# Patient Record
Sex: FEMALE | Race: WHITE | NOT HISPANIC OR LATINO | Employment: OTHER | URBAN - METROPOLITAN AREA
[De-identification: names, ages, dates, MRNs, and addresses within clinical notes are randomized per-mention and may not be internally consistent; named-entity substitution may affect disease eponyms.]

---

## 2017-01-04 DIAGNOSIS — I10 ESSENTIAL HYPERTENSION, BENIGN: Primary | ICD-10-CM

## 2017-01-04 RX ORDER — LISINOPRIL 20 MG/1
20 TABLET ORAL DAILY
Qty: 90 TABLET | Refills: 3 | Status: SHIPPED | OUTPATIENT
Start: 2017-01-04 | End: 2017-08-14

## 2017-01-16 ENCOUNTER — ANTICOAGULATION THERAPY VISIT (OUTPATIENT)
Dept: NURSING | Facility: CLINIC | Age: 77
End: 2017-01-16
Payer: COMMERCIAL

## 2017-01-16 DIAGNOSIS — Z79.01 LONG-TERM (CURRENT) USE OF ANTICOAGULANTS: Primary | ICD-10-CM

## 2017-01-16 LAB — INR POINT OF CARE: 3.1 (ref 0.86–1.14)

## 2017-01-16 PROCEDURE — 99207 ZZC NO CHARGE NURSE ONLY: CPT

## 2017-01-16 PROCEDURE — 85610 PROTHROMBIN TIME: CPT | Mod: QW

## 2017-01-16 PROCEDURE — 36416 COLLJ CAPILLARY BLOOD SPEC: CPT

## 2017-01-16 NOTE — MR AVS SNAPSHOT
Salome SPRAGUE Darlin   1/16/2017 3:00 PM   Anticoagulation Therapy Visit    Description:  76 year old female   Provider:   ANTICOAGULATION CLINIC   Department:   Nurse           INR as of 1/16/2017     Selected INR 3.1! (1/16/2017)      Anticoagulation Summary as of 1/16/2017     INR goal 2.0-3.0   Selected INR 3.1! (1/16/2017)   Full instructions 2.5 mg on Mon, Wed, Fri; 5 mg all other days   Next INR check 2/13/2017    Indications   Long-term (current) use of anticoagulants [Z79.01] [Z79.01]  CVA (cerebral vascular accident) (Resolved) [I63.9]         Your next Anticoagulation Clinic appointment(s)     Feb 13, 2017  3:00 PM   Anticoagulation Visit with  ANTICOAGULATION CLINIC   [facility] [place] ([facility])    [address] [address] [phone]              Contact Numbers     [place] [facility]  Please call [phone] with any problems or questions regarding your therapy, or to cancel or reschedule your appointment        January 2017 Details    Sun Mon Tue Wed Thu Fri Sat     1               2               3               4               5               6               7                 8               9               10               11               12               13               14                 15               16      2.5 mg   See details      17      5 mg         18      2.5 mg         19      5 mg         20      2.5 mg         21      5 mg           22      5 mg         23      2.5 mg         24      5 mg         25      2.5 mg         26      5 mg         27      2.5 mg         28      5 mg           29      5 mg         30      2.5 mg         31      5 mg              Date Details   01/16 This INR check   Increase greens               How to take your warfarin dose     To take:  2.5 mg Take 0.5 of a 5 mg tablet.    To take:  5 mg Take 1 of the 5 mg tablets.           February 2017 Details    Sun Mon Tue Wed Thu Fri Sat        1      2.5 mg         2       5 mg         3      2.5 mg         4      5 mg           5      5 mg         6      2.5 mg         7      5 mg         8      2.5 mg         9      5 mg         10      2.5 mg         11      5 mg           12      5 mg         13            14               15               16               17               18                 19               20               21               22               23               24               25                 26               27               28                    Date Details   No additional details    Date of next INR:  2/13/2017         How to take your warfarin dose     To take:  2.5 mg Take 0.5 of a 5 mg tablet.    To take:  5 mg Take 1 of the 5 mg tablets.

## 2017-01-16 NOTE — PROGRESS NOTES
ANTICOAGULATION FOLLOW-UP CLINIC VISIT    Patient Name:  Salome Saeed  Date:  1/16/2017  Contact Type:  Face to Face    SUBJECTIVE:     Patient Findings     Positives No Problem Findings           OBJECTIVE    INR PROTIME   Date Value Ref Range Status   01/16/2017 3.1* 0.86 - 1.14 Final       ASSESSMENT / PLAN  INR assessment THER    Recheck INR In: 4 WEEKS    INR Location Clinic      Anticoagulation Summary as of 1/16/2017     INR goal 2.0-3.0   Selected INR 3.1! (1/16/2017)   Maintenance plan 2.5 mg (5 mg x 0.5) on Mon, Wed, Fri; 5 mg (5 mg x 1) all other days   Full instructions 2.5 mg on Mon, Wed, Fri; 5 mg all other days   Weekly total 27.5 mg   Plan last modified Leyla Lopez RN (1/11/2016)   Next INR check 2/13/2017   Target end date     Indications   Long-term (current) use of anticoagulants [Z79.01] [Z79.01]  CVA (cerebral vascular accident) (Resolved) [I63.9]         Anticoagulation Episode Summary     INR check location     Preferred lab     Send INR reminders to [facility]    Comments             See the Encounter Report to view Anticoagulation Flowsheet and Dosing Calendar (Go to Encounters tab in chart review, and find the Anticoagulation Therapy Visit)    Rosanne Rico RN

## 2017-01-31 ENCOUNTER — TRANSFERRED RECORDS (OUTPATIENT)
Dept: HEALTH INFORMATION MANAGEMENT | Facility: CLINIC | Age: 77
End: 2017-01-31

## 2017-02-02 ENCOUNTER — TELEPHONE (OUTPATIENT)
Dept: FAMILY MEDICINE | Facility: CLINIC | Age: 77
End: 2017-02-02

## 2017-02-02 NOTE — TELEPHONE ENCOUNTER
Spoke To patient. She went and talked to Dr. Harris.  He is recommending surgery. She feels very comfortable with  Dr. Harris. Her sons are encouraging her to potentially  Get a second opinion.She is asking for my advice. I told her if she is comfortable with Dr. Harris, She should stick with him.  She feels most comfortable doing this.  She will follow-up to address her  Coumadin prior to her surgery.    Karen Weiler, MD

## 2017-02-02 NOTE — TELEPHONE ENCOUNTER
Name of caller: Salome  Relationship to Patient: Self    Reason for Call: Patient would like to talk to Dr. Weiler about personal issues.  She will no be home between 12:30 to 3:30 today, but she would like to speak with her today.    Best phone number to reach patient at: [phone]  OK to leave message with medical info.?: Yes    Nereyda Harkins  Patient Representative - [facility]

## 2017-02-14 ENCOUNTER — ANTICOAGULATION THERAPY VISIT (OUTPATIENT)
Dept: NURSING | Facility: CLINIC | Age: 77
End: 2017-02-14
Payer: COMMERCIAL

## 2017-02-14 VITALS — SYSTOLIC BLOOD PRESSURE: 118 MMHG | DIASTOLIC BLOOD PRESSURE: 60 MMHG

## 2017-02-14 DIAGNOSIS — Z79.01 LONG-TERM (CURRENT) USE OF ANTICOAGULANTS: ICD-10-CM

## 2017-02-14 LAB — INR POINT OF CARE: 2.8 (ref 0.86–1.14)

## 2017-02-14 PROCEDURE — 36416 COLLJ CAPILLARY BLOOD SPEC: CPT

## 2017-02-14 PROCEDURE — 85610 PROTHROMBIN TIME: CPT | Mod: QW

## 2017-02-14 PROCEDURE — 99207 ZZC NO CHARGE NURSE ONLY: CPT

## 2017-02-14 NOTE — MR AVS SNAPSHOT
Salomeduane Saeed   2/14/2017 2:00 PM   Anticoagulation Therapy Visit    Description:  76 year old female   Provider:  [facility]   Department:  [facility]           INR as of 2/14/2017     Today's INR 2.8      Anticoagulation Summary as of 2/14/2017     INR goal 2.0-3.0   Today's INR 2.8   Full instructions 2.5 mg on Mon, Wed, Fri; 5 mg all other days   Next INR check 3/14/2017    Indications   Long-term (current) use of anticoagulants [Z79.01] [Z79.01]  CVA (cerebral vascular accident) (Resolved) [I63.9]         Contact Numbers     [facility]  Please call [phone] with any problems or questions regarding your therapy, or to cancel or reschedule your appointment        February 2017 Details    Sun Mon Tue Wed Thu Fri Sat        1               2               3               4                 5               6               7               8               9               10               11                 12               13               14      5 mg   See details      15      2.5 mg         16      5 mg         17      2.5 mg         18      5 mg           19      5 mg         20      2.5 mg         21      5 mg         22      2.5 mg         23      5 mg         24      2.5 mg         25      5 mg           26      5 mg         27      2.5 mg         28      5 mg              Date Details   02/14 This INR check               How to take your warfarin dose     To take:  2.5 mg Take 0.5 of a 5 mg tablet.    To take:  5 mg Take 1 of the 5 mg tablets.           March 2017 Details    Sun Mon Tue Wed Thu Fri Sat        1      2.5 mg         2      5 mg         3      2.5 mg         4      5 mg           5      5 mg         6      2.5 mg         7      5 mg         8      2.5 mg         9      5 mg         10      2.5 mg         11      5 mg           12      5 mg         13      2.5 mg         14            15               16               17               18                 19                20               21               22               23               24               25                 26               27               28               29               30               31                 Date Details   No additional details    Date of next INR:  3/14/2017         How to take your warfarin dose     To take:  2.5 mg Take 0.5 of a 5 mg tablet.    To take:  5 mg Take 1 of the 5 mg tablets.

## 2017-02-14 NOTE — PROGRESS NOTES
ANTICOAGULATION FOLLOW-UP CLINIC VISIT    Patient Name:  Salome Saeed  Date:  2/14/2017  Contact Type:  Face to Face    SUBJECTIVE:     Patient Findings     Positives No Problem Findings           OBJECTIVE    INR Protime   Date Value Ref Range Status   02/14/2017 2.8 (A) 0.86 - 1.14 Final       ASSESSMENT / PLAN  No question data found.  Anticoagulation Summary as of 2/14/2017     INR goal 2.0-3.0   Today's INR 2.8   Maintenance plan 2.5 mg (5 mg x 0.5) on Mon, Wed, Fri; 5 mg (5 mg x 1) all other days   Full instructions 2.5 mg on Mon, Wed, Fri; 5 mg all other days   Weekly total 27.5 mg   No change documented Day, NUBIA Lay   Plan last modified Leyla Lopez RN (1/11/2016)   Next INR check 3/14/2017   Target end date     Indications   Long-term (current) use of anticoagulants [Z79.01] [Z79.01]  CVA (cerebral vascular accident) (Resolved) [I63.9]         Anticoagulation Episode Summary     INR check location     Preferred lab     Send INR reminders to  [facility]    Comments             See the Encounter Report to view Anticoagulation Flowsheet and Dosing Calendar (Go to Encounters tab in chart review, and find the Anticoagulation Therapy Visit)      Rosanne Rico RN

## 2017-03-14 ENCOUNTER — ANTICOAGULATION THERAPY VISIT (OUTPATIENT)
Dept: NURSING | Facility: CLINIC | Age: 77
End: 2017-03-14
Payer: COMMERCIAL

## 2017-03-14 DIAGNOSIS — Z79.01 LONG-TERM (CURRENT) USE OF ANTICOAGULANTS: ICD-10-CM

## 2017-03-14 LAB — INR POINT OF CARE: 3.1 (ref 0.86–1.14)

## 2017-03-14 PROCEDURE — 36416 COLLJ CAPILLARY BLOOD SPEC: CPT

## 2017-03-14 PROCEDURE — 99207 ZZC NO CHARGE NURSE ONLY: CPT

## 2017-03-14 PROCEDURE — 85610 PROTHROMBIN TIME: CPT | Mod: QW

## 2017-03-14 NOTE — PROGRESS NOTES
ANTICOAGULATION FOLLOW-UP CLINIC VISIT    Patient Name:  Salome Saeed  Date:  3/14/2017  Contact Type:  Face to Face    SUBJECTIVE:     Patient Findings     Positives No Problem Findings           OBJECTIVE    INR Protime   Date Value Ref Range Status   03/14/2017 3.1 (A) 0.86 - 1.14 Final       ASSESSMENT / PLAN  INR assessment THER    Recheck INR In: 4 WEEKS    INR Location Clinic      Anticoagulation Summary as of 3/14/2017     INR goal 2.0-3.0   Today's INR 3.1!   Maintenance plan 2.5 mg (5 mg x 0.5) on Mon, Wed, Fri; 5 mg (5 mg x 1) all other days   Full instructions 2.5 mg on Mon, Wed, Fri; 5 mg all other days   Weekly total 27.5 mg   Plan last modified Leyla Lopez RN (1/11/2016)   Next INR check 4/10/2017   Target end date     Indications   Long-term (current) use of anticoagulants [Z79.01] [Z79.01]  CVA (cerebral vascular accident) (Resolved) [I63.9]         Anticoagulation Episode Summary     INR check location     Preferred lab     Send INR reminders to [facility]    Comments             See the Encounter Report to view Anticoagulation Flowsheet and Dosing Calendar (Go to Encounters tab in chart review, and find the Anticoagulation Therapy Visit)      Rosanne Rico RN

## 2017-03-14 NOTE — MR AVS SNAPSHOT
Salome SPRAGUE Darlin   3/14/2017 2:00 PM   Anticoagulation Therapy Visit    Description:  76 year old female   Provider:  [facility]   Department:  [facility]           INR as of 3/14/2017     Today's INR 3.1!      Anticoagulation Summary as of 3/14/2017     INR goal 2.0-3.0   Today's INR 3.1!   Full instructions 2.5 mg on Mon, Wed, Fri; 5 mg all other days   Next INR check 4/10/2017    Indications   Long-term (current) use of anticoagulants [Z79.01] [Z79.01]  CVA (cerebral vascular accident) (Resolved) [I63.9]         Contact Numbers     [facility]  Please call [phone] with any problems or questions regarding your therapy, or to cancel or reschedule your appointment        March 2017 Details    Sun Mon Tue Wed Thu Fri Sat        1               2               3               4                 5               6               7               8               9               10               11                 12               13               14      5 mg   See details      15      2.5 mg         16      5 mg         17      2.5 mg         18      5 mg           19      5 mg         20      2.5 mg         21      5 mg         22      2.5 mg         23      5 mg         24      2.5 mg         25      5 mg           26      5 mg         27      2.5 mg         28      5 mg         29      2.5 mg         30      5 mg         31      2.5 mg           Date Details   03/14 This INR check   Increase greens               How to take your warfarin dose     To take:  2.5 mg Take 0.5 of a 5 mg tablet.    To take:  5 mg Take 1 of the 5 mg tablets.           April 2017 Details    Sun Mon Tue Wed Thu Fri Sat           1      5 mg           2      5 mg         3      2.5 mg         4      5 mg         5      2.5 mg         6      5 mg         7      2.5 mg         8      5 mg           9      5 mg         10            11               12               13               14               15                 16                17               18               19               20               21               22                 23               24               25               26               27               28               29                 30                      Date Details   No additional details    Date of next INR:  4/10/2017         How to take your warfarin dose     To take:  2.5 mg Take 0.5 of a 5 mg tablet.    To take:  5 mg Take 1 of the 5 mg tablets.

## 2017-03-17 DIAGNOSIS — E03.9 HYPOTHYROIDISM, UNSPECIFIED TYPE: ICD-10-CM

## 2017-03-17 NOTE — TELEPHONE ENCOUNTER
levothyroxine (SYNTHROID,LEVOTHROID) 100 MCG tablet     Last Written Prescription Date: 7/14/2016  Last Quantity: 90 tablet, # refills: 3  Last Office Visit with G, P or [facility] prescribing provider: 10/18/2016   Next 5 appointments (look out 90 days)     Apr 10, 2017 10:00 AM CDT   Pre-Op physical with Karen Weiler, MD   [facility]    [address] [facility] [id number] [phone]                   TSH   Date Value Ref Range Status   05/04/2016 0.16 (L) 0.40 - 4.00 mU/L Final

## 2017-03-20 RX ORDER — LEVOTHYROXINE SODIUM 100 UG/1
100 TABLET ORAL DAILY
Qty: 90 TABLET | Refills: 0 | Status: SHIPPED | OUTPATIENT
Start: 2017-03-20 | End: 2017-05-24

## 2017-03-20 NOTE — TELEPHONE ENCOUNTER
Previous rx was sent to [facility]. Patient now requesting through mail order. Rx updated and sent to mail order.  Rosanne Rico RN, BSN  [facility]

## 2017-04-10 ENCOUNTER — OFFICE VISIT (OUTPATIENT)
Dept: FAMILY MEDICINE | Facility: CLINIC | Age: 77
End: 2017-04-10
Payer: COMMERCIAL

## 2017-04-10 ENCOUNTER — ANTICOAGULATION THERAPY VISIT (OUTPATIENT)
Dept: FAMILY MEDICINE | Facility: CLINIC | Age: 77
End: 2017-04-10
Payer: COMMERCIAL

## 2017-04-10 VITALS
HEIGHT: 64 IN | OXYGEN SATURATION: 98 % | HEART RATE: 66 BPM | TEMPERATURE: 97.9 F | SYSTOLIC BLOOD PRESSURE: 119 MMHG | WEIGHT: 153.8 LBS | BODY MASS INDEX: 26.26 KG/M2 | DIASTOLIC BLOOD PRESSURE: 66 MMHG

## 2017-04-10 DIAGNOSIS — Z79.01 LONG-TERM (CURRENT) USE OF ANTICOAGULANTS: ICD-10-CM

## 2017-04-10 DIAGNOSIS — Z01.818 PREOP GENERAL PHYSICAL EXAM: Primary | ICD-10-CM

## 2017-04-10 LAB
ERYTHROCYTE [DISTWIDTH] IN BLOOD BY AUTOMATED COUNT: 14.5 % (ref 10–15)
HCT VFR BLD AUTO: 40.8 % (ref 35–47)
HGB BLD-MCNC: 13.7 G/DL (ref 11.7–15.7)
INR POINT OF CARE: 2.7 (ref 0.86–1.14)
MCH RBC QN AUTO: 29.3 PG (ref 26.5–33)
MCHC RBC AUTO-ENTMCNC: 33.6 G/DL (ref 31.5–36.5)
MCV RBC AUTO: 87 FL (ref 78–100)
PLATELET # BLD AUTO: 220 10E9/L (ref 150–450)
RBC # BLD AUTO: 4.68 10E12/L (ref 3.8–5.2)
WBC # BLD AUTO: 6.6 10E9/L (ref 4–11)

## 2017-04-10 PROCEDURE — 36416 COLLJ CAPILLARY BLOOD SPEC: CPT | Performed by: FAMILY MEDICINE

## 2017-04-10 PROCEDURE — 93000 ELECTROCARDIOGRAM COMPLETE: CPT | Performed by: FAMILY MEDICINE

## 2017-04-10 PROCEDURE — 80048 BASIC METABOLIC PNL TOTAL CA: CPT | Performed by: FAMILY MEDICINE

## 2017-04-10 PROCEDURE — 85027 COMPLETE CBC AUTOMATED: CPT | Performed by: FAMILY MEDICINE

## 2017-04-10 PROCEDURE — 99214 OFFICE O/P EST MOD 30 MIN: CPT | Performed by: FAMILY MEDICINE

## 2017-04-10 PROCEDURE — 85610 PROTHROMBIN TIME: CPT | Mod: QW | Performed by: FAMILY MEDICINE

## 2017-04-10 NOTE — NURSING NOTE
"Chief Complaint   Patient presents with     Pre-Op Exam       Initial /66  Pulse 66  Temp 97.9  F (36.6  C) (Oral)  Ht 5' 4\" (1.626 m)  Wt 153 lb 12.8 oz (69.8 kg)  SpO2 98%  BMI 26.4 kg/m2 Estimated body mass index is 26.4 kg/(m^2) as calculated from the following:    Height as of this encounter: 5' 4\" (1.626 m).    Weight as of this encounter: 153 lb 12.8 oz (69.8 kg).  Medication Reconciliation: complete   Petra Whiting Medical Assistant      "

## 2017-04-10 NOTE — MR AVS SNAPSHOT
After Visit Summary   4/10/2017    Salome Saeed    MRN: [id number]           Patient Information     Date Of Birth          1940        Visit Information        Provider Department      4/10/2017 10:00 AM Weiler, Karen, MD [facility] [place]        Today's Diagnoses     Preop general physical exam    -  1      Care Instructions      Before Your Surgery      Call your surgeon if there is any change in your health. This includes signs of a cold or flu (such as a sore throat, runny nose, cough, rash or fever).    Do not smoke, drink alcohol or take over the counter medicine (unless your surgeon or primary care doctor tells you to) for the 24 hours before and after surgery.    If you take prescribed drugs: Follow your doctor s orders about which medicines to take and which to stop until after surgery.    Eating and drinking prior to surgery: follow the instructions from your surgeon    Take a shower or bath the night before surgery. Use the soap your surgeon gave you to gently clean your skin. If you do not have soap from your surgeon, use your regular soap. Do not shave or scrub the surgery site.  Wear clean pajamas and have clean sheets on your bed.         Follow-ups after your visit        Who to contact     If you have questions or need follow up information about today's clinic visit or your schedule please [facility] at [phone].  Normal or non-critical lab and imaging results will be communicated to you by MyChart, letter or phone within 4 business days after the clinic has received the results. If you do not hear from us within 7 days, please contact the clinic through MyChart or phone. If you have a critical or abnormal lab result, we will notify you by phone as soon as possible.  Submit refill requests through "[employer] or call your pharmacy and they will forward the refill request to us. Please allow 3 business days for your refill to be completed.  "         Additional Information About Your Visit        MyChart Information     [employer] lets you send messages to your doctor, view your test results, renew your prescriptions, schedule appointments and more. To sign up, go to [email or web address] . Click on \"Log in\" on the left side of the screen, which will take you to the Welcome page. Then click on \"Sign up Now\" on the right side of the page.     You will be asked to enter the access code listed below, as well as some personal information. Please follow the directions to create your username and password.     Your access code is: [email or web address]  Expires: 2017  7:41 PM     Your access code will  in 90 days. If you need help or a new code, please call your [facility] clinic or [phone].        Care EveryWhere ID     This is your Care EveryWhere ID. This could be used by other organizations to access your [facility] medical records  [id number]        Your Vitals Were     Pulse Temperature Height Pulse Oximetry BMI (Body Mass Index)       66 97.9  F (36.6  C) (Oral) 5' 4\" (1.626 m) 98% 26.4 kg/m2        Blood Pressure from Last 3 Encounters:   04/10/17 119/66   17 118/60   10/18/16 132/70    Weight from Last 3 Encounters:   04/10/17 153 lb 12.8 oz (69.8 kg)   10/18/16 150 lb 6.4 oz (68.2 kg)   16 150 lb 3.2 oz (68.1 kg)              We Performed the Following     Basic metabolic panel     CBC with platelets     EKG 12-lead complete w/read - Clinics          Today's Medication Changes          These changes are accurate as of: 4/10/17 11:59 PM.  If you have any questions, ask your nurse or doctor.               Start taking these medicines.        Dose/Directions    enoxaparin 60 MG/0.6ML injection   Commonly known as:  LOVENOX   Used for:  Long-term (current) use of anticoagulants   Started by:  Weiler, Karen, MD        Dose:  60 mg   Inject 0.6 mLs (60 mg) Subcutaneous every 12 hours   Quantity:  6 mL   Refills:  0            Where to get " your medicines      These medications were sent to [id number] - [place], [place] - [address]  [address] [address] [place] [address]     Phone:  [phone]     enoxaparin 60 MG/0.6ML injection                Primary Care Provider Office Phone # Fax #    Karen Weiler, [phone]       [facility] [address]  [place] [address]        Thank you!     Thank you for choosing [facility]  for your care. Our goal is always to provide you with excellent care. Hearing back from our patients is one way we can continue to improve our services. Please take a few minutes to complete the written survey that you may receive in the mail after your visit with us. Thank you!             Your Updated Medication List - Protect others around you: Learn how to safely use, store and throw away your medicines at www.disposemymeds.org.          This list is accurate as of: 4/10/17 11:59 PM.  Always use your most recent med list.                   Brand Name Dispense Instructions for use    acetaminophen 325 MG tablet    TYLENOL     Take 2 tablets by mouth every 6 hours as needed for pain.       amLODIPine 5 MG tablet    NORVASC    90 tablet    Take 1 tablet (5 mg) by mouth daily       ascorbic acid 500 MG tablet    VITAMIN C     Take 1,000 mg by mouth daily       aspirin EC 81 MG EC tablet      Take 1 tablet (81 mg) by mouth daily       atenolol 25 MG tablet    TENORMIN    180 tablet    Take 1 tablet (25 mg) by mouth 2 times daily       CALCIUM 600 + D PO      Take 1,200 mg by mouth daily       CoQ-10 200 MG Caps      Take 400 mg by mouth daily       DAILY MULTIVITAMIN PO      Take by mouth daily       enoxaparin 60 MG/0.6ML injection    LOVENOX    6 mL    Inject 0.6 mLs (60 mg) Subcutaneous every 12 hours       escitalopram 5 MG tablet    LEXAPRO    90 tablet    Take 1 tablet (5 mg) by mouth daily       levothyroxine 100 MCG tablet    SYNTHROID/LEVOTHROID    90 tablet    Take 1  tablet (100 mcg) by mouth daily       lisinopril 20 MG tablet    PRINIVIL/ZESTRIL    90 tablet    Take 1 tablet (20 mg) by mouth daily       nitroglycerin 0.4 MG sublingual tablet    NITROSTAT    25 tablet    Place 1 tablet (0.4 mg) under the tongue every 5 minutes as needed for chest pain       ranitidine 150 MG tablet    ZANTAC    180 tablet    Take 1 tablet (150 mg) by mouth 2 times daily       rosuvastatin 10 MG tablet    CRESTOR    90 tablet    Take 1 tablet (10 mg) by mouth daily       vitamin D 2000 UNITS tablet      Take 2,000 Units by mouth daily       warfarin 5 MG tablet    COUMADIN    90 tablet    Take 0.5-1 tab daily as directed by INR nurse based on INR reading.

## 2017-04-10 NOTE — LETTER
[facility]     [address] [place], [place] [address] [phone]   April 12, 2017    Salome Saeed  [address]  [place] [address]      Dear Salome,    Here is a summary of your recent test results:    All of your labs are normal    Your test results are enclosed.      Thank you for choosing [facility].  We appreciate the opportunity to serve you and look forward to supporting your healthcare needs in the future.    If you have any questions or concerns, please call me or my staff at [phone].    Best regards,  Karen Weiler, MD      Results for orders placed or performed in visit on 04/10/17   CBC with platelets   Result Value Ref Range    WBC 6.6 4.0 - 11.0 10e9/L    RBC Count 4.68 3.8 - 5.2 10e12/L    Hemoglobin 13.7 11.7 - 15.7 g/dL    Hematocrit 40.8 35.0 - 47.0 %    MCV 87 78 - 100 fl    MCH 29.3 26.5 - 33.0 pg    MCHC 33.6 31.5 - 36.5 g/dL    RDW 14.5 10.0 - 15.0 %    Platelet Count 220 150 - 450 10e9/L   Basic metabolic panel   Result Value Ref Range    Sodium 134 133 - 144 mmol/L    Potassium 5.0 3.4 - 5.3 mmol/L    Chloride 98 94 - 109 mmol/L    Carbon Dioxide 28 20 - 32 mmol/L    Anion Gap 8 3 - 14 mmol/L    Glucose 103 (H) 70 - 99 mg/dL    Urea Nitrogen 13 7 - 30 mg/dL    Creatinine 0.68 0.52 - 1.04 mg/dL    GFR Estimate 85 >60 mL/min/1.7m2    GFR Estimate If Black >90   GFR Calc   >60 mL/min/1.7m2    Calcium 9.2 8.5 - 10.1 mg/dL

## 2017-04-10 NOTE — PROGRESS NOTES
[facility]  [address] [address] [phone]  Dept: [phone]    PRE-OP EVALUATION:  Today's date: 4/10/2017    Salome Saeed (: 1940) presents for pre-operative evaluation assessment as requested by Dr. Harris.  She requires evaluation and anesthesia risk assessment prior to undergoing surgery/procedure for treatment of Back .  Proposed procedure: Decompression Levels: L3 to L4 Posterior spine fusion : L2 to L4    Date of Surgery/ Procedure: 17  Time of Surgery/ Procedure: 12.30 PM   Hospital/Surgical Facility: [facility]   Fax number for surgical facility: [phone]  Primary Physician: Weiler, Karen  Type of Anesthesia Anticipated: General    Patient has a Health Care Directive or Living Will:  NO    1. YES - DO YOU HAVE A HISTORY OF HEART ATTACK, STROKE, STENT, BYPASS OR SURGERY ON AN ARTERY IN THE HEAD, NECK, HEART OR LEG? Stroke , stents   2. NO - Do you ever have any pain or discomfort in your chest?  3. NO - Do you have a history of  Heart Failure?  4. NO - Are you troubled by shortness of breath when: walking on the level, up a slight hill or at night?  5. NO - Do you currently have a cold, bronchitis or other respiratory infection?  6. NO - Do you have a cough, shortness of breath or wheezing?  7. NO - DO YOU SOMETIMES GET PAINS IN THE CALVES OF YOUR LEGS WHEN YOU WALK?   8. NO - Do you or anyone in your family have previous history of blood clots?  9. NO - Do you or does anyone in your family have a serious bleeding problem such as prolonged bleeding following surgeries or cuts?  10. NO - Have you ever had problems with anemia or been told to take iron pills?  11. NO - Have you had any abnormal blood loss such as black, tarry or bloody stools, or abnormal vaginal bleeding?  12. NO - Have you ever had a blood transfusion?  13. NO - Have you or any of your relatives ever had problems with anesthesia?  14. NO - Do you have sleep apnea,  excessive snoring or daytime drowsiness?  15. NO - Do you have any prosthetic heart valves?  16. NO - Do you have prosthetic joints?  17. NO - Is there any chance that you may be pregnant?      HPI:                                                      Brief HPI related to upcoming procedure:     Patient reports she will have a posterior spine fusion L2-L4 at [facility], performed by Dr. Harris. Patient reports she is asymptomatic when she is not active. When she stands or walks she experiences pain in her legs. She also has difficulty sleeping due to the pain.       See problem list for active medical problems.  Problems all longstanding and stable, except as noted/documented.  See ROS for pertinent symptoms related to these conditions.                                                                                                  .    MEDICAL HISTORY:                                                      Patient Active Problem List    Diagnosis Date Noted     Hyperlipidemia LDL goal <70 04/01/2010     Priority: High     Coronary artery disease involving native coronary artery of native heart without angina pectoris 04/09/2009     Priority: High     5/28/2015 - PTCA and 2.25x8mm Promus PREMIER NAILA to ostium of small second OM  12/2012 cath - 40-50% stenosis in mid PDA, 80% on D1- medically treat  4/8/2009: PTCA and two 2.5x15mm Xience stents to mid LAD lesion        Benign essential hypertension      Priority: High     Cerebrovascular accident involving cerebellum (H) 10/26/2016     Priority: Medium     Vertebral artery stenosis, right      Priority: Medium     Hypothyroidism, unspecified type 05/18/2016     Priority: Medium     Long-term (current) use of anticoagulants [Z79.01] 01/11/2016     Priority: Medium     GERD (gastroesophageal reflux disease)      Priority: Medium     Status post coronary angiogram 05/28/2015     Mitral regurgitation      1+ per 7/2013 Echo       Anxiety 08/08/2013     Health Care Home  07/23/2013     EMERGENCY CARE PLAN  Presenting Problem Signs and Symptoms Treatment Plan    Questions or conerns during clinic hours    I will call the clinic directly     Questions or conerns outside clinic hours    I will call the 24 hour nurse line at [phone]    Patient needs to schedule an appointment    I will call the 24 hour scheduling team at [phone] or clinic directly    Same day treatment     I will call the clinic first, nurse line if after hours, urgent care and express care if needed                          Clinic Care Coordinator:Apolinar Green, [phone]  **Pt not in active care coordination at this time.       Spinal stenosis, lumbar region, with neurogenic claudication 05/08/2013     Lumbago 09/07/2012     DDD (degenerative disc disease), lumbar 07/09/2012     Lumbar spinal stenosis 07/09/2012     Advanced directives, counseling/discussion 06/18/2012     Discussed advance care planning with patient; however, patient declined at this time. 6/18/2012           Past Medical History:   Diagnosis Date     CAD (coronary artery disease) 4/9/2009 4/8/2009: PTCA and two 2.5x15mm Xience stents to mid LAD lesion; Cath 12/2012- 40-50% stenosis in mid PDA, 80% on D1- medically treat , 5/28/2015 - PTCA and 2.25x8mm Promus PREMIIER NAILA to ostium of 2nd OM     CVA (cerebral infarction)      DDD (degenerative disc disease)      Essential hypertension, benign      GERD (gastroesophageal reflux disease)      Headache      Hyperlipidemia      Hypothyroidism      Lumbago      Lumbar spinal stenosis      Mitral regurgitation     1+ per 7/2013 Echo     Vertebral artery stenosis, right      Past Surgical History:   Procedure Laterality Date     CORONARY ANGIOGRAPHY ADULT ORDER  12/6/2012    40-50% stenosis to mid PDA, 80% in D1- medically treat     CORONARY ANGIOGRAPHY ADULT ORDER  5/28/2015    PTCA and 2.25x8mm Promus PREMIER NAILA to ostium of 2nd OM     DECOMPRESSION, FUSION LUMBAR POSTERIOR THREE +  LEVELS, COMBINED  5/8/2013    Procedure: COMBINED DECOMPRESSION, FUSION LUMBAR POSTERIOR THREE + LEVELS;  Posterior Lumbar Decompression L3-S1, Fusion L4-S1;  Surgeon: Daniel Harris MD;  Location: [facility] OR     ENDOSCOPIC STRIPPING VEIN(S)       HEART CATH, ANGIOPLASTY  4/8/2009    PTCA and two 2.5x15mm Xience stents to mid LAD lesion     HYSTERECTOMY, RICKIE      Fibroids, and Menorrhagia.. Bilateral Oophrectomy     ORTHOPEDIC SURGERY       Stenting of LAD, Angioplasty  4/8/09     TONSILLECTOMY      as a child     Current Outpatient Prescriptions   Medication Sig Dispense Refill     levothyroxine (SYNTHROID/LEVOTHROID) 100 MCG tablet Take 1 tablet (100 mcg) by mouth daily 90 tablet 0     lisinopril (PRINIVIL/ZESTRIL) 20 MG tablet Take 1 tablet (20 mg) by mouth daily 90 tablet 3     warfarin (COUMADIN) 5 MG tablet Take 0.5-1 tab daily as directed by INR nurse based on INR reading. 90 tablet 1     aspirin EC 81 MG tablet Take 1 tablet (81 mg) by mouth daily       atenolol (TENORMIN) 25 MG tablet Take 1 tablet (25 mg) by mouth 2 times daily 180 tablet 3     rosuvastatin (CRESTOR) 10 MG tablet Take 1 tablet (10 mg) by mouth daily 90 tablet 3     amLODIPine (NORVASC) 5 MG tablet Take 1 tablet (5 mg) by mouth daily 90 tablet 3     escitalopram (LEXAPRO) 5 MG tablet Take 1 tablet (5 mg) by mouth daily 90 tablet 3     Multiple Vitamin (DAILY MULTIVITAMIN PO) Take by mouth daily       Calcium Carb-Cholecalciferol (CALCIUM 600 + D PO) Take 1,200 mg by mouth daily        Cholecalciferol (VITAMIN D) 2000 UNITS tablet Take 2,000 Units by mouth daily       Coenzyme Q10 (COQ-10) 200 MG CAPS Take 400 mg by mouth daily        ascorbic acid (VITAMIN C) 500 MG tablet Take 1,000 mg by mouth daily        ranitidine (ZANTAC) 150 MG tablet Take 1 tablet (150 mg) by mouth 2 times daily 180 tablet 1     nitroglycerin (NITROSTAT) 0.4 MG SL tablet Place 1 tablet (0.4 mg) under the tongue every 5 minutes as needed for chest pain 25 tablet 2  "    acetaminophen (TYLENOL) 325 MG tablet Take 2 tablets by mouth every 6 hours as needed for pain.  0     OTC products: Tylenol and vitamins     Allergies   Allergen Reactions     Atorvastatin      Leg cramps     Gluten      Sinuses affected by gluten     Magnesium Salicylate      Oat      Shellfish Allergy      hives     Wheat       Latex Allergy: NO    Social History   Substance Use Topics     Smoking status: Former Smoker     Quit date: 1/1/1965     Smokeless tobacco: Never Used     Alcohol use 0.0 oz/week     0 Standard drinks or equivalent per week      Comment: 2 glasses wine per month     History   Drug Use No       REVIEW OF SYSTEMS:                                                    C: NEGATIVE for fever, chills, change in weight  I: NEGATIVE for worrisome rashes, moles or lesions  E: NEGATIVE for vision changes or irritation  E/M: NEGATIVE for ear, mouth and throat problems  R: NEGATIVE for significant cough or SOB  CV: NEGATIVE for chest pain, palpitations or peripheral edema  GI: NEGATIVE for nausea, abdominal pain, heartburn, or change in bowel habits  : NEGATIVE for frequency, dysuria, or hematuria  M: NEGATIVE for significant arthralgias or myalgia  N: NEGATIVE for weakness, dizziness or paresthesias  E: NEGATIVE for temperature intolerance, skin/hair changes  H: NEGATIVE for bleeding problems  P: NEGATIVE for changes in mood or affect    This document serves as a record of the services and decisions personally performed and made by Karen Weiler, MD. It was created on her behalf by Maria Antonia Milton, a trained medical scribe. The creation of this document is based the provider's statements to the medical scribe.  Maria Antonia Milton April 10, 2017 11:05 AM    EXAM:                                                    /66  Pulse 66  Temp 97.9  F (36.6  C) (Oral)  Ht 1.626 m (5' 4\")  Wt 69.8 kg (153 lb 12.8 oz)  SpO2 98%  BMI 26.4 kg/m2    GENERAL APPEARANCE: healthy, alert and no distress     EYES: EOMI, " PERRL     HENT: ear canals and TM's normal and nose and mouth without ulcers or lesions     NECK: no adenopathy, no asymmetry, masses, or scars and thyroid normal to palpation     RESP: lungs clear to auscultation - no rales, rhonchi or wheezes     CV: regular rates and rhythm, normal S1 S2, no S3 or S4 and no murmur, click or rub     ABDOMEN:  soft, nontender, no HSM or masses and bowel sounds normal     MS: extremities normal- no gross deformities noted, no evidence of inflammation in joints, FROM in all extremities.     SKIN: no suspicious lesions or rashes     NEURO: Normal strength and tone, sensory exam grossly normal, mentation intact and speech normal     PSYCH: mentation appears normal. and affect normal/bright     LYMPHATICS: No axillary, cervical, or supraclavicular nodes    DIAGNOSTICS:                                                      EKG: appears normal, NSR, normal axis, normal intervals, no acute ST/T changes c/w ischemia, no LVH by voltage criteria, unchanged from previous tracings  Labs Resulted Today:   Results for orders placed or performed in visit on 04/10/17   CBC with platelets   Result Value Ref Range    WBC 6.6 4.0 - 11.0 10e9/L    RBC Count 4.68 3.8 - 5.2 10e12/L    Hemoglobin 13.7 11.7 - 15.7 g/dL    Hematocrit 40.8 35.0 - 47.0 %    MCV 87 78 - 100 fl    MCH 29.3 26.5 - 33.0 pg    MCHC 33.6 31.5 - 36.5 g/dL    RDW 14.5 10.0 - 15.0 %    Platelet Count 220 150 - 450 10e9/L   Basic metabolic panel   Result Value Ref Range    Sodium 134 133 - 144 mmol/L    Potassium 5.0 3.4 - 5.3 mmol/L    Chloride 98 94 - 109 mmol/L    Carbon Dioxide 28 20 - 32 mmol/L    Anion Gap 8 3 - 14 mmol/L    Glucose 103 (H) 70 - 99 mg/dL    Urea Nitrogen 13 7 - 30 mg/dL    Creatinine 0.68 0.52 - 1.04 mg/dL    GFR Estimate 85 >60 mL/min/1.7m2    GFR Estimate If Black >90   GFR Calc   >60 mL/min/1.7m2    Calcium 9.2 8.5 - 10.1 mg/dL       Recent Labs   Lab Test 03/14/17 02/14/17 08/24/16   0827    05/04/16   1207   07/08/15   1215   HGB   --    --    --    --    --   13.4   --   13.6   PLT   --    --    --    --    --   213   --   225   INR  3.1*  2.8*   < >   --    < >   --    < >   --    NA   --    --    --   137   --   134   < >  129*   POTASSIUM   --    --    --   4.1   --   4.4   < >  4.6   CR   --    --    --   0.59   --   0.65   < >  0.65    < > = values in this interval not displayed.        IMPRESSION:                                                    Reason for surgery/procedure: Posterior spine fusion : L2 to L4  Diagnosis/reason for consult: clear for anesthesia    The proposed surgical procedure is considered INTERMEDIATE risk.    REVISED CARDIAC RISK INDEX  The patient has the following serious cardiovascular risks for perioperative complications such as (MI, PE, VFib and 3  AV Block):  No serious cardiac risks  INTERPRETATION: 1 risks: Class II (low risk - 0.9% complication rate)    The patient has the following additional risks for perioperative complications:  No identified additional risks      ICD-10-CM    1. Preop general physical exam Z01.818 EKG 12-lead complete w/read - Clinics     CBC with platelets     Basic metabolic panel       RECOMMENDATIONS:                                                      Patient is currently on Coumadin for history of CVA.  We'll bridge with Lovenox.  Instructions given to patient.  --Patient is to take all scheduled medications on the day of surgery EXCEPT for modifications listed below.    APPROVAL GIVEN to proceed with proposed procedure, without further diagnostic evaluation     The information in this document, created by the medical scribe for me, accurately reflects the services I personally performed and the decisions made by me. I have reviewed and approved this document for accuracy prior to leaving the patient care area.  4/10/2017 11:04 AM         Signed Electronically by: Karen Weiler, MD    Copy of this evaluation report is provided to  requesting physician.    [facility] Preop Guidelines

## 2017-04-10 NOTE — PROGRESS NOTES
ANTICOAGULATION FOLLOW-UP CLINIC VISIT    Patient Name:  Salome Saeed  Date:  4/10/2017  Contact Type:  Face to Face    SUBJECTIVE:     Patient Findings     Positives No Problem Findings           OBJECTIVE    INR Protime   Date Value Ref Range Status   04/10/2017 2.7 (A) 0.86 - 1.14 Final       ASSESSMENT / PLAN  INR assessment THER    Recheck INR In: 4 WEEKS    INR Location Clinic      Anticoagulation Summary as of 4/10/2017     INR goal 2.0-3.0   Today's INR 2.7   Maintenance plan 2.5 mg (5 mg x 0.5) on Mon, Wed, Fri; 5 mg (5 mg x 1) all other days   Full instructions 4/21: Hold; 4/22: Hold; 4/23: Hold; 4/24: Hold; 4/25: Hold; Otherwise 2.5 mg on Mon, Wed, Fri; 5 mg all other days   Weekly total 27.5 mg   Plan last modified Leyla Lopez (1/11/2016)   Next INR check    Target end date     Indications   Long-term (current) use of anticoagulants [Z79.01] [Z79.01]  CVA (cerebral vascular accident) (Resolved) [I63.9]         Anticoagulation Episode Summary     INR check location     Preferred lab     Send INR reminders to [facility] TRI[facility]    Comments             See the Encounter Report to view Anticoagulation Flowsheet and Dosing Calendar (Go to Encounters tab in chart review, and find the Anticoagulation Therapy Visit)    Patient having spine surgery on 4/26/17. Hold and Lovenox instructions given today. Will plan on following up with patient 5/1/17 via phone - patient may have home care checking INR's post-operatively.    Rosanne Rico RN

## 2017-04-11 LAB
ANION GAP SERPL CALCULATED.3IONS-SCNC: 8 MMOL/L (ref 3–14)
BUN SERPL-MCNC: 13 MG/DL (ref 7–30)
CALCIUM SERPL-MCNC: 9.2 MG/DL (ref 8.5–10.1)
CHLORIDE SERPL-SCNC: 98 MMOL/L (ref 94–109)
CO2 SERPL-SCNC: 28 MMOL/L (ref 20–32)
CREAT SERPL-MCNC: 0.68 MG/DL (ref 0.52–1.04)
GFR SERPL CREATININE-BSD FRML MDRD: 85 ML/MIN/1.7M2
GLUCOSE SERPL-MCNC: 103 MG/DL (ref 70–99)
POTASSIUM SERPL-SCNC: 5 MMOL/L (ref 3.4–5.3)
SODIUM SERPL-SCNC: 134 MMOL/L (ref 133–144)

## 2017-04-17 ENCOUNTER — TRANSFERRED RECORDS (OUTPATIENT)
Dept: HEALTH INFORMATION MANAGEMENT | Facility: CLINIC | Age: 77
End: 2017-04-17

## 2017-04-26 ENCOUNTER — TRANSFERRED RECORDS (OUTPATIENT)
Dept: HEALTH INFORMATION MANAGEMENT | Facility: CLINIC | Age: 77
End: 2017-04-26

## 2017-05-01 ENCOUNTER — ANTICOAGULATION THERAPY VISIT (OUTPATIENT)
Dept: NURSING | Facility: CLINIC | Age: 77
End: 2017-05-01
Payer: COMMERCIAL

## 2017-05-01 DIAGNOSIS — Z79.01 LONG-TERM (CURRENT) USE OF ANTICOAGULANTS: ICD-10-CM

## 2017-05-01 LAB — INR POINT OF CARE: 1 (ref 0.86–1.14)

## 2017-05-01 PROCEDURE — 85610 PROTHROMBIN TIME: CPT | Mod: QW

## 2017-05-01 PROCEDURE — 36416 COLLJ CAPILLARY BLOOD SPEC: CPT

## 2017-05-01 PROCEDURE — 99207 ZZC NO CHARGE NURSE ONLY: CPT

## 2017-05-01 NOTE — MR AVS SNAPSHOT
Salomeduane Saeed   5/1/2017 2:00 PM   Anticoagulation Therapy Visit    Description:  77 year old female   Provider:   ANTICOAGULATION CLINIC   Department:   Nurse           INR as of 5/1/2017     Today's INR 1.0!      Anticoagulation Summary as of 5/1/2017     INR goal 2.0-3.0   Today's INR 1.0!   Full instructions 5/1: 5 mg; 5/3: 5 mg; Otherwise 2.5 mg on Mon, Wed, Fri; 5 mg all other days   Next INR check 5/4/2017    Indications   Long-term (current) use of anticoagulants [Z79.01] [Z79.01]  CVA (cerebral vascular accident) (Resolved) [I63.9]         Your next Anticoagulation Clinic appointment(s)     May 04, 2017  2:00 PM CDT   Anticoagulation Visit with  ANTICOAGULATION CLINIC   [facility]    [address]  [place] [address] [phone]              Contact Numbers     [facility]  Please call [phone] with any problems or questions regarding your therapy, or to cancel or reschedule your appointment        May 2017 Details    Sun Mon Tue Wed Thu Fri Sat      1      5 mg   See details      2      5 mg         3      5 mg         4            5               6                 7               8               9               10               11               12               13                 14               15               16               17               18               19               20                 21               22               23               24               25               26               27                 28               29               30               31                   Date Details   05/01 This INR check   Take 5 mg (5 mg tablets x 1)   Start one dose of Lovenox daily starting today until next appointment on Thursday       Date of next INR:  5/4/2017         How to take your warfarin dose     To take:  5 mg Take 1 of the 5 mg tablets.

## 2017-05-01 NOTE — PROGRESS NOTES
ANTICOAGULATION FOLLOW-UP CLINIC VISIT    Patient Name:  Salome Saeed  Date:  5/1/2017  Contact Type:  Face to Face    SUBJECTIVE:     Patient Findings     Positives Hospital admission (recent back surgery), Intentional hold of therapy           OBJECTIVE    INR Protime   Date Value Ref Range Status   05/01/2017 1.0 0.86 - 1.14 Final       ASSESSMENT / PLAN  INR assessment SUB    Recheck INR In: 3 DAYS    INR Location Clinic      Anticoagulation Summary as of 5/1/2017     INR goal 2.0-3.0   Today's INR 1.0!   Maintenance plan 2.5 mg (5 mg x 0.5) on Mon, Wed, Fri; 5 mg (5 mg x 1) all other days   Full instructions 5/1: 5 mg; 5/3: 5 mg; Otherwise 2.5 mg on Mon, Wed, Fri; 5 mg all other days   Weekly total 27.5 mg   Plan last modified Leyla Lopez (1/11/2016)   Next INR check 5/4/2017   Target end date     Indications   Long-term (current) use of anticoagulants [Z79.01] [Z79.01]  CVA (cerebral vascular accident) (Resolved) [I63.9]         Anticoagulation Episode Summary     INR check location     Preferred lab     Send INR reminders to [facility]    Comments             See the Encounter Report to view Anticoagulation Flowsheet and Dosing Calendar (Go to Encounters tab in chart review, and find the Anticoagulation Therapy Visit)    Dosage adjustment made based on physician directed care plan.    Rosanne Rico RN

## 2017-05-04 ENCOUNTER — ANTICOAGULATION THERAPY VISIT (OUTPATIENT)
Dept: NURSING | Facility: CLINIC | Age: 77
End: 2017-05-04
Payer: COMMERCIAL

## 2017-05-04 DIAGNOSIS — I10 HTN (HYPERTENSION): ICD-10-CM

## 2017-05-04 DIAGNOSIS — Z79.01 LONG-TERM (CURRENT) USE OF ANTICOAGULANTS: ICD-10-CM

## 2017-05-04 LAB — INR POINT OF CARE: 1.6 (ref 0.86–1.14)

## 2017-05-04 PROCEDURE — 36416 COLLJ CAPILLARY BLOOD SPEC: CPT

## 2017-05-04 PROCEDURE — 99207 ZZC NO CHARGE NURSE ONLY: CPT

## 2017-05-04 PROCEDURE — 85610 PROTHROMBIN TIME: CPT | Mod: QW

## 2017-05-04 RX ORDER — AMLODIPINE BESYLATE 5 MG/1
5 TABLET ORAL DAILY
Qty: 90 TABLET | Refills: 1 | Status: SHIPPED | OUTPATIENT
Start: 2017-05-04 | End: 2017-09-25

## 2017-05-04 NOTE — PROGRESS NOTES
ANTICOAGULATION FOLLOW-UP CLINIC VISIT    Patient Name:  Salome aSeed  Date:  5/4/2017  Contact Type:  Face to Face    SUBJECTIVE:     Patient Findings     Positives Hospital admission, Intentional hold of therapy           OBJECTIVE    INR Protime   Date Value Ref Range Status   05/04/2017 1.6 (A) 0.86 - 1.14 Final       ASSESSMENT / PLAN  INR assessment SUB    Recheck INR In: 1 WEEK    INR Location Clinic      Anticoagulation Summary as of 5/4/2017     INR goal 2.0-3.0   Today's INR 1.6!   Maintenance plan 2.5 mg (5 mg x 0.5) on Mon, Wed, Fri; 5 mg (5 mg x 1) all other days   Full instructions 2.5 mg on Mon, Wed, Fri; 5 mg all other days   Weekly total 27.5 mg   No change documented Day, NUBIA Lay   Plan last modified Leyla Lopez (1/11/2016)   Next INR check 5/11/2017   Target end date     Indications   Long-term (current) use of anticoagulants [Z79.01] [Z79.01]  CVA (cerebral vascular accident) (Resolved) [I63.9]         Anticoagulation Episode Summary     INR check location     Preferred lab     Send INR reminders to [facility] TRI[facility]    Comments             See the Encounter Report to view Anticoagulation Flowsheet and Dosing Calendar (Go to Encounters tab in chart review, and find the Anticoagulation Therapy Visit)    Dosage adjustment made based on physician directed care plan.    Rosanne Rico RN

## 2017-05-04 NOTE — MR AVS SNAPSHOT
Salome CELESTINE Darlin   5/4/2017 2:00 PM   Anticoagulation Therapy Visit    Description:  77 year old female   Provider:   ANTICOAGULATION CLINIC   Department:   Nurse           INR as of 5/4/2017     Today's INR 1.6!      Anticoagulation Summary as of 5/4/2017     INR goal 2.0-3.0   Today's INR 1.6!   Full instructions 2.5 mg on Mon, Wed, Fri; 5 mg all other days   Next INR check 5/11/2017    Indications   Long-term (current) use of anticoagulants [Z79.01] [Z79.01]  CVA (cerebral vascular accident) (Resolved) [I63.9]         Your next Anticoagulation Clinic appointment(s)     May 11, 2017  2:30 PM CDT   Anticoagulation Visit with  ANTICOAGULATION CLINIC   [facility]    [address] [place] [place] [id number] [phone]              Contact Numbers     [facility]  Please call [phone] with any problems or questions regarding your therapy, or to cancel or reschedule your appointment        May 2017 Details    Sun Mon Tue Wed Thu Fri Sat      1               2               3               4      5 mg   See details      5      2.5 mg         6      5 mg           7      5 mg         8      2.5 mg         9      5 mg         10      2.5 mg         11            12               13                 14               15               16               17               18               19               20                 21               22               23               24               25               26               27                 28               29               30               31                   Date Details   05/04 This INR check       Date of next INR:  5/11/2017         How to take your warfarin dose     To take:  2.5 mg Take 0.5 of a 5 mg tablet.    To take:  5 mg Take 1 of the 5 mg tablets.

## 2017-05-11 ENCOUNTER — ANTICOAGULATION THERAPY VISIT (OUTPATIENT)
Dept: NURSING | Facility: CLINIC | Age: 77
End: 2017-05-11
Payer: COMMERCIAL

## 2017-05-11 DIAGNOSIS — Z79.01 LONG-TERM (CURRENT) USE OF ANTICOAGULANTS: ICD-10-CM

## 2017-05-11 LAB — INR POINT OF CARE: 2.7 (ref 0.86–1.14)

## 2017-05-11 PROCEDURE — 36416 COLLJ CAPILLARY BLOOD SPEC: CPT

## 2017-05-11 PROCEDURE — 99207 ZZC NO CHARGE NURSE ONLY: CPT

## 2017-05-11 PROCEDURE — 85610 PROTHROMBIN TIME: CPT | Mod: QW

## 2017-05-11 NOTE — PROGRESS NOTES
ANTICOAGULATION FOLLOW-UP CLINIC VISIT    Patient Name:  Salome Saeed  Date:  5/11/2017  Contact Type:  Face to Face    SUBJECTIVE:     Patient Findings     Positives No Problem Findings           OBJECTIVE    INR Protime   Date Value Ref Range Status   05/11/2017 2.7 (A) 0.86 - 1.14 Final       ASSESSMENT / PLAN  INR assessment THER    Recheck INR In: 2 WEEKS    INR Location Clinic      Anticoagulation Summary as of 5/11/2017     INR goal 2.0-3.0   Today's INR 2.7   Maintenance plan 2.5 mg (5 mg x 0.5) on Mon, Wed, Fri; 5 mg (5 mg x 1) all other days   Full instructions 2.5 mg on Mon, Wed, Fri; 5 mg all other days   Weekly total 27.5 mg   No change documented Rosanne Rico RN   Plan last modified Leyla Lopez (1/11/2016)   Next INR check 5/25/2017   Target end date     Indications   Long-term (current) use of anticoagulants [Z79.01] [Z79.01]  CVA (cerebral vascular accident) (Resolved) [I63.9]         Anticoagulation Episode Summary     INR check location     Preferred lab     Send INR reminders to [facility]    Comments             See the Encounter Report to view Anticoagulation Flowsheet and Dosing Calendar (Go to Encounters tab in chart review, and find the Anticoagulation Therapy Visit)    Rosanne Rico, RN

## 2017-05-11 NOTE — MR AVS SNAPSHOT
Salome Eppersonde   5/11/2017 2:30 PM   Anticoagulation Therapy Visit    Description:  77 year old female   Provider:   ANTICOAGULATION CLINIC   Department:   Nurse           INR as of 5/11/2017     Today's INR 2.7      Anticoagulation Summary as of 5/11/2017     INR goal 2.0-3.0   Today's INR 2.7   Full instructions 2.5 mg on Mon, Wed, Fri; 5 mg all other days   Next INR check 5/25/2017    Indications   Long-term (current) use of anticoagulants [Z79.01] [Z79.01]  CVA (cerebral vascular accident) (Resolved) [I63.9]         Your next Anticoagulation Clinic appointment(s)     May 25, 2017  3:00 PM CDT   Anticoagulation Visit with  ANTICOAGULATION CLINIC   [facility] [place] ([facility])    [address]  [facility] [id number] [phone]              Contact Numbers     [place] [facility]  Please call [phone] with any problems or questions regarding your therapy, or to cancel or reschedule your appointment        May 2017 Details    Sun Mon Tue Wed Thu Fri Sat      1               2               3               4               5               6                 7               8               9               10               11      5 mg   See details      12      2.5 mg         13      5 mg           14      5 mg         15      2.5 mg         16      5 mg         17      2.5 mg         18      5 mg         19      2.5 mg         20      5 mg           21      5 mg         22      2.5 mg         23      5 mg         24      2.5 mg         25            26               27                 28               29               30               31                   Date Details   05/11 This INR check       Date of next INR:  5/25/2017         How to take your warfarin dose     To take:  2.5 mg Take 0.5 of a 5 mg tablet.    To take:  5 mg Take 1 of the 5 mg tablets.

## 2017-05-18 DIAGNOSIS — I25.10 CAD (CORONARY ARTERY DISEASE): ICD-10-CM

## 2017-05-18 RX ORDER — ATENOLOL 25 MG/1
25 TABLET ORAL 2 TIMES DAILY
Qty: 180 TABLET | Refills: 1 | Status: SHIPPED | OUTPATIENT
Start: 2017-05-18 | End: 2017-08-14

## 2017-05-24 ENCOUNTER — TELEPHONE (OUTPATIENT)
Dept: FAMILY MEDICINE | Facility: CLINIC | Age: 77
End: 2017-05-24

## 2017-05-24 ENCOUNTER — OFFICE VISIT (OUTPATIENT)
Dept: FAMILY MEDICINE | Facility: CLINIC | Age: 77
End: 2017-05-24
Payer: COMMERCIAL

## 2017-05-24 VITALS
TEMPERATURE: 97.7 F | HEIGHT: 64 IN | BODY MASS INDEX: 25.78 KG/M2 | OXYGEN SATURATION: 98 % | SYSTOLIC BLOOD PRESSURE: 118 MMHG | HEART RATE: 72 BPM | WEIGHT: 151 LBS | DIASTOLIC BLOOD PRESSURE: 60 MMHG

## 2017-05-24 DIAGNOSIS — R53.83 FATIGUE, UNSPECIFIED TYPE: Primary | ICD-10-CM

## 2017-05-24 DIAGNOSIS — M51.369 DDD (DEGENERATIVE DISC DISEASE), LUMBAR: ICD-10-CM

## 2017-05-24 DIAGNOSIS — R11.0 NAUSEA: ICD-10-CM

## 2017-05-24 DIAGNOSIS — E03.9 HYPOTHYROIDISM, UNSPECIFIED TYPE: ICD-10-CM

## 2017-05-24 DIAGNOSIS — F41.9 ANXIETY: ICD-10-CM

## 2017-05-24 LAB
ALBUMIN UR-MCNC: NEGATIVE MG/DL
APPEARANCE UR: CLEAR
BASOPHILS # BLD AUTO: 0.1 10E9/L (ref 0–0.2)
BASOPHILS NFR BLD AUTO: 1.2 %
BILIRUB UR QL STRIP: NEGATIVE
COLOR UR AUTO: YELLOW
CRP SERPL-MCNC: 5.2 MG/L (ref 0–8)
DIFFERENTIAL METHOD BLD: NORMAL
EOSINOPHIL # BLD AUTO: 0.2 10E9/L (ref 0–0.7)
EOSINOPHIL NFR BLD AUTO: 4.8 %
ERYTHROCYTE [DISTWIDTH] IN BLOOD BY AUTOMATED COUNT: 14.7 % (ref 10–15)
ERYTHROCYTE [SEDIMENTATION RATE] IN BLOOD BY WESTERGREN METHOD: 38 MM/H (ref 0–30)
GLUCOSE UR STRIP-MCNC: NEGATIVE MG/DL
HCT VFR BLD AUTO: 37.2 % (ref 35–47)
HGB BLD-MCNC: 12.2 G/DL (ref 11.7–15.7)
HGB UR QL STRIP: ABNORMAL
KETONES UR STRIP-MCNC: NEGATIVE MG/DL
LEUKOCYTE ESTERASE UR QL STRIP: NEGATIVE
LIPASE SERPL-CCNC: 176 U/L (ref 73–393)
LYMPHOCYTES # BLD AUTO: 1.1 10E9/L (ref 0.8–5.3)
LYMPHOCYTES NFR BLD AUTO: 22.1 %
MCH RBC QN AUTO: 29.3 PG (ref 26.5–33)
MCHC RBC AUTO-ENTMCNC: 32.8 G/DL (ref 31.5–36.5)
MCV RBC AUTO: 89 FL (ref 78–100)
MONOCYTES # BLD AUTO: 0.4 10E9/L (ref 0–1.3)
MONOCYTES NFR BLD AUTO: 8.3 %
NEUTROPHILS # BLD AUTO: 3.1 10E9/L (ref 1.6–8.3)
NEUTROPHILS NFR BLD AUTO: 63.6 %
NITRATE UR QL: NEGATIVE
NON-SQ EPI CELLS #/AREA URNS LPF: ABNORMAL /LPF
PH UR STRIP: 7 PH (ref 5–7)
PLATELET # BLD AUTO: 287 10E9/L (ref 150–450)
RBC # BLD AUTO: 4.16 10E12/L (ref 3.8–5.2)
RBC #/AREA URNS AUTO: ABNORMAL /HPF (ref 0–2)
SP GR UR STRIP: 1.01 (ref 1–1.03)
URN SPEC COLLECT METH UR: ABNORMAL
UROBILINOGEN UR STRIP-ACNC: 0.2 EU/DL (ref 0.2–1)
WBC # BLD AUTO: 4.8 10E9/L (ref 4–11)
WBC #/AREA URNS AUTO: ABNORMAL /HPF (ref 0–2)

## 2017-05-24 PROCEDURE — 86140 C-REACTIVE PROTEIN: CPT | Performed by: FAMILY MEDICINE

## 2017-05-24 PROCEDURE — 83690 ASSAY OF LIPASE: CPT | Performed by: FAMILY MEDICINE

## 2017-05-24 PROCEDURE — 36415 COLL VENOUS BLD VENIPUNCTURE: CPT | Performed by: FAMILY MEDICINE

## 2017-05-24 PROCEDURE — 87086 URINE CULTURE/COLONY COUNT: CPT | Performed by: FAMILY MEDICINE

## 2017-05-24 PROCEDURE — 85652 RBC SED RATE AUTOMATED: CPT | Performed by: FAMILY MEDICINE

## 2017-05-24 PROCEDURE — 85025 COMPLETE CBC W/AUTO DIFF WBC: CPT | Performed by: FAMILY MEDICINE

## 2017-05-24 PROCEDURE — 84443 ASSAY THYROID STIM HORMONE: CPT | Performed by: FAMILY MEDICINE

## 2017-05-24 PROCEDURE — 80053 COMPREHEN METABOLIC PANEL: CPT | Performed by: FAMILY MEDICINE

## 2017-05-24 PROCEDURE — 81001 URINALYSIS AUTO W/SCOPE: CPT | Performed by: FAMILY MEDICINE

## 2017-05-24 PROCEDURE — 99214 OFFICE O/P EST MOD 30 MIN: CPT | Performed by: FAMILY MEDICINE

## 2017-05-24 RX ORDER — OXYCODONE HYDROCHLORIDE 5 MG/1
TABLET ORAL
Refills: 0 | COMMUNITY
Start: 2017-05-13 | End: 2017-09-01

## 2017-05-24 NOTE — PROGRESS NOTES
SUBJECTIVE:                                                    Salome Saeed is a 77 year old female who presents to clinic today for the following health issues:      Concern - Nausea / Fatigue      Onset: X3 days     Description:   I felt nausea and diarrhea on Monday   Tired and lower back pain ( right side)   Tingling feeling - lasts for a few minutes   Pelvic discomfort on Monday and today     Intensity: moderate    Progression of Symptoms:  same    Accompanying Signs & Symptoms:  Decreased appetite        Previous history of similar problem:   None     Precipitating factors:   Worsened by: daily activities     Alleviating factors:  Improved by: oxycodone        Therapies Tried and outcome: oxycodone - feels better     Back surgery was 4/26/2017. Felt okay, but over the past few days, has not felt good.  Surgery   Has had some loose stools started 2 days ago.  No blood in her stool.  Not dark in color.   Has been feeling very nauseated.  No vomiting.   Has an area in her back that has been very sore.  No numbness or tingling in her legs.  Feels weak.  Appetite is decreased.  Has been taking Oxycodone 1 X a day at bedtime.   Had some abdominal pain when she had the diarrhea.  No dysuria.  No CP, SOB.  No cough. No ill contacts.     Problem list and histories reviewed & adjusted, as indicated.  Additional history: as documented    Patient Active Problem List   Diagnosis     Benign essential hypertension     Coronary artery disease involving native coronary artery of native heart without angina pectoris     Hyperlipidemia LDL goal <70     Advanced directives, counseling/discussion     DDD (degenerative disc disease), lumbar     Lumbar spinal stenosis     Lumbago     Spinal stenosis, lumbar region, with neurogenic claudication     Health Care Home     Anxiety     Mitral regurgitation     Status post coronary angiogram     GERD (gastroesophageal reflux disease)     Long-term (current) use of  "anticoagulants [Z79.01]     Hypothyroidism, unspecified type     Vertebral artery stenosis, right     Cerebrovascular accident involving cerebellum (H)     Past Surgical History:   Procedure Laterality Date     CORONARY ANGIOGRAPHY ADULT ORDER  2012    40-50% stenosis to mid PDA, 80% in D1- medically treat     CORONARY ANGIOGRAPHY ADULT ORDER  2015    PTCA and 2.25x8mm Promus PREMIER NAILA to ostium of 2nd OM     DECOMPRESSION, FUSION LUMBAR POSTERIOR THREE + LEVELS, COMBINED  2013    Procedure: COMBINED DECOMPRESSION, FUSION LUMBAR POSTERIOR THREE + LEVELS;  Posterior Lumbar Decompression L3-S1, Fusion L4-S1;  Surgeon: Daniel Harris MD;  Location: [facility] OR     ENDOSCOPIC STRIPPING VEIN(S)       HEART CATH, ANGIOPLASTY  2009    PTCA and two 2.5x15mm Xience stents to mid LAD lesion     HYSTERECTOMY, RICKIE      Fibroids, and Menorrhagia.. Bilateral Oophrectomy     ORTHOPEDIC SURGERY       Stenting of LAD, Angioplasty  09     TONSILLECTOMY      as a child       Social History   Substance Use Topics     Smoking status: Former Smoker     Quit date: 1965     Smokeless tobacco: Never Used     Alcohol use 0.0 oz/week     0 Standard drinks or equivalent per week      Comment: 2 glasses wine per month     Family History   Problem Relation Age of Onset     Neurologic Disorder Mother      Dementia, Still living     Ovarian Cancer Mother      Thyroid Disease Mother      C.A.D. Father           DIABETES Father      Coronary Artery Disease Father      Alcohol/Drug Brother      Thyroid Disease Son      DIABETES Son            Reviewed and updated as needed this visit by clinical staff       Reviewed and updated as needed this visit by Provider         ROS:  Constitutional, HEENT, cardiovascular, pulmonary, gi and gu systems are negative, except as otherwise noted.    OBJECTIVE:                                                    /60  Pulse 72  Temp 97.7  F (36.5  C) (Oral)  Ht 5' 4\" (1.626 " m)  Wt 151 lb (68.5 kg)  SpO2 98%  BMI 25.92 kg/m2  Body mass index is 25.92 kg/(m^2).  GENERAL: Awake, Alert.  Looks tired.   NECK: no adenopathy, no asymmetry, masses, or scars and thyroid normal to palpation  RESP: lungs clear to auscultation - no rales, rhonchi or wheezes  CV: regular rate and rhythm, normal S1 S2, no S3 or S4, no murmur, click or rub, no peripheral edema and peripheral pulses strong  ABDOMEN: soft, nontender, no hepatosplenomegaly, no masses and bowel sounds normal  MS: no gross musculoskeletal defects noted, no edema  Back:  Incision healing well. No redness or warmth. No tenderness.  Diagnostic Test Results:  UA RESULTS:  Recent Labs   Lab Test  05/24/17   1203   COLOR  Yellow   APPEARANCE  Clear   URINEGLC  Negative   URINEBILI  Negative   URINEKETONE  Negative   SG  1.015   UBLD  Small*   URINEPH  7.0   PROTEIN  Negative   UROBILINOGEN  0.2   NITRITE  Negative   LEUKEST  Negative   RBCU  2-5*   WBCU  O - 2     .   CBC RESULTS:   Recent Labs   Lab Test  05/24/17   1229   WBC  4.8   RBC  4.16   HGB  12.2   HCT  37.2   MCV  89   MCH  29.3   MCHC  32.8   RDW  14.7   PLT  287       ASSESSMENT/PLAN:                                                      (R53.83) Fatigue, unspecified type  (primary encounter diagnosis)  Comment: ?etiology.  Unsure if related to recent back surgery versus other cause. We'll check labs.  Plan: *UA reflex to Microscopic and Culture ([place]         and [facility] (except [place] and         [place]), CBC with platelets and differential,         ESR: Erythrocyte sedimentation rate, CRP,         inflammation, Comprehensive metabolic panel         (BMP + Alb, Alk Phos, ALT, AST, Total. Bili,         TP), Lipase, Urine Microscopic, TSH with free         T4 reflex          (R11.0) Nausea  Comment: ?etiology.  Not associated with much abdominal pain.  Plan: Urine Culture Aerobic Bacterial          (M51.36) DDD (degenerative disc disease), lumbar  Comment: S/P  Lumbar fusion L2-L5.  Pain is under good control. Feel like her postoperative course has been good. She is ambulating well. Has follow-up scheduled with Dr. Harris.  Incision is healing well. No signs of infection.      Follow-up if not improving.    Karen Weiler, MD  [facility]

## 2017-05-24 NOTE — MR AVS SNAPSHOT
After Visit Summary   5/24/2017    aSlome Saeed    MRN: [id number]           Patient Information     Date Of Birth          1940        Visit Information        Provider Department      5/24/2017 11:40 AM Weiler, Azucena, MD [facility] [place]        Today's Diagnoses     Fatigue, unspecified type    -  1    Nausea        DDD (degenerative disc disease), lumbar           Follow-ups after your visit        Your next 10 appointments already scheduled     Jun 06, 2017  9:00 AM CDT   US ABDOMEN LIMITED with [id number]   [facility] [place] ([facility])    [address]  [facility] [phone]           Please bring a list of your medicines (including vitamins, minerals and over-the-counter drugs). Also, tell your doctor about any allergies you may have. Wear comfortable clothes and leave your valuables at home.  Adults: No eating or drinking for 8 hours before the exam. You may take medicine with a small sip of water.  Children: - Children 6+ years: No food or drink for 6 hours before exam. - Children 1-5 years: No food or drink for 4 hours before exam. - Infants, breast-fed: may have breast milk up to 2 hours before exam. - Infants, formula: may have bottle until 4 hours before exam.  Please call the Imaging Department at your exam site with any questions.            Jun 08, 2017 11:15 AM CDT   Anticoagulation Visit with [facility]TICO[facility]ULATION CLINIC   [facility] ([facility] [place])    [address]  [place] [phone]              Who to contact     If you have questions or need follow up information about today's clinic visit or your schedule please contact [facility] [place] directly at [phone].  Normal or non-critical lab and imaging results will be communicated to you by MyChart, letter or phone within 4 business days after the clinic has received the results. If you do not  "hear from us within 7 days, please contact the clinic through [employer] or phone. If you have a critical or abnormal lab result, we will notify you by phone as soon as possible.  Submit refill requests through [employer] or call your pharmacy and they will forward the refill request to us. Please allow 3 business days for your refill to be completed.          Additional Information About Your Visit        [email or web address] Information     [employer] lets you send messages to your doctor, view your test results, renew your prescriptions, schedule appointments and more. To sign up, go to [email or web address] . Click on \"Log in\" on the left side of the screen, which will take you to the Welcome page. Then click on \"Sign up Now\" on the right side of the page.     You will be asked to enter the access code listed below, as well as some personal information. Please follow the directions to create your username and password.     Your access code is: [email or web address]  Expires: 2017  7:41 PM     Your access code will  in 90 days. If you need help or a new code, please call your [place] clinic or [phone].        Care EveryWhere ID     This is your Care EveryWhere ID. This could be used by other organizations to access your [place] medical records  [id number]        Your Vitals Were     Pulse Temperature Height Pulse Oximetry BMI (Body Mass Index)       72 97.7  F (36.5  C) (Oral) 5' 4\" (1.626 m) 98% 25.92 kg/m2        Blood Pressure from Last 3 Encounters:   17 118/60   04/10/17 119/66   17 118/60    Weight from Last 3 Encounters:   17 151 lb (68.5 kg)   04/10/17 153 lb 12.8 oz (69.8 kg)   10/18/16 150 lb 6.4 oz (68.2 kg)              We Performed the Following     *UA reflex to Microscopic and Culture ([place] and [facility] [facility] (except [place] and [place])     CBC with platelets and differential     Comprehensive metabolic panel (BMP + Alb, Alk Phos, ALT, AST, Total. Bili, TP)     CRP, inflammation  "    ESR: Erythrocyte sedimentation rate     Lipase     TSH with free T4 reflex     Urine Culture Aerobic Bacterial     Urine Microscopic          Today's Medication Changes          These changes are accurate as of: 5/24/17 11:59 PM.  If you have any questions, ask your nurse or doctor.               These medicines have changed or have updated prescriptions.        Dose/Directions    levothyroxine 100 MCG tablet   Commonly known as:  SYNTHROID/LEVOTHROID   This may have changed:  See the new instructions.   Used for:  Hypothyroidism, unspecified type   Changed by:  Weiler, Karen, MD        TAKE 1 TABLET EVERY DAY   Quantity:  30 tablet   Refills:  0            Where to get your medicines      These medications were sent to [facility] - [place], [place] - [address]  [address], [facility] [address]     Phone:  [phone]     escitalopram 5 MG tablet    levothyroxine 100 MCG tablet                Primary Care Provider Office Phone # Fax #    Karen Weiler, [phone]       [facility] [address] [address]        Thank you!     Thank you for choosing [facility]  for your care. Our goal is always to provide you with excellent care. Hearing back from our patients is one way we can continue to improve our services. Please take a few minutes to complete the written survey that you may receive in the mail after your visit with us. Thank you!             Your Updated Medication List - Protect others around you: Learn how to safely use, store and throw away your medicines at www.disposemymeds.org.          This list is accurate as of: 5/24/17 11:59 PM.  Always use your most recent med list.                   Brand Name Dispense Instructions for use    acetaminophen 325 MG tablet    TYLENOL     Take 2 tablets by mouth every 6 hours as needed for pain.       amLODIPine 5 MG tablet    NORVASC    90 tablet    Take 1 tablet (5 mg) by mouth  daily       ascorbic acid 500 MG tablet    VITAMIN C     Take 1,000 mg by mouth daily       aspirin EC 81 MG EC tablet      Take 1 tablet (81 mg) by mouth daily       atenolol 25 MG tablet    TENORMIN    180 tablet    Take 1 tablet (25 mg) by mouth 2 times daily       CALCIUM 600 + D PO      Take 1,200 mg by mouth daily       CoQ-10 200 MG Caps      Take 400 mg by mouth daily       DAILY MULTIVITAMIN PO      Take by mouth daily       enoxaparin 60 MG/0.6ML injection    LOVENOX    1.2 mL    Inject 0.6 mLs (60 mg) Subcutaneous daily       escitalopram 5 MG tablet    LEXAPRO    90 tablet    Take 1 tablet (5 mg) by mouth daily       levothyroxine 100 MCG tablet    SYNTHROID/LEVOTHROID    30 tablet    TAKE 1 TABLET EVERY DAY       lisinopril 20 MG tablet    PRINIVIL/ZESTRIL    90 tablet    Take 1 tablet (20 mg) by mouth daily       nitroglycerin 0.4 MG sublingual tablet    NITROSTAT    25 tablet    Place 1 tablet (0.4 mg) under the tongue every 5 minutes as needed for chest pain       oxyCODONE 5 MG IR tablet    ROXICODONE         ranitidine 150 MG tablet    ZANTAC    180 tablet    Take 1 tablet (150 mg) by mouth 2 times daily       rosuvastatin 10 MG tablet    CRESTOR    90 tablet    Take 1 tablet (10 mg) by mouth daily       vitamin D 2000 UNITS tablet      Take 2,000 Units by mouth daily       warfarin 5 MG tablet    COUMADIN    90 tablet    Take 0.5-1 tab daily as directed by INR nurse based on INR reading.

## 2017-05-24 NOTE — TELEPHONE ENCOUNTER
levothyroxine     Last Written Prescription Date: 3/20/2017  Last Quantity: 90, # refills: 0  Last Office Visit with G, P or [facility] prescribing provider: 4/10/2017        TSH   Date Value Ref Range Status   05/04/2016 0.16 (L) 0.40 - 4.00 mU/L Final

## 2017-05-24 NOTE — NURSING NOTE
"Chief Complaint   Patient presents with     Surgical Followup       Initial /60  Pulse 72  Temp 97.7  F (36.5  C) (Oral)  Ht 5' 4\" (1.626 m)  Wt 151 lb (68.5 kg)  SpO2 98%  BMI 25.92 kg/m2 Estimated body mass index is 25.92 kg/(m^2) as calculated from the following:    Height as of this encounter: 5' 4\" (1.626 m).    Weight as of this encounter: 151 lb (68.5 kg).  Medication Reconciliation: complete   Petra Whiting Certified Medical Assistant      "

## 2017-05-24 NOTE — TELEPHONE ENCOUNTER
escitalopram (LEXAPRO) 5 MG tablet     Last Written Prescription Date: 5/18/2016  Last Fill Quantity: 90 tablet, # refills: 3  Last Office Visit with G primary care provider:  4/10/2017        Last PHQ-9 score on record=   PHQ-9 SCORE 1/30/2014   Total Score 4

## 2017-05-24 NOTE — TELEPHONE ENCOUNTER
Loreta calling today is not feeling well. Nauseated, diarrhea not loose but having frequent bowel movements. Spine surgery in April 26 th, fusion and repair of screw per Loreta. She is saying the surgical area is bothering her--it doesn't feel right,.. No swelling, no fever, legs feel normal no numbness or tingling. General feeling of generalized well being. Discussed with Dr. Weiler and placed on today's schedule. Renee Hoang R.N.

## 2017-05-25 LAB
ALBUMIN SERPL-MCNC: 3.6 G/DL (ref 3.4–5)
ALP SERPL-CCNC: 145 U/L (ref 40–150)
ALT SERPL W P-5'-P-CCNC: 26 U/L (ref 0–50)
ANION GAP SERPL CALCULATED.3IONS-SCNC: 10 MMOL/L (ref 3–14)
AST SERPL W P-5'-P-CCNC: 21 U/L (ref 0–45)
BILIRUB SERPL-MCNC: 0.4 MG/DL (ref 0.2–1.3)
BUN SERPL-MCNC: 8 MG/DL (ref 7–30)
CALCIUM SERPL-MCNC: 9.1 MG/DL (ref 8.5–10.1)
CHLORIDE SERPL-SCNC: 102 MMOL/L (ref 94–109)
CO2 SERPL-SCNC: 25 MMOL/L (ref 20–32)
CREAT SERPL-MCNC: 0.6 MG/DL (ref 0.52–1.04)
GFR SERPL CREATININE-BSD FRML MDRD: ABNORMAL ML/MIN/1.7M2
GLUCOSE SERPL-MCNC: 128 MG/DL (ref 70–99)
POTASSIUM SERPL-SCNC: 4.5 MMOL/L (ref 3.4–5.3)
PROT SERPL-MCNC: 7.4 G/DL (ref 6.8–8.8)
SODIUM SERPL-SCNC: 137 MMOL/L (ref 133–144)
TSH SERPL DL<=0.005 MIU/L-ACNC: 0.55 MU/L (ref 0.4–4)

## 2017-05-25 RX ORDER — ESCITALOPRAM OXALATE 5 MG/1
5 TABLET ORAL DAILY
Qty: 90 TABLET | Refills: 3 | Status: SHIPPED | OUTPATIENT
Start: 2017-05-25 | End: 2017-06-22

## 2017-05-25 RX ORDER — LEVOTHYROXINE SODIUM 100 UG/1
TABLET ORAL
Qty: 30 TABLET | Refills: 0 | Status: SHIPPED | OUTPATIENT
Start: 2017-05-25 | End: 2017-06-21

## 2017-05-25 NOTE — TELEPHONE ENCOUNTER
Due for an Office visit for further refills, only fill for 30 days     Rossy Herring RN, BSN  [facility]

## 2017-05-26 LAB
BACTERIA SPEC CULT: NORMAL
MICRO REPORT STATUS: NORMAL
SPECIMEN SOURCE: NORMAL

## 2017-05-30 ENCOUNTER — ANTICOAGULATION THERAPY VISIT (OUTPATIENT)
Dept: NURSING | Facility: CLINIC | Age: 77
End: 2017-05-30
Payer: COMMERCIAL

## 2017-05-30 DIAGNOSIS — Z79.01 LONG-TERM (CURRENT) USE OF ANTICOAGULANTS: ICD-10-CM

## 2017-05-30 LAB — INR POINT OF CARE: 5.2 (ref 0.86–1.14)

## 2017-05-30 PROCEDURE — 36416 COLLJ CAPILLARY BLOOD SPEC: CPT

## 2017-05-30 PROCEDURE — 85610 PROTHROMBIN TIME: CPT | Mod: QW

## 2017-05-30 NOTE — PROGRESS NOTES
ANTICOAGULATION FOLLOW-UP CLINIC VISIT    Patient Name:  Salome Saeed  Date:  5/30/2017  Contact Type:  Face to Face    SUBJECTIVE:     Patient Findings     Positives No Problem Findings           OBJECTIVE    INR Protime   Date Value Ref Range Status   05/30/2017 5.2 (A) 0.86 - 1.14 Final       ASSESSMENT / PLAN  No question data found.  Anticoagulation Summary as of 5/30/2017     INR goal 2.0-3.0   Today's INR 5.2!   Maintenance plan 2.5 mg (5 mg x 0.5) on Mon, Wed, Fri; 5 mg (5 mg x 1) all other days   Full instructions 5/30: Hold; 5/31: Hold; Otherwise 2.5 mg on Mon, Wed, Fri; 5 mg all other days   Weekly total 27.5 mg   Plan last modified Leyla Lopez (1/11/2016)   Next INR check 6/1/2017   Target end date     Indications   Long-term (current) use of anticoagulants [Z79.01] [Z79.01]  CVA (cerebral vascular accident) (Resolved) [I63.9]         Anticoagulation Episode Summary     INR check location     Preferred lab     Send INR reminders to  [facility]    Comments             See the Encounter Report to view Anticoagulation Flowsheet and Dosing Calendar (Go to Encounters tab in chart review, and find the Anticoagulation Therapy Visit)    Dosage adjustment made based on physician directed care plan.    Rossy Herring RN

## 2017-05-30 NOTE — MR AVS SNAPSHOT
Salome SPRAGUE Darlin   5/30/2017 3:15 PM   Anticoagulation Therapy Visit    Description:  77 year old female   Provider:   ANTICOAGULATION CLINIC   Department:   Nurse           INR as of 5/30/2017     Today's INR 5.2!      Anticoagulation Summary as of 5/30/2017     INR goal 2.0-3.0   Today's INR 5.2!   Full instructions 5/30: Hold; 5/31: Hold; Otherwise 2.5 mg on Mon, Wed, Fri; 5 mg all other days   Next INR check 6/1/2017    Indications   Long-term (current) use of anticoagulants [Z79.01] [Z79.01]  CVA (cerebral vascular accident) (Resolved) [I63.9]         Your next Anticoagulation Clinic appointment(s)     Jun 01, 2017 10:30 AM CDT   Anticoagulation Visit with  ANTICOAGULATION CLINIC   [facility] [place] ([facility])    [address] [phone]              Contact Numbers     [facility]  Please call [phone] with any problems or questions regarding your therapy, or to cancel or reschedule your appointment        May 2017 Details    Sun Mon Tue Wed Thu Fri Sat      1               2               3               4               5               6                 7               8               9               10               11               12               13                 14               15               16               17               18               19               20                 21               22               23               24               25               26               27                 28               29               30      Hold   See details      31      Hold             Date Details   05/30 This INR check               How to take your warfarin dose     Hold Do not take your warfarin dose. See the Details table to the right for additional instructions.                June 2017 Details    Sun Mon Tue Wed Thu Fri Sat         1            2               3                 4               5               6                7               8               9               10                 11               12               13               14               15               16               17                 18               19               20               21               22               23               24                 25               26               27               28               29               30                 Date Details   No additional details    Date of next INR:  6/1/2017         How to take your warfarin dose     To take:  5 mg Take 1 of the 5 mg tablets.

## 2017-05-31 ENCOUNTER — TELEPHONE (OUTPATIENT)
Dept: FAMILY MEDICINE | Facility: CLINIC | Age: 77
End: 2017-05-31

## 2017-05-31 DIAGNOSIS — R11.0 NAUSEA: ICD-10-CM

## 2017-05-31 DIAGNOSIS — R10.11 ABDOMINAL PAIN, RIGHT UPPER QUADRANT: Primary | ICD-10-CM

## 2017-05-31 NOTE — TELEPHONE ENCOUNTER
Dr. Weiler, please see message below and advise. I can call patient back for you.  Thank you,  Renee Hoang R.N.

## 2017-05-31 NOTE — TELEPHONE ENCOUNTER
Name of caller: Salome  Relationship of Patient: Self    Reason for Call: Patient called and wanted to speak with Dr. Weiler about her test results and what she thought about them and that her INR is also very high as well.     Best phone number to reach pt at is: [phone]  Ok to leave a message with medical info? Yes    Pharmacy preferred (if calling for a refill): [facility]

## 2017-06-01 ENCOUNTER — TRANSFERRED RECORDS (OUTPATIENT)
Dept: HEALTH INFORMATION MANAGEMENT | Facility: CLINIC | Age: 77
End: 2017-06-01

## 2017-06-01 ENCOUNTER — ANTICOAGULATION THERAPY VISIT (OUTPATIENT)
Dept: NURSING | Facility: CLINIC | Age: 77
End: 2017-06-01
Payer: COMMERCIAL

## 2017-06-01 DIAGNOSIS — Z79.01 LONG-TERM (CURRENT) USE OF ANTICOAGULANTS: ICD-10-CM

## 2017-06-01 LAB — INR POINT OF CARE: 2 (ref 0.86–1.14)

## 2017-06-01 PROCEDURE — 85610 PROTHROMBIN TIME: CPT | Mod: QW

## 2017-06-01 PROCEDURE — 36416 COLLJ CAPILLARY BLOOD SPEC: CPT

## 2017-06-01 NOTE — PROGRESS NOTES
ANTICOAGULATION FOLLOW-UP CLINIC VISIT    Patient Name:  Salome Saeed  Date:  6/1/2017  Contact Type:  Face to Face    SUBJECTIVE:     Patient Findings     Positives Intentional hold of therapy           OBJECTIVE    INR Protime   Date Value Ref Range Status   06/01/2017 2.0 (A) 0.86 - 1.14 Final       ASSESSMENT / PLAN  No question data found.  Anticoagulation Summary as of 6/1/2017     INR goal 2.0-3.0   Today's INR 2.0   Maintenance plan 2.5 mg (5 mg x 0.5) on Mon, Wed, Fri; 5 mg (5 mg x 1) all other days   Full instructions 2.5 mg on Mon, Wed, Fri; 5 mg all other days   Weekly total 27.5 mg   No change documented Rossy Herring RN   Plan last modified Leyla Lopez (1/11/2016)   Next INR check 6/8/2017   Target end date     Indications   Long-term (current) use of anticoagulants [Z79.01] [Z79.01]  CVA (cerebral vascular accident) (Resolved) [I63.9]         Anticoagulation Episode Summary     INR check location     Preferred lab     Send INR reminders to  [facility]    Comments             See the Encounter Report to view Anticoagulation Flowsheet and Dosing Calendar (Go to Encounters tab in chart review, and find the Anticoagulation Therapy Visit)    Dosage adjustment made based on physician directed care plan.    Rossy Herring RN

## 2017-06-01 NOTE — MR AVS SNAPSHOT
Salome CELESTINE Darlin   6/1/2017 10:30 AM   Anticoagulation Therapy Visit    Description:  77 year old female   Provider:   ANTICOAGULATION CLINIC   Department:   Nurse           INR as of 6/1/2017     Today's INR 2.0      Anticoagulation Summary as of 6/1/2017     INR goal 2.0-3.0   Today's INR 2.0   Full instructions 2.5 mg on Mon, Wed, Fri; 5 mg all other days   Next INR check 6/8/2017    Indications   Long-term (current) use of anticoagulants [Z79.01] [Z79.01]  CVA (cerebral vascular accident) (Resolved) [I63.9]         Your next Anticoagulation Clinic appointment(s)     Jun 08, 2017 11:15 AM CDT   Anticoagulation Visit with  ANTICOAGULATION CLINIC   [facility] [place] ([facility])    [address]  [facility] [id number] [phone]              Contact Numbers     [place] [facility]  Please call [phone] with any problems or questions regarding your therapy, or to cancel or reschedule your appointment        June 2017 Details    Sun Mon Tue Wed Thu Fri Sat         1      5 mg   See details      2      2.5 mg         3      5 mg           4      5 mg         5      2.5 mg         6      5 mg         7      2.5 mg         8            9               10                 11               12               13               14               15               16               17                 18               19               20               21               22               23               24                 25               26               27               28               29               30                 Date Details   06/01 This INR check       Date of next INR:  6/8/2017         How to take your warfarin dose     To take:  2.5 mg Take 0.5 of a 5 mg tablet.    To take:  5 mg Take 1 of the 5 mg tablets.

## 2017-06-02 NOTE — TELEPHONE ENCOUNTER
Spoke to patient. Discussed test results. Patient is still feeling very nauseated. Symptoms worse after she eats. Has had gallbladder issues in the past.  Will repeat US. May consider surgery consult depending on the results. Patient to follow up if symptoms worsening.    Karen Weiler, MD

## 2017-06-06 ENCOUNTER — HOSPITAL ENCOUNTER (OUTPATIENT)
Dept: ULTRASOUND IMAGING | Facility: CLINIC | Age: 77
Discharge: HOME | End: 2017-06-06
Attending: FAMILY MEDICINE | Admitting: FAMILY MEDICINE
Payer: COMMERCIAL

## 2017-06-06 DIAGNOSIS — R10.11 ABDOMINAL PAIN, RIGHT UPPER QUADRANT: ICD-10-CM

## 2017-06-06 DIAGNOSIS — R11.0 NAUSEA: ICD-10-CM

## 2017-06-06 PROCEDURE — 76705 ECHO EXAM OF ABDOMEN: CPT

## 2017-06-08 ENCOUNTER — ANTICOAGULATION THERAPY VISIT (OUTPATIENT)
Dept: NURSING | Facility: CLINIC | Age: 77
End: 2017-06-08
Payer: COMMERCIAL

## 2017-06-08 DIAGNOSIS — Z79.01 LONG-TERM (CURRENT) USE OF ANTICOAGULANTS: ICD-10-CM

## 2017-06-08 LAB — INR POINT OF CARE: 3 (ref 0.86–1.14)

## 2017-06-08 PROCEDURE — 36416 COLLJ CAPILLARY BLOOD SPEC: CPT

## 2017-06-08 PROCEDURE — 99207 ZZC NO CHARGE NURSE ONLY: CPT

## 2017-06-08 PROCEDURE — 85610 PROTHROMBIN TIME: CPT | Mod: QW

## 2017-06-08 NOTE — MR AVS SNAPSHOT
Salome SPRAGUE Darlin   6/8/2017 11:15 AM   Anticoagulation Therapy Visit    Description:  77 year old female   Provider:   ANTICOAGULATION CLINIC   Department:   Nurse           INR as of 6/8/2017     Today's INR 3.0      Anticoagulation Summary as of 6/8/2017     INR goal 2.0-3.0   Today's INR 3.0   Full instructions 6/8: 2.5 mg; Otherwise 2.5 mg on Mon, Wed, Fri; 5 mg all other days   Next INR check 6/15/2017    Indications   Long-term (current) use of anticoagulants [Z79.01] [Z79.01]  CVA (cerebral vascular accident) (Resolved) [I63.9]         Your next Anticoagulation Clinic appointment(s)     Jerry 15, 2017  2:30 PM CDT   Anticoagulation Visit with  ANTICOAGULATION CLINIC   [facility] [place] ([facility])    [address] [facility] [address] [phone]              Contact Numbers     [place] [facility]  Please call [phone] with any problems or questions regarding your therapy, or to cancel or reschedule your appointment        June 2017 Details    Sun Mon Tue Wed Thu Fri Sat         1               2               3                 4               5               6               7               8      2.5 mg   See details      9      2.5 mg         10      5 mg           11      5 mg         12      2.5 mg         13      5 mg         14      2.5 mg         15            16               17                 18               19               20               21               22               23               24                 25               26               27               28               29               30                 Date Details   06/08 This INR check       Date of next INR:  6/15/2017         How to take your warfarin dose     To take:  2.5 mg Take 0.5 of a 5 mg tablet.    To take:  5 mg Take 1 of the 5 mg tablets.

## 2017-06-08 NOTE — PROGRESS NOTES
ANTICOAGULATION FOLLOW-UP CLINIC VISIT    Patient Name:  Salome Saeed  Date:  6/8/2017  Contact Type:  Face to Face    SUBJECTIVE:     Patient Findings     Positives Missed doses           OBJECTIVE    INR Protime   Date Value Ref Range Status   06/08/2017 3.0 (A) 0.86 - 1.14 Final       ASSESSMENT / PLAN  No question data found.  Anticoagulation Summary as of 6/8/2017     INR goal 2.0-3.0   Today's INR 3.0   Maintenance plan 2.5 mg (5 mg x 0.5) on Mon, Wed, Fri; 5 mg (5 mg x 1) all other days   Full instructions 6/8: 2.5 mg; Otherwise 2.5 mg on Mon, Wed, Fri; 5 mg all other days   Weekly total 27.5 mg   Plan last modified Leyla Lopez (1/11/2016)   Next INR check 6/15/2017   Target end date     Indications   Long-term (current) use of anticoagulants [Z79.01] [Z79.01]  CVA (cerebral vascular accident) (Resolved) [I63.9]         Anticoagulation Episode Summary     INR check location     Preferred lab     Send INR reminders to  [facility]    Comments             See the Encounter Report to view Anticoagulation Flowsheet and Dosing Calendar (Go to Encounters tab in chart review, and find the Anticoagulation Therapy Visit)    Dosage adjustment made based on physician directed care plan.    Rossy Herring RN

## 2017-06-12 ENCOUNTER — OFFICE VISIT (OUTPATIENT)
Dept: FAMILY MEDICINE | Facility: CLINIC | Age: 77
End: 2017-06-12
Payer: COMMERCIAL

## 2017-06-12 DIAGNOSIS — R10.13 ABDOMINAL PAIN, EPIGASTRIC: Primary | ICD-10-CM

## 2017-06-12 DIAGNOSIS — F41.9 ANXIETY: ICD-10-CM

## 2017-06-12 PROCEDURE — 99213 OFFICE O/P EST LOW 20 MIN: CPT | Performed by: FAMILY MEDICINE

## 2017-06-14 VITALS
TEMPERATURE: 97.8 F | HEIGHT: 64 IN | WEIGHT: 148.6 LBS | HEART RATE: 62 BPM | SYSTOLIC BLOOD PRESSURE: 130 MMHG | DIASTOLIC BLOOD PRESSURE: 62 MMHG | BODY MASS INDEX: 25.37 KG/M2 | OXYGEN SATURATION: 97 %

## 2017-06-14 NOTE — NURSING NOTE
"Chief Complaint   Patient presents with     Recheck Medication       Initial /62  Pulse 62  Temp 97.8  F (36.6  C) (Oral)  Ht 5' 4\" (1.626 m)  Wt 148 lb 9.6 oz (67.4 kg)  SpO2 97%  BMI 25.51 kg/m2 Estimated body mass index is 25.51 kg/(m^2) as calculated from the following:    Height as of this encounter: 5' 4\" (1.626 m).    Weight as of this encounter: 148 lb 9.6 oz (67.4 kg).  Medication Reconciliation: complete   Petra Whiting Certified Medical Assistant      "

## 2017-06-15 ENCOUNTER — ANTICOAGULATION THERAPY VISIT (OUTPATIENT)
Dept: NURSING | Facility: CLINIC | Age: 77
End: 2017-06-15
Payer: COMMERCIAL

## 2017-06-15 DIAGNOSIS — Z79.01 LONG-TERM (CURRENT) USE OF ANTICOAGULANTS: ICD-10-CM

## 2017-06-15 LAB — INR POINT OF CARE: 3.1 (ref 0.86–1.14)

## 2017-06-15 PROCEDURE — 85610 PROTHROMBIN TIME: CPT | Mod: QW

## 2017-06-15 PROCEDURE — 99207 ZZC NO CHARGE NURSE ONLY: CPT

## 2017-06-15 PROCEDURE — 36416 COLLJ CAPILLARY BLOOD SPEC: CPT

## 2017-06-15 NOTE — MR AVS SNAPSHOT
Salome SPRAGUE Darlin   6/15/2017 2:30 PM   Anticoagulation Therapy Visit    Description:  77 year old female   Provider:   ANTICOAGULATION CLINIC   Department:   Nurse           INR as of 6/15/2017     Today's INR 3.1!      Anticoagulation Summary as of 6/15/2017     INR goal 2.0-3.0   Today's INR 3.1!   Full instructions 2.5 mg on Mon, Wed, Fri; 5 mg all other days   Next INR check 6/29/2017    Indications   Long-term (current) use of anticoagulants [Z79.01] [Z79.01]  CVA (cerebral vascular accident) (Resolved) [I63.9]         Your next Anticoagulation Clinic appointment(s)     Jun 29, 2017  2:30 PM CDT   Anticoagulation Visit with  ANTICOAGULATION CLINIC   [facility] [place] ([facility])    [address] [place] [address] [phone]              Contact Numbers     [place] [facility]  Please call [phone] with any problems or questions regarding your therapy, or to cancel or reschedule your appointment        June 2017 Details    Sun Mon Tue Wed Thu Fri Sat         1               2               3                 4               5               6               7               8               9               10                 11               12               13               14               15      5 mg   See details      16      2.5 mg         17      5 mg           18      5 mg         19      2.5 mg         20      5 mg         21      2.5 mg         22      5 mg         23      2.5 mg         24      5 mg           25      5 mg         26      2.5 mg         27      5 mg         28      2.5 mg         29            30                 Date Details   06/15 This INR check       Date of next INR:  6/29/2017         How to take your warfarin dose     To take:  2.5 mg Take 0.5 of a 5 mg tablet.    To take:  5 mg Take 1 of the 5 mg tablets.

## 2017-06-15 NOTE — PROGRESS NOTES
ANTICOAGULATION FOLLOW-UP CLINIC VISIT    Patient Name:  Salome Saeed  Date:  6/15/2017  Contact Type:  Face to Face    SUBJECTIVE:     Patient Findings     Positives No Problem Findings           OBJECTIVE    INR Protime   Date Value Ref Range Status   06/15/2017 3.1 (A) 0.86 - 1.14 Final       ASSESSMENT / PLAN  No question data found.  Anticoagulation Summary as of 6/15/2017     INR goal 2.0-3.0   Today's INR 3.1!   Maintenance plan 2.5 mg (5 mg x 0.5) on Mon, Wed, Fri; 5 mg (5 mg x 1) all other days   Full instructions 2.5 mg on Mon, Wed, Fri; 5 mg all other days   Weekly total 27.5 mg   No change documented Rossy Herring RN   Plan last modified Leyla Lopez (1/11/2016)   Next INR check 6/29/2017   Target end date     Indications   Long-term (current) use of anticoagulants [Z79.01] [Z79.01]  CVA (cerebral vascular accident) (Resolved) [I63.9]         Anticoagulation Episode Summary     INR check location     Preferred lab     Send INR reminders to  [facility]    Comments             See the Encounter Report to view Anticoagulation Flowsheet and Dosing Calendar (Go to Encounters tab in chart review, and find the Anticoagulation Therapy Visit)    Dosage adjustment made based on physician directed care plan.    Rossy Herring RN

## 2017-06-21 ENCOUNTER — THERAPY VISIT (OUTPATIENT)
Dept: PHYSICAL THERAPY | Facility: CLINIC | Age: 77
End: 2017-06-21
Payer: COMMERCIAL

## 2017-06-21 DIAGNOSIS — Z98.1 STATUS POST ARTHRODESIS: ICD-10-CM

## 2017-06-21 DIAGNOSIS — M54.50 CHRONIC BILATERAL LOW BACK PAIN WITHOUT SCIATICA: Primary | ICD-10-CM

## 2017-06-21 DIAGNOSIS — E03.9 HYPOTHYROIDISM, UNSPECIFIED TYPE: ICD-10-CM

## 2017-06-21 DIAGNOSIS — G89.29 CHRONIC BILATERAL LOW BACK PAIN WITHOUT SCIATICA: Primary | ICD-10-CM

## 2017-06-21 PROCEDURE — 97161 PT EVAL LOW COMPLEX 20 MIN: CPT | Mod: GP | Performed by: PHYSICAL THERAPIST

## 2017-06-21 PROCEDURE — G8978 MOBILITY CURRENT STATUS: HCPCS | Mod: GP | Performed by: PHYSICAL THERAPIST

## 2017-06-21 PROCEDURE — 97110 THERAPEUTIC EXERCISES: CPT | Mod: GP | Performed by: PHYSICAL THERAPIST

## 2017-06-21 PROCEDURE — G8979 MOBILITY GOAL STATUS: HCPCS | Mod: GP | Performed by: PHYSICAL THERAPIST

## 2017-06-21 NOTE — MR AVS SNAPSHOT
After Visit Summary   6/21/2017    Salome Saeed    MRN: [id number]           Patient Information     Date Of Birth          1940        Visit Information        Provider Department      6/21/2017 2:40 PM Elijah Mcdaniels PT [facility] [place]        Today's Diagnoses     Chronic bilateral low back pain without sciatica    -  1    Status post arthrodesis           Follow-ups after your visit        Your next 10 appointments already scheduled     Jun 28, 2017  1:20 PM CDT   KELSEY Extremity with Elijah cMdaniels PT   [facility] [place] ([facility] [place])    [address] [place] [address]   [phone]            Jun 29, 2017  2:30 PM CDT   Anticoagulation Visit with  ANTICOAGULATION CLINIC   [facility] [place] ([facility] [place])    [address]  [place] [address]   [phone]            Sep 01, 2017  9:15 AM CDT   LAB with RU LAB   [facility] ([facility])    [address]  [place] [address] [phone]           Patient must bring picture ID.  Patient should be prepared to give a urine specimen  Please do not eat 10-12 hours before your appointment if you are coming in fasting for labs on lipids, cholesterol, or glucose (sugar).  Pregnant women should follow their Care Team instructions. Water with medications is okay. Do not drink coffee or other fluids.   If you have concerns about taking  your medications, please ask at office or if scheduling via Enevatehart, send a message by clicking on Secure Messaging, Message Your Care Team.            Sep 01, 2017 10:10 AM CDT   Return Visit with KIANA Carreno CNP   [facility] [place] ([facility])    [address]  [place] [address] [phone]              Who to contact     If you have questions or need follow up information about  "today's clinic visit or your schedule please contact [facility] [place] directly at [phone].  Normal or non-critical lab and imaging results will be communicated to you by Zentilahart, letter or phone within 4 business days after the clinic has received the results. If you do not hear from us within 7 days, please contact the clinic through Zentilahart or phone. If you have a critical or abnormal lab result, we will notify you by phone as soon as possible.  Submit refill requests through Lima or call your pharmacy and they will forward the refill request to us. Please allow 3 business days for your refill to be completed.          Additional Information About Your Visit        Zentilahart Information     Lima lets you send messages to your doctor, view your test results, renew your prescriptions, schedule appointments and more. To sign up, go to [email or web address] . Click on \"Log in\" on the left side of the screen, which will take you to the Welcome page. Then click on \"Sign up Now\" on the right side of the page.     You will be asked to enter the access code listed below, as well as some personal information. Please follow the directions to create your username and password.     Your access code is: [email or web address]  Expires: 2017  7:41 PM     Your access code will  in 90 days. If you need help or a new code, please call your [facility] clinic or [phone].        Care EveryWhere ID     This is your Care EveryWhere ID. This could be used by other organizations to access your [facility] medical records  [id number]         Blood Pressure from Last 3 Encounters:   17 130/62   17 118/60   04/10/17 119/66    Weight from Last 3 Encounters:   17 67.4 kg (148 lb 9.6 oz)   17 68.5 kg (151 lb)   04/10/17 69.8 kg (153 lb 12.8 oz)              We Performed the Following     HC PT EVAL, LOW COMPLEXITY     KELSEY CERT REPORT     THERAPEUTIC EXERCISES        Primary Care " Provider Office Phone # Fax #    Karen Weiler, [phone]       [facility] [address] [address]        Equal Access to Services     JAZIELSYLVIE ELISE : Hadii aad ku hadpatricko Soomaali, waaxda luqadaha, qaybta kaalmada adeegyada, sonia negronn andrew brown laAlysiasylvie freya. So [facility] [phone].    ATENCIÓN: Si habla español, tiene a smith disposición servicios gratuitos de asistencia lingüística. Llame al [phone].    We comply with applicable federal civil rights laws and [place] laws. We do not discriminate on the basis of race, color, national origin, age, disability sex, sexual orientation or gender identity.            Thank you!     Thank you for choosing [facility] [place]  for your care. Our goal is always to provide you with excellent care. Hearing back from our patients is one way we can continue to improve our services. Please take a few minutes to complete the written survey that you may receive in the mail after your visit with us. Thank you!             Your Updated Medication List - Protect others around you: Learn how to safely use, store and throw away your medicines at www.disposemymeds.org.          This list is accurate as of: 6/21/17  5:05 PM.  Always use your most recent med list.                   Brand Name Dispense Instructions for use Diagnosis    acetaminophen 325 MG tablet    TYLENOL     Take 2 tablets by mouth every 6 hours as needed for pain.        amLODIPine 5 MG tablet    NORVASC    90 tablet    Take 1 tablet (5 mg) by mouth daily    HTN (hypertension)       ascorbic acid 500 MG tablet    VITAMIN C     Take 1,000 mg by mouth daily        aspirin EC 81 MG EC tablet      Take 1 tablet (81 mg) by mouth daily    Coronary artery disease involving native coronary artery of native heart without angina pectoris       atenolol 25 MG tablet    TENORMIN    180 tablet    Take 1 tablet (25 mg) by mouth 2 times daily    CAD (coronary artery  disease)       CALCIUM 600 + D PO      Take 1,200 mg by mouth daily        CoQ-10 200 MG Caps      Take 400 mg by mouth daily        DAILY MULTIVITAMIN PO      Take by mouth daily        enoxaparin 60 MG/0.6ML injection    LOVENOX    1.2 mL    Inject 0.6 mLs (60 mg) Subcutaneous daily    Long-term (current) use of anticoagulants       escitalopram 5 MG tablet    LEXAPRO    90 tablet    Take 1 tablet (5 mg) by mouth daily    Anxiety       levothyroxine 100 MCG tablet    SYNTHROID/LEVOTHROID    30 tablet    TAKE 1 TABLET EVERY DAY    Hypothyroidism, unspecified type       lisinopril 20 MG tablet    PRINIVIL/ZESTRIL    90 tablet    Take 1 tablet (20 mg) by mouth daily    Essential hypertension, benign       nitroglycerin 0.4 MG sublingual tablet    NITROSTAT    25 tablet    Place 1 tablet (0.4 mg) under the tongue every 5 minutes as needed for chest pain    CAD (coronary artery disease)       oxyCODONE 5 MG IR tablet    ROXICODONE          ranitidine 150 MG tablet    ZANTAC    180 tablet    Take 1 tablet (150 mg) by mouth 2 times daily    Gastroesophageal reflux disease with esophagitis       rosuvastatin 10 MG tablet    CRESTOR    90 tablet    Take 1 tablet (10 mg) by mouth daily    Hyperlipidemia LDL goal <70       vitamin D 2000 UNITS tablet      Take 2,000 Units by mouth daily        warfarin 5 MG tablet    COUMADIN    90 tablet    Take 0.5-1 tab daily as directed by INR nurse based on INR reading.    Cerebrovascular accident involving cerebellum (H)

## 2017-06-21 NOTE — TELEPHONE ENCOUNTER
levothyroxine (SYNTHROID/LEVOTHROID) 100 MCG tablet     Last Written Prescription Date: 5/25/2017  Last Quantity: 30 tablet, # refills: 0  Last Office Visit with [facility], [facility] or [facility] prescribing provider: 6/12/2017   Next 5 appointments (look out 90 days)     Sep 01, 2017 10:10 AM CDT   Return Visit with KIANA Carreno CNP   [facility] [place] ([facility])    [address] [address] [address] [phone]                   TSH   Date Value Ref Range Status   05/24/2017 0.55 0.40 - 4.00 mU/L Final

## 2017-06-21 NOTE — LETTER
[facility]  [address]  [facility] [address]  [phone]    2017    Re: Salome Saeed   :   1940  MRN:  [id number]   REFERRING PHYSICIAN:   Loy Coffey  [facility]  Date of Initial Evaluation: 2017  Visits:  Rxs Used: 1  Reason for Referral:   Chronic bilateral low back pain without sciatica  Status post arthrodesis    EVALUATION SUMMARY    Subjective:  Patient is a 77 year old female presenting with rehab back hpi. The history is provided by the patient. No  was used.   Salome Saeed is a 77 year old female with a lumbar condition.  Condition occurred with:  Degenerative joint disease (LBP progressed last year and elected to have further lumbar L1-3 fusion, repair of few screws from prior L3-S1 fusion (May 2013)).  Condition occurred: other.  This is a new condition  2017.    Patient reports pain:  Lower lumbar spine, mid lumbar spine, upper lumbar spine, central lumbar spine, lumbar spine left and lumbar spine right.    Pain is described as aching and is intermittent and reported as 5/10.  Associated symptoms:  Loss of strength, fatigue and loss of motion/stiffness. Pain is worse during the day.  Symptoms are exacerbated by lifting, standing and walking   Since onset symptoms are unchanged.    Previous treatment includes surgery.  There was moderate improvement following previous treatment.  General health as reported by patient is good.  Past medical history: see epic.  Medical allergies: yes.  Other surgeries include:  Orthopedic surgery.  Medication history: see epic.  Current occupation is Retired  Barriers include:  None as reported by the patient.  Red flags:  None as reported by the patient.    Objective:    Gait:    Gait Type:  Antalgic   Weight Bearing Status:  WBAT   Assistive Devices:  Brace  Lumbar/SI Evaluation  ROM:  AROM Lumbar: not assessed  Strength: abdominal  strength difficult with poor isometric isolation, gluts fair isolation  Neural Tension/Mobility:  Lumbar:  Normal    Hip Evaluation  Hip PROM:    Flexion: Left: 110   Right: 125  Internal Rotation: Left: 30    Right: 35  External Rotation: Left: 50    Right: 55    Assessment/Plan:    Patient is a 77 year old female with lumbar complaints.    Patient has the following significant findings with corresponding treatment plan.                Diagnosis 1:  L1-3 new fusion, L3-S1 prior fusion  Pain -  hot/cold therapy, splint/taping/bracing/orthotics, self management, education and home program  Re: Salome Saeed   :   1940    Decreased ROM/flexibility - manual therapy and therapeutic exercise  Decreased strength - therapeutic exercise and therapeutic activities  Impaired balance - neuro re-education, gait training and therapeutic activities  Decreased proprioception - neuro re-education and therapeutic activities  Impaired gait - gait training  Decreased function - therapeutic activities  Impaired posture - neuro re-education  Therapy Evaluation Codes:   1) History comprised of:   Personal factors that impact the plan of care:      None.    Comorbidity factors that impact the plan of care are:      None.     Medications impacting care: None.  2) Examination of Body Systems comprised of:   Body structures and functions that impact the plan of care:      Hip and Lumbar spine.   Activity limitations that impact the plan of care are:      Bathing, Bending, Cooking, Driving, Dressing, Lifting, Squatting/kneeling, Stairs, Standing and Walking.  3) Clinical presentation characteristics are:   Stable/Uncomplicated.  4) Decision-Making    Low complexity using standardized patient assessment instrument and/or measureable assessment of functional outcome.  Cumulative Therapy Evaluation is: Low complexity.  Previous and current functional limitations:  (See Goal Flow Sheet for this information)    Short term and Long term  goals: (See Goal Flow Sheet for this information)   Communication ability:  Patient appears to be able to clearly communicate and understand verbal and written communication and follow directions correctly.  Treatment Explanation - The following has been discussed with the patient:   RX ordered/plan of care  Anticipated outcomes  Possible risks and side effects  This patient would benefit from PT intervention to resume normal activities.   Rehab potential is good.  Frequency:  1 X week, once daily  Duration:  for 12 weeks  Discharge Plan:  Achieve all LTG.  Independent in home treatment program.  Reach maximal therapeutic benefit.      Thank you for your referral.    INQUIRIES  Therapist: Aung Mcdaniels [facility] [place]  [address] [place] [address]  Phone: [phone]  Fax: [phone]

## 2017-06-21 NOTE — PROGRESS NOTES
Subjective:    Patient is a 77 year old female presenting with rehab back hpi. The history is provided by the patient. No  was used.   Salome Saeed is a 77 year old female with a lumbar condition.  Condition occurred with:  Degenerative joint disease (LBP progressed last year and elected to have further lumbar L1-3 fusion, repair of few screws from prior L3-S1 fusion (May 2013)).  Condition occurred: other.  This is a new condition  April 26th 2017.    Patient reports pain:  Lower lumbar spine, mid lumbar spine, upper lumbar spine, central lumbar spine, lumbar spine left and lumbar spine right.    Pain is described as aching and is intermittent and reported as 5/10.  Associated symptoms:  Loss of strength, fatigue and loss of motion/stiffness. Pain is worse during the day.  Symptoms are exacerbated by lifting, standing and walking   Since onset symptoms are unchanged.    Previous treatment includes surgery.  There was moderate improvement following previous treatment.  General health as reported by patient is good.  Past medical history: see epic.  Medical allergies: yes.  Other surgeries include:  Orthopedic surgery.  Medication history: see epic.  Current occupation is Retired  .        Barriers include:  None as reported by the patient.    Red flags:  None as reported by the patient.                        Objective:      Gait:    Gait Type:  Antalgic   Weight Bearing Status:  WBAT   Assistive Devices:  Brace                 Lumbar/SI Evaluation  ROM:  AROM Lumbar: not assessed    Strength: abdominal strength difficult with poor isometric isolation, gluts fair isolation          Neural Tension/Mobility:  Lumbar:  Normal                                                  Hip Evaluation  Hip PROM:    Flexion: Left: 110   Right: 125        Internal Rotation: Left: 30    Right: 35  External Rotation: Left: 50    Right: 55                               General     ROS    Assessment/Plan:       Patient is a 77 year old female with lumbar complaints.    Patient has the following significant findings with corresponding treatment plan.                Diagnosis 1:  L1-3 new fusion, L3-S1 prior fusion  Pain -  hot/cold therapy, splint/taping/bracing/orthotics, self management, education and home program  Decreased ROM/flexibility - manual therapy and therapeutic exercise  Decreased strength - therapeutic exercise and therapeutic activities  Impaired balance - neuro re-education, gait training and therapeutic activities  Decreased proprioception - neuro re-education and therapeutic activities  Impaired gait - gait training  Decreased function - therapeutic activities  Impaired posture - neuro re-education    Therapy Evaluation Codes:   1) History comprised of:   Personal factors that impact the plan of care:      None.    Comorbidity factors that impact the plan of care are:      None.     Medications impacting care: None.  2) Examination of Body Systems comprised of:   Body structures and functions that impact the plan of care:      Hip and Lumbar spine.   Activity limitations that impact the plan of care are:      Bathing, Bending, Cooking, Driving, Dressing, Lifting, Squatting/kneeling, Stairs, Standing and Walking.  3) Clinical presentation characteristics are:   Stable/Uncomplicated.  4) Decision-Making    Low complexity using standardized patient assessment instrument and/or measureable assessment of functional outcome.  Cumulative Therapy Evaluation is: Low complexity.    Previous and current functional limitations:  (See Goal Flow Sheet for this information)    Short term and Long term goals: (See Goal Flow Sheet for this information)     Communication ability:  Patient appears to be able to clearly communicate and understand verbal and written communication and follow directions correctly.  Treatment Explanation - The following has been discussed with the patient:   RX ordered/plan of  care  Anticipated outcomes  Possible risks and side effects  This patient would benefit from PT intervention to resume normal activities.   Rehab potential is good.    Frequency:  1 X week, once daily  Duration:  for 12 weeks  Discharge Plan:  Achieve all LTG.  Independent in home treatment program.  Reach maximal therapeutic benefit.    Please refer to the daily flowsheet for treatment today, total treatment time and time spent performing 1:1 timed codes.

## 2017-06-22 RX ORDER — ESCITALOPRAM OXALATE 5 MG/1
7.5 TABLET ORAL DAILY
Qty: 90 TABLET | Refills: 3
Start: 2017-06-22 | End: 2017-09-01 | Stop reason: DRUGHIGH

## 2017-06-22 RX ORDER — LEVOTHYROXINE SODIUM 100 UG/1
100 TABLET ORAL DAILY
Qty: 90 TABLET | Refills: 3 | Status: SHIPPED | OUTPATIENT
Start: 2017-06-22 | End: 2018-04-19

## 2017-06-22 NOTE — TELEPHONE ENCOUNTER
TSH   Date Value Ref Range Status   05/24/2017 0.55 0.40 - 4.00 mU/L Final       Refill per RN protocol     Rossy Herring RN, BSN  [facility]

## 2017-06-28 ENCOUNTER — THERAPY VISIT (OUTPATIENT)
Dept: PHYSICAL THERAPY | Facility: CLINIC | Age: 77
End: 2017-06-28
Payer: COMMERCIAL

## 2017-06-28 DIAGNOSIS — M54.50 CHRONIC BILATERAL LOW BACK PAIN WITHOUT SCIATICA: ICD-10-CM

## 2017-06-28 DIAGNOSIS — Z98.1 STATUS POST ARTHRODESIS: ICD-10-CM

## 2017-06-28 DIAGNOSIS — G89.29 CHRONIC BILATERAL LOW BACK PAIN WITHOUT SCIATICA: ICD-10-CM

## 2017-06-28 PROCEDURE — 97530 THERAPEUTIC ACTIVITIES: CPT | Mod: GP | Performed by: PHYSICAL THERAPIST

## 2017-06-28 PROCEDURE — 97110 THERAPEUTIC EXERCISES: CPT | Mod: GP | Performed by: PHYSICAL THERAPIST

## 2017-06-29 ENCOUNTER — ANTICOAGULATION THERAPY VISIT (OUTPATIENT)
Dept: NURSING | Facility: CLINIC | Age: 77
End: 2017-06-29
Payer: COMMERCIAL

## 2017-06-29 DIAGNOSIS — Z79.01 LONG-TERM (CURRENT) USE OF ANTICOAGULANTS: ICD-10-CM

## 2017-06-29 LAB — INR POINT OF CARE: 3.1 (ref 0.86–1.14)

## 2017-06-29 PROCEDURE — 85610 PROTHROMBIN TIME: CPT | Mod: QW

## 2017-06-29 PROCEDURE — 99207 ZZC NO CHARGE NURSE ONLY: CPT

## 2017-06-29 PROCEDURE — 36416 COLLJ CAPILLARY BLOOD SPEC: CPT

## 2017-06-29 NOTE — PROGRESS NOTES
ANTICOAGULATION FOLLOW-UP CLINIC VISIT    Patient Name:  Salome Saeed  Date:  6/29/2017  Contact Type:  Face to Face    SUBJECTIVE:     Patient Findings     Positives No Problem Findings           OBJECTIVE    INR Protime   Date Value Ref Range Status   06/29/2017 3.1 (A) 0.86 - 1.14 Final       ASSESSMENT / PLAN  No question data found.  Anticoagulation Summary as of 6/29/2017     INR goal 2.0-3.0   Today's INR 3.1!   Maintenance plan 2.5 mg (5 mg x 0.5) on Mon, Wed, Fri; 5 mg (5 mg x 1) all other days   Full instructions 2.5 mg on Mon, Wed, Fri; 5 mg all other days   Weekly total 27.5 mg   No change documented Rossy Herring RN   Plan last modified Leyla Lopez (1/11/2016)   Next INR check 7/27/2017   Target end date     Indications   Long-term (current) use of anticoagulants [Z79.01] [Z79.01]  CVA (cerebral vascular accident) (Resolved) [I63.9]         Anticoagulation Episode Summary     INR check location     Preferred lab     Send INR reminders to  [facility]    Comments             See the Encounter Report to view Anticoagulation Flowsheet and Dosing Calendar (Go to Encounters tab in chart review, and find the Anticoagulation Therapy Visit)    Dosage adjustment made based on physician directed care plan.    Rossy Herring RN

## 2017-06-29 NOTE — MR AVS SNAPSHOT
Salome SPRAGUE Darlin   6/29/2017 2:30 PM   Anticoagulation Therapy Visit    Description:  77 year old female   Provider:   ANTICOAGULATION CLINIC   Department:   Nurse           INR as of 6/29/2017     Today's INR 3.1!      Anticoagulation Summary as of 6/29/2017     INR goal 2.0-3.0   Today's INR 3.1!   Full instructions 2.5 mg on Mon, Wed, Fri; 5 mg all other days   Next INR check 7/27/2017    Indications   Long-term (current) use of anticoagulants [Z79.01] [Z79.01]  CVA (cerebral vascular accident) (Resolved) [I63.9]         Your next Anticoagulation Clinic appointment(s)     Jul 26, 2017  2:00 PM CDT   Anticoagulation Visit with  ANTICOAGULATION CLINIC   [facility] [place] ([facility]    [address] [place] [address] [phone]              Contact Numbers     [place] [facility]  Please call [phone] with any problems or questions regarding your therapy, or to cancel or reschedule your appointment        June 2017 Details    Sun Mon Tue Wed Thu Fri Sat         1               2               3                 4               5               6               7               8               9               10                 11               12               13               14               15               16               17                 18               19               20               21               22               23               24                 25               26               27               28               29      5 mg   See details      30      2.5 mg           Date Details   06/29 This INR check               How to take your warfarin dose     To take:  2.5 mg Take 0.5 of a 5 mg tablet.    To take:  5 mg Take 1 of the 5 mg tablets.           July 2017 Details    Sun Mon Tue Wed Thu Fri Sat           1      5 mg           2      5 mg         3      2.5 mg         4      5 mg         5      2.5 mg         6      5 mg         7      2.5 mg         8       5 mg           9      5 mg         10      2.5 mg         11      5 mg         12      2.5 mg         13      5 mg         14      2.5 mg         15      5 mg           16      5 mg         17      2.5 mg         18      5 mg         19      2.5 mg         20      5 mg         21      2.5 mg         22      5 mg           23      5 mg         24      2.5 mg         25      5 mg         26      2.5 mg         27            28               29                 30               31                     Date Details   No additional details    Date of next INR:  7/27/2017         How to take your warfarin dose     To take:  2.5 mg Take 0.5 of a 5 mg tablet.    To take:  5 mg Take 1 of the 5 mg tablets.

## 2017-07-11 ENCOUNTER — THERAPY VISIT (OUTPATIENT)
Dept: PHYSICAL THERAPY | Facility: CLINIC | Age: 77
End: 2017-07-11
Payer: COMMERCIAL

## 2017-07-11 DIAGNOSIS — Z98.1 STATUS POST ARTHRODESIS: ICD-10-CM

## 2017-07-11 DIAGNOSIS — M54.50 CHRONIC BILATERAL LOW BACK PAIN WITHOUT SCIATICA: ICD-10-CM

## 2017-07-11 DIAGNOSIS — G89.29 CHRONIC BILATERAL LOW BACK PAIN WITHOUT SCIATICA: ICD-10-CM

## 2017-07-11 PROCEDURE — 97112 NEUROMUSCULAR REEDUCATION: CPT | Mod: GP | Performed by: PHYSICAL THERAPIST

## 2017-07-11 PROCEDURE — 97530 THERAPEUTIC ACTIVITIES: CPT | Mod: GP | Performed by: PHYSICAL THERAPIST

## 2017-07-11 PROCEDURE — 97110 THERAPEUTIC EXERCISES: CPT | Mod: GP | Performed by: PHYSICAL THERAPIST

## 2017-07-13 ENCOUNTER — TELEPHONE (OUTPATIENT)
Dept: FAMILY MEDICINE | Facility: CLINIC | Age: 77
End: 2017-07-13

## 2017-07-13 NOTE — TELEPHONE ENCOUNTER
PA phone number: [phone]  Patient ID: [id number]  Medication Requested: Escitalopram 5mg--need override as plan only allows one per day  Dx:Anxiety F41.9  Requested by: [facility] [place]  T/f: nothing on file.  Per chart review, pt was taking 10mg from 8/28/2013 through 3/13/2015 when she stopped taking the med.  We restarted her on a 5mg dose on 12/31/2015.  Dose was increased to 7.5mg on 6/12/2017 based on visit that day.  Per note from that visit pt was to increase to 7.5mg to treat for possible stomach ulcer.  Do you still want pt on 7.5mg?  If so, I will need to do a PA or we can go back to 5mg or a 10mg as plan only allows one tablet per day.  MD, please advise.  Janie Mccall

## 2017-07-18 NOTE — TELEPHONE ENCOUNTER
"Patient is doing well on the 5mg.  \"Everything\" seems better.  I am happy with the way things are right now.   Routing to  as JAKE Saenz RN- Triage [employer]    "

## 2017-07-18 NOTE — TELEPHONE ENCOUNTER
Can we please call patient and see how she is doing.  Since the insurance is not covering 7.5, see if the patient feels like she wants to go up to 10mg or we can drop to 5mg.    Karen Weiler, MD

## 2017-07-26 ENCOUNTER — ANTICOAGULATION THERAPY VISIT (OUTPATIENT)
Dept: NURSING | Facility: CLINIC | Age: 77
End: 2017-07-26
Payer: COMMERCIAL

## 2017-07-26 DIAGNOSIS — Z79.01 LONG-TERM (CURRENT) USE OF ANTICOAGULANTS: ICD-10-CM

## 2017-07-26 LAB — INR POINT OF CARE: 2.1 (ref 0.86–1.14)

## 2017-07-26 PROCEDURE — 85610 PROTHROMBIN TIME: CPT | Mod: QW

## 2017-07-26 PROCEDURE — 36416 COLLJ CAPILLARY BLOOD SPEC: CPT

## 2017-07-26 NOTE — MR AVS SNAPSHOT
Salomeduane Saeed   7/26/2017 2:00 PM   Anticoagulation Therapy Visit    Description:  77 year old female   Provider:   ANTICOAGULATION CLINIC   Department:   Nurse           INR as of 7/26/2017     Today's INR 2.1      Anticoagulation Summary as of 7/26/2017     INR goal 2.0-3.0   Today's INR 2.1   Full instructions 2.5 mg on Mon, Wed, Fri; 5 mg all other days   Next INR check 8/22/2017    Indications   Long-term (current) use of anticoagulants [Z79.01] [Z79.01]  CVA (cerebral vascular accident) (Resolved) [I63.9]         Your next Anticoagulation Clinic appointment(s)     Aug 22, 2017  2:15 PM CDT   Anticoagulation Visit with  ANTICOAGULATION CLINIC   [facility] [place] ([facility])    [address]  [facility] [id number] [phone]              Contact Numbers     [place] [facility]  Please call [phone] with any problems or questions regarding your therapy, or to cancel or reschedule your appointment        July 2017 Details    Sun Mon Tue Wed Thu Fri Sat           1                 2               3               4               5               6               7               8                 9               10               11               12               13               14               15                 16               17               18               19               20               21               22                 23               24               25               26      2.5 mg   See details      27      5 mg         28      2.5 mg         29      5 mg           30      5 mg         31      2.5 mg               Date Details   07/26 This INR check               How to take your warfarin dose     To take:  2.5 mg Take 0.5 of a 5 mg tablet.    To take:  5 mg Take 1 of the 5 mg tablets.           August 2017 Details    Sun Mon Tue Wed Thu Fri Sat       1      5 mg         2      2.5 mg         3      5 mg         4      2.5 mg         5      5 mg           6       5 mg         7      2.5 mg         8      5 mg         9      2.5 mg         10      5 mg         11      2.5 mg         12      5 mg           13      5 mg         14      2.5 mg         15      5 mg         16      2.5 mg         17      5 mg         18      2.5 mg         19      5 mg           20      5 mg         21      2.5 mg         22            23               24               25               26                 27               28               29               30               31                  Date Details   No additional details    Date of next INR:  8/22/2017         How to take your warfarin dose     To take:  2.5 mg Take 0.5 of a 5 mg tablet.    To take:  5 mg Take 1 of the 5 mg tablets.

## 2017-07-26 NOTE — PROGRESS NOTES
ANTICOAGULATION FOLLOW-UP CLINIC VISIT    Patient Name:  Salome Saeed  Date:  7/26/2017  Contact Type:  Face to Face    SUBJECTIVE:     Patient Findings     Positives No Problem Findings           OBJECTIVE    INR Protime   Date Value Ref Range Status   07/26/2017 2.1 (A) 0.86 - 1.14 Final       ASSESSMENT / PLAN  INR assessment THER    Recheck INR In: 4 WEEKS    INR Location Clinic      Anticoagulation Summary as of 7/26/2017     INR goal 2.0-3.0   Today's INR 2.1   Maintenance plan 2.5 mg (5 mg x 0.5) on Mon, Wed, Fri; 5 mg (5 mg x 1) all other days   Full instructions 2.5 mg on Mon, Wed, Fri; 5 mg all other days   Weekly total 27.5 mg   No change documented Renee Hoang, NUBIA   Plan last modified Leyla Lopez (1/11/2016)   Next INR check 8/22/2017   Target end date     Indications   Long-term (current) use of anticoagulants [Z79.01] [Z79.01]  CVA (cerebral vascular accident) (Resolved) [I63.9]         Anticoagulation Episode Summary     INR check location     Preferred lab     Send INR reminders to [facility]    Comments             See the Encounter Report to view Anticoagulation Flowsheet and Dosing Calendar (Go to Encounters tab in chart review, and find the Anticoagulation Therapy Visit)    Dosage adjustment made based on physician directed care plan.    Renee Hoagn RN

## 2017-08-02 ENCOUNTER — THERAPY VISIT (OUTPATIENT)
Dept: PHYSICAL THERAPY | Facility: CLINIC | Age: 77
End: 2017-08-02
Payer: COMMERCIAL

## 2017-08-02 DIAGNOSIS — M54.50 CHRONIC BILATERAL LOW BACK PAIN WITHOUT SCIATICA: ICD-10-CM

## 2017-08-02 DIAGNOSIS — Z98.1 STATUS POST ARTHRODESIS: ICD-10-CM

## 2017-08-02 DIAGNOSIS — G89.29 CHRONIC BILATERAL LOW BACK PAIN WITHOUT SCIATICA: ICD-10-CM

## 2017-08-02 PROCEDURE — 97110 THERAPEUTIC EXERCISES: CPT | Mod: GP | Performed by: PHYSICAL THERAPIST

## 2017-08-07 NOTE — TELEPHONE ENCOUNTER
Pantoprazole (Protonix) 20 mg EC tablet  This medication has never been prescribed by a [facility] provider.

## 2017-08-07 NOTE — TELEPHONE ENCOUNTER
This medication was never prescribed to pt by any [facility] provider   Why does pt need this? How much and how often does pt take this?     Called #   Telephone Information:   Mobile [phone]     Unable to LM     Will attempt later     Rossy Herring RN, BSN  [facility]

## 2017-08-08 ENCOUNTER — TRANSFERRED RECORDS (OUTPATIENT)
Dept: HEALTH INFORMATION MANAGEMENT | Facility: CLINIC | Age: 77
End: 2017-08-08

## 2017-08-11 NOTE — TELEPHONE ENCOUNTER
Called pharmacy and verbally gave them new dispensing directions.  They will run rx with new dosing instruction of 5mg taken 1 time daily.  Janie Mccall

## 2017-08-14 DIAGNOSIS — I10 ESSENTIAL HYPERTENSION, BENIGN: ICD-10-CM

## 2017-08-14 DIAGNOSIS — I25.10 CAD (CORONARY ARTERY DISEASE): ICD-10-CM

## 2017-08-14 RX ORDER — ATENOLOL 25 MG/1
25 TABLET ORAL 2 TIMES DAILY
Qty: 60 TABLET | Refills: 0 | Status: SHIPPED | OUTPATIENT
Start: 2017-08-14 | End: 2017-11-01

## 2017-08-14 RX ORDER — LISINOPRIL 20 MG/1
20 TABLET ORAL DAILY
Qty: 30 TABLET | Refills: 0 | Status: SHIPPED | OUTPATIENT
Start: 2017-08-14 | End: 2017-10-25

## 2017-08-15 NOTE — TELEPHONE ENCOUNTER
Patient Contact    Attempt # 2    Was call answered?  No.  Unable to leave message. voicmail not set up  Brenda Saenz RN- Triage [employer]

## 2017-08-21 RX ORDER — PANTOPRAZOLE SODIUM 20 MG/1
TABLET, DELAYED RELEASE ORAL
Qty: 30 TABLET | Refills: 0 | OUTPATIENT
Start: 2017-08-21

## 2017-08-22 ENCOUNTER — ANTICOAGULATION THERAPY VISIT (OUTPATIENT)
Dept: NURSING | Facility: CLINIC | Age: 77
End: 2017-08-22
Payer: COMMERCIAL

## 2017-08-22 ENCOUNTER — THERAPY VISIT (OUTPATIENT)
Dept: PHYSICAL THERAPY | Facility: CLINIC | Age: 77
End: 2017-08-22
Payer: COMMERCIAL

## 2017-08-22 DIAGNOSIS — Z98.1 STATUS POST ARTHRODESIS: ICD-10-CM

## 2017-08-22 DIAGNOSIS — Z79.01 LONG-TERM (CURRENT) USE OF ANTICOAGULANTS: ICD-10-CM

## 2017-08-22 DIAGNOSIS — M54.50 CHRONIC BILATERAL LOW BACK PAIN WITHOUT SCIATICA: ICD-10-CM

## 2017-08-22 DIAGNOSIS — G89.29 CHRONIC BILATERAL LOW BACK PAIN WITHOUT SCIATICA: ICD-10-CM

## 2017-08-22 DIAGNOSIS — K21.9 GASTROESOPHAGEAL REFLUX DISEASE WITHOUT ESOPHAGITIS: Primary | ICD-10-CM

## 2017-08-22 LAB — INR POINT OF CARE: 2.7 (ref 0.86–1.14)

## 2017-08-22 PROCEDURE — G8979 MOBILITY GOAL STATUS: HCPCS | Mod: GP | Performed by: PHYSICAL THERAPIST

## 2017-08-22 PROCEDURE — G8980 MOBILITY D/C STATUS: HCPCS | Mod: GP | Performed by: PHYSICAL THERAPIST

## 2017-08-22 PROCEDURE — 85610 PROTHROMBIN TIME: CPT | Mod: QW

## 2017-08-22 PROCEDURE — 97110 THERAPEUTIC EXERCISES: CPT | Mod: GP | Performed by: PHYSICAL THERAPIST

## 2017-08-22 PROCEDURE — 97530 THERAPEUTIC ACTIVITIES: CPT | Mod: GP | Performed by: PHYSICAL THERAPIST

## 2017-08-22 PROCEDURE — 36416 COLLJ CAPILLARY BLOOD SPEC: CPT

## 2017-08-22 NOTE — MR AVS SNAPSHOT
Salome SPRAGUE Darlin   8/22/2017 2:15 PM   Anticoagulation Therapy Visit    Description:  77 year old female   Provider:   ANTICOAGULATION CLINIC   Department:   Nurse           INR as of 8/22/2017     Today's INR 2.7      Anticoagulation Summary as of 8/22/2017     INR goal 2.0-3.0   Today's INR 2.7   Full instructions 2.5 mg on Mon, Wed, Fri; 5 mg all other days   Next INR check 9/19/2017    Indications   Long-term (current) use of anticoagulants [Z79.01] [Z79.01]  CVA (cerebral vascular accident) (Resolved) [I63.9]         Your next Anticoagulation Clinic appointment(s)     Sep 19, 2017  2:30 PM CDT   Anticoagulation Visit with  ANTICOAGULATION CLINIC   [facility] [place] ([facility])    [address]  [place] [id number] [phone]              Contact Numbers     [place] [facility]  Please call [phone] with any problems or questions regarding your therapy, or to cancel or reschedule your appointment        August 2017 Details    Sun Mon Tue Wed Thu Fri Sat       1               2               3               4               5                 6               7               8               9               10               11               12                 13               14               15               16               17               18               19                 20               21               22      5 mg   See details      23      2.5 mg         24      5 mg         25      2.5 mg         26      5 mg           27      5 mg         28      2.5 mg         29      5 mg         30      2.5 mg         31      5 mg            Date Details   08/22 This INR check               How to take your warfarin dose     To take:  2.5 mg Take 0.5 of a 5 mg tablet.    To take:  5 mg Take 1 of the 5 mg tablets.           September 2017 Details    Sun Mon Tue Wed Thu Fri Sat          1      2.5 mg         2      5 mg           3      5 mg         4      2.5 mg         5      5  mg         6      2.5 mg         7      5 mg         8      2.5 mg         9      5 mg           10      5 mg         11      2.5 mg         12      5 mg         13      2.5 mg         14      5 mg         15      2.5 mg         16      5 mg           17      5 mg         18      2.5 mg         19            20               21               22               23                 24               25               26               27               28               29               30                Date Details   No additional details    Date of next INR:  9/19/2017         How to take your warfarin dose     To take:  2.5 mg Take 0.5 of a 5 mg tablet.    To take:  5 mg Take 1 of the 5 mg tablets.

## 2017-08-22 NOTE — MR AVS SNAPSHOT
After Visit Summary   8/22/2017    Salome Saeed    MRN: [id number]           Patient Information     Date Of Birth          1940        Visit Information        Provider Department      8/22/2017 3:00 PM Elijah Mcdaniels PT [facility] [place]        Today's Diagnoses     Chronic bilateral low back pain without sciatica        Status post arthrodesis           Follow-ups after your visit        Your next 10 appointments already scheduled     Sep 01, 2017  9:15 AM CDT   LAB with RU LAB   [facility] ([facility])    [address] [place] [id number] [phone]           Patient must bring picture ID. Patient should be prepared to give a urine specimen  Please do not eat 10-12 hours before your appointment if you are coming in fasting for labs on lipids, cholesterol, or glucose (sugar). Pregnant women should follow their Care Team instructions. Water with medications is okay. Do not drink coffee or other fluids. If you have concerns about taking  your medications, please ask at office or if scheduling via Users[employer], send a message by clicking on Secure Messaging, Message Your Care Team.            Sep 01, 2017 10:10 AM CDT   Return Visit with KIANA Carreno CNP   [facility] ([facility])    [address] [place] [address] [address]  [facility] [id number] [phone]            Sep 19, 2017  2:30 PM CDT   Anticoagulation Visit with [facility]   [facility] [place] ([facility])    [address] [place] [id number] [phone]              Who to contact     If you have questions or need follow up information about today's clinic visit or your schedule please contact [facility] directly at [phone].  Normal or non-critical lab and imaging results will be communicated to you by  "MyChart, letter or phone within 4 business days after the clinic has received the results. If you do not hear from us within 7 days, please contact the clinic through [employer] or phone. If you have a critical or abnormal lab result, we will notify you by phone as soon as possible.  Submit refill requests through [employer] or call your pharmacy and they will forward the refill request to us. Please allow 3 business days for your refill to be completed.          Additional Information About Your Visit        Displair[employer]l Information     [employer] lets you send messages to your doctor, view your test results, renew your prescriptions, schedule appointments and more. To sign up, go to [email or web address] . Click on \"Log in\" on the left side of the screen, which will take you to the Welcome page. Then click on \"Sign up Now\" on the right side of the page.     You will be asked to enter the access code listed below, as well as some personal information. Please follow the directions to create your username and password.     Your access code is: [email or web address]  Expires: 10/31/2017  4:30 PM     Your access code will  in 90 days. If you need help or a new code, please call your [place] clinic or [phone].        Care EveryWhere ID     This is your Care EveryWhere ID. This could be used by other organizations to access your [place] medical records  [id number]         Blood Pressure from Last 3 Encounters:   17 130/62   17 118/60   04/10/17 119/66    Weight from Last 3 Encounters:   17 67.4 kg (148 lb 9.6 oz)   17 68.5 kg (151 lb)   04/10/17 69.8 kg (153 lb 12.8 oz)              We Performed the Following     KELSEY PROGRESS NOTES REPORT     THERAPEUTIC ACTIVITIES     THERAPEUTIC EXERCISES        Primary Care Provider Office Phone # Fax #    Karen Weiler, [phone] [address]  [place] [address]        Equal Access to Services     BRICE OLIVARES AH: Randell booth " Gilberto, reneda lutreadaha, qaybta kakim chambers, sonia morales aliyahjesus alberto guevaranancy freya. So [facility] [phone].    ATENCIÓN: Si pam campa, tiene a smith disposición servicios gratuitos de asistencia lingüística. Leonidas al [phone].    We comply with applicable federal civil rights laws and [place] laws. We do not discriminate on the basis of race, color, national origin, age, disability sex, sexual orientation or gender identity.            Thank you!     Thank you for choosing [facility]  for your care. Our goal is always to provide you with excellent care. Hearing back from our patients is one way we can continue to improve our services. Please take a few minutes to complete the written survey that you may receive in the mail after your visit with us. Thank you!             Your Updated Medication List - Protect others around you: Learn how to safely use, store and throw away your medicines at www.disposemymeds.org.          This list is accurate as of: 8/22/17  3:49 PM.  Always use your most recent med list.                   Brand Name Dispense Instructions for use Diagnosis    acetaminophen 325 MG tablet    TYLENOL     Take 2 tablets by mouth every 6 hours as needed for pain.        amLODIPine 5 MG tablet    NORVASC    90 tablet    Take 1 tablet (5 mg) by mouth daily    HTN (hypertension)       ascorbic acid 500 MG tablet    VITAMIN C     Take 1,000 mg by mouth daily        aspirin EC 81 MG EC tablet      Take 1 tablet (81 mg) by mouth daily    Coronary artery disease involving native coronary artery of native heart without angina pectoris       atenolol 25 MG tablet    TENORMIN    60 tablet    Take 1 tablet (25 mg) by mouth 2 times daily    CAD (coronary artery disease)       CALCIUM 600 + D PO      Take 1,200 mg by mouth daily        CoQ-10 200 MG Caps      Take 400 mg by mouth daily        DAILY MULTIVITAMIN PO      Take by mouth daily        enoxaparin 60 MG/0.6ML  injection    LOVENOX    1.2 mL    Inject 0.6 mLs (60 mg) Subcutaneous daily    Long-term (current) use of anticoagulants       escitalopram 5 MG tablet    LEXAPRO    90 tablet    Take 1.5 tablets (7.5 mg) by mouth daily    Anxiety       levothyroxine 100 MCG tablet    SYNTHROID/LEVOTHROID    90 tablet    Take 1 tablet (100 mcg) by mouth daily    Hypothyroidism, unspecified type       lisinopril 20 MG tablet    PRINIVIL/ZESTRIL    30 tablet    Take 1 tablet (20 mg) by mouth daily    Essential hypertension, benign       nitroGLYcerin 0.4 MG sublingual tablet    NITROSTAT    25 tablet    Place 1 tablet (0.4 mg) under the tongue every 5 minutes as needed for chest pain    CAD (coronary artery disease)       oxyCODONE 5 MG IR tablet    ROXICODONE          ranitidine 150 MG tablet    ZANTAC    180 tablet    Take 1 tablet (150 mg) by mouth 2 times daily    Gastroesophageal reflux disease with esophagitis       rosuvastatin 10 MG tablet    CRESTOR    90 tablet    Take 1 tablet (10 mg) by mouth daily    Hyperlipidemia LDL goal <70       vitamin D 2000 UNITS tablet      Take 2,000 Units by mouth daily        warfarin 5 MG tablet    COUMADIN    90 tablet    Take 0.5-1 tab daily as directed by INR nurse based on INR reading.    Cerebrovascular accident involving cerebellum (H)

## 2017-08-22 NOTE — PROGRESS NOTES
ANTICOAGULATION FOLLOW-UP CLINIC VISIT    Patient Name:  Salome Saeed  Date:  8/22/2017  Contact Type:  Face to Face    SUBJECTIVE:     Patient Findings     Positives No Problem Findings           OBJECTIVE    INR Protime   Date Value Ref Range Status   08/22/2017 2.7 (A) 0.86 - 1.14 Final       ASSESSMENT / PLAN  INR assessment THER    Recheck INR In: 4 WEEKS    INR Location Clinic      Anticoagulation Summary as of 8/22/2017     INR goal 2.0-3.0   Today's INR 2.7   Maintenance plan 2.5 mg (5 mg x 0.5) on Mon, Wed, Fri; 5 mg (5 mg x 1) all other days   Full instructions 2.5 mg on Mon, Wed, Fri; 5 mg all other days   Weekly total 27.5 mg   No change documented Rosanne Rico RN   Plan last modified Leyla Lopez (1/11/2016)   Next INR check 9/19/2017   Target end date     Indications   Long-term (current) use of anticoagulants [Z79.01] [Z79.01]  CVA (cerebral vascular accident) (Resolved) [I63.9]         Anticoagulation Episode Summary     INR check location     Preferred lab     Send INR reminders to SV TRIAGE    Comments             See the Encounter Report to view Anticoagulation Flowsheet and Dosing Calendar (Go to Encounters tab in chart review, and find the Anticoagulation Therapy Visit)    Rosanne Rico, RN

## 2017-08-22 NOTE — LETTER
[facility] [place]  [address]  [facility] [address]  [phone]    2017    Re: Salome Saeed   :   1940  MRN:  [id number]   REFERRING PHYSICIAN:   Daniel Harris    [facility]  Date of Initial Evaluation:  2017  Visits:  Rxs Used: 5  Reason for Referral:     Chronic bilateral low back pain without sciatica  Status post arthrodesis    DISCHARGE REPORT    Progress reporting period is from initial to .       SUBJECTIVE    Subjective changes noted by patient:  Loreta returns to PT feeling overall good improvement.  She is not using her back brace and being more active. Pt walked lot yesterday walking with  at [facility] and she could feel some back soreness.  Pt reports one of her main reasons to have this new surgery was her leg pain, and she reports she does not have leg pain anymore.  Current pain level is 4/10  .     Previous pain level was  7/10  .   Changes in function:  Yes (See Goal flowsheet attached for changes in current functional level)  Adverse reaction to treatment or activity: None    OBJECTIVE    Changes noted in objective findings:  Yes, Loreta shows good progress of LE, core strength with her ability to move more and she is able to tolerate a progression of exercise volume.  Objective: pain at rest 0/10, pain with standing/walking 6-7/10     ASSESSMENT/PLAN    Updated problem list and treatment plan: Diagnosis 1:  S/P Lumbar Fusion L1-S1  Pain -  hot/cold therapy  Decreased ROM/flexibility - manual therapy and therapeutic exercise  Decreased strength - therapeutic exercise and therapeutic activities  Impaired gait - gait training  Decreased function - therapeutic activities  Impaired posture - neuro re-education  STG/LTGs have been met or progress has been made towards goals:  Yes (See Goal flow sheet completed today.)  Re: Salome Saeed   :   1940      Assessment of Progress: The  patient's condition is improving.  Self Management Plans:  Patient has been instructed in a home treatment program.  Patient is independent in a home treatment program.  Patient  has been instructed in self management of symptoms.  Patient is independent in self management of symptoms.  I have re-evaluated this patient and find that the nature, scope, duration and intensity of the therapy is appropriate for the medical condition of the patient.  Salome continues to require the following intervention to meet STG and LTG's:  PT intervention is no longer required to meet STG/LTG.    Recommendations:    This patient is ready to be discharged from therapy and continue their home treatment program.              Thank you for your referral.    INQUIRIES  Therapist: Aung Mcdaniels PT  [facility] [place]  [address] [facility] [address] [facility]  [place] [address]  Phone: [phone]  Fax: [phone]

## 2017-08-22 NOTE — PROGRESS NOTES
Subjective:    HPI  Oswestry Score: 17.78 %                 Objective:    System    Physical Exam    General     ROS    Assessment/Plan:      DISCHARGE REPORT    Progress reporting period is from initial to 8/22.       SUBJECTIVE  Subjective changes noted by patient:  Loreta returns to PT feeling overall good improvement.  She is not using her back brace and being more active. Pt walked lot yesterday walking with  at [facility] and she could feel some back soreness.  Pt reports one of her main reasons to have this new surgery was her leg pain, and she reports she does not have leg pain anymore.  Current pain level is 4/10  .     Previous pain level was  7/10  .   Changes in function:  Yes (See Goal flowsheet attached for changes in current functional level)  Adverse reaction to treatment or activity: None    OBJECTIVE  Changes noted in objective findings:  Yes, Loreta shows good progress of LE, core strength with her ability to move more and she is able to tolerate a progression of exercise volume.  Objective: pain at rest 0/10, pain with standing/walking 6-7/10     ASSESSMENT/PLAN  Updated problem list and treatment plan: Diagnosis 1:  S/P Lumbar Fusion L1-S1  Pain -  hot/cold therapy  Decreased ROM/flexibility - manual therapy and therapeutic exercise  Decreased strength - therapeutic exercise and therapeutic activities  Impaired gait - gait training  Decreased function - therapeutic activities  Impaired posture - neuro re-education  STG/LTGs have been met or progress has been made towards goals:  Yes (See Goal flow sheet completed today.)  Assessment of Progress: The patient's condition is improving.  Self Management Plans:  Patient has been instructed in a home treatment program.  Patient is independent in a home treatment program.  Patient  has been instructed in self management of symptoms.  Patient is independent in self management of symptoms.  I have re-evaluated this patient and find that the nature,  scope, duration and intensity of the therapy is appropriate for the medical condition of the patient.  Salome continues to require the following intervention to meet STG and LTG's:  PT intervention is no longer required to meet STG/LTG.    Recommendations:  This patient is ready to be discharged from therapy and continue their home treatment program.    Please refer to the daily flowsheet for treatment today, total treatment time and time spent performing 1:1 timed codes.

## 2017-08-23 RX ORDER — PANTOPRAZOLE SODIUM 20 MG/1
TABLET, DELAYED RELEASE ORAL
Qty: 30 TABLET | Refills: 1 | Status: SHIPPED | OUTPATIENT
Start: 2017-08-23 | End: 2017-09-01

## 2017-08-23 NOTE — TELEPHONE ENCOUNTER
Pantoprazole(PROTONIX) 20 MG EC tablet    See request on 8/7/2017.    Last Written Prescription Date: NA  Last Fill Quantity: NA,  # refills: NA   Last Office Visit with [facility], [facility] or [facility] prescribing provider: 6/12/2017                                         Next 5 appointments (look out 90 days)     Sep 01, 2017 10:10 AM CDT   Return Visit with KIANA Carreno CNP   [facility] ([facility]    [address] [address] [address] [phone]                 Routing refill request to provider for review/approval because:  Medication has never been prescribed by a [place] physician.

## 2017-09-01 ENCOUNTER — OFFICE VISIT (OUTPATIENT)
Dept: CARDIOLOGY | Facility: CLINIC | Age: 77
End: 2017-09-01
Attending: INTERNAL MEDICINE
Payer: COMMERCIAL

## 2017-09-01 VITALS
BODY MASS INDEX: 25.95 KG/M2 | DIASTOLIC BLOOD PRESSURE: 72 MMHG | SYSTOLIC BLOOD PRESSURE: 118 MMHG | HEIGHT: 64 IN | HEART RATE: 56 BPM | OXYGEN SATURATION: 99 % | WEIGHT: 152 LBS

## 2017-09-01 DIAGNOSIS — E78.5 HYPERLIPIDEMIA LDL GOAL <70: ICD-10-CM

## 2017-09-01 DIAGNOSIS — I25.10 CORONARY ARTERY DISEASE INVOLVING NATIVE CORONARY ARTERY OF NATIVE HEART WITHOUT ANGINA PECTORIS: ICD-10-CM

## 2017-09-01 DIAGNOSIS — I25.759 CORONARY ARTERY DISEASE INVOLVING NATIVE ARTERY OF TRANSPLANTED HEART WITH ANGINA PECTORIS (H): Primary | ICD-10-CM

## 2017-09-01 DIAGNOSIS — I25.10 CORONARY ARTERY DISEASE INVOLVING NATIVE CORONARY ARTERY, ANGINA PRESENCE UNSPECIFIED, UNSPECIFIED WHETHER NATIVE OR TRANSPLANTED HEART: ICD-10-CM

## 2017-09-01 LAB
ANION GAP SERPL CALCULATED.3IONS-SCNC: 5 MMOL/L (ref 3–14)
BUN SERPL-MCNC: 10 MG/DL (ref 7–30)
CALCIUM SERPL-MCNC: 9.1 MG/DL (ref 8.5–10.1)
CHLORIDE SERPL-SCNC: 100 MMOL/L (ref 94–109)
CHOLEST SERPL-MCNC: 172 MG/DL
CO2 SERPL-SCNC: 29 MMOL/L (ref 20–32)
CREAT SERPL-MCNC: 0.63 MG/DL (ref 0.52–1.04)
GFR SERPL CREATININE-BSD FRML MDRD: >90 ML/MIN/1.7M2
GLUCOSE SERPL-MCNC: 108 MG/DL (ref 70–99)
HDLC SERPL-MCNC: 84 MG/DL
LDLC SERPL CALC-MCNC: 75 MG/DL
NONHDLC SERPL-MCNC: 88 MG/DL
POTASSIUM SERPL-SCNC: 4.2 MMOL/L (ref 3.4–5.3)
SODIUM SERPL-SCNC: 134 MMOL/L (ref 133–144)
TRIGL SERPL-MCNC: 64 MG/DL

## 2017-09-01 PROCEDURE — 80061 LIPID PANEL: CPT | Performed by: INTERNAL MEDICINE

## 2017-09-01 PROCEDURE — 80048 BASIC METABOLIC PNL TOTAL CA: CPT | Performed by: INTERNAL MEDICINE

## 2017-09-01 PROCEDURE — 99214 OFFICE O/P EST MOD 30 MIN: CPT | Performed by: NURSE PRACTITIONER

## 2017-09-01 PROCEDURE — 36415 COLL VENOUS BLD VENIPUNCTURE: CPT | Performed by: INTERNAL MEDICINE

## 2017-09-01 RX ORDER — ESCITALOPRAM OXALATE 5 MG/1
5 TABLET ORAL DAILY
COMMUNITY
End: 2017-11-01

## 2017-09-01 RX ORDER — NITROGLYCERIN 0.4 MG/1
0.4 TABLET SUBLINGUAL EVERY 5 MIN PRN
Qty: 25 TABLET | Refills: 2 | Status: SHIPPED | OUTPATIENT
Start: 2017-09-01 | End: 2017-09-01

## 2017-09-01 RX ORDER — ROSUVASTATIN CALCIUM 20 MG/1
20 TABLET, COATED ORAL DAILY
Qty: 90 TABLET | Refills: 3 | Status: SHIPPED | OUTPATIENT
Start: 2017-09-01 | End: 2018-05-30

## 2017-09-01 RX ORDER — NITROGLYCERIN 0.4 MG/1
0.4 TABLET SUBLINGUAL EVERY 5 MIN PRN
Qty: 25 TABLET | Refills: 2 | Status: SHIPPED | OUTPATIENT
Start: 2017-09-01 | End: 2019-10-04

## 2017-09-01 NOTE — MR AVS SNAPSHOT
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 For a TWO day exam: Allow 2 hours PER day for test.  You may need to stop some medicines before the test. Follow your doctor s orders. - If you take a beta blocker: Follow your doctor s specific instructions on taking it prior to and on the day of your exam. - If you take Aggrenox or dipyridamole (Persantine, Permole), stop taking it 48 hours before your test. - If you take Viagra, Cialis or Levitra, stop taking it 48 hours before your test. - If you take theophylline or aminophylline, stop taking it 12 hours before your test.  For patients with diabetes: - If you take insulin, call your diabetes care team. Ask if you should take a 1/2 dose the morning of your test. - If you take diabetes medicine by mouth, don t take it on the morning of your test. Bring it with you to take after the test. (If you have questions, call your diabetes care team.)  Do not take nitrates on the day of your test. Do not wear your  Nitro-Patch.  Stop all caffeine 12 hours before the test. This includes coffee, tea, soda pop, chocolate and certain medicines (such as Anacin, Excedrin and NoDoz). Also avoid decaf coffee and tea, as these contain small amounts of caffeine.  No alcohol, smoking or other tobacco for 12 hours before the test.  Stop eating 3 hours before the test. You may drink water.  Please wear a loose two-piece outfit. If you will have an exercise test, bring rubber-soled walking shoes.  When you arrive, please tell us if you: - Have diabetes - Are breastfeeding - May be pregnant - Have a pacemaker of ICD (implantable defibrillator).  Please call your Imaging Department at your exam site with any questions.            Sep 06, 2017 12:30 PM CDT   Ekg Stress Nm Lexiscan with RESRM3   [facility] ([facility])    [address] [facility] [id number] [phone]            Sep 13, 2017  8:30 AM CDT   Return Visit with Nikolai Amaya MD   [facility] [place] ([facility])    [address]  [facility] [id number] [phone]            Sep 19, 2017  2:30 PM CDT   Anticoagulation Visit with [facility]IC   [facility] [place] ([facility])    [address] [facility] [id number] [phone]              Future tests that were ordered for you today     Open Future Orders        Priority Expected Expires Ordered    Lipid Profile Routine 12/30/2017 9/1/2018 9/1/2017    ALT Routine 12/30/2017 9/1/2018 9/1/2017    NM Lexiscan stress test (nuc card) Routine 9/6/2017 9/1/2018 9/1/2017    Follow-Up with Cardiologist Routine 9/13/2017 9/1/2018 9/1/2017            Who to contact     If you have questions or need follow up information about today's clinic visit or your schedule please contact [facility] [place] directly at [phone].  Normal or non-critical lab and  "imaging results will be communicated to you by MyChart, letter or phone within 4 business days after the clinic has received the results. If you do not hear from us within 7 days, please contact the clinic through scanRt or phone. If you have a critical or abnormal lab result, we will notify you by phone as soon as possible.  Submit refill requests through [employer] or call your pharmacy and they will forward the refill request to us. Please allow 3 business days for your refill to be completed.          Additional Information About Your Visit        [employer] Information     [employer] lets you send messages to your doctor, view your test results, renew your prescriptions, schedule appointments and more. To sign up, go to [email or web address] . Click on \"Log in\" on the left side of the screen, which will take you to the Welcome page. Then click on \"Sign up Now\" on the right side of the page.     You will be asked to enter the access code listed below, as well as some personal information. Please follow the directions to create your username and password.     Your access code is: [email or web address]  Expires: 10/31/2017  4:30 PM     Your access code will  in 90 days. If you need help or a new code, please call your [place] clinic or [phone].        Care EveryWhere ID     This is your Care EveryWhere ID. This could be used by other organizations to access your [place] medical records  [id number]        Your Vitals Were     Pulse Height Pulse Oximetry BMI (Body Mass Index)          56 1.626 m (5' 4\") 99% 26.09 kg/m2         Blood Pressure from Last 3 Encounters:   17 118/72   17 130/62   17 118/60    Weight from Last 3 Encounters:   17 68.9 kg (152 lb)   17 67.4 kg (148 lb 9.6 oz)   17 68.5 kg (151 lb)              We Performed the Following     Follow-Up with Cardiac Advanced Practice Provider          Today's Medication Changes          These changes are accurate as of: " 9/1/17 10:53 AM.  If you have any questions, ask your nurse or doctor.               Start taking these medicines.        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        Dose/Directions    escitalopram 5 MG tablet   Commonly known as:  LEXAPRO   This may have changed:  Another medication with the same name was removed. Continue taking this medication, and follow the directions you see here.   Changed by:  Emmett Araujo APRN CNP        Dose:  5 mg   Take 5 mg by mouth daily   Refills:  0       rosuvastatin 20 MG tablet   Commonly known as:  CRESTOR   This may have changed:    - medication strength  - how much to take   Used for:  Hyperlipidemia LDL goal <70   Changed by:  Emmett Araujo APRN CNP        Dose:  20 mg   Take 1 tablet (20 mg) by mouth daily   Quantity:  90 tablet   Refills:  3            Where to get your medicines      These medications were sent to [facility] - [facility] [address]  [address] [facility] [address]     Phone:  [phone]     nitroGLYcerin 0.4 MG sublingual tablet    rosuvastatin 20 MG tablet                Primary Care Provider Office Phone # Fax #    Karen Weiler, [phone] [address]  [place] [address]        Equal Access to Services     [facility]: Hadii solange booth Sojoel, waaxda luqadaha, qaybta kaalmada trish, sonia pillai. [facility] [phone].    ATENCIÓN: Si habla español, tiene a smith disposición servicios gratuitos de asistencia  lingüística. Leonidas al [phone].    We comply with applicable federal civil rights laws and [place] laws. We do not discriminate on the basis of race, color, national origin, age, disability sex, sexual orientation or gender identity.            Thank you!     Thank you for choosing [facility] [place]  for your care. Our goal is always to provide you with excellent care. Hearing back from our patients is one way we can continue to improve our services. Please take a few minutes to complete the written survey that you may receive in the mail after your visit with us. Thank you!             Your Updated Medication List - Protect others around you: Learn how to safely use, store and throw away your medicines at www.disposemymeds.org.          This list is accurate as of: 9/1/17 10:53 AM.  Always use your most recent med list.                   Brand Name Dispense Instructions for use Diagnosis    acetaminophen 325 MG tablet    TYLENOL     Take 2 tablets by mouth every 6 hours as needed for pain.        amLODIPine 5 MG tablet    NORVASC    90 tablet    Take 1 tablet (5 mg) by mouth daily    HTN (hypertension)       ascorbic acid 500 MG tablet    VITAMIN C     Take 1,000 mg by mouth daily        aspirin EC 81 MG EC tablet      Take 1 tablet (81 mg) by mouth daily    Coronary artery disease involving native coronary artery of native heart without angina pectoris       atenolol 25 MG tablet    TENORMIN    60 tablet    Take 1 tablet (25 mg) by mouth 2 times daily    CAD (coronary artery disease)       CALCIUM 600 + D PO      Take 1,200 mg by mouth daily        CoQ-10 200 MG Caps      Take 400 mg by mouth daily        DAILY MULTIVITAMIN PO      Take by mouth daily        escitalopram 5 MG tablet    LEXAPRO     Take 5 mg by mouth daily        levothyroxine 100 MCG tablet    SYNTHROID/LEVOTHROID    90 tablet    Take 1 tablet (100 mcg) by mouth daily    Hypothyroidism, unspecified type        lisinopril 20 MG tablet    PRINIVIL/ZESTRIL    30 tablet    Take 1 tablet (20 mg) by mouth daily    Essential hypertension, benign       nitroGLYcerin 0.4 MG sublingual tablet    NITROSTAT    25 tablet    Place 1 tablet (0.4 mg) under the tongue every 5 minutes as needed for chest pain    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    Cerebrovascular accident involving cerebellum (H)

## 2017-09-01 NOTE — LETTER
9/1/2017    Karen Weiler, MD  [address] [place] [address]    RE: Salome Saeed       Dear Colleague,    I had the pleasure of seeing Salome Saeed in the [facility].    History of Present Illness:    Salome Saeed is a 77 year old female followed here by Dr. Amaya. She returns today for annual follow-up. She has a history of unstable angina leading to 2 drug-coated stents into her LAD in April 2009. She had recurrent chest discomfort in 2012 with angiography showing no change in her anatomy. Medical management was undertaken.     In the spring of 2015 she had classic substernal chest pressure with exertion and angiography again showed no change in her anatomy. We attempted medical therapy however she went to the emergency room and went back to the Cath Lab where she was found to have a small obtuse marginal branch with stenosis treated with a Promus Premier drug-eluting stent in May 2015. The LAD had 25% in-stent restenosis. The first diagonal had a narrowing of 80% but this was a small caliber vessel.    Salome had her second back surgery by Dr. Harris in April of this year. She was suffering from sciatica and radicular symptoms down her right lower extremity. This has profoundly limited her activity over the past year. She continues to have back discomfort but the radicular symptoms and sciatica have resolved. She now gets pain intrascapularly when she walks which the surgeons are telling her is likely muscle spasms. Prior to her first stenting in 2009 she had intrascapular discomfort however-more commonly however her typical angina with substernal chest discomfort with exertion.    Yesterday she went to the [facility] with her  and did a lot of walking. She has not been able to walk very far lately. She noted an onset of substernal chest pressure with activity that was relieved at rest. She did a brief episode of this this morning which lasted  only a few minutes and dissipated. Of course this is concerning for the possibility of angina pectoris. She has not used  nitroglycerin. She remains on amlodipine and aspirin and atenolol. She has nitroglycerin available but it is old-- I will renew the prescription.    Her lipid panel has deteriorated further with LDL up to 75. I will increase rosuvastatin to 20 mg per day and have ordered a lipid panel in 4 months.    In July 2013 she had a CVA which manifested as balance problems. He was thought to be an embolic stroke from a lesion in the vertebral artery. She carries been on warfarin since that time.    Overall her weight is up a few pounds but relatively stable. She again is not exercising regularly due to postop surgical limitations. She notes some breathlessness which likely is deconditioning. She has no edema        Impression/Plan:     1. Coronary artery disease dating back nearly a decade. She is now having substernal chest pressure with exertion that started yesterday for the first time. It is difficult to know if this is angina or not, but seems fairly classic for her. It's the first episode she's had. I suspect the intrascapular pain is surgically related. I suspect the shortness of breath is deconditioning. I will start with a Lexiscan nuclear stress test to quantify ischemia. She will see Dr. Amaya back as I am out of the office. This will be scheduled in 2 weeks time. I renewed her nitroglycerin and told her if she has sustained chest pressure unrelieved with nitroglycerin she should call 911. If his symptoms progressed to rest pain she should also seek medical attention. We talked about using nitroglycerin and likely is safest in the lying position. Continue aspirin and beta blockers amlodipine and ACE inhibitor.  2. Last echocardiogram was done at the time of her stroke in 2013 showing preserved left ventricular function there was no shunt through the atrial septum and no significant valvular  disease, 1+ mitral regurgitation was noted  3. Dyslipidemia with regression of her LDL to 75. Her diet has not been optimal as her  then doing the cooking and has been bringing in a lot of takeout food. This is occurred since her back surgery. Increase rosuvastatin 20 mg per day check lipids in 4 months time  4. 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 Continue warfarin  5. Degenerative spinal disease just completed her second surgery by Dr. Harris 4 months ago. She continues to recover but is having some chronic pain muscle spasms and activity limitation. This is likely contributory to her shortness of breath.  6. Hypertension controlled continue the same medications.    Greater than 50% of this 30 minute visit today was spent in counseling    It has been a pleasure seeing Salome Saeed   today. We will see her back in follow-up within 2 weeks after her nuclear stress test is performed.     Emmett Araujo, MSN, APRN-BC, CNP  Cardiology    Orders Placed This Encounter   Procedures     NM Lexiscan stress test (nuc card)     Lipid Profile     ALT     Follow-Up with Cardiologist     Orders Placed This Encounter   Medications     escitalopram (LEXAPRO) 5 MG tablet     Sig: Take 5 mg by mouth daily     vitamin B complex with vitamin C (VITAMIN  B COMPLEX) TABS tablet     Sig: Take 1 tablet by mouth daily     rosuvastatin (CRESTOR) 20 MG tablet     Sig: Take 1 tablet (20 mg) by mouth daily     Dispense:  90 tablet     Refill:  3     nitroGLYcerin (NITROSTAT) 0.4 MG sublingual tablet     Sig: Place 1 tablet (0.4 mg) under the tongue every 5 minutes as needed for chest pain     Dispense:  25 tablet     Refill:  2     Medications Discontinued During This Encounter   Medication Reason     escitalopram (LEXAPRO) 5 MG tablet Dose adjustment     enoxaparin (LOVENOX) 60 MG/0.6ML injection Therapy completed     pantoprazole (PROTONIX) 20 MG EC tablet Therapy completed     oxyCODONE  (ROXICODONE) 5 MG IR tablet Therapy completed     rosuvastatin (CRESTOR) 10 MG tablet Reorder     nitroglycerin (NITROSTAT) 0.4 MG SL tablet Reorder         Encounter Diagnoses   Name Primary?     Hyperlipidemia LDL goal <70      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) Yes       CURRENT MEDICATIONS:  Current Outpatient Prescriptions   Medication Sig Dispense Refill     escitalopram (LEXAPRO) 5 MG tablet Take 5 mg by mouth daily       vitamin B complex with vitamin C (VITAMIN  B COMPLEX) TABS tablet Take 1 tablet by mouth daily       rosuvastatin (CRESTOR) 20 MG tablet Take 1 tablet (20 mg) by mouth daily 90 tablet 3     nitroGLYcerin (NITROSTAT) 0.4 MG sublingual tablet Place 1 tablet (0.4 mg) under the tongue every 5 minutes as needed for chest pain 25 tablet 2     atenolol (TENORMIN) 25 MG tablet Take 1 tablet (25 mg) by mouth 2 times daily 60 tablet 0     lisinopril (PRINIVIL/ZESTRIL) 20 MG tablet Take 1 tablet (20 mg) by mouth daily 30 tablet 0     levothyroxine (SYNTHROID/LEVOTHROID) 100 MCG tablet Take 1 tablet (100 mcg) by mouth daily 90 tablet 3     amLODIPine (NORVASC) 5 MG tablet Take 1 tablet (5 mg) by mouth daily 90 tablet 1     warfarin (COUMADIN) 5 MG tablet Take 0.5-1 tab daily as directed by INR nurse based on INR reading. 90 tablet 1     aspirin EC 81 MG tablet Take 1 tablet (81 mg) by mouth daily       Multiple Vitamin (DAILY MULTIVITAMIN PO) Take by mouth daily       Calcium Carb-Cholecalciferol (CALCIUM 600 + D PO) Take 1,200 mg by mouth daily        Cholecalciferol (VITAMIN D) 2000 UNITS tablet Take 2,000 Units by mouth daily       Coenzyme Q10 (COQ-10) 200 MG CAPS Take 400 mg by mouth daily        ascorbic acid (VITAMIN C) 500 MG tablet Take 1,000 mg by mouth daily        ranitidine (ZANTAC) 150 MG tablet Take 1 tablet (150 mg) by mouth 2 times daily 180 tablet 1     acetaminophen (TYLENOL) 325 MG tablet Take 2 tablets by mouth every 6  hours as needed for pain.  0     [DISCONTINUED] escitalopram (LEXAPRO) 5 MG tablet Take 1.5 tablets (7.5 mg) by mouth daily 90 tablet 3     [DISCONTINUED] rosuvastatin (CRESTOR) 10 MG tablet Take 1 tablet (10 mg) by mouth daily 90 tablet 3     [DISCONTINUED] nitroglycerin (NITROSTAT) 0.4 MG SL tablet Place 1 tablet (0.4 mg) under the tongue every 5 minutes as needed for chest pain 25 tablet 2       ALLERGIES     Allergies   Allergen Reactions     Atorvastatin      Leg cramps     Gluten      Sinuses affected by gluten     Magnesium Salicylate      Oat      Shellfish Allergy      hives     Wheat        PAST MEDICAL HISTORY:  Past Medical History:   Diagnosis Date     CAD (coronary artery disease) 4/9/2009 4/8/2009: PTCA and two 2.5x15mm Xience stents to mid LAD lesion; Cath 12/2012- 40-50% stenosis in mid PDA, 80% on D1- medically treat , 5/28/2015 - PTCA and 2.25x8mm Promus PREMIIER NAILA to ostium of 2nd OM     CVA (cerebral infarction)      DDD (degenerative disc disease)      Essential hypertension, benign      GERD (gastroesophageal reflux disease)      Headache      Hyperlipidemia      Hypothyroidism      Lumbago      Lumbar spinal stenosis      Mitral regurgitation     1+ per 7/2013 Echo     Vertebral artery stenosis, right        PAST SURGICAL HISTORY:  Past Surgical History:   Procedure Laterality Date     CORONARY ANGIOGRAPHY ADULT ORDER  12/6/2012    40-50% stenosis to mid PDA, 80% in D1- medically treat     CORONARY ANGIOGRAPHY ADULT ORDER  5/28/2015    PTCA and 2.25x8mm Promus PREMIER NAILA to ostium of 2nd OM     DECOMPRESSION, FUSION LUMBAR POSTERIOR THREE + LEVELS, COMBINED  5/8/2013    Procedure: COMBINED DECOMPRESSION, FUSION LUMBAR POSTERIOR THREE + LEVELS;  Posterior Lumbar Decompression L3-S1, Fusion L4-S1;  Surgeon: Daniel Harris MD;  Location: [facility] OR     ENDOSCOPIC STRIPPING VEIN(S)       HEART CATH, ANGIOPLASTY  4/8/2009    PTCA and two 2.5x15mm Xience stents to mid LAD lesion      HYSTERECTOMY, RICKIE      Fibroids, and Menorrhagia.. Bilateral Oophrectomy     ORTHOPEDIC SURGERY       Stenting of LAD, Angioplasty  09     TONSILLECTOMY      as a child       FAMILY HISTORY:  Family History   Problem Relation Age of Onset     Neurologic Disorder Mother      Dementia, Still living     Ovarian Cancer Mother      Thyroid Disease Mother      C.A.D. Father           DIABETES Father      Coronary Artery Disease Father      Alcohol/Drug Brother      Thyroid Disease Son      DIABETES Son        SOCIAL HISTORY:  Social History     Social History     Marital status:      Spouse name: N/A     Number of children: N/A     Years of education: N/A     Social History Main Topics     Smoking status: Former Smoker     Quit date: 1965     Smokeless tobacco: Never Used     Alcohol use 0.0 oz/week     0 Standard drinks or equivalent per week      Comment: 2 glasses wine per month     Drug use: No     Sexual activity: No     Other Topics Concern     Blood Transfusions No     Caffeine Concern Yes     5 cups caffeine per day     Sleep Concern No     Stress Concern No     Weight Concern No     weight stable     Special Diet No     trying to eat healthier     Exercise Yes     stretching     Seat Belt Yes     Self-Exams Yes     Parent/Sibling W/ Cabg, Mi Or Angioplasty Before 65f 55m? Yes     Social History Narrative    Works in an office       Review of Systems:  Skin:  Positive for bruising   Eyes:  Positive for glasses  ENT:       Respiratory:  Positive for dyspnea on exertion  Cardiovascular:    chest pain;Positive for;heaviness  Gastroenterology: Positive for heartburn  Genitourinary:  Negative    Musculoskeletal:  Positive for back pain  Neurologic:  Negative    Psychiatric:  Negative    Heme/Lymph/Imm:  Positive for easy bruising  Endocrine:  Positive for thyroid disorder    Physical Exam:  Vitals: /72 (BP Location: Right arm, Patient Position: Sitting, Cuff Size: Adult Regular)  Pulse 56  " Ht 1.626 m (5' 4\")  Wt 68.9 kg (152 lb)  SpO2 99%  BMI 26.09 kg/m2    Constitutional:  cooperative, alert and oriented, well developed, well nourished, in no acute distress        Skin:  warm and dry to the touch, no apparent skin lesions or masses noted        Head:  normocephalic, no masses or lesions        Eyes:  pupils equal and round, conjunctivae and lids unremarkable, sclera white, no xanthalasma, EOMS intact, no nystagmus        ENT:  no pallor or cyanosis, dentition good        Neck:  carotid pulses are full and equal bilaterally;no carotid bruit        Chest:  normal breath sounds, clear to auscultation, normal A-P diameter, normal symmetry, normal respiratory excursion, no use of accessory muscles        Cardiac: regular rhythm;normal S1 and S2   S4              Abdomen:  abdomen soft, non-tender, BS normoactive, no mass, no HSM, no bruits        Vascular: pulses full and equal                                 left wrist    Extremities and Back:  no edema;no spinal abnormalities noted;normal muscle strength and tone   left wrist and forearm ecchymotic and mildy swollen without hum or bruit    Neurological:  affect appropriate, oriented to time, person and place;no gross motor deficits          Recent Lab Results:  LIPID RESULTS:  Lab Results   Component Value Date    CHOL 172 09/01/2017    HDL 84 09/01/2017    LDL 75 09/01/2017    TRIG 64 09/01/2017    CHOLHDLRATIO 2.2 08/17/2015       LIVER ENZYME RESULTS:  Lab Results   Component Value Date    AST 21 05/24/2017    ALT 26 05/24/2017       CBC RESULTS:  Lab Results   Component Value Date    WBC 4.8 05/24/2017    RBC 4.16 05/24/2017    HGB 12.2 05/24/2017    HCT 37.2 05/24/2017    MCV 89 05/24/2017    MCH 29.3 05/24/2017    MCHC 32.8 05/24/2017    RDW 14.7 05/24/2017     05/24/2017       BMP RESULTS:  Lab Results   Component Value Date     09/01/2017    POTASSIUM 4.2 09/01/2017    CHLORIDE 100 09/01/2017    CO2 29 09/01/2017    ANIONGAP 5 " 09/01/2017     (H) 09/01/2017    BUN 10 09/01/2017    CR 0.63 09/01/2017    GFRESTIMATED >90 09/01/2017    GFRESTBLACK >90 09/01/2017    ERIC 9.1 09/01/2017        A1C RESULTS:  No results found for: A1C    INR RESULTS:  Lab Results   Component Value Date    INR 2.7 (A) 08/22/2017    INR 2.1 (A) 07/26/2017    INR 3.26 (H) 05/28/2015    INR 2.60 (H) 05/17/2015     Thank you for allowing me to participate in the care of your patient.    Sincerely,     KIANA Carreno [facility]

## 2017-09-01 NOTE — PATIENT INSTRUCTIONS
Schedule a stress test and see Dr Amaya back     Increase Crestor to 20mg daily    Check cholesterol in 4 months

## 2017-09-01 NOTE — PROGRESS NOTES
History of Present Illness:    Salome Saeed is a 77 year old female followed here by Dr. Amaya. She returns today for annual follow-up. She has a history of unstable angina leading to 2 drug-coated stents into her LAD in April 2009. She had recurrent chest discomfort in 2012 with angiography showing no change in her anatomy. Medical management was undertaken.     In the spring of 2015 she had classic substernal chest pressure with exertion and angiography again showed no change in her anatomy. We attempted medical therapy however she went to the emergency room and went back to the Cath Lab where she was found to have a small obtuse marginal branch with stenosis treated with a Promus Premier drug-eluting stent in May 2015. The LAD had 25% in-stent restenosis. The first diagonal had a narrowing of 80% but this was a small caliber vessel.    Salome had her second back surgery by Dr. Harris in April of this year. She was suffering from sciatica and radicular symptoms down her right lower extremity. This has profoundly limited her activity over the past year. She continues to have back discomfort but the radicular symptoms and sciatica have resolved. She now gets pain intrascapularly when she walks which the surgeons are telling her is likely muscle spasms. Prior to her first stenting in 2009 she had intrascapular discomfort however-more commonly however her typical angina with substernal chest discomfort with exertion.    Yesterday she went to the [facility] with her  and did a lot of walking. She has not been able to walk very far lately. She noted an onset of substernal chest pressure with activity that was relieved at rest. She did a brief episode of this this morning which lasted only a few minutes and dissipated. Of course this is concerning for the possibility of angina pectoris. She has not used  nitroglycerin. She remains on amlodipine and aspirin and atenolol. She has nitroglycerin  available but it is old-- I will renew the prescription.    Her lipid panel has deteriorated further with LDL up to 75. I will increase rosuvastatin to 20 mg per day and have ordered a lipid panel in 4 months.    In July 2013 she had a CVA which manifested as balance problems. He was thought to be an embolic stroke from a lesion in the vertebral artery. She carries been on warfarin since that time.    Overall her weight is up a few pounds but relatively stable. She again is not exercising regularly due to postop surgical limitations. She notes some breathlessness which likely is deconditioning. She has no edema        Impression/Plan:     1. Coronary artery disease dating back nearly a decade. She is now having substernal chest pressure with exertion that started yesterday for the first time. It is difficult to know if this is angina or not, but seems fairly classic for her. It's the first episode she's had. I suspect the intrascapular pain is surgically related. I suspect the shortness of breath is deconditioning. I will start with a Lexiscan nuclear stress test to quantify ischemia. She will see Dr. Amaya back as I am out of the office. This will be scheduled in 2 weeks time. I renewed her nitroglycerin and told her if she has sustained chest pressure unrelieved with nitroglycerin she should call 911. If his symptoms progressed to rest pain she should also seek medical attention. We talked about using nitroglycerin and likely is safest in the lying position. Continue aspirin and beta blockers amlodipine and ACE inhibitor.  2. Last echocardiogram was done at the time of her stroke in 2013 showing preserved left ventricular function there was no shunt through the atrial septum and no significant valvular disease, 1+ mitral regurgitation was noted  3. Dyslipidemia with regression of her LDL to 75. Her diet has not been optimal as her  then doing the cooking and has been bringing in a lot of takeout food.  This is occurred since her back surgery. Increase rosuvastatin 20 mg per day check lipids in 4 months time  4. 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 Continue warfarin  5. Degenerative spinal disease just completed her second surgery by Dr. Harris 4 months ago. She continues to recover but is having some chronic pain muscle spasms and activity limitation. This is likely contributory to her shortness of breath.  6. Hypertension controlled continue the same medications.    Greater than 50% of this 30 minute visit today was spent in counseling    It has been a pleasure seeing Salome Saeed   today. We will see her back in follow-up within 2 weeks after her nuclear stress test is performed.     Emmett Araujo, MSN, APRN-BC, CNP  Cardiology    Orders Placed This Encounter   Procedures     NM Lexiscan stress test (nuc card)     Lipid Profile     ALT     Follow-Up with Cardiologist     Orders Placed This Encounter   Medications     escitalopram (LEXAPRO) 5 MG tablet     Sig: Take 5 mg by mouth daily     vitamin B complex with vitamin C (VITAMIN  B COMPLEX) TABS tablet     Sig: Take 1 tablet by mouth daily     rosuvastatin (CRESTOR) 20 MG tablet     Sig: Take 1 tablet (20 mg) by mouth daily     Dispense:  90 tablet     Refill:  3     nitroGLYcerin (NITROSTAT) 0.4 MG sublingual tablet     Sig: Place 1 tablet (0.4 mg) under the tongue every 5 minutes as needed for chest pain     Dispense:  25 tablet     Refill:  2     Medications Discontinued During This Encounter   Medication Reason     escitalopram (LEXAPRO) 5 MG tablet Dose adjustment     enoxaparin (LOVENOX) 60 MG/0.6ML injection Therapy completed     pantoprazole (PROTONIX) 20 MG EC tablet Therapy completed     oxyCODONE (ROXICODONE) 5 MG IR tablet Therapy completed     rosuvastatin (CRESTOR) 10 MG tablet Reorder     nitroglycerin (NITROSTAT) 0.4 MG SL tablet Reorder         Encounter Diagnoses   Name Primary?     Hyperlipidemia  LDL goal <70      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) Yes       CURRENT MEDICATIONS:  Current Outpatient Prescriptions   Medication Sig Dispense Refill     escitalopram (LEXAPRO) 5 MG tablet Take 5 mg by mouth daily       vitamin B complex with vitamin C (VITAMIN  B COMPLEX) TABS tablet Take 1 tablet by mouth daily       rosuvastatin (CRESTOR) 20 MG tablet Take 1 tablet (20 mg) by mouth daily 90 tablet 3     nitroGLYcerin (NITROSTAT) 0.4 MG sublingual tablet Place 1 tablet (0.4 mg) under the tongue every 5 minutes as needed for chest pain 25 tablet 2     atenolol (TENORMIN) 25 MG tablet Take 1 tablet (25 mg) by mouth 2 times daily 60 tablet 0     lisinopril (PRINIVIL/ZESTRIL) 20 MG tablet Take 1 tablet (20 mg) by mouth daily 30 tablet 0     levothyroxine (SYNTHROID/LEVOTHROID) 100 MCG tablet Take 1 tablet (100 mcg) by mouth daily 90 tablet 3     amLODIPine (NORVASC) 5 MG tablet Take 1 tablet (5 mg) by mouth daily 90 tablet 1     warfarin (COUMADIN) 5 MG tablet Take 0.5-1 tab daily as directed by INR nurse based on INR reading. 90 tablet 1     aspirin EC 81 MG tablet Take 1 tablet (81 mg) by mouth daily       Multiple Vitamin (DAILY MULTIVITAMIN PO) Take by mouth daily       Calcium Carb-Cholecalciferol (CALCIUM 600 + D PO) Take 1,200 mg by mouth daily        Cholecalciferol (VITAMIN D) 2000 UNITS tablet Take 2,000 Units by mouth daily       Coenzyme Q10 (COQ-10) 200 MG CAPS Take 400 mg by mouth daily        ascorbic acid (VITAMIN C) 500 MG tablet Take 1,000 mg by mouth daily        ranitidine (ZANTAC) 150 MG tablet Take 1 tablet (150 mg) by mouth 2 times daily 180 tablet 1     acetaminophen (TYLENOL) 325 MG tablet Take 2 tablets by mouth every 6 hours as needed for pain.  0     [DISCONTINUED] escitalopram (LEXAPRO) 5 MG tablet Take 1.5 tablets (7.5 mg) by mouth daily 90 tablet 3     [DISCONTINUED] rosuvastatin (CRESTOR) 10 MG tablet Take 1 tablet (10 mg) by  mouth daily 90 tablet 3     [DISCONTINUED] nitroglycerin (NITROSTAT) 0.4 MG SL tablet Place 1 tablet (0.4 mg) under the tongue every 5 minutes as needed for chest pain 25 tablet 2       ALLERGIES     Allergies   Allergen Reactions     Atorvastatin      Leg cramps     Gluten      Sinuses affected by gluten     Magnesium Salicylate      Oat      Shellfish Allergy      hives     Wheat        PAST MEDICAL HISTORY:  Past Medical History:   Diagnosis Date     CAD (coronary artery disease) 4/9/2009 4/8/2009: PTCA and two 2.5x15mm Xience stents to mid LAD lesion; Cath 12/2012- 40-50% stenosis in mid PDA, 80% on D1- medically treat , 5/28/2015 - PTCA and 2.25x8mm Promus PREMIIER NAILA to ostium of 2nd OM     CVA (cerebral infarction)      DDD (degenerative disc disease)      Essential hypertension, benign      GERD (gastroesophageal reflux disease)      Headache      Hyperlipidemia      Hypothyroidism      Lumbago      Lumbar spinal stenosis      Mitral regurgitation     1+ per 7/2013 Echo     Vertebral artery stenosis, right        PAST SURGICAL HISTORY:  Past Surgical History:   Procedure Laterality Date     CORONARY ANGIOGRAPHY ADULT ORDER  12/6/2012    40-50% stenosis to mid PDA, 80% in D1- medically treat     CORONARY ANGIOGRAPHY ADULT ORDER  5/28/2015    PTCA and 2.25x8mm Promus PREMIER NAILA to ostium of 2nd OM     DECOMPRESSION, FUSION LUMBAR POSTERIOR THREE + LEVELS, COMBINED  5/8/2013    Procedure: COMBINED DECOMPRESSION, FUSION LUMBAR POSTERIOR THREE + LEVELS;  Posterior Lumbar Decompression L3-S1, Fusion L4-S1;  Surgeon: Daniel Harris MD;  Location: [facility] OR     ENDOSCOPIC STRIPPING VEIN(S)       HEART CATH, ANGIOPLASTY  4/8/2009    PTCA and two 2.5x15mm Xience stents to mid LAD lesion     HYSTERECTOMY, RICKIE      Fibroids, and Menorrhagia.. Bilateral Oophrectomy     ORTHOPEDIC SURGERY       Stenting of LAD, Angioplasty  4/8/09     TONSILLECTOMY      as a child       FAMILY HISTORY:  Family History   Problem  "Relation Age of Onset     Neurologic Disorder Mother      Dementia, Still living     Ovarian Cancer Mother      Thyroid Disease Mother      C.A.D. Father           DIABETES Father      Coronary Artery Disease Father      Alcohol/Drug Brother      Thyroid Disease Son      DIABETES Son        SOCIAL HISTORY:  Social History     Social History     Marital status:      Spouse name: N/A     Number of children: N/A     Years of education: N/A     Social History Main Topics     Smoking status: Former Smoker     Quit date: 1965     Smokeless tobacco: Never Used     Alcohol use 0.0 oz/week     0 Standard drinks or equivalent per week      Comment: 2 glasses wine per month     Drug use: No     Sexual activity: No     Other Topics Concern     Blood Transfusions No     Caffeine Concern Yes     5 cups caffeine per day     Sleep Concern No     Stress Concern No     Weight Concern No     weight stable     Special Diet No     trying to eat healthier     Exercise Yes     stretching     Seat Belt Yes     Self-Exams Yes     Parent/Sibling W/ Cabg, Mi Or Angioplasty Before 65f 55m? Yes     Social History Narrative    Works in an office       Review of Systems:  Skin:  Positive for bruising   Eyes:  Positive for glasses  ENT:       Respiratory:  Positive for dyspnea on exertion  Cardiovascular:    chest pain;Positive for;heaviness  Gastroenterology: Positive for heartburn  Genitourinary:  Negative    Musculoskeletal:  Positive for back pain  Neurologic:  Negative    Psychiatric:  Negative    Heme/Lymph/Imm:  Positive for easy bruising  Endocrine:  Positive for thyroid disorder    Physical Exam:  Vitals: /72 (BP Location: Right arm, Patient Position: Sitting, Cuff Size: Adult Regular)  Pulse 56  Ht 1.626 m (5' 4\")  Wt 68.9 kg (152 lb)  SpO2 99%  BMI 26.09 kg/m2    Constitutional:  cooperative, alert and oriented, well developed, well nourished, in no acute distress        Skin:  warm and dry to the touch, no " apparent skin lesions or masses noted        Head:  normocephalic, no masses or lesions        Eyes:  pupils equal and round, conjunctivae and lids unremarkable, sclera white, no xanthalasma, EOMS intact, no nystagmus        ENT:  no pallor or cyanosis, dentition good        Neck:  carotid pulses are full and equal bilaterally;no carotid bruit        Chest:  normal breath sounds, clear to auscultation, normal A-P diameter, normal symmetry, normal respiratory excursion, no use of accessory muscles        Cardiac: regular rhythm;normal S1 and S2   S4              Abdomen:  abdomen soft, non-tender, BS normoactive, no mass, no HSM, no bruits        Vascular: pulses full and equal                                 left wrist    Extremities and Back:  no edema;no spinal abnormalities noted;normal muscle strength and tone   left wrist and forearm ecchymotic and mildy swollen without hum or bruit    Neurological:  affect appropriate, oriented to time, person and place;no gross motor deficits          Recent Lab Results:  LIPID RESULTS:  Lab Results   Component Value Date    CHOL 172 09/01/2017    HDL 84 09/01/2017    LDL 75 09/01/2017    TRIG 64 09/01/2017    CHOLHDLRATIO 2.2 08/17/2015       LIVER ENZYME RESULTS:  Lab Results   Component Value Date    AST 21 05/24/2017    ALT 26 05/24/2017       CBC RESULTS:  Lab Results   Component Value Date    WBC 4.8 05/24/2017    RBC 4.16 05/24/2017    HGB 12.2 05/24/2017    HCT 37.2 05/24/2017    MCV 89 05/24/2017    MCH 29.3 05/24/2017    MCHC 32.8 05/24/2017    RDW 14.7 05/24/2017     05/24/2017       BMP RESULTS:  Lab Results   Component Value Date     09/01/2017    POTASSIUM 4.2 09/01/2017    CHLORIDE 100 09/01/2017    CO2 29 09/01/2017    ANIONGAP 5 09/01/2017     (H) 09/01/2017    BUN 10 09/01/2017    CR 0.63 09/01/2017    GFRESTIMATED >90 09/01/2017    GFRESTBLACK >90 09/01/2017    ERIC 9.1 09/01/2017        A1C RESULTS:  No results found for: A1C    INR  RESULTS:  Lab Results   Component Value Date    INR 2.7 (A) 08/22/2017    INR 2.1 (A) 07/26/2017    INR 3.26 (H) 05/28/2015    INR 2.60 (H) 05/17/2015           CC  Nikolai Amaya MD  [address]  [place] [id number]

## 2017-09-06 ENCOUNTER — HOSPITAL ENCOUNTER (OUTPATIENT)
Dept: CARDIOLOGY | Facility: CLINIC | Age: 77
Discharge: HOME | End: 2017-09-06
Attending: NURSE PRACTITIONER | Admitting: NURSE PRACTITIONER
Payer: COMMERCIAL

## 2017-09-06 ENCOUNTER — HOSPITAL ENCOUNTER (OUTPATIENT)
Dept: NUCLEAR MEDICINE | Facility: CLINIC | Age: 77
Setting detail: NUCLEAR MEDICINE
End: 2017-09-06
Attending: NURSE PRACTITIONER
Payer: COMMERCIAL

## 2017-09-06 PROCEDURE — 25000128 H RX IP 250 OP 636

## 2017-09-06 PROCEDURE — 78452 HT MUSCLE IMAGE SPECT MULT: CPT | Mod: 26 | Performed by: INTERNAL MEDICINE

## 2017-09-06 PROCEDURE — 93016 CV STRESS TEST SUPVJ ONLY: CPT | Performed by: INTERNAL MEDICINE

## 2017-09-06 PROCEDURE — 34300033 ZZH RX 343: Performed by: NURSE PRACTITIONER

## 2017-09-06 PROCEDURE — 93018 CV STRESS TEST I&R ONLY: CPT | Performed by: INTERNAL MEDICINE

## 2017-09-06 PROCEDURE — 78452 HT MUSCLE IMAGE SPECT MULT: CPT

## 2017-09-06 PROCEDURE — A9502 TC99M TETROFOSMIN: HCPCS | Performed by: NURSE PRACTITIONER

## 2017-09-06 RX ORDER — REGADENOSON 0.08 MG/ML
INJECTION, SOLUTION INTRAVENOUS
Status: COMPLETED
Start: 2017-09-06 | End: 2017-09-06

## 2017-09-06 RX ADMIN — TETROFOSMIN 11 MCI.: 1.38 INJECTION, POWDER, LYOPHILIZED, FOR SOLUTION INTRAVENOUS at 10:48

## 2017-09-06 RX ADMIN — TETROFOSMIN 33 MCI.: 1.38 INJECTION, POWDER, LYOPHILIZED, FOR SOLUTION INTRAVENOUS at 12:30

## 2017-09-06 RX ADMIN — REGADENOSON 0.4 MG: 0.08 INJECTION, SOLUTION INTRAVENOUS at 12:56

## 2017-09-06 NOTE — PROGRESS NOTES
Pre-procedure:    Initial vital signs: /70, HR 62, RR 16  Allergies reviewed: See Epic    Rhythm: Sinus  Medications taken within 48 hours of procedure: See Epic   Last Caffeine: none  Lung sounds: CTA, no wheezing, crackles or rtx  Health History (COPD, Asthma, etc): none    Procedure: Lexiscan  Reaction/symptoms after receiving Jessica injection: Shortness of breath, chest and back discomfort  Intensity of Pain: 6 out of 10  1. Vital Signs:/60, HR 84, RR 16      Reversal agent: N/A    Post:   Resolution of symptoms?: YES  Vital signs: /62, HR 70, RR 16  Walk: YES  Comment: slight chest discomfort post test.   Return to Radiology

## 2017-09-12 ENCOUNTER — PRE VISIT (OUTPATIENT)
Dept: CARDIOLOGY | Facility: CLINIC | Age: 77
End: 2017-09-12

## 2017-09-13 ENCOUNTER — OFFICE VISIT (OUTPATIENT)
Dept: CARDIOLOGY | Facility: CLINIC | Age: 77
End: 2017-09-13
Payer: COMMERCIAL

## 2017-09-13 VITALS
OXYGEN SATURATION: 97 % | HEART RATE: 57 BPM | DIASTOLIC BLOOD PRESSURE: 62 MMHG | SYSTOLIC BLOOD PRESSURE: 126 MMHG | HEIGHT: 64 IN | WEIGHT: 141 LBS | BODY MASS INDEX: 24.07 KG/M2

## 2017-09-13 DIAGNOSIS — E78.5 HYPERLIPIDEMIA LDL GOAL <70: ICD-10-CM

## 2017-09-13 DIAGNOSIS — R07.89 ATYPICAL CHEST PAIN: ICD-10-CM

## 2017-09-13 DIAGNOSIS — I25.10 CORONARY ARTERY DISEASE INVOLVING NATIVE CORONARY ARTERY OF NATIVE HEART WITHOUT ANGINA PECTORIS: Chronic | ICD-10-CM

## 2017-09-13 DIAGNOSIS — I10 BENIGN ESSENTIAL HYPERTENSION: Primary | Chronic | ICD-10-CM

## 2017-09-13 DIAGNOSIS — I63.9 CEREBROVASCULAR ACCIDENT INVOLVING CEREBELLUM (H): ICD-10-CM

## 2017-09-13 PROCEDURE — 99214 OFFICE O/P EST MOD 30 MIN: CPT | Performed by: INTERNAL MEDICINE

## 2017-09-13 NOTE — PROGRESS NOTES
HPI and Plan:   See dictation    No orders of the defined types were placed in this encounter.      No orders of the defined types were placed in this encounter.      There are no discontinued medications.      Encounter Diagnoses   Name Primary?     Benign essential hypertension Yes     Coronary artery disease involving native coronary artery of native heart without angina pectoris      Hyperlipidemia LDL goal <70      Cerebrovascular accident involving cerebellum (H)      Atypical chest pain        CURRENT MEDICATIONS:  Current Outpatient Prescriptions   Medication Sig Dispense Refill     escitalopram (LEXAPRO) 5 MG tablet Take 5 mg by mouth daily       vitamin B complex with vitamin C (VITAMIN  B COMPLEX) TABS tablet Take 1 tablet by mouth daily       rosuvastatin (CRESTOR) 20 MG tablet Take 1 tablet (20 mg) by mouth daily 90 tablet 3     nitroGLYcerin (NITROSTAT) 0.4 MG sublingual tablet Place 1 tablet (0.4 mg) under the tongue every 5 minutes as needed for chest pain 25 tablet 2     atenolol (TENORMIN) 25 MG tablet Take 1 tablet (25 mg) by mouth 2 times daily 60 tablet 0     lisinopril (PRINIVIL/ZESTRIL) 20 MG tablet Take 1 tablet (20 mg) by mouth daily 30 tablet 0     levothyroxine (SYNTHROID/LEVOTHROID) 100 MCG tablet Take 1 tablet (100 mcg) by mouth daily 90 tablet 3     amLODIPine (NORVASC) 5 MG tablet Take 1 tablet (5 mg) by mouth daily 90 tablet 1     warfarin (COUMADIN) 5 MG tablet Take 0.5-1 tab daily as directed by INR nurse based on INR reading. 90 tablet 1     aspirin EC 81 MG tablet Take 1 tablet (81 mg) by mouth daily       Multiple Vitamin (DAILY MULTIVITAMIN PO) Take by mouth daily       Calcium Carb-Cholecalciferol (CALCIUM 600 + D PO) Take 1,200 mg by mouth daily        Cholecalciferol (VITAMIN D) 2000 UNITS tablet Take 2,000 Units by mouth daily       Coenzyme Q10 (COQ-10) 200 MG CAPS Take 400 mg by mouth daily        ascorbic acid (VITAMIN C) 500 MG tablet Take 1,000 mg by mouth daily         ranitidine (ZANTAC) 150 MG tablet Take 1 tablet (150 mg) by mouth 2 times daily 180 tablet 1     acetaminophen (TYLENOL) 325 MG tablet Take 2 tablets by mouth every 6 hours as needed for pain.  0       ALLERGIES     Allergies   Allergen Reactions     Atorvastatin      Leg cramps     Gluten      Sinuses affected by gluten     Magnesium Salicylate      Oat      Shellfish Allergy      hives     Wheat        PAST MEDICAL HISTORY:  Past Medical History:   Diagnosis Date     CAD (coronary artery disease) 4/9/2009 4/8/2009: PTCA and two 2.5x15mm Xience stents to mid LAD lesion; Cath 12/2012- 40-50% stenosis in mid PDA, 80% on D1- medically treat , 5/28/2015 - PTCA and 2.25x8mm Promus PREMIIER NAILA to ostium of 2nd OM     CVA (cerebral infarction)      DDD (degenerative disc disease)      Essential hypertension, benign      GERD (gastroesophageal reflux disease)      Headache      Hyperlipidemia      Hypothyroidism      Lumbago      Lumbar spinal stenosis      Mitral regurgitation     1+ per 7/2013 Echo     Vertebral artery stenosis, right        PAST SURGICAL HISTORY:  Past Surgical History:   Procedure Laterality Date     CORONARY ANGIOGRAPHY ADULT ORDER  12/6/2012    40-50% stenosis to mid PDA, 80% in D1- medically treat     CORONARY ANGIOGRAPHY ADULT ORDER  5/28/2015    PTCA and 2.25x8mm Promus PREMIER NAILA to ostium of 2nd OM     DECOMPRESSION, FUSION LUMBAR POSTERIOR THREE + LEVELS, COMBINED  5/8/2013    Procedure: COMBINED DECOMPRESSION, FUSION LUMBAR POSTERIOR THREE + LEVELS;  Posterior Lumbar Decompression L3-S1, Fusion L4-S1;  Surgeon: Daniel Harris MD;  Location: [facility] OR     ENDOSCOPIC STRIPPING VEIN(S)       HEART CATH, ANGIOPLASTY  4/8/2009    PTCA and two 2.5x15mm Xience stents to mid LAD lesion     HYSTERECTOMY, RICKIE      Fibroids, and Menorrhagia.. Bilateral Oophrectomy     ORTHOPEDIC SURGERY       Stenting of LAD, Angioplasty  4/8/09     TONSILLECTOMY      as a child       FAMILY HISTORY:  Family  "History   Problem Relation Age of Onset     Neurologic Disorder Mother      Dementia, Still living     Ovarian Cancer Mother      Thyroid Disease Mother      C.A.D. Father           DIABETES Father      Coronary Artery Disease Father      Alcohol/Drug Brother      Thyroid Disease Son      DIABETES Son        SOCIAL HISTORY:  Social History     Social History     Marital status:      Spouse name: N/A     Number of children: N/A     Years of education: N/A     Social History Main Topics     Smoking status: Former Smoker     Quit date: 1965     Smokeless tobacco: Never Used     Alcohol use 0.0 oz/week     0 Standard drinks or equivalent per week      Comment: 2 glasses wine per month     Drug use: No     Sexual activity: No     Other Topics Concern     Blood Transfusions No     Caffeine Concern Yes     5 cups caffeine per day     Sleep Concern No     Stress Concern No     Weight Concern No     weight stable     Special Diet No     trying to eat healthier     Exercise Yes     stretching     Seat Belt Yes     Self-Exams Yes     Parent/Sibling W/ Cabg, Mi Or Angioplasty Before 65f 55m? Yes     Social History Narrative    Works in an office       Review of Systems:  Skin:  Negative bruising     Eyes:  Negative      ENT:  Negative      Respiratory:  Positive for dyspnea on exertion     Cardiovascular:    chest pain;Positive for;heaviness chest heaviness off and on  Gastroenterology: Positive for heartburn controlled with meds   Genitourinary:  Negative      Musculoskeletal:  Positive for back pain Hx of back surgery   Neurologic:  Positive for stroke balance issue  Psychiatric:  Negative      Heme/Lymph/Imm:  Positive for easy bruising    Endocrine:  Positive for thyroid disorder      Physical Exam:  Vitals: /62  Pulse 57  Ht 1.626 m (5' 4\")  Wt 64 kg (141 lb)  SpO2 97%  BMI 24.2 kg/m2    Constitutional:  cooperative, alert and oriented, well developed, well nourished, in no acute distress    "     Skin:  warm and dry to the touch, no apparent skin lesions or masses noted        Head:  normocephalic, no masses or lesions        Eyes:  pupils equal and round, conjunctivae and lids unremarkable, sclera white, no xanthalasma, EOMS intact, no nystagmus        ENT:  no pallor or cyanosis, dentition good        Neck:  carotid pulses are full and equal bilaterally;no carotid bruit        Chest:  normal breath sounds, clear to auscultation, normal A-P diameter, normal symmetry, normal respiratory excursion, no use of accessory muscles          Cardiac: regular rhythm;normal S1 and S2;no murmurs, gallops or rubs detected   S4              Abdomen:           Vascular: pulses full and equal                                        Extremities and Back:  no edema;no spinal abnormalities noted;normal muscle strength and tone              Neurological:  affect appropriate, oriented to time, person and place;no gross motor deficits              RICKEY Araujo, APRN CNP  [address]  [place] [address]

## 2017-09-13 NOTE — PROGRESS NOTES
HISTORY OF PRESENT ILLNESS:  Ms. Saeed is a delightful 77-year-old woman with past medical history significant for unstable angina leading to 2 Xience drug-eluting stents placed in her left anterior descending artery in 04/2009.  She has hypercholesterolemia, hypertension, hypothyroidism and a strong family history of coronary artery disease.  Chest discomfort in 12/2012, lead to repeat angiography demonstrating no change in her anatomy.  She had moderate disease in the posterior descending artery, an ostial pinch in her first diagonal and an 80% stenosis in a branch of an obtuse marginal and I recommended medical management.      She underwent spinal surgery by Dr. Daniel Harris with good results for her spinal stenosis.  In the spring of 2015, she again had symptoms that were consistent with fairly classic exertional angina.  Angiography demonstrated no change in her anatomy and we again tried to continue with medical management.  One month later she again came to the emergency room, we brought her back to the Cath Lab where we stented a small obtuse marginal using a 2.25 x 8 mm Promus Premier drug-eluting stent.  She initially had some problems with the wrist after the radial approach, but it did result in resolution of her anginal symptoms.      In July 2013, she had a cerebrovascular accident which was manifested as balance problems.  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  She had been on warfarin therapy.      More recently, Salome had another back surgery in 04/2017, and in August had an episode of exertional chest discomfort relieved by rest and then a recurrent episode.  Because of this, we performed a stress nuclear scan.  We also checked her fasting lipid profile with a backslide of her LDL to 75 and we increased her Crestor from 10 mg to 20 mg.      Salome returns to clinic and thinks she is doing fairly well.  She still has some occasional chest pain that  occurs in no particular pattern.  She states that it usually passes quite quickly.  She never takes any nitroglycerin for it.      She states she is more troubled by back pain.  Her sciatica was cured with the back surgery but still she has lower back discomfort for which she continues to follow Daniel Harris and is treating this conservatively at this time.      She notes no problems or side effects with her current medical regimen.  She unfortunately is not exercising at all because of her back but is planning on getting a bicycle.      ASSESSMENT AND PLAN:  Jesus appears to be doing well from a cardiac standpoint.  She continues to have chest discomfort which appears to be more atypical.  The stress nuclear scan is quite reassuring as it demonstrates no evidence of ischemia and I have tried to reassure her.      I have encouraged her to try to exercise regularly.  I did encourage her to get a bicycle, extolling the virtues of a regular exercise regimen.      Fasting lipid profile as stated was checked on 09/01.  Total cholesterol is 172, HDL is 84, LDL is 75, triglycerides are 64.  We have bumped her Crestor from 10 mg to 20 and will recheck a fasting lipid profile in 2-3 months.  The goal was to try to drive her LDL at least below 70.      Blood pressure was initially high when she came in today, but I did recheck it and on repeat it was 126/62 with a pulse of 57.      Her weight is 141, which is down and I have congratulated on this and encouraged her to continue to try to maintain this lower weight.      We did review her medications; will continue the remainder as is.  She does not appear to be having any bleeding problems with her warfarin and aspirin therapy.      Thank you for allowing me to participate in her care.         OMAR CASTRO MD, [facility]             D: 09/13/2017 09:15   T: 09/13/2017 11:14   MT: [id number]      Name:     JESUS AVELAR   MRN:      [id number]        Account:       [id number]   :      1940           Service Date: 2017      Document: [id number]

## 2017-09-13 NOTE — LETTER
9/13/2017    Karen Weiler, MD  [address] [place] [address]    RE: Salome Saeed       Dear Colleague,    I had the pleasure of seeing Salome Saeed in the [facility].    Ms. Saeed is a delightful 77-year-old woman with past medical history significant for unstable angina leading to 2 Xience drug-eluting stents placed in her left anterior descending artery in 04/2009.  She has hypercholesterolemia, hypertension, hypothyroidism and a strong family history of coronary artery disease.  Chest discomfort in 12/2012, lead to repeat angiography demonstrating no change in her anatomy.  She had moderate disease in the posterior descending artery, an ostial pinch in her first diagonal and an 80% stenosis in a branch of an obtuse marginal and I recommended medical management.      She underwent spinal surgery by Dr. Daniel Harris with good results for her spinal stenosis.  In the spring of 2015, she again had symptoms that were consistent with fairly classic exertional angina.  Angiography demonstrated no change in her anatomy and we again tried to continue with medical management.  One month later she again came to the emergency room, we brought her back to the Cath Lab where we stented a small obtuse marginal using a 2.25 x 8 mm Promus Premier drug-eluting stent.  She initially had some problems with the wrist after the radial approach, but it did result in resolution of her anginal symptoms.      In July 2013, she had a cerebrovascular accident which was manifested as balance problems.  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  She had been on warfarin therapy.      More recently, Salome had another back surgery in 04/2017, and in August had an episode of exertional chest discomfort relieved by rest and then a recurrent episode.  Because of this, we performed a stress nuclear scan.  We also checked her fasting lipid profile with a backslide  of her LDL to 75 and we increased her Crestor from 10 mg to 20 mg.      Salome returns to clinic and thinks she is doing fairly well.  She still has some occasional chest pain that occurs in no particular pattern.  She states that it usually passes quite quickly.  She never takes any nitroglycerin for it.      She states she is more troubled by back pain.  Her sciatica was cured with the back surgery but still she has lower back discomfort for which she continues to follow Daniel Harris and is treating this conservatively at this time.      She notes no problems or side effects with her current medical regimen.  She unfortunately is not exercising at all because of her back but is planning on getting a bicycle.     Outpatient Encounter Prescriptions as of 9/13/2017   Medication Sig Dispense Refill     escitalopram (LEXAPRO) 5 MG tablet Take 5 mg by mouth daily       vitamin B complex with vitamin C (VITAMIN  B COMPLEX) TABS tablet Take 1 tablet by mouth daily       rosuvastatin (CRESTOR) 20 MG tablet Take 1 tablet (20 mg) by mouth daily 90 tablet 3     nitroGLYcerin (NITROSTAT) 0.4 MG sublingual tablet Place 1 tablet (0.4 mg) under the tongue every 5 minutes as needed for chest pain 25 tablet 2     atenolol (TENORMIN) 25 MG tablet Take 1 tablet (25 mg) by mouth 2 times daily 60 tablet 0     lisinopril (PRINIVIL/ZESTRIL) 20 MG tablet Take 1 tablet (20 mg) by mouth daily 30 tablet 0     levothyroxine (SYNTHROID/LEVOTHROID) 100 MCG tablet Take 1 tablet (100 mcg) by mouth daily 90 tablet 3     amLODIPine (NORVASC) 5 MG tablet Take 1 tablet (5 mg) by mouth daily 90 tablet 1     warfarin (COUMADIN) 5 MG tablet Take 0.5-1 tab daily as directed by INR nurse based on INR reading. 90 tablet 1     aspirin EC 81 MG tablet Take 1 tablet (81 mg) by mouth daily       Multiple Vitamin (DAILY MULTIVITAMIN PO) Take by mouth daily       Calcium Carb-Cholecalciferol (CALCIUM 600 + D PO) Take 1,200 mg by mouth daily         Cholecalciferol (VITAMIN D) 2000 UNITS tablet Take 2,000 Units by mouth daily       Coenzyme Q10 (COQ-10) 200 MG CAPS Take 400 mg by mouth daily        ascorbic acid (VITAMIN C) 500 MG tablet Take 1,000 mg by mouth daily        ranitidine (ZANTAC) 150 MG tablet Take 1 tablet (150 mg) by mouth 2 times daily 180 tablet 1     acetaminophen (TYLENOL) 325 MG tablet Take 2 tablets by mouth every 6 hours as needed for pain.  0     No facility-administered encounter medications on file as of 9/13/2017.       ASSESSMENT AND PLAN:  Salome appears to be doing well from a cardiac standpoint.  She continues to have chest discomfort which appears to be more atypical.  The stress nuclear scan is quite reassuring as it demonstrates no evidence of ischemia and I have tried to reassure her.      I have encouraged her to try to exercise regularly.  I did encourage her to get a bicycle, extolling the virtues of a regular exercise regimen.      Fasting lipid profile as stated was checked on 09/01.  Total cholesterol is 172, HDL is 84, LDL is 75, triglycerides are 64.  We have bumped her Crestor from 10 mg to 20 and will recheck a fasting lipid profile in 2-3 months.  The goal was to try to drive her LDL at least below 70.      Blood pressure was initially high when she came in today, but I did recheck it and on repeat it was 126/62 with a pulse of 57.      Her weight is 141, which is down and I have congratulated on this and encouraged her to continue to try to maintain this lower weight.      We did review her medications; will continue the remainder as is.  She does not appear to be having any bleeding problems with her warfarin and aspirin therapy.      Thank you for allowing me to participate in her care.     Sincerely,    Nikolai Amaya MD     [facility]

## 2017-09-13 NOTE — MR AVS SNAPSHOT
"              After Visit Summary   9/13/2017    Salome Saeed    MRN: [id number]           Patient Information     Date Of Birth          1940        Visit Information        Provider Department      9/13/2017 8:30 AM Nikolai Amaya MD [facility] [place]        Today's Diagnoses     Benign essential hypertension    -  1    Coronary artery disease involving native coronary artery of native heart without angina pectoris        Hyperlipidemia LDL goal <70        Cerebrovascular accident involving cerebellum (H)        Atypical chest pain           Follow-ups after your visit        Your next 10 appointments already scheduled     Sep 19, 2017  2:30 PM CDT   Anticoagulation Visit with  ANTICOAGULATION CLINIC   [facility]    [address]  [facility] [address] [phone]              Who to contact     If you have questions or need follow up information about today's clinic visit or your schedule please contact [facility] [place] directly at [phone].  Normal or non-critical lab and imaging results will be communicated to you by RealityMinehart, letter or phone within 4 business days after the clinic has received the results. If you do not hear from us within 7 days, please contact the clinic through RealityMinehart or phone. If you have a critical or abnormal lab result, we will notify you by phone as soon as possible.  Submit refill requests through SoundTag or call your pharmacy and they will forward the refill request to us. Please allow 3 business days for your refill to be completed.          Additional Information About Your Visit        MyChart Information     SoundTag lets you send messages to your doctor, view your test results, renew your prescriptions, schedule appointments and more. To sign up, go to [email or web address] . Click on \"Log in\" on the left side of the screen, which will take " "you to the Welcome page. Then click on \"Sign up Now\" on the right side of the page.     You will be asked to enter the access code listed below, as well as some personal information. Please follow the directions to create your username and password.     Your access code is: [email or web address]  Expires: 10/31/2017  4:30 PM     Your access code will  in 90 days. If you need help or a new code, please call your [place] clinic or [phone].        Care EveryWhere ID     This is your Care EveryWhere ID. This could be used by other organizations to access your [place] medical records  [id number]        Your Vitals Were     Pulse Height Pulse Oximetry BMI (Body Mass Index)          57 1.626 m (5' 4\") 97% 24.2 kg/m2         Blood Pressure from Last 3 Encounters:   17 126/62   17 118/72   17 130/62    Weight from Last 3 Encounters:   17 64 kg (141 lb)   17 68.9 kg (152 lb)   17 67.4 kg (148 lb 9.6 oz)              We Performed the Following     Follow-Up with Cardiologist        Primary Care Provider Office Phone # Fax #    Karen Weiler, [phone] [address] [address]        Equal Access to Services     [facility] AH: Hadii aad ku hadasho Sojungali, waaxda luqadaha, qaybta kaalmada trish, sonia pan . So [facility] [phone].    ATENCIÓN: Si habla español, tiene a smith disposición servicios gratuitos de asistencia lingüística. Leonidas al [phone].    We comply with applicable federal civil rights laws and [place] laws. We do not discriminate on the basis of race, color, national origin, age, disability sex, sexual orientation or gender identity.            Thank you!     Thank you for choosing [facility] [place]  for your care. Our goal is always to provide you with excellent care. Hearing back from our patients is one way we can continue to improve our services. Please take a few " minutes to complete the written survey that you may receive in the mail after your visit with us. Thank you!             Your Updated Medication List - Protect others around you: Learn how to safely use, store and throw away your medicines at www.disposemymeds.org.          This list is accurate as of: 9/13/17  9:16 AM.  Always use your most recent med list.                   Brand Name Dispense Instructions for use Diagnosis    acetaminophen 325 MG tablet    TYLENOL     Take 2 tablets by mouth every 6 hours as needed for pain.        amLODIPine 5 MG tablet    NORVASC    90 tablet    Take 1 tablet (5 mg) by mouth daily    HTN (hypertension)       ascorbic acid 500 MG tablet    VITAMIN C     Take 1,000 mg by mouth daily        aspirin EC 81 MG EC tablet      Take 1 tablet (81 mg) by mouth daily    Coronary artery disease involving native coronary artery of native heart without angina pectoris       atenolol 25 MG tablet    TENORMIN    60 tablet    Take 1 tablet (25 mg) by mouth 2 times daily    CAD (coronary artery disease)       CALCIUM 600 + D PO      Take 1,200 mg by mouth daily        CoQ-10 200 MG Caps      Take 400 mg by mouth daily        DAILY MULTIVITAMIN PO      Take by mouth daily        escitalopram 5 MG tablet    LEXAPRO     Take 5 mg by mouth daily        levothyroxine 100 MCG tablet    SYNTHROID/LEVOTHROID    90 tablet    Take 1 tablet (100 mcg) by mouth daily    Hypothyroidism, unspecified type       lisinopril 20 MG tablet    PRINIVIL/ZESTRIL    30 tablet    Take 1 tablet (20 mg) by mouth daily    Essential hypertension, benign       nitroGLYcerin 0.4 MG sublingual tablet    NITROSTAT    25 tablet    Place 1 tablet (0.4 mg) under the tongue every 5 minutes as needed for chest pain    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    Cerebrovascular accident involving cerebellum (H)

## 2017-09-19 ENCOUNTER — ANTICOAGULATION THERAPY VISIT (OUTPATIENT)
Dept: NURSING | Facility: CLINIC | Age: 77
End: 2017-09-19
Payer: COMMERCIAL

## 2017-09-19 DIAGNOSIS — Z79.01 LONG-TERM (CURRENT) USE OF ANTICOAGULANTS: ICD-10-CM

## 2017-09-19 DIAGNOSIS — I63.9 CEREBROVASCULAR ACCIDENT INVOLVING CEREBELLUM (H): ICD-10-CM

## 2017-09-19 LAB — INR POINT OF CARE: 2.5 (ref 0.86–1.14)

## 2017-09-19 PROCEDURE — 85610 PROTHROMBIN TIME: CPT | Mod: QW

## 2017-09-19 PROCEDURE — 36416 COLLJ CAPILLARY BLOOD SPEC: CPT

## 2017-09-19 RX ORDER — WARFARIN SODIUM 5 MG/1
TABLET ORAL
Qty: 90 TABLET | Refills: 1 | Status: SHIPPED | OUTPATIENT
Start: 2017-09-19 | End: 2018-01-22

## 2017-09-19 NOTE — MR AVS SNAPSHOT
Salome SPRAGUE Darlin   9/19/2017 2:30 PM   Anticoagulation Therapy Visit    Description:  77 year old female   Provider:   ANTICOAGULATION CLINIC   Department:   Nurse           INR as of 9/19/2017     Today's INR 2.5      Anticoagulation Summary as of 9/19/2017     INR goal 2.0-3.0   Today's INR 2.5   Full instructions 2.5 mg on Mon, Wed, Fri; 5 mg all other days   Next INR check 10/17/2017    Indications   Long-term (current) use of anticoagulants [Z79.01] [Z79.01]  CVA (cerebral vascular accident) (Resolved) [I63.9]         Your next Anticoagulation Clinic appointment(s)     Oct 17, 2017  2:30 PM CDT   Anticoagulation Visit with  ANTICOAGULATION CLINIC   [facility] [place] ([facility])    [address]  [place] [address] [phone]              Contact Numbers     [place] [facility]  Please call [phone] with any problems or questions regarding your therapy, or to cancel or reschedule your appointment        September 2017 Details    Sun Mon Tue Wed Thu Fri Sat          1               2                 3               4               5               6               7               8               9                 10               11               12               13               14               15               16                 17               18               19      5 mg   See details      20      2.5 mg         21      5 mg         22      2.5 mg         23      5 mg           24      5 mg         25      2.5 mg         26      5 mg         27      2.5 mg         28      5 mg         29      2.5 mg         30      5 mg          Date Details   09/19 This INR check               How to take your warfarin dose     To take:  2.5 mg Take 0.5 of a 5 mg tablet.    To take:  5 mg Take 1 of the 5 mg tablets.           October 2017 Details    Sun Mon Tue Wed Thu Fri Sat     1      5 mg         2      2.5 mg         3      5 mg         4      2.5 mg         5      5 mg         6       2.5 mg         7      5 mg           8      5 mg         9      2.5 mg         10      5 mg         11      2.5 mg         12      5 mg         13      2.5 mg         14      5 mg           15      5 mg         16      2.5 mg         17            18               19               20               21                 22               23               24               25               26               27               28                 29               30               31                    Date Details   No additional details    Date of next INR:  10/17/2017         How to take your warfarin dose     To take:  2.5 mg Take 0.5 of a 5 mg tablet.    To take:  5 mg Take 1 of the 5 mg tablets.

## 2017-09-19 NOTE — PROGRESS NOTES
ANTICOAGULATION FOLLOW-UP CLINIC VISIT    Patient Name:  Salome Saeed  Date:  9/19/2017  Contact Type:  Face to Face    SUBJECTIVE:     Patient Findings     Positives No Problem Findings           OBJECTIVE    INR Protime   Date Value Ref Range Status   09/19/2017 2.5 (A) 0.86 - 1.14 Final       ASSESSMENT / PLAN  INR assessment THER    Recheck INR In: 4 WEEKS    INR Location Clinic      Anticoagulation Summary as of 9/19/2017     INR goal 2.0-3.0   Today's INR 2.5   Maintenance plan 2.5 mg (5 mg x 0.5) on Mon, Wed, Fri; 5 mg (5 mg x 1) all other days   Full instructions 2.5 mg on Mon, Wed, Fri; 5 mg all other days   Weekly total 27.5 mg   No change documented Rosanne Rico RN   Plan last modified Leyla Lopez (1/11/2016)   Next INR check 10/17/2017   Target end date     Indications   Long-term (current) use of anticoagulants [Z79.01] [Z79.01]  CVA (cerebral vascular accident) (Resolved) [I63.9]         Anticoagulation Episode Summary     INR check location     Preferred lab     Send INR reminders to [facility] TRIAGE    Comments             See the Encounter Report to view Anticoagulation Flowsheet and Dosing Calendar (Go to Encounters tab in chart review, and find the Anticoagulation Therapy Visit)    Rosanne Rico, RN

## 2017-09-25 DIAGNOSIS — I10 HTN (HYPERTENSION): ICD-10-CM

## 2017-09-25 RX ORDER — AMLODIPINE BESYLATE 5 MG/1
5 TABLET ORAL DAILY
Qty: 90 TABLET | Refills: 3 | Status: SHIPPED | OUTPATIENT
Start: 2017-09-25 | End: 2018-07-05

## 2017-09-28 ENCOUNTER — OFFICE VISIT (OUTPATIENT)
Dept: FAMILY MEDICINE | Facility: CLINIC | Age: 77
End: 2017-09-28
Payer: COMMERCIAL

## 2017-09-28 VITALS
WEIGHT: 152 LBS | SYSTOLIC BLOOD PRESSURE: 112 MMHG | HEIGHT: 64 IN | TEMPERATURE: 98.1 F | HEART RATE: 65 BPM | OXYGEN SATURATION: 98 % | DIASTOLIC BLOOD PRESSURE: 66 MMHG | BODY MASS INDEX: 25.95 KG/M2

## 2017-09-28 DIAGNOSIS — Z23 NEED FOR PROPHYLACTIC VACCINATION AND INOCULATION AGAINST INFLUENZA: ICD-10-CM

## 2017-09-28 DIAGNOSIS — Z71.89 ADVANCED DIRECTIVES, COUNSELING/DISCUSSION: ICD-10-CM

## 2017-09-28 DIAGNOSIS — Z01.818 PREOP GENERAL PHYSICAL EXAM: Primary | ICD-10-CM

## 2017-09-28 PROCEDURE — G0008 ADMIN INFLUENZA VIRUS VAC: HCPCS | Performed by: FAMILY MEDICINE

## 2017-09-28 PROCEDURE — 90662 IIV NO PRSV INCREASED AG IM: CPT | Performed by: FAMILY MEDICINE

## 2017-09-28 PROCEDURE — 99214 OFFICE O/P EST MOD 30 MIN: CPT | Mod: 25 | Performed by: FAMILY MEDICINE

## 2017-09-28 ASSESSMENT — ANXIETY QUESTIONNAIRES
3. WORRYING TOO MUCH ABOUT DIFFERENT THINGS: NOT AT ALL
5. BEING SO RESTLESS THAT IT IS HARD TO SIT STILL: NOT AT ALL
GAD7 TOTAL SCORE: 0
1. FEELING NERVOUS, ANXIOUS, OR ON EDGE: NOT AT ALL
IF YOU CHECKED OFF ANY PROBLEMS ON THIS QUESTIONNAIRE, HOW DIFFICULT HAVE THESE PROBLEMS MADE IT FOR YOU TO DO YOUR WORK, TAKE CARE OF THINGS AT HOME, OR GET ALONG WITH OTHER PEOPLE: NOT DIFFICULT AT ALL
2. NOT BEING ABLE TO STOP OR CONTROL WORRYING: NOT AT ALL
7. FEELING AFRAID AS IF SOMETHING AWFUL MIGHT HAPPEN: NOT AT ALL
6. BECOMING EASILY ANNOYED OR IRRITABLE: NOT AT ALL

## 2017-09-28 ASSESSMENT — PATIENT HEALTH QUESTIONNAIRE - PHQ9
5. POOR APPETITE OR OVEREATING: NOT AT ALL
SUM OF ALL RESPONSES TO PHQ QUESTIONS 1-9: 0

## 2017-09-28 NOTE — NURSING NOTE
"Chief Complaint   Patient presents with     Pre-Op Exam       Initial /66  Pulse 65  Temp 98.1  F (36.7  C) (Oral)  Ht 5' 4\" (1.626 m)  Wt 152 lb (68.9 kg)  SpO2 98%  BMI 26.09 kg/m2 Estimated body mass index is 26.09 kg/(m^2) as calculated from the following:    Height as of this encounter: 5' 4\" (1.626 m).    Weight as of this encounter: 152 lb (68.9 kg).  Medication Reconciliation: complete   Petra Whiting Certified Medical Assistant    "

## 2017-09-28 NOTE — ASSESSMENT & PLAN NOTE
Advance Care Planning 9/28/2017: ACP Review of Chart / Resources Provided:  Reviewed chart for advance care plan.  Salome Saeed has no plan or code status on file. Discussed available resources and provided with information.   Added by Petra Whiting

## 2017-09-28 NOTE — PROGRESS NOTES
[facility], [place]  [address] [address] [phone]  Dept: [phone]    PRE-OP EVALUATION:  Today's date: 2017    Salome Saeed (: 1940) presents for pre-operative evaluation assessment as requested by Dr. Jovani Quiroz.  She requires evaluation and anesthesia risk assessment prior to undergoing surgery/procedure for treatment of Eye bilateral .  Proposed procedure: Cataract     Date of Surgery/ Procedure: 10/04/17 and 10/11/17   Time of Surgery/ Procedure: To be decided   Hospital/Surgical Facility: [facility]   [phone]  Primary Physician: Weiler, Karen  Type of Anesthesia Anticipated: Local with MAC    Patient has a Health Care Directive or Living Will:  NO    1. YES - DO YOU HAVE A HISTORY OF HEART ATTACK, STROKE, STENT, BYPASS OR SURGERY ON AN ARTERY IN THE HEAD, NECK, HEART OR LEG? Stroke and 3 stents   2. NO - Do you ever have any pain or discomfort in your chest?  3. NO - Do you have a history of  Heart Failure?  4. NO - Are you troubled by shortness of breath when: walking on the level, up a slight hill or at night?  5. NO - Do you currently have a cold, bronchitis or other respiratory infection?  6. NO - Do you have a cough, shortness of breath or wheezing?  7. NO - Do you sometimes get pains in the calves of your legs when you walk?  8. NO - Do you or anyone in your family have previous history of blood clots?  9. NO - Do you or does anyone in your family have a serious bleeding problem such as prolonged bleeding following surgeries or cuts?  10. NO - Have you ever had problems with anemia or been told to take iron pills?  11. NO - Have you had any abnormal blood loss such as black, tarry or bloody stools, or abnormal vaginal bleeding?  12. NO - Have you ever had a blood transfusion?  13. NO - Have you or any of your relatives ever had problems with anesthesia?  14. NO - Do you have sleep apnea, excessive snoring or daytime drowsiness?  15. NO - Do you have  any prosthetic heart valves?  16. NO - Do you have prosthetic joints?  17. NO - Is there any chance that you may be pregnant?    HPI:                                                      Brief HPI related to upcoming procedure:     Pt was been having back pain when she went to cardiology, attributes it to her recent back surgery. Her systolic pressure was 77% from a recent stress test, no changes from stress test she had 2 years ago. No problems with anesthesia. Pt is having 2 separate surgeries for cataracts. Last blood work at beginning of September showed ranges within normal ranges. She is nonsmoker. Her BP looked good today. No palpitations, chest pain, SOB, swelling in her feet.      See problem list for active medical problems.  Problems all longstanding and stable, except as noted/documented.  See ROS for pertinent symptoms related to these conditions.                                                                                                  .    MEDICAL HISTORY:                                                    Patient Active Problem List    Diagnosis Date Noted     Hyperlipidemia LDL goal <70 04/01/2010     Priority: High     Coronary artery disease involving native coronary artery of native heart without angina pectoris 04/09/2009     Priority: High     5/28/2015 - PTCA and 2.25x8mm Promus PREMIER NAILA to ostium of small second OM  12/2012 cath - 40-50% stenosis in mid PDA, 80% on D1- medically treat  4/8/2009: PTCA and two 2.5x15mm Xience stents to mid LAD lesion        Benign essential hypertension      Priority: High     Chronic bilateral low back pain without sciatica 06/21/2017     Priority: Medium     Status post arthrodesis 06/21/2017     Priority: Medium     Cerebrovascular accident involving cerebellum (H) 10/26/2016     Priority: Medium     Vertebral artery stenosis, right      Priority: Medium     Hypothyroidism, unspecified type 05/18/2016     Priority: Medium     Long-term (current) use  of anticoagulants [Z79.01] 01/11/2016     Priority: Medium     GERD (gastroesophageal reflux disease)      Priority: Medium     Status post coronary angiogram 05/28/2015     Priority: Medium     Mitral regurgitation      Priority: Medium     1+ per 7/2013 Echo       Anxiety 08/08/2013     Priority: Medium     Health Care Home 07/23/2013     Priority: Medium     EMERGENCY CARE PLAN  Presenting Problem Signs and Symptoms Treatment Plan    Questions or conerns during clinic hours    I will call the clinic directly     Questions or conerns outside clinic hours    I will call the 24 hour nurse line at [phone]    Patient needs to schedule an appointment    I will call the 24 hour scheduling team at [phone] or clinic directly    Same day treatment     I will call the clinic first, nurse line if after hours, urgent care and express care if needed                          Clinic Care Coordinator:Apolinar Green [phone]  **Pt not in active care coordination at this time.       Atypical chest pain 07/19/2013     Priority: Medium     Imo Update utility       Spinal stenosis, lumbar region, with neurogenic claudication 05/08/2013     Priority: Medium     Lumbago 09/07/2012     Priority: Medium     DDD (degenerative disc disease), lumbar 07/09/2012     Priority: Medium     Lumbar spinal stenosis 07/09/2012     Priority: Medium     Advanced directives, counseling/discussion 06/18/2012     Priority: Medium     Discussed advance care planning with patient; however, patient declined at this time. 6/18/2012           Past Medical History:   Diagnosis Date     CAD (coronary artery disease) 4/9/2009 4/8/2009: PTCA and two 2.5x15mm Xience stents to mid LAD lesion; Cath 12/2012- 40-50% stenosis in mid PDA, 80% on D1- medically treat , 5/28/2015 - PTCA and 2.25x8mm Promus PREMIIER NAILA to ostium of 2nd OM     CVA (cerebral infarction)      DDD (degenerative disc disease)      Essential hypertension, benign      GERD  (gastroesophageal reflux disease)      Headache      Hyperlipidemia      Hypothyroidism      Lumbago      Lumbar spinal stenosis      Mitral regurgitation     1+ per 7/2013 Echo     Vertebral artery stenosis, right      Past Surgical History:   Procedure Laterality Date     CORONARY ANGIOGRAPHY ADULT ORDER  12/6/2012    40-50% stenosis to mid PDA, 80% in D1- medically treat     CORONARY ANGIOGRAPHY ADULT ORDER  5/28/2015    PTCA and 2.25x8mm Promus PREMIER NAILA to ostium of 2nd OM     DECOMPRESSION, FUSION LUMBAR POSTERIOR THREE + LEVELS, COMBINED  5/8/2013    Procedure: COMBINED DECOMPRESSION, FUSION LUMBAR POSTERIOR THREE + LEVELS;  Posterior Lumbar Decompression L3-S1, Fusion L4-S1;  Surgeon: Daniel Harris MD;  Location: [facility] OR     ENDOSCOPIC STRIPPING VEIN(S)       HEART CATH, ANGIOPLASTY  4/8/2009    PTCA and two 2.5x15mm Xience stents to mid LAD lesion     HYSTERECTOMY, RICKIE      Fibroids, and Menorrhagia.. Bilateral Oophrectomy     ORTHOPEDIC SURGERY       Stenting of LAD, Angioplasty  4/8/09     TONSILLECTOMY      as a child     Current Outpatient Prescriptions   Medication Sig Dispense Refill     amLODIPine (NORVASC) 5 MG tablet Take 1 tablet (5 mg) by mouth daily 90 tablet 3     warfarin (COUMADIN) 5 MG tablet Take 0.5-1 tab daily as directed by INR nurse based on INR reading. 90 tablet 1     escitalopram (LEXAPRO) 5 MG tablet Take 5 mg by mouth daily       vitamin B complex with vitamin C (VITAMIN  B COMPLEX) TABS tablet Take 1 tablet by mouth daily       rosuvastatin (CRESTOR) 20 MG tablet Take 1 tablet (20 mg) by mouth daily 90 tablet 3     nitroGLYcerin (NITROSTAT) 0.4 MG sublingual tablet Place 1 tablet (0.4 mg) under the tongue every 5 minutes as needed for chest pain 25 tablet 2     atenolol (TENORMIN) 25 MG tablet Take 1 tablet (25 mg) by mouth 2 times daily 60 tablet 0     lisinopril (PRINIVIL/ZESTRIL) 20 MG tablet Take 1 tablet (20 mg) by mouth daily 30 tablet 0     levothyroxine  "(SYNTHROID/LEVOTHROID) 100 MCG tablet Take 1 tablet (100 mcg) by mouth daily 90 tablet 3     aspirin EC 81 MG tablet Take 1 tablet (81 mg) by mouth daily       Multiple Vitamin (DAILY MULTIVITAMIN PO) Take by mouth daily       Calcium Carb-Cholecalciferol (CALCIUM 600 + D PO) Take 1,200 mg by mouth daily        Cholecalciferol (VITAMIN D) 2000 UNITS tablet Take 2,000 Units by mouth daily       Coenzyme Q10 (COQ-10) 200 MG CAPS Take 400 mg by mouth daily        ascorbic acid (VITAMIN C) 500 MG tablet Take 1,000 mg by mouth daily        ranitidine (ZANTAC) 150 MG tablet Take 1 tablet (150 mg) by mouth 2 times daily 180 tablet 1     acetaminophen (TYLENOL) 325 MG tablet Take 2 tablets by mouth every 6 hours as needed for pain.  0     OTC products: vitamins     Allergies   Allergen Reactions     Atorvastatin      Leg cramps     Gluten      Sinuses affected by gluten     Magnesium Salicylate      Oat      Shellfish Allergy      hives     Wheat       Latex Allergy: NO    Social History   Substance Use Topics     Smoking status: Former Smoker     Quit date: 1/1/1965     Smokeless tobacco: Never Used     Alcohol use 0.0 oz/week     0 Standard drinks or equivalent per week      Comment: 2 glasses wine per month     History   Drug Use No       REVIEW OF SYSTEMS:                                                    Constitutional, HEENT, cardiovascular, pulmonary, gi and gu systems are negative, except as otherwise noted.    This document serves as a record of the services and decisions personally performed and made by Karen Weiler, MD. It was created on her behalf by Asa Srinivasan, a trained medical scribe. The creation of this document is based on the provider's statements to the medical scribe.  Asa Srinivasan 2:58 PM September 28, 2017    EXAM:                                                    /66  Pulse 65  Temp 98.1  F (36.7  C) (Oral)  Ht 1.626 m (5' 4\")  Wt 68.9 kg (152 lb)  SpO2 98%  BMI 26.09 kg/m2    " GENERAL APPEARANCE: healthy, alert and no distress     EYES: EOMI, PERRL     HENT: ear canals and TM's normal and nose and mouth without ulcers or lesions     NECK: no adenopathy, no asymmetry, masses, or scars and thyroid normal to palpation     RESP: lungs clear to auscultation - no rales, rhonchi or wheezes     CV: regular rates and rhythm, normal S1 S2, no S3 or S4 and no murmur, click or rub     ABDOMEN:  soft, nontender, no HSM or masses and bowel sounds normal     MS: extremities normal- no gross deformities noted, no evidence of inflammation in joints, FROM in all extremities.     SKIN: no suspicious lesions or rashes     NEURO: Normal strength and tone, sensory exam grossly normal, mentation intact and speech normal. CN II-XII grossly intact. Strength 5/5 and symmetrical in extremities.   FTN-FTF intact     PSYCH: mentation appears normal. and affect normal/bright     LYMPHATICS: No axillary, cervical, or supraclavicular nodes    DIAGNOSTICS:                                                    No labs or EKG required for low risk surgery (cataract, skin procedure, breast biopsy, etc)    Recent Labs   Lab Test 09/19/17 09/01/17   0914 08/22/17 05/24/17   1229   04/10/17   1143   HGB   --    --    --    --   12.2   --   13.7   PLT   --    --    --    --   287   --   220   INR  2.5*   --   2.7*   < >   --    < >   --    NA   --   134   --    --   137   --   134   POTASSIUM   --   4.2   --    --   4.5   --   5.0   CR   --   0.63   --    --   0.60   --   0.68    < > = values in this interval not displayed.     IMPRESSION:                                                    Reason for surgery/procedure: Removal of bilateral cataracts.  Diagnosis/reason for consult: Evaluation and anesthesia risk assessment.    The proposed surgical procedure is considered LOW risk.    REVISED CARDIAC RISK INDEX  The patient has the following serious cardiovascular risks for perioperative complications such as (MI, PE, VFib and  3  AV Block):  No serious cardiac risks  INTERPRETATION: 0 risks: Class I (very low risk - 0.4% complication rate)    The patient has the following additional risks for perioperative complications:  No identified additional risks      ICD-10-CM    1. Preop general physical exam Z01.818    2. Need for prophylactic vaccination and inoculation against influenza Z23 FLU VACCINE, INCREASED ANTIGEN, PRESV FREE, AGE 65+ [17290]     Vaccine Administration, Initial [79135]   3. Advanced directives, counseling/discussion Z71.89        RECOMMENDATIONS:                                                      (Z01.818) Preop general physical exam  (primary encounter diagnosis)  Comment: Pt had stress test on 9/6/17, was good, EKG not needed today subsequently. Blood work from 9/1/17 showed levels within normal range.  Plan: Follow up if needed.    (Z23) Need for prophylactic vaccination and inoculation against influenza  Comment: Pt requesting flu shot today, will have her receive one.  Plan: FLU VACCINE, INCREASED ANTIGEN, PRESV FREE, AGE        65+ [57999], Vaccine Administration, Initial         [81203]        Follow up if needed.    (Z71.89) Advanced directives, counseling/discussion      --Consult hospital rounder / IM to assist post-op medical management    --Patient is to take all scheduled medications on the day of surgery EXCEPT for modifications listed below.    APPROVAL GIVEN to proceed with proposed procedure, without further diagnostic evaluation       Signed Electronically by: Karen Weiler, MD    Copy of this evaluation report is provided to requesting physician.    [place] Preop Guidelines    The information in this document, created by the medical scribe for me, accurately reflects the services I personally performed and the decisions made by me. I have reviewed and approved this document for accuracy prior to leaving the patient care area.  September 28, 2017 2:58 PM    Injectable Influenza Immunization  Documentation    1.  Is the person to be vaccinated sick today?   No    2. Does the person to be vaccinated have an allergy to a component   of the vaccine?   No    3. Has the person to be vaccinated ever had a serious reaction   to influenza vaccine in the past?   No    4. Has the person to be vaccinated ever had Guillain-Barré syndrome?   No    Form completed by Petra Whiting Certified Medical Assistant.

## 2017-09-28 NOTE — MR AVS SNAPSHOT
After Visit Summary   9/28/2017    Salome Saeed    MRN: [id number]           Patient Information     Date Of Birth          1940        Visit Information        Provider Department      9/28/2017 2:20 PM Weiler, Azucena, MD [facility] [place]        Today's Diagnoses     Preop general physical exam    -  1    Need for prophylactic vaccination and inoculation against influenza        Advanced directives, counseling/discussion          Care Instructions      Before Your Surgery      Call your surgeon if there is any change in your health. This includes signs of a cold or flu (such as a sore throat, runny nose, cough, rash or fever).    Do not smoke, drink alcohol or take over the counter medicine (unless your surgeon or primary care doctor tells you to) for the 24 hours before and after surgery.    If you take prescribed drugs: Follow your doctor s orders about which medicines to take and which to stop until after surgery.    Eating and drinking prior to surgery: follow the instructions from your surgeon    Take a shower or bath the night before surgery. Use the soap your surgeon gave you to gently clean your skin. If you do not have soap from your surgeon, use your regular soap. Do not shave or scrub the surgery site.  Wear clean pajamas and have clean sheets on your bed.           Follow-ups after your visit        Your next 10 appointments already scheduled     Oct 17, 2017  2:30 PM CDT   Anticoagulation Visit with [facility]   Aurelia Ramirez ([facility] [place])    [address] [place] [id number] [phone]              Who to contact     If you have questions or need follow up information about today's clinic visit or your schedule please contact [facility] [place] directly at [phone].  Normal or non-critical lab and imaging results will be communicated to you by MyChart, letter or phone within 4 business days after the clinic has  "received the results. If you do not hear from us within 7 days, please contact the clinic through [facility] or phone. If you have a critical or abnormal lab result, we will notify you by phone as soon as possible.  Submit refill requests through [facility] or call your pharmacy and they will forward the refill request to us. Please allow 3 business days for your refill to be completed.          Additional Information About Your Visit        [facility] Information     [facility] lets you send messages to your doctor, view your test results, renew your prescriptions, schedule appointments and more. To sign up, go to [email or web address] . Click on \"Log in\" on the left side of the screen, which will take you to the Welcome page. Then click on \"Sign up Now\" on the right side of the page.     You will be asked to enter the access code listed below, as well as some personal information. Please follow the directions to create your username and password.     Your access code is: [email or web address]  Expires: 10/31/2017  4:30 PM     Your access code will  in 90 days. If you need help or a new code, please call your [place] clinic or [phone].        Care EveryWhere ID     This is your Care EveryWhere ID. This could be used by other organizations to access your [place] medical records  [id number]        Your Vitals Were     Pulse Temperature Height Pulse Oximetry BMI (Body Mass Index)       65 98.1  F (36.7  C) (Oral) 5' 4\" (1.626 m) 98% 26.09 kg/m2        Blood Pressure from Last 3 Encounters:   17 112/66   17 126/62   17 118/72    Weight from Last 3 Encounters:   17 152 lb (68.9 kg)   17 141 lb (64 kg)   17 152 lb (68.9 kg)              We Performed the Following     FLU VACCINE, INCREASED ANTIGEN, PRESV FREE, AGE 65+ [30644]     Vaccine Administration, Initial [36496]        Primary Care Provider Office Phone # Fax #    Karen Weiler, [phone] [address]  [place] [address]        Equal Access to Services     [facility] : Hadii solange ku hadpatricko Sojungali, waaxda luqadaha, qaybta kaalmasonia pedersenjacobkenroy pillai. So [facility] [phone].    ATENCIÓN: Si habla español, tiene a smith disposición servicios gratuitos de asistencia lingüística. Leonidas al [phone].    We comply with applicable federal civil rights laws and [place] laws. We do not discriminate on the basis of race, color, national origin, age, disability, sex, sexual orientation, or gender identity.            Thank you!     Thank you for choosing [facility]3]  for your care. Our goal is always to provide you with excellent care. Hearing back from our patients is one way we can continue to improve our services. Please take a few minutes to complete the written survey that you may receive in the mail after your visit with us. Thank you!             Your Updated Medication List - Protect others around you: Learn how to safely use, store and throw away your medicines at www.disposemymeds.org.          This list is accurate as of: 9/28/17 11:59 PM.  Always use your most recent med list.                   Brand Name Dispense Instructions for use Diagnosis    acetaminophen 325 MG tablet    TYLENOL     Take 2 tablets by mouth every 6 hours as needed for pain.        amLODIPine 5 MG tablet    NORVASC    90 tablet    Take 1 tablet (5 mg) by mouth daily    HTN (hypertension)       ascorbic acid 500 MG tablet    VITAMIN C     Take 1,000 mg by mouth daily        aspirin EC 81 MG EC tablet      Take 1 tablet (81 mg) by mouth daily    Coronary artery disease involving native coronary artery of native heart without angina pectoris       atenolol 25 MG tablet    TENORMIN    60 tablet    Take 1 tablet (25 mg) by mouth 2 times daily    CAD (coronary artery disease)       CALCIUM 600 + D PO      Take 1,200 mg by mouth daily        CoQ-10 200 MG Caps      Take 400 mg by mouth daily         DAILY MULTIVITAMIN PO      Take by mouth daily        escitalopram 5 MG tablet    LEXAPRO     Take 5 mg by mouth daily        levothyroxine 100 MCG tablet    SYNTHROID/LEVOTHROID    90 tablet    Take 1 tablet (100 mcg) by mouth daily    Hypothyroidism, unspecified type       lisinopril 20 MG tablet    PRINIVIL/ZESTRIL    30 tablet    Take 1 tablet (20 mg) by mouth daily    Essential hypertension, benign       nitroGLYcerin 0.4 MG sublingual tablet    NITROSTAT    25 tablet    Place 1 tablet (0.4 mg) under the tongue every 5 minutes as needed for chest pain    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    Cerebrovascular accident involving cerebellum (H)

## 2017-09-29 ASSESSMENT — ANXIETY QUESTIONNAIRES: GAD7 TOTAL SCORE: 0

## 2017-10-16 ENCOUNTER — ANTICOAGULATION THERAPY VISIT (OUTPATIENT)
Dept: NURSING | Facility: CLINIC | Age: 77
End: 2017-10-16
Payer: COMMERCIAL

## 2017-10-16 DIAGNOSIS — Z79.01 LONG-TERM (CURRENT) USE OF ANTICOAGULANTS: ICD-10-CM

## 2017-10-16 LAB — INR POINT OF CARE: 3.1 (ref 0.86–1.14)

## 2017-10-16 PROCEDURE — 36416 COLLJ CAPILLARY BLOOD SPEC: CPT

## 2017-10-16 PROCEDURE — 85610 PROTHROMBIN TIME: CPT | Mod: QW

## 2017-10-16 NOTE — MR AVS SNAPSHOT
Salome Eppersonde   10/16/2017 11:15 AM   Anticoagulation Therapy Visit    Description:  77 year old female   Provider:   ANTICOAGULATION CLINIC   Department:   Nurse           INR as of 10/16/2017     Today's INR 3.1!      Anticoagulation Summary as of 10/16/2017     INR goal 2.0-3.0   Today's INR 3.1!   Full instructions 2.5 mg on Mon, Wed, Fri; 5 mg all other days   Next INR check 10/31/2017    Indications   Long-term (current) use of anticoagulants [Z79.01] [Z79.01]  CVA (cerebral vascular accident) (Resolved) [I63.9]         Your next Anticoagulation Clinic appointment(s)     Oct 31, 2017 11:00 AM CDT   Anticoagulation Visit with  ANTICOAGULATION CLINIC   [facility] [place] ([facility])    [address]  [place] [id number] [phone]              Contact Numbers     [place] [facility]  Please call [phone] with any problems or questions regarding your therapy, or to cancel or reschedule your appointment        October 2017 Details    Sun Mon Tue Wed Thu Fri Sat     1               2               3               4               5               6               7                 8               9               10               11               12               13               14                 15               16      2.5 mg   See details      17      5 mg         18      2.5 mg         19      5 mg         20      2.5 mg         21      5 mg           22      5 mg         23      2.5 mg         24      5 mg         25      2.5 mg         26      5 mg         27      2.5 mg         28      5 mg           29      5 mg         30      2.5 mg         31                 Date Details   10/16 This INR check       Date of next INR:  10/31/2017         How to take your warfarin dose     To take:  2.5 mg Take 0.5 of a 5 mg tablet.    To take:  5 mg Take 1 of the 5 mg tablets.

## 2017-10-16 NOTE — PROGRESS NOTES
ANTICOAGULATION FOLLOW-UP CLINIC VISIT    Patient Name:  Salome Saeed  Date:  10/16/2017  Contact Type:  Face to Face    SUBJECTIVE:     Patient Findings     Positives No Problem Findings           OBJECTIVE    INR Protime   Date Value Ref Range Status   10/16/2017 3.1 (A) 0.86 - 1.14 Final       ASSESSMENT / PLAN  INR assessment THER    Recheck INR In: 2 WEEKS    INR Location Clinic      Anticoagulation Summary as of 10/16/2017     INR goal 2.0-3.0   Today's INR 3.1!   Maintenance plan 2.5 mg (5 mg x 0.5) on Mon, Wed, Fri; 5 mg (5 mg x 1) all other days   Full instructions 2.5 mg on Mon, Wed, Fri; 5 mg all other days   Weekly total 27.5 mg   No change documented Trisha, NUBIA Lay   Plan last modified Leyla Lopez (1/11/2016)   Next INR check 10/31/2017   Target end date     Indications   Long-term (current) use of anticoagulants [Z79.01] [Z79.01]  CVA (cerebral vascular accident) (Resolved) [I63.9]         Anticoagulation Episode Summary     INR check location     Preferred lab     Send INR reminders to [facility] TRI[facility]    Comments             See the Encounter Report to view Anticoagulation Flowsheet and Dosing Calendar (Go to Encounters tab in chart review, and find the Anticoagulation Therapy Visit)    Rosanne Rico, NUBIA

## 2017-10-25 DIAGNOSIS — I10 ESSENTIAL HYPERTENSION, BENIGN: ICD-10-CM

## 2017-10-25 RX ORDER — LISINOPRIL 20 MG/1
20 TABLET ORAL DAILY
Qty: 90 TABLET | Refills: 3 | Status: SHIPPED | OUTPATIENT
Start: 2017-10-25 | End: 2017-11-14

## 2017-10-26 ENCOUNTER — TRANSFERRED RECORDS (OUTPATIENT)
Dept: HEALTH INFORMATION MANAGEMENT | Facility: CLINIC | Age: 77
End: 2017-10-26

## 2017-10-30 ENCOUNTER — ANTICOAGULATION THERAPY VISIT (OUTPATIENT)
Dept: NURSING | Facility: CLINIC | Age: 77
End: 2017-10-30
Payer: COMMERCIAL

## 2017-10-30 DIAGNOSIS — Z79.01 LONG-TERM (CURRENT) USE OF ANTICOAGULANTS: ICD-10-CM

## 2017-10-30 LAB — INR POINT OF CARE: 2.9 (ref 0.86–1.14)

## 2017-10-30 PROCEDURE — 85610 PROTHROMBIN TIME: CPT | Mod: QW

## 2017-10-30 PROCEDURE — 36416 COLLJ CAPILLARY BLOOD SPEC: CPT

## 2017-10-30 NOTE — MR AVS SNAPSHOT
Salome CELESTINE Darlin   10/30/2017 11:00 AM   Anticoagulation Therapy Visit    Description:  77 year old female   Provider:   ANTICOAGULATION CLINIC   Department:   Nurse           INR as of 10/30/2017     Today's INR 2.9      Anticoagulation Summary as of 10/30/2017     INR goal 2.0-3.0   Today's INR 2.9   Full instructions 2.5 mg on Mon, Wed, Fri; 5 mg all other days   Next INR check 11/27/2017    Indications   Long-term (current) use of anticoagulants [Z79.01] [Z79.01]  CVA (cerebral vascular accident) (Resolved) [I63.9]         Your next Anticoagulation Clinic appointment(s)     Nov 27, 2017  1:30 PM CST   Anticoagulation Visit with  ANTICOAGULATION CLINIC   [facility] [place] ([facility])    [address] [address] [phone]              Contact Numbers     [facility] [facility]  Please call [phone] with any problems or questions regarding your therapy, or to cancel or reschedule your appointment        October 2017 Details    Sun Mon Tue Wed Thu Fri Sat     1               2               3               4               5               6               7                 8               9               10               11               12               13               14                 15               16               17               18               19               20               21                 22               23               24               25               26               27               28                 29               30      2.5 mg   See details      31      5 mg              Date Details   10/30 This INR check               How to take your warfarin dose     To take:  2.5 mg Take 0.5 of a 5 mg tablet.    To take:  5 mg Take 1 of the 5 mg tablets.           November 2017 Details    Sun Mon Tue Wed Thu Fri Sat        1      2.5 mg         2      5 mg         3      2.5 mg         4      5 mg           5      5 mg         6      2.5 mg         7       5 mg         8      2.5 mg         9      5 mg         10      2.5 mg         11      5 mg           12      5 mg         13      2.5 mg         14      5 mg         15      2.5 mg         16      5 mg         17      2.5 mg         18      5 mg           19      5 mg         20      2.5 mg         21      5 mg         22      2.5 mg         23      5 mg         24      2.5 mg         25      5 mg           26      5 mg         27            28               29               30                  Date Details   No additional details    Date of next INR:  11/27/2017         How to take your warfarin dose     To take:  2.5 mg Take 0.5 of a 5 mg tablet.    To take:  5 mg Take 1 of the 5 mg tablets.

## 2017-10-30 NOTE — PROGRESS NOTES
ANTICOAGULATION FOLLOW-UP CLINIC VISIT    Patient Name:  Salome Saeed  Date:  10/30/2017  Contact Type:  Face to Face    SUBJECTIVE:     Patient Findings     Positives No Problem Findings           OBJECTIVE    INR Protime   Date Value Ref Range Status   10/30/2017 2.9 (A) 0.86 - 1.14 Final       ASSESSMENT / PLAN  INR assessment THER    Recheck INR In: 4 WEEKS    INR Location Clinic      Anticoagulation Summary as of 10/30/2017     INR goal 2.0-3.0   Today's INR 2.9   Maintenance plan 2.5 mg (5 mg x 0.5) on Mon, Wed, Fri; 5 mg (5 mg x 1) all other days   Full instructions 2.5 mg on Mon, Wed, Fri; 5 mg all other days   Weekly total 27.5 mg   No change documented Rosanne Rico RN   Plan last modified Leyla Lopez (1/11/2016)   Next INR check 11/27/2017   Target end date     Indications   Long-term (current) use of anticoagulants [Z79.01] [Z79.01]  CVA (cerebral vascular accident) (Resolved) [I63.9]         Anticoagulation Episode Summary     INR check location     Preferred lab     Send INR reminders to [facility] TRI[facility]    Comments             See the Encounter Report to view Anticoagulation Flowsheet and Dosing Calendar (Go to Encounters tab in chart review, and find the Anticoagulation Therapy Visit)    Rosanne Rico, RN

## 2017-11-01 ENCOUNTER — OFFICE VISIT (OUTPATIENT)
Dept: FAMILY MEDICINE | Facility: CLINIC | Age: 77
End: 2017-11-01
Payer: COMMERCIAL

## 2017-11-01 VITALS
HEART RATE: 63 BPM | WEIGHT: 155 LBS | DIASTOLIC BLOOD PRESSURE: 66 MMHG | HEIGHT: 64 IN | BODY MASS INDEX: 26.46 KG/M2 | TEMPERATURE: 97.9 F | SYSTOLIC BLOOD PRESSURE: 119 MMHG | OXYGEN SATURATION: 98 %

## 2017-11-01 DIAGNOSIS — E78.5 HYPERLIPIDEMIA LDL GOAL <70: ICD-10-CM

## 2017-11-01 DIAGNOSIS — M51.369 DDD (DEGENERATIVE DISC DISEASE), LUMBAR: ICD-10-CM

## 2017-11-01 DIAGNOSIS — I25.10 CORONARY ARTERY DISEASE INVOLVING NATIVE CORONARY ARTERY OF NATIVE HEART WITHOUT ANGINA PECTORIS: ICD-10-CM

## 2017-11-01 DIAGNOSIS — F41.9 ANXIETY: ICD-10-CM

## 2017-11-01 DIAGNOSIS — Z00.00 MEDICARE ANNUAL WELLNESS VISIT, SUBSEQUENT: Primary | ICD-10-CM

## 2017-11-01 PROCEDURE — 99397 PER PM REEVAL EST PAT 65+ YR: CPT | Performed by: FAMILY MEDICINE

## 2017-11-01 RX ORDER — ESCITALOPRAM OXALATE 5 MG/1
5 TABLET ORAL DAILY
Qty: 90 TABLET | Refills: 3 | Status: SHIPPED | OUTPATIENT
Start: 2017-11-01 | End: 2018-09-09

## 2017-11-01 RX ORDER — KETOROLAC TROMETHAMINE 5 MG/ML
SOLUTION OPHTHALMIC
Refills: 3 | COMMUNITY
Start: 2017-10-11 | End: 2018-10-09

## 2017-11-01 RX ORDER — PREDNISOLONE ACETATE 10 MG/ML
SUSPENSION/ DROPS OPHTHALMIC
Refills: 3 | COMMUNITY
Start: 2017-10-11 | End: 2018-10-09

## 2017-11-01 RX ORDER — ATENOLOL 25 MG/1
25 TABLET ORAL 2 TIMES DAILY
Qty: 180 TABLET | Refills: 3 | Status: SHIPPED | OUTPATIENT
Start: 2017-11-01 | End: 2018-12-26

## 2017-11-01 RX ORDER — GATIFLOXACIN 5 MG/ML
SOLUTION/ DROPS OPHTHALMIC
Refills: 3 | COMMUNITY
Start: 2017-10-11 | End: 2018-10-09

## 2017-11-01 RX ORDER — MOXIFLOXACIN 5 MG/ML
SOLUTION/ DROPS OPHTHALMIC
Refills: 3 | COMMUNITY
Start: 2017-10-26 | End: 2018-10-09

## 2017-11-01 RX ORDER — CYCLOPENTOLATE HYDROCHLORIDE 10 MG/ML
SOLUTION/ DROPS OPHTHALMIC
Refills: 0 | COMMUNITY
Start: 2017-10-02 | End: 2018-10-09

## 2017-11-01 NOTE — PROGRESS NOTES
SUBJECTIVE:   Salome Saeed is a 77 year old female who presents for Preventive Visit.    Are you in the first 12 months of your Medicare Part B coverage?  No    Healthy Habits:    Do you get at least three servings of calcium containing foods daily (dairy, green leafy vegetables, etc.)? yes    Amount of exercise or daily activities, outside of work: none     Problems taking medications regularly No    Medication side effects: No    Have you had an eye exam in the past two years? yes    Do you see a dentist twice per year? yes    Do you have sleep apnea, excessive snoring or daytime drowsiness?no    COGNITIVE SCREEN  1) Repeat 3 items (Banana, Sunrise, Chair)    2) Clock draw: NORMAL  3) 3 item recall: Recalls 2 objects   Results: NORMAL clock, 1-2 items recalled: COGNITIVE IMPAIRMENT LESS LIKELY    Mini-CogTM Copyright S Anjelica. Licensed by the author for use in [facility]; reprinted with permission ([email or web address]@[email or web address]). All rights reserved.          Pt recently had cataract surgery, went well, was painless, her left cornea is healing slower than her right. She has hx of double vision, still needs glasses. No chest pain or SOB. She saw cardiologist Dr. Amaya recently, everything was normal, she did stress nuclear scan, he increased her Crestor. She is sleeping well, no depression symptoms, she got flu shot. She notes having prominence of arthritis in her back, is waiting at least a year to see if pain resolves on its own. No hx of falls. Last mammogram was 2016, pt does not have family hx of breast cancer, pt wants to wait until next spring to have another done. She is UTD on DEXA. She is due for colon cancer screening, she usually does FIT, no family hx of colon cancer. No palpitations, abdominal pain, or swelling in her feet. She is taking Atenolol 1 tablet 2X a day, she is still on Lexapro.      Reviewed and updated as needed this visit by clinical staff  Tobacco  Allergies  Meds  Med Hx   Surg Hx  Fam Hx  Soc Hx      Reviewed and updated as needed this visit by Provider        Social History   Substance Use Topics     Smoking status: Former Smoker     Quit date: 1/1/1965     Smokeless tobacco: Never Used     Alcohol use 0.0 oz/week     0 Standard drinks or equivalent per week      Comment: 2 glasses wine per month       No alcohol use     Today's PHQ-2 Score:   PHQ-2 ( 1999 Pfizer) 11/1/2017 9/28/2017   Q1: Little interest or pleasure in doing things 0 0   Q2: Feeling down, depressed or hopeless 0 0   PHQ-2 Score 0 0     Do you feel safe in your environment - Yes    Do you have a Health Care Directive?: No: Advance care planning was reviewed with patient; patient declined at this time.    Current providers sharing in care for this patient include: Patient Care Team:  Weiler, Karen, MD as PCP - General      Hearing impairment: No    Ability to successfully perform activities of daily living: Yes, no assistance needed     Fall risk:  Fallen 2 or more times in the past year?: No  Any fall with injury in the past year?: No      Home safety:  none identified      The following health maintenance items are reviewed in Epic and correct as of today:  Health Maintenance   Topic Date Due     URINE DRUG SCREEN Q1 YR  04/02/1957     COLON CANCER SCREEN (SYSTEM ASSIGNED)  02/20/2014     WELLNESS VISIT Q1 YR  05/18/2017     TSH Q1 YEAR  05/24/2018     LIPID MONITORING Q1 YEAR  09/01/2018     FALL RISK ASSESSMENT  09/28/2018     TETANUS Q10 YR  02/20/2019     DEXA Q3 YR  06/27/2019     ADVANCE DIRECTIVE PLANNING Q5 YRS  09/28/2022     INFLUENZA VACCINE (SYSTEM ASSIGNED)  Completed     PNEUMOCOCCAL  Completed       Pneumonia Vaccine: Series completed  Mammogram Screening: Patient over age 75, has elected to continue with mammography screening.  History of abnormal Pap smear: Status post benign hysterectomy. Health Maintenance and Surgical History updated.    ROS:  Constitutional, HEENT, cardiovascular, pulmonary,  "gi and gu systems are negative, except as otherwise noted.    This document serves as a record of the services and decisions personally performed and made by Karen Weiler, MD. It was created on her behalf by Asa Srinivasan, a trained medical scribe. The creation of this document is based on the provider's statements to the medical scribe.  Asa Srinivasan 11:17 AM November 1, 2017    OBJECTIVE:   /66  Pulse 63  Temp 97.9  F (36.6  C) (Oral)  Ht 1.626 m (5' 4\")  Wt 70.3 kg (155 lb)  SpO2 98%  BMI 26.61 kg/m2 Estimated body mass index is 26.61 kg/(m^2) as calculated from the following:    Height as of this encounter: 1.626 m (5' 4\").    Weight as of this encounter: 70.3 kg (155 lb).  EXAM:   GENERAL APPEARANCE: healthy, alert and no distress  EYES: Eyes grossly normal to inspection, PERRL and conjunctivae and sclerae normal  HENT: ear canals and TM's normal, nose and mouth without ulcers or lesions, oropharynx clear and oral mucous membranes moist  NECK: no adenopathy, no asymmetry, masses, or scars and thyroid normal to palpation  RESP: lungs clear to auscultation - no rales, rhonchi or wheezes  CV: regular rate and rhythm, normal S1 S2, no S3 or S4, no murmur, click or rub, no peripheral edema and peripheral pulses strong  ABDOMEN: soft, nontender, no hepatosplenomegaly, no masses and bowel sounds normal  MS: no musculoskeletal defects are noted and gait is age appropriate without ataxia  SKIN: no suspicious lesions or rashes  NEURO: Normal strength and tone, sensory exam grossly normal, mentation intact and speech normal  PSYCH: mentation appears normal and affect normal/bright    ASSESSMENT / PLAN:     (Z00.00) Medicare annual wellness visit, subsequent  (primary encounter diagnosis)  Comment: Pt appears to be doing well.  Plan: Next annual wellness check due 11/1/18.    (I25.10) Coronary artery disease involving native coronary artery of native heart without angina pectoris  Comment: Pt is being followed " "by Dr. Amaya, she recently had labs and stress nuclear test done with him, were all normal, he increased her Crestor, she is still taking Atenolol as prescribed, will refill and have her continue.  Plan: atenolol (TENORMIN) 25 MG tablet        Follow up if needed.    (E78.5) Hyperlipidemia LDL goal <70  Comment: Stable, well controlled with current regimen, Dr. Amaya recently increased her Crestor.  Plan: Follow up if needed.    (M51.36) DDD (degenerative disc disease), lumbar  Comment: Pt has arthritis in her back, is waiting at least a year to see if pain resolves on its own after her surgery before pursuing additional treatment options.  Plan: Follow up if needed.    (F41.9) Anxiety  Comment: Stable, will refill and have pt continue with Lexapro.  Plan: escitalopram (LEXAPRO) 5 MG tablet        Follow up if needed.    End of Life Planning:  Patient currently has an advanced directive: No.  I have verified the patient's ablity to prepare an advanced directive/make health care decisions.  Literature was provided to assist patient in preparing an advanced directive.    COUNSELING:  Reviewed preventive health counseling, as reflected in patient instructions       Regular exercise       Healthy diet/nutrition       Osteoporosis Prevention/Bone Health       Colon cancer screening    Estimated body mass index is 26.61 kg/(m^2) as calculated from the following:    Height as of this encounter: 1.626 m (5' 4\").    Weight as of this encounter: 70.3 kg (155 lb).     reports that she quit smoking about 52 years ago. She has never used smokeless tobacco.    Appropriate preventive services were discussed with this patient, including applicable screening as appropriate for cardiovascular disease, diabetes, osteopenia/osteoporosis, and glaucoma.  As appropriate for age/gender, discussed screening for colorectal cancer, prostate cancer, breast cancer, and cervical cancer. Checklist reviewing preventive services available has " been given to the patient.    Reviewed patients plan of care and provided an AVS. The Basic Care Plan (routine screening as documented in Health Maintenance) for Salome meets the Care Plan requirement. This Care Plan has been established and reviewed with the Patient.    Counseling Resources:  ATP IV Guidelines  Pooled Cohorts Equation Calculator  Breast Cancer Risk Calculator  FRAX Risk Assessment  ICSI Preventive Guidelines  Dietary Guidelines for Americans, 2010  [employer] MyPlate  ASA Prophylaxis  Lung CA Screening    The information in this document, created by the medical scribe for me, accurately reflects the services I personally performed and the decisions made by me. I have reviewed and approved this document for accuracy prior to leaving the patient care area.  November 1, 2017 11:16 AM    Karen Weiler, MD  [facility]

## 2017-11-01 NOTE — PATIENT INSTRUCTIONS

## 2017-11-01 NOTE — MR AVS SNAPSHOT
After Visit Summary   11/1/2017    Salome Saeed    MRN: [id number]           Patient Information     Date Of Birth          1940        Visit Information        Provider Department      11/1/2017 10:40 AM Weiler, Karen, MD [facility] [place]        Today's Diagnoses     Medicare annual wellness visit, subsequent    -  1    Coronary artery disease involving native coronary artery of native heart without angina pectoris        Hyperlipidemia LDL goal <70        DDD (degenerative disc disease), lumbar        Anxiety          Care Instructions      Preventive Health Recommendations    Female Ages 65 +    Yearly exam:     See your health care provider every year in order to  o Review health changes.   o Discuss preventive care.    o Review your medicines if your doctor has prescribed any.      You no longer need a yearly Pap test unless you've had an abnormal Pap test in the past 10 years. If you have vaginal symptoms, such as bleeding or discharge, be sure to talk with your provider about a Pap test.      Every 1 to 2 years, have a mammogram.  If you are over 69, talk with your health care provider about whether or not you want to continue having screening mammograms.      Every 10 years, have a colonoscopy. Or, have a yearly FIT test (stool test). These exams will check for colon cancer.       Have a cholesterol test every 5 years, or more often if your doctor advises it.       Have a diabetes test (fasting glucose) every three years. If you are at risk for diabetes, you should have this test more often.       At age 65, have a bone density scan (DEXA) to check for osteoporosis (brittle bone disease).    Shots:    Get a flu shot each year.    Get a tetanus shot every 10 years.    Talk to your doctor about your pneumonia vaccines. There are now two you should receive - Pneumovax (PPSV 23) and Prevnar (PCV 13).    Talk to your doctor about the shingles vaccine.    Talk to your doctor about  the hepatitis B vaccine.    Nutrition:     Eat at least 5 servings of fruits and vegetables each day.      Eat whole-grain bread, whole-wheat pasta and brown rice instead of white grains and rice.      Talk to your provider about Calcium and Vitamin D.     Lifestyle    Exercise at least 150 minutes a week (30 minutes a day, 5 days a week). This will help you control your weight and prevent disease.      Limit alcohol to one drink per day.      No smoking.       Wear sunscreen to prevent skin cancer.       See your dentist twice a year for an exam and cleaning.      See your eye doctor every 1 to 2 years to screen for conditions such as glaucoma, macular degeneration and cataracts.    Dr. [facility] clinic          Follow-ups after your visit        Your next 10 appointments already scheduled     Nov 27, 2017  1:30 PM CST   Anticoagulation Visit with  ANTICOAGULATION CLINIC   [facility] [place] ([facility]    [address] [address] [phone]              Who to contact     If you have questions or need follow up information about today's clinic visit or your schedule please [facility] at [phone].  Normal or non-critical lab and imaging results will be communicated to you by MyChart, letter or phone within 4 business days after the clinic has received the results. If you do not hear from us within 7 days, please contact the clinic through [employer] or phone. If you have a critical or abnormal lab result, we will notify you by phone as soon as possible.  Submit refill requests through [employer] or call your pharmacy and they will forward the refill request to us. Please allow 3 business days for your refill to be completed.          Additional Information About Your Visit        MyChart Information     FaceRig lets you send messages to your doctor, view your test results, renew your prescriptions, schedule appointments and more. To sign up, go  "to [email or web address] . Click on \"Log in\" on the left side of the screen, which will take you to the Welcome page. Then click on \"Sign up Now\" on the right side of the page.     You will be asked to enter the access code listed below, as well as some personal information. Please follow the directions to create your username and password.     Your access code is: [email or web address]  Expires: 2018  8:05 PM     Your access code will  in 90 days. If you need help or a new code, please call your [facility] clinic or [phone].        Care EveryWhere ID     This is your Care EveryWhere ID. This could be used by other organizations to access your [facility] medical records  [id number]        Your Vitals Were     Pulse Temperature Height Pulse Oximetry BMI (Body Mass Index)       63 97.9  F (36.6  C) (Oral) 5' 4\" (1.626 m) 98% 26.61 kg/m2        Blood Pressure from Last 3 Encounters:   17 119/66   17 112/66   17 126/62    Weight from Last 3 Encounters:   17 155 lb (70.3 kg)   17 152 lb (68.9 kg)   17 141 lb (64 kg)              Today, you had the following     No orders found for display         Where to get your medicines      These medications were sent to [facility] - [facility] [address]  [address] [facility] [address]     Phone:  [phone]     atenolol 25 MG tablet    escitalopram 5 MG tablet          Primary Care Provider Office Phone # Fax #    Karen Weiler, [phone] [address]  [place] [address]        Equal Access to Services     SYLVIE OLIVARES : Randell Macias, wajose antoine, qaybta kaalmasonia pedersen. So [facility] [phone].    ATENCIÓN: Si habla español, tiene a smith disposición servicios gratuitos de asistencia lingüística. Llame al [phone].    We comply with applicable federal civil rights laws and [place] laws. We do not " discriminate on the basis of race, color, national origin, age, disability, sex, sexual orientation, or gender identity.            Thank you!     Thank you for choosing [facility]  for your care. Our goal is always to provide you with excellent care. Hearing back from our patients is one way we can continue to improve our services. Please take a few minutes to complete the written survey that you may receive in the mail after your visit with us. Thank you!             Your Updated Medication List - Protect others around you: Learn how to safely use, store and throw away your medicines at www.disposemymeds.org.          This list is accurate as of: 11/1/17 11:59 PM.  Always use your most recent med list.                   Brand Name Dispense Instructions for use Diagnosis    acetaminophen 325 MG tablet    TYLENOL     Take 2 tablets by mouth every 6 hours as needed for pain.        amLODIPine 5 MG tablet    NORVASC    90 tablet    Take 1 tablet (5 mg) by mouth daily    HTN (hypertension)       ascorbic acid 500 MG tablet    VITAMIN C     Take 1,000 mg by mouth daily        aspirin EC 81 MG EC tablet      Take 1 tablet (81 mg) by mouth daily    Coronary artery disease involving native coronary artery of native heart without angina pectoris       atenolol 25 MG tablet    TENORMIN    180 tablet    Take 1 tablet (25 mg) by mouth 2 times daily    Coronary artery disease involving native coronary artery of native heart without angina pectoris       CALCIUM 600 + D PO      Take 1,200 mg by mouth daily        CoQ-10 200 MG Caps      Take 400 mg by mouth daily        cyclopentolate 1 % ophthalmic solution    CYCLOGYL          DAILY MULTIVITAMIN PO      Take by mouth daily        escitalopram 5 MG tablet    LEXAPRO    90 tablet    Take 1 tablet (5 mg) by mouth daily    Anxiety       gatifloxacin 0.5 % ophthalmic solution    ZYMAXID          ketorolac 0.5 % ophthalmic solution    ACULAR          levothyroxine 100  MCG tablet    SYNTHROID/LEVOTHROID    90 tablet    Take 1 tablet (100 mcg) by mouth daily    Hypothyroidism, unspecified type       lisinopril 20 MG tablet    PRINIVIL/ZESTRIL    90 tablet    Take 1 tablet (20 mg) by mouth daily    Essential hypertension, benign       moxifloxacin 0.5 % ophthalmic solution    VIGAMOX          nitroGLYcerin 0.4 MG sublingual tablet    NITROSTAT    25 tablet    Place 1 tablet (0.4 mg) under the tongue every 5 minutes as needed for chest pain    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)       prednisoLONE acetate 1 % ophthalmic susp    PRED FORTE          ranitidine 150 MG tablet    ZANTAC    180 tablet    Take 1 tablet (150 mg) by mouth 2 times daily    Gastroesophageal reflux disease with esophagitis       rosuvastatin 20 MG tablet    CRESTOR    90 tablet    Take 1 tablet (20 mg) by mouth daily    Hyperlipidemia LDL goal <70       vitamin B complex with vitamin C Tabs tablet      Take 1 tablet by mouth daily        vitamin D 2000 UNITS tablet      Take 2,000 Units by mouth daily        warfarin 5 MG tablet    COUMADIN    90 tablet    Take 0.5-1 tab daily as directed by INR nurse based on INR reading.    Cerebrovascular accident involving cerebellum (H)

## 2017-11-01 NOTE — NURSING NOTE
"Chief Complaint   Patient presents with     Wellness Visit       Initial /66  Pulse 63  Temp 97.9  F (36.6  C) (Oral)  Ht 5' 4\" (1.626 m)  Wt 155 lb (70.3 kg)  SpO2 98%  BMI 26.61 kg/m2 Estimated body mass index is 26.61 kg/(m^2) as calculated from the following:    Height as of this encounter: 5' 4\" (1.626 m).    Weight as of this encounter: 155 lb (70.3 kg).  Medication Reconciliation: complete   Petra Whiting Certified Medical Assistant    "

## 2017-11-14 DIAGNOSIS — I10 ESSENTIAL HYPERTENSION, BENIGN: ICD-10-CM

## 2017-11-14 RX ORDER — LISINOPRIL 20 MG/1
20 TABLET ORAL DAILY
Qty: 90 TABLET | Refills: 3 | Status: SHIPPED | OUTPATIENT
Start: 2017-11-14 | End: 2018-10-11

## 2017-11-28 ENCOUNTER — ANTICOAGULATION THERAPY VISIT (OUTPATIENT)
Dept: NURSING | Facility: CLINIC | Age: 77
End: 2017-11-28
Payer: COMMERCIAL

## 2017-11-28 DIAGNOSIS — Z79.01 LONG-TERM (CURRENT) USE OF ANTICOAGULANTS: ICD-10-CM

## 2017-11-28 LAB — INR POINT OF CARE: 3.2 (ref 0.86–1.14)

## 2017-11-28 PROCEDURE — 85610 PROTHROMBIN TIME: CPT | Mod: QW

## 2017-11-28 PROCEDURE — 36416 COLLJ CAPILLARY BLOOD SPEC: CPT

## 2017-11-28 NOTE — MR AVS SNAPSHOT
Salome SPRAGUE Darlin   11/28/2017 2:00 PM   Anticoagulation Therapy Visit    Description:  77 year old female   Provider:   ANTICOAGULATION CLINIC   Department:   Nurse           INR as of 11/28/2017     Today's INR 3.2!      Anticoagulation Summary as of 11/28/2017     INR goal 2.0-3.0   Today's INR 3.2!   Full instructions 2.5 mg on Mon, Wed, Fri; 5 mg all other days   Next INR check 12/11/2017    Indications   Long-term (current) use of anticoagulants [Z79.01] [Z79.01]  CVA (cerebral vascular accident) (Resolved) [I63.9]         Your next Anticoagulation Clinic appointment(s)     Dec 11, 2017  2:30 PM CST   Anticoagulation Visit with  ANTICOAGULATION CLINIC   [facility] [place] ([facility])    [address] [place] [address] [phone]              Contact Numbers     [facility]  Please call [phone] with any problems or questions regarding your therapy, or to cancel or reschedule your appointment        November 2017 Details    Sun Mon Tue Wed Thu Fri Sat        1               2               3               4                 5               6               7               8               9               10               11                 12               13               14               15               16               17               18                 19               20               21               22               23               24               25                 26               27               28      5 mg   See details      29      2.5 mg         30      5 mg            Date Details   11/28 This INR check   increase greens               How to take your warfarin dose     To take:  2.5 mg Take 0.5 of a 5 mg tablet.    To take:  5 mg Take 1 of the 5 mg tablets.           December 2017 Details    Sun Mon Tue Wed Thu Fri Sat          1      2.5 mg         2      5 mg           3      5 mg         4      2.5 mg         5      5 mg         6      2.5 mg          7      5 mg         8      2.5 mg         9      5 mg           10      5 mg         11            12               13               14               15               16                 17               18               19               20               21               22               23                 24               25               26               27               28               29               30                 31                      Date Details   No additional details    Date of next INR:  12/11/2017         How to take your warfarin dose     To take:  2.5 mg Take 0.5 of a 5 mg tablet.    To take:  5 mg Take 1 of the 5 mg tablets.

## 2017-11-28 NOTE — PROGRESS NOTES
ANTICOAGULATION FOLLOW-UP CLINIC VISIT    Patient Name:  Salome Saeed  Date:  11/28/2017  Contact Type:  Face to Face    SUBJECTIVE:     Patient Findings     Positives Inflammation           OBJECTIVE    INR Protime   Date Value Ref Range Status   11/28/2017 3.2 (A) 0.86 - 1.14 Final       ASSESSMENT / PLAN  INR assessment SUPRA    Recheck INR In: 2 WEEKS    INR Location Clinic      Anticoagulation Summary as of 11/28/2017     INR goal 2.0-3.0   Today's INR 3.2!   Maintenance plan 2.5 mg (5 mg x 0.5) on Mon, Wed, Fri; 5 mg (5 mg x 1) all other days   Full instructions 2.5 mg on Mon, Wed, Fri; 5 mg all other days   Weekly total 27.5 mg   No change documented Day, NUBIA Lay   Plan last modified Leyla Lopez (1/11/2016)   Next INR check 12/11/2017   Target end date     Indications   Long-term (current) use of anticoagulants [Z79.01] [Z79.01]  CVA (cerebral vascular accident) (Resolved) [I63.9]         Anticoagulation Episode Summary     INR check location     Preferred lab     Send INR reminders to [facility]    Comments             See the Encounter Report to view Anticoagulation Flowsheet and Dosing Calendar (Go to Encounters tab in chart review, and find the Anticoagulation Therapy Visit)    Increase greens intake.    Rosanne Rico, RN

## 2017-12-11 ENCOUNTER — ANTICOAGULATION THERAPY VISIT (OUTPATIENT)
Dept: NURSING | Facility: CLINIC | Age: 77
End: 2017-12-11
Payer: COMMERCIAL

## 2017-12-11 DIAGNOSIS — Z79.01 LONG-TERM (CURRENT) USE OF ANTICOAGULANTS: ICD-10-CM

## 2017-12-11 LAB — INR POINT OF CARE: 2.2 (ref 0.86–1.14)

## 2017-12-11 PROCEDURE — 36416 COLLJ CAPILLARY BLOOD SPEC: CPT

## 2017-12-11 PROCEDURE — 85610 PROTHROMBIN TIME: CPT | Mod: QW

## 2017-12-11 NOTE — MR AVS SNAPSHOT
Salome SPRAGUE Darlin   12/11/2017 2:30 PM   Anticoagulation Therapy Visit    Description:  77 year old female   Provider:   ANTICOAGULATION CLINIC   Department:   Nurse           INR as of 12/11/2017     Today's INR 2.2      Anticoagulation Summary as of 12/11/2017     INR goal 2.0-3.0   Today's INR 2.2   Full instructions 2.5 mg on Mon, Wed, Fri; 5 mg all other days   Next INR check 1/15/2018    Indications   Long-term (current) use of anticoagulants [Z79.01] [Z79.01]  CVA (cerebral vascular accident) (Resolved) [I63.9]         Your next Anticoagulation Clinic appointment(s)     Nate 15, 2018  2:00 PM CST   Anticoagulation Visit with  ANTICOAGULATION CLINIC   [facility] [place] ([facility])    [address]  [facility] [place] [address] [phone]              Contact Numbers     [facility]  Please call [phone] with any problems or questions regarding your therapy, or to cancel or reschedule your appointment        December 2017 Details    Sun Mon Tue Wed Thu Fri Sat          1               2                 3               4               5               6               7               8               9                 10               11      2.5 mg   See details      12      5 mg         13      2.5 mg         14      5 mg         15      2.5 mg         16      5 mg           17      5 mg         18      2.5 mg         19      5 mg         20      2.5 mg         21      5 mg         22      2.5 mg         23      5 mg           24      5 mg         25      2.5 mg         26      5 mg         27      2.5 mg         28      5 mg         29      2.5 mg         30      5 mg           31      5 mg                Date Details   12/11 This INR check               How to take your warfarin dose     To take:  2.5 mg Take 0.5 of a 5 mg tablet.    To take:  5 mg Take 1 of the 5 mg tablets.           January 2018 Details    Sun Mon Tue Wed Thu Fri Sat      1      2.5 mg         2      5 mg          3      2.5 mg         4      5 mg         5      2.5 mg         6      5 mg           7      5 mg         8      2.5 mg         9      5 mg         10      2.5 mg         11      5 mg         12      2.5 mg         13      5 mg           14      5 mg         15            16               17               18               19               20                 21               22               23               24               25               26               27                 28               29               30               31                   Date Details   No additional details    Date of next INR:  1/15/2018         How to take your warfarin dose     To take:  2.5 mg Take 0.5 of a 5 mg tablet.    To take:  5 mg Take 1 of the 5 mg tablets.

## 2017-12-11 NOTE — PROGRESS NOTES
ANTICOAGULATION FOLLOW-UP CLINIC VISIT    Patient Name:  Salome Saeed  Date:  12/11/2017  Contact Type:  Face to Face    SUBJECTIVE:     Patient Findings     Positives No Problem Findings           OBJECTIVE    INR Protime   Date Value Ref Range Status   12/11/2017 2.2 (A) 0.86 - 1.14 Final       ASSESSMENT / PLAN  INR assessment THER    Recheck INR In: 4 WEEKS    INR Location Clinic      Anticoagulation Summary as of 12/11/2017     INR goal 2.0-3.0   Today's INR 2.2   Maintenance plan 2.5 mg (5 mg x 0.5) on Mon, Wed, Fri; 5 mg (5 mg x 1) all other days   Full instructions 2.5 mg on Mon, Wed, Fri; 5 mg all other days   Weekly total 27.5 mg   No change documented Renee Hoang, NUBIA   Plan last modified Leyla Lopez (1/11/2016)   Next INR check 1/15/2018   Target end date     Indications   Long-term (current) use of anticoagulants [Z79.01] [Z79.01]  CVA (cerebral vascular accident) (Resolved) [I63.9]         Anticoagulation Episode Summary     INR check location     Preferred lab     Send INR reminders to [facility]    Comments             See the Encounter Report to view Anticoagulation Flowsheet and Dosing Calendar (Go to Encounters tab in chart review, and find the Anticoagulation Therapy Visit)    Dosage adjustment made based on physician directed care plan. No changes, recheck in one month.     Renee Hoang, NUBIA

## 2017-12-27 DIAGNOSIS — E78.5 HYPERLIPIDEMIA LDL GOAL <70: ICD-10-CM

## 2017-12-27 LAB
ALT SERPL W P-5'-P-CCNC: 28 U/L (ref 0–50)
CHOLEST SERPL-MCNC: 163 MG/DL
HDLC SERPL-MCNC: 74 MG/DL
LDLC SERPL CALC-MCNC: 66 MG/DL
NONHDLC SERPL-MCNC: 89 MG/DL
TRIGL SERPL-MCNC: 114 MG/DL

## 2017-12-27 PROCEDURE — 80061 LIPID PANEL: CPT | Performed by: NURSE PRACTITIONER

## 2017-12-27 PROCEDURE — 36415 COLL VENOUS BLD VENIPUNCTURE: CPT | Performed by: NURSE PRACTITIONER

## 2017-12-27 PROCEDURE — 84460 ALANINE AMINO (ALT) (SGPT): CPT | Performed by: NURSE PRACTITIONER

## 2018-01-15 ENCOUNTER — ANTICOAGULATION THERAPY VISIT (OUTPATIENT)
Dept: NURSING | Facility: CLINIC | Age: 78
End: 2018-01-15
Payer: COMMERCIAL

## 2018-01-15 DIAGNOSIS — Z79.01 LONG-TERM (CURRENT) USE OF ANTICOAGULANTS: ICD-10-CM

## 2018-01-15 LAB — INR POINT OF CARE: 2 (ref 0.86–1.14)

## 2018-01-15 PROCEDURE — 36416 COLLJ CAPILLARY BLOOD SPEC: CPT

## 2018-01-15 PROCEDURE — 85610 PROTHROMBIN TIME: CPT | Mod: QW

## 2018-01-15 NOTE — MR AVS SNAPSHOT
Salome SPRAGUE Darlin   1/15/2018 2:00 PM   Anticoagulation Therapy Visit    Description:  77 year old female   Provider:   ANTICOAGULATION CLINIC   Department:   Nurse           INR as of 1/15/2018     Today's INR 2.0      Anticoagulation Summary as of 1/15/2018     INR goal 2.0-3.0   Today's INR 2.0   Full instructions 2.5 mg on Mon, Wed, Fri; 5 mg all other days   Next INR check 2/13/2018    Indications   Long-term (current) use of anticoagulants [Z79.01] [Z79.01]  CVA (cerebral vascular accident) (Resolved) [I63.9]         Your next Anticoagulation Clinic appointment(s)     Feb 13, 2018  1:00 PM CST   Anticoagulation Visit with  ANTICOAGULATION CLINIC   [facility] [place] ([facility])    [address]  [facility] [place] [id number] [phone]              Contact Numbers     [facility]  Please call [phone] with any problems or questions regarding your therapy, or to cancel or reschedule your appointment        January 2018 Details    Sun Mon Tue Wed Thu Fri Sat      1               2               3               4               5               6                 7               8               9               10               11               12               13                 14               15      2.5 mg   See details      16      5 mg         17      2.5 mg         18      5 mg         19      2.5 mg         20      5 mg           21      5 mg         22      2.5 mg         23      5 mg         24      2.5 mg         25      5 mg         26      2.5 mg         27      5 mg           28      5 mg         29      2.5 mg         30      5 mg         31      2.5 mg             Date Details   01/15 This INR check               How to take your warfarin dose     To take:  2.5 mg Take 0.5 of a 5 mg tablet.    To take:  5 mg Take 1 of the 5 mg tablets.           February 2018 Details    Sun Mon Tue Wed Thu Fri Sat         1      5 mg         2      2.5 mg         3      5 mg            4      5 mg         5      2.5 mg         6      5 mg         7      2.5 mg         8      5 mg         9      2.5 mg         10      5 mg           11      5 mg         12      2.5 mg         13            14               15               16               17                 18               19               20               21               22               23               24                 25               26               27               28                   Date Details   No additional details    Date of next INR:  2/13/2018         How to take your warfarin dose     To take:  2.5 mg Take 0.5 of a 5 mg tablet.    To take:  5 mg Take 1 of the 5 mg tablets.

## 2018-01-15 NOTE — PROGRESS NOTES
ANTICOAGULATION FOLLOW-UP CLINIC VISIT    Patient Name:  Salome Saeed  Date:  1/15/2018  Contact Type:  Face to Face    SUBJECTIVE:     Patient Findings     Positives No Problem Findings           OBJECTIVE    INR Protime   Date Value Ref Range Status   01/15/2018 2.0 (A) 0.86 - 1.14 Final       ASSESSMENT / PLAN  INR assessment THER    Recheck INR In: 4 WEEKS    INR Location Clinic      Anticoagulation Summary as of 1/15/2018     INR goal 2.0-3.0   Today's INR 2.0   Maintenance plan 2.5 mg (5 mg x 0.5) on Mon, Wed, Fri; 5 mg (5 mg x 1) all other days   Full instructions 2.5 mg on Mon, Wed, Fri; 5 mg all other days   Weekly total 27.5 mg   No change documented Renee Hoang, NUBIA   Plan last modified Leyla Lopez (1/11/2016)   Next INR check 2/13/2018   Target end date     Indications   Long-term (current) use of anticoagulants [Z79.01] [Z79.01]  CVA (cerebral vascular accident) (Resolved) [I63.9]         Anticoagulation Episode Summary     INR check location     Preferred lab     Send INR reminders to [facility]    Comments             See the Encounter Report to view Anticoagulation Flowsheet and Dosing Calendar (Go to Encounters tab in chart review, and find the Anticoagulation Therapy Visit)    Dosage adjustment made based on physician directed care plan. Continue maintenance plan, recheck in 4 weeks.     Renee Hoang RN

## 2018-01-22 DIAGNOSIS — I63.9 CEREBROVASCULAR ACCIDENT INVOLVING CEREBELLUM (H): ICD-10-CM

## 2018-01-23 NOTE — TELEPHONE ENCOUNTER
"Requested Prescriptions   Pending Prescriptions Disp Refills     warfarin (COUMADIN) 5 MG tablet [Pharmacy Med Name: WARFARIN SODIUM 5 MG Tablet]  Last Written Prescription Date:  9/19/2017  Last Fill Quantity: 90 tablet,  # refills: 1   Last Office Visit with FMNORY, KARLA or [facility] prescribing provider:  11/1/2017   Future Office Visit:      90 tablet 1     Sig: TAKE 1/2 TO 1 TABLET EVERY DAY AS DIRECTED  BY  INR  NURSE  BASED ON INR READING    Vitamin K Antagonists Failed    1/22/2018  7:50 PM       Failed - INR is within goal in the past 6 weeks    Confirm INR is within goal in the past 6 weeks.     Recent Labs   Lab Test 01/15/18   INR  2.0*          Passed - Recent or future visit with authorizing provider's specialty    Patient had office visit in the last year or has a visit in the next 30 days with authorizing provider.  See \"Patient Info\" tab in inbasket, or \"Choose Columns\" in Meds & Orders section of the refill encounter.          Passed - Patient is 18 years of age or older       Passed - Patient is not pregnant       Passed - No positive pregnancy on file in past 12 months          "

## 2018-01-24 RX ORDER — WARFARIN SODIUM 5 MG/1
TABLET ORAL
Qty: 90 TABLET | Refills: 1 | Status: SHIPPED | OUTPATIENT
Start: 2018-01-24 | End: 2018-05-30

## 2018-01-24 NOTE — TELEPHONE ENCOUNTER
Prescription approved per [facility] Refill Protocol. Renee Hoang R.N.

## 2018-02-08 ENCOUNTER — ANTICOAGULATION THERAPY VISIT (OUTPATIENT)
Dept: FAMILY MEDICINE | Facility: CLINIC | Age: 78
End: 2018-02-08

## 2018-02-08 ENCOUNTER — OFFICE VISIT (OUTPATIENT)
Dept: FAMILY MEDICINE | Facility: CLINIC | Age: 78
End: 2018-02-08
Payer: COMMERCIAL

## 2018-02-08 VITALS
TEMPERATURE: 97.4 F | SYSTOLIC BLOOD PRESSURE: 120 MMHG | HEART RATE: 67 BPM | BODY MASS INDEX: 26.63 KG/M2 | DIASTOLIC BLOOD PRESSURE: 64 MMHG | WEIGHT: 156 LBS | HEIGHT: 64 IN | OXYGEN SATURATION: 97 %

## 2018-02-08 DIAGNOSIS — Z01.818 PREOP GENERAL PHYSICAL EXAM: Primary | ICD-10-CM

## 2018-02-08 DIAGNOSIS — Z79.01 LONG-TERM (CURRENT) USE OF ANTICOAGULANTS: ICD-10-CM

## 2018-02-08 LAB
ANION GAP SERPL CALCULATED.3IONS-SCNC: 5 MMOL/L (ref 3–14)
BUN SERPL-MCNC: 11 MG/DL (ref 7–30)
CALCIUM SERPL-MCNC: 9 MG/DL (ref 8.5–10.1)
CHLORIDE SERPL-SCNC: 101 MMOL/L (ref 94–109)
CO2 SERPL-SCNC: 31 MMOL/L (ref 20–32)
CREAT SERPL-MCNC: 0.64 MG/DL (ref 0.52–1.04)
GFR SERPL CREATININE-BSD FRML MDRD: 90 ML/MIN/1.7M2
GLUCOSE SERPL-MCNC: 85 MG/DL (ref 70–99)
HGB BLD-MCNC: 13.1 G/DL (ref 11.7–15.7)
INR POINT OF CARE: 3.8 (ref 0.86–1.14)
POTASSIUM SERPL-SCNC: 4.6 MMOL/L (ref 3.4–5.3)
SODIUM SERPL-SCNC: 137 MMOL/L (ref 133–144)
T4 FREE SERPL-MCNC: 1.27 NG/DL (ref 0.76–1.46)
TSH SERPL DL<=0.005 MIU/L-ACNC: 0.13 MU/L (ref 0.4–4)

## 2018-02-08 PROCEDURE — 93005 ELECTROCARDIOGRAM TRACING: CPT | Performed by: FAMILY MEDICINE

## 2018-02-08 PROCEDURE — 99214 OFFICE O/P EST MOD 30 MIN: CPT | Performed by: FAMILY MEDICINE

## 2018-02-08 PROCEDURE — 36416 COLLJ CAPILLARY BLOOD SPEC: CPT | Performed by: FAMILY MEDICINE

## 2018-02-08 PROCEDURE — 85018 HEMOGLOBIN: CPT | Performed by: FAMILY MEDICINE

## 2018-02-08 PROCEDURE — 85610 PROTHROMBIN TIME: CPT | Mod: QW | Performed by: FAMILY MEDICINE

## 2018-02-08 PROCEDURE — 80048 BASIC METABOLIC PNL TOTAL CA: CPT | Performed by: FAMILY MEDICINE

## 2018-02-08 PROCEDURE — 84443 ASSAY THYROID STIM HORMONE: CPT | Performed by: FAMILY MEDICINE

## 2018-02-08 PROCEDURE — 84439 ASSAY OF FREE THYROXINE: CPT | Performed by: FAMILY MEDICINE

## 2018-02-08 RX ORDER — DUREZOL 0.5 MG/ML
EMULSION OPHTHALMIC
Refills: 2 | COMMUNITY
Start: 2018-01-24 | End: 2018-10-09

## 2018-02-08 NOTE — NURSING NOTE
"Chief Complaint   Patient presents with     Pre-Op Exam       Initial /64  Pulse 67  Temp 97.4  F (36.3  C) (Oral)  Ht 5' 4\" (1.626 m)  Wt 156 lb (70.8 kg)  SpO2 97%  BMI 26.78 kg/m2 Estimated body mass index is 26.78 kg/(m^2) as calculated from the following:    Height as of this encounter: 5' 4\" (1.626 m).    Weight as of this encounter: 156 lb (70.8 kg).  Medication Reconciliation: complete   Petra Whiting Certified Medical Assistant    "

## 2018-02-08 NOTE — MR AVS SNAPSHOT
Salome SPRAGUE Darlin   2/8/2018   Anticoagulation Therapy Visit    Description:  77 year old female   Provider:  Surya Dyer DO   Department:   Family Practice           INR as of 2/8/2018     Today's INR 3.8!      Anticoagulation Summary as of 2/8/2018     INR goal 2.0-3.0   Today's INR 3.8!   Full instructions 2/8: Hold; Otherwise 2.5 mg on Mon, Wed, Fri; 5 mg all other days   Next INR check 2/13/2018    Indications   Long-term (current) use of anticoagulants [Z79.01] [Z79.01]  CVA (cerebral vascular accident) (Resolved) [I63.9]         Your next Anticoagulation Clinic appointment(s)     Feb 13, 2018  1:00 PM CST   Anticoagulation Visit with  ANTICOAGULATION CLINIC   [facility] [place] ([facility]    [address] [address]   [phone]              February 2018 Details    Sun Mon Tue Wed Thu Fri Sat         1               2               3                 4               5               6               7               8      Hold   See details      9      2.5 mg         10      5 mg           11      5 mg         12      2.5 mg         13            14               15               16               17                 18               19               20               21               22               23               24                 25               26               27               28                   Date Details   02/08 This INR check   Hold dose   Increase greens       Date of next INR:  2/13/2018         How to take your warfarin dose     To take:  2.5 mg Take 0.5 of a 5 mg tablet.    To take:  5 mg Take 1 of the 5 mg tablets.    Hold Do not take your warfarin dose. See the Details table to the right for additional instructions.

## 2018-02-08 NOTE — LETTER
[facility] [place]                     ( [phone]   February 12, 2018    Salome Saeed  [address]  [place] [address]      Dear Salome,    Here is a summary of your recent test results:    Kidney function is normal (Cr, GFR), Sodium is normal, Potassium is normal, Calcium is normal, Glucose is normal (diabetes screening test).   -TSH (thyroid stimulating hormone) is low but thyroid hormone level is normal. This is stable from recent tests. I would recommend rechecking your TSH in 2 months. I will place a lab order and you can schedule a lab only appointment   -Hemoglobin is normal.  There is no evidence of anemia.    Your test results are enclosed.      Please contact me if you have any questions.      [facility].     Healthy regards,  Surya Dyer, DO           Results for orders placed or performed in visit on 02/08/18   Basic metabolic panel   Result Value Ref Range    Sodium 137 133 - 144 mmol/L    Potassium 4.6 3.4 - 5.3 mmol/L    Chloride 101 94 - 109 mmol/L    Carbon Dioxide 31 20 - 32 mmol/L    Anion Gap 5 3 - 14 mmol/L    Glucose 85 70 - 99 mg/dL    Urea Nitrogen 11 7 - 30 mg/dL    Creatinine 0.64 0.52 - 1.04 mg/dL    GFR Estimate 90 >60 mL/min/1.7m2    GFR Estimate If Black >90 >60 mL/min/1.7m2    Calcium 9.0 8.5 - 10.1 mg/dL   Hemoglobin   Result Value Ref Range    Hemoglobin 13.1 11.7 - 15.7 g/dL   TSH with free T4 reflex   Result Value Ref Range    TSH 0.13 (L) 0.40 - 4.00 mU/L   T4 free   Result Value Ref Range    T4 Free 1.27 0.76 - 1.46 ng/dL

## 2018-02-08 NOTE — PROGRESS NOTES
[facility]  [address] [facility] [address] [phone]  Dept: [phone]    PRE-OP EVALUATION:  Today's date: 2018    Salome Saeed (: 1940) presents for pre-operative evaluation assessment as requested by Dr. Dr. Quiroz.  She requires evaluation and anesthesia risk assessment prior to undergoing surgery/procedure for treatment of Left Eye .  Proposed procedure: Partial Cornea transplant, Dsek     Date of Surgery/ Procedure: 18 , 18   Time of Surgery/ Procedure: To be determined   Hospital/Surgical Facility: [facility]   Fax number for surgical facility: [phone]  Primary Physician: Weiler, Karen  Type of Anesthesia Anticipated: General or MAC anesthesia    Patient has a Health Care Directive or Living Will:  NO    1. YES - DO YOU HAVE A HISTORY OF HEART ATTACK, STROKE, STENT, BYPASS OR SURGERY ON AN ARTERY IN THE HEAD, NECK, HEART OR LEG? Stroke and stents   2. YES - DO YOU EVER HAVE ANY PAIN OR DISCOMFORT IN YOUR CHEST? Occasionally, recent tests no negative results    3. NO - Do you have a history of  Heart Failure?  4. YES - ARE YOUR TROUBLED BY SHORTNESS OF BREATH WHEN WALKING ON THE LEVEL, UP A SLIGHT HILL OR AT NIGHT? Sometimes, slightly   5. NO - Do you currently have a cold, bronchitis or other respiratory infection?  6. NO - Do you have a cough, shortness of breath or wheezing?  7. NO - Do you sometimes get pains in the calves of your legs when you walk?  8. NO - Do you or anyone in your family have previous history of blood clots?  9. NO - Do you or does anyone in your family have a serious bleeding problem such as prolonged bleeding following surgeries or cuts?  10. NO - Have you ever had problems with anemia or been told to take iron pills?  11. NO - Have you had any abnormal blood loss such as black, tarry or bloody stools, or abnormal vaginal bleeding?  12. NO - Have you ever had a blood transfusion?  13. NO - Have you or any of your  relatives ever had problems with anesthesia?  14. NO - Do you have sleep apnea, excessive snoring or daytime drowsiness?  15. NO - Do you have any prosthetic heart valves?  16. NO - Do you have prosthetic joints?  17. NO - Is there any chance that you may be pregnant?      HPI:                                                      Brief HPI related to upcoming procedure: history of double vision. Had cataract surgery this past fall. Now going for DSEK procedure. --     HYPERTENSION: stable, well controlled.    See problem list for active medical problems.  Problems all longstanding and stable, except as noted/documented.  See ROS for pertinent symptoms related to these conditions.                                                                                                  .  HYPERLIPIDEMIA - Patient has a long history of significant Hyperlipidemia requiring medication for treatment with recent good control. Patient reports no problems or side effects with the medication.                                                                                                                                                       .  HYPOTHYROIDISM - Patient has a longstanding history of chronic Hypothyroidism. Patient has been doing well, noting no tremor, insomnia, hair loss or changes in skin texture. Last TSH value of 0.55 on 5/24/2017. Continues to take medications as directed, without adverse reactions or side effects.                                                                                                                                                                                                                        .  CAD - Patient has a longstanding history of mod-severe CAD. Patient denies recent chest pain or NTG use, denies exercise induced dyspnea or PND. Last Stress test - Lexiscan stress test:    Impression  1.  Myocardial perfusion imaging using single isotope technique  demonstrated normal  perfusion, no ischemia or infarct.   2. Gated images demonstrated normal wall motion.  The left ventricular  systolic function is 77% at stress and at rest.  3. Compared to the prior study from 2011, no changes ..                                                                                                                            .    MEDICAL HISTORY:                                                      Patient Active Problem List    Diagnosis Date Noted     Hyperlipidemia LDL goal <70 04/01/2010     Priority: High     Coronary artery disease involving native coronary artery of native heart without angina pectoris 04/09/2009     Priority: High     5/28/2015 - PTCA and 2.25x8mm Promus PREMIER NAILA to ostium of small second OM  12/2012 cath - 40-50% stenosis in mid PDA, 80% on D1- medically treat  4/8/2009: PTCA and two 2.5x15mm Xience stents to mid LAD lesion        Benign essential hypertension      Priority: High     Chronic bilateral low back pain without sciatica 06/21/2017     Priority: Medium     Status post arthrodesis 06/21/2017     Priority: Medium     Cerebrovascular accident involving cerebellum (H) 10/26/2016     Priority: Medium     Vertebral artery stenosis, right      Priority: Medium     Hypothyroidism, unspecified type 05/18/2016     Priority: Medium     Long-term (current) use of anticoagulants [Z79.01] 01/11/2016     Priority: Medium     GERD (gastroesophageal reflux disease)      Priority: Medium     Status post coronary angiogram 05/28/2015     Priority: Medium     Mitral regurgitation      Priority: Medium     1+ per 7/2013 Echo       Anxiety 08/08/2013     Priority: Medium     Health Care Home 07/23/2013     Priority: Medium     EMERGENCY CARE PLAN  Presenting Problem Signs and Symptoms Treatment Plan    Questions or conerns during clinic hours    I will call the clinic directly     Questions or conerns outside clinic hours    I will call the 24 hour nurse line at [phone]    Patient  needs to schedule an appointment    I will call the 24 hour scheduling team at [phone] or clinic directly    Same day treatment     I will call the clinic first, nurse line if after hours, urgent care and express care if needed                          Clinic Care Coordinator:Apolinar Green, [phone]  **Pt not in active care coordination at this time.       Atypical chest pain 07/19/2013     Priority: Medium     Imo Update utility       Spinal stenosis, lumbar region, with neurogenic claudication 05/08/2013     Priority: Medium     Lumbago 09/07/2012     Priority: Medium     DDD (degenerative disc disease), lumbar 07/09/2012     Priority: Medium     Lumbar spinal stenosis 07/09/2012     Priority: Medium     Advanced directives, counseling/discussion 06/18/2012     Priority: Medium     Discussed advance care planning with patient; however, patient declined at this time. 6/18/2012           Past Medical History:   Diagnosis Date     CAD (coronary artery disease) 4/9/2009 4/8/2009: PTCA and two 2.5x15mm Xience stents to mid LAD lesion; Cath 12/2012- 40-50% stenosis in mid PDA, 80% on D1- medically treat , 5/28/2015 - PTCA and 2.25x8mm Promus PREMIIER NAILA to ostium of 2nd OM     CVA (cerebral infarction)      DDD (degenerative disc disease)      Essential hypertension, benign      GERD (gastroesophageal reflux disease)      Headache      Hyperlipidemia      Hypothyroidism      Lumbago      Lumbar spinal stenosis      Mitral regurgitation     1+ per 7/2013 Echo     Vertebral artery stenosis, right      Past Surgical History:   Procedure Laterality Date     CORONARY ANGIOGRAPHY ADULT ORDER  12/6/2012    40-50% stenosis to mid PDA, 80% in D1- medically treat     CORONARY ANGIOGRAPHY ADULT ORDER  5/28/2015    PTCA and 2.25x8mm Promus PREMIER NAILA to ostium of 2nd OM     DECOMPRESSION, FUSION LUMBAR POSTERIOR THREE + LEVELS, COMBINED  5/8/2013    Procedure: COMBINED DECOMPRESSION, FUSION LUMBAR POSTERIOR THREE +  LEVELS;  Posterior Lumbar Decompression L3-S1, Fusion L4-S1;  Surgeon: Daniel Harris MD;  Location: [facility] OR     ENDOSCOPIC STRIPPING VEIN(S)       HEART CATH, ANGIOPLASTY  4/8/2009    PTCA and two 2.5x15mm Xience stents to mid LAD lesion     HYSTERECTOMY, RICKIE      Fibroids, and Menorrhagia.. Bilateral Oophrectomy     ORTHOPEDIC SURGERY       Stenting of LAD, Angioplasty  4/8/09     TONSILLECTOMY      as a child     Current Outpatient Prescriptions   Medication Sig Dispense Refill     DUREZOL 0.05 % ophthalmic emulsion   2     warfarin (COUMADIN) 5 MG tablet TAKE 1/2 TO 1 TABLET EVERY DAY AS DIRECTED  BY  INR  NURSE  BASED ON INR READING 90 tablet 1     lisinopril (PRINIVIL/ZESTRIL) 20 MG tablet Take 1 tablet (20 mg) by mouth daily 90 tablet 3     cyclopentolate (CYCLOGYL) 1 % ophthalmic solution   0     atenolol (TENORMIN) 25 MG tablet Take 1 tablet (25 mg) by mouth 2 times daily 180 tablet 3     escitalopram (LEXAPRO) 5 MG tablet Take 1 tablet (5 mg) by mouth daily 90 tablet 3     amLODIPine (NORVASC) 5 MG tablet Take 1 tablet (5 mg) by mouth daily 90 tablet 3     vitamin B complex with vitamin C (VITAMIN  B COMPLEX) TABS tablet Take 1 tablet by mouth daily       rosuvastatin (CRESTOR) 20 MG tablet Take 1 tablet (20 mg) by mouth daily 90 tablet 3     nitroGLYcerin (NITROSTAT) 0.4 MG sublingual tablet Place 1 tablet (0.4 mg) under the tongue every 5 minutes as needed for chest pain 25 tablet 2     levothyroxine (SYNTHROID/LEVOTHROID) 100 MCG tablet Take 1 tablet (100 mcg) by mouth daily 90 tablet 3     aspirin EC 81 MG tablet Take 1 tablet (81 mg) by mouth daily       Multiple Vitamin (DAILY MULTIVITAMIN PO) Take by mouth daily       Calcium Carb-Cholecalciferol (CALCIUM 600 + D PO) Take 1,200 mg by mouth daily        Cholecalciferol (VITAMIN D) 2000 UNITS tablet Take 2,000 Units by mouth daily       Coenzyme Q10 (COQ-10) 200 MG CAPS Take 400 mg by mouth daily        ascorbic acid (VITAMIN C) 500 MG tablet  "Take 1,000 mg by mouth daily        ranitidine (ZANTAC) 150 MG tablet Take 1 tablet (150 mg) by mouth 2 times daily 180 tablet 1     acetaminophen (TYLENOL) 325 MG tablet Take 2 tablets by mouth every 6 hours as needed for pain.  0     gatifloxacin (ZYMAXID) 0.5 % ophthalmic solution   3     ketorolac (ACULAR) 0.5 % ophthalmic solution   3     moxifloxacin (VIGAMOX) 0.5 % ophthalmic solution   3     prednisoLONE acetate (PRED FORTE) 1 % ophthalmic susp   3     OTC products: Vitamins and Tylenol and aleve sometimes     Allergies   Allergen Reactions     Atorvastatin      Leg cramps     Gluten      Sinuses affected by gluten     Magnesium Salicylate      Oat      Shellfish Allergy      hives     Wheat       Latex Allergy: NO    Social History   Substance Use Topics     Smoking status: Former Smoker     Quit date: 1/1/1965     Smokeless tobacco: Never Used     Alcohol use 0.0 oz/week     0 Standard drinks or equivalent per week      Comment: 2 glasses wine per month     History   Drug Use No       REVIEW OF SYSTEMS:                                                    C: NEGATIVE for fever, chills, change in weight  I: NEGATIVE for worrisome rashes, moles or lesions  E: NEGATIVE for vision changes or irritation  E/M: NEGATIVE for ear, mouth and throat problems  R: NEGATIVE for significant cough or SOB  B: NEGATIVE for masses, tenderness or discharge  CV: NEGATIVE for chest pain, palpitations or peripheral edema  GI: NEGATIVE for nausea, abdominal pain, heartburn, or change in bowel habits  : NEGATIVE for frequency, dysuria, or hematuria  M: NEGATIVE for significant arthralgias or myalgia  N: NEGATIVE for weakness, dizziness or paresthesias  E: NEGATIVE for temperature intolerance, skin/hair changes  H: NEGATIVE for bleeding problems  P: NEGATIVE for changes in mood or affect    EXAM:                                                    /64  Pulse 67  Temp 97.4  F (36.3  C) (Oral)  Ht 5' 4\" (1.626 m)  Wt 156 lb " (70.8 kg)  SpO2 97%  BMI 26.78 kg/m2    GENERAL APPEARANCE: healthy, alert and no distress     EYES: EOMI, PERRL     HENT: ear canals and TM's normal and nose and mouth without ulcers or lesions     NECK: no adenopathy, no asymmetry, masses, or scars and thyroid normal to palpation     RESP: lungs clear to auscultation - no rales, rhonchi or wheezes     CV: regular rates and rhythm, normal S1 S2, no S3 or S4 and no murmur, click or rub     ABDOMEN:  soft, nontender, no HSM or masses and bowel sounds normal     MS: extremities normal- no gross deformities noted, no evidence of inflammation in joints, FROM in all extremities.     SKIN: no suspicious lesions or rashes     NEURO: Normal strength and tone, sensory exam grossly normal, mentation intact and speech normal     PSYCH: mentation appears normal. and affect normal/bright     LYMPHATICS: No axillary, cervical, or supraclavicular nodes    DIAGNOSTICS:                                                      EKG: sinus bradycardia, normal axis, normal intervals, no acute ST/T changes c/w ischemia, unchanged from previous tracings    Labs Resulted Today:   Results for orders placed or performed in visit on 02/08/18   Basic metabolic panel   Result Value Ref Range    Sodium 137 133 - 144 mmol/L    Potassium 4.6 3.4 - 5.3 mmol/L    Chloride 101 94 - 109 mmol/L    Carbon Dioxide 31 20 - 32 mmol/L    Anion Gap 5 3 - 14 mmol/L    Glucose 85 70 - 99 mg/dL    Urea Nitrogen 11 7 - 30 mg/dL    Creatinine 0.64 0.52 - 1.04 mg/dL    GFR Estimate 90 >60 mL/min/1.7m2    GFR Estimate If Black >90 >60 mL/min/1.7m2    Calcium 9.0 8.5 - 10.1 mg/dL   Hemoglobin   Result Value Ref Range    Hemoglobin 13.1 11.7 - 15.7 g/dL   TSH with free T4 reflex   Result Value Ref Range    TSH 0.13 (L) 0.40 - 4.00 mU/L   T4 free   Result Value Ref Range    T4 Free 1.27 0.76 - 1.46 ng/dL     Labs Drawn and in Process:   Unresulted Labs Ordered in the Past 30 Days of this Admission     No orders found  from 12/10/2017 to 2/9/2018.          Recent Labs   Lab Test 01/15/18 12/11/17   09/01/17   0914   05/24/17   1229   04/10/17   1143   HGB   --    --    --    --    --   12.2   --   13.7   PLT   --    --    --    --    --   287   --   220   INR  2.0*  2.2*   < >   --    < >   --    < >   --    NA   --    --    --   134   --   137   --   134   POTASSIUM   --    --    --   4.2   --   4.5   --   5.0   CR   --    --    --   0.63   --   0.60   --   0.68    < > = values in this interval not displayed.        IMPRESSION:                                                    Reason for surgery/procedure: history oc cataracts, vision changes  Diagnosis/reason for consult: preoperative assessment    The proposed surgical procedure is considered LOW risk.    REVISED CARDIAC RISK INDEX  The patient has the following serious cardiovascular risks for perioperative complications such as (MI, PE, VFib and 3  AV Block):  No serious cardiac risks  INTERPRETATION: 0 risks: Class I (very low risk - 0.4% complication rate)    The patient has the following additional risks for perioperative complications:  No identified additional risks      ICD-10-CM    1. Preop general physical exam Z01.818 EKG 12-LEAD RADIOLOGY     Basic metabolic panel     Hemoglobin     TSH with free T4 reflex     CANCELED: INR       RECOMMENDATIONS:                                                      --Consult hospital rounder / IM to assist post-op medical management    Cardiovascular Risk  Patient is already on a Beta Blocker. Continue Betablocker therapy after surgery, using Beta blocker order set as necessary for NPO status.    --Patient is to take all scheduled medications on the day of surgery EXCEPT for modifications listed below.    APPROVAL GIVEN to proceed with proposed procedure, without further diagnostic evaluation       Signed Electronically by: Surya Dyer DO    Copy of this evaluation report is provided to requesting physician.    [place] Preop  Guidelines

## 2018-02-08 NOTE — MR AVS SNAPSHOT
After Visit Summary   2/8/2018    Salome Saeed    MRN: [id number]           Patient Information     Date Of Birth          1940        Visit Information        Provider Department      2/8/2018 10:40 AM Surya Dyer, DO [facility] [place]        Today's Diagnoses     Preop general physical exam    -  1      Care Instructions      Before Your Surgery      Call your surgeon if there is any change in your health. This includes signs of a cold or flu (such as a sore throat, runny nose, cough, rash or fever).    Do not smoke, drink alcohol or take over the counter medicine (unless your surgeon or primary care doctor tells you to) for the 24 hours before and after surgery.    If you take prescribed drugs: Follow your doctor s orders about which medicines to take and which to stop until after surgery.    Eating and drinking prior to surgery: follow the instructions from your surgeon    Take a shower or bath the night before surgery. Use the soap your surgeon gave you to gently clean your skin. If you do not have soap from your surgeon, use your regular soap. Do not shave or scrub the surgery site.  Wear clean pajamas and have clean sheets on your bed.           Follow-ups after your visit        Your next 10 appointments already scheduled     Feb 13, 2018  1:00 PM CST   Anticoagulation Visit with  ANTICOAGULATION CLINIC   [facility] [place] ([facility] [place])    [address] [facility] [address] [phone]              Who to contact     If you have questions or need follow up information about today's clinic visit or your schedule please contact [facility] [place] directly at [phone].  Normal or non-critical lab and imaging results will be communicated to you by MyChart, letter or phone within 4 business days after the clinic has received the results. If you do not hear from us within 7 days, please contact the clinic through MyChart or phone. If you have a  "critical or abnormal lab result, we will notify you by phone as soon as possible.  Submit refill requests through [employer] or call your pharmacy and they will forward the refill request to us. Please allow 3 business days for your refill to be completed.          Additional Information About Your Visit        MyChart Information     [employer] lets you send messages to your doctor, view your test results, renew your prescriptions, schedule appointments and more. To sign up, go to [email or web address] . Click on \"Log in\" on the left side of the screen, which will take you to the Welcome page. Then click on \"Sign up Now\" on the right side of the page.     You will be asked to enter the access code listed below, as well as some personal information. Please follow the directions to create your username and password.     Your access code is: [email or web address]  Expires: 2018 11:26 AM     Your access code will  in 90 days. If you need help or a new code, please call your [facility] clinic or [phone].        Care EveryWhere ID     This is your Care EveryWhere ID. This could be used by other organizations to access your [facility] medical records  [id number]        Your Vitals Were     Pulse Temperature Height Pulse Oximetry BMI (Body Mass Index)       67 97.4  F (36.3  C) (Oral) 5' 4\" (1.626 m) 97% 26.78 kg/m2        Blood Pressure from Last 3 Encounters:   18 120/64   17 119/66   17 112/66    Weight from Last 3 Encounters:   18 156 lb (70.8 kg)   17 155 lb (70.3 kg)   17 152 lb (68.9 kg)              We Performed the Following     Basic metabolic panel     EKG 12-LEAD RADIOLOGY     Hemoglobin     TSH with free T4 reflex        Primary Care Provider Office Phone # Fax #    Karen Weiler, [phone] [address]  [place] [address]        Equal Access to Services     BRICE OLIVARES AH: Hadii solange Macias, rafael antoine, qaybta asya chambers, " sonia pereabrenda wright'aan ah. So [facility] [phone].    ATENCIÓN: Si habla katheryn, tiene a smith disposición servicios gratuitos de asistencia lingüística. Leonidas bermudez [phone].    We comply with applicable federal civil rights laws and [place] laws. We do not discriminate on the basis of race, color, national origin, age, disability, sex, sexual orientation, or gender identity.            Thank you!     [facility] you [facility]  [facility] your [facility]. Our goal is always to provide you with excellent care. Hearing back from our patients is one way we can continue to improve our services. Please take a few minutes to complete the written survey that you may receive in the mail after your visit with us. Thank you!             Your Updated Medication List - Protect others around you: Learn how to safely use, store and throw away your medicines at www.disposemymeds.org.          This list is accurate as of 2/8/18 11:26 AM.  Always use your most recent med list.                   Brand Name Dispense Instructions for use Diagnosis    acetaminophen 325 MG tablet    TYLENOL     Take 2 tablets by mouth every 6 hours as needed for pain.        amLODIPine 5 MG tablet    NORVASC    90 tablet    Take 1 tablet (5 mg) by mouth daily    HTN (hypertension)       ascorbic acid 500 MG tablet    VITAMIN C     Take 1,000 mg by mouth daily        aspirin EC 81 MG EC tablet      Take 1 tablet (81 mg) by mouth daily    Coronary artery disease involving native coronary artery of native heart without angina pectoris       atenolol 25 MG tablet    TENORMIN    180 tablet    Take 1 tablet (25 mg) by mouth 2 times daily    Coronary artery disease involving native coronary artery of native heart without angina pectoris       CALCIUM 600 + D PO      Take 1,200 mg by mouth daily        CoQ-10 200 MG Caps      Take 400 mg by mouth daily        cyclopentolate 1 % ophthalmic solution    CYCLOGYL          DAILY MULTIVITAMIN  PO      Take by mouth daily        DUREZOL 0.05 % ophthalmic emulsion   Generic drug:  difluprednate           escitalopram 5 MG tablet    LEXAPRO    90 tablet    Take 1 tablet (5 mg) by mouth daily    Anxiety       gatifloxacin 0.5 % ophthalmic solution    ZYMAXID          ketorolac 0.5 % ophthalmic solution    ACULAR          levothyroxine 100 MCG tablet    SYNTHROID/LEVOTHROID    90 tablet    Take 1 tablet (100 mcg) by mouth daily    Hypothyroidism, unspecified type       lisinopril 20 MG tablet    PRINIVIL/ZESTRIL    90 tablet    Take 1 tablet (20 mg) by mouth daily    Essential hypertension, benign       moxifloxacin 0.5 % ophthalmic solution    VIGAMOX          nitroGLYcerin 0.4 MG sublingual tablet    NITROSTAT    25 tablet    Place 1 tablet (0.4 mg) under the tongue every 5 minutes as needed for chest pain    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)       prednisoLONE acetate 1 % ophthalmic susp    PRED FORTE          ranitidine 150 MG tablet    ZANTAC    180 tablet    Take 1 tablet (150 mg) by mouth 2 times daily    Gastroesophageal reflux disease with esophagitis       rosuvastatin 20 MG tablet    CRESTOR    90 tablet    Take 1 tablet (20 mg) by mouth daily    Hyperlipidemia LDL goal <70       vitamin B complex with vitamin C Tabs tablet      Take 1 tablet by mouth daily        vitamin D 2000 UNITS tablet      Take 2,000 Units by mouth daily        warfarin 5 MG tablet    COUMADIN    90 tablet    TAKE 1/2 TO 1 TABLET EVERY DAY AS DIRECTED  BY  INR  NURSE  BASED ON INR READING    Cerebrovascular accident involving cerebellum (H)

## 2018-02-12 DIAGNOSIS — E03.9 HYPOTHYROIDISM, UNSPECIFIED TYPE: Primary | ICD-10-CM

## 2018-02-13 ENCOUNTER — ANTICOAGULATION THERAPY VISIT (OUTPATIENT)
Dept: NURSING | Facility: CLINIC | Age: 78
End: 2018-02-13
Payer: COMMERCIAL

## 2018-02-13 DIAGNOSIS — Z79.01 LONG-TERM (CURRENT) USE OF ANTICOAGULANTS: ICD-10-CM

## 2018-02-13 LAB — INR POINT OF CARE: 2.4 (ref 0.86–1.14)

## 2018-02-13 PROCEDURE — 85610 PROTHROMBIN TIME: CPT | Mod: QW

## 2018-02-13 PROCEDURE — 36416 COLLJ CAPILLARY BLOOD SPEC: CPT

## 2018-02-13 NOTE — PROGRESS NOTES
ANTICOAGULATION FOLLOW-UP CLINIC VISIT    Patient Name:  Salome Saeed  Date:  2/13/2018  Contact Type:  Face to Face    SUBJECTIVE:     Patient Findings     Positives No Problem Findings           OBJECTIVE    INR Protime   Date Value Ref Range Status   02/13/2018 2.4 (A) 0.86 - 1.14 Final       ASSESSMENT / PLAN  INR assessment THER    Recheck INR In: 2 WEEKS    INR Location Clinic      Anticoagulation Summary as of 2/13/2018     INR goal 2.0-3.0   Today's INR 2.4   Maintenance plan 2.5 mg (5 mg x 0.5) on Mon, Wed, Fri; 5 mg (5 mg x 1) all other days   Full instructions 2.5 mg on Mon, Wed, Fri; 5 mg all other days   Weekly total 27.5 mg   Plan last modified Leyla Lopez (1/11/2016)   Next INR check 2/26/2018   Target end date     Indications   Long-term (current) use of anticoagulants [Z79.01] [Z79.01]  CVA (cerebral vascular accident) (Resolved) [I63.9]         Anticoagulation Episode Summary     INR check location     Preferred lab     Send INR reminders to [facility]    Comments             See the Encounter Report to view Anticoagulation Flowsheet and Dosing Calendar (Go to Encounters tab in chart review, and find the Anticoagulation Therapy Visit)    Rosanne Rico RN

## 2018-02-13 NOTE — MR AVS SNAPSHOT
Salome SPRAGUE Darlin   2/13/2018 1:00 PM   Anticoagulation Therapy Visit    Description:  77 year old female   Provider:   ANTICOAGULATION CLINIC   Department:   Nurse           INR as of 2/13/2018     Today's INR 2.4      Anticoagulation Summary as of 2/13/2018     INR goal 2.0-3.0   Today's INR 2.4   Full instructions 2.5 mg on Mon, Wed, Fri; 5 mg all other days   Next INR check 2/26/2018    Indications   Long-term (current) use of anticoagulants [Z79.01] [Z79.01]  CVA (cerebral vascular accident) (Resolved) [I63.9]         Your next Anticoagulation Clinic appointment(s)     Feb 26, 2018  1:30 PM CST   Anticoagulation Visit with  ANTICOAGULATION CLINIC   [facility] [place] ([facility])    [address]  [facility] [id number] [phone]              Contact Numbers     [place] [facility]  Please call [phone] with any problems or questions regarding your therapy, or to cancel or reschedule your appointment        February 2018 Details    Sun Mon Tue Wed Thu Fri Sat         1               2               3                 4               5               6               7               8               9               10                 11               12               13      5 mg   See details      14      2.5 mg         15      5 mg         16      2.5 mg         17      5 mg           18      5 mg         19      2.5 mg         20      5 mg         21      2.5 mg         22      5 mg         23      2.5 mg         24      5 mg           25      5 mg         26            27               28                   Date Details   02/13 This INR check       Date of next INR:  2/26/2018         How to take your warfarin dose     To take:  2.5 mg Take 0.5 of a 5 mg tablet.    To take:  5 mg Take 1 of the 5 mg tablets.

## 2018-02-26 ENCOUNTER — ANTICOAGULATION THERAPY VISIT (OUTPATIENT)
Dept: NURSING | Facility: CLINIC | Age: 78
End: 2018-02-26
Payer: COMMERCIAL

## 2018-02-26 DIAGNOSIS — Z79.01 LONG-TERM (CURRENT) USE OF ANTICOAGULANTS: ICD-10-CM

## 2018-02-26 LAB — INR POINT OF CARE: 5.5 (ref 0.86–1.14)

## 2018-02-26 PROCEDURE — 36416 COLLJ CAPILLARY BLOOD SPEC: CPT

## 2018-02-26 PROCEDURE — 85610 PROTHROMBIN TIME: CPT | Mod: QW

## 2018-02-26 NOTE — MR AVS SNAPSHOT
Salome SPRAGUE Darlin   2/26/2018 1:30 PM   Anticoagulation Therapy Visit    Description:  77 year old female   Provider:   ANTICOAGULATION CLINIC   Department:   Nurse           INR as of 2/26/2018     Today's INR 5.5!      Anticoagulation Summary as of 2/26/2018     INR goal 2.0-3.0   Today's INR 5.5!   Full instructions 2/26: Hold; 2/27: Hold; Otherwise 2.5 mg on Mon, Wed, Fri; 5 mg all other days   Next INR check 3/8/2018    Indications   Long-term (current) use of anticoagulants [Z79.01] [Z79.01]  CVA (cerebral vascular accident) (Resolved) [I63.9]         Your next Anticoagulation Clinic appointment(s)     Mar 08, 2018  1:30 PM CST   Anticoagulation Visit with  ANTICOAGULATION CLINIC   [facility] [place] ([facility])    [address] [facility] [address] [phone]              Contact Numbers     [facility]  Please call [phone] with any problems or questions regarding your therapy, or to cancel or reschedule your appointment        February 2018 Details    Sun Mon Tue Wed Thu Fri Sat         1               2               3                 4               5               6               7               8               9               10                 11               12               13               14               15               16               17                 18               19               20               21               22               23               24                 25               26      Hold   See details      27      Hold         28      2.5 mg             Date Details   02/26 This INR check               How to take your warfarin dose     To take:  2.5 mg Take 0.5 of a 5 mg tablet.    Hold Do not take your warfarin dose. See the Details table to the right for additional instructions.                March 2018 Details    Sun Mon Tue Wed Thu Fri Sat         1      5 mg         2      2.5 mg         3      5 mg           4      5 mg          5      2.5 mg         6      5 mg         7      2.5 mg         8            9               10                 11               12               13               14               15               16               17                 18               19               20               21               22               23               24                 25               26               27               28               29               30               31                Date Details   No additional details    Date of next INR:  3/8/2018         How to take your warfarin dose     To take:  2.5 mg Take 0.5 of a 5 mg tablet.    To take:  5 mg Take 1 of the 5 mg tablets.

## 2018-02-26 NOTE — PROGRESS NOTES
ANTICOAGULATION FOLLOW-UP CLINIC VISIT    Patient Name:  Salome Saeed  Date:  2/26/2018  Contact Type:  Face to Face    SUBJECTIVE:     Patient Findings     Positives Inflammation (eye procedure today and tomorrow and recent back surgery), Antibiotic use or infection (eye issues)           OBJECTIVE    INR Protime   Date Value Ref Range Status   02/26/2018 5.5 (A) 0.86 - 1.14 Final       ASSESSMENT / PLAN  INR assessment SUPRA    Recheck INR In: 1 WEEK    INR Location Clinic      Anticoagulation Summary as of 2/26/2018     INR goal 2.0-3.0   Today's INR 5.5!   Maintenance plan 2.5 mg (5 mg x 0.5) on Mon, Wed, Fri; 5 mg (5 mg x 1) all other days   Full instructions 2/26: Hold; 2/27: Hold; Otherwise 2.5 mg on Mon, Wed, Fri; 5 mg all other days   Weekly total 27.5 mg   Plan last modified Leyla Lopez RN (1/11/2016)   Next INR check 3/8/2018   Target end date     Indications   Long-term (current) use of anticoagulants [Z79.01] [Z79.01]  CVA (cerebral vascular accident) (Resolved) [I63.9]         Anticoagulation Episode Summary     INR check location     Preferred lab     Send INR reminders to [facility]    Comments             See the Encounter Report to view Anticoagulation Flowsheet and Dosing Calendar (Go to Encounters tab in chart review, and find the Anticoagulation Therapy Visit)    Dosage adjustment made based on physician directed care plan.    Rosnane Rico RN

## 2018-03-08 ENCOUNTER — ANTICOAGULATION THERAPY VISIT (OUTPATIENT)
Dept: NURSING | Facility: CLINIC | Age: 78
End: 2018-03-08
Payer: COMMERCIAL

## 2018-03-08 DIAGNOSIS — Z79.01 LONG-TERM (CURRENT) USE OF ANTICOAGULANTS: ICD-10-CM

## 2018-03-08 LAB — INR POINT OF CARE: 1.6 (ref 0.86–1.14)

## 2018-03-08 PROCEDURE — 85610 PROTHROMBIN TIME: CPT | Mod: QW

## 2018-03-08 PROCEDURE — 36416 COLLJ CAPILLARY BLOOD SPEC: CPT

## 2018-03-08 PROCEDURE — 99207 ZZC NO CHARGE NURSE ONLY: CPT

## 2018-03-08 NOTE — MR AVS SNAPSHOT
Salome Eppersonde   3/8/2018 1:30 PM   Anticoagulation Therapy Visit    Description:  77 year old female   Provider:   ANTICOAGULATION CLINIC   Department:   Nurse           INR as of 3/8/2018     Today's INR 1.6!      Anticoagulation Summary as of 3/8/2018     INR goal 2.0-3.0   Today's INR 1.6!   Full instructions 2.5 mg on Mon, Wed, Fri; 5 mg all other days   Next INR check 3/19/2018    Indications   Long-term (current) use of anticoagulants [Z79.01] [Z79.01]  CVA (cerebral vascular accident) (Resolved) [I63.9]         Your next Anticoagulation Clinic appointment(s)     Mar 22, 2018  1:00 PM CDT   Anticoagulation Visit with  ANTICOAGULATION CLINIC   [facility] [place] ([facility])    [address]  [place] [id number] [phone]              Contact Numbers     [place] [facility]  Please call [phone] with any problems or questions regarding your therapy, or to cancel or reschedule your appointment        March 2018 Details    Sun Mon Tue Wed Thu Fri Sat         1               2               3                 4               5               6               7               8      5 mg   See details      9      2.5 mg         10      5 mg           11      5 mg         12      2.5 mg         13      5 mg         14      2.5 mg         15      5 mg         16      2.5 mg         17      5 mg           18      5 mg         19            20               21               22               23               24                 25               26               27               28               29               30               31                Date Details   03/08 This INR check       Date of next INR:  3/19/2018         How to take your warfarin dose     To take:  2.5 mg Take 0.5 of a 5 mg tablet.    To take:  5 mg Take 1 of the 5 mg tablets.

## 2018-03-08 NOTE — PROGRESS NOTES
ANTICOAGULATION FOLLOW-UP CLINIC VISIT    Patient Name:  Salome Saeed  Date:  3/8/2018  Contact Type:  Face to Face    SUBJECTIVE:     Patient Findings     Positives Intentional hold of therapy, Missed doses (additional missed dose)           OBJECTIVE    INR Protime   Date Value Ref Range Status   03/08/2018 1.6 (A) 0.86 - 1.14 Final       ASSESSMENT / PLAN  INR assessment SUB    Recheck INR In: 2 WEEKS    INR Location Clinic      Anticoagulation Summary as of 3/8/2018     INR goal 2.0-3.0   Today's INR 1.6!   Maintenance plan 2.5 mg (5 mg x 0.5) on Mon, Wed, Fri; 5 mg (5 mg x 1) all other days   Full instructions 2.5 mg on Mon, Wed, Fri; 5 mg all other days   Weekly total 27.5 mg   No change documented Day, NUBIA Lay   Plan last modified Leyla Lopez RN (1/11/2016)   Next INR check 3/19/2018   Target end date     Indications   Long-term (current) use of anticoagulants [Z79.01] [Z79.01]  CVA (cerebral vascular accident) (Resolved) [I63.9]         Anticoagulation Episode Summary     INR check location     Preferred lab     Send INR reminders to [facility]    Comments             See the Encounter Report to view Anticoagulation Flowsheet and Dosing Calendar (Go to Encounters tab in chart review, and find the Anticoagulation Therapy Visit)    Dosage adjustment made based on physician directed care plan.    Rosanne Rico RN

## 2018-03-12 DIAGNOSIS — E03.9 HYPOTHYROIDISM, UNSPECIFIED TYPE: ICD-10-CM

## 2018-03-13 RX ORDER — LEVOTHYROXINE SODIUM 100 UG/1
TABLET ORAL
Qty: 90 TABLET | Refills: 3 | OUTPATIENT
Start: 2018-03-13

## 2018-03-13 NOTE — TELEPHONE ENCOUNTER
"Requested Prescriptions   Pending Prescriptions Disp Refills     levothyroxine (SYNTHROID/LEVOTHROID) 100 MCG tablet [Pharmacy Med Name: LEVOTHYROXINE SODIUM 100 MCG Tablet]  Medication may not be due for a refill.  Last Written Prescription Date:  6/22/2017  Last Fill Quantity: 90 tablet,  # refills: 3   Last office visit: 2/8/2018 with prescribing provider:  Manisha   Future Office Visit:       90 tablet 3     Sig: TAKE 1 TABLET EVERY DAY    Thyroid Protocol Failed    3/12/2018  4:59 PM       Failed - Normal TSH on file in past 12 months    Recent Labs   Lab Test  02/08/18   1149   TSH  0.13*             Passed - Patient is 12 years or older       Passed - Recent (12 mo) or future (30 days) visit within the authorizing provider's specialty    Patient had office visit in the last 12 months or has a visit in the next 30 days with authorizing provider or within the authorizing provider's specialty.  See \"Patient Info\" tab in inbasket, or \"Choose Columns\" in Meds & Orders section of the refill encounter.           Passed - No active pregnancy on record    If patient is pregnant or has had a positive pregnancy test, please check TSH.         Passed - No positive pregnancy test in past 12 months    If patient is pregnant or has had a positive pregnancy test, please check TSH.            "

## 2018-03-22 ENCOUNTER — ANTICOAGULATION THERAPY VISIT (OUTPATIENT)
Dept: NURSING | Facility: CLINIC | Age: 78
End: 2018-03-22
Payer: COMMERCIAL

## 2018-03-22 DIAGNOSIS — Z79.01 LONG-TERM (CURRENT) USE OF ANTICOAGULANTS: ICD-10-CM

## 2018-03-22 LAB — INR POINT OF CARE: 3.3 (ref 0.86–1.14)

## 2018-03-22 PROCEDURE — 85610 PROTHROMBIN TIME: CPT | Mod: QW

## 2018-03-22 PROCEDURE — 36416 COLLJ CAPILLARY BLOOD SPEC: CPT

## 2018-03-22 NOTE — PROGRESS NOTES
ANTICOAGULATION FOLLOW-UP CLINIC VISIT    Patient Name:  Salome Saeed  Date:  3/22/2018  Contact Type:  Face to Face    SUBJECTIVE:     Patient Findings     Positives Change in diet/appetite, Inflammation           OBJECTIVE    INR Protime   Date Value Ref Range Status   03/22/2018 3.3 (A) 0.86 - 1.14 Final       ASSESSMENT / PLAN  INR assessment SUPRA    Recheck INR In: 2 WEEKS    INR Location Clinic      Anticoagulation Summary as of 3/22/2018     INR goal 2.0-3.0   Today's INR 3.3!   Maintenance plan 2.5 mg (5 mg x 0.5) on Mon, Wed, Fri; 5 mg (5 mg x 1) all other days   Full instructions 2.5 mg on Mon, Wed, Fri; 5 mg all other days   Weekly total 27.5 mg   Plan last modified Leyla Lopez RN (1/11/2016)   Next INR check 4/5/2018   Target end date     Indications   Long-term (current) use of anticoagulants [Z79.01] [Z79.01]  CVA (cerebral vascular accident) (Resolved) [I63.9]         Anticoagulation Episode Summary     INR check location     Preferred lab     Send INR reminders to [facility]    Comments             See the Encounter Report to view Anticoagulation Flowsheet and Dosing Calendar (Go to Encounters tab in chart review, and find the Anticoagulation Therapy Visit)    INR slightly supratherapeutic today. Advised patient to increase Vitamin K intake and recheck again in 2 weeks.    Rosanne Rico RN

## 2018-03-22 NOTE — MR AVS SNAPSHOT
Salome SPRAGUE Darlin   3/22/2018 1:00 PM   Anticoagulation Therapy Visit    Description:  77 year old female   Provider:   ANTICOAGULATION CLINIC   Department:   Nurse           INR as of 3/22/2018     Today's INR 3.3!      Anticoagulation Summary as of 3/22/2018     INR goal 2.0-3.0   Today's INR 3.3!   Full instructions 2.5 mg on Mon, Wed, Fri; 5 mg all other days   Next INR check 4/5/2018    Indications   Long-term (current) use of anticoagulants [Z79.01] [Z79.01]  CVA (cerebral vascular accident) (Resolved) [I63.9]         Your next Anticoagulation Clinic appointment(s)     Apr 05, 2018  1:30 PM CDT   Anticoagulation Visit with  ANTICOAGULATION CLINIC   [facility] [place] ([facility])    [address] [place] [id number] [phone]              Contact Numbers     [place] [facility]  Please call [phone] with any problems or questions regarding your therapy, or to cancel or reschedule your appointment        March 2018 Details    Sun Mon Tue Wed Thu Fri Sat         1               2               3                 4               5               6               7               8               9               10                 11               12               13               14               15               16               17                 18               19               20               21               22      5 mg   See details      23      2.5 mg         24      5 mg           25      5 mg         26      2.5 mg         27      5 mg         28      2.5 mg         29      5 mg         30      2.5 mg         31      5 mg          Date Details   03/22 This INR check   Increase greens               How to take your warfarin dose     To take:  2.5 mg Take 0.5 of a 5 mg tablet.    To take:  5 mg Take 1 of the 5 mg tablets.           April 2018 Details    Sun Mon Tue Wed Thu Fri Sat     1      5 mg         2      2.5 mg         3      5 mg         4      2.5 mg         5             6               7                 8               9               10               11               12               13               14                 15               16               17               18               19               20               21                 22               23               24               25               26               27               28                 29               30                     Date Details   No additional details    Date of next INR:  4/5/2018         How to take your warfarin dose     To take:  2.5 mg Take 0.5 of a 5 mg tablet.    To take:  5 mg Take 1 of the 5 mg tablets.

## 2018-03-28 DIAGNOSIS — E03.9 HYPOTHYROIDISM, UNSPECIFIED TYPE: ICD-10-CM

## 2018-03-28 NOTE — TELEPHONE ENCOUNTER
"Requested Prescriptions   Pending Prescriptions Disp Refills     levothyroxine (SYNTHROID/LEVOTHROID) 100 MCG tablet [Pharmacy Med Name: LEVOTHYROXINE SODIUM 100 MCG Tablet]  Medication may not be due for a refill.  Last Written Prescription Date:  6/22/2017  Last Fill Quantity: 90 tablet,  # refills: 3   Last office visit: 2/8/2018 with prescribing provider:  Manisha   Future Office Visit:       90 tablet 3     Sig: TAKE 1 TABLET EVERY DAY    Thyroid Protocol Failed    3/28/2018 11:13 AM       Failed - Normal TSH on file in past 12 months    Recent Labs   Lab Test  02/08/18   1149   TSH  0.13*             Passed - Patient is 12 years or older       Passed - Recent (12 mo) or future (30 days) visit within the authorizing provider's specialty    Patient had office visit in the last 12 months or has a visit in the next 30 days with authorizing provider or within the authorizing provider's specialty.  See \"Patient Info\" tab in inbasket, or \"Choose Columns\" in Meds & Orders section of the refill encounter.           Passed - No active pregnancy on record    If patient is pregnant or has had a positive pregnancy test, please check TSH.         Passed - No positive pregnancy test in past 12 months    If patient is pregnant or has had a positive pregnancy test, please check TSH.            "

## 2018-03-29 RX ORDER — LEVOTHYROXINE SODIUM 100 UG/1
TABLET ORAL
Qty: 90 TABLET | Refills: 3 | OUTPATIENT
Start: 2018-03-29

## 2018-04-05 ENCOUNTER — ANTICOAGULATION THERAPY VISIT (OUTPATIENT)
Dept: NURSING | Facility: CLINIC | Age: 78
End: 2018-04-05
Payer: COMMERCIAL

## 2018-04-05 DIAGNOSIS — Z79.01 LONG-TERM (CURRENT) USE OF ANTICOAGULANTS: ICD-10-CM

## 2018-04-05 LAB — INR POINT OF CARE: 3.1 (ref 0.86–1.14)

## 2018-04-05 PROCEDURE — 36416 COLLJ CAPILLARY BLOOD SPEC: CPT

## 2018-04-05 PROCEDURE — 85610 PROTHROMBIN TIME: CPT | Mod: QW

## 2018-04-05 NOTE — PROGRESS NOTES
ANTICOAGULATION FOLLOW-UP CLINIC VISIT    Patient Name:  Salome Saeed  Date:  4/5/2018  Contact Type:  Face to Face    SUBJECTIVE:     Patient Findings     Positives Inflammation (back pain post surgery - has been taking ibuprofen)           OBJECTIVE    INR Protime   Date Value Ref Range Status   04/05/2018 3.1 (A) 0.86 - 1.14 Final       ASSESSMENT / PLAN  INR assessment SUPRA    Recheck INR In: 2 WEEKS    INR Location Clinic      Anticoagulation Summary as of 4/5/2018     INR goal 2.0-3.0   Today's INR 3.1!   Maintenance plan 5 mg (5 mg x 1) on Mon, Wed, Fri; 2.5 mg (5 mg x 0.5) all other days   Full instructions 5 mg on Mon, Wed, Fri; 2.5 mg all other days   Weekly total 25 mg   Plan last modified Day, NUBIA Lay (4/5/2018)   Next INR check 4/19/2018   Target end date     Indications   Long-term (current) use of anticoagulants [Z79.01] [Z79.01]  CVA (cerebral vascular accident) (Resolved) [I63.9]         Anticoagulation Episode Summary     INR check location     Preferred lab     Send INR reminders to  [facility]    Comments             See the Encounter Report to view Anticoagulation Flowsheet and Dosing Calendar (Go to Encounters tab in chart review, and find the Anticoagulation Therapy Visit)    Dosage adjustment made based on physician directed care plan. Maintenance dose decreased from 27.5mg per week to 25mg per week today given recent higher readings. Patient also reported increased back pain recently - patient has significant history of back issues including recent surgery - and has been taking ibuprofen pretty regularly for pain. I advised patient that ibuprofen can increase bleeding risk while on warfarin and that she may want to contact her surgeon to see if there is an alternative medication they would recommend to manage her pain since Tylenol is not effective.    Rosanne Rico RN

## 2018-04-05 NOTE — MR AVS SNAPSHOT
Salome Vierasiomara   4/5/2018 1:30 PM   Anticoagulation Therapy Visit    Description:  78 year old female   Provider:   ANTICOAGULATION CLINIC   Department:   Nurse           INR as of 4/5/2018     Today's INR 3.1!      Anticoagulation Summary as of 4/5/2018     INR goal 2.0-3.0   Today's INR 3.1!   Full instructions 5 mg on Mon, Wed, Fri; 2.5 mg all other days   Next INR check 4/19/2018    Indications   Long-term (current) use of anticoagulants [Z79.01] [Z79.01]  CVA (cerebral vascular accident) (Resolved) [I63.9]         Your next Anticoagulation Clinic appointment(s)     Apr 19, 2018  1:30 PM CDT   Anticoagulation Visit with  ANTICOAGULATION CLINIC   [facility] [place] ([facility])    [address] [place] [id number] [phone]              Contact Numbers     [place] [facility]  Please call [phone] with any problems or questions regarding your therapy, or to cancel or reschedule your appointment        April 2018 Details    Sun Mon Tue Wed Thu Fri Sat     1               2               3               4               5      2.5 mg   See details      6      5 mg         7      2.5 mg           8      2.5 mg         9      5 mg         10      2.5 mg         11      5 mg         12      2.5 mg         13      5 mg         14      2.5 mg           15      2.5 mg         16      5 mg         17      2.5 mg         18      5 mg         19            20               21                 22               23               24               25               26               27               28                 29               30                     Date Details   04/05 This INR check       Date of next INR:  4/19/2018         How to take your warfarin dose     To take:  2.5 mg Take 0.5 of a 5 mg tablet.    To take:  5 mg Take 1 of the 5 mg tablets.

## 2018-04-19 ENCOUNTER — ANTICOAGULATION THERAPY VISIT (OUTPATIENT)
Dept: NURSING | Facility: CLINIC | Age: 78
End: 2018-04-19
Payer: COMMERCIAL

## 2018-04-19 DIAGNOSIS — Z79.01 LONG-TERM (CURRENT) USE OF ANTICOAGULANTS: ICD-10-CM

## 2018-04-19 DIAGNOSIS — E03.9 HYPOTHYROIDISM, UNSPECIFIED TYPE: ICD-10-CM

## 2018-04-19 LAB — INR POINT OF CARE: 2.6 (ref 0.86–1.14)

## 2018-04-19 PROCEDURE — 36416 COLLJ CAPILLARY BLOOD SPEC: CPT

## 2018-04-19 PROCEDURE — 85610 PROTHROMBIN TIME: CPT | Mod: QW

## 2018-04-19 RX ORDER — LEVOTHYROXINE SODIUM 100 UG/1
100 TABLET ORAL DAILY
Qty: 90 TABLET | Refills: 0 | Status: SHIPPED | OUTPATIENT
Start: 2018-04-19 | End: 2018-08-23

## 2018-04-19 NOTE — TELEPHONE ENCOUNTER
Patient presented to clinic for a recheck of her INR. While here she mentioned that she received a letter from her mail order pharmacy ([facility]) regarding her levothyroxine prescription. She brought the letter with her. It reports that her refill was denied and to follow up with her provider regarding further refills.    After review it appears that patient should have enough medication to last until mid-June. Patient reports that she has plenty of medication at home so, was unsure why they were requesting the refill anyway. I advised patient that I will go ahead and send a new prescription for the levothyroxine to the mail order, but did advise that she will be due for follow up thyroid labs for future refills.    Patient verbalized understanding and agrees with plan.    Will call back if further questions or concerns.    Rosanne Rico, RN, BSN

## 2018-04-19 NOTE — PROGRESS NOTES
ANTICOAGULATION FOLLOW-UP CLINIC VISIT    Patient Name:  Salome Saeed  Date:  4/19/2018  Contact Type:  Face to Face    SUBJECTIVE:     Patient Findings     Positives No Problem Findings           OBJECTIVE    INR Protime   Date Value Ref Range Status   04/19/2018 2.6 (A) 0.86 - 1.14 Final       ASSESSMENT / PLAN  INR assessment THER    Recheck INR In: 4 WEEKS    INR Location Clinic      Anticoagulation Summary as of 4/19/2018     INR goal 2.0-3.0   Today's INR 2.6   Maintenance plan 5 mg (5 mg x 1) on Mon, Wed, Fri; 2.5 mg (5 mg x 0.5) all other days   Full instructions 5 mg on Mon, Wed, Fri; 2.5 mg all other days   Weekly total 25 mg   No change documented Day, NUBIA Lay   Plan last modified Day, NUBIA Lay (4/5/2018)   Next INR check 5/17/2018   Target end date     Indications   Long-term (current) use of anticoagulants [Z79.01] [Z79.01]  CVA (cerebral vascular accident) (Resolved) [I63.9]         Anticoagulation Episode Summary     INR check location     Preferred lab     Send INR reminders to [facility]    Comments             See the Encounter Report to view Anticoagulation Flowsheet and Dosing Calendar (Go to Encounters tab in chart review, and find the Anticoagulation Therapy Visit)    Rosanne Rico RN

## 2018-04-19 NOTE — MR AVS SNAPSHOT
Salome SPRAGUE Darlin   4/19/2018 1:30 PM   Anticoagulation Therapy Visit    Description:  78 year old female   Provider:   ANTICOAGULATION CLINIC   Department:   Nurse           INR as of 4/19/2018     Today's INR 2.6      Anticoagulation Summary as of 4/19/2018     INR goal 2.0-3.0   Today's INR 2.6   Full instructions 5 mg on Mon, Wed, Fri; 2.5 mg all other days   Next INR check 5/17/2018    Indications   Long-term (current) use of anticoagulants [Z79.01] [Z79.01]  CVA (cerebral vascular accident) (Resolved) [I63.9]         Your next Anticoagulation Clinic appointment(s)     May 17, 2018  1:30 PM CDT   Anticoagulation Visit with  ANTICOAGULATION CLINIC   [facility] ([facility])    [address]  [facility] [address] [phone]              Contact Numbers     [facility]  Please call [phone] with any problems or questions regarding your therapy, or to cancel or reschedule your appointment        April 2018 Details    Sun Mon Tue Wed Thu Fri Sat     1               2               3               4               5               6               7                 8               9               10               11               12               13               14                 15               16               17               18               19      2.5 mg   See details      20      5 mg         21      2.5 mg           22      2.5 mg         23      5 mg         24      2.5 mg         25      5 mg         26      2.5 mg         27      5 mg         28      2.5 mg           29      2.5 mg         30      5 mg               Date Details   04/19 This INR check               How to take your warfarin dose     To take:  2.5 mg Take 0.5 of a 5 mg tablet.    To take:  5 mg Take 1 of the 5 mg tablets.           May 2018 Details    Sun Mon Tue Wed Thu Fri Sat       1      2.5 mg         2      5 mg         3      2.5 mg         4      5 mg         5      2.5 mg            6      2.5 mg         7      5 mg         8      2.5 mg         9      5 mg         10      2.5 mg         11      5 mg         12      2.5 mg           13      2.5 mg         14      5 mg         15      2.5 mg         16      5 mg         17            18               19                 20               21               22               23               24               25               26                 27               28               29               30               31                  Date Details   No additional details    Date of next INR:  5/17/2018         How to take your warfarin dose     To take:  2.5 mg Take 0.5 of a 5 mg tablet.    To take:  5 mg Take 1 of the 5 mg tablets.

## 2018-05-17 ENCOUNTER — ANTICOAGULATION THERAPY VISIT (OUTPATIENT)
Dept: NURSING | Facility: CLINIC | Age: 78
End: 2018-05-17
Payer: COMMERCIAL

## 2018-05-17 DIAGNOSIS — Z79.01 LONG-TERM (CURRENT) USE OF ANTICOAGULANTS: ICD-10-CM

## 2018-05-17 LAB — INR POINT OF CARE: 2 (ref 0.86–1.14)

## 2018-05-17 PROCEDURE — 85610 PROTHROMBIN TIME: CPT | Mod: QW

## 2018-05-17 PROCEDURE — 36416 COLLJ CAPILLARY BLOOD SPEC: CPT

## 2018-05-17 NOTE — MR AVS SNAPSHOT
Salome Eppersonde   5/17/2018 1:30 PM   Anticoagulation Therapy Visit    Description:  78 year old female   Provider:   ANTICOAGULATION CLINIC   Department:   Nurse           INR as of 5/17/2018     Today's INR 2.0      Anticoagulation Summary as of 5/17/2018     INR goal 2.0-3.0   Today's INR 2.0   Full instructions 2.5 mg on Mon, Wed, Fri; 5 mg all other days   Next INR check 6/7/2018    Indications   Long-term (current) use of anticoagulants [Z79.01] [Z79.01]  CVA (cerebral vascular accident) (Resolved) [I63.9]         Your next Anticoagulation Clinic appointment(s)     Jun 07, 2018  1:30 PM CDT   Anticoagulation Visit with  ANTICOAGULATION CLINIC   [facility] ([facility])    [address]  [facility] [address] [phone]              Contact Numbers     [facility]  Please call [phone] with any problems or questions regarding your therapy, or to cancel or reschedule your appointment        May 2018 Details    Sun Mon Tue Wed Thu Fri Sat       1               2               3               4               5                 6               7               8               9               10               11               12                 13               14               15               16               17      5 mg   See details      18      2.5 mg         19      5 mg           20      5 mg         21      2.5 mg         22      5 mg         23      2.5 mg         24      5 mg         25      2.5 mg         26      5 mg           27      5 mg         28      2.5 mg         29      5 mg         30      2.5 mg         31      5 mg            Date Details   05/17 This INR check               How to take your warfarin dose     To take:  2.5 mg Take 0.5 of a 5 mg tablet.    To take:  5 mg Take 1 of the 5 mg tablets.           June 2018 Details    Sun Mon Tue Wed Thu Fri Sat          1      2.5 mg         2      5 mg           3      5 mg         4      2.5 mg          5      5 mg         6      2.5 mg         7            8               9                 10               11               12               13               14               15               16                 17               18               19               20               21               22               23                 24               25               26               27               28               29               30                Date Details   No additional details    Date of next INR:  6/7/2018         How to take your warfarin dose     To take:  2.5 mg Take 0.5 of a 5 mg tablet.    To take:  5 mg Take 1 of the 5 mg tablets.

## 2018-05-17 NOTE — PROGRESS NOTES
ANTICOAGULATION FOLLOW-UP CLINIC VISIT    Patient Name:  Salome Saeed  Date:  5/17/2018  Contact Type:  Face to Face    SUBJECTIVE:        OBJECTIVE    INR Protime   Date Value Ref Range Status   05/17/2018 2.0 (A) 0.86 - 1.14 Final       ASSESSMENT / PLAN  INR assessment THER    Recheck INR In: 3 WEEKS    INR Location Clinic      Anticoagulation Summary as of 5/17/2018     INR goal 2.0-3.0   Today's INR 2.0   Maintenance plan 2.5 mg (5 mg x 0.5) on Mon, Wed, Fri; 5 mg (5 mg x 1) all other days   Full instructions 2.5 mg on Mon, Wed, Fri; 5 mg all other days   Weekly total 27.5 mg   Plan last modified Day, NUBIA Lay (5/17/2018)   Next INR check 6/7/2018   Target end date     Indications   Long-term (current) use of anticoagulants [Z79.01] [Z79.01]  CVA (cerebral vascular accident) (Resolved) [I63.9]         Anticoagulation Episode Summary     INR check location     Preferred lab     Send INR reminders to [facility]    Comments             See the Encounter Report to view Anticoagulation Flowsheet and Dosing Calendar (Go to Encounters tab in chart review, and find the Anticoagulation Therapy Visit)    Dosage adjustment made based on physician directed care plan.    Rosanne Rico RN

## 2018-05-30 DIAGNOSIS — E78.5 HYPERLIPIDEMIA LDL GOAL <70: ICD-10-CM

## 2018-05-30 DIAGNOSIS — I63.9 CEREBROVASCULAR ACCIDENT INVOLVING CEREBELLUM (H): ICD-10-CM

## 2018-05-30 RX ORDER — ROSUVASTATIN CALCIUM 20 MG/1
20 TABLET, COATED ORAL DAILY
Qty: 90 TABLET | Refills: 0 | Status: SHIPPED | OUTPATIENT
Start: 2018-05-30 | End: 2018-08-07

## 2018-06-04 RX ORDER — WARFARIN SODIUM 5 MG/1
TABLET ORAL
Qty: 90 TABLET | Refills: 1 | Status: SHIPPED | OUTPATIENT
Start: 2018-06-04 | End: 2018-10-11

## 2018-06-04 NOTE — TELEPHONE ENCOUNTER
Patient calling stating that her mail order pharmacy, [facility], keeps calling her stating that we have not responded to a refill request for her warfarin. It appears that a warfarin refill was entered into patient's chart, however, it was not routed. Patient reports that she has plenty of medication so, is not in need of a temporary supply.    Prescription approved per [facility] Refill Protocol.  Rosanne Rico RN, BSN  [facility]

## 2018-06-07 ENCOUNTER — ANTICOAGULATION THERAPY VISIT (OUTPATIENT)
Dept: NURSING | Facility: CLINIC | Age: 78
End: 2018-06-07
Payer: COMMERCIAL

## 2018-06-07 DIAGNOSIS — Z79.01 LONG-TERM (CURRENT) USE OF ANTICOAGULANTS: ICD-10-CM

## 2018-06-07 LAB — INR POINT OF CARE: 2.3 (ref 0.86–1.14)

## 2018-06-07 PROCEDURE — 99207 ZZC NO CHARGE NURSE ONLY: CPT

## 2018-06-07 PROCEDURE — 36416 COLLJ CAPILLARY BLOOD SPEC: CPT

## 2018-06-07 PROCEDURE — 85610 PROTHROMBIN TIME: CPT | Mod: QW

## 2018-06-07 NOTE — MR AVS SNAPSHOT
Salome SPRAGUE Darlin   6/7/2018 2:45 PM   Anticoagulation Therapy Visit    Description:  78 year old female   Provider:   ANTICOAGULATION CLINIC   Department:   Nurse           INR as of 6/7/2018     Today's INR 2.3      Anticoagulation Summary as of 6/7/2018     INR goal 2.0-3.0   Today's INR 2.3   Full warfarin instructions 2.5 mg on Mon, Wed, Fri; 5 mg all other days   Next INR check 7/3/2018    Indications   Long-term (current) use of anticoagulants [Z79.01] [Z79.01]  CVA (cerebral vascular accident) (Resolved) [I63.9]         Your next Anticoagulation Clinic appointment(s)     Jun 07, 2018  2:45 PM CDT   Anticoagulation Visit with  ANTICOAGULATION CLINIC   [facility]    [address]  [facility] [id number] [phone]            Jul 03, 2018  1:30 PM CDT   Anticoagulation Visit with  ANTICOAGULATION CLINIC   [facility]    [address]  [facility] [id number] [phone]              Contact Numbers     [facility]  Please call [phone] with any problems or questions regarding your therapy, or to cancel or reschedule your appointment        June 2018 Details    Sun Mon Tue Wed Thu Fri Sat          1               2                 3               4               5               6               7      5 mg   See details      8      2.5 mg         9      5 mg           10      5 mg         11      2.5 mg         12      5 mg         13      2.5 mg         14      5 mg         15      2.5 mg         16      5 mg           17      5 mg         18      2.5 mg         19      5 mg         20      2.5 mg         21      5 mg         22      2.5 mg         23      5 mg           24      5 mg         25      2.5 mg         26      5 mg         27      2.5 mg         28      5 mg         29      2.5 mg         30      5 mg          Date Details   06/07 This INR check               How to take your warfarin dose     To  take:  2.5 mg Take 0.5 of a 5 mg tablet.    To take:  5 mg Take 1 of the 5 mg tablets.           July 2018 Details    Sun Mon Tue Wed Thu Fri Sat     1      5 mg         2      2.5 mg         3            4               5               6               7                 8               9               10               11               12               13               14                 15               16               17               18               19               20               21                 22               23               24               25               26               27               28                 29               30               31                    Date Details   No additional details    Date of next INR:  7/3/2018         How to take your warfarin dose     To take:  2.5 mg Take 0.5 of a 5 mg tablet.    To take:  5 mg Take 1 of the 5 mg tablets.

## 2018-06-07 NOTE — PROGRESS NOTES
ANTICOAGULATION FOLLOW-UP CLINIC VISIT    Patient Name:  Salome Saeed  Date:  6/7/2018  Contact Type:  Face to Face    SUBJECTIVE:     Patient Findings     Positives No Problem Findings           OBJECTIVE    INR Protime   Date Value Ref Range Status   06/07/2018 2.3 (A) 0.86 - 1.14 Final       ASSESSMENT / PLAN  INR assessment THER    Recheck INR In: 4 WEEKS    INR Location Clinic      Anticoagulation Summary as of 6/7/2018     INR goal 2.0-3.0   Today's INR 2.3   Warfarin maintenance plan 2.5 mg (5 mg x 0.5) on Mon, Wed, Fri; 5 mg (5 mg x 1) all other days   Full warfarin instructions 2.5 mg on Mon, Wed, Fri; 5 mg all other days   Weekly warfarin total 27.5 mg   No change documented Day, NUBIA Lay   Plan last modified Day, NUBIA Lay (5/17/2018)   Next INR check 7/3/2018   Target end date     Indications   Long-term (current) use of anticoagulants [Z79.01] [Z79.01]  CVA (cerebral vascular accident) (Resolved) [I63.9]         Anticoagulation Episode Summary     INR check location     Preferred lab     Send INR reminders to [facility]    Comments             See the Encounter Report to view Anticoagulation Flowsheet and Dosing Calendar (Go to Encounters tab in chart review, and find the Anticoagulation Therapy Visit)    Rosanne Rico, NUBIA

## 2018-06-27 ENCOUNTER — TELEPHONE (OUTPATIENT)
Dept: FAMILY MEDICINE | Facility: CLINIC | Age: 78
End: 2018-06-27

## 2018-06-27 DIAGNOSIS — E03.9 HYPOTHYROIDISM, UNSPECIFIED TYPE: ICD-10-CM

## 2018-06-27 RX ORDER — LEVOTHYROXINE SODIUM 100 UG/1
100 TABLET ORAL DAILY
Qty: 90 TABLET | Refills: 0 | Status: CANCELLED | OUTPATIENT
Start: 2018-06-27

## 2018-06-27 NOTE — TELEPHONE ENCOUNTER
"Requested Prescriptions   Pending Prescriptions Disp Refills     levothyroxine (SYNTHROID/LEVOTHROID) 100 MCG tablet  Last Written Prescription Date:  4/19/2018  Last Fill Quantity: 90 tablet,  # refills: 0   Last office visit: 2/8/2018 with prescribing provider:  Manisha   Future Office Visit:       90 tablet 0     Sig: Take 1 tablet (100 mcg) by mouth daily    Thyroid Protocol Failed    6/27/2018  1:36 PM       Failed - Normal TSH on file in past 12 months    Recent Labs   Lab Test  02/08/18   1149   TSH  0.13*             Passed - Patient is 12 years or older       Passed - Recent (12 mo) or future (30 days) visit within the authorizing provider's specialty    Patient had office visit in the last 12 months or has a visit in the next 30 days with authorizing provider or within the authorizing provider's specialty.  See \"Patient Info\" tab in inbasket, or \"Choose Columns\" in Meds & Orders section of the refill encounter.           Passed - No active pregnancy on record    If patient is pregnant or has had a positive pregnancy test, please check TSH.         Passed - No positive pregnancy test in past 12 months    If patient is pregnant or has had a positive pregnancy test, please check TSH.            "

## 2018-06-28 NOTE — TELEPHONE ENCOUNTER
Domenica for recheck of thyroid labs. Please assist patient with scheduling lab only appointment. Can provide additional refill if she needs more medication prior to lab completion. Thank you.  Rosanne Rico RN, BSN  [facility]

## 2018-06-28 NOTE — TELEPHONE ENCOUNTER
Called pt at [phone] and spoke with pt.  Appt made for lab only on 7/3/2018.  Pt states she has medication to last until then.  Janie Mccall

## 2018-07-03 ENCOUNTER — ANTICOAGULATION THERAPY VISIT (OUTPATIENT)
Dept: NURSING | Facility: CLINIC | Age: 78
End: 2018-07-03
Payer: COMMERCIAL

## 2018-07-03 DIAGNOSIS — E03.9 HYPOTHYROIDISM, UNSPECIFIED TYPE: ICD-10-CM

## 2018-07-03 DIAGNOSIS — Z79.01 LONG-TERM (CURRENT) USE OF ANTICOAGULANTS: ICD-10-CM

## 2018-07-03 LAB
INR POINT OF CARE: 2.4 (ref 0.86–1.14)
T4 FREE SERPL-MCNC: 0.96 NG/DL (ref 0.76–1.46)
TSH SERPL DL<=0.005 MIU/L-ACNC: 0.19 MU/L (ref 0.4–4)

## 2018-07-03 PROCEDURE — 85610 PROTHROMBIN TIME: CPT | Mod: QW

## 2018-07-03 PROCEDURE — 84439 ASSAY OF FREE THYROXINE: CPT | Performed by: FAMILY MEDICINE

## 2018-07-03 PROCEDURE — 99207 ZZC NO CHARGE NURSE ONLY: CPT

## 2018-07-03 PROCEDURE — 84443 ASSAY THYROID STIM HORMONE: CPT | Performed by: FAMILY MEDICINE

## 2018-07-03 PROCEDURE — 36416 COLLJ CAPILLARY BLOOD SPEC: CPT

## 2018-07-03 NOTE — PROGRESS NOTES
ANTICOAGULATION FOLLOW-UP CLINIC VISIT    Patient Name:  Salome Saeed  Date:  7/3/2018  Contact Type:  Face to Face    SUBJECTIVE:     Patient Findings     Positives No Problem Findings           OBJECTIVE    INR Protime   Date Value Ref Range Status   07/03/2018 2.4 (A) 0.86 - 1.14 Final       ASSESSMENT / PLAN  INR assessment THER    Recheck INR In: 5 WEEKS    INR Location Clinic      Anticoagulation Summary as of 7/3/2018     INR goal 2.0-3.0   Today's INR 2.4   Warfarin maintenance plan 2.5 mg (5 mg x 0.5) on Mon, Wed, Fri; 5 mg (5 mg x 1) all other days   Full warfarin instructions 2.5 mg on Mon, Wed, Fri; 5 mg all other days   Weekly warfarin total 27.5 mg   No change documented Day, NUBIA Lay   Plan last modified Day, NUBIA Lay (5/17/2018)   Next INR check 8/6/2018   Target end date     Indications   Long-term (current) use of anticoagulants [Z79.01] [Z79.01]  CVA (cerebral vascular accident) (Resolved) [I63.9]         Anticoagulation Episode Summary     INR check location     Preferred lab     Send INR reminders to [facility] TRI[facility]    Comments             See the Encounter Report to view Anticoagulation Flowsheet and Dosing Calendar (Go to Encounters tab in chart review, and find the Anticoagulation Therapy Visit)    Rosanne Rico, NUBIA

## 2018-07-03 NOTE — MR AVS SNAPSHOT
Salome Eppersonde   7/3/2018 1:30 PM   Anticoagulation Therapy Visit    Description:  78 year old female   Provider:   ANTICOAGULATION CLINIC   Department:   Nurse           INR as of 7/3/2018     Today's INR 2.4      Anticoagulation Summary as of 7/3/2018     INR goal 2.0-3.0   Today's INR 2.4   Full warfarin instructions 2.5 mg on Mon, Wed, Fri; 5 mg all other days   Next INR check 8/6/2018    Indications   Long-term (current) use of anticoagulants [Z79.01] [Z79.01]  CVA (cerebral vascular accident) (Resolved) [I63.9]         Your next Anticoagulation Clinic appointment(s)     Aug 06, 2018  1:30 PM CDT   Anticoagulation Visit with  ANTICOAGULATION CLINIC   [facility]    [address] [facility] [address] [phone]              Contact Numbers     [facility]  Please call [phone] with any problems or questions regarding your therapy, or to cancel or reschedule your appointment        July 2018 Details    Sun Mon Tue Wed Thu Fri Sat     1               2               3      5 mg   See details      4      2.5 mg         5      5 mg         6      2.5 mg         7      5 mg           8      5 mg         9      2.5 mg         10      5 mg         11      2.5 mg         12      5 mg         13      2.5 mg         14      5 mg           15      5 mg         16      2.5 mg         17      5 mg         18      2.5 mg         19      5 mg         20      2.5 mg         21      5 mg           22      5 mg         23      2.5 mg         24      5 mg         25      2.5 mg         26      5 mg         27      2.5 mg         28      5 mg           29      5 mg         30      2.5 mg         31      5 mg              Date Details   07/03 This INR check               How to take your warfarin dose     To take:  2.5 mg Take 0.5 of a 5 mg tablet.    To take:  5 mg Take 1 of the 5 mg tablets.           August 2018 Details    Sun Mon Tue Wed Thu Fri Sat        1       2.5 mg         2      5 mg         3      2.5 mg         4      5 mg           5      5 mg         6            7               8               9               10               11                 12               13               14               15               16               17               18                 19               20               21               22               23               24               25                 26               27               28               29               30               31                 Date Details   No additional details    Date of next INR:  8/6/2018         How to take your warfarin dose     To take:  2.5 mg Take 0.5 of a 5 mg tablet.    To take:  5 mg Take 1 of the 5 mg tablets.

## 2018-07-05 DIAGNOSIS — I10 HTN (HYPERTENSION): ICD-10-CM

## 2018-07-05 RX ORDER — AMLODIPINE BESYLATE 5 MG/1
5 TABLET ORAL DAILY
Qty: 90 TABLET | Refills: 0 | Status: SHIPPED | OUTPATIENT
Start: 2018-07-05 | End: 2018-09-07

## 2018-07-10 DIAGNOSIS — E03.9 HYPOTHYROIDISM, UNSPECIFIED TYPE: Primary | ICD-10-CM

## 2018-07-10 RX ORDER — LEVOTHYROXINE SODIUM 88 UG/1
88 TABLET ORAL DAILY
Qty: 90 TABLET | Refills: 0 | Status: SHIPPED | OUTPATIENT
Start: 2018-07-10 | End: 2018-08-23

## 2018-07-11 ENCOUNTER — TELEPHONE (OUTPATIENT)
Dept: FAMILY MEDICINE | Facility: CLINIC | Age: 78
End: 2018-07-11

## 2018-07-11 NOTE — TELEPHONE ENCOUNTER
Reason for Call:  Other prescription    Detailed comments: Pt would like to talk to someone about her thyroid medication. It was changed yesterday and she would like to know why.    Phone Number Patient can be reached at: Home number on file [phone] (home)    Best Time:     Can we leave a detailed message on this number? YES    Call taken on 7/11/2018 at 10:17 AM by Jaleesa Ureña

## 2018-07-11 NOTE — TELEPHONE ENCOUNTER
07/03/2018 Result Note:   -TSH (thyroid stimulating hormone) is abnormal suggesting you are currently slightly overreplaced.  ADVISE: changing your medication dose to 88 micrograms/day and recheck your TSH with a lab appointment in 6 weeks. (TSH w/reflex T4, DX: hypothyroidism).     A new prescription has been sent to your mail order pharmacy and future lab order placed to be checked.       Called patient @ # below - advised of result note above  Patient stated an understanding and agreed with plan.  Lab Only scheduled for 08/22/2018    Yesi Weir RN  [facility]

## 2018-07-17 ENCOUNTER — TELEPHONE (OUTPATIENT)
Dept: FAMILY MEDICINE | Facility: CLINIC | Age: 78
End: 2018-07-17

## 2018-07-17 NOTE — TELEPHONE ENCOUNTER
Date Forms was received: July 17, 2018    Forms received by: Patient Drop Off    Last office visit: 2-8-18    Purpose of Form:  Handicap Parking Form    When the form is due:  This month July 2018    How the form needs to be returned for patient:  Patient     Form currently placed  SW forms file  Christine Aufmuth MA

## 2018-07-25 NOTE — TELEPHONE ENCOUNTER
Called pt to verify need for permit.  She has had 2 back surgeries and is unsteady on her feet especially in winter.  Form given to DO to complete.  Janie Mccall

## 2018-08-02 ENCOUNTER — TRANSFERRED RECORDS (OUTPATIENT)
Dept: HEALTH INFORMATION MANAGEMENT | Facility: CLINIC | Age: 78
End: 2018-08-02

## 2018-08-06 ENCOUNTER — ANTICOAGULATION THERAPY VISIT (OUTPATIENT)
Dept: NURSING | Facility: CLINIC | Age: 78
End: 2018-08-06
Payer: COMMERCIAL

## 2018-08-06 DIAGNOSIS — Z79.01 LONG-TERM (CURRENT) USE OF ANTICOAGULANTS: ICD-10-CM

## 2018-08-06 LAB — INR POINT OF CARE: 3.1 (ref 0.86–1.14)

## 2018-08-06 PROCEDURE — 85610 PROTHROMBIN TIME: CPT | Mod: QW

## 2018-08-06 PROCEDURE — 36416 COLLJ CAPILLARY BLOOD SPEC: CPT

## 2018-08-06 NOTE — PROGRESS NOTES
ANTICOAGULATION FOLLOW-UP CLINIC VISIT    Patient Name:  Salome Saeed  Date:  8/6/2018  Contact Type:  Face to Face    SUBJECTIVE:     Patient Findings     Positives No Problem Findings           OBJECTIVE    INR Protime   Date Value Ref Range Status   08/06/2018 3.1 (A) 0.86 - 1.14 Final       ASSESSMENT / PLAN  INR assessment THER    Recheck INR In: 4 WEEKS    INR Location Clinic      Anticoagulation Summary as of 8/6/2018     INR goal 2.0-3.0   Today's INR 3.1!   Warfarin maintenance plan 2.5 mg (5 mg x 0.5) on Mon, Wed, Fri; 5 mg (5 mg x 1) all other days   Full warfarin instructions 2.5 mg on Mon, Wed, Fri; 5 mg all other days   Weekly warfarin total 27.5 mg   Plan last modified Day, NUBIA Lay (5/17/2018)   Next INR check 9/4/2018   Target end date     Indications   Long-term (current) use of anticoagulants [Z79.01] [Z79.01]  CVA (cerebral vascular accident) (Resolved) [I63.9]         Anticoagulation Episode Summary     INR check location     Preferred lab     Send INR reminders to [facility]    Comments             See the Encounter Report to view Anticoagulation Flowsheet and Dosing Calendar (Go to Encounters tab in chart review, and find the Anticoagulation Therapy Visit)    Advised to increase vitamin K intake.    Rosanne Rico RN

## 2018-08-06 NOTE — MR AVS SNAPSHOT
Salome Eppersonde   8/6/2018 1:30 PM   Anticoagulation Therapy Visit    Description:  78 year old female   Provider:   ANTICOAGULATION CLINIC   Department:   Nurse           INR as of 8/6/2018     Today's INR 3.1!      Anticoagulation Summary as of 8/6/2018     INR goal 2.0-3.0   Today's INR 3.1!   Full warfarin instructions 2.5 mg on Mon, Wed, Fri; 5 mg all other days   Next INR check 9/4/2018    Indications   Long-term (current) use of anticoagulants [Z79.01] [Z79.01]  CVA (cerebral vascular accident) (Resolved) [I63.9]         Your next Anticoagulation Clinic appointment(s)     Sep 04, 2018  1:30 PM CDT   Anticoagulation Visit with  ANTICOAGULATION CLINIC   [facility]    [address] [place] [id number] [phone]              Contact Numbers     [facility]  Please call [phone] with any problems or questions regarding your therapy, or to cancel or reschedule your appointment        August 2018 Details    Sun Mon Tue Wed Thu Fri Sat        1               2               3               4                 5               6      2.5 mg   See details      7      5 mg         8      2.5 mg         9      5 mg         10      2.5 mg         11      5 mg           12      5 mg         13      2.5 mg         14      5 mg         15      2.5 mg         16      5 mg         17      2.5 mg         18      5 mg           19      5 mg         20      2.5 mg         21      5 mg         22      2.5 mg         23      5 mg         24      2.5 mg         25      5 mg           26      5 mg         27      2.5 mg         28      5 mg         29      2.5 mg         30      5 mg         31      2.5 mg           Date Details   08/06 This INR check   Increase greens               How to take your warfarin dose     To take:  2.5 mg Take 0.5 of a 5 mg tablet.    To take:  5 mg Take 1 of the 5 mg tablets.           September 2018 Details    Sun Mon Tue Wed Thu Fri Sat            1      5 mg           2      5 mg         3      2.5 mg         4            5               6               7               8                 9               10               11               12               13               14               15                 16               17               18               19               20               21               22                 23               24               25               26               27               28               29                 30                      Date Details   No additional details    Date of next INR:  9/4/2018         How to take your warfarin dose     To take:  2.5 mg Take 0.5 of a 5 mg tablet.    To take:  5 mg Take 1 of the 5 mg tablets.

## 2018-08-07 DIAGNOSIS — E78.5 HYPERLIPIDEMIA LDL GOAL <70: ICD-10-CM

## 2018-08-07 RX ORDER — ROSUVASTATIN CALCIUM 20 MG/1
20 TABLET, COATED ORAL DAILY
Qty: 90 TABLET | Refills: 1 | Status: SHIPPED | OUTPATIENT
Start: 2018-08-07 | End: 2018-08-08

## 2018-08-08 DIAGNOSIS — E78.5 HYPERLIPIDEMIA LDL GOAL <70: ICD-10-CM

## 2018-08-08 RX ORDER — ROSUVASTATIN CALCIUM 20 MG/1
20 TABLET, COATED ORAL DAILY
Qty: 90 TABLET | Refills: 0 | Status: SHIPPED | OUTPATIENT
Start: 2018-08-08 | End: 2018-12-27

## 2018-08-22 DIAGNOSIS — E03.9 HYPOTHYROIDISM, UNSPECIFIED TYPE: ICD-10-CM

## 2018-08-22 LAB
T4 FREE SERPL-MCNC: 1.19 NG/DL (ref 0.76–1.46)
TSH SERPL DL<=0.005 MIU/L-ACNC: 0.22 MU/L (ref 0.4–4)

## 2018-08-22 PROCEDURE — 84443 ASSAY THYROID STIM HORMONE: CPT | Performed by: FAMILY MEDICINE

## 2018-08-22 PROCEDURE — 36415 COLL VENOUS BLD VENIPUNCTURE: CPT | Performed by: FAMILY MEDICINE

## 2018-08-22 PROCEDURE — 84439 ASSAY OF FREE THYROXINE: CPT | Performed by: FAMILY MEDICINE

## 2018-08-23 DIAGNOSIS — E03.9 HYPOTHYROIDISM, UNSPECIFIED TYPE: Primary | ICD-10-CM

## 2018-08-23 RX ORDER — LEVOTHYROXINE SODIUM 75 UG/1
75 TABLET ORAL DAILY
Qty: 60 TABLET | Refills: 0 | Status: SHIPPED | OUTPATIENT
Start: 2018-08-23 | End: 2018-08-27

## 2018-08-27 ENCOUNTER — OFFICE VISIT (OUTPATIENT)
Dept: FAMILY MEDICINE | Facility: CLINIC | Age: 78
End: 2018-08-27
Payer: COMMERCIAL

## 2018-08-27 VITALS
TEMPERATURE: 98.6 F | BODY MASS INDEX: 27.64 KG/M2 | OXYGEN SATURATION: 98 % | SYSTOLIC BLOOD PRESSURE: 134 MMHG | HEART RATE: 65 BPM | DIASTOLIC BLOOD PRESSURE: 70 MMHG | WEIGHT: 161 LBS

## 2018-08-27 DIAGNOSIS — M54.50 RIGHT-SIDED LOW BACK PAIN WITHOUT SCIATICA, UNSPECIFIED CHRONICITY: ICD-10-CM

## 2018-08-27 DIAGNOSIS — E03.9 HYPOTHYROIDISM, UNSPECIFIED TYPE: Primary | ICD-10-CM

## 2018-08-27 PROCEDURE — 99214 OFFICE O/P EST MOD 30 MIN: CPT | Performed by: FAMILY MEDICINE

## 2018-08-27 RX ORDER — LEVOTHYROXINE SODIUM 100 UG/1
100 TABLET ORAL DAILY
Qty: 90 TABLET | Refills: 1 | Status: SHIPPED | OUTPATIENT
Start: 2018-08-27 | End: 2019-01-22

## 2018-08-27 NOTE — MR AVS SNAPSHOT
After Visit Summary   8/27/2018    Salome Saeed    MRN: [id number]           Patient Information     Date Of Birth          1940        Visit Information        Provider Department      8/27/2018 10:40 AM Surya Dyer,  [facility]        Today's Diagnoses     Hypothyroidism, unspecified type    -  1    Right-sided low back pain without sciatica, unspecified chronicity           Follow-ups after your visit        Follow-up notes from your care team     Return in about 7 weeks (around 10/17/2018) for lab only visit to recheck thyroid. .      Your next 10 appointments already scheduled     Sep 04, 2018  1:30 PM CDT   Anticoagulation Visit with  ANTICOAGULATION CLINIC   [facility] [place] ([facility])    [address]  [facility] [id number] [phone]            Oct 09, 2018  8:30 AM CDT   LAB with RU LAB   [facility] ([facility])    [address] [place] [address] [address]  [facility] [id number] [phone]           Please do not eat 10-12 hours before your appointment if you are coming in fasting for labs on lipids, cholesterol, or glucose (sugar). This does not apply to pregnant women. Water, hot tea and black coffee (with nothing added) are okay. Do not drink other fluids, diet soda or chew gum.            Oct 09, 2018  9:30 AM CDT   Return Visit with Nikolai Amaya MD   [facility] ([facility])    [address]  [facility] [id number] [phone]              Future tests that were ordered for you today     Open Future Orders        Priority Expected Expires Ordered    T3, total Routine  8/27/2019 8/27/2018    T3, Free Routine  8/27/2019 8/27/2018    Thyroid peroxidase antibody Routine  8/27/2019 8/27/2018            Who to contact     If you have questions or need follow up information about today's clinic visit or your schedule  please contact [facility] [place] directly at [phone].  Normal or non-critical lab and imaging results will be communicated to you by MyChart, letter or phone within 4 business days after the clinic has received the results. If you do not hear from us within 7 days, please contact the clinic through mChronhart or phone. If you have a critical or abnormal lab result, we will notify you by phone as soon as possible.  Submit refill requests through [employer] or call your pharmacy and they will forward the refill request to us. Please allow 3 business days for your refill to be completed.          Additional Information About Your Visit        [employer]ronhar[employer]agnet[employer]s Information     [employer] gives you secure access to your electronic health record. If you see a primary care provider, you can also send messages to your care team and make appointments. If you have questions, please call your primary care clinic.  If you do not have a primary care provider, please call [phone] and they will assist you.        Care EveryWhere ID     This is your Care EveryWhere ID. This could be used by other organizations to access your [facility] medical records  [id number]        Your Vitals Were     Pulse Temperature Pulse Oximetry BMI (Body Mass Index)          65 98.6  F (37  C) (Oral) 98% 27.64 kg/m2         Blood Pressure from Last 3 Encounters:   08/27/18 134/70   02/08/18 120/64   11/01/17 119/66    Weight from Last 3 Encounters:   08/27/18 161 lb (73 kg)   02/08/18 156 lb (70.8 kg)   11/01/17 155 lb (70.3 kg)              We Performed the Following     PAF COMPLETED          Today's Medication Changes          These changes are accurate as of 8/27/18 11:21 AM.  If you have any questions, ask your nurse or doctor.               These medicines have changed or have updated prescriptions.        Dose/Directions    * levothyroxine 75 MCG tablet   Commonly known as:  SYNTHROID/LEVOTHROID   This may have changed:  Another medication with the  same name was added. Make sure you understand how and when to take each.   Used for:  Hypothyroidism, unspecified type   Changed by:  Surya Dyer DO        Dose:  75 mcg   Take 1 tablet (75 mcg) by mouth daily   Quantity:  60 tablet   Refills:  0       * levothyroxine 100 MCG tablet   Commonly known as:  SYNTHROID/LEVOTHROID   This may have changed:  You were already taking a medication with the same name, and this prescription was added. Make sure you understand how and when to take each.   Used for:  Hypothyroidism, unspecified type   Changed by:  Surya Dyer DO        Dose:  100 mcg   Take 1 tablet (100 mcg) by mouth daily   Quantity:  90 tablet   Refills:  1       * Notice:  This list has 2 medication(s) that are the same as other medications prescribed for you. Read the directions carefully, and ask your doctor or other care provider to review them with you.         Where to get your medicines      These medications were sent to [facility] - [place], [place] - [address]  [address], [facility] [address]     Phone:  [phone]     levothyroxine 100 MCG tablet                Primary Care Provider Office Phone # Fax #    Surya Dyer [phone] [address]  [place] [address]        Equal Access to Services     BRICE OLIVARES : Hadii solange ku hadasho Soomaali, waaxda luqadaha, qaybta kaalmada adeegyada, sonia pillai. So [facility] [phone].    ATENCIÓN: Si habla español, tiene a smith disposición servicios gratuitos de asistencia lingüística. Llame al [phone].    We comply with applicable federal civil rights laws and [place] laws. We do not discriminate on the basis of race, color, national origin, age, disability, sex, sexual orientation, or gender identity.            Thank you!     Thank you for choosing [facility]  for your care. Our goal is always to provide you with excellent care. Hearing back  from our patients is one way we can continue to improve our services. Please take a few minutes to complete the written survey that you may receive in the mail after your visit with us. Thank you!             Your Updated Medication List - Protect others around you: Learn how to safely use, store and throw away your medicines at www.disposemymeds.org.          This list is accurate as of 8/27/18 11:21 AM.  Always use your most recent med list.                   Brand Name Dispense Instructions for use Diagnosis    acetaminophen 325 MG tablet    TYLENOL     Take 2 tablets by mouth every 6 hours as needed for pain.        amLODIPine 5 MG tablet    NORVASC    90 tablet    Take 1 tablet (5 mg) by mouth daily    HTN (hypertension)       ascorbic acid 500 MG tablet    VITAMIN C     Take 1,000 mg by mouth daily        aspirin 81 MG EC tablet      Take 1 tablet (81 mg) by mouth daily    Coronary artery disease involving native coronary artery of native heart without angina pectoris       atenolol 25 MG tablet    TENORMIN    180 tablet    Take 1 tablet (25 mg) by mouth 2 times daily    Coronary artery disease involving native coronary artery of native heart without angina pectoris       CALCIUM 600 + D PO      Take 1,200 mg by mouth daily        CoQ-10 200 MG Caps      Take 400 mg by mouth daily        cyclopentolate 1 % ophthalmic solution    CYCLOGYL          DAILY MULTIVITAMIN PO      Take by mouth daily        DUREZOL 0.05 % ophthalmic emulsion   Generic drug:  difluprednate           escitalopram 5 MG tablet    LEXAPRO    90 tablet    Take 1 tablet (5 mg) by mouth daily    Anxiety       gatifloxacin 0.5 % ophthalmic solution    ZYMAXID          ketorolac 0.5 % ophthalmic solution    ACULAR          * levothyroxine 75 MCG tablet    SYNTHROID/LEVOTHROID    60 tablet    Take 1 tablet (75 mcg) by mouth daily    Hypothyroidism, unspecified type       * levothyroxine 100 MCG tablet    SYNTHROID/LEVOTHROID    90 tablet    Take  1 tablet (100 mcg) by mouth daily    Hypothyroidism, unspecified type       lisinopril 20 MG tablet    PRINIVIL/ZESTRIL    90 tablet    Take 1 tablet (20 mg) by mouth daily    Essential hypertension, benign       moxifloxacin 0.5 % ophthalmic solution    VIGAMOX          nitroGLYcerin 0.4 MG sublingual tablet    NITROSTAT    25 tablet    Place 1 tablet (0.4 mg) under the tongue every 5 minutes as needed for chest pain    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)       prednisoLONE acetate 1 % ophthalmic susp    PRED FORTE          ranitidine 150 MG tablet    ZANTAC    180 tablet    Take 1 tablet (150 mg) by mouth 2 times daily    Gastroesophageal reflux disease with esophagitis       rosuvastatin 20 MG tablet    CRESTOR    90 tablet    Take 1 tablet (20 mg) by mouth daily    Hyperlipidemia LDL goal <70       vitamin B complex with vitamin C Tabs tablet      Take 1 tablet by mouth daily        vitamin D 2000 units tablet      Take 2,000 Units by mouth daily        warfarin 5 MG tablet    COUMADIN    90 tablet    TAKE 1/2 TO 1 TABLET EVERY DAY AS DIRECTED  BY  INR  NURSE  BASED ON INR READING    Cerebrovascular accident involving cerebellum (H)       * Notice:  This list has 2 medication(s) that are the same as other medications prescribed for you. Read the directions carefully, and ask your doctor or other care provider to review them with you.

## 2018-08-27 NOTE — PROGRESS NOTES
SUBJECTIVE:   Salome Saeed is a 78 year old female who presents to clinic today for the following health issues:      Hypothyroidism Follow-up      Since last visit, patient describes the following symptoms: weight gain of 5 lbs      Amount of exercise or physical activity: 4-5 days/week for an average of 15-30 minutes    Problems taking medications regularly: No    Medication side effects: none    Diet: regular (no restrictions)    Don't recall any diagnosis of hyperthyroidism. No history of procedures on thyroid. No head trauma. Doesn't remember why she was started on Synthroid.  She thinks she has been taking it for at least 10-12 years.  Since dose has been lowered, she feels more fatigued, has episodes of sweating, has gained weight and overall doesn't feel as well. No skin changes. No diarrhea or constipation. Denies any chest pain, palpitations, or shortness of breath.      Right low back pain.  Has been worse for the past couple months. Intermittently comes and goes.  Will take Tylenol arthritis or Aleve. Ice pack is what really helps her symptoms. No urinary symptoms. Denies any saddle anesthesia or episodes of incontinence. No history of trauma or injury. She did have lumbar fusion in 2013. Has been seeing chiropractor with some relief. No radiation of pain. No numbness or tingling in her lower extremities. No sensory loss.    Problem list and histories reviewed & adjusted, as indicated.  Additional history: as documented    Patient Active Problem List   Diagnosis     Benign essential hypertension     Coronary artery disease involving native coronary artery of native heart without angina pectoris     Hyperlipidemia LDL goal <70     Advanced directives, counseling/discussion     DDD (degenerative disc disease), lumbar     Lumbar spinal stenosis     Lumbago     Spinal stenosis, lumbar region, with neurogenic claudication     Atypical chest pain     Health Care Home     Anxiety     Mitral  regurgitation     Status post coronary angiogram     GERD (gastroesophageal reflux disease)     Long-term (current) use of anticoagulants [Z79.01]     Hypothyroidism, unspecified type     Vertebral artery stenosis, right     Cerebrovascular accident involving cerebellum (H)     Chronic bilateral low back pain without sciatica     Status post arthrodesis     Past Surgical History:   Procedure Laterality Date     CORONARY ANGIOGRAPHY ADULT ORDER  2012    40-50% stenosis to mid PDA, 80% in D1- medically treat     CORONARY ANGIOGRAPHY ADULT ORDER  2015    PTCA and 2.25x8mm Promus PREMIER NAILA to ostium of 2nd OM     DECOMPRESSION, FUSION LUMBAR POSTERIOR THREE + LEVELS, COMBINED  2013    Procedure: COMBINED DECOMPRESSION, FUSION LUMBAR POSTERIOR THREE + LEVELS;  Posterior Lumbar Decompression L3-S1, Fusion L4-S1;  Surgeon: Daniel [facility] MD;  Location: [facility] OR     ENDOSCOPIC STRIPPING VEIN(S)       HEART CATH, ANGIOPLASTY  2009    PTCA and two 2.5x15mm Xience stents to mid LAD lesion     HYSTERECTOMY, RICKIE      Fibroids, and Menorrhagia.. Bilateral Oophrectomy     ORTHOPEDIC SURGERY       Stenting of LAD, Angioplasty  09     TONSILLECTOMY      as a child       Social History   Substance Use Topics     Smoking status: Former Smoker     Quit date: 1965     Smokeless tobacco: Never Used     Alcohol use 0.0 oz/week     0 Standard drinks or equivalent per week      Comment: 2 glasses wine per month     Family History   Problem Relation Age of Onset     Neurologic Disorder Mother      Dementia, Still living     Ovarian Cancer Mother      Thyroid Disease Mother      C.A.D. Father           Diabetes Father      Coronary Artery Disease Father      Alcohol/Drug Brother      Thyroid Disease Son      Diabetes Son            Reviewed and updated as needed this visit by clinical staff       Reviewed and updated as needed this visit by Provider         ROS:  Constitutional, HEENT, cardiovascular,  pulmonary, gi and gu systems are negative, except as otherwise noted.    OBJECTIVE:     /70  Pulse 65  Temp 98.6  F (37  C) (Oral)  Wt 161 lb (73 kg)  SpO2 98%  BMI 27.64 kg/m2  Body mass index is 27.64 kg/(m^2).  GENERAL: healthy, alert and no distress  RESP: lungs clear to auscultation - no rales, rhonchi or wheezes  CV: regular rate and rhythm, normal S1 S2, no S3 or S4, no murmur, click or rub, no peripheral edema and peripheral pulses strong  Comprehensive back pain exam:  mild tenderness of right lumbar paraspinal musculature, Range of motion not limited by pain, Lower extremity strength functional and equal on both sides,Lower extremity sensation normal and equal on both sides and Straight leg raise negative bilaterally    Diagnostic Test Results:  none     ASSESSMENT/PLAN:   1. Hypothyroidism, unspecified type: unclear etiology for hypothyroidism based on chart review. No history of thyroid procedure or ever being hyperthyroid. NO history of radiation. ?subclinical hypothyroidism. For the past several years, TSH has been low with normal T4 and Synthroid dosing remained the same. Will increase Synthroid back to 100 mcg daily. Recheck thyroid labs in a couple months. Could consider endocrinology consult for recommendations in the future.  - PAF COMPLETED  - levothyroxine (SYNTHROID/LEVOTHROID) 100 MCG tablet; Take 1 tablet (100 mcg) by mouth daily  Dispense: 90 tablet; Refill: 1  - T3, total; Future  - T3, Free; Future  - Thyroid peroxidase antibody; Future    2. Right-sided low back pain without sciatica, unspecified chronicity: seems more muscle related on exam. Encouraged ongoing chiropractic care as helpful. Can try ice and/or heat -- whichever is more helpful. Also try acetaminophen as needed. If still not improving, could refer to physical therapy for further evaluation and recommendations.    Surya Dyer,   [facility]

## 2018-09-04 ENCOUNTER — TELEPHONE (OUTPATIENT)
Dept: FAMILY MEDICINE | Facility: CLINIC | Age: 78
End: 2018-09-04

## 2018-09-04 ENCOUNTER — ANTICOAGULATION THERAPY VISIT (OUTPATIENT)
Dept: NURSING | Facility: CLINIC | Age: 78
End: 2018-09-04
Payer: COMMERCIAL

## 2018-09-04 DIAGNOSIS — Z79.01 LONG-TERM (CURRENT) USE OF ANTICOAGULANTS: ICD-10-CM

## 2018-09-04 LAB — INR POINT OF CARE: 3.8 (ref 0.86–1.14)

## 2018-09-04 PROCEDURE — 85610 PROTHROMBIN TIME: CPT | Mod: QW

## 2018-09-04 PROCEDURE — 36416 COLLJ CAPILLARY BLOOD SPEC: CPT

## 2018-09-04 NOTE — MR AVS SNAPSHOT
Salome Eppersonde   9/4/2018 1:30 PM   Anticoagulation Therapy Visit    Description:  78 year old female   Provider:   ANTICOAGULATION CLINIC   Department:   Nurse           INR as of 9/4/2018     Today's INR 3.8!      Anticoagulation Summary as of 9/4/2018     INR goal 2.0-3.0   Today's INR 3.8!   Full warfarin instructions 9/4: Hold; Otherwise 2.5 mg on Mon, Wed, Fri; 5 mg all other days   Next INR check 9/13/2018    Indications   Long-term (current) use of anticoagulants [Z79.01] [Z79.01]  CVA (cerebral vascular accident) (Resolved) [I63.9]         Your next Anticoagulation Clinic appointment(s)     Sep 13, 2018  1:30 PM CDT   Anticoagulation Visit with  ANTICOAGULATION CLINIC   [facility] [place] ([facility])    [address] [id number] [phone]              Contact Numbers     [place] [facility]  Please call [phone] with any problems or questions regarding your therapy, or to cancel or reschedule your appointment        September 2018 Details    Sun Mon Tue Wed Thu Fri Sat           1                 2               3               4      Hold   See details      5      2.5 mg         6      5 mg         7      2.5 mg         8      5 mg           9      5 mg         10      2.5 mg         11      5 mg         12      2.5 mg         13            14               15                 16               17               18               19               20               21               22                 23               24               25               26               27               28               29                 30                      Date Details   09/04 This INR check       Date of next INR:  9/13/2018         How to take your warfarin dose     To take:  2.5 mg Take 0.5 of a 5 mg tablet.    To take:  5 mg Take 1 of the 5 mg tablets.    Hold Do not take your warfarin dose. See the Details table to the right for additional instructions.

## 2018-09-04 NOTE — TELEPHONE ENCOUNTER
Form received via fax.  Completed and signed and faxed back to [facility] [phone].  Copy scanned to chart.  Christine Spence MA

## 2018-09-04 NOTE — PROGRESS NOTES
ANTICOAGULATION FOLLOW-UP CLINIC VISIT    Patient Name:  Salome Saeed  Date:  9/4/2018  Contact Type:  Face to Face    SUBJECTIVE:     Patient Findings     Positives Change in medications (has been taking prednisone for back issue), No Problem Findings           OBJECTIVE    INR Protime   Date Value Ref Range Status   09/04/2018 3.8 (A) 0.86 - 1.14 Final       ASSESSMENT / PLAN  INR assessment SUPRA    Recheck INR In: 10 DAYS    INR Location Clinic      Anticoagulation Summary as of 9/4/2018     INR goal 2.0-3.0   Today's INR 3.8!   Warfarin maintenance plan 2.5 mg (5 mg x 0.5) on Mon, Wed, Fri; 5 mg (5 mg x 1) all other days   Full warfarin instructions 9/4: Hold; Otherwise 2.5 mg on Mon, Wed, Fri; 5 mg all other days   Weekly warfarin total 27.5 mg   Plan last modified Day, NUBIA Lay (5/17/2018)   Next INR check 9/13/2018   Target end date     Indications   Long-term (current) use of anticoagulants [Z79.01] [Z79.01]  CVA (cerebral vascular accident) (Resolved) [I63.9]         Anticoagulation Episode Summary     INR check location     Preferred lab     Send INR reminders to [facility]    Comments             See the Encounter Report to view Anticoagulation Flowsheet and Dosing Calendar (Go to Encounters tab in chart review, and find the Anticoagulation Therapy Visit)    Dosage adjustment made based on physician directed care plan. See full instructions above.    Rosanne Rico RN

## 2018-09-07 DIAGNOSIS — I10 HTN (HYPERTENSION): ICD-10-CM

## 2018-09-07 RX ORDER — AMLODIPINE BESYLATE 5 MG/1
5 TABLET ORAL DAILY
Qty: 30 TABLET | Refills: 0 | Status: SHIPPED | OUTPATIENT
Start: 2018-09-07 | End: 2018-10-02

## 2018-09-09 DIAGNOSIS — F41.9 ANXIETY: ICD-10-CM

## 2018-09-10 NOTE — TELEPHONE ENCOUNTER
"Requested Prescriptions   Pending Prescriptions Disp Refills     escitalopram (LEXAPRO) 5 MG tablet [Pharmacy Med Name: ESCITALOPRAM OXALATE 5 MG Tablet]  Last Written Prescription Date:  11/1/2017  Last Fill Quantity: 90 tablet,  # refills: 3   Last office visit: 8/27/2018 with prescribing provider:  Manisha     Future Office Visit:   Next 5 appointments (look out 90 days)     Oct 09, 2018  9:30 AM CDT   Return Visit with Nikolai Amaya MD   [facility] ([facility])    [address]  [facility] [address] [phone]                    90 tablet 3     Sig: TAKE 1 TABLET EVERY DAY    SSRIs Protocol Passed    9/9/2018  7:46 AM    PHQ-9 SCORE 1/30/2014 9/28/2017   Total Score 4 -   Total Score - 0     VALERIA-7 SCORE 2/3/2014 9/28/2017   Total Score 0 -   Total Score - 0            Passed - Recent (12 mo) or future (30 days) visit within the authorizing provider's specialty    Patient had office visit in the last 12 months or has a visit in the next 30 days with authorizing provider or within the authorizing provider's specialty.  See \"Patient Info\" tab in inbasket, or \"Choose Columns\" in Meds & Orders section of the refill encounter.           Passed - Patient is age 18 or older       Passed - No active pregnancy on record       Passed - No positive pregnancy test in last 12 months          "

## 2018-09-11 RX ORDER — ESCITALOPRAM OXALATE 5 MG/1
TABLET ORAL
Qty: 90 TABLET | Refills: 3 | Status: SHIPPED | OUTPATIENT
Start: 2018-09-11 | End: 2019-07-01

## 2018-09-13 ENCOUNTER — ANTICOAGULATION THERAPY VISIT (OUTPATIENT)
Dept: NURSING | Facility: CLINIC | Age: 78
End: 2018-09-13
Payer: COMMERCIAL

## 2018-09-13 DIAGNOSIS — Z79.01 LONG-TERM (CURRENT) USE OF ANTICOAGULANTS: ICD-10-CM

## 2018-09-13 LAB — INR POINT OF CARE: 5.1 (ref 0.86–1.14)

## 2018-09-13 PROCEDURE — 85610 PROTHROMBIN TIME: CPT | Mod: QW

## 2018-09-13 PROCEDURE — 36416 COLLJ CAPILLARY BLOOD SPEC: CPT

## 2018-09-13 NOTE — MR AVS SNAPSHOT
Salome Eppersonde   9/13/2018 1:30 PM   Anticoagulation Therapy Visit    Description:  78 year old female   Provider:   ANTICOAGULATION CLINIC   Department:   Nurse           INR as of 9/13/2018     Today's INR 5.1!      Anticoagulation Summary as of 9/13/2018     INR goal 2.0-3.0   Today's INR 5.1!   Full warfarin instructions 9/13: Hold; 9/14: Hold; Otherwise 2.5 mg on Mon, Wed, Fri; 5 mg all other days   Next INR check 9/20/2018    Indications   Long-term (current) use of anticoagulants [Z79.01] [Z79.01]  CVA (cerebral vascular accident) (Resolved) [I63.9]         Your next Anticoagulation Clinic appointment(s)     Sep 20, 2018  1:30 PM CDT   Anticoagulation Visit with  ANTICOAGULATION CLINIC   [facility] [place] ([facility])    [address]  [place] [id number] [phone]              Contact Numbers     [place] [facility]  Please call [phone] with any problems or questions regarding your therapy, or to cancel or reschedule your appointment        September 2018 Details    Sun Mon Tue Wed Thu Fri Sat           1                 2               3               4               5               6               7               8                 9               10               11               12               13      Hold   See details      14      Hold         15      5 mg           16      5 mg         17      2.5 mg         18      5 mg         19      2.5 mg         20            21               22                 23               24               25               26               27               28               29                 30                      Date Details   09/13 This INR check       Date of next INR:  9/20/2018         How to take your warfarin dose     To take:  2.5 mg Take 0.5 of a 5 mg tablet.    To take:  5 mg Take 1 of the 5 mg tablets.    Hold Do not take your warfarin dose. See the Details table to the right for additional instructions.

## 2018-09-13 NOTE — PROGRESS NOTES
ANTICOAGULATION FOLLOW-UP CLINIC VISIT    Patient Name:  Salome Saeed  Date:  9/13/2018  Contact Type:  Face to Face    SUBJECTIVE:     Patient Findings     Positives Change in medications (on prednisone taper)           OBJECTIVE    INR Protime   Date Value Ref Range Status   09/13/2018 5.1 (A) 0.86 - 1.14 Final       ASSESSMENT / PLAN  INR assessment SUPRA    Recheck INR In: 1 WEEK    INR Location Clinic      Anticoagulation Summary as of 9/13/2018     INR goal 2.0-3.0   Today's INR 5.1!   Warfarin maintenance plan 2.5 mg (5 mg x 0.5) on Mon, Wed, Fri; 5 mg (5 mg x 1) all other days   Full warfarin instructions 9/13: Hold; 9/14: Hold; Otherwise 2.5 mg on Mon, Wed, Fri; 5 mg all other days   Weekly warfarin total 27.5 mg   Plan last modified Day, NUBIA Lay (5/17/2018)   Next INR check 9/20/2018   Target end date     Indications   Long-term (current) use of anticoagulants [Z79.01] [Z79.01]  CVA (cerebral vascular accident) (Resolved) [I63.9]         Anticoagulation Episode Summary     INR check location     Preferred lab     Send INR reminders to [facility]    Comments             See the Encounter Report to view Anticoagulation Flowsheet and Dosing Calendar (Go to Encounters tab in chart review, and find the Anticoagulation Therapy Visit)    Dosage adjustment made based on physician directed care plan. See full instructions above.    Rosanne Rico RN

## 2018-09-20 ENCOUNTER — ANTICOAGULATION THERAPY VISIT (OUTPATIENT)
Dept: NURSING | Facility: CLINIC | Age: 78
End: 2018-09-20
Payer: COMMERCIAL

## 2018-09-20 DIAGNOSIS — Z79.01 LONG-TERM (CURRENT) USE OF ANTICOAGULANTS: ICD-10-CM

## 2018-09-20 LAB — INR POINT OF CARE: 3.2 (ref 0.86–1.14)

## 2018-09-20 PROCEDURE — 36416 COLLJ CAPILLARY BLOOD SPEC: CPT

## 2018-09-20 PROCEDURE — 85610 PROTHROMBIN TIME: CPT | Mod: QW

## 2018-09-20 NOTE — MR AVS SNAPSHOT
Salome SPRAGUE Darlin   9/20/2018 11:00 AM   Anticoagulation Therapy Visit    Description:  78 year old female   Provider:   ANTICOAGULATION CLINIC   Department:   Nurse           INR as of 9/20/2018     Today's INR 3.2!      Anticoagulation Summary as of 9/20/2018     INR goal 2.0-3.0   Today's INR 3.2!   Full warfarin instructions 5 mg on Mon, Wed, Fri; 2.5 mg all other days   Next INR check 10/4/2018    Indications   Long-term (current) use of anticoagulants [Z79.01] [Z79.01]  CVA (cerebral vascular accident) (Resolved) [I63.9]         Your next Anticoagulation Clinic appointment(s)     Oct 04, 2018  1:30 PM CDT   Anticoagulation Visit with  ANTICOAGULATION CLINIC   [facility] ([facility])    [address]  [place] [id number] [phone]              Contact Numbers     [place] [facility]  Please call [phone] with any problems or questions regarding your therapy, or to cancel or reschedule your appointment        September 2018 Details    Sun Mon Tue Wed Thu Fri Sat           1                 2               3               4               5               6               7               8                 9               10               11               12               13               14               15                 16               17               18               19               20      2.5 mg   See details      21      5 mg         22      2.5 mg           23      2.5 mg         24      5 mg         25      2.5 mg         26      5 mg         27      2.5 mg         28      5 mg         29      2.5 mg           30      2.5 mg                Date Details   09/20 This INR check               How to take your warfarin dose     To take:  2.5 mg Take 0.5 of a 5 mg tablet.    To take:  5 mg Take 1 of the 5 mg tablets.           October 2018 Details    Sun Mon Tue Wed Thu Fri Sat      1      5 mg         2      2.5 mg         3      5 mg         4            5                6                 7               8               9               10               11               12               13                 14               15               16               17               18               19               20                 21               22               23               24               25               26               27                 28               29               30               31                   Date Details   No additional details    Date of next INR:  10/4/2018         How to take your warfarin dose     To take:  2.5 mg Take 0.5 of a 5 mg tablet.    To take:  5 mg Take 1 of the 5 mg tablets.

## 2018-09-20 NOTE — PROGRESS NOTES
ANTICOAGULATION FOLLOW-UP CLINIC VISIT    Patient Name:  Salome Saeed  Date:  9/20/2018  Contact Type:  Face to Face    SUBJECTIVE:     Patient Findings     Positives Change in medications (was on prednisone taper, completed 3-4 days ago)           OBJECTIVE    INR Protime   Date Value Ref Range Status   09/20/2018 3.2 (A) 0.86 - 1.14 Final       ASSESSMENT / PLAN  INR assessment SUPRA    Recheck INR In: 2 WEEKS    INR Location Clinic      Anticoagulation Summary as of 9/20/2018     INR goal 2.0-3.0   Today's INR 3.2!   Warfarin maintenance plan 5 mg (5 mg x 1) on Mon, Wed, Fri; 2.5 mg (5 mg x 0.5) all other days   Full warfarin instructions 5 mg on Mon, Wed, Fri; 2.5 mg all other days   Weekly warfarin total 25 mg   Plan last modified Day, NUBIA Lay (9/20/2018)   Next INR check 10/4/2018   Target end date     Indications   Long-term (current) use of anticoagulants [Z79.01] [Z79.01]  CVA (cerebral vascular accident) (Resolved) [I63.9]         Anticoagulation Episode Summary     INR check location     Preferred lab     Send INR reminders to [facility]    Comments             See the Encounter Report to view Anticoagulation Flowsheet and Dosing Calendar (Go to Encounters tab in chart review, and find the Anticoagulation Therapy Visit)    Dosage adjustment made based on physician directed care plan. Given recent supratherapeutic readings, will decrease maintenance dose today from 27.5mg per week to 25mg per week (9.1% decrease). Recheck in 2 weeks.    Rosanne Rico RN

## 2018-10-02 DIAGNOSIS — I10 ESSENTIAL HYPERTENSION: ICD-10-CM

## 2018-10-02 RX ORDER — AMLODIPINE BESYLATE 5 MG/1
5 TABLET ORAL DAILY
Qty: 30 TABLET | Refills: 0 | Status: SHIPPED | OUTPATIENT
Start: 2018-10-02 | End: 2018-10-29

## 2018-10-02 NOTE — TELEPHONE ENCOUNTER
Refill for 30 day supply of 5mg amlodipine sent. Patient has follow-up appointment with Dr. Amaya on 10/9/18. Will need further refills then.

## 2018-10-04 ENCOUNTER — ANTICOAGULATION THERAPY VISIT (OUTPATIENT)
Dept: NURSING | Facility: CLINIC | Age: 78
End: 2018-10-04
Payer: COMMERCIAL

## 2018-10-04 LAB — INR POINT OF CARE: 2.6 (ref 0.86–1.14)

## 2018-10-04 PROCEDURE — 85610 PROTHROMBIN TIME: CPT | Mod: QW

## 2018-10-04 PROCEDURE — 36416 COLLJ CAPILLARY BLOOD SPEC: CPT

## 2018-10-04 NOTE — MR AVS SNAPSHOT
Salome Eppersonde   10/4/2018 3:15 PM   Anticoagulation Therapy Visit    Description:  78 year old female   Provider:   ANTICOAGULATION CLINIC   Department:   Nurse           INR as of 10/4/2018     Today's INR 2.6      Anticoagulation Summary as of 10/4/2018     INR goal 2.0-3.0   Today's INR 2.6   Full warfarin instructions 5 mg on Mon, Wed, Fri; 2.5 mg all other days   Next INR check 10/25/2018    Indications   Long-term (current) use of anticoagulants [Z79.01] [Z79.01]  CVA (cerebral vascular accident) (Resolved) [I63.9]         Your next Anticoagulation Clinic appointment(s)     Oct 25, 2018  1:30 PM CDT   Anticoagulation Visit with  ANTICOAGULATION CLINIC   [facility] [place] ([facility])    [address]  [facility] [place] [address] [phone]              Contact Numbers     [place] [facility]  Please call [phone] with any problems or questions regarding your therapy, or to cancel or reschedule your appointment        October 2018 Details    Sun Mon Tue Wed Thu Fri Sat      1               2               3               4      2.5 mg   See details      5      5 mg         6      2.5 mg           7      2.5 mg         8      5 mg         9      2.5 mg         10      5 mg         11      2.5 mg         12      5 mg         13      2.5 mg           14      2.5 mg         15      5 mg         16      2.5 mg         17      5 mg         18      2.5 mg         19      5 mg         20      2.5 mg           21      2.5 mg         22      5 mg         23      2.5 mg         24      5 mg         25            26               27                 28               29               30               31                   Date Details   10/04 This INR check       Date of next INR:  10/25/2018         How to take your warfarin dose     To take:  2.5 mg Take 0.5 of a 5 mg tablet.    To take:  5 mg Take 1 of the 5 mg tablets.

## 2018-10-04 NOTE — PROGRESS NOTES
ANTICOAGULATION FOLLOW-UP CLINIC VISIT    Patient Name:  Salome Saeed  Date:  10/4/2018  Contact Type:  Face to Face    SUBJECTIVE:     Patient Findings     Positives Bleeding gums, No Problem Findings           OBJECTIVE    INR Protime   Date Value Ref Range Status   10/04/2018 2.6 (A) 0.86 - 1.14 Final       ASSESSMENT / PLAN  INR assessment THER    Recheck INR In: 3 WEEKS    INR Location Clinic      Anticoagulation Summary as of 10/4/2018     INR goal 2.0-3.0   Today's INR 2.6   Warfarin maintenance plan 5 mg (5 mg x 1) on Mon, Wed, Fri; 2.5 mg (5 mg x 0.5) all other days   Full warfarin instructions 5 mg on Mon, Wed, Fri; 2.5 mg all other days   Weekly warfarin total 25 mg   Plan last modified Day, NUBIA Lay (9/20/2018)   Next INR check 10/25/2018   Target end date     Indications   Long-term (current) use of anticoagulants [Z79.01] [Z79.01]  CVA (cerebral vascular accident) (Resolved) [I63.9]         Anticoagulation Episode Summary     INR check location     Preferred lab     Send INR reminders to [facility]    Comments             See the Encounter Report to view Anticoagulation Flowsheet and Dosing Calendar (Go to Encounters tab in chart review, and find the Anticoagulation Therapy Visit)    Rosanne Rico RN

## 2018-10-05 ENCOUNTER — PRE VISIT (OUTPATIENT)
Dept: CARDIOLOGY | Facility: CLINIC | Age: 78
End: 2018-10-05

## 2018-10-09 ENCOUNTER — OFFICE VISIT (OUTPATIENT)
Dept: CARDIOLOGY | Facility: CLINIC | Age: 78
End: 2018-10-09
Payer: COMMERCIAL

## 2018-10-09 VITALS
HEART RATE: 60 BPM | BODY MASS INDEX: 27.31 KG/M2 | WEIGHT: 160 LBS | HEIGHT: 64 IN | DIASTOLIC BLOOD PRESSURE: 70 MMHG | SYSTOLIC BLOOD PRESSURE: 130 MMHG

## 2018-10-09 DIAGNOSIS — I10 BENIGN ESSENTIAL HYPERTENSION: Primary | Chronic | ICD-10-CM

## 2018-10-09 DIAGNOSIS — Z79.01 LONG TERM CURRENT USE OF ANTICOAGULANT THERAPY: ICD-10-CM

## 2018-10-09 DIAGNOSIS — I25.10 CORONARY ARTERY DISEASE INVOLVING NATIVE CORONARY ARTERY OF NATIVE HEART WITHOUT ANGINA PECTORIS: Chronic | ICD-10-CM

## 2018-10-09 DIAGNOSIS — I25.10 CORONARY ARTERY DISEASE INVOLVING NATIVE CORONARY ARTERY OF NATIVE HEART WITHOUT ANGINA PECTORIS: ICD-10-CM

## 2018-10-09 DIAGNOSIS — R06.09 DOE (DYSPNEA ON EXERTION): ICD-10-CM

## 2018-10-09 DIAGNOSIS — E78.5 HYPERLIPIDEMIA LDL GOAL <70: ICD-10-CM

## 2018-10-09 LAB
ANION GAP SERPL CALCULATED.3IONS-SCNC: 4 MMOL/L (ref 3–14)
BUN SERPL-MCNC: 9 MG/DL (ref 7–30)
CALCIUM SERPL-MCNC: 8.9 MG/DL (ref 8.5–10.1)
CHLORIDE SERPL-SCNC: 103 MMOL/L (ref 94–109)
CHOLEST SERPL-MCNC: 150 MG/DL
CO2 SERPL-SCNC: 29 MMOL/L (ref 20–32)
CREAT SERPL-MCNC: 0.73 MG/DL (ref 0.52–1.04)
GFR SERPL CREATININE-BSD FRML MDRD: 77 ML/MIN/1.7M2
GLUCOSE SERPL-MCNC: 102 MG/DL (ref 70–99)
HDLC SERPL-MCNC: 73 MG/DL
LDLC SERPL CALC-MCNC: 64 MG/DL
NONHDLC SERPL-MCNC: 77 MG/DL
POTASSIUM SERPL-SCNC: 3.8 MMOL/L (ref 3.4–5.3)
SODIUM SERPL-SCNC: 136 MMOL/L (ref 133–144)
TRIGL SERPL-MCNC: 67 MG/DL

## 2018-10-09 PROCEDURE — 80061 LIPID PANEL: CPT | Performed by: INTERNAL MEDICINE

## 2018-10-09 PROCEDURE — 80048 BASIC METABOLIC PNL TOTAL CA: CPT | Performed by: INTERNAL MEDICINE

## 2018-10-09 PROCEDURE — 99213 OFFICE O/P EST LOW 20 MIN: CPT | Performed by: INTERNAL MEDICINE

## 2018-10-09 PROCEDURE — 36415 COLL VENOUS BLD VENIPUNCTURE: CPT | Performed by: INTERNAL MEDICINE

## 2018-10-09 NOTE — LETTER
10/9/2018    Surya Dyer, DO  [address]  [place] [address]    RE: Salome Saeed       Dear Colleague,    I had the pleasure of seeing Salome CELESTINE Saeed in the [facility].    HPI and Plan:   See dictation    Orders Placed This Encounter   Procedures     Basic metabolic panel     Lipid Profile     Follow-Up with Cardiac Advanced Practice Provider     Follow-Up with Cardiologist       No orders of the defined types were placed in this encounter.      Medications Discontinued During This Encounter   Medication Reason     cyclopentolate (CYCLOGYL) 1 % ophthalmic solution      DUREZOL 0.05 % ophthalmic emulsion      moxifloxacin (VIGAMOX) 0.5 % ophthalmic solution      prednisoLONE acetate (PRED FORTE) 1 % ophthalmic susp      ketorolac (ACULAR) 0.5 % ophthalmic solution      gatifloxacin (ZYMAXID) 0.5 % ophthalmic solution          Encounter Diagnoses   Name Primary?     Benign essential hypertension Yes     Coronary artery disease involving native coronary artery of native heart without angina pectoris      Hyperlipidemia LDL goal <70      WILSON (dyspnea on exertion)      Long term current use of anticoagulant therapy        CURRENT MEDICATIONS:  Current Outpatient Prescriptions   Medication Sig Dispense Refill     acetaminophen (TYLENOL) 325 MG tablet Take 2 tablets by mouth every 6 hours as needed for pain.  0     amLODIPine (NORVASC) 5 MG tablet Take 1 tablet (5 mg) by mouth daily 30 tablet 0     ascorbic acid (VITAMIN C) 500 MG tablet Take 1,000 mg by mouth daily        aspirin EC 81 MG tablet Take 1 tablet (81 mg) by mouth daily       atenolol (TENORMIN) 25 MG tablet Take 1 tablet (25 mg) by mouth 2 times daily 180 tablet 3     Calcium Carb-Cholecalciferol (CALCIUM 600 + D PO) Take 1,200 mg by mouth daily        Cholecalciferol (VITAMIN D) 2000 UNITS tablet Take 2,000 Units by mouth daily       Coenzyme Q10 (COQ-10) 200 MG CAPS Take 400 mg by mouth daily         escitalopram (LEXAPRO) 5 MG tablet TAKE 1 TABLET EVERY DAY 90 tablet 3     levothyroxine (SYNTHROID/LEVOTHROID) 100 MCG tablet Take 1 tablet (100 mcg) by mouth daily 90 tablet 1     lisinopril (PRINIVIL/ZESTRIL) 20 MG tablet Take 1 tablet (20 mg) by mouth daily 90 tablet 3     Multiple Vitamin (DAILY MULTIVITAMIN PO) Take by mouth daily       nitroGLYcerin (NITROSTAT) 0.4 MG sublingual tablet Place 1 tablet (0.4 mg) under the tongue every 5 minutes as needed for chest pain 25 tablet 2     ranitidine (ZANTAC) 150 MG tablet Take 1 tablet (150 mg) by mouth 2 times daily 180 tablet 1     rosuvastatin (CRESTOR) 20 MG tablet Take 1 tablet (20 mg) by mouth daily 90 tablet 0     vitamin B complex with vitamin C (VITAMIN  B COMPLEX) TABS tablet Take 1 tablet by mouth daily       warfarin (COUMADIN) 5 MG tablet TAKE 1/2 TO 1 TABLET EVERY DAY AS DIRECTED  BY  INR  NURSE  BASED ON INR READING 90 tablet 1       ALLERGIES     Allergies   Allergen Reactions     Atorvastatin      Leg cramps     Gluten      Sinuses affected by gluten     Magnesium Salicylate      Oat      Shellfish Allergy      hives     Wheat        PAST MEDICAL HISTORY:  Past Medical History:   Diagnosis Date     CAD (coronary artery disease) 4/9/2009 4/8/2009: PTCA and two 2.5x15mm Xience stents to mid LAD lesion; Cath 12/2012- 40-50% stenosis in mid PDA, 80% on D1- medically treat , 5/28/2015 - PTCA and 2.25x8mm Promus PREMIIER NAILA to ostium of 2nd OM     CVA (cerebral infarction)      DDD (degenerative disc disease)      Essential hypertension, benign      GERD (gastroesophageal reflux disease)      Headache      Hyperlipidemia      Hypothyroidism      Lumbago      Lumbar spinal stenosis      Mitral regurgitation     1+ per 7/2013 Echo     Vertebral artery stenosis, right        PAST SURGICAL HISTORY:  Past Surgical History:   Procedure Laterality Date     CORONARY ANGIOGRAPHY ADULT ORDER  12/6/2012    40-50% stenosis to mid PDA, 80% in D1- medically treat      CORONARY ANGIOGRAPHY ADULT ORDER  2015    PTCA and 2.25x8mm Promus PREMIER NAILA to ostium of 2nd OM     DECOMPRESSION, FUSION LUMBAR POSTERIOR THREE + LEVELS, COMBINED  2013    Procedure: COMBINED DECOMPRESSION, FUSION LUMBAR POSTERIOR THREE + LEVELS;  Posterior Lumbar Decompression L3-S1, Fusion L4-S1;  Surgeon: Daniel Harris MD;  Location: [facility] OR     ENDOSCOPIC STRIPPING VEIN(S)       HEART CATH, ANGIOPLASTY  2009    PTCA and two 2.5x15mm Xience stents to mid LAD lesion     HYSTERECTOMY, RICKIE      Fibroids, and Menorrhagia.. Bilateral Oophrectomy     ORTHOPEDIC SURGERY       Stenting of LAD, Angioplasty  09     TONSILLECTOMY      as a child       FAMILY HISTORY:  Family History   Problem Relation Age of Onset     Neurologic Disorder Mother      Dementia, Still living     Ovarian Cancer Mother      Thyroid Disease Mother      C.A.D. Father           Diabetes Father      Coronary Artery Disease Father      Alcohol/Drug Brother      Thyroid Disease Son      Diabetes Son        SOCIAL HISTORY:  Social History     Social History     Marital status:      Spouse name: N/A     Number of children: N/A     Years of education: N/A     Social History Main Topics     Smoking status: Former Smoker     Quit date: 1965     Smokeless tobacco: Never Used     Alcohol use 0.0 oz/week     0 Standard drinks or equivalent per week      Comment: 2 glasses wine per month     Drug use: No     Sexual activity: No     Other Topics Concern     Blood Transfusions No     Caffeine Concern Yes     5 cups caffeine per day     Sleep Concern No     Stress Concern No     Weight Concern No     weight stable     Special Diet No     trying to eat healthier     Exercise Yes     stretching     Seat Belt Yes     Self-Exams Yes     Parent/Sibling W/ Cabg, Mi Or Angioplasty Before 65f 55m? Yes     Social History Narrative    Works in an office       Review of Systems:  Skin:  Negative for       Eyes:  Positive for  "glasses;visual blurring    ENT:  Negative for      Respiratory:  Positive for dyspnea on exertion     Cardiovascular:  Negative for      Gastroenterology: Positive for heartburn;constipation;diarrhea    Genitourinary:  Positive for urinary frequency    Musculoskeletal:    back pain    Neurologic:  Positive for stroke    Psychiatric:  Positive for anxiety    Heme/Lymph/Imm:  Positive for hay fever    Endocrine:  Positive for thyroid disorder      Physical Exam:  Vitals: /70  Pulse 60  Ht 1.626 m (5' 4\")  Wt 72.6 kg (160 lb)  BMI 27.46 kg/m2    Constitutional:  cooperative, alert and oriented, well developed, well nourished, in no acute distress overweight      Skin:  warm and dry to the touch, no apparent skin lesions or masses noted          Head:  normocephalic, no masses or lesions        Eyes:  pupils equal and round, conjunctivae and lids unremarkable, sclera white, no xanthalasma, EOMS intact, no nystagmus        Lymph:      ENT:  no pallor or cyanosis, dentition good        Neck:  carotid pulses are full and equal bilaterally;no carotid bruit        Respiratory:  normal breath sounds, clear to auscultation, normal A-P diameter, normal symmetry, normal respiratory excursion, no use of accessory muscles         Cardiac: regular rhythm;normal S1 and S2   S4   grade 1;LUSB;systolic murmur        pulses full and equal                                        GI:           Extremities and Muscular Skeletal:  no edema;no spinal abnormalities noted;normal muscle strength and tone              Neurological:  no gross motor deficits        Psych:  affect appropriate, oriented to time, person and place        CC  Surya Dyer DO  [address] [place] [address]                Thank you for allowing me to participate in the care of your patient.      Sincerely,     Nikolai Amaya MD     [facility]    cc:   Surya Dyer DO  [address] [place] [address]        "

## 2018-10-09 NOTE — PROGRESS NOTES
Service Date: 10/09/2018      CLINIC VISIT      HISTORY OF PRESENT ILLNESS:  Ms. Saeed is a delightful 78-year-old woman with past medical history significant for unstable angina leading to 2 Xience drug-eluting stents being placed in her left anterior descending artery in 04/2009.  She has hypercholesterolemia, hypertension, hypothyroidism, chronic back problems, strong family history of coronary artery disease.  Chest discomfort in 2012 led to angiography demonstrating no change in her anatomy.  She had moderate disease in the posterior descending artery, an ostial pinch in her first diagonal and 80% stenosis in a branch of an obtuse marginal and I recommended medical management.      She had spinal surgery by Dr. Daniel Harris with good results for her spinal stenosis.  In spring of 2015, she again had symptoms consistent with fairly classic angina.  Angiography demonstrated no change in her anatomy and we again tried to continued medical management.  One month later, she again came to the emergency room.  We brought her back to the Cath Lab where we stented a small obtuse marginal, placing a 2.25 x 8 mm Promus Premier drug-eluting stent.  She initially had some problems with the wrist after the radial approach but did result in resolution of her anginal symptoms.      In 04/2013, she had a cerebrovascular accident which was manifested as balance problems.  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  She is on warfarin therapy.      Salome had another back surgery in 04/2017.  In August, had an episode of exertional chest discomfort relieved by rest.  A recurrent episode led to a stress nuclear scan which appeared to be normal.      Salome returns to clinic and thinks she is doing well from a cardiac standpoint.  She has no chest, arm, neck, jaw or shoulder discomfort.  She does note that she does have some shortness of breath when she climbs the stairs but no chest  discomfort associated with this.  Her main issue remains her lower back.  She recently had a steroid injection which she blames is the reason she has gained about 5 pounds of weight.  She states her back is still bothering her.  She is not doing any sort of regular exercise but did buy an exercise bicycle which she is planning on using.      She states she has no bleeding problems with her warfarin therapy.  No symptoms to suggest a TIA or CVA.      ASSESSMENT AND PLAN:  Salome appears to be doing well from a cardiac standpoint without clinical evidence of ischemia, heart failure or significant arrhythmia.  I suspect her dyspnea on exertion is mostly just deconditioning and her recent weight gain.  In the past, she has had fairly classical anginal symptoms leading to her stenting procedures.  As stated, she did have a recent stress nuclear scan that appeared to be normal.      Blood pressure is well controlled at 130/70 with a pulse of 60.      Weight is unfortunately at 160, on the higher end of her range.  Body mass index is 27.5.  I have asked her to lose 5 pounds of weight and get back on the lower end of her range.      Fasting lipid profile is quite good.  Total cholesterol is 150, HDL 73, LDL 64, triglycerides are 67.  She is only on 20 mg of rosuvastatin.  We talked about whether we should consider bumping rosuvastatin up to 40 to drive her LDL lower, given the results of the IMPROVE-IT trial, and extrapolating LDL data from ODYSSEY and FOURIER.  At this time, she is going to think about it.  She would rather try to increase her exercise and lose the 5 pounds of weight that she has gained.  This will obviously improve her cholesterol profile as well.      She will continue on her warfarin lifelong.      Thank you for allowing me to participate in her care.      Nikolai Castro MD, FACC         NIKOLAI CASTRO MD, FACC             D: 10/09/2018   T: 10/09/2018   MT: LUBNA      Name:     ROSIO  JESUS   MRN:      [id number]        Account:      [id number]   :      1940           Service Date: 10/09/2018      Document: [id number]

## 2018-10-09 NOTE — LETTER
10/9/2018      Surya Dyer,   [address]  [facility] [place] [address]      RE: Salome Saeed       Dear Colleague,    I had the pleasure of seeing Salome Saeed in the [facility].    Service Date: 10/09/2018      CLINIC VISIT      HISTORY OF PRESENT ILLNESS:  Ms. Saeed is a delightful 78-year-old woman with past medical history significant for unstable angina leading to 2 Xience drug-eluting stents being placed in her left anterior descending artery in 04/2009.  She has hypercholesterolemia, hypertension, hypothyroidism, chronic back problems, strong family history of coronary artery disease.  Chest discomfort in 2012 led to angiography demonstrating no change in her anatomy.  She had moderate disease in the posterior descending artery, an ostial pinch in her first diagonal and 80% stenosis in a branch of an obtuse marginal and I recommended medical management.      She had spinal surgery by Dr. Daniel Harris with good results for her spinal stenosis.  In spring of 2015, she again had symptoms consistent with fairly classic angina.  Angiography demonstrated no change in her anatomy and we again tried to continued medical management.  One month later, she again came to the emergency room.  We brought her back to the Cath Lab where we stented a small obtuse marginal, placing a 2.25 x 8 mm Promus Premier drug-eluting stent.  She initially had some problems with the wrist after the radial approach but did result in resolution of her anginal symptoms.      In 04/2013, she had a cerebrovascular accident which was manifested as balance problems.  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  She is on warfarin therapy.      Salome had another back surgery in 04/2017.  In August, had an episode of exertional chest discomfort relieved by rest.  A recurrent episode led to a stress nuclear scan which appeared to be normal.      Salome returns to  clinic and thinks she is doing well from a cardiac standpoint.  She has no chest, arm, neck, jaw or shoulder discomfort.  She does note that she does have some shortness of breath when she climbs the stairs but no chest discomfort associated with this.  Her main issue remains her lower back.  She recently had a steroid injection which she blames is the reason she has gained about 5 pounds of weight.  She states her back is still bothering her.  She is not doing any sort of regular exercise but did buy an exercise bicycle which she is planning on using.      She states she has no bleeding problems with her warfarin therapy.  No symptoms to suggest a TIA or CVA.      ASSESSMENT AND PLAN:  Salome appears to be doing well from a cardiac standpoint without clinical evidence of ischemia, heart failure or significant arrhythmia.  I suspect her dyspnea on exertion is mostly just deconditioning and her recent weight gain.  In the past, she has had fairly classical anginal symptoms leading to her stenting procedures.  As stated, she did have a recent stress nuclear scan that appeared to be normal.      Blood pressure is well controlled at 130/70 with a pulse of 60.      Weight is unfortunately at 160, on the higher end of her range.  Body mass index is 27.5.  I have asked her to lose 5 pounds of weight and get back on the lower end of her range.      Fasting lipid profile is quite good.  Total cholesterol is 150, HDL 73, LDL 64, triglycerides are 67.  She is only on 20 mg of rosuvastatin.  We talked about whether we should consider bumping rosuvastatin up to 40 to drive her LDL lower, given the results of the IMPROVE-IT trial, and extrapolating LDL data from ODYSSEY and FOURIER.  At this time, she is going to think about it.  She would rather try to increase her exercise and lose the 5 pounds of weight that she has gained.  This will obviously improve her cholesterol profile as well.      She will continue on her warfarin  lifelong.      Thank you for allowing me to participate in her care.      Nikolai Castro MD, [facility]         NIKOLAI CASTRO MD, [facility]             D: 10/09/2018   T: 10/09/2018   MT: LUBNA      Name:     JESUS AVELAR   MRN:      [id number]        Account:      [id number]   :      1940           Service Date: 10/09/2018      Document: [id number]         Outpatient Encounter Prescriptions as of 10/9/2018   Medication Sig Dispense Refill     acetaminophen (TYLENOL) 325 MG tablet Take 2 tablets by mouth every 6 hours as needed for pain.  0     amLODIPine (NORVASC) 5 MG tablet Take 1 tablet (5 mg) by mouth daily 30 tablet 0     ascorbic acid (VITAMIN C) 500 MG tablet Take 1,000 mg by mouth daily        aspirin EC 81 MG tablet Take 1 tablet (81 mg) by mouth daily       atenolol (TENORMIN) 25 MG tablet Take 1 tablet (25 mg) by mouth 2 times daily 180 tablet 3     Calcium Carb-Cholecalciferol (CALCIUM 600 + D PO) Take 1,200 mg by mouth daily        Cholecalciferol (VITAMIN D) 2000 UNITS tablet Take 2,000 Units by mouth daily       Coenzyme Q10 (COQ-10) 200 MG CAPS Take 400 mg by mouth daily        escitalopram (LEXAPRO) 5 MG tablet TAKE 1 TABLET EVERY DAY 90 tablet 3     levothyroxine (SYNTHROID/LEVOTHROID) 100 MCG tablet Take 1 tablet (100 mcg) by mouth daily 90 tablet 1     Multiple Vitamin (DAILY MULTIVITAMIN PO) Take by mouth daily       nitroGLYcerin (NITROSTAT) 0.4 MG sublingual tablet Place 1 tablet (0.4 mg) under the tongue every 5 minutes as needed for chest pain 25 tablet 2     ranitidine (ZANTAC) 150 MG tablet Take 1 tablet (150 mg) by mouth 2 times daily 180 tablet 1     rosuvastatin (CRESTOR) 20 MG tablet Take 1 tablet (20 mg) by mouth daily 90 tablet 0     vitamin B complex with vitamin C (VITAMIN  B COMPLEX) TABS tablet Take 1 tablet by mouth daily       [DISCONTINUED] lisinopril (PRINIVIL/ZESTRIL) 20 MG tablet Take 1 tablet (20 mg) by mouth daily 90 tablet 3     [DISCONTINUED] warfarin  (COUMADIN) 5 MG tablet TAKE 1/2 TO 1 TABLET EVERY DAY AS DIRECTED  BY  INR  NURSE  BASED ON INR READING 90 tablet 1     [DISCONTINUED] amLODIPine (NORVASC) 5 MG tablet Take 1 tablet (5 mg) by mouth daily 30 tablet 0     [DISCONTINUED] cyclopentolate (CYCLOGYL) 1 % ophthalmic solution   0     [DISCONTINUED] DUREZOL 0.05 % ophthalmic emulsion   2     [DISCONTINUED] gatifloxacin (ZYMAXID) 0.5 % ophthalmic solution   3     [DISCONTINUED] ketorolac (ACULAR) 0.5 % ophthalmic solution   3     [DISCONTINUED] moxifloxacin (VIGAMOX) 0.5 % ophthalmic solution   3     [DISCONTINUED] prednisoLONE acetate (PRED FORTE) 1 % ophthalmic susp   3     No facility-administered encounter medications on file as of 10/9/2018.        Again, thank you for allowing me to participate in the care of your patient.      Sincerely,    Nikolai Amaya MD     [facility]

## 2018-10-09 NOTE — PROGRESS NOTES
HPI and Plan:   See dictation    Orders Placed This Encounter   Procedures     Basic metabolic panel     Lipid Profile     Follow-Up with Cardiac Advanced Practice Provider     Follow-Up with Cardiologist       No orders of the defined types were placed in this encounter.      Medications Discontinued During This Encounter   Medication Reason     cyclopentolate (CYCLOGYL) 1 % ophthalmic solution      DUREZOL 0.05 % ophthalmic emulsion      moxifloxacin (VIGAMOX) 0.5 % ophthalmic solution      prednisoLONE acetate (PRED FORTE) 1 % ophthalmic susp      ketorolac (ACULAR) 0.5 % ophthalmic solution      gatifloxacin (ZYMAXID) 0.5 % ophthalmic solution          Encounter Diagnoses   Name Primary?     Benign essential hypertension Yes     Coronary artery disease involving native coronary artery of native heart without angina pectoris      Hyperlipidemia LDL goal <70      WILSON (dyspnea on exertion)      Long term current use of anticoagulant therapy        CURRENT MEDICATIONS:  Current Outpatient Prescriptions   Medication Sig Dispense Refill     acetaminophen (TYLENOL) 325 MG tablet Take 2 tablets by mouth every 6 hours as needed for pain.  0     amLODIPine (NORVASC) 5 MG tablet Take 1 tablet (5 mg) by mouth daily 30 tablet 0     ascorbic acid (VITAMIN C) 500 MG tablet Take 1,000 mg by mouth daily        aspirin EC 81 MG tablet Take 1 tablet (81 mg) by mouth daily       atenolol (TENORMIN) 25 MG tablet Take 1 tablet (25 mg) by mouth 2 times daily 180 tablet 3     Calcium Carb-Cholecalciferol (CALCIUM 600 + D PO) Take 1,200 mg by mouth daily        Cholecalciferol (VITAMIN D) 2000 UNITS tablet Take 2,000 Units by mouth daily       Coenzyme Q10 (COQ-10) 200 MG CAPS Take 400 mg by mouth daily        escitalopram (LEXAPRO) 5 MG tablet TAKE 1 TABLET EVERY DAY 90 tablet 3     levothyroxine (SYNTHROID/LEVOTHROID) 100 MCG tablet Take 1 tablet (100 mcg) by mouth daily 90 tablet 1     lisinopril (PRINIVIL/ZESTRIL) 20 MG tablet Take  1 tablet (20 mg) by mouth daily 90 tablet 3     Multiple Vitamin (DAILY MULTIVITAMIN PO) Take by mouth daily       nitroGLYcerin (NITROSTAT) 0.4 MG sublingual tablet Place 1 tablet (0.4 mg) under the tongue every 5 minutes as needed for chest pain 25 tablet 2     ranitidine (ZANTAC) 150 MG tablet Take 1 tablet (150 mg) by mouth 2 times daily 180 tablet 1     rosuvastatin (CRESTOR) 20 MG tablet Take 1 tablet (20 mg) by mouth daily 90 tablet 0     vitamin B complex with vitamin C (VITAMIN  B COMPLEX) TABS tablet Take 1 tablet by mouth daily       warfarin (COUMADIN) 5 MG tablet TAKE 1/2 TO 1 TABLET EVERY DAY AS DIRECTED  BY  INR  NURSE  BASED ON INR READING 90 tablet 1       ALLERGIES     Allergies   Allergen Reactions     Atorvastatin      Leg cramps     Gluten      Sinuses affected by gluten     Magnesium Salicylate      Oat      Shellfish Allergy      hives     Wheat        PAST MEDICAL HISTORY:  Past Medical History:   Diagnosis Date     CAD (coronary artery disease) 4/9/2009 4/8/2009: PTCA and two 2.5x15mm Xience stents to mid LAD lesion; Cath 12/2012- 40-50% stenosis in mid PDA, 80% on D1- medically treat , 5/28/2015 - PTCA and 2.25x8mm Promus PREMIIER NAILA to ostium of 2nd OM     CVA (cerebral infarction)      DDD (degenerative disc disease)      Essential hypertension, benign      GERD (gastroesophageal reflux disease)      Headache      Hyperlipidemia      Hypothyroidism      Lumbago      Lumbar spinal stenosis      Mitral regurgitation     1+ per 7/2013 Echo     Vertebral artery stenosis, right        PAST SURGICAL HISTORY:  Past Surgical History:   Procedure Laterality Date     CORONARY ANGIOGRAPHY ADULT ORDER  12/6/2012    40-50% stenosis to mid PDA, 80% in D1- medically treat     CORONARY ANGIOGRAPHY ADULT ORDER  5/28/2015    PTCA and 2.25x8mm Promus PREMIER NAILA to ostium of 2nd OM     DECOMPRESSION, FUSION LUMBAR POSTERIOR THREE + LEVELS, COMBINED  5/8/2013    Procedure: COMBINED DECOMPRESSION, FUSION  LUMBAR POSTERIOR THREE + LEVELS;  Posterior Lumbar Decompression L3-S1, Fusion L4-S1;  Surgeon: Daniel Harris MD;  Location: [facility] OR     ENDOSCOPIC STRIPPING VEIN(S)       HEART CATH, ANGIOPLASTY  2009    PTCA and two 2.5x15mm Xience stents to mid LAD lesion     HYSTERECTOMY, RICKIE      Fibroids, and Menorrhagia.. Bilateral Oophrectomy     ORTHOPEDIC SURGERY       Stenting of LAD, Angioplasty  09     TONSILLECTOMY      as a child       FAMILY HISTORY:  Family History   Problem Relation Age of Onset     Neurologic Disorder Mother      Dementia, Still living     Ovarian Cancer Mother      Thyroid Disease Mother      C.A.D. Father           Diabetes Father      Coronary Artery Disease Father      Alcohol/Drug Brother      Thyroid Disease Son      Diabetes Son        SOCIAL HISTORY:  Social History     Social History     Marital status:      Spouse name: N/A     Number of children: N/A     Years of education: N/A     Social History Main Topics     Smoking status: Former Smoker     Quit date: 1965     Smokeless tobacco: Never Used     Alcohol use 0.0 oz/week     0 Standard drinks or equivalent per week      Comment: 2 glasses wine per month     Drug use: No     Sexual activity: No     Other Topics Concern     Blood Transfusions No     Caffeine Concern Yes     5 cups caffeine per day     Sleep Concern No     Stress Concern No     Weight Concern No     weight stable     Special Diet No     trying to eat healthier     Exercise Yes     stretching     Seat Belt Yes     Self-Exams Yes     Parent/Sibling W/ Cabg, Mi Or Angioplasty Before 65f 55m? Yes     Social History Narrative    Works in an office       Review of Systems:  Skin:  Negative for       Eyes:  Positive for glasses;visual blurring    ENT:  Negative for      Respiratory:  Positive for dyspnea on exertion     Cardiovascular:  Negative for      Gastroenterology: Positive for heartburn;constipation;diarrhea    Genitourinary:  Positive  "for urinary frequency    Musculoskeletal:    back pain    Neurologic:  Positive for stroke    Psychiatric:  Positive for anxiety    Heme/Lymph/Imm:  Positive for hay fever    Endocrine:  Positive for thyroid disorder      Physical Exam:  Vitals: /70  Pulse 60  Ht 1.626 m (5' 4\")  Wt 72.6 kg (160 lb)  BMI 27.46 kg/m2    Constitutional:  cooperative, alert and oriented, well developed, well nourished, in no acute distress overweight      Skin:  warm and dry to the touch, no apparent skin lesions or masses noted          Head:  normocephalic, no masses or lesions        Eyes:  pupils equal and round, conjunctivae and lids unremarkable, sclera white, no xanthalasma, EOMS intact, no nystagmus        Lymph:      ENT:  no pallor or cyanosis, dentition good        Neck:  carotid pulses are full and equal bilaterally;no carotid bruit        Respiratory:  normal breath sounds, clear to auscultation, normal A-P diameter, normal symmetry, normal respiratory excursion, no use of accessory muscles         Cardiac: regular rhythm;normal S1 and S2   S4   grade 1;LUSB;systolic murmur        pulses full and equal                                        GI:           Extremities and Muscular Skeletal:  no edema;no spinal abnormalities noted;normal muscle strength and tone              Neurological:  no gross motor deficits        Psych:  affect appropriate, oriented to time, person and place        CC  Surya Dyer,   [address] [place] [address]              "

## 2018-10-09 NOTE — MR AVS SNAPSHOT
After Visit Summary   10/9/2018    Salome Saeed    MRN: [id number]           Patient Information     Date Of Birth          1940        Visit Information        Provider Department      10/9/2018 9:30 AM Nikolai Amaya MD [facility]        Today's Diagnoses     Benign essential hypertension    -  1    Coronary artery disease involving native coronary artery of native heart without angina pectoris        Hyperlipidemia LDL goal <70        WILSON (dyspnea on exertion)        Long term current use of anticoagulant therapy           Follow-ups after your visit        Additional Services     Follow-Up with Cardiac Advanced Practice Provider           Follow-Up with Cardiologist       BMP/FLP/ALT                  Your next 10 appointments already scheduled     Oct 25, 2018  1:30 PM CDT   Anticoagulation Visit with  ANTICOAGULATION CLINIC   [facility]    [address] [address] [phone]              Future tests that were ordered for you today     Open Future Orders        Priority Expected Expires Ordered    Basic metabolic panel Routine 10/9/2019 10/10/2019 10/9/2018    Lipid Profile Routine 10/9/2019 10/10/2019 10/9/2018    Follow-Up with Cardiac Advanced Practice Provider Routine 10/9/2019 10/10/2019 10/9/2018    Follow-Up with Cardiologist Routine 10/8/2020 10/28/2020 10/9/2018            Who to contact     If you have questions or need follow up information about today's clinic visit or your schedule please contact [facility] directly at [phone].  Normal or non-critical lab and imaging results will be communicated to you by MyChart, letter or phone within 4 business days after the clinic has received the results. If you do not hear from us within 7 days, please contact the clinic through MyChart or phone. If you have a critical or  "abnormal lab result, we will notify you by phone as soon as possible.  Submit refill requests through [employer] or call your pharmacy and they will forward the refill request to us. Please allow 3 business days for your refill to be completed.          Additional Information About Your Visit        Care EveryWhere ID     This is your Care EveryWhere ID. This could be used by other organizations to access your [place] medical records  [id number]        Your Vitals Were     Pulse Height BMI (Body Mass Index)             60 1.626 m (5' 4\") 27.46 kg/m2          Blood Pressure from Last 3 Encounters:   10/09/18 130/70   08/27/18 134/70   02/08/18 120/64    Weight from Last 3 Encounters:   10/09/18 72.6 kg (160 lb)   08/27/18 73 kg (161 lb)   02/08/18 70.8 kg (156 lb)                 Today's Medication Changes          These changes are accurate as of 10/9/18  9:56 AM.  If you have any questions, ask your nurse or doctor.               Stop taking these medicines if you haven't already. Please contact your care team if you have questions.     cyclopentolate 1 % ophthalmic solution   Commonly known as:  CYCLOGYL   Stopped by:  Nikolai Amaya MD           DUREZOL 0.05 % ophthalmic emulsion   Generic drug:  difluprednate   Stopped by:  Nikolai Amaya MD           gatifloxacin 0.5 % ophthalmic solution   Commonly known as:  ZYMAXID   Stopped by:  Nikolai Amaya MD           ketorolac 0.5 % ophthalmic solution   Commonly known as:  ACULAR   Stopped by:  Nikolai Amaya MD           moxifloxacin 0.5 % ophthalmic solution   Commonly known as:  VIGAMOX   Stopped by:  Nikolai Amaya MD           prednisoLONE acetate 1 % ophthalmic susp   Commonly known as:  PRED FORTE   Stopped by:  Nikolai Amaya MD                    Primary Care Provider Office Phone # Fax #    Surya Dyer [phone] [address]  [place] [address]        Equal Access to Services     BRICE OLIVARES " AH: Hadii solange lemuspatrickjd Gilberto, waaxda luqadaha, qaybta kaalmarianela chambers, sonia mike migdalianancy lyjacobkenroy pillai. So [facility] [phone].    ATENCIÓN: Si habla español, tiene a smith disposición servicios gratuitos de asistencia lingüística. Llame al [phone].    We comply with applicable federal civil rights laws and [place] laws. We do not discriminate on the basis of race, color, national origin, age, disability, sex, sexual orientation, or gender identity.            Thank you!     Thank you for choosing [facility]  for your care. Our goal is always to provide you with excellent care. Hearing back from our patients is one way we can continue to improve our services. Please take a few minutes to complete the written survey that you may receive in the mail after your visit with us. Thank you!             Your Updated Medication List - Protect others around you: Learn how to safely use, store and throw away your medicines at www.disposemymeds.org.          This list is accurate as of 10/9/18  9:56 AM.  Always use your most recent med list.                   Brand Name Dispense Instructions for use Diagnosis    acetaminophen 325 MG tablet    TYLENOL     Take 2 tablets by mouth every 6 hours as needed for pain.        amLODIPine 5 MG tablet    NORVASC    30 tablet    Take 1 tablet (5 mg) by mouth daily    Essential hypertension       ascorbic acid 500 MG tablet    VITAMIN C     Take 1,000 mg by mouth daily        aspirin 81 MG EC tablet      Take 1 tablet (81 mg) by mouth daily    Coronary artery disease involving native coronary artery of native heart without angina pectoris       atenolol 25 MG tablet    TENORMIN    180 tablet    Take 1 tablet (25 mg) by mouth 2 times daily    Coronary artery disease involving native coronary artery of native heart without angina pectoris       CALCIUM 600 + D PO      Take 1,200 mg by mouth daily        CoQ-10 200 MG Caps       Take 400 mg by mouth daily        DAILY MULTIVITAMIN PO      Take by mouth daily        escitalopram 5 MG tablet    LEXAPRO    90 tablet    TAKE 1 TABLET EVERY DAY    Anxiety       levothyroxine 100 MCG tablet    SYNTHROID/LEVOTHROID    90 tablet    Take 1 tablet (100 mcg) by mouth daily    Hypothyroidism, unspecified type       lisinopril 20 MG tablet    PRINIVIL/ZESTRIL    90 tablet    Take 1 tablet (20 mg) by mouth daily    Essential hypertension, benign       nitroGLYcerin 0.4 MG sublingual tablet    NITROSTAT    25 tablet    Place 1 tablet (0.4 mg) under the tongue every 5 minutes as needed for chest pain    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)       ranitidine 150 MG tablet    ZANTAC    180 tablet    Take 1 tablet (150 mg) by mouth 2 times daily    Gastroesophageal reflux disease with esophagitis       rosuvastatin 20 MG tablet    CRESTOR    90 tablet    Take 1 tablet (20 mg) by mouth daily    Hyperlipidemia LDL goal <70       vitamin B complex with vitamin C Tabs tablet      Take 1 tablet by mouth daily        vitamin D 2000 units tablet      Take 2,000 Units by mouth daily        warfarin 5 MG tablet    COUMADIN    90 tablet    TAKE 1/2 TO 1 TABLET EVERY DAY AS DIRECTED  BY  INR  NURSE  BASED ON INR READING    Cerebrovascular accident involving cerebellum (H)

## 2018-10-11 DIAGNOSIS — I63.9 CEREBROVASCULAR ACCIDENT INVOLVING CEREBELLUM (H): ICD-10-CM

## 2018-10-11 DIAGNOSIS — I10 ESSENTIAL HYPERTENSION, BENIGN: ICD-10-CM

## 2018-10-11 RX ORDER — LISINOPRIL 20 MG/1
20 TABLET ORAL DAILY
Qty: 90 TABLET | Refills: 3 | Status: SHIPPED | OUTPATIENT
Start: 2018-10-11 | End: 2019-07-29

## 2018-10-11 RX ORDER — WARFARIN SODIUM 5 MG/1
TABLET ORAL
Qty: 90 TABLET | Refills: 1 | Status: SHIPPED | OUTPATIENT
Start: 2018-10-11 | End: 2020-08-10

## 2018-10-11 NOTE — TELEPHONE ENCOUNTER
"Requested Prescriptions   Pending Prescriptions Disp Refills     warfarin (COUMADIN) 5 MG tablet  Last Written Prescription Date:  06/04/2018  Last Fill Quantity: 90 tablet,  # refills: 1   Last office visit: 8/27/2018 with prescribing provider:  Surya Dyer DO    Future Office Visit:     90 tablet 1     Sig: TAKE 1/2 TO 1 TABLET EVERY DAY AS DIRECTED  BY  INR  NURSE  BASED ON INR READING    Vitamin K Antagonists Failed    10/11/2018  3:47 PM       Failed - INR is within goal in the past 6 weeks    Confirm INR is within goal in the past 6 weeks.     Recent Labs   Lab Test 10/04/18   INR  2.6*                      Passed - Recent (12 mo) or future (30 days) visit within the authorizing provider's specialty    Patient had office visit in the last 12 months or has a visit in the next 30 days with authorizing provider or within the authorizing provider's specialty.  See \"Patient Info\" tab in inbasket, or \"Choose Columns\" in Meds & Orders section of the refill encounter.           Passed - Patient is 18 years of age or older       Passed - Patient is not pregnant       Passed - No positive pregnancy on file in past 12 months          "

## 2018-10-11 NOTE — TELEPHONE ENCOUNTER
Prescription approved per [facility] Refill Protocol.  Rosanne Rico, RN, BSN  [facility]

## 2018-10-25 ENCOUNTER — ANTICOAGULATION THERAPY VISIT (OUTPATIENT)
Dept: NURSING | Facility: CLINIC | Age: 78
End: 2018-10-25
Payer: COMMERCIAL

## 2018-10-25 DIAGNOSIS — Z79.01 LONG TERM CURRENT USE OF ANTICOAGULANT THERAPY: ICD-10-CM

## 2018-10-25 LAB — INR POINT OF CARE: 2.9 (ref 0.86–1.14)

## 2018-10-25 PROCEDURE — 36416 COLLJ CAPILLARY BLOOD SPEC: CPT

## 2018-10-25 PROCEDURE — 85610 PROTHROMBIN TIME: CPT | Mod: QW

## 2018-10-25 NOTE — PROGRESS NOTES
ANTICOAGULATION FOLLOW-UP CLINIC VISIT    Patient Name:  Salome Saeed  Date:  10/25/2018  Contact Type:  Face to Face    SUBJECTIVE:     Patient Findings     Positives No Problem Findings           OBJECTIVE    INR Protime   Date Value Ref Range Status   10/25/2018 2.9 (A) 0.86 - 1.14 Final       ASSESSMENT / PLAN  INR assessment THER    Recheck INR In: 4 WEEKS    INR Location Clinic      Anticoagulation Summary as of 10/25/2018     INR goal 2.0-3.0   Today's INR 2.9   Warfarin maintenance plan 5 mg (5 mg x 1) on Mon, Wed, Fri; 2.5 mg (5 mg x 0.5) all other days   Full warfarin instructions 5 mg on Mon, Wed, Fri; 2.5 mg all other days   Weekly warfarin total 25 mg   No change documented Day, NUBIA Lay   Plan last modified Day, NUBIA Lay (9/20/2018)   Next INR check 11/20/2018   Target end date     Indications   Long term current use of anticoagulant therapy [Z79.01]  CVA (cerebral vascular accident) (Resolved) [I63.9]         Anticoagulation Episode Summary     INR check location     Preferred lab     Send INR reminders to [facility] TRIAGE    Comments             See the Encounter Report to view Anticoagulation Flowsheet and Dosing Calendar (Go to Encounters tab in chart review, and find the Anticoagulation Therapy Visit)    Rosanne Rico RN

## 2018-10-25 NOTE — MR AVS SNAPSHOT
Salome SPRAGUE Darlin   10/25/2018 1:30 PM   Anticoagulation Therapy Visit    Description:  78 year old female   Provider:   ANTICOAGULATION CLINIC   Department:   Nurse           INR as of 10/25/2018     Today's INR 2.9      Anticoagulation Summary as of 10/25/2018     INR goal 2.0-3.0   Today's INR 2.9   Full warfarin instructions 5 mg on Mon, Wed, Fri; 2.5 mg all other days   Next INR check 11/20/2018    Indications   Long term current use of anticoagulant therapy [Z79.01]  CVA (cerebral vascular accident) (Resolved) [I63.9]         Your next Anticoagulation Clinic appointment(s)     Nov 20, 2018  1:30 PM CST   Anticoagulation Visit with  ANTICOAGULATION CLINIC   [facility] [place] ([facility])    [address] [facility] [address] [phone]              Contact Numbers     [facility]  Please call [phone] with any problems or questions regarding your therapy, or to cancel or reschedule your appointment        October 2018 Details    Sun Mon Tue Wed Thu Fri Sat      1               2               3               4               5               6                 7               8               9               10               11               12               13                 14               15               16               17               18               19               20                 21               22               23               24               25      2.5 mg   See details      26      5 mg         27      2.5 mg           28      2.5 mg         29      5 mg         30      2.5 mg         31      5 mg             Date Details   10/25 This INR check               How to take your warfarin dose     To take:  2.5 mg Take 0.5 of a 5 mg tablet.    To take:  5 mg Take 1 of the 5 mg tablets.           November 2018 Details    Sun Mon Tue Wed Thu Fri Sat         1      2.5 mg         2      5 mg         3      2.5 mg           4      2.5 mg         5      5 mg          6      2.5 mg         7      5 mg         8      2.5 mg         9      5 mg         10      2.5 mg           11      2.5 mg         12      5 mg         13      2.5 mg         14      5 mg         15      2.5 mg         16      5 mg         17      2.5 mg           18      2.5 mg         19      5 mg         20            21               22               23               24                 25               26               27               28               29               30                 Date Details   No additional details    Date of next INR:  11/20/2018         How to take your warfarin dose     To take:  2.5 mg Take 0.5 of a 5 mg tablet.    To take:  5 mg Take 1 of the 5 mg tablets.

## 2018-10-29 DIAGNOSIS — I10 ESSENTIAL HYPERTENSION: ICD-10-CM

## 2018-10-29 RX ORDER — AMLODIPINE BESYLATE 5 MG/1
5 TABLET ORAL DAILY
Qty: 90 TABLET | Refills: 3 | Status: SHIPPED | OUTPATIENT
Start: 2018-10-29 | End: 2019-08-06

## 2018-11-20 ENCOUNTER — ANTICOAGULATION THERAPY VISIT (OUTPATIENT)
Dept: NURSING | Facility: CLINIC | Age: 78
End: 2018-11-20
Payer: COMMERCIAL

## 2018-11-20 DIAGNOSIS — Z79.01 LONG TERM CURRENT USE OF ANTICOAGULANT THERAPY: ICD-10-CM

## 2018-11-20 LAB — INR POINT OF CARE: 3.6 (ref 0.86–1.14)

## 2018-11-20 PROCEDURE — 36416 COLLJ CAPILLARY BLOOD SPEC: CPT

## 2018-11-20 PROCEDURE — 85610 PROTHROMBIN TIME: CPT | Mod: QW

## 2018-11-20 NOTE — MR AVS SNAPSHOT
Salome Eppersonde   11/20/2018 1:30 PM   Anticoagulation Therapy Visit    Description:  78 year old female   Provider:   ANTICOAGULATION CLINIC   Department:   Nurse           INR as of 11/20/2018     Today's INR 3.6!      Anticoagulation Summary as of 11/20/2018     INR goal 2.0-3.0   Today's INR 3.6!   Full warfarin instructions 11/20: Hold; Otherwise 5 mg on Mon, Fri; 2.5 mg all other days   Next INR check 12/4/2018    Indications   Long term current use of anticoagulant therapy [Z79.01]  CVA (cerebral vascular accident) (Resolved) [I63.9]         Your next Anticoagulation Clinic appointment(s)     Dec 04, 2018  2:30 PM CST   Anticoagulation Visit with  ANTICOAGULATION CLINIC   [facility]    [address]  [facility] [address] [phone]              Contact Numbers     [facility]  Please call [phone] with any problems or questions regarding your therapy, or to cancel or reschedule your appointment        November 2018 Details    Sun Mon Tue Wed Thu Fri Sat         1               2               3                 4               5               6               7               8               9               10                 11               12               13               14               15               16               17                 18               19               20      Hold   See details      21      2.5 mg         22      2.5 mg         23      5 mg         24      2.5 mg           25      2.5 mg         26      5 mg         27      2.5 mg         28      2.5 mg         29      2.5 mg         30      5 mg           Date Details   11/20 This INR check               How to take your warfarin dose     To take:  2.5 mg Take 0.5 of a 5 mg tablet.    To take:  5 mg Take 1 of the 5 mg tablets.    Hold Do not take your warfarin dose. See the Details table to the right for additional instructions.                December 2018 Details     Sun Mon Tue Wed Thu Fri Sat           1      2.5 mg           2      2.5 mg         3      5 mg         4            5               6               7               8                 9               10               11               12               13               14               15                 16               17               18               19               20               21               22                 23               24               25               26               27               28               29                 30               31                     Date Details   No additional details    Date of next INR:  12/4/2018         How to take your warfarin dose     To take:  2.5 mg Take 0.5 of a 5 mg tablet.    To take:  5 mg Take 1 of the 5 mg tablets.

## 2018-12-06 ENCOUNTER — ANTICOAGULATION THERAPY VISIT (OUTPATIENT)
Dept: NURSING | Facility: CLINIC | Age: 78
End: 2018-12-06
Payer: COMMERCIAL

## 2018-12-06 DIAGNOSIS — Z79.01 LONG TERM CURRENT USE OF ANTICOAGULANT THERAPY: ICD-10-CM

## 2018-12-06 LAB — INR POINT OF CARE: 2.6 (ref 0.86–1.14)

## 2018-12-06 PROCEDURE — 36416 COLLJ CAPILLARY BLOOD SPEC: CPT

## 2018-12-06 PROCEDURE — 85610 PROTHROMBIN TIME: CPT | Mod: QW

## 2018-12-06 NOTE — PROGRESS NOTES
ANTICOAGULATION FOLLOW-UP CLINIC VISIT    Patient Name:  Salome Saeed  Date:  12/6/2018  Contact Type:  Face to Face    SUBJECTIVE:     Patient Findings     Positives No Problem Findings           OBJECTIVE    INR Protime   Date Value Ref Range Status   12/06/2018 2.6 (A) 0.86 - 1.14 Final       ASSESSMENT / PLAN  INR assessment THER    Recheck INR In: 4 WEEKS    INR Location Clinic      Anticoagulation Summary as of 12/6/2018     INR goal 2.0-3.0   Today's INR 2.6   Warfarin maintenance plan 5 mg (5 mg x 1) on Mon, Fri; 2.5 mg (5 mg x 0.5) all other days   Full warfarin instructions 5 mg on Mon, Fri; 2.5 mg all other days   Weekly warfarin total 22.5 mg   No change documented Day, NUBIA Lay   Plan last modified Day, NUBIA Lay (11/20/2018)   Next INR check 1/3/2019   Target end date     Indications   Long term current use of anticoagulant therapy [Z79.01]  CVA (cerebral vascular accident) (Resolved) [I63.9]         Anticoagulation Episode Summary     INR check location     Preferred lab     Send INR reminders to  [facility]    Comments             See the Encounter Report to view Anticoagulation Flowsheet and Dosing Calendar (Go to Encounters tab in chart review, and find the Anticoagulation Therapy Visit)    Dosage adjustment made based on physician directed care plan.    Rosanne Rico RN

## 2018-12-06 NOTE — MR AVS SNAPSHOT
Salome SPRAGUE Darlin   12/6/2018 3:00 PM   Anticoagulation Therapy Visit    Description:  78 year old female   Provider:   ANTICOAGULATION CLINIC   Department:   Nurse           INR as of 12/6/2018     Today's INR 2.6      Anticoagulation Summary as of 12/6/2018     INR goal 2.0-3.0   Today's INR 2.6   Full warfarin instructions 5 mg on Mon, Fri; 2.5 mg all other days   Next INR check 1/3/2019    Indications   Long term current use of anticoagulant therapy [Z79.01]  CVA (cerebral vascular accident) (Resolved) [I63.9]         Your next Anticoagulation Clinic appointment(s)     Jan 03, 2019  2:30 PM CST   Anticoagulation Visit with  ANTICOAGULATION CLINIC   [facility] [place] ([facility])    [address] [facility] [address] [phone]              Contact Numbers     [facility]  Please call [phone] with any problems or questions regarding your therapy, or to cancel or reschedule your appointment        December 2018 Details    Sun Mon Tue Wed Thu Fri Sat           1                 2               3               4               5               6      2.5 mg   See details      7      5 mg         8      2.5 mg           9      2.5 mg         10      5 mg         11      2.5 mg         12      2.5 mg         13      2.5 mg         14      5 mg         15      2.5 mg           16      2.5 mg         17      5 mg         18      2.5 mg         19      2.5 mg         20      2.5 mg         21      5 mg         22      2.5 mg           23      2.5 mg         24      5 mg         25      2.5 mg         26      2.5 mg         27      2.5 mg         28      5 mg         29      2.5 mg           30      2.5 mg         31      5 mg               Date Details   12/06 This INR check               How to take your warfarin dose     To take:  2.5 mg Take 0.5 of a 5 mg tablet.    To take:  5 mg Take 1 of the 5 mg tablets.           January 2019 Details    Sun Mon Tue Wed Thu Fri Sat        1      2.5 mg         2      2.5 mg         3            4               5                 6               7               8               9               10               11               12                 13               14               15               16               17               18               19                 20               21               22               23               24               25               26                 27               28               29               30               31                  Date Details   No additional details    Date of next INR:  1/3/2019         How to take your warfarin dose     To take:  2.5 mg Take 0.5 of a 5 mg tablet.

## 2018-12-26 DIAGNOSIS — I25.10 CORONARY ARTERY DISEASE INVOLVING NATIVE CORONARY ARTERY OF NATIVE HEART WITHOUT ANGINA PECTORIS: ICD-10-CM

## 2018-12-27 DIAGNOSIS — E78.5 HYPERLIPIDEMIA LDL GOAL <70: ICD-10-CM

## 2018-12-27 RX ORDER — ATENOLOL 25 MG/1
TABLET ORAL
Qty: 90 TABLET | Refills: 2 | Status: SHIPPED | OUTPATIENT
Start: 2018-12-27 | End: 2019-10-04

## 2018-12-27 RX ORDER — ROSUVASTATIN CALCIUM 20 MG/1
20 TABLET, COATED ORAL DAILY
Qty: 90 TABLET | Refills: 3 | Status: SHIPPED | OUTPATIENT
Start: 2018-12-27 | End: 2019-10-04

## 2018-12-27 NOTE — TELEPHONE ENCOUNTER
"Requested Prescriptions   Pending Prescriptions Disp Refills     atenolol (TENORMIN) 25 MG tablet [Pharmacy Med Name: ATENOLOL 25 MG Tablet]  Last Written Prescription Date:  11/1/2017  Last Fill Quantity: 180 tablet,  # refills: 3   Last office visit: 8/27/2018 with prescribing provider:  Manisha     Future Office Visit:       180 tablet 3     Sig: TAKE 1 TABLET TWICE DAILY    Beta-Blockers Protocol Passed - 12/26/2018  5:19 PM       Passed - Blood pressure under 140/90 in past 12 months    BP Readings from Last 3 Encounters:   10/09/18 130/70   08/27/18 134/70   02/08/18 120/64                Passed - Patient is age 6 or older       Passed - Recent (12 mo) or future (30 days) visit within the authorizing provider's specialty    Patient had office visit in the last 12 months or has a visit in the next 30 days with authorizing provider or within the authorizing provider's specialty.  See \"Patient Info\" tab in inbasket, or \"Choose Columns\" in Meds & Orders section of the refill encounter.                "

## 2018-12-27 NOTE — TELEPHONE ENCOUNTER
Prescription approved per [facility] Refill Protocol.    Verna SAUNDERSN, RN   [facility]

## 2019-01-07 ENCOUNTER — ANTICOAGULATION THERAPY VISIT (OUTPATIENT)
Dept: NURSING | Facility: CLINIC | Age: 79
End: 2019-01-07
Payer: COMMERCIAL

## 2019-01-07 DIAGNOSIS — Z79.01 LONG TERM CURRENT USE OF ANTICOAGULANT THERAPY: ICD-10-CM

## 2019-01-07 LAB — INR POINT OF CARE: 1.9 (ref 0.86–1.14)

## 2019-01-07 PROCEDURE — 99207 ZZC NO CHARGE NURSE ONLY: CPT

## 2019-01-07 PROCEDURE — 85610 PROTHROMBIN TIME: CPT | Mod: QW

## 2019-01-07 PROCEDURE — 36416 COLLJ CAPILLARY BLOOD SPEC: CPT

## 2019-01-07 NOTE — ADDENDUM NOTE
Addended by: BRANDON THOMPSON on: 1/7/2019 01:46 PM     Modules accepted: Level of Service, SmartSet

## 2019-01-07 NOTE — PROGRESS NOTES
ANTICOAGULATION FOLLOW-UP CLINIC VISIT    Patient Name:  Salome Saeed  Date:  2019  Contact Type:  Face to Face    SUBJECTIVE:        OBJECTIVE    INR Protime   Date Value Ref Range Status   2019 1.9 (A) 0.86 - 1.14 Final       ASSESSMENT / PLAN  INR assessment THER    Recheck INR In: 2 WEEKS    INR Location Clinic      Anticoagulation Summary  As of 2019    INR goal:   2.0-3.0   TTR:   69.2 % (3 y)   INR used for dosin.9! (2019)   Warfarin maintenance plan:   5 mg (5 mg x 1) every Mon, Wed, Fri; 2.5 mg (5 mg x 0.5) all other days   Full warfarin instructions:   5 mg every Mon, Wed, Fri; 2.5 mg all other days   Weekly warfarin total:   25 mg   Plan last modified:   Renee Hoang, RN (2019)   Next INR check:   2019   Target end date:       Indications    Long term current use of anticoagulant therapy [Z79.01]  CVA (cerebral vascular accident) (Resolved) [I63.9]             Anticoagulation Episode Summary     INR check location:       Preferred lab:       Send INR reminders to:    [facility]    Comments:               See the Encounter Report to view Anticoagulation Flowsheet and Dosing Calendar (Go to Encounters tab in chart review, and find the Anticoagulation Therapy Visit)    Dosage adjustment made based on physician directed care plan.    INR 1.9 today. Discussed with patient and have increased dosage back to maintenance that was previously on of 25 mg per week. Patient is wanting to eat increased greens. Unsure why recently elevated she reports she had back pain during that time. Recently tried cannabis but did not feel this helped enough to continue.     Will have Loreta recheck in 2 weeks to see if new dosage with 11% increase is keep in range or will need further adjustment.     Renee Hoang, RN

## 2019-01-21 ENCOUNTER — ANTICOAGULATION THERAPY VISIT (OUTPATIENT)
Dept: NURSING | Facility: CLINIC | Age: 79
End: 2019-01-21
Payer: COMMERCIAL

## 2019-01-21 DIAGNOSIS — Z79.01 LONG TERM CURRENT USE OF ANTICOAGULANT THERAPY: ICD-10-CM

## 2019-01-21 LAB — INR POINT OF CARE: 2.6 (ref 0.86–1.14)

## 2019-01-21 PROCEDURE — 36416 COLLJ CAPILLARY BLOOD SPEC: CPT

## 2019-01-21 PROCEDURE — 85610 PROTHROMBIN TIME: CPT | Mod: QW

## 2019-01-21 NOTE — PROGRESS NOTES
ANTICOAGULATION FOLLOW-UP CLINIC VISIT    Patient Name:  Salome Saeed  Date:  2019  Contact Type:  Face to Face    SUBJECTIVE:     Patient Findings     Positives:   No Problem Findings           OBJECTIVE    INR Protime   Date Value Ref Range Status   2019 2.6 (A) 0.86 - 1.14 Final       ASSESSMENT / PLAN  INR assessment THER    Recheck INR In: 4 WEEKS    INR Location Clinic      Anticoagulation Summary  As of 2019    INR goal:   2.0-3.0   TTR:   69.4 % (3 y)   INR used for dosin.6 (2019)   Warfarin maintenance plan:   5 mg (5 mg x 1) every Mon, Wed, Fri; 2.5 mg (5 mg x 0.5) all other days   Full warfarin instructions:   5 mg every Mon, Wed, Fri; 2.5 mg all other days   Weekly warfarin total:   25 mg   No change documented:   Rosanne Rico RN   Plan last modified:   Renee Hoang RN (2019)   Next INR check:   2019   Target end date:       Indications    Long term current use of anticoagulant therapy [Z79.01]  CVA (cerebral vascular accident) (Resolved) [I63.9]             Anticoagulation Episode Summary     INR check location:       Preferred lab:       Send INR reminders to:    [facility]    Comments:               See the Encounter Report to view Anticoagulation Flowsheet and Dosing Calendar (Go to Encounters tab in chart review, and find the Anticoagulation Therapy Visit)      Rosanne Rico RN

## 2019-01-22 DIAGNOSIS — E03.9 HYPOTHYROIDISM, UNSPECIFIED TYPE: ICD-10-CM

## 2019-01-22 NOTE — TELEPHONE ENCOUNTER
Routing refill request to provider for review/approval because:  Labs out of range:  TSH  NICKY JavierN, RN  [facility]

## 2019-01-22 NOTE — TELEPHONE ENCOUNTER
"Requested Prescriptions   Pending Prescriptions Disp Refills     levothyroxine (SYNTHROID/LEVOTHROID) 100 MCG tablet [Pharmacy Med Name: LEVOTHYROXINE SODIUM 100 MCG Tablet]  Last Written Prescription Date:  8/27/18  Last Fill Quantity: 90,  # refills: 1   Last office visit: 8/27/2018 with prescribing provider:  Manisha   Future Office Visit:     90 tablet 1     Sig: TAKE 1 TABLET EVERY DAY    Thyroid Protocol Failed - 1/22/2019  1:27 PM       Failed - Normal TSH on file in past 12 months    Recent Labs   Lab Test 08/22/18  1327   TSH 0.22*             Passed - Patient is 12 years or older       Passed - Recent (12 mo) or future (30 days) visit within the authorizing provider's specialty    Patient had office visit in the last 12 months or has a visit in the next 30 days with authorizing provider or within the authorizing provider's specialty.  See \"Patient Info\" tab in inbasket, or \"Choose Columns\" in Meds & Orders section of the refill encounter.             Passed - Medication is active on med list       Passed - No active pregnancy on record    If patient is pregnant or has had a positive pregnancy test, please check TSH.         Passed - No positive pregnancy test in past 12 months    If patient is pregnant or has had a positive pregnancy test, please check TSH.            "

## 2019-01-24 RX ORDER — LEVOTHYROXINE SODIUM 100 UG/1
TABLET ORAL
Qty: 30 TABLET | Refills: 0 | Status: SHIPPED | OUTPATIENT
Start: 2019-01-24 | End: 2019-02-15

## 2019-01-24 NOTE — TELEPHONE ENCOUNTER
Called pt and made lab only for 2/19/2019 as she is already scheduled for INR that day.  Janie Mccall

## 2019-01-24 NOTE — TELEPHONE ENCOUNTER
Short term (30 day) refill signed and she is due for recheck of thyroid labs. Future lab orders are already placed. Please assist her in scheduling lab only appointment.    Surya Dyer,   1/24/2019 12:24 AM

## 2019-02-15 DIAGNOSIS — E03.9 HYPOTHYROIDISM, UNSPECIFIED TYPE: ICD-10-CM

## 2019-02-18 RX ORDER — LEVOTHYROXINE SODIUM 100 UG/1
TABLET ORAL
Qty: 30 TABLET | Refills: 0 | Status: SHIPPED | OUTPATIENT
Start: 2019-02-18 | End: 2019-03-05

## 2019-02-18 NOTE — TELEPHONE ENCOUNTER
30 day refill signed. Patient due for labs and future order has already been placed. She already has lab appointment scheduled for tomorrow.  She is also due for multiple health maintenance items - would advise follow up in clinic sometime in the next month.    Surya Dyer,   2/18/2019 12:55 PM

## 2019-02-18 NOTE — TELEPHONE ENCOUNTER
"Requested Prescriptions   Pending Prescriptions Disp Refills     levothyroxine (SYNTHROID/LEVOTHROID) 100 MCG tablet [Pharmacy Med Name: LEVOTHYROXINE SODIUM 100 MCG Tablet]  Last Written Prescription Date:  1/24/2019  Last Fill Quantity: 30 tablet,  # refills: 0   Last office visit: 8/27/2018 with prescribing provider:  Manisha     Future Office Visit:       30 tablet 0     Sig: TAKE 1 TABLET EVERY DAY    Thyroid Protocol Failed - 2/15/2019  3:21 PM       Failed - Normal TSH on file in past 12 months    Recent Labs   Lab Test 08/22/18  1327   TSH 0.22*             Passed - Patient is 12 years or older       Passed - Recent (12 mo) or future (30 days) visit within the authorizing provider's specialty    Patient had office visit in the last 12 months or has a visit in the next 30 days with authorizing provider or within the authorizing provider's specialty.  See \"Patient Info\" tab in inbasket, or \"Choose Columns\" in Meds & Orders section of the refill encounter.             Passed - Medication is active on med list       Passed - No active pregnancy on record    If patient is pregnant or has had a positive pregnancy test, please check TSH.         Passed - No positive pregnancy test in past 12 months    If patient is pregnant or has had a positive pregnancy test, please check TSH.          "

## 2019-02-18 NOTE — TELEPHONE ENCOUNTER
Routing refill request to provider for review/approval because:  Labs out of range:  TSH  Patient advised to have follow up labs and has not followed up.    NICKY SanchezN, RN  [facility]

## 2019-02-19 ENCOUNTER — ANTICOAGULATION THERAPY VISIT (OUTPATIENT)
Dept: NURSING | Facility: CLINIC | Age: 79
End: 2019-02-19
Payer: COMMERCIAL

## 2019-02-19 DIAGNOSIS — E03.9 HYPOTHYROIDISM, UNSPECIFIED TYPE: ICD-10-CM

## 2019-02-19 DIAGNOSIS — Z79.01 LONG TERM CURRENT USE OF ANTICOAGULANT THERAPY: ICD-10-CM

## 2019-02-19 LAB
INR POINT OF CARE: 2.5 (ref 0.86–1.14)
T3 SERPL-MCNC: 84 NG/DL (ref 60–181)
T3FREE SERPL-MCNC: 2.2 PG/ML (ref 2.3–4.2)

## 2019-02-19 PROCEDURE — 84443 ASSAY THYROID STIM HORMONE: CPT | Performed by: FAMILY MEDICINE

## 2019-02-19 PROCEDURE — 84480 ASSAY TRIIODOTHYRONINE (T3): CPT | Performed by: FAMILY MEDICINE

## 2019-02-19 PROCEDURE — 84481 FREE ASSAY (FT-3): CPT | Performed by: FAMILY MEDICINE

## 2019-02-19 PROCEDURE — 86376 MICROSOMAL ANTIBODY EACH: CPT | Performed by: FAMILY MEDICINE

## 2019-02-19 PROCEDURE — 36416 COLLJ CAPILLARY BLOOD SPEC: CPT

## 2019-02-19 PROCEDURE — 85610 PROTHROMBIN TIME: CPT | Mod: QW

## 2019-02-19 NOTE — PROGRESS NOTES
ANTICOAGULATION FOLLOW-UP CLINIC VISIT    Patient Name:  Salome Saeed  Date:  2019  Contact Type:  Face to Face    SUBJECTIVE:     Patient Findings     Positives:   No Problem Findings           OBJECTIVE    INR Protime   Date Value Ref Range Status   2019 2.5 (A) 0.86 - 1.14 Final       ASSESSMENT / PLAN  INR assessment THER    Recheck INR In: 5 WEEKS    INR Location Clinic      Anticoagulation Summary  As of 2019    INR goal:   2.0-3.0   TTR:   70.2 % (3.1 y)   INR used for dosin.5 (2019)   Warfarin maintenance plan:   5 mg (5 mg x 1) every Mon, Wed, Fri; 2.5 mg (5 mg x 0.5) all other days   Full warfarin instructions:   5 mg every Mon, Wed, Fri; 2.5 mg all other days   Weekly warfarin total:   25 mg   No change documented:   Rosanne Rico RN   Plan last modified:   Renee Hoang, RN (2019)   Next INR check:   3/26/2019   Target end date:       Indications    Long term current use of anticoagulant therapy [Z79.01]  CVA (cerebral vascular accident) (Resolved) [I63.9]             Anticoagulation Episode Summary     INR check location:       Preferred lab:       Send INR reminders to:    [facility]    Comments:               See the Encounter Report to view Anticoagulation Flowsheet and Dosing Calendar (Go to Encounters tab in chart review, and find the Anticoagulation Therapy Visit)    MURTAZA Javier, RN

## 2019-02-20 LAB
THYROPEROXIDASE AB SERPL-ACNC: 29 IU/ML
TSH SERPL DL<=0.005 MIU/L-ACNC: 0.41 MU/L (ref 0.4–4)

## 2019-02-27 DIAGNOSIS — I25.10 CORONARY ARTERY DISEASE INVOLVING NATIVE CORONARY ARTERY OF NATIVE HEART WITHOUT ANGINA PECTORIS: ICD-10-CM

## 2019-02-27 DIAGNOSIS — I63.9 CEREBROVASCULAR ACCIDENT INVOLVING CEREBELLUM (H): ICD-10-CM

## 2019-02-27 NOTE — TELEPHONE ENCOUNTER
"Requested Prescriptions   Pending Prescriptions Disp Refills     atenolol (TENORMIN) 25 MG tablet [Pharmacy Med Name: ATENOLOL 25 MG Tablet]  Medication may not be due for a refill.  Not due until April  Last Written Prescription Date:  12/27/2018  Last Fill Quantity: 90 tablet,  # refills: 2   Last office visit: 8/27/2018 with prescribing provider:  [place]     Future Office Visit:   Next 5 appointments (look out 90 days)    Mar 05, 2019 10:20 AM CST  [facility] KIANA Manning CNP  [facility] [address] [facility] [id number] [phone]            180 tablet 2     Sig: TAKE 1 TABLET TWICE DAILY    Beta-Blockers Protocol Passed - 2/27/2019  2:13 PM       Passed - Blood pressure under 140/90 in past 12 months    BP Readings from Last 3 Encounters:   10/09/18 130/70   08/27/18 134/70   02/08/18 120/64            Passed - Patient is age 6 or older       Passed - Recent (12 mo) or future (30 days) visit within the authorizing provider's specialty    Patient had office visit in the last 12 months or has a visit in the next 30 days with authorizing provider or within the authorizing provider's specialty.  See \"Patient Info\" tab in inbasket, or \"Choose Columns\" in Meds & Orders section of the refill encounter.             Passed - Medication is active on med list                  warfarin (COUMADIN) 5 MG tablet [Pharmacy Med Name: WARFARIN SODIUM 5 MG Tablet]  Last Written Prescription Date:  10/11/2018  Last Fill Quantity: 90 tablet,  # refills: 1   Last office visit: 8/27/2018 with prescribing provider:  Manisha     Future Office Visit:   Next 5 appointments (look out 90 days)    Mar 05, 2019 10:20 AM CST  Office Visit with KIANA Manning CNP  [facility] [address] [facility] [id number] [phone]            90 tablet 1     Sig: TAKE 1/2 TO 1 TABLET EVERY DAY AS DIRECTED  BY  INR  NURSE,  BASED ON INR " "READING    Vitamin K Antagonists Failed - 2/27/2019  2:13 PM       Failed - INR is within goal in the past 6 weeks    Confirm INR is within goal in the past 6 weeks.     Recent Labs   Lab Test 02/19/19   INR 2.5*            Passed - Recent (12 mo) or future (30 days) visit within the authorizing provider's specialty    Patient had office visit in the last 12 months or has a visit in the next 30 days with authorizing provider or within the authorizing provider's specialty.  See \"Patient Info\" tab in inbasket, or \"Choose Columns\" in Meds & Orders section of the refill encounter.             Passed - Medication is active on med list       Passed - Patient is 18 years of age or older       Passed - Patient is not pregnant       Passed - No positive pregnancy on file in past 12 months        "

## 2019-02-28 RX ORDER — ATENOLOL 25 MG/1
TABLET ORAL
Qty: 180 TABLET | Refills: 2 | OUTPATIENT
Start: 2019-02-28

## 2019-02-28 RX ORDER — WARFARIN SODIUM 5 MG/1
TABLET ORAL
Qty: 90 TABLET | Refills: 1 | OUTPATIENT
Start: 2019-02-28

## 2019-02-28 NOTE — TELEPHONE ENCOUNTER
Patient has refills on file, request denied.   Claudine Winn RN   [facility] - Triage

## 2019-03-05 ENCOUNTER — OFFICE VISIT (OUTPATIENT)
Dept: FAMILY MEDICINE | Facility: CLINIC | Age: 79
End: 2019-03-05
Payer: COMMERCIAL

## 2019-03-05 VITALS
BODY MASS INDEX: 28 KG/M2 | HEART RATE: 66 BPM | SYSTOLIC BLOOD PRESSURE: 118 MMHG | WEIGHT: 164 LBS | HEIGHT: 64 IN | TEMPERATURE: 97.4 F | OXYGEN SATURATION: 98 % | DIASTOLIC BLOOD PRESSURE: 62 MMHG

## 2019-03-05 DIAGNOSIS — I25.759 CORONARY ARTERY DISEASE INVOLVING NATIVE ARTERY OF TRANSPLANTED HEART WITH ANGINA PECTORIS (H): ICD-10-CM

## 2019-03-05 DIAGNOSIS — Z78.0 ASYMPTOMATIC MENOPAUSAL STATE: ICD-10-CM

## 2019-03-05 DIAGNOSIS — M85.80 OSTEOPENIA, UNSPECIFIED LOCATION: Primary | ICD-10-CM

## 2019-03-05 DIAGNOSIS — E03.9 HYPOTHYROIDISM, UNSPECIFIED TYPE: ICD-10-CM

## 2019-03-05 DIAGNOSIS — Z12.39 SCREENING FOR BREAST CANCER: ICD-10-CM

## 2019-03-05 PROCEDURE — 99214 OFFICE O/P EST MOD 30 MIN: CPT | Performed by: NURSE PRACTITIONER

## 2019-03-05 RX ORDER — LEVOTHYROXINE SODIUM 100 UG/1
100 TABLET ORAL DAILY
Qty: 90 TABLET | Refills: 3 | Status: SHIPPED | OUTPATIENT
Start: 2019-03-05 | End: 2020-11-30

## 2019-03-05 ASSESSMENT — ANXIETY QUESTIONNAIRES
1. FEELING NERVOUS, ANXIOUS, OR ON EDGE: NOT AT ALL
6. BECOMING EASILY ANNOYED OR IRRITABLE: NOT AT ALL
3. WORRYING TOO MUCH ABOUT DIFFERENT THINGS: NOT AT ALL
5. BEING SO RESTLESS THAT IT IS HARD TO SIT STILL: NOT AT ALL
GAD7 TOTAL SCORE: 0
2. NOT BEING ABLE TO STOP OR CONTROL WORRYING: NOT AT ALL
IF YOU CHECKED OFF ANY PROBLEMS ON THIS QUESTIONNAIRE, HOW DIFFICULT HAVE THESE PROBLEMS MADE IT FOR YOU TO DO YOUR WORK, TAKE CARE OF THINGS AT HOME, OR GET ALONG WITH OTHER PEOPLE: NOT DIFFICULT AT ALL
7. FEELING AFRAID AS IF SOMETHING AWFUL MIGHT HAPPEN: NOT AT ALL

## 2019-03-05 ASSESSMENT — PATIENT HEALTH QUESTIONNAIRE - PHQ9
5. POOR APPETITE OR OVEREATING: NOT AT ALL
SUM OF ALL RESPONSES TO PHQ QUESTIONS 1-9: 0

## 2019-03-05 ASSESSMENT — MIFFLIN-ST. JEOR: SCORE: 1218.9

## 2019-03-05 NOTE — PROGRESS NOTES
SUBJECTIVE:   Salome Saeed is a 78 year old female who presents to clinic today for the following health issues:      Hyperlipidemia Follow-Up      Rate your low fat/cholesterol diet?: not monitoring fat    Taking statin?  Yes, possible muscle aches from statin- leg cramps and ache every night for the past month, but takes magnesium which has helps with the cramps and readjustment in bed helps with leg aches which wake her     Other lipid medications/supplements?:  none    Hypertension Follow-up      Outpatient blood pressures are being checked at home.  Results are 120/75 is the highest but usually 110/65 is her normal.    Low Salt Diet: no added salt  No light headed or dizziness when getting up    Anxiety Follow-Up    Status since last visit: doing good, tried weaning of Lexapro thought she didn't need to take it but noticed she was a little weepy without it so decided to continue taking it.  Trying to take fewer medications    Other associated symptoms:None    Complicating factors:   Significant life event: Yes-  Lost a very dear friend to cancer recently   Current substance abuse: None  Depression symptoms: No  VALERIA-7 SCORE 2/3/2014 9/28/2017 3/5/2019   Total Score 0 - -   Total Score - 0 0       VALERIA-7  Hypothyroidism Follow-up      Since last visit, patient describes the following symptoms: weight gain of 10 lbs and dry skin, but patient has always struggled with constipation    Amount of exercise or physical activity: None but tries to do some stretching     Problems taking medications regularly: No    Medication side effects: none    Diet: regular (no restrictions)      Problem list and histories reviewed & adjusted, as indicated.    Patient Active Problem List   Diagnosis     Benign essential hypertension     Coronary artery disease involving native coronary artery of native heart without angina pectoris     Hyperlipidemia LDL goal <70     Advanced directives, counseling/discussion     DDD  (degenerative disc disease), lumbar     Lumbar spinal stenosis     Lumbago     Spinal stenosis, lumbar region, with neurogenic claudication     Atypical chest pain     Health Care Home     Anxiety     Mitral regurgitation     GERD (gastroesophageal reflux disease)     Long term current use of anticoagulant therapy     Hypothyroidism, unspecified type     Vertebral artery stenosis, right     Cerebrovascular accident involving cerebellum (H)     Chronic bilateral low back pain without sciatica     Status post arthrodesis     WILSON (dyspnea on exertion)     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)     Past Surgical History:   Procedure Laterality Date     CORONARY ANGIOGRAPHY ADULT ORDER  2012    40-50% stenosis to mid PDA, 80% in D1- medically treat     CORONARY ANGIOGRAPHY ADULT ORDER  2015    PTCA and 2.25x8mm Promus PREMIER NAILA to ostium of 2nd OM     DECOMPRESSION, FUSION LUMBAR POSTERIOR THREE + LEVELS, COMBINED  2013    Procedure: COMBINED DECOMPRESSION, FUSION LUMBAR POSTERIOR THREE + LEVELS;  Posterior Lumbar Decompression L3-S1, Fusion L4-S1;  Surgeon: Daniel Harris MD;  Location: [facility] OR     ENDOSCOPIC STRIPPING VEIN(S)       HEART CATH, ANGIOPLASTY  2009    PTCA and two 2.5x15mm Xience stents to mid LAD lesion     HYSTERECTOMY, RICKIE      Fibroids, and Menorrhagia.. Bilateral Oophrectomy     ORTHOPEDIC SURGERY       Stenting of LAD, Angioplasty  09     TONSILLECTOMY      as a child       Social History     Tobacco Use     Smoking status: Former Smoker     Last attempt to quit: 1965     Years since quittin.2     Smokeless tobacco: Never Used   Substance Use Topics     Alcohol use: Yes     Alcohol/week: 0.0 oz     Comment: 2 glasses wine per month     Family History   Problem Relation Age of Onset     Neurologic Disorder Mother         Dementia, Still living     Ovarian Cancer Mother      Thyroid Disease Mother      C.A.D.  Father              Diabetes Father      Coronary Artery Disease Father      Alcohol/Drug Brother      Thyroid Disease Son      Diabetes Son          Current Outpatient Medications   Medication Sig Dispense Refill     acetaminophen (TYLENOL) 325 MG tablet Take 2 tablets by mouth every 6 hours as needed for pain.  0     amLODIPine (NORVASC) 5 MG tablet Take 1 tablet (5 mg) by mouth daily 90 tablet 3     aspirin EC 81 MG tablet Take 1 tablet (81 mg) by mouth daily       atenolol (TENORMIN) 25 MG tablet TAKE 1 TABLET TWICE DAILY 90 tablet 2     escitalopram (LEXAPRO) 5 MG tablet TAKE 1 TABLET EVERY DAY 90 tablet 3     levothyroxine (SYNTHROID/LEVOTHROID) 100 MCG tablet Take 1 tablet (100 mcg) by mouth daily 90 tablet 3     lisinopril (PRINIVIL/ZESTRIL) 20 MG tablet Take 1 tablet (20 mg) by mouth daily 90 tablet 3     Multiple Vitamin (DAILY MULTIVITAMIN PO) Take by mouth daily       ranitidine (ZANTAC) 150 MG tablet Take 1 tablet (150 mg) by mouth 2 times daily 180 tablet 1     rosuvastatin (CRESTOR) 20 MG tablet Take 1 tablet (20 mg) by mouth daily 90 tablet 3     vitamin B complex with vitamin C (VITAMIN  B COMPLEX) TABS tablet Take 1 tablet by mouth daily       warfarin (COUMADIN) 5 MG tablet TAKE 1/2 TO 1 TABLET EVERY DAY AS DIRECTED  BY  INR  NURSE  BASED ON INR READING 90 tablet 1     ascorbic acid (VITAMIN C) 500 MG tablet Take 1,000 mg by mouth daily        Calcium Carb-Cholecalciferol (CALCIUM 600 + D PO) Take 1,200 mg by mouth daily        Cholecalciferol (VITAMIN D) 2000 UNITS tablet Take 2,000 Units by mouth daily       Coenzyme Q10 (COQ-10) 200 MG CAPS Take 400 mg by mouth daily        nitroGLYcerin (NITROSTAT) 0.4 MG sublingual tablet Place 1 tablet (0.4 mg) under the tongue every 5 minutes as needed for chest pain (Patient not taking: Reported on 3/5/2019) 25 tablet 2     Allergies   Allergen Reactions     Atorvastatin      Leg cramps     Gluten      Sinuses affected by gluten     Magnesium  "Salicylate      Oat      Shellfish Allergy      hives     Wheat        Reviewed and updated as needed this visit by clinical staff  Tobacco  Allergies  Meds       Reviewed and updated as needed this visit by Provider         ROS:  CONSTITUTIONAL: NEGATIVE for fever, or chills, POSITIVE: Increased weight of 10 lbs recently  EYES: NEGATIVE for vision changes or irritation, pt had cataract sx this past year  ENT/MOUTH: NEGATIVE for ear, mouth and throat problems  RESP: NEGATIVE for significant cough; POSITIVE: SOB on exertion  CV: NEGATIVE for chest pain, palpitations or peripheral edema  GI: NEGATIVE for nausea, abdominal pain, or heartburn; POSITIVE: Chronic constipation  : NEGATIVE for frequency, dysuria, or hematuria  MUSCULOSKELETAL: POSITIVE: back pain with walking, leg aches and cramping at night  NEURO: NEGATIVE for weakness, dizziness or paresthesias  HEME: NEGATIVE for bleeding problems without recent bruising or bloody noses  PSYCHIATRIC: POSITIVE: Increased weepiness with loss of good friend to cancer in the past month    OBJECTIVE:     /62 (BP Location: Right arm, Patient Position: Sitting, Cuff Size: Adult Regular)   Pulse 66   Temp 97.4  F (36.3  C) (Oral)   Ht 1.626 m (5' 4\")   Wt 74.4 kg (164 lb)   SpO2 98%   BMI 28.15 kg/m    Body mass index is 28.15 kg/m .  GENERAL: healthy, alert and no acute distress  EYES: Eyes grossly normal to inspection, PERRL and conjunctivae and sclerae normal  HENT: ear canals free of erythema or cerumen and TM's intact without erythema, LR present, nose and mouth without ulcers or lesions  RESP: lungs clear to auscultation - no rales, rhonchi or wheezes  CV: regular rate and rhythm, distant heart sounds with  S1 S2 present, no peripheral edema  ABDOMEN: soft, nontender, no hepatosplenomegaly, no masses and bowel sounds present in all four quadrants  PSYCH: mentation appears normal, affect normal/bright    Diagnostic Test Results:  none     ASSESSMENT/PLAN: "     Salome was seen today for hypertension, lipids, anxiety and thyroid problem.    Diagnoses and all orders for this visit:    Hypertension:  Patient has hypertension which is well controlled on amlodipine, atenolol and lisinopril without side effects, no refills needed. Recent CMP WNL. Continue on current regimen and home blood pressure monitoring.     Hyperlipidemia:  Patient has hyperlipidemia which is well controlled on crestor. Recent lipid levels WNL. No refills needed at this time, continuing to encourage weight loss and dietary modification.      Hypothyroidism, unspecified type  Hypothyroidism is controlled currently with levothyroxine. TSH WNL, mildly decreased T3, will continue to monitor, refilled prescription  -     levothyroxine (SYNTHROID/LEVOTHROID) 100 MCG tablet; Take 1 tablet (100 mcg) by mouth daily    Anxiety:  Patients anxiety is well controlled with a VALERIA-7 score of 0. Patient reports some anxiety and feeling down recently with the loss of her friend to cancer and trying to wean down lexapro at the same time. She is currently back to the original dosage of lexapro and feeling better. Lexapro was refilled and will reassess mood and need for lexapro at a future visit.    Screening for breast cancer  Discussion of discontinuing mammograms due to age and risk factors. Patient desires to have one more mammogram this year and then discontinue mammograms after that.  -     *MA Screening Digital Bilateral; Future    Osteopenia, unspecified location/ Asymptomatic menopausal state  Last dexascan 6/2016 which revealed osteopenia with the recommendation to recheck in 2 years. Order placed.  -     DX Hip/Pelvis/Spine; Future    Follow up in 1 month for medicare wellness exam and go over mammogram and dexascan results.    Jonelle Beltran RN, Student FNP, [employer]    History and physical examination done with student nurse practitioner.  Student acted as scribe for this encounter.  I agree with assessment  and plan for this patient.     KIANA Causey [facility]

## 2019-03-06 ASSESSMENT — ANXIETY QUESTIONNAIRES: GAD7 TOTAL SCORE: 0

## 2019-03-18 ENCOUNTER — ANCILLARY PROCEDURE (OUTPATIENT)
Dept: BONE DENSITY | Facility: CLINIC | Age: 79
End: 2019-03-18
Payer: COMMERCIAL

## 2019-03-18 ENCOUNTER — HOSPITAL ENCOUNTER (OUTPATIENT)
Dept: MAMMOGRAPHY | Facility: CLINIC | Age: 79
Discharge: HOME | End: 2019-03-18
Attending: NURSE PRACTITIONER | Admitting: NURSE PRACTITIONER
Payer: COMMERCIAL

## 2019-03-18 ENCOUNTER — ANCILLARY PROCEDURE (OUTPATIENT)
Dept: BONE DENSITY | Facility: CLINIC | Age: 79
End: 2019-03-18
Attending: NURSE PRACTITIONER
Payer: COMMERCIAL

## 2019-03-18 DIAGNOSIS — Z78.0 ASYMPTOMATIC MENOPAUSAL STATE: ICD-10-CM

## 2019-03-18 DIAGNOSIS — Z12.39 SCREENING FOR BREAST CANCER: ICD-10-CM

## 2019-03-18 PROCEDURE — 77081 DXA BONE DENSITY APPENDICULR: CPT | Performed by: FAMILY MEDICINE

## 2019-03-18 PROCEDURE — 77063 BREAST TOMOSYNTHESIS BI: CPT

## 2019-03-18 NOTE — LETTER
2019      Salome Saeed  [address]  [place] [address]        Dear ,    We are writing to inform you of your test results.    Dexa shows osteoporosis. Make sure taking calcium 1200mg daily and vitamin D 800 IU daily. I'd recommend follow-up with Alida to discuss other treatment options which may improve your bone density and reduce your risk for an osteoporotic bone fracture.     Resulted Orders   DX Wrist Heel Radius    Narrative    BONE DENSITOMETRY  [facility] [address] [place] [address]  3/18/2019      PATIENT: Salome Saeed  CHART: [id number]   :  1940  AGE:  78 year old  SEX:  female   REFERRING PROVIDER:  Alida Ugarte APRN CNP     PROCEDURE:  Bone density scanning was performed using DXA technology of   the lumbar spine and hip.  Scanning was performed on a Lunar Prodigy   scanner.  Reporting is completed in the form of a T-score.  The T-score   represents the standard deviation from peak bone mass based on a young   healthy adult.     REFERENCE T-SCORES:       Normal                -1.0 and greater                                 Osteopenia         Between -1.0 and -2.5                                             Osteoporosis     -2.5 and less                                       RISK FACTORS:  Post-menopausal    CURRENT TREATMENT:  none listed     FINDINGS:                              Left Femoral Neck            T-score:  -1.0               Right Femoral Neck          T-score:  -1.5               Forearm (radius 33%)      T-score:  -2.7                                 Total Hip Mean BMD: 0.921  Previous: 0.941     Comparison is made to a couple DXA scans performed on the same Lunar   Prodigy  machine since .      IMPRESSION  Osteoporosis  Degenerative changes of the spine  Recommendations include ensuring adequate daily Calcium and Vitamin D   intake  Follow up scan can be considered in three  years.    Comparisons are not necessarily valid when precision within the machine   has not been determined. Such a comparison has been performed here; one   should interpret with caution.   Compared to previous bone densitometry performed on this patient, there is   the suggestion of a possible trend towards worsening of the total hip.    Current NOF guidelines recommend treatment for patients with the   following:  - Prior hip or vertebral fracture  - T-score -2.5 or below  - A 10 year risk of any major osteoporotic fracture >20% or 10 year risk   of hip fracture >3%, as calculated using the FRAX calculator   (www.shef.ac.uk/FRAX).      Based on these guidelines, treatment (in addition to calcium and vitamin   D) is recommended for this patient, after ruling out other causes of   osteoporosis/low bone density.  While this is meant as an aid to clinical   decision-making, clinical judgment must still be used.       MATTY JIMENEZ M.D.               If you have any questions or concerns, please call the clinic at the number listed above.       Sincerely,      Nikolai Villalba MD

## 2019-03-25 NOTE — RESULT ENCOUNTER NOTE
Please send the following letter:    Ms. Saeed,    Dexa shows osteoporosis. Make sure taking calcium 1200mg daily and vitamin D 800 IU daily. I'd recommend follow-up with Alida to discuss other treatment options which may improve your bone density and reduce your risk for an osteoporotic bone fracture.      If you have further questions about the interpretation of your labs, labtestsonline.org is a good website to check out for further information.      Please contact the clinic if you have additional questions.  Thank you.    Sincerely,    Nikolai Villalba MD

## 2019-03-26 ENCOUNTER — ANTICOAGULATION THERAPY VISIT (OUTPATIENT)
Dept: NURSING | Facility: CLINIC | Age: 79
End: 2019-03-26
Payer: COMMERCIAL

## 2019-03-26 DIAGNOSIS — Z79.01 LONG TERM CURRENT USE OF ANTICOAGULANT THERAPY: ICD-10-CM

## 2019-03-26 LAB — INR POINT OF CARE: 2.5 (ref 0.86–1.14)

## 2019-03-26 PROCEDURE — 85610 PROTHROMBIN TIME: CPT | Mod: QW

## 2019-03-26 PROCEDURE — 36416 COLLJ CAPILLARY BLOOD SPEC: CPT

## 2019-03-26 NOTE — PROGRESS NOTES
ANTICOAGULATION FOLLOW-UP CLINIC VISIT    Patient Name:  Salome Saeed  Date:  3/26/2019  Contact Type:  Face to Face    SUBJECTIVE:     Patient Findings       The patient was assessed for diet, medication, and activity level changes, missed or extra doses, bruising or bleeding, with no problem findings.       OBJECTIVE    INR Protime   Date Value Ref Range Status   2019 2.5 (A) 0.86 - 1.14 Final       ASSESSMENT / PLAN  INR assessment THER    Recheck INR In: 6 WEEKS    INR Location Clinic      Anticoagulation Summary  As of 3/26/2019    INR goal:   2.0-3.0   TTR:   71.1 % (3.2 y)   INR used for dosin.5 (3/26/2019)   Warfarin maintenance plan:   5 mg (5 mg x 1) every Mon, Wed, Fri; 2.5 mg (5 mg x 0.5) all other days   Full warfarin instructions:   5 mg every Mon, Wed, Fri; 2.5 mg all other days   Weekly warfarin total:   25 mg   No change documented:   Rosanne Rico RN   Plan last modified:   Renee Honag RN (2019)   Next INR check:   2019   Target end date:       Indications    Long term current use of anticoagulant therapy [Z79.01]  CVA (cerebral vascular accident) (Resolved) [I63.9]             Anticoagulation Episode Summary     INR check location:       Preferred lab:       Send INR reminders to:    [facility]    Comments:               See the Encounter Report to view Anticoagulation Flowsheet and Dosing Calendar (Go to Encounters tab in chart review, and find the Anticoagulation Therapy Visit)    MURTAZA Javier, RN

## 2019-04-30 ENCOUNTER — ANCILLARY PROCEDURE (OUTPATIENT)
Dept: GENERAL RADIOLOGY | Facility: CLINIC | Age: 79
End: 2019-04-30
Attending: NURSE PRACTITIONER
Payer: COMMERCIAL

## 2019-04-30 ENCOUNTER — OFFICE VISIT (OUTPATIENT)
Dept: FAMILY MEDICINE | Facility: CLINIC | Age: 79
End: 2019-04-30
Payer: COMMERCIAL

## 2019-04-30 VITALS
HEART RATE: 62 BPM | BODY MASS INDEX: 27.83 KG/M2 | OXYGEN SATURATION: 98 % | SYSTOLIC BLOOD PRESSURE: 134 MMHG | TEMPERATURE: 98.1 F | WEIGHT: 163 LBS | DIASTOLIC BLOOD PRESSURE: 60 MMHG | HEIGHT: 64 IN

## 2019-04-30 DIAGNOSIS — Z23 NEED FOR TDAP VACCINATION: ICD-10-CM

## 2019-04-30 DIAGNOSIS — G89.29 CHRONIC PAIN OF LEFT KNEE: ICD-10-CM

## 2019-04-30 DIAGNOSIS — Z00.00 MEDICARE ANNUAL WELLNESS VISIT, SUBSEQUENT: Primary | ICD-10-CM

## 2019-04-30 DIAGNOSIS — M25.562 CHRONIC PAIN OF LEFT KNEE: ICD-10-CM

## 2019-04-30 PROCEDURE — 90471 IMMUNIZATION ADMIN: CPT | Performed by: NURSE PRACTITIONER

## 2019-04-30 PROCEDURE — 73562 X-RAY EXAM OF KNEE 3: CPT | Mod: LT

## 2019-04-30 PROCEDURE — 90715 TDAP VACCINE 7 YRS/> IM: CPT | Performed by: NURSE PRACTITIONER

## 2019-04-30 PROCEDURE — G0439 PPPS, SUBSEQ VISIT: HCPCS | Performed by: NURSE PRACTITIONER

## 2019-04-30 PROCEDURE — 99213 OFFICE O/P EST LOW 20 MIN: CPT | Mod: 25 | Performed by: NURSE PRACTITIONER

## 2019-04-30 ASSESSMENT — MIFFLIN-ST. JEOR: SCORE: 1201.42

## 2019-04-30 NOTE — PATIENT INSTRUCTIONS
Patient Education   Personalized Prevention Plan  You are due for the preventive services outlined below.  Your care team is available to assist you in scheduling these services.  If you have already completed any of these items, please share that information with your care team to update in your medical record.  Health Maintenance Due   Topic Date Due     URINE DRUG SCREEN Q1 YR  04/02/1955     Zoster (Shingles) Vaccine (2 of 3) 09/23/2009     Flu Vaccine (1) 09/01/2018     Annual Wellness Visit  11/01/2018     Diptheria Tetanus Pertussis (DTAP/TDAP/TD) Vaccine (3 - Td) 02/20/2019

## 2019-04-30 NOTE — NURSING NOTE
Screening Questionnaire for Adult Immunization    Are you sick today?   No   Do you have allergies to medications, food, a vaccine component or latex?   No   Have you ever had a serious reaction after receiving a vaccination?   No   Do you have a long-term health problem with heart disease, lung disease, asthma, kidney disease, metabolic disease (e.g. diabetes), anemia, or other blood disorder?   Yes   Do you have cancer, leukemia, HIV/AIDS, or any other immune system problem?   No   In the past 3 months, have you taken medications that affect  your immune system, such as prednisone, other steroids, or anticancer drugs; drugs for the treatment of rheumatoid arthritis, Crohn s disease, or psoriasis; or have you had radiation treatments?   No   Have you had a seizure, or a brain or other nervous system problem?   No   During the past year, have you received a transfusion of blood or blood     products, or been given immune (gamma) globulin or antiviral drug?   No   For women: Are you pregnant or is there a chance you could become        pregnant during the next month?   No   Have you received any vaccinations in the past 4 weeks?   No     Immunization questionnaire was positive for at least one answer.  Notified Alida Ugarte NP.        Per orders of Alida Ugarte NP injection of TDAP given by Alecia Nj. Patient instructed to remain in clinic for 15 minutes afterwards, and to report any adverse reaction to me immediately.       Screening performed by Alecia Nj on 4/30/2019 at 11:04 AM.

## 2019-04-30 NOTE — PROGRESS NOTES
SUBJECTIVE:   Salome Saeed is a 79 year old female who presents for Preventive Visit.    Are you in the first 12 months of your Medicare coverage?  No    HPI  Do you feel safe in your environment? Yes    Do you have a Health Care Directive? No: Advance care planning reviewed with patient; information given to patient to review.      Fall risk  Fallen 2 or more times in the past year?: No  Any fall with injury in the past year?: No    Cognitive Screening   1) Repeat 3 items (Leader, Season, Table)    2) Clock draw: NORMAL  3) 3 item recall: Recalls 2 objects   Results: NORMAL clock, 1-2 items recalled: COGNITIVE IMPAIRMENT LESS LIKELY    Mini-CogTM Copyright S Anjelica. Licensed by the author for use in [facility]; reprinted with permission (sokatia@[facility]). All rights reserved.      Do you have sleep apnea, excessive snoring or daytime drowsiness?: no    Reviewed and updated as needed this visit by clinical staff  Tobacco  Allergies  Meds         Reviewed and updated as needed this visit by Provider        Social History     Tobacco Use     Smoking status: Former Smoker     Last attempt to quit: 1965     Years since quittin.3     Smokeless tobacco: Never Used   Substance Use Topics     Alcohol use: Yes     Alcohol/week: 0.0 oz     Comment: 2 glasses wine per month         Alcohol Use 2017   Prescreen: >3 drinks/day or >7 drinks/week? No alcohol use      Current providers sharing in care for this patient include:   Patient Care Team:  Surya Dyer DO as PCP - General (Family Practice)  Surya Dyer DO as Assigned PCP    The following health maintenance items are reviewed in Epic and correct as of today:  Health Maintenance   Topic Date Due     URINE DRUG SCREEN Q1 YR  1955     ZOSTER IMMUNIZATION (2 of 3) 2009     INFLUENZA VACCINE (1) 2018     MEDICARE ANNUAL WELLNESS VISIT  2018     DTAP/TDAP/TD IMMUNIZATION (3 - Td) 2019     LIPID MONITORING  Q1 YEAR  10/09/2019     TSH Q1 YEAR  02/19/2020     FALL RISK ASSESSMENT  03/05/2020     VALERIA QUESTIONNAIRE 1 YEAR  03/05/2020     PHQ-9 Q1YR  03/05/2020     MAMMO Q2 YR  03/18/2021     DEXA Q3 YR  03/18/2022     ADVANCE DIRECTIVE PLANNING Q5 YRS  09/28/2022     PNEUMOCOCCAL IMMUNIZATION 65+ LOW/MEDIUM RISK  Completed     IPV IMMUNIZATION  Aged Out     MENINGITIS IMMUNIZATION  Aged Out     BP Readings from Last 3 Encounters:   04/30/19 134/60   03/05/19 118/62   10/09/18 130/70    Wt Readings from Last 3 Encounters:   04/30/19 73.9 kg (163 lb)   03/05/19 74.4 kg (164 lb)   10/09/18 72.6 kg (160 lb)                  Patient Active Problem List   Diagnosis     Benign essential hypertension     Coronary artery disease involving native coronary artery of native heart without angina pectoris     Hyperlipidemia LDL goal <70     Advanced directives, counseling/discussion     DDD (degenerative disc disease), lumbar     Lumbar spinal stenosis     Lumbago     Spinal stenosis, lumbar region, with neurogenic claudication     Atypical chest pain     Health Care Home     Anxiety     Mitral regurgitation     GERD (gastroesophageal reflux disease)     Long term current use of anticoagulant therapy     Hypothyroidism, unspecified type     Vertebral artery stenosis, right     Cerebrovascular accident involving cerebellum (H)     Chronic bilateral low back pain without sciatica     Status post arthrodesis     WILSON (dyspnea on exertion)     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)     Past Surgical History:   Procedure Laterality Date     CORONARY ANGIOGRAPHY ADULT ORDER  12/6/2012    40-50% stenosis to mid PDA, 80% in D1- medically treat     CORONARY ANGIOGRAPHY ADULT ORDER  5/28/2015    PTCA and 2.25x8mm Promus PREMIER NAILA to ostium of 2nd OM     DECOMPRESSION, FUSION LUMBAR POSTERIOR THREE + LEVELS, COMBINED  5/8/2013    Procedure: COMBINED DECOMPRESSION, FUSION LUMBAR POSTERIOR  THREE + LEVELS;  Posterior Lumbar Decompression L3-S1, Fusion L4-S1;  Surgeon: Daniel Harris MD;  Location: [facility] OR     ENDOSCOPIC STRIPPING VEIN(S)       HEART CATH, ANGIOPLASTY  2009    PTCA and two 2.5x15mm Xience stents to mid LAD lesion     HYSTERECTOMY, RICKIE      Fibroids, and Menorrhagia.. Bilateral Oophrectomy     ORTHOPEDIC SURGERY       Stenting of LAD, Angioplasty  09     TONSILLECTOMY      as a child       Social History     Tobacco Use     Smoking status: Former Smoker     Last attempt to quit: 1965     Years since quittin.3     Smokeless tobacco: Never Used   Substance Use Topics     Alcohol use: Yes     Alcohol/week: 0.0 oz     Comment: 2 glasses wine per month     Family History   Problem Relation Age of Onset     Neurologic Disorder Mother         Dementia, Still living     Ovarian Cancer Mother      Thyroid Disease Mother      C.A.D. Father              Diabetes Father      Coronary Artery Disease Father      Alcohol/Drug Brother      Thyroid Disease Son      Diabetes Son          Current Outpatient Medications   Medication Sig Dispense Refill     acetaminophen (TYLENOL) 325 MG tablet Take 2 tablets by mouth every 6 hours as needed for pain.  0     amLODIPine (NORVASC) 5 MG tablet Take 1 tablet (5 mg) by mouth daily 90 tablet 3     ascorbic acid (VITAMIN C) 500 MG tablet Take 1,000 mg by mouth daily        aspirin EC 81 MG tablet Take 1 tablet (81 mg) by mouth daily       atenolol (TENORMIN) 25 MG tablet TAKE 1 TABLET TWICE DAILY 90 tablet 2     Calcium Carb-Cholecalciferol (CALCIUM 600 + D PO) Take 1,200 mg by mouth daily        Cholecalciferol (VITAMIN D) 2000 UNITS tablet Take 2,000 Units by mouth daily       Coenzyme Q10 (COQ-10) 200 MG CAPS Take 400 mg by mouth daily        escitalopram (LEXAPRO) 5 MG tablet TAKE 1 TABLET EVERY DAY 90 tablet 3     levothyroxine (SYNTHROID/LEVOTHROID) 100 MCG tablet Take 1 tablet (100 mcg) by mouth daily 90 tablet 3     lisinopril  "(PRINIVIL/ZESTRIL) 20 MG tablet Take 1 tablet (20 mg) by mouth daily 90 tablet 3     Multiple Vitamin (DAILY MULTIVITAMIN PO) Take by mouth daily       nitroGLYcerin (NITROSTAT) 0.4 MG sublingual tablet Place 1 tablet (0.4 mg) under the tongue every 5 minutes as needed for chest pain (Patient not taking: Reported on 3/5/2019) 25 tablet 2     ranitidine (ZANTAC) 150 MG tablet Take 1 tablet (150 mg) by mouth 2 times daily 180 tablet 1     rosuvastatin (CRESTOR) 20 MG tablet Take 1 tablet (20 mg) by mouth daily 90 tablet 3     vitamin B complex with vitamin C (VITAMIN  B COMPLEX) TABS tablet Take 1 tablet by mouth daily       warfarin (COUMADIN) 5 MG tablet TAKE 1/2 TO 1 TABLET EVERY DAY AS DIRECTED  BY  INR  NURSE  BASED ON INR READING 90 tablet 1     Pneumonia Vaccine:Adults age 65+ who received Pneumovax (PPSV23) at 65 years or older: Should be given PCV13 > 1 year after their most recent PPSV23  Mammogram Screening: Patient over age 75, has elected to continue with mammography screening.    Review of Systems  Constitutional, HEENT, cardiovascular, pulmonary, gi and gu systems are negative, except as otherwise noted.  MS:  Chronic back pain - has an appointment with neurosurgeon at [facility] - Dr. Harris  Left knee pain - chronic pain, worsening of knee pain over the past few months.  Aggravated with going up and down stairs and prolonged standing.  Has been seeing chiropractor.    Anderson has been helping with pain.      OBJECTIVE:   /60 (BP Location: Right arm, Patient Position: Sitting, Cuff Size: Adult Regular)   Pulse 62   Temp 98.1  F (36.7  C) (Oral)   Ht 1.613 m (5' 3.5\")   Wt 73.9 kg (163 lb)   SpO2 98%   BMI 28.42 kg/m   Estimated body mass index is 28.42 kg/m  as calculated from the following:    Height as of this encounter: 1.613 m (5' 3.5\").    Weight as of this encounter: 73.9 kg (163 lb).  Physical Exam  GENERAL APPEARANCE: healthy, alert and no distress  EYES: Eyes " grossly normal to inspection, PERRL and conjunctivae and sclerae normal  HENT: ear canals and TM's normal, nose and mouth without ulcers or lesions, oropharynx clear and oral mucous membranes moist  NECK: no adenopathy, no asymmetry, masses, or scars and thyroid normal to palpation  RESP: lungs clear to auscultation - no rales, rhonchi or wheezes  CV: regular rate and rhythm, normal S1 S2, no S3 or S4, no murmur, click or rub, no peripheral edema and peripheral pulses strong  ABDOMEN: soft, nontender, no hepatosplenomegaly, no masses and bowel sounds normal  MS: no musculoskeletal defects are noted and gait is age appropriate without ataxia  Left knee with full ROM, tenderness to palpation over right lateral anterior joint line, no swelling, erythema or warmth, no joint instability  SKIN: no suspicious lesions or rashes  NEURO: Normal strength and tone, sensory exam grossly normal, mentation intact and speech normal  PSYCH: mentation appears normal and affect normal/bright    ASSESSMENT / PLAN:     Salome was seen today for physical.    Diagnoses and all orders for this visit:    Medicare annual wellness visit, subsequent    Chronic pain of left knee  -     XR Knee Left 3 Views; Future - medial compartment degenerative changes, narrowing visible  -     Acetaminophen, 1,000 mg scheduled 2 times daily.   -     Consider physical therapy.   -     Follow-up with PCP for steroid injection.      End of Life Planning:  Patient currently has an advanced directive: No.  I have verified the patient's ablity to prepare an advanced directive/make health care decisions.  Literature was provided to assist patient in preparing an advanced directive.    COUNSELING:  Reviewed preventive health counseling, as reflected in patient instructions       Regular exercise       Vision screening       Dental care    BP Readings from Last 1 Encounters:   04/30/19 134/60     Estimated body mass index is 28.42 kg/m  as calculated from the  "following:    Height as of this encounter: 1.613 m (5' 3.5\").    Weight as of this encounter: 73.9 kg (163 lb).      Weight management plan: Discussed healthy diet and exercise guidelines     reports that she quit smoking about 54 years ago. She has never used smokeless tobacco.      Appropriate preventive services were discussed with this patient, including applicable screening as appropriate for cardiovascular disease, diabetes, osteopenia/osteoporosis, and glaucoma.  As appropriate for age/gender, discussed screening for colorectal cancer, prostate cancer, breast cancer, and cervical cancer. Checklist reviewing preventive services available has been given to the patient.    Reviewed patients plan of care and provided an AVS. The Basic Care Plan (routine screening as documented in Health Maintenance) for Salome meets the Care Plan requirement. This Care Plan has been established and reviewed with the Patient.    Counseling Resources:  ATP IV Guidelines  Pooled Cohorts Equation Calculator  Breast Cancer Risk Calculator  FRAX Risk Assessment  ICSI Preventive Guidelines  Dietary Guidelines for Americans, 2010  USDA's MyPlate  ASA Prophylaxis  Lung CA Screening    KIANA Causey [facility]    Identified Health Risks:  "

## 2019-05-02 ENCOUNTER — OFFICE VISIT (OUTPATIENT)
Dept: FAMILY MEDICINE | Facility: CLINIC | Age: 79
End: 2019-05-02
Payer: COMMERCIAL

## 2019-05-02 VITALS
TEMPERATURE: 97.9 F | HEART RATE: 60 BPM | WEIGHT: 163 LBS | SYSTOLIC BLOOD PRESSURE: 134 MMHG | BODY MASS INDEX: 28.42 KG/M2 | DIASTOLIC BLOOD PRESSURE: 72 MMHG | OXYGEN SATURATION: 97 %

## 2019-05-02 DIAGNOSIS — M17.12 OSTEOARTHRITIS OF LEFT KNEE, UNSPECIFIED OSTEOARTHRITIS TYPE: Primary | ICD-10-CM

## 2019-05-02 PROCEDURE — 20610 DRAIN/INJ JOINT/BURSA W/O US: CPT | Mod: LT | Performed by: FAMILY MEDICINE

## 2019-05-02 NOTE — PROGRESS NOTES
SUBJECTIVE:   Salome Saeed is a 79 year old female who presents to clinic today for the following   health issues:      She has been dealing with chronic left knee pain, worsening over the past few months. Pain is worse with stairs and standing. No inciting injury or trauma. She has been using Aspercreme and Aleve with partial relief. X-ray completed on 4/30/19 that demonstrated:    XR KNEE LT 3 VW 4/30/2019 3:56 PM     HISTORY: Chronic pain of left knee; Chronic pain of left knee                                                                      IMPRESSION: Mild degenerative narrowing medial compartment. Tiny knee  effusion. No other findings.      Additional history: as documented    Reviewed  and updated as needed this visit by clinical staff  Tobacco  Allergies  Meds  Problems  Med Hx  Surg Hx  Fam Hx         Reviewed and updated as needed this visit by Provider  Tobacco  Allergies  Meds  Problems  Med Hx  Surg Hx  Fam Hx         Patient Active Problem List   Diagnosis     Benign essential hypertension     Coronary artery disease involving native coronary artery of native heart without angina pectoris     Hyperlipidemia LDL goal <70     Advanced directives, counseling/discussion     DDD (degenerative disc disease), lumbar     Lumbar spinal stenosis     Lumbago     Spinal stenosis, lumbar region, with neurogenic claudication     Atypical chest pain     Health Care Home     Anxiety     Mitral regurgitation     GERD (gastroesophageal reflux disease)     Long term current use of anticoagulant therapy     Hypothyroidism, unspecified type     Vertebral artery stenosis, right     Cerebrovascular accident involving cerebellum (H)     Chronic bilateral low back pain without sciatica     Status post arthrodesis     WILSON (dyspnea on exertion)     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)     Past Surgical History:   Procedure Laterality Date      CORONARY ANGIOGRAPHY ADULT ORDER  2012    40-50% stenosis to mid PDA, 80% in D1- medically treat     CORONARY ANGIOGRAPHY ADULT ORDER  2015    PTCA and 2.25x8mm Promus PREMIER NAILA to ostium of 2nd OM     DECOMPRESSION, FUSION LUMBAR POSTERIOR THREE + LEVELS, COMBINED  2013    Procedure: COMBINED DECOMPRESSION, FUSION LUMBAR POSTERIOR THREE + LEVELS;  Posterior Lumbar Decompression L3-S1, Fusion L4-S1;  Surgeon: Daniel Harris MD;  Location: [facility] OR     ENDOSCOPIC STRIPPING VEIN(S)       HEART CATH, ANGIOPLASTY  2009    PTCA and two 2.5x15mm Xience stents to mid LAD lesion     HYSTERECTOMY, RICKIE      Fibroids, and Menorrhagia.. Bilateral Oophrectomy     ORTHOPEDIC SURGERY       Stenting of LAD, Angioplasty  09     TONSILLECTOMY      as a child       Social History     Tobacco Use     Smoking status: Former Smoker     Last attempt to quit: 1965     Years since quittin.3     Smokeless tobacco: Never Used   Substance Use Topics     Alcohol use: Yes     Alcohol/week: 0.0 oz     Comment: 2 glasses wine per month     Family History   Problem Relation Age of Onset     Neurologic Disorder Mother         Dementia, Still living     Ovarian Cancer Mother      Thyroid Disease Mother      C.A.D. Father              Diabetes Father      Coronary Artery Disease Father      Alcohol/Drug Brother      Thyroid Disease Son      Diabetes Son            ROS:  Constitutional, HEENT, cardiovascular, pulmonary, gi and gu systems are negative, except as otherwise noted.    OBJECTIVE:     /72   Pulse 60   Temp 97.9  F (36.6  C) (Oral)   Wt 73.9 kg (163 lb)   SpO2 97%   BMI 28.42 kg/m    Body mass index is 28.42 kg/m .  GENERAL: healthy, alert and no distress  MS: extremities normal- no gross deformities noted. Left knee with full range of motion. Negative anterior/posterior drawer. Negative qamar. She does have some tenderness over both medial and lateral joint lines. No erythema,  warmth.    Diagnostic Test Results:  none     ASSESSMENT/PLAN:   1. Osteoarthritis of left knee, unspecified osteoarthritis type: The risks and benefits of the procedure were and explained and verbal consent was obtained.  Using sterile technique, the left knee was prepped and liodocaine 1% was used to anesthetize the skin.  From the lateral infrapatellar approach, using a 22 G 1.5 inch needle, the knee joint was entered aspirated without blood and then 1 ml of Kenalog 40mg/ml and 4ml plain 1% Lidocaine was injected and the needle withdrawn.  The procedure was well tolerated.  The patient is asked to continue to rest the knee for a few more days before resuming regular activities.  It may be more painful for the first 1-2 days.  Watch for fever, or increased swelling or persistent pain in knee. Call or return to clinic prn if such symptoms occur or the knee fails to improve as anticipated.  - Drain/Inject Large Joint/Bursa  - methylPREDNISolone (DEPO-MEDROL) 40 MG/ML injection; Inject 1 mL (40 mg) into the muscle once for 1 dose  Dispense: 1 mL; Refill: 0    Surya Dyer DO  [facility]

## 2019-05-03 RX ORDER — METHYLPREDNISOLONE ACETATE 40 MG/ML
40 INJECTION, SUSPENSION INTRA-ARTICULAR; INTRALESIONAL; INTRAMUSCULAR; SOFT TISSUE ONCE
Qty: 1 ML | Refills: 0 | Status: SHIPPED | OUTPATIENT
Start: 2019-05-03 | End: 2019-05-03

## 2019-05-06 ENCOUNTER — TELEPHONE (OUTPATIENT)
Dept: FAMILY MEDICINE | Facility: CLINIC | Age: 79
End: 2019-05-06

## 2019-05-06 ENCOUNTER — VIRTUAL VISIT (OUTPATIENT)
Dept: FAMILY MEDICINE | Facility: CLINIC | Age: 79
End: 2019-05-06
Payer: COMMERCIAL

## 2019-05-06 DIAGNOSIS — M17.12 OSTEOARTHRITIS OF LEFT KNEE, UNSPECIFIED OSTEOARTHRITIS TYPE: Primary | ICD-10-CM

## 2019-05-06 PROCEDURE — 99207 ZZC NO CHARGE LOS: CPT | Performed by: FAMILY MEDICINE

## 2019-05-06 NOTE — PROGRESS NOTES
"Salome Saeed is a 79 year old female who is being evaluated via a billable telephone visit.      The patient has been notified of following:     \"This telephone visit will be conducted via a call between you and your physician/provider. We have found that certain health care needs can be provided without the need for a physical exam.  This service lets us provide the care you need with a short phone conversation.  If a prescription is necessary we can send it directly to your pharmacy.  If lab work is needed we can place an order for that and you can then stop by our lab to have the test done at a later time.    If during the course of the call the physician/provider feels a telephone visit is not appropriate, you will not be charged for this service.\"     Consent has been obtained for this service by 1 care team member: yes. See the scanned image in the medical record.    Salome Saeed complains of  No chief complaint on file.      I have reviewed and updated the patient's Past Medical History, Social History, Family History and Medication List.    ALLERGIES  Atorvastatin; Gluten; Magnesium salicylate; Oat; Shellfish allergy; and Wheat    Margo Shabazz MA   (MA signature)    Knee feels better after injection but is still having pain, wondering what the next step would be. No symptoms besides pain. Maybe 4-5/10 pain, worse with walking and laying on her side. Pain is not unbearable - she could live with it. Denies any fevers or chills. No redness, warmth, or swelling of knee.      Assessment/Plan:  (M17.12) Osteoarthritis of left knee, unspecified osteoarthritis type  (primary encounter diagnosis)  Comment: advise monitoring for another few days to week to see if she starts to notice any further improvement. If no further benefit, could refer to orthopedics for further recommendations.    I have reviewed the note as documented above.  This accurately captures the substance of my conversation with the " patient.      Total time of call between patient and provider was 2 minutes     Surya Dyer DO

## 2019-05-07 ENCOUNTER — ANTICOAGULATION THERAPY VISIT (OUTPATIENT)
Dept: NURSING | Facility: CLINIC | Age: 79
End: 2019-05-07
Payer: COMMERCIAL

## 2019-05-07 DIAGNOSIS — Z79.01 LONG TERM CURRENT USE OF ANTICOAGULANT THERAPY: ICD-10-CM

## 2019-05-07 LAB — INR POINT OF CARE: 2.6 (ref 0.86–1.14)

## 2019-05-07 PROCEDURE — 85610 PROTHROMBIN TIME: CPT | Mod: QW

## 2019-05-07 PROCEDURE — 36416 COLLJ CAPILLARY BLOOD SPEC: CPT

## 2019-05-07 NOTE — PROGRESS NOTES
ANTICOAGULATION FOLLOW-UP CLINIC VISIT    Patient Name:  Slaome Saeed  Date:  2019  Contact Type:  Face to Face    SUBJECTIVE:     Patient Findings     Comments:   The patient was assessed for diet, medication, and activity level changes, missed or extra doses, bruising or bleeding, with no problem findings.             OBJECTIVE    INR Protime   Date Value Ref Range Status   2019 2.6 (A) 0.86 - 1.14 Final       ASSESSMENT / PLAN  INR assessment THER    Recheck INR In: 6 WEEKS    INR Location Clinic      Anticoagulation Summary  As of 2019    INR goal:   2.0-3.0   TTR:   72.1 % (3.3 y)   INR used for dosin.6 (2019)   Warfarin maintenance plan:   5 mg (5 mg x 1) every Mon, Wed, Fri; 2.5 mg (5 mg x 0.5) all other days   Full warfarin instructions:   5 mg every Mon, Wed, Fri; 2.5 mg all other days   Weekly warfarin total:   25 mg   No change documented:   Rosanne Rico RN   Plan last modified:   Renee Hoang RN (2019)   Next INR check:   2019   Target end date:       Indications    Long term current use of anticoagulant therapy [Z79.01]  CVA (cerebral vascular accident) (Resolved) [I63.9]             Anticoagulation Episode Summary     INR check location:       Preferred lab:       Send INR reminders to:    [facility]    Comments:               See the Encounter Report to view Anticoagulation Flowsheet and Dosing Calendar (Go to Encounters tab in chart review, and find the Anticoagulation Therapy Visit)    MURTAZA Javier, RN

## 2019-05-09 ENCOUNTER — TRANSFERRED RECORDS (OUTPATIENT)
Dept: HEALTH INFORMATION MANAGEMENT | Facility: CLINIC | Age: 79
End: 2019-05-09

## 2019-05-15 ENCOUNTER — TRANSFERRED RECORDS (OUTPATIENT)
Dept: HEALTH INFORMATION MANAGEMENT | Facility: CLINIC | Age: 79
End: 2019-05-15

## 2019-06-07 ENCOUNTER — TRANSFERRED RECORDS (OUTPATIENT)
Dept: HEALTH INFORMATION MANAGEMENT | Facility: CLINIC | Age: 79
End: 2019-06-07

## 2019-06-25 ENCOUNTER — TRANSFERRED RECORDS (OUTPATIENT)
Dept: HEALTH INFORMATION MANAGEMENT | Facility: CLINIC | Age: 79
End: 2019-06-25

## 2019-06-26 ENCOUNTER — ANTICOAGULATION THERAPY VISIT (OUTPATIENT)
Dept: NURSING | Facility: CLINIC | Age: 79
End: 2019-06-26
Payer: COMMERCIAL

## 2019-06-26 DIAGNOSIS — Z79.01 LONG TERM CURRENT USE OF ANTICOAGULANT THERAPY: ICD-10-CM

## 2019-06-26 LAB — INR POINT OF CARE: 1.4 (ref 0.86–1.14)

## 2019-06-26 PROCEDURE — 85610 PROTHROMBIN TIME: CPT | Mod: QW

## 2019-06-26 PROCEDURE — 36416 COLLJ CAPILLARY BLOOD SPEC: CPT

## 2019-06-26 NOTE — PROGRESS NOTES
ANTICOAGULATION FOLLOW-UP CLINIC VISIT    Patient Name:  Salome Saeed  Date:  2019  Contact Type:  Face to Face    SUBJECTIVE:  Patient Findings         Clinical Outcomes     Negatives:   Major bleeding event, Thromboembolic event, Anticoagulation-related hospital admission, Anticoagulation-related ED visit, Anticoagulation-related fatality           OBJECTIVE    INR Protime   Date Value Ref Range Status   2019 1.4 (A) 0.86 - 1.14 Final       ASSESSMENT / PLAN  INR assessment SUB    Recheck INR In: 2 WEEKS    INR Location Clinic      Anticoagulation Summary  As of 2019    INR goal:   2.0-3.0   TTR:   71.2 % (3.4 y)   INR used for dosin.4! (2019)   Warfarin maintenance plan:   5 mg (5 mg x 1) every Mon, Wed, Fri; 2.5 mg (5 mg x 0.5) all other days   Full warfarin instructions:   : 7.5 mg; : 5 mg; Otherwise 5 mg every Mon, Wed, Fri; 2.5 mg all other days   Weekly warfarin total:   25 mg   Plan last modified:   Renee Hoang RN (2019)   Next INR check:   2019   Target end date:       Indications    Long term current use of anticoagulant therapy [Z79.01]  CVA (cerebral vascular accident) (Resolved) [I63.9]             Anticoagulation Episode Summary     INR check location:       Preferred lab:       Send INR reminders to:    [facility]    Comments:               See the Encounter Report to view Anticoagulation Flowsheet and Dosing Calendar (Go to Encounters tab in chart review, and find the Anticoagulation Therapy Visit)    Dosage adjustment made based on physician directed care plan.    Patient denies any identifiable changes that caused the 1.4 non-therapeutic INR. The patient was assessed for diet, medication, and activity level changes, missed or extra doses, bruising or bleeding, with no problem findings. Only thing that has changed as she said she has gained some weight. But per Epic review weight has been pretty stable over past year, did not review past that time  period.      Will increase dose this week by 5 mg (20%). Unable to recheck until 7/8/19 due to holiday schedule. Reviewed signs and symptoms of clotting and gave handout of this for her to monitor since she is at a higher risk with sub-therapeutic INR.          Renee Hoang RN

## 2019-07-01 DIAGNOSIS — F41.9 ANXIETY: ICD-10-CM

## 2019-07-01 RX ORDER — ESCITALOPRAM OXALATE 5 MG/1
TABLET ORAL
Qty: 90 TABLET | Refills: 3 | Status: SHIPPED | OUTPATIENT
Start: 2019-07-01 | End: 2020-05-14

## 2019-07-01 NOTE — TELEPHONE ENCOUNTER
"Requested Prescriptions   Pending Prescriptions Disp Refills     escitalopram (LEXAPRO) 5 MG tablet [Pharmacy Med Name: ESCITALOPRAM OXALATE 5 MG Tablet] 90 tablet 3     Sig: TAKE 1 TABLET EVERY DAY  Last Written Prescription Date:  9/11/2018  Last Fill Quantity: 90tablet,  # refills: 3   Last Office Visit: 5/6/2019 Ree Love  Future Office Visit:        PHQ-9 SCORE 1/30/2014 9/28/2017 3/5/2019   PHQ-9 Total Score 4 - -   PHQ-9 Total Score - 0 0     VALERIA-7 SCORE 2/3/2014 9/28/2017 3/5/2019   Total Score 0 - -   Total Score - 0 0       SSRIs Protocol Passed - 7/1/2019  1:36 PM        Passed - Recent (12 mo) or future (30 days) visit within the authorizing provider's specialty     Patient had office visit in the last 12 months or has a visit in the next 30 days with authorizing provider or within the authorizing provider's specialty.  See \"Patient Info\" tab in inbasket, or \"Choose Columns\" in Meds & Orders section of the refill encounter.              Passed - Medication is active on med list        Passed - Patient is age 18 or older        Passed - No active pregnancy on record        Passed - No positive pregnancy test in last 12 months          "

## 2019-07-01 NOTE — TELEPHONE ENCOUNTER
Prescription approved per [facility] Refill Protocol.    Lima Arevalo RN, BSN

## 2019-07-08 ENCOUNTER — ANTICOAGULATION THERAPY VISIT (OUTPATIENT)
Dept: NURSING | Facility: CLINIC | Age: 79
End: 2019-07-08
Payer: COMMERCIAL

## 2019-07-08 DIAGNOSIS — Z79.01 LONG TERM CURRENT USE OF ANTICOAGULANT THERAPY: ICD-10-CM

## 2019-07-08 LAB — INR POINT OF CARE: 3.3 (ref 0.86–1.14)

## 2019-07-08 PROCEDURE — 85610 PROTHROMBIN TIME: CPT | Mod: QW

## 2019-07-08 PROCEDURE — 36416 COLLJ CAPILLARY BLOOD SPEC: CPT

## 2019-07-08 NOTE — PROGRESS NOTES
ANTICOAGULATION FOLLOW-UP CLINIC VISIT    Patient Name:  Salome Saeed  Date:  7/8/2019  Contact Type:  Face to Face    SUBJECTIVE:  Patient Findings         Clinical Outcomes     Negatives:   Major bleeding event, Thromboembolic event, Anticoagulation-related hospital admission, Anticoagulation-related ED visit, Anticoagulation-related fatality           OBJECTIVE    INR Protime   Date Value Ref Range Status   07/08/2019 3.3 (A) 0.86 - 1.14 Final       ASSESSMENT / PLAN  INR assessment SUPRA    Recheck INR In: 2 WEEKS    INR Location Clinic      Anticoagulation Summary  As of 7/8/2019    INR goal:   2.0-3.0   TTR:   71.0 % (3.5 y)   INR used for dosing:   3.3! (7/8/2019)   Warfarin maintenance plan:   5 mg (5 mg x 1) every Mon, Wed, Fri; 2.5 mg (5 mg x 0.5) all other days   Full warfarin instructions:   7/22: 2.5 mg; Otherwise 5 mg every Mon, Wed, Fri; 2.5 mg all other days   Weekly warfarin total:   25 mg   Plan last modified:   Rosanne Rico RN (7/8/2019)   Next INR check:   7/23/2019   Target end date:       Indications    Long term current use of anticoagulant therapy [Z79.01]  CVA (cerebral vascular accident) (Resolved) [I63.9]             Anticoagulation Episode Summary     INR check location:       Preferred lab:       Send INR reminders to:    TRIAGE    Comments:               See the Encounter Report to view Anticoagulation Flowsheet and Dosing Calendar (Go to Encounters tab in chart review, and find the Anticoagulation Therapy Visit)    Dosage adjustment made based on physician directed care plan - slightly supratherapeutic today. No dose change recommended - did advise that she may increase vitamin K intake.    Of note, patient mentions that she will be having an injection in her neck on 6/24/19. They were not very specific on where they wanted her INR to be for this procedure. She thought around 2.5 or under. Recommended we hold half a dose on 6/22/19 and check INR on 6/23/19.    Rosanne Rico  RN

## 2019-07-23 ENCOUNTER — ANTICOAGULATION THERAPY VISIT (OUTPATIENT)
Dept: NURSING | Facility: CLINIC | Age: 79
End: 2019-07-23
Payer: COMMERCIAL

## 2019-07-23 DIAGNOSIS — Z79.01 LONG TERM CURRENT USE OF ANTICOAGULANT THERAPY: ICD-10-CM

## 2019-07-23 LAB — INR POINT OF CARE: 2 (ref 0.86–1.14)

## 2019-07-23 PROCEDURE — 99207 ZZC NO CHARGE NURSE ONLY: CPT

## 2019-07-23 PROCEDURE — 85610 PROTHROMBIN TIME: CPT | Mod: QW

## 2019-07-23 PROCEDURE — 36416 COLLJ CAPILLARY BLOOD SPEC: CPT

## 2019-07-23 NOTE — PROGRESS NOTES
ANTICOAGULATION FOLLOW-UP CLINIC VISIT    Patient Name:  Salome Saeed  Date:  2019  Contact Type:  Face to Face    SUBJECTIVE:  Patient Findings         Clinical Outcomes     Negatives:   Major bleeding event, Thromboembolic event, Anticoagulation-related hospital admission, Anticoagulation-related ED visit, Anticoagulation-related fatality           OBJECTIVE    INR Protime   Date Value Ref Range Status   2019 2.0 (A) 0.86 - 1.14 Final       ASSESSMENT / PLAN  INR assessment THER    Recheck INR In: 2 WEEKS    INR Location Clinic      Anticoagulation Summary  As of 2019    INR goal:   2.0-3.0   TTR:   71.1 % (3.5 y)   INR used for dosin.0 (2019)   Warfarin maintenance plan:   5 mg (5 mg x 1) every Mon, Wed, Fri; 2.5 mg (5 mg x 0.5) all other days   Full warfarin instructions:   5 mg every Mon, Wed, Fri; 2.5 mg all other days   Weekly warfarin total:   25 mg   Plan last modified:   oRsanne Rico RN (2019)   Next INR check:   2019   Target end date:       Indications    Long term current use of anticoagulant therapy [Z79.01]  CVA (cerebral vascular accident) (Resolved) [I63.9]             Anticoagulation Episode Summary     INR check location:       Preferred lab:       Send INR reminders to:    TRIAGE    Comments:               See the Encounter Report to view Anticoagulation Flowsheet and Dosing Calendar (Go to Encounters tab in chart review, and find the Anticoagulation Therapy Visit)      MURATZA Javier, RN

## 2019-07-29 DIAGNOSIS — I10 ESSENTIAL HYPERTENSION, BENIGN: ICD-10-CM

## 2019-07-29 RX ORDER — LISINOPRIL 20 MG/1
20 TABLET ORAL DAILY
Qty: 90 TABLET | Refills: 0 | Status: SHIPPED | OUTPATIENT
Start: 2019-07-29 | End: 2019-10-03

## 2019-08-05 NOTE — PROGRESS NOTES
Subjective     Salome Saeed is a 79 year old female who presents to clinic today for the following health issues:    HPI   Left knee injection - Last injection helped for 3 months. Would like to try a second injection.      Patient Active Problem List   Diagnosis     Benign essential hypertension     Coronary artery disease involving native coronary artery of native heart without angina pectoris     Hyperlipidemia LDL goal <70     Advanced directives, counseling/discussion     DDD (degenerative disc disease), lumbar     Lumbar spinal stenosis     Lumbago     Spinal stenosis, lumbar region, with neurogenic claudication     Atypical chest pain     Health Care Home     Anxiety     Mitral regurgitation     GERD (gastroesophageal reflux disease)     Long term current use of anticoagulant therapy     Hypothyroidism, unspecified type     Vertebral artery stenosis, right     Cerebrovascular accident involving cerebellum (H)     Chronic bilateral low back pain without sciatica     Status post arthrodesis     WILSON (dyspnea on exertion)     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)     Past Surgical History:   Procedure Laterality Date     CORONARY ANGIOGRAPHY ADULT ORDER  12/6/2012    40-50% stenosis to mid PDA, 80% in D1- medically treat     CORONARY ANGIOGRAPHY ADULT ORDER  5/28/2015    PTCA and 2.25x8mm Promus PREMIER NAILA to ostium of 2nd OM     DECOMPRESSION, FUSION LUMBAR POSTERIOR THREE + LEVELS, COMBINED  5/8/2013    Procedure: COMBINED DECOMPRESSION, FUSION LUMBAR POSTERIOR THREE + LEVELS;  Posterior Lumbar Decompression L3-S1, Fusion L4-S1;  Surgeon: Daniel Harirs MD;  Location: [facility] OR     ENDOSCOPIC STRIPPING VEIN(S)       HEART CATH, ANGIOPLASTY  4/8/2009    PTCA and two 2.5x15mm Xience stents to mid LAD lesion     HYSTERECTOMY, RICKIE      Fibroids, and Menorrhagia.. Bilateral Oophrectomy     ORTHOPEDIC SURGERY       Stenting of LAD, Angioplasty  4/8/09      TONSILLECTOMY      as a child       Social History     Tobacco Use     Smoking status: Former Smoker     Last attempt to quit: 1965     Years since quittin.6     Smokeless tobacco: Never Used   Substance Use Topics     Alcohol use: Yes     Alcohol/week: 0.0 oz     Comment: 2 glasses wine per month     Family History   Problem Relation Age of Onset     Neurologic Disorder Mother         Dementia, Still living     Ovarian Cancer Mother      Thyroid Disease Mother      C.A.D. Father              Diabetes Father      Coronary Artery Disease Father      Alcohol/Drug Brother      Thyroid Disease Son      Diabetes Son          Reviewed and updated as needed this visit by Provider         Review of Systems   ROS COMP: Constitutional, HEENT, cardiovascular, pulmonary, gi and gu systems are negative, except as otherwise noted.      Objective    /64   Pulse 66   Temp 98  F (36.7  C) (Oral)   Wt 74.4 kg (164 lb)   SpO2 97%   BMI 28.60 kg/m    Body mass index is 28.6 kg/m .  Physical Exam   GENERAL: healthy, alert and no distress  RESP: lungs clear to auscultation - no rales, rhonchi or wheezes  CV: regular rate and rhythm, normal S1 S2, no S3 or S4, no murmur, click or rub, no peripheral edema and peripheral pulses strong  MS: no gross musculoskeletal defects noted, no edema    Diagnostic Test Results:  none         Assessment & Plan     1. Osteoarthritis of left knee, unspecified osteoarthritis type: The risks and benefits of the procedure were and explained and verbal consent was obtained.  Using sterile technique, the left knee was prepped and liodocaine 1% was used to anesthetize the skin.  From the lateral infrapatellar approach, using a 22 G 1.5 inch needle, the knee joint was entered aspirated without blood and then 1 ml of Kenalog 40mg/ml and 4ml plain 1% Lidocaine was injected and the needle withdrawn.  The procedure was well tolerated.  The patient is asked to continue to rest the knee  for a few more days before resuming regular activities.  It may be more painful for the first 1-2 days.  Watch for fever, or increased swelling or persistent pain in knee. Call or return to clinic prn if such symptoms occur or the knee fails to improve as anticipated. If steroid injection not helping as much or as long, consider referral to ortho for consideration of Synvisc or other recommendation.       Return in about 3 months (around 11/6/2019) for follow up if symptoms not improving.    Surya Dyer,   [facility]

## 2019-08-06 ENCOUNTER — OFFICE VISIT (OUTPATIENT)
Dept: FAMILY MEDICINE | Facility: CLINIC | Age: 79
End: 2019-08-06
Payer: COMMERCIAL

## 2019-08-06 ENCOUNTER — ANTICOAGULATION THERAPY VISIT (OUTPATIENT)
Dept: NURSING | Facility: CLINIC | Age: 79
End: 2019-08-06
Payer: COMMERCIAL

## 2019-08-06 VITALS
TEMPERATURE: 98 F | BODY MASS INDEX: 28.6 KG/M2 | WEIGHT: 164 LBS | DIASTOLIC BLOOD PRESSURE: 64 MMHG | SYSTOLIC BLOOD PRESSURE: 118 MMHG | HEART RATE: 66 BPM | OXYGEN SATURATION: 97 %

## 2019-08-06 DIAGNOSIS — I10 ESSENTIAL HYPERTENSION: ICD-10-CM

## 2019-08-06 DIAGNOSIS — Z79.01 LONG TERM CURRENT USE OF ANTICOAGULANT THERAPY: ICD-10-CM

## 2019-08-06 DIAGNOSIS — M17.12 OSTEOARTHRITIS OF LEFT KNEE, UNSPECIFIED OSTEOARTHRITIS TYPE: Primary | ICD-10-CM

## 2019-08-06 LAB — INR POINT OF CARE: 1.9 (ref 0.86–1.14)

## 2019-08-06 PROCEDURE — 85610 PROTHROMBIN TIME: CPT | Mod: QW

## 2019-08-06 PROCEDURE — 36416 COLLJ CAPILLARY BLOOD SPEC: CPT

## 2019-08-06 PROCEDURE — 20610 DRAIN/INJ JOINT/BURSA W/O US: CPT | Mod: LT | Performed by: FAMILY MEDICINE

## 2019-08-06 RX ORDER — AMLODIPINE BESYLATE 5 MG/1
5 TABLET ORAL DAILY
Qty: 90 TABLET | Refills: 3 | Status: SHIPPED | OUTPATIENT
Start: 2019-08-06 | End: 2020-05-11

## 2019-08-06 RX ORDER — METHYLPREDNISOLONE ACETATE 40 MG/ML
40 INJECTION, SUSPENSION INTRA-ARTICULAR; INTRALESIONAL; INTRAMUSCULAR; SOFT TISSUE ONCE
Status: DISCONTINUED | OUTPATIENT
Start: 2019-08-06 | End: 2019-08-07

## 2019-08-06 NOTE — PROGRESS NOTES
"ANTICOAGULATION FOLLOW-UP CLINIC VISIT    Patient Name:  Salome Saeed  Date:  2019  Contact Type:  Face to Face    SUBJECTIVE:  Patient Findings     Positives:   Missed doses (Patient has a \"back injection\" scheduled for tomorrow and took 2.5mg on Friday and Monday instead of her usual 5mg)        Clinical Outcomes     Negatives:   Major bleeding event, Thromboembolic event, Anticoagulation-related hospital admission, Anticoagulation-related ED visit, Anticoagulation-related fatality           OBJECTIVE    INR Protime   Date Value Ref Range Status   2019 1.9 (A) 0.86 - 1.14 Final       ASSESSMENT / PLAN  INR assessment SUB    Recheck INR In: 2 WEEKS    INR Location Clinic      Anticoagulation Summary  As of 2019    INR goal:   2.0-3.0   TTR:   70.3 % (3.5 y)   INR used for dosin.9! (2019)   Warfarin maintenance plan:   5 mg (5 mg x 1) every Mon, Wed, Fri; 2.5 mg (5 mg x 0.5) all other days   Full warfarin instructions:   5 mg every Mon, Wed, Fri; 2.5 mg all other days   Weekly warfarin total:   25 mg   No change documented:   Rosanne Rico, RN   Plan last modified:   Rosanne Rico, RN (2019)   Next INR check:   2019   Target end date:       Indications    Long term current use of anticoagulant therapy [Z79.01]  CVA (cerebral vascular accident) (Resolved) [I63.9]             Anticoagulation Episode Summary     INR check location:       Preferred lab:       Send INR reminders to:    TRIAGE    Comments:               See the Encounter Report to view Anticoagulation Flowsheet and Dosing Calendar (Go to Encounters tab in chart review, and find the Anticoagulation Therapy Visit)      MURTAZA Javier, RN                 "

## 2019-08-07 RX ORDER — METHYLPREDNISOLONE ACETATE 40 MG/ML
40 INJECTION, SUSPENSION INTRA-ARTICULAR; INTRALESIONAL; INTRAMUSCULAR; SOFT TISSUE ONCE
Status: DISCONTINUED | OUTPATIENT
Start: 2019-08-06 | End: 2020-02-24

## 2019-08-20 ENCOUNTER — ANTICOAGULATION THERAPY VISIT (OUTPATIENT)
Dept: NURSING | Facility: CLINIC | Age: 79
End: 2019-08-20
Payer: COMMERCIAL

## 2019-08-20 DIAGNOSIS — Z79.01 LONG TERM CURRENT USE OF ANTICOAGULANT THERAPY: ICD-10-CM

## 2019-08-20 LAB — INR POINT OF CARE: 2.6 (ref 0.86–1.14)

## 2019-08-20 PROCEDURE — 36416 COLLJ CAPILLARY BLOOD SPEC: CPT

## 2019-08-20 PROCEDURE — 85610 PROTHROMBIN TIME: CPT | Mod: QW

## 2019-08-20 NOTE — PROGRESS NOTES
ANTICOAGULATION FOLLOW-UP CLINIC VISIT    Patient Name:  Salome Saeed  Date:  2019  Contact Type:  Face to Face    SUBJECTIVE:  Patient Findings     Positives:   Other complaints (Patient will be having another back injection 19. )        Clinical Outcomes     Negatives:   Major bleeding event, Thromboembolic event, Anticoagulation-related hospital admission, Anticoagulation-related ED visit, Anticoagulation-related fatality           OBJECTIVE    INR Protime   Date Value Ref Range Status   2019 2.6 (A) 0.86 - 1.14 Final       ASSESSMENT / PLAN  INR assessment THER    Recheck INR In: 1 WEEK    INR Location Clinic      Anticoagulation Summary  As of 2019    INR goal:   2.0-3.0   TTR:   70.5 % (3.6 y)   INR used for dosin.6 (2019)   Warfarin maintenance plan:   5 mg (5 mg x 1) every Mon, Wed, Fri; 2.5 mg (5 mg x 0.5) all other days   Full warfarin instructions:   : 2.5 mg; : 2.5 mg; Otherwise 5 mg every Mon, Wed, Fri; 2.5 mg all other days   Weekly warfarin total:   25 mg   Plan last modified:   Rosanne Rico RN (2019)   Next INR check:   2019   Target end date:       Indications    Long term current use of anticoagulant therapy [Z79.01]  CVA (cerebral vascular accident) (Resolved) [I63.9]             Anticoagulation Episode Summary     INR check location:       Preferred lab:       Send INR reminders to:    TRIAGE    Comments:               See the Encounter Report to view Anticoagulation Flowsheet and Dosing Calendar (Go to Encounters tab in chart review, and find the Anticoagulation Therapy Visit)    Dosage adjustment made based on physician directed care plan - Patient will be having another back injection on 19. Will follow same plan as previous injection and have patient take a half dose on 19 and 19 to ensure that INR is low-end of therapeutic and/or slightly subtherapeutic. Will see patient on 19 to recheck INR prior to  procedure.    Rosanne Rico, NICKYN, RN

## 2019-08-26 ENCOUNTER — ANTICOAGULATION THERAPY VISIT (OUTPATIENT)
Dept: NURSING | Facility: CLINIC | Age: 79
End: 2019-08-26
Payer: COMMERCIAL

## 2019-08-26 DIAGNOSIS — Z79.01 LONG TERM CURRENT USE OF ANTICOAGULANT THERAPY: ICD-10-CM

## 2019-08-26 LAB — INR POINT OF CARE: 1.8 (ref 0.86–1.14)

## 2019-08-26 PROCEDURE — 85610 PROTHROMBIN TIME: CPT | Mod: QW

## 2019-08-26 PROCEDURE — 36416 COLLJ CAPILLARY BLOOD SPEC: CPT

## 2019-08-26 NOTE — PROGRESS NOTES
ANTICOAGULATION FOLLOW-UP CLINIC VISIT    Patient Name:  Salome Saeed  Date:  2019  Contact Type:  Face to Face    SUBJECTIVE:  Patient Findings     Positives:   Missed doses (intention hold for back injection)             OBJECTIVE    INR Protime   Date Value Ref Range Status   2019 1.8 (A) 0.86 - 1.14 Final       ASSESSMENT / PLAN  INR assessment SUB    Recheck INR In: 2 WEEKS    INR Location Clinic      Anticoagulation Summary  As of 2019    INR goal:   2.0-3.0   TTR:   70.5 % (3.6 y)   INR used for dosin.8! (2019)   Warfarin maintenance plan:   5 mg (5 mg x 1) every Mon, Wed, Fri; 2.5 mg (5 mg x 0.5) all other days   Full warfarin instructions:   : 2.5 mg; Otherwise 5 mg every Mon, Wed, Fri; 2.5 mg all other days   Weekly warfarin total:   25 mg   Plan last modified:   Rosanne Rico RN (2019)   Next INR check:   2019   Target end date:       Indications    Long term current use of anticoagulant therapy [Z79.01]  CVA (cerebral vascular accident) (Resolved) [I63.9]             Anticoagulation Episode Summary     INR check location:       Preferred lab:       Send INR reminders to:    TRIAGE    Comments:               See the Encounter Report to view Anticoagulation Flowsheet and Dosing Calendar (Go to Encounters tab in chart review, and find the Anticoagulation Therapy Visit)    Dosage adjustment made based on physician directed care plan - patient has been taking a reduced dose of warfarin in preparation for back injections (20mg per week instead of her usual 25mg per week). She has injection number 2 out of 3 tomorrow. Next injection will likely be in two weeks. She will call to schedule follow up INR prior to next injection. She will plan on taking reduced dose again prior to next injection as well - see anticoag track tab for dosing details for previous injections.    MURTAZA Javier, RN

## 2019-09-12 ENCOUNTER — ANTICOAGULATION THERAPY VISIT (OUTPATIENT)
Dept: NURSING | Facility: CLINIC | Age: 79
End: 2019-09-12
Payer: COMMERCIAL

## 2019-09-12 DIAGNOSIS — Z79.01 LONG TERM CURRENT USE OF ANTICOAGULANT THERAPY: ICD-10-CM

## 2019-09-12 LAB — INR POINT OF CARE: 2.1 (ref 0.86–1.14)

## 2019-09-12 PROCEDURE — 85610 PROTHROMBIN TIME: CPT | Mod: QW

## 2019-09-12 PROCEDURE — 36416 COLLJ CAPILLARY BLOOD SPEC: CPT

## 2019-09-12 NOTE — PROGRESS NOTES
ANTICOAGULATION FOLLOW-UP CLINIC VISIT    Patient Name:  Salome Saeed  Date:  2019  Contact Type:  Face to Face    SUBJECTIVE:  Patient Findings     Comments:   The patient was assessed for diet, medication, and activity level changes, missed or extra doses, bruising or bleeding, with no problem findings.          Clinical Outcomes     Negatives:   Major bleeding event, Thromboembolic event, Anticoagulation-related hospital admission, Anticoagulation-related ED visit, Anticoagulation-related fatality    Comments:   The patient was assessed for diet, medication, and activity level changes, missed or extra doses, bruising or bleeding, with no problem findings.             OBJECTIVE    INR Protime   Date Value Ref Range Status   2019 2.1 (A) 0.86 - 1.14 Final       ASSESSMENT / PLAN  INR assessment THER    Recheck INR In: 2 WEEKS    INR Location Clinic      Anticoagulation Summary  As of 2019    INR goal:   2.0-3.0   TTR:   70.0 % (3.6 y)   INR used for dosin.1 (2019)   Warfarin maintenance plan:   5 mg (5 mg x 1) every Mon, Wed, Fri; 2.5 mg (5 mg x 0.5) all other days   Full warfarin instructions:   : 2.5 mg; : 2.5 mg; Otherwise 5 mg every Mon, Wed, Fri; 2.5 mg all other days   Weekly warfarin total:   25 mg   Plan last modified:   Rosanne Rico RN (2019)   Next INR check:   2019   Target end date:       Indications    Long term current use of anticoagulant therapy [Z79.01]  CVA (cerebral vascular accident) (Resolved) [I63.9]             Anticoagulation Episode Summary     INR check location:       Preferred lab:       Send INR reminders to:    TRIAGE    Comments:               See the Encounter Report to view Anticoagulation Flowsheet and Dosing Calendar (Go to Encounters tab in chart review, and find the Anticoagulation Therapy Visit)    Patient will be having her last back injection coming up on 19. She will be on a reduced dose of warfarin prior to the  injection. I will see her on 9/24/19 for recheck prior to her injection.    Rosanne Rico, BSN, RN

## 2019-09-24 ENCOUNTER — ANTICOAGULATION THERAPY VISIT (OUTPATIENT)
Dept: NURSING | Facility: CLINIC | Age: 79
End: 2019-09-24
Payer: COMMERCIAL

## 2019-09-24 ENCOUNTER — TELEPHONE (OUTPATIENT)
Dept: FAMILY MEDICINE | Facility: CLINIC | Age: 79
End: 2019-09-24

## 2019-09-24 DIAGNOSIS — Z79.01 LONG TERM CURRENT USE OF ANTICOAGULANT THERAPY: ICD-10-CM

## 2019-09-24 LAB — INR POINT OF CARE: 1.5 (ref 0.86–1.14)

## 2019-09-24 PROCEDURE — 85610 PROTHROMBIN TIME: CPT | Mod: QW

## 2019-09-24 PROCEDURE — 36416 COLLJ CAPILLARY BLOOD SPEC: CPT

## 2019-09-24 NOTE — TELEPHONE ENCOUNTER
Name of caller: Sandra  Relationship to Patient: [facility]    Reason for Call:  Loreta is having a procedure tomorrow and Sandra is requesting we fax the INR results from today.    Best phone number to reach Sandra at is: [phone]    Please fax INR results: [phone]    Ok to leave a message with medical info? yes    Yady Serrano  Patient Representative

## 2019-09-24 NOTE — PROGRESS NOTES
ANTICOAGULATION FOLLOW-UP CLINIC VISIT    Patient Name:  Salome Saeed  Date:  2019  Contact Type:  Face to Face    SUBJECTIVE:  Patient Findings     Positives:   Upcoming invasive procedure (has been taking lower dose due to upcoming back injection)        Clinical Outcomes     Negatives:   Major bleeding event, Thromboembolic event, Anticoagulation-related hospital admission, Anticoagulation-related ED visit, Anticoagulation-related fatality           OBJECTIVE    INR Protime   Date Value Ref Range Status   2019 1.5 (A) 0.86 - 1.14 Final       ASSESSMENT / PLAN  INR assessment SUB    Recheck INR In: 2 WEEKS    INR Location Clinic      Anticoagulation Summary  As of 2019    INR goal:   2.0-3.0   TTR:   69.6 % (3.7 y)   INR used for dosin.5! (2019)   Warfarin maintenance plan:   5 mg (5 mg x 1) every Mon, Wed, Fri; 2.5 mg (5 mg x 0.5) all other days   Full warfarin instructions:   5 mg every Mon, Wed, Fri; 2.5 mg all other days   Weekly warfarin total:   25 mg   No change documented:   Rosanne Rico RN   Plan last modified:   Rosanne Rico, RN (2019)   Next INR check:   10/8/2019   Target end date:       Indications    Long term current use of anticoagulant therapy [Z79.01]  CVA (cerebral vascular accident) (Resolved) [I63.9]             Anticoagulation Episode Summary     INR check location:       Preferred lab:       Send INR reminders to:    TRIAGE    Comments:               See the Encounter Report to view Anticoagulation Flowsheet and Dosing Calendar (Go to Encounters tab in chart review, and find the Anticoagulation Therapy Visit)    MURTAZA Javier, RN

## 2019-10-03 DIAGNOSIS — I10 ESSENTIAL HYPERTENSION, BENIGN: ICD-10-CM

## 2019-10-03 RX ORDER — LISINOPRIL 20 MG/1
20 TABLET ORAL DAILY
Qty: 90 TABLET | Refills: 0 | Status: SHIPPED | OUTPATIENT
Start: 2019-10-03 | End: 2019-10-14

## 2019-10-04 ENCOUNTER — OFFICE VISIT (OUTPATIENT)
Dept: CARDIOLOGY | Facility: CLINIC | Age: 79
End: 2019-10-04
Payer: COMMERCIAL

## 2019-10-04 VITALS
SYSTOLIC BLOOD PRESSURE: 120 MMHG | DIASTOLIC BLOOD PRESSURE: 60 MMHG | WEIGHT: 164 LBS | BODY MASS INDEX: 28 KG/M2 | HEART RATE: 80 BPM | HEIGHT: 64 IN

## 2019-10-04 DIAGNOSIS — I25.759 CORONARY ARTERY DISEASE INVOLVING NATIVE ARTERY OF TRANSPLANTED HEART WITH ANGINA PECTORIS (H): ICD-10-CM

## 2019-10-04 DIAGNOSIS — I25.10 CORONARY ARTERY DISEASE INVOLVING NATIVE CORONARY ARTERY OF NATIVE HEART WITHOUT ANGINA PECTORIS: Chronic | ICD-10-CM

## 2019-10-04 DIAGNOSIS — E78.5 HYPERLIPIDEMIA LDL GOAL <70: ICD-10-CM

## 2019-10-04 LAB
ANION GAP SERPL CALCULATED.3IONS-SCNC: 2 MMOL/L (ref 3–14)
BUN SERPL-MCNC: 12 MG/DL (ref 7–30)
CALCIUM SERPL-MCNC: 8.9 MG/DL (ref 8.5–10.1)
CHLORIDE SERPL-SCNC: 103 MMOL/L (ref 94–109)
CHOLEST SERPL-MCNC: 163 MG/DL
CO2 SERPL-SCNC: 31 MMOL/L (ref 20–32)
CREAT SERPL-MCNC: 0.66 MG/DL (ref 0.52–1.04)
GFR SERPL CREATININE-BSD FRML MDRD: 85 ML/MIN/{1.73_M2}
GLUCOSE SERPL-MCNC: 105 MG/DL (ref 70–99)
HDLC SERPL-MCNC: 72 MG/DL
LDLC SERPL CALC-MCNC: 78 MG/DL
NONHDLC SERPL-MCNC: 91 MG/DL
POTASSIUM SERPL-SCNC: 4.2 MMOL/L (ref 3.4–5.3)
SODIUM SERPL-SCNC: 136 MMOL/L (ref 133–144)
TRIGL SERPL-MCNC: 65 MG/DL

## 2019-10-04 PROCEDURE — 80048 BASIC METABOLIC PNL TOTAL CA: CPT | Performed by: INTERNAL MEDICINE

## 2019-10-04 PROCEDURE — 80061 LIPID PANEL: CPT | Performed by: INTERNAL MEDICINE

## 2019-10-04 PROCEDURE — 36415 COLL VENOUS BLD VENIPUNCTURE: CPT | Performed by: INTERNAL MEDICINE

## 2019-10-04 PROCEDURE — 99214 OFFICE O/P EST MOD 30 MIN: CPT | Performed by: NURSE PRACTITIONER

## 2019-10-04 RX ORDER — NITROGLYCERIN 0.4 MG/1
0.4 TABLET SUBLINGUAL EVERY 5 MIN PRN
Qty: 25 TABLET | Refills: 2 | Status: SHIPPED | OUTPATIENT
Start: 2019-10-04 | End: 2020-11-20

## 2019-10-04 RX ORDER — ROSUVASTATIN CALCIUM 40 MG/1
40 TABLET, COATED ORAL DAILY
Qty: 90 TABLET | Refills: 3 | Status: SHIPPED | OUTPATIENT
Start: 2019-10-04 | End: 2020-07-15

## 2019-10-04 RX ORDER — ATENOLOL 25 MG/1
25 TABLET ORAL 2 TIMES DAILY
Qty: 180 TABLET | Refills: 3 | Status: SHIPPED | OUTPATIENT
Start: 2019-10-04 | End: 2020-07-15

## 2019-10-04 ASSESSMENT — MIFFLIN-ST. JEOR: SCORE: 1213.9

## 2019-10-04 NOTE — LETTER
10/4/2019      Surya Dyer,   [address]  [facility] [place] [address]      RE: Salome Saeed       Dear Colleague,    I had the pleasure of seeing Salome Saeed in the [facility].    Service Date: 10/04/2019      HISTORY OF PRESENT ILLNESS:  Salome Saeed is a very pleasant 79-year-old female who follows here with Dr. Nikolai Amaya.  She presents today for her annual visit.      She has a longstanding history of coronary artery disease dating back to 04/2009 when she presented with unstable angina leading to 2 drug-eluting stents into her LAD.  She had chest pain in 2012.  Angiogram was unchanged revealing moderate PDA lesions, an ostial pinch in the first diagonal, 80% OM which we have treated medically.  In 2015 she had chest pain again with no change in her anatomy; however, 1 month later she presented to the emergency room and the OM lesion was stented.      Nuclear stress test in 2017 prior to her back surgery showed no ischemia.  She had had some exertional chest discomfort at that point.  She is dye allergic and on warfarin.      She underwent spinal surgery with Dr. Harris and has had reasonable results; however, she continues to have some discomfort being treated with radiofrequency now.  She started riding her exercise bike and now has developed left knee issues requiring injections.  The pain is persistent.  Due to her back and her knees, she has not been able to exercise as routinely as usual and she has gained about 12 pounds in the last couple of years.  In concordance with this, her lipids have deteriorated.  Her LDL is now 78, when it used to be firmly under 70.  She is on Crestor 20 mg per day, which she tolerates well.      In 04/2013 she had a cerebrovascular accident with balance problems.  This was thought to be embolic from a lesion in her vertebral artery.  She remains on warfarin with aspirin and has had no bleeding.     She had an echocardiogram  during her CVA showing negative bubble study, preserved EF and 1+ MR.    She tells me over this past year she has had intermittent chest discomfort which comes and goes.  It is not necessarily related to exertion.  She also has some left arm aching sometimes when watching TV.  She has become more short of breath, particularly with climbing stairs and vacuuming, and I suspect some of this is related to deconditioning from her orthopedic issues.  Currently, she is not doing any regular form of exercise.      We had a long discussion today about working this up.  She is on a good regimen for angina, and I believe a Lexiscan stress test would be appropriate at this point, which she is in agreement with.      She otherwise is doing well.  She is still living with her , who seems to be in reasonable health.  She has lost one of her Yorkie Terriers and recently adopted a Yonny Tzu and is enjoying both of her puppies.  She otherwise has no complaints today and her exam is unremarkable.      IMPRESSION AND PLAN:   1.  Coronary artery disease with stents dating back to 2009, last done to the  branch in 2015.  Nuclear stress test in 2017 was unremarkable, but over this past year she has noted some chest discomfort and occasional left arm aching more likely to occur at rest.  She does have some cervical spine issues, so it is difficult to know if this is angina or orthopedic in nature.  I will start with a Lexiscan nuclear stress test and see her back for followup to review those results.  Continue aspirin and her full program for angina.  Her meds were refilled.   2.  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  She remains on warfarin with aspirin and no bleeding issues.   3.  Multiple orthopedic issues rendering her quite deconditioned.  I suspect her shortness of breath is from this.  She has also gained some weight.   4.  Dyslipidemia, which has deteriorated given the above situation.  I will  increase Crestor to 40 mg per day, and I will check her lipids in about 5 weeks when she returns to see me.  If she has cramps or pain on the Crestor 40 mg, I would reduce it back to 20 mg, which she tolerates well, and add Zetia 10 mg per day if needed.      I did remind her that ranitidine is on recall currently, and I would suggest substituting famotidine 20 mg per day.  She uses this on an as-needed basis and does have the over-the-counter ranitidine, which is not available at the current time due to concerns by the FDA.      I will see her back in 5 weeks' time.      It has been a pleasure seeing her in followup today.  Greater than 50% of this 30-minute visit today was spent in counseling.         SURI MEJIA NP             D: 10/04/2019   T: 10/04/2019   MT: LALY      Name:     JESUS AVELAR   MRN:      [id number]        Account:      [id number]   :      1940           Service Date: 10/04/2019      Document: [id number]           Outpatient Encounter Medications as of 10/4/2019   Medication Sig Dispense Refill     acetaminophen (TYLENOL) 325 MG tablet Take 2 tablets by mouth every 6 hours as needed for pain.  0     amLODIPine (NORVASC) 5 MG tablet Take 1 tablet (5 mg) by mouth daily 90 tablet 3     ascorbic acid (VITAMIN C) 500 MG tablet Take 1,000 mg by mouth daily        aspirin EC 81 MG tablet Take 1 tablet (81 mg) by mouth daily       atenolol (TENORMIN) 25 MG tablet Take 1 tablet (25 mg) by mouth 2 times daily 180 tablet 3     Calcium Carb-Cholecalciferol (CALCIUM 600 + D PO) Take 1,200 mg by mouth daily        Cholecalciferol (VITAMIN D) 2000 UNITS tablet Take 2,000 Units by mouth daily       Coenzyme Q10 (COQ-10) 200 MG CAPS Take 400 mg by mouth daily        escitalopram (LEXAPRO) 5 MG tablet TAKE 1 TABLET EVERY DAY 90 tablet 3     levothyroxine (SYNTHROID/LEVOTHROID) 100 MCG tablet Take 1 tablet (100 mcg) by mouth daily 90 tablet 3     lisinopril (PRINIVIL/ZESTRIL) 20 MG  tablet Take 1 tablet (20 mg) by mouth daily 90 tablet 0     Multiple Vitamin (DAILY MULTIVITAMIN PO) Take by mouth daily       nitroGLYcerin (NITROSTAT) 0.4 MG sublingual tablet Place 1 tablet (0.4 mg) under the tongue every 5 minutes as needed for chest pain 25 tablet 2     ranitidine (ZANTAC) 150 MG tablet Take 1 tablet (150 mg) by mouth 2 times daily 180 tablet 1     rosuvastatin (CRESTOR) 40 MG tablet Take 1 tablet (40 mg) by mouth daily 90 tablet 3     vitamin B complex with vitamin C (VITAMIN  B COMPLEX) TABS tablet Take 1 tablet by mouth daily       warfarin (COUMADIN) 5 MG tablet TAKE 1/2 TO 1 TABLET EVERY DAY AS DIRECTED  BY  INR  NURSE  BASED ON INR READING 90 tablet 1     [] methylPREDNISolone (DEPO-MEDROL) 40 MG/ML injection Inject 1 mL (40 mg) into the muscle once for 1 dose 1 mL 0     [DISCONTINUED] atenolol (TENORMIN) 25 MG tablet TAKE 1 TABLET TWICE DAILY 90 tablet 2     [DISCONTINUED] nitroGLYcerin (NITROSTAT) 0.4 MG sublingual tablet Place 1 tablet (0.4 mg) under the tongue every 5 minutes as needed for chest pain 25 tablet 2     [DISCONTINUED] rosuvastatin (CRESTOR) 20 MG tablet Take 1 tablet (20 mg) by mouth daily 90 tablet 3     Facility-Administered Encounter Medications as of 10/4/2019   Medication Dose Route Frequency Provider Last Rate Last Dose     methylPREDNISolone (DEPO-MEDROL) injection 40 mg  40 mg INTRA-ARTICULAR Once Surya Dyer, DO           Again, thank you for allowing me to participate in the care of your patient.      Sincerely,    KIANA Carreno [facility]

## 2019-10-04 NOTE — PROGRESS NOTES
History of Present Illness:    201513  Impression/Plan:     It has been a pleasure seeing Salome Saeed in follow up    Emmett Araujo, MSN, APRN-BC, CNP  Cardiology    Orders Placed This Encounter   Procedures     NM Lexiscan stress test (nuc card)     Lipid Profile     ALT     Follow-Up with Cardiac Advanced Practice Provider     Orders Placed This Encounter   Medications     rosuvastatin (CRESTOR) 40 MG tablet     Sig: Take 1 tablet (40 mg) by mouth daily     Dispense:  90 tablet     Refill:  3     atenolol (TENORMIN) 25 MG tablet     Sig: Take 1 tablet (25 mg) by mouth 2 times daily     Dispense:  180 tablet     Refill:  3     nitroGLYcerin (NITROSTAT) 0.4 MG sublingual tablet     Sig: Place 1 tablet (0.4 mg) under the tongue every 5 minutes as needed for chest pain     Dispense:  25 tablet     Refill:  2     Medications Discontinued During This Encounter   Medication Reason     rosuvastatin (CRESTOR) 20 MG tablet Reorder     atenolol (TENORMIN) 25 MG tablet Reorder     nitroGLYcerin (NITROSTAT) 0.4 MG sublingual tablet Reorder         Encounter Diagnoses   Name Primary?     Coronary artery disease involving native coronary artery of native heart without angina pectoris      Hyperlipidemia LDL goal <70      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)        CURRENT MEDICATIONS:  Current Outpatient Medications   Medication Sig Dispense Refill     acetaminophen (TYLENOL) 325 MG tablet Take 2 tablets by mouth every 6 hours as needed for pain.  0     amLODIPine (NORVASC) 5 MG tablet Take 1 tablet (5 mg) by mouth daily 90 tablet 3     ascorbic acid (VITAMIN C) 500 MG tablet Take 1,000 mg by mouth daily        aspirin EC 81 MG tablet Take 1 tablet (81 mg) by mouth daily       atenolol (TENORMIN) 25 MG tablet Take 1 tablet (25 mg) by mouth 2 times daily 180 tablet 3     Calcium Carb-Cholecalciferol (CALCIUM 600 + D PO) Take 1,200 mg by mouth daily         Cholecalciferol (VITAMIN D) 2000 UNITS tablet Take 2,000 Units by mouth daily       Coenzyme Q10 (COQ-10) 200 MG CAPS Take 400 mg by mouth daily        escitalopram (LEXAPRO) 5 MG tablet TAKE 1 TABLET EVERY DAY 90 tablet 3     levothyroxine (SYNTHROID/LEVOTHROID) 100 MCG tablet Take 1 tablet (100 mcg) by mouth daily 90 tablet 3     lisinopril (PRINIVIL/ZESTRIL) 20 MG tablet Take 1 tablet (20 mg) by mouth daily 90 tablet 0     Multiple Vitamin (DAILY MULTIVITAMIN PO) Take by mouth daily       nitroGLYcerin (NITROSTAT) 0.4 MG sublingual tablet Place 1 tablet (0.4 mg) under the tongue every 5 minutes as needed for chest pain 25 tablet 2     ranitidine (ZANTAC) 150 MG tablet Take 1 tablet (150 mg) by mouth 2 times daily 180 tablet 1     rosuvastatin (CRESTOR) 40 MG tablet Take 1 tablet (40 mg) by mouth daily 90 tablet 3     vitamin B complex with vitamin C (VITAMIN  B COMPLEX) TABS tablet Take 1 tablet by mouth daily       warfarin (COUMADIN) 5 MG tablet TAKE 1/2 TO 1 TABLET EVERY DAY AS DIRECTED  BY  INR  NURSE  BASED ON INR READING 90 tablet 1       ALLERGIES     Allergies   Allergen Reactions     Atorvastatin      Leg cramps     Gluten      Sinuses affected by gluten     Magnesium Salicylate      Oat      Shellfish Allergy      hives     Wheat        PAST MEDICAL HISTORY:  Past Medical History:   Diagnosis Date     CAD (coronary artery disease) 4/9/2009 4/8/2009: PTCA and two 2.5x15mm Xience stents to mid LAD lesion; Cath 12/2012- 40-50% stenosis in mid PDA, 80% on D1- medically treat , 5/28/2015 - PTCA and 2.25x8mm Promus PREMIIER NAILA to ostium of 2nd OM     CVA (cerebral infarction)      DDD (degenerative disc disease)      Essential hypertension, benign      GERD (gastroesophageal reflux disease)      Headache      Hyperlipidemia      Hypothyroidism      Lumbago      Lumbar spinal stenosis      Mitral regurgitation     1+ per 7/2013 Echo     Vertebral artery stenosis, right        PAST SURGICAL HISTORY:  Past  Surgical History:   Procedure Laterality Date     CORONARY ANGIOGRAPHY ADULT ORDER  2012    40-50% stenosis to mid PDA, 80% in D1- medically treat     CORONARY ANGIOGRAPHY ADULT ORDER  2015    PTCA and 2.25x8mm Promus PREMIER NAILA to ostium of 2nd OM     DECOMPRESSION, FUSION LUMBAR POSTERIOR THREE + LEVELS, COMBINED  2013    Procedure: COMBINED DECOMPRESSION, FUSION LUMBAR POSTERIOR THREE + LEVELS;  Posterior Lumbar Decompression L3-S1, Fusion L4-S1;  Surgeon: Daniel Harris MD;  Location: [facility] OR     ENDOSCOPIC STRIPPING VEIN(S)       HEART CATH, ANGIOPLASTY  2009    PTCA and two 2.5x15mm Xience stents to mid LAD lesion     HYSTERECTOMY, RICKIE      Fibroids, and Menorrhagia.. Bilateral Oophrectomy     ORTHOPEDIC SURGERY       Stenting of LAD, Angioplasty  09     TONSILLECTOMY      as a child       FAMILY HISTORY:  Family History   Problem Relation Age of Onset     Neurologic Disorder Mother         Dementia, Still living     Ovarian Cancer Mother      Thyroid Disease Mother      C.A.D. Father              Diabetes Father      Coronary Artery Disease Father      Alcohol/Drug Brother      Thyroid Disease Son      Diabetes Son        SOCIAL HISTORY:  Social History     Socioeconomic History     Marital status:      Spouse name: None     Number of children: None     Years of education: None     Highest education level: None   Occupational History     None   Social Needs     Financial resource strain: None     Food insecurity:     Worry: None     Inability: None     Transportation needs:     Medical: None     Non-medical: None   Tobacco Use     Smoking status: Former Smoker     Last attempt to quit: 1965     Years since quittin.7     Smokeless tobacco: Never Used   Substance and Sexual Activity     Alcohol use: Yes     Alcohol/week: 0.0 standard drinks     Comment: 2 glasses wine per month     Drug use: No     Sexual activity: Never   Lifestyle     Physical activity:      "Days per week: None     Minutes per session: None     Stress: None   Relationships     Social connections:     Talks on phone: None     Gets together: None     Attends Christianity service: None     Active member of club or organization: None     Attends meetings of clubs or organizations: None     Relationship status: None     Intimate partner violence:     Fear of current or ex partner: None     Emotionally abused: None     Physically abused: None     Forced sexual activity: None   Other Topics Concern      Service Not Asked     Blood Transfusions No     Caffeine Concern Yes     Comment: 5 cups caffeine per day     Occupational Exposure Not Asked     Hobby Hazards Not Asked     Sleep Concern No     Stress Concern No     Weight Concern No     Comment: weight stable     Special Diet No     Comment: trying to eat healthier     Back Care Not Asked     Exercise Yes     Comment: stretching     Bike Helmet Not Asked     Seat Belt Yes     Self-Exams Yes     Parent/sibling w/ CABG, MI or angioplasty before 65F 55M? Yes   Social History Narrative    Works in an office       Review of Systems:  Skin:  Negative for       Eyes:  Positive for glasses    ENT:  Negative for      Respiratory:  Positive for dyspnea on exertion     Cardiovascular:    Positive for;lightheadedness chest pressure occ with left arm numbness  Gastroenterology: Positive for heartburn;constipation;diarrhea    Genitourinary:  Positive for urinary frequency    Musculoskeletal:  Positive for back pain    Neurologic:  Positive for headaches    Psychiatric:  Positive for anxiety    Heme/Lymph/Imm:  Positive for hay fever    Endocrine:  Positive for        Physical Exam:  Vitals: /60   Pulse 80   Ht 1.626 m (5' 4\")   Wt 74.4 kg (164 lb)   BMI 28.15 kg/m      Constitutional:  cooperative, alert and oriented, well developed, well nourished, in no acute distress overweight      Skin:  warm and dry to the touch, no apparent skin lesions or masses " noted          Head:  normocephalic, no masses or lesions        Eyes:  pupils equal and round, conjunctivae and lids unremarkable, sclera white, no xanthalasma, EOMS intact, no nystagmus        Lymph:      ENT:  no pallor or cyanosis, dentition good        Neck:  carotid pulses are full and equal bilaterally;no carotid bruit        Respiratory:  normal breath sounds, clear to auscultation, normal A-P diameter, normal symmetry, normal respiratory excursion, no use of accessory muscles         Cardiac: regular rhythm;normal S1 and S2   S4   grade 1;LUSB;systolic murmur        pulses full and equal                                   left wrist    GI:  abdomen soft, non-tender, BS normoactive, no mass, no HSM, no bruits        Extremities and Muscular Skeletal:  no edema;no spinal abnormalities noted;normal muscle strength and tone         left wrist and forearm ecchymotic and mildy swollen without hum or bruit    Neurological:  no gross motor deficits        Psych:  affect appropriate, oriented to time, person and place      Recent Lab Results:  LIPID RESULTS:  Lab Results   Component Value Date    CHOL 163 10/04/2019    HDL 72 10/04/2019    LDL 78 10/04/2019    TRIG 65 10/04/2019    CHOLHDLRATIO 2.2 08/17/2015       LIVER ENZYME RESULTS:  Lab Results   Component Value Date    AST 21 05/24/2017    ALT 28 12/27/2017       CBC RESULTS:  Lab Results   Component Value Date    WBC 4.8 05/24/2017    RBC 4.16 05/24/2017    HGB 13.1 02/08/2018    HCT 37.2 05/24/2017    MCV 89 05/24/2017    MCH 29.3 05/24/2017    MCHC 32.8 05/24/2017    RDW 14.7 05/24/2017     05/24/2017       BMP RESULTS:  Lab Results   Component Value Date     10/04/2019    POTASSIUM 4.2 10/04/2019    CHLORIDE 103 10/04/2019    CO2 31 10/04/2019    ANIONGAP 2 (L) 10/04/2019     (H) 10/04/2019    BUN 12 10/04/2019    CR 0.66 10/04/2019    GFRESTIMATED 85 10/04/2019    GFRESTBLACK >90 10/04/2019    ERIC 8.9 10/04/2019        A1C RESULTS:  No  results found for: A1C    INR RESULTS:  Lab Results   Component Value Date    INR 1.5 (A) 09/24/2019    INR 2.1 (A) 09/12/2019    INR 3.26 (H) 05/28/2015    INR 2.60 (H) 05/17/2015           CC  Nikolai Amaya MD  [id number]  [place] [id number]

## 2019-10-04 NOTE — PROGRESS NOTES
Service Date: 10/04/2019      HISTORY OF PRESENT ILLNESS:  Salome Saeed is a very pleasant 79-year-old female who follows here with Dr. Nikolai Amaya.  She presents today for her annual visit.      She has a longstanding history of coronary artery disease dating back to 04/2009 when she presented with unstable angina leading to 2 drug-eluting stents into her LAD.  She had chest pain in 2012.  Angiogram was unchanged revealing moderate PDA lesions, an ostial pinch in the first diagonal, 80% OM which we have treated medically.  In 2015 she had chest pain again with no change in her anatomy; however, 1 month later she presented to the emergency room and the OM lesion was stented.      Nuclear stress test in 2017 prior to her back surgery showed no ischemia.  She had had some exertional chest discomfort at that point.  She is dye allergic and on warfarin.      She underwent spinal surgery with Dr. Harris and has had reasonable results; however, she continues to have some discomfort being treated with radiofrequency now.  She started riding her exercise bike and now has developed left knee issues requiring injections.  The pain is persistent.  Due to her back and her knees, she has not been able to exercise as routinely as usual and she has gained about 12 pounds in the last couple of years.  In concordance with this, her lipids have deteriorated.  Her LDL is now 78, when it used to be firmly under 70.  She is on Crestor 20 mg per day, which she tolerates well.      In 04/2013 she had a cerebrovascular accident with balance problems.  This was thought to be embolic from a lesion in her vertebral artery.  She remains on warfarin with aspirin and has had no bleeding.     She had an echocardiogram during her CVA showing negative bubble study, preserved EF and 1+ MR.    She tells me over this past year she has had intermittent chest discomfort which comes and goes.  It is not necessarily related to exertion.  She also  has some left arm aching sometimes when watching TV.  She has become more short of breath, particularly with climbing stairs and vacuuming, and I suspect some of this is related to deconditioning from her orthopedic issues.  Currently, she is not doing any regular form of exercise.      We had a long discussion today about working this up.  She is on a good regimen for angina, and I believe a Lexiscan stress test would be appropriate at this point, which she is in agreement with.      She otherwise is doing well.  She is still living with her , who seems to be in reasonable health.  She has lost one of her Yorkie Terriers and recently adopted a Yonny Tzu and is enjoying both of her puppies.  She otherwise has no complaints today and her exam is unremarkable.      IMPRESSION AND PLAN:   1.  Coronary artery disease with stents dating back to 2009, last done to the  branch in 2015.  Nuclear stress test in 2017 was unremarkable, but over this past year she has noted some chest discomfort and occasional left arm aching more likely to occur at rest.  She does have some cervical spine issues, so it is difficult to know if this is angina or orthopedic in nature.  I will start with a Lexiscan nuclear stress test and see her back for followup to review those results.  Continue aspirin and her full program for angina.  Her meds were refilled.   2.  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  She remains on warfarin with aspirin and no bleeding issues.   3.  Multiple orthopedic issues rendering her quite deconditioned.  I suspect her shortness of breath is from this.  She has also gained some weight.   4.  Dyslipidemia, which has deteriorated given the above situation.  I will increase Crestor to 40 mg per day, and I will check her lipids in about 5 weeks when she returns to see me.  If she has cramps or pain on the Crestor 40 mg, I would reduce it back to 20 mg, which she tolerates well, and add  Zetia 10 mg per day if needed.      I did remind her that ranitidine is on recall currently, and I would suggest substituting famotidine 20 mg per day.  She uses this on an as-needed basis and does have the over-the-counter ranitidine, which is not available at the current time due to concerns by the FDA.      I will see her back in 5 weeks' time.      It has been a pleasure seeing her in followup today.  Greater than 50% of this 30-minute visit today was spent in counseling.         SURI MEJIA NP             D: 10/04/2019   T: 10/04/2019   MT: LALY      Name:     JESUS AVELAR   MRN:      [id number]        Account:      [id number]   :      1940           Service Date: 10/04/2019      Document: [id number]

## 2019-10-04 NOTE — LETTER
10/4/2019    Surya Dyer, DO  [address] [place] [address]    RE: Salome Saeed       Dear Colleague,    I had the pleasure of seeing Salome Saeed in the [facility].      History of Present Illness:    [id number]  Impression/Plan:     It has been a pleasure seeing Salome Saeed in follow up    Emmett Araujo, MSN, APRN-BC, CNP  Cardiology    Orders Placed This Encounter   Procedures     NM Lexiscan stress test (nuc card)     Lipid Profile     ALT     Follow-Up with Cardiac Advanced Practice Provider     Orders Placed This Encounter   Medications     rosuvastatin (CRESTOR) 40 MG tablet     Sig: Take 1 tablet (40 mg) by mouth daily     Dispense:  90 tablet     Refill:  3     atenolol (TENORMIN) 25 MG tablet     Sig: Take 1 tablet (25 mg) by mouth 2 times daily     Dispense:  180 tablet     Refill:  3     nitroGLYcerin (NITROSTAT) 0.4 MG sublingual tablet     Sig: Place 1 tablet (0.4 mg) under the tongue every 5 minutes as needed for chest pain     Dispense:  25 tablet     Refill:  2     Medications Discontinued During This Encounter   Medication Reason     rosuvastatin (CRESTOR) 20 MG tablet Reorder     atenolol (TENORMIN) 25 MG tablet Reorder     nitroGLYcerin (NITROSTAT) 0.4 MG sublingual tablet Reorder         Encounter Diagnoses   Name Primary?     Coronary artery disease involving native coronary artery of native heart without angina pectoris      Hyperlipidemia LDL goal <70      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)        CURRENT MEDICATIONS:  Current Outpatient Medications   Medication Sig Dispense Refill     acetaminophen (TYLENOL) 325 MG tablet Take 2 tablets by mouth every 6 hours as needed for pain.  0     amLODIPine (NORVASC) 5 MG tablet Take 1 tablet (5 mg) by mouth daily 90 tablet 3     ascorbic acid (VITAMIN C) 500 MG tablet Take 1,000 mg by mouth daily        aspirin EC 81 MG tablet Take 1  tablet (81 mg) by mouth daily       atenolol (TENORMIN) 25 MG tablet Take 1 tablet (25 mg) by mouth 2 times daily 180 tablet 3     Calcium Carb-Cholecalciferol (CALCIUM 600 + D PO) Take 1,200 mg by mouth daily        Cholecalciferol (VITAMIN D) 2000 UNITS tablet Take 2,000 Units by mouth daily       Coenzyme Q10 (COQ-10) 200 MG CAPS Take 400 mg by mouth daily        escitalopram (LEXAPRO) 5 MG tablet TAKE 1 TABLET EVERY DAY 90 tablet 3     levothyroxine (SYNTHROID/LEVOTHROID) 100 MCG tablet Take 1 tablet (100 mcg) by mouth daily 90 tablet 3     lisinopril (PRINIVIL/ZESTRIL) 20 MG tablet Take 1 tablet (20 mg) by mouth daily 90 tablet 0     Multiple Vitamin (DAILY MULTIVITAMIN PO) Take by mouth daily       nitroGLYcerin (NITROSTAT) 0.4 MG sublingual tablet Place 1 tablet (0.4 mg) under the tongue every 5 minutes as needed for chest pain 25 tablet 2     ranitidine (ZANTAC) 150 MG tablet Take 1 tablet (150 mg) by mouth 2 times daily 180 tablet 1     rosuvastatin (CRESTOR) 40 MG tablet Take 1 tablet (40 mg) by mouth daily 90 tablet 3     vitamin B complex with vitamin C (VITAMIN  B COMPLEX) TABS tablet Take 1 tablet by mouth daily       warfarin (COUMADIN) 5 MG tablet TAKE 1/2 TO 1 TABLET EVERY DAY AS DIRECTED  BY  INR  NURSE  BASED ON INR READING 90 tablet 1       ALLERGIES     Allergies   Allergen Reactions     Atorvastatin      Leg cramps     Gluten      Sinuses affected by gluten     Magnesium Salicylate      Oat      Shellfish Allergy      hives     Wheat        PAST MEDICAL HISTORY:  Past Medical History:   Diagnosis Date     CAD (coronary artery disease) 4/9/2009 4/8/2009: PTCA and two 2.5x15mm Xience stents to mid LAD lesion; Cath 12/2012- 40-50% stenosis in mid PDA, 80% on D1- medically treat , 5/28/2015 - PTCA and 2.25x8mm Promus PREMIIER NAILA to ostium of 2nd OM     CVA (cerebral infarction)      DDD (degenerative disc disease)      Essential hypertension, benign      GERD (gastroesophageal reflux disease)       Headache      Hyperlipidemia      Hypothyroidism      Lumbago      Lumbar spinal stenosis      Mitral regurgitation     1+ per 2013 Echo     Vertebral artery stenosis, right        PAST SURGICAL HISTORY:  Past Surgical History:   Procedure Laterality Date     CORONARY ANGIOGRAPHY ADULT ORDER  2012    40-50% stenosis to mid PDA, 80% in D1- medically treat     CORONARY ANGIOGRAPHY ADULT ORDER  2015    PTCA and 2.25x8mm Promus PREMIER NAILA to ostium of 2nd OM     DECOMPRESSION, FUSION LUMBAR POSTERIOR THREE + LEVELS, COMBINED  2013    Procedure: COMBINED DECOMPRESSION, FUSION LUMBAR POSTERIOR THREE + LEVELS;  Posterior Lumbar Decompression L3-S1, Fusion L4-S1;  Surgeon: Daniel Harris MD;  Location: [facility] OR     ENDOSCOPIC STRIPPING VEIN(S)       HEART CATH, ANGIOPLASTY  2009    PTCA and two 2.5x15mm Xience stents to mid LAD lesion     HYSTERECTOMY, RICKIE      Fibroids, and Menorrhagia.. Bilateral Oophrectomy     ORTHOPEDIC SURGERY       Stenting of LAD, Angioplasty  09     TONSILLECTOMY      as a child       FAMILY HISTORY:  Family History   Problem Relation Age of Onset     Neurologic Disorder Mother         Dementia, Still living     Ovarian Cancer Mother      Thyroid Disease Mother      C.A.D. Father              Diabetes Father      Coronary Artery Disease Father      Alcohol/Drug Brother      Thyroid Disease Son      Diabetes Son        SOCIAL HISTORY:  Social History     Socioeconomic History     Marital status:      Spouse name: None     Number of children: None     Years of education: None     Highest education level: None   Occupational History     None   Social Needs     Financial resource strain: None     Food insecurity:     Worry: None     Inability: None     Transportation needs:     Medical: None     Non-medical: None   Tobacco Use     Smoking status: Former Smoker     Last attempt to quit: 1965     Years since quittin.7     Smokeless tobacco: Never  "Used   Substance and Sexual Activity     Alcohol use: Yes     Alcohol/week: 0.0 standard drinks     Comment: 2 glasses wine per month     Drug use: No     Sexual activity: Never   Lifestyle     Physical activity:     Days per week: None     Minutes per session: None     Stress: None   Relationships     Social connections:     Talks on phone: None     Gets together: None     Attends Religion service: None     Active member of club or organization: None     Attends meetings of clubs or organizations: None     Relationship status: None     Intimate partner violence:     Fear of current or ex partner: None     Emotionally abused: None     Physically abused: None     Forced sexual activity: None   Other Topics Concern      Service Not Asked     Blood Transfusions No     Caffeine Concern Yes     Comment: 5 cups caffeine per day     Occupational Exposure Not Asked     Hobby Hazards Not Asked     Sleep Concern No     Stress Concern No     Weight Concern No     Comment: weight stable     Special Diet No     Comment: trying to eat healthier     Back Care Not Asked     Exercise Yes     Comment: stretching     Bike Helmet Not Asked     Seat Belt Yes     Self-Exams Yes     Parent/sibling w/ CABG, MI or angioplasty before 65F 55M? Yes   Social History Narrative    Works in an office       Review of Systems:  Skin:  Negative for       Eyes:  Positive for glasses    ENT:  Negative for      Respiratory:  Positive for dyspnea on exertion     Cardiovascular:    Positive for;lightheadedness chest pressure occ with left arm numbness  Gastroenterology: Positive for heartburn;constipation;diarrhea    Genitourinary:  Positive for urinary frequency    Musculoskeletal:  Positive for back pain    Neurologic:  Positive for headaches    Psychiatric:  Positive for anxiety    Heme/Lymph/Imm:  Positive for hay fever    Endocrine:  Positive for        Physical Exam:  Vitals: /60   Pulse 80   Ht 1.626 m (5' 4\")   Wt 74.4 kg (164 " lb)   BMI 28.15 kg/m       Constitutional:  cooperative, alert and oriented, well developed, well nourished, in no acute distress overweight      Skin:  warm and dry to the touch, no apparent skin lesions or masses noted          Head:  normocephalic, no masses or lesions        Eyes:  pupils equal and round, conjunctivae and lids unremarkable, sclera white, no xanthalasma, EOMS intact, no nystagmus        Lymph:      ENT:  no pallor or cyanosis, dentition good        Neck:  carotid pulses are full and equal bilaterally;no carotid bruit        Respiratory:  normal breath sounds, clear to auscultation, normal A-P diameter, normal symmetry, normal respiratory excursion, no use of accessory muscles         Cardiac: regular rhythm;normal S1 and S2   S4   grade 1;LUSB;systolic murmur        pulses full and equal                                   left wrist    GI:  abdomen soft, non-tender, BS normoactive, no mass, no HSM, no bruits        Extremities and Muscular Skeletal:  no edema;no spinal abnormalities noted;normal muscle strength and tone         left wrist and forearm ecchymotic and mildy swollen without hum or bruit    Neurological:  no gross motor deficits        Psych:  affect appropriate, oriented to time, person and place      Recent Lab Results:  LIPID RESULTS:  Lab Results   Component Value Date    CHOL 163 10/04/2019    HDL 72 10/04/2019    LDL 78 10/04/2019    TRIG 65 10/04/2019    CHOLHDLRATIO 2.2 08/17/2015       LIVER ENZYME RESULTS:  Lab Results   Component Value Date    AST 21 05/24/2017    ALT 28 12/27/2017       CBC RESULTS:  Lab Results   Component Value Date    WBC 4.8 05/24/2017    RBC 4.16 05/24/2017    HGB 13.1 02/08/2018    HCT 37.2 05/24/2017    MCV 89 05/24/2017    MCH 29.3 05/24/2017    MCHC 32.8 05/24/2017    RDW 14.7 05/24/2017     05/24/2017       BMP RESULTS:  Lab Results   Component Value Date     10/04/2019    POTASSIUM 4.2 10/04/2019    CHLORIDE 103 10/04/2019    CO2 31  10/04/2019    ANIONGAP 2 (L) 10/04/2019     (H) 10/04/2019    BUN 12 10/04/2019    CR 0.66 10/04/2019    GFRESTIMATED 85 10/04/2019    GFRESTBLACK >90 10/04/2019    ERIC 8.9 10/04/2019        A1C RESULTS:  No results found for: A1C    INR RESULTS:  Lab Results   Component Value Date    INR 1.5 (A) 09/24/2019    INR 2.1 (A) 09/12/2019    INR 3.26 (H) 05/28/2015    INR 2.60 (H) 05/17/2015           CC  Nikolai Amaya MD  [address]  [place] [address]                  Service Date: 10/04/2019      HISTORY OF PRESENT ILLNESS:  Salome Saeed is a very pleasant 79-year-old female who follows here with Dr. Nikolai Amaya.  She presents today for her annual visit.      She has a longstanding history of coronary artery disease dating back to 04/2009 when she presented with unstable angina leading to 2 drug-eluting stents into her LAD.  She had chest pain in 2012.  Angiogram was unchanged revealing moderate PDA lesions, an ostial pinch in the first diagonal, 80% OM which we have treated medically.  In 2015 she had chest pain again with no change in her anatomy; however, 1 month later she presented to the emergency room and the OM lesion was stented.      Nuclear stress test in 2017 prior to her back surgery showed no ischemia.  She had had some exertional chest discomfort at that point.  She is dye allergic and on warfarin.      She underwent spinal surgery with Dr. Harris and has had reasonable results; however, she continues to have some discomfort being treated with radiofrequency now.  She started riding her exercise bike and now has developed left knee issues requiring injections.  The pain is persistent.  Due to her back and her knees, she has not been able to exercise as routinely as usual and she has gained about 12 pounds in the last couple of years.  In concordance with this, her lipids have deteriorated.  Her LDL is now 78, when it used to be firmly under 70.  She is on Crestor 20 mg per  day, which she tolerates well.      In 04/2013 she had a cerebrovascular accident with balance problems.  This was thought to be embolic from a lesion in her vertebral artery.  She remains on warfarin with aspirin and has had no bleeding.     She had an echocardiogram during her CVA showing negative bubble study, preserved EF and 1+ MR.    She tells me over this past year she has had intermittent chest discomfort which comes and goes.  It is not necessarily related to exertion.  She also has some left arm aching sometimes when watching TV.  She has become more short of breath, particularly with climbing stairs and vacuuming, and I suspect some of this is related to deconditioning from her orthopedic issues.  Currently, she is not doing any regular form of exercise.      We had a long discussion today about working this up.  She is on a good regimen for angina, and I believe a Lexiscan stress test would be appropriate at this point, which she is in agreement with.      She otherwise is doing well.  She is still living with her , who seems to be in reasonable health.  She has lost one of her Yorkie Terriers and recently adopted a Yonny Tzu and is enjoying both of her puppies.  She otherwise has no complaints today and her exam is unremarkable.      IMPRESSION AND PLAN:   1.  Coronary artery disease with stents dating back to 2009, last done to the  branch in 2015.  Nuclear stress test in 2017 was unremarkable, but over this past year she has noted some chest discomfort and occasional left arm aching more likely to occur at rest.  She does have some cervical spine issues, so it is difficult to know if this is angina or orthopedic in nature.  I will start with a Lexiscan nuclear stress test and see her back for followup to review those results.  Continue aspirin and her full program for angina.  Her meds were refilled.   2.  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  She remains on warfarin  with aspirin and no bleeding issues.   3.  Multiple orthopedic issues rendering her quite deconditioned.  I suspect her shortness of breath is from this.  She has also gained some weight.   4.  Dyslipidemia, which has deteriorated given the above situation.  I will increase Crestor to 40 mg per day, and I will check her lipids in about 5 weeks when she returns to see me.  If she has cramps or pain on the Crestor 40 mg, I would reduce it back to 20 mg, which she tolerates well, and add Zetia 10 mg per day if needed.      I did remind her that ranitidine is on recall currently, and I would suggest substituting famotidine 20 mg per day.  She uses this on an as-needed basis and does have the over-the-counter ranitidine, which is not available at the current time due to concerns by the FDA.      I will see her back in 5 weeks' time.      It has been a pleasure seeing her in followup today.  Greater than 50% of this 30-minute visit today was spent in counseling.         SURI MEJIA NP             D: 10/04/2019   T: 10/04/2019   MT: LALY      Name:     JESUS AVELAR   MRN:      [id number]        Account:      [id number]   :      1940           Service Date: 10/04/2019      Document: [id number]        Thank you for allowing me to participate in the care of your patient.      Sincerely,     KIANA Carreno [facility]    cc:   Nikolai Amaya MD  [address]  [place] [address]

## 2019-10-08 ENCOUNTER — ANTICOAGULATION THERAPY VISIT (OUTPATIENT)
Dept: NURSING | Facility: CLINIC | Age: 79
End: 2019-10-08
Payer: COMMERCIAL

## 2019-10-08 DIAGNOSIS — Z79.01 LONG TERM CURRENT USE OF ANTICOAGULANT THERAPY: ICD-10-CM

## 2019-10-08 LAB — INR POINT OF CARE: 2.1 (ref 0.86–1.14)

## 2019-10-08 PROCEDURE — 36416 COLLJ CAPILLARY BLOOD SPEC: CPT

## 2019-10-08 PROCEDURE — 85610 PROTHROMBIN TIME: CPT | Mod: QW

## 2019-10-08 NOTE — PROGRESS NOTES
ANTICOAGULATION FOLLOW-UP CLINIC VISIT    Patient Name:  Salome Saeed  Date:  10/8/2019  Contact Type:  Face to Face    SUBJECTIVE:  Patient Findings         Clinical Outcomes     Negatives:   Major bleeding event, Thromboembolic event, Anticoagulation-related hospital admission, Anticoagulation-related ED visit, Anticoagulation-related fatality           OBJECTIVE    INR Protime   Date Value Ref Range Status   10/08/2019 2.1 (A) 0.86 - 1.14 Final       ASSESSMENT / PLAN  INR assessment THER    Recheck INR In: 4 WEEKS    INR Location Clinic      Anticoagulation Summary  As of 10/8/2019    INR goal:   2.0-3.0   TTR:   69.0 % (3.7 y)   INR used for dosin.1 (10/8/2019)   Warfarin maintenance plan:   5 mg (5 mg x 1) every Mon, Wed, Fri; 2.5 mg (5 mg x 0.5) all other days   Full warfarin instructions:   5 mg every Mon, Wed, Fri; 2.5 mg all other days   Weekly warfarin total:   25 mg   No change documented:   Rosanne Rico RN   Plan last modified:   Rosanne Rico RN (2019)   Next INR check:   2019   Target end date:       Indications    Long term current use of anticoagulant therapy [Z79.01]  CVA (cerebral vascular accident) (Resolved) [I63.9]             Anticoagulation Episode Summary     INR check location:       Preferred lab:       Send INR reminders to:    TRIAGE    Comments:               See the Encounter Report to view Anticoagulation Flowsheet and Dosing Calendar (Go to Encounters tab in chart review, and find the Anticoagulation Therapy Visit)    MURTAZA Javier, RN

## 2019-10-11 ENCOUNTER — HOSPITAL ENCOUNTER (OUTPATIENT)
Dept: CARDIOLOGY | Facility: CLINIC | Age: 79
Discharge: HOME | End: 2019-10-11
Attending: NURSE PRACTITIONER | Admitting: NURSE PRACTITIONER
Payer: COMMERCIAL

## 2019-10-11 ENCOUNTER — HOSPITAL ENCOUNTER (OUTPATIENT)
Dept: NUCLEAR MEDICINE | Facility: CLINIC | Age: 79
Setting detail: NUCLEAR MEDICINE
End: 2019-10-11
Attending: NURSE PRACTITIONER
Payer: COMMERCIAL

## 2019-10-11 DIAGNOSIS — I25.10 CORONARY ARTERY DISEASE INVOLVING NATIVE CORONARY ARTERY OF NATIVE HEART WITHOUT ANGINA PECTORIS: Chronic | ICD-10-CM

## 2019-10-11 PROCEDURE — 78452 HT MUSCLE IMAGE SPECT MULT: CPT | Mod: 26 | Performed by: INTERNAL MEDICINE

## 2019-10-11 PROCEDURE — 93018 CV STRESS TEST I&R ONLY: CPT | Performed by: INTERNAL MEDICINE

## 2019-10-11 PROCEDURE — 78452 HT MUSCLE IMAGE SPECT MULT: CPT

## 2019-10-11 PROCEDURE — 93016 CV STRESS TEST SUPVJ ONLY: CPT | Performed by: INTERNAL MEDICINE

## 2019-10-11 PROCEDURE — 25000128 H RX IP 250 OP 636

## 2019-10-11 PROCEDURE — 34300033 ZZH RX 343: Performed by: NURSE PRACTITIONER

## 2019-10-11 PROCEDURE — A9502 TC99M TETROFOSMIN: HCPCS | Performed by: NURSE PRACTITIONER

## 2019-10-11 RX ORDER — REGADENOSON 0.08 MG/ML
INJECTION, SOLUTION INTRAVENOUS
Status: COMPLETED
Start: 2019-10-11 | End: 2019-10-11

## 2019-10-11 RX ADMIN — TETROFOSMIN 30.1 MCI.: 1.38 INJECTION, POWDER, LYOPHILIZED, FOR SOLUTION INTRAVENOUS at 12:29

## 2019-10-11 RX ADMIN — REGADENOSON 0.4 MG: 0.08 INJECTION, SOLUTION INTRAVENOUS at 12:26

## 2019-10-11 RX ADMIN — TETROFOSMIN 11 MCI.: 1.38 INJECTION, POWDER, LYOPHILIZED, FOR SOLUTION INTRAVENOUS at 10:51

## 2019-10-14 ENCOUNTER — CARE COORDINATION (OUTPATIENT)
Dept: CARDIOLOGY | Facility: CLINIC | Age: 79
End: 2019-10-14

## 2019-10-14 DIAGNOSIS — I10 ESSENTIAL HYPERTENSION, BENIGN: ICD-10-CM

## 2019-10-14 RX ORDER — LISINOPRIL 20 MG/1
20 TABLET ORAL DAILY
Qty: 90 TABLET | Refills: 3 | Status: SHIPPED | OUTPATIENT
Start: 2019-10-14 | End: 2020-09-08

## 2019-10-14 NOTE — TELEPHONE ENCOUNTER
Received refill request for:  Lisinopril 20mg  Last OV was: 10/4/19  Labs/EKG: BMP done 10/4/19  F/U scheduled: Order in Epic for follow up ~10/2020  [facility].    NUBIA Barajas 11:07 AM 10/14/2019

## 2019-10-14 NOTE — PROGRESS NOTES
"    Called and spoke with pt.  Discussed that ESTHER Dye reviewed Lexiscan stress test results from 10/11/19 and everything looks fine.  Discussed that since she has had some chest pain ESTHER Dye says if she is feeling fine she does not need to keep OV on 11/8/19, but will leave it up to her.  Pt states she is still having some SOB.    Reviewed DThomas-Kvider, CNP's OV note from 10/4/19 states:  \"She has become more short of breath, particularly with climbing stairs and vacuuming, and I suspect some of this is related to deconditioning from her orthopedic issues.  Currently, she is not doing any regular form of exercise.\"    Recommended that pt follow up with her  PCP, Dr. Dyer, regarding her ongoing SOB.  She agrees with this plan and states she would like to cancel OV with ESTHER Dye on 11/8/19.  Lab and OV cancelled as requested.     Reviewed with pt that she is due for fasting labs ~11/8 to recheck her cholesterol since ESTHER Dye increased her crestor dose from 20 to 40mg daily on 10/4/19.  She agrees to have fasting labs done at [facility] in [place] ~ 11/8/19.      Routed to ESTHER Dye as DIANE Barajas RN 10:09 AM 10/14/2019    "

## 2019-11-05 ENCOUNTER — ANTICOAGULATION THERAPY VISIT (OUTPATIENT)
Dept: NURSING | Facility: CLINIC | Age: 79
End: 2019-11-05
Payer: COMMERCIAL

## 2019-11-05 DIAGNOSIS — Z79.01 LONG TERM CURRENT USE OF ANTICOAGULANT THERAPY: ICD-10-CM

## 2019-11-05 LAB — INR POINT OF CARE: 3 (ref 0.86–1.14)

## 2019-11-05 PROCEDURE — 36416 COLLJ CAPILLARY BLOOD SPEC: CPT

## 2019-11-05 PROCEDURE — 85610 PROTHROMBIN TIME: CPT | Mod: QW

## 2019-11-05 NOTE — PROGRESS NOTES
ANTICOAGULATION FOLLOW-UP CLINIC VISIT    Patient Name:  Salome Saeed  Date:  11/5/2019  Contact Type:  Face to Face    SUBJECTIVE:  Patient Findings         Clinical Outcomes     Negatives:   Major bleeding event, Thromboembolic event, Anticoagulation-related hospital admission, Anticoagulation-related ED visit, Anticoagulation-related fatality           OBJECTIVE    INR Protime   Date Value Ref Range Status   11/05/2019 3.0 (A) 0.86 - 1.14 Final       ASSESSMENT / PLAN  INR assessment THER    Recheck INR In: 4 WEEKS    INR Location Clinic      Anticoagulation Summary  As of 11/5/2019    INR goal:   2.0-3.0   TTR:   69.6 % (3.8 y)   INR used for dosing:   3.0 (11/5/2019)   Warfarin maintenance plan:   5 mg (5 mg x 1) every Mon, Wed, Fri; 2.5 mg (5 mg x 0.5) all other days   Full warfarin instructions:   5 mg every Mon, Wed, Fri; 2.5 mg all other days   Weekly warfarin total:   25 mg   No change documented:   Rosanne Rico RN   Plan last modified:   Rosanne Rico RN (7/8/2019)   Next INR check:   12/3/2019   Target end date:       Indications    Long term current use of anticoagulant therapy [Z79.01]  CVA (cerebral vascular accident) (Resolved) [I63.9]             Anticoagulation Episode Summary     INR check location:       Preferred lab:       Send INR reminders to:    TRIAGE    Comments:               See the Encounter Report to view Anticoagulation Flowsheet and Dosing Calendar (Go to Encounters tab in chart review, and find the Anticoagulation Therapy Visit)    MURTAZA Javier, RN

## 2019-11-07 DIAGNOSIS — I25.10 CORONARY ARTERY DISEASE INVOLVING NATIVE CORONARY ARTERY OF NATIVE HEART WITHOUT ANGINA PECTORIS: Chronic | ICD-10-CM

## 2019-11-07 LAB
ALT SERPL W P-5'-P-CCNC: 29 U/L (ref 0–50)
CHOLEST SERPL-MCNC: 147 MG/DL
HDLC SERPL-MCNC: 68 MG/DL
LDLC SERPL CALC-MCNC: 64 MG/DL
NONHDLC SERPL-MCNC: 79 MG/DL
TRIGL SERPL-MCNC: 74 MG/DL

## 2019-11-07 PROCEDURE — 84460 ALANINE AMINO (ALT) (SGPT): CPT | Performed by: NURSE PRACTITIONER

## 2019-11-07 PROCEDURE — 80061 LIPID PANEL: CPT | Performed by: NURSE PRACTITIONER

## 2019-11-07 PROCEDURE — 36415 COLL VENOUS BLD VENIPUNCTURE: CPT | Performed by: NURSE PRACTITIONER

## 2019-12-03 ENCOUNTER — ANTICOAGULATION THERAPY VISIT (OUTPATIENT)
Dept: NURSING | Facility: CLINIC | Age: 79
End: 2019-12-03
Payer: COMMERCIAL

## 2019-12-03 DIAGNOSIS — Z79.01 LONG TERM CURRENT USE OF ANTICOAGULANT THERAPY: ICD-10-CM

## 2019-12-03 LAB — INR POINT OF CARE: 3.3 (ref 0.86–1.14)

## 2019-12-03 PROCEDURE — 85610 PROTHROMBIN TIME: CPT | Mod: QW

## 2019-12-03 PROCEDURE — 36416 COLLJ CAPILLARY BLOOD SPEC: CPT

## 2019-12-03 NOTE — PROGRESS NOTES
ANTICOAGULATION FOLLOW-UP CLINIC VISIT    Patient Name:  Salome Saeed  Date:  12/3/2019  Contact Type:  Face to Face    SUBJECTIVE:  Patient Findings     Positives:   Change in diet/appetite (decrease in greens)        Clinical Outcomes     Negatives:   Major bleeding event, Thromboembolic event, Anticoagulation-related hospital admission, Anticoagulation-related ED visit, Anticoagulation-related fatality           OBJECTIVE    INR Protime   Date Value Ref Range Status   12/03/2019 3.3 (A) 0.86 - 1.14 Final       ASSESSMENT / PLAN  INR assessment SUPRA    Recheck INR In: 2 WEEKS    INR Location Clinic      Anticoagulation Summary  As of 12/3/2019    INR goal:   2.0-3.0   TTR:   67.3 % (1 y)   INR used for dosing:   3.3! (12/3/2019)   Warfarin maintenance plan:   5 mg (5 mg x 1) every Mon, Wed, Fri; 2.5 mg (5 mg x 0.5) all other days   Full warfarin instructions:   12/3: Hold; Otherwise 5 mg every Mon, Wed, Fri; 2.5 mg all other days   Weekly warfarin total:   25 mg   Plan last modified:   Rosanne Rico RN (7/8/2019)   Next INR check:   12/17/2019   Target end date:       Indications    Long term current use of anticoagulant therapy [Z79.01]  CVA (cerebral vascular accident) (Resolved) [I63.9]             Anticoagulation Episode Summary     INR check location:       Preferred lab:       Send INR reminders to:   JUDY ROCHE    Comments:               See the Encounter Report to view Anticoagulation Flowsheet and Dosing Calendar (Go to Encounters tab in chart review, and find the Anticoagulation Therapy Visit)    Dosage adjustment made based on physician directed care plan - see full instructions above.    MURTAZA Javier, RN

## 2019-12-16 ENCOUNTER — TELEPHONE (OUTPATIENT)
Dept: NURSING | Facility: CLINIC | Age: 79
End: 2019-12-16

## 2019-12-16 DIAGNOSIS — E03.9 HYPOTHYROIDISM, UNSPECIFIED TYPE: ICD-10-CM

## 2019-12-16 RX ORDER — LEVOTHYROXINE SODIUM 100 UG/1
TABLET ORAL
Qty: 90 TABLET | Refills: 0 | OUTPATIENT
Start: 2019-12-16

## 2019-12-16 NOTE — TELEPHONE ENCOUNTER
Patient has been rescheduled to a different date.  NICKY JavierN, RN  [facility]

## 2019-12-16 NOTE — TELEPHONE ENCOUNTER
Reason for Call:  Loreta is calling to change her INR appt on 12/17/19 from 2 to 2:30.     I changed it for her, and told her I would ask Rosanne Rico RN to make sure it was ok to change appt time.    I told her we would only call her back if 2:30 won't work for Rosanne.    Best phone number to reach pt at is: [phone]  Ok to leave a message with medical info? yes    Yady Serrano  Patient Representative'

## 2019-12-16 NOTE — TELEPHONE ENCOUNTER
"Requested Prescriptions   Pending Prescriptions Disp Refills     levothyroxine (SYNTHROID/LEVOTHROID) 100 MCG tablet [Pharmacy Med Name: LEVOTHYROXINE SODIUM 100 MCG Tablet]    Last Written Prescription Date:  3/5/19  Last Fill Quantity: 90 tablet,  # refills: 3   Last office visit: 8/6/2019 with prescribing provider:  Manisha     Future Office Visit:     90 tablet 0     Sig: TAKE 1 TABLET EVERY DAY       Thyroid Protocol Passed - 12/16/2019  1:45 PM        Passed - Patient is 12 years or older        Passed - Recent (12 mo) or future (30 days) visit within the authorizing provider's specialty     Patient has had an office visit with the authorizing provider or a provider within the authorizing providers department within the previous 12 mos or has a future within next 30 days. See \"Patient Info\" tab in inbasket, or \"Choose Columns\" in Meds & Orders section of the refill encounter.              Passed - Medication is active on med list        Passed - Normal TSH on file in past 12 months     Recent Labs   Lab Test 02/19/19  1345   TSH 0.41              Passed - No active pregnancy on record     If patient is pregnant or has had a positive pregnancy test, please check TSH.          Passed - No positive pregnancy test in past 12 months     If patient is pregnant or has had a positive pregnancy test, please check TSH.          "

## 2019-12-16 NOTE — TELEPHONE ENCOUNTER
Current rx is good until March 2020. Too soon to refill.  Rosanne Rico, BSN, RN  [facility]

## 2019-12-26 ENCOUNTER — ANTICOAGULATION THERAPY VISIT (OUTPATIENT)
Dept: NURSING | Facility: CLINIC | Age: 79
End: 2019-12-26
Payer: COMMERCIAL

## 2019-12-26 DIAGNOSIS — Z79.01 LONG TERM CURRENT USE OF ANTICOAGULANT THERAPY: ICD-10-CM

## 2019-12-26 LAB — INR POINT OF CARE: 3.4 (ref 0.86–1.14)

## 2019-12-26 PROCEDURE — 36416 COLLJ CAPILLARY BLOOD SPEC: CPT

## 2019-12-26 PROCEDURE — 85610 PROTHROMBIN TIME: CPT | Mod: QW

## 2019-12-26 RX ORDER — COVID-19 ANTIGEN TEST
KIT MISCELLANEOUS
Status: ON HOLD | COMMUNITY
End: 2020-06-02

## 2019-12-26 NOTE — PROGRESS NOTES
ANTICOAGULATION FOLLOW-UP CLINIC VISIT    Patient Name:  Salome Saeed  Date:  12/26/2019  Contact Type:  Face to Face    SUBJECTIVE:  Patient Findings         Clinical Outcomes     Negatives:   Major bleeding event, Thromboembolic event, Anticoagulation-related hospital admission, Anticoagulation-related ED visit, Anticoagulation-related fatality           OBJECTIVE    INR Protime   Date Value Ref Range Status   12/26/2019 3.4 (A) 0.86 - 1.14 Final       ASSESSMENT / PLAN  INR assessment SUPRA    Recheck INR In: 2 WEEKS    INR Location Clinic      Anticoagulation Summary  As of 12/26/2019    INR goal:   2.0-3.0   TTR:   61.0 % (1 y)   INR used for dosing:   3.4! (12/26/2019)   Warfarin maintenance plan:   5 mg (5 mg x 1) every Mon, Wed, Fri; 2.5 mg (5 mg x 0.5) all other days   Full warfarin instructions:   12/26: Hold; Otherwise 5 mg every Mon, Wed, Fri; 2.5 mg all other days   Weekly warfarin total:   25 mg   Plan last modified:   Rosanne Rico RN (7/8/2019)   Next INR check:   1/9/2020   Target end date:       Indications    Long term current use of anticoagulant therapy [Z79.01]  CVA (cerebral vascular accident) (Resolved) [I63.9]             Anticoagulation Episode Summary     INR check location:       Preferred lab:       Send INR reminders to:   JUDY ROCHE    Comments:               See the Encounter Report to view Anticoagulation Flowsheet and Dosing Calendar (Go to Encounters tab in chart review, and find the Anticoagulation Therapy Visit)    Dosage adjustment made based on physician directed care plan    Patient denies any identifiable changes that caused the slightly sub-therapeutic INR. She says she does take aleve nightly. Since not generally recommended when on Warfarin we discussed Tylenol, but she says that does not work well for her. Discussed risks of taking NSAIDs with Warfarin. I informed her I will have Dr. Dyer review to see if there is a safer alternative. She tells me she has  chronic back pain due to spinal stenosis and has had two surgeries.     INR again elevated slightly today. I have adjusted dose to see if that will bring her back into range. Will recheck in 2 weeks and if elevated at that time may want to consider change in maintenance dosage. However because of recent lettuce recall she says she may be getting less greens. She will also increase vegetable intake back to previous.       Renee Hoang RN

## 2020-01-08 NOTE — PROGRESS NOTES
Subjective     Salome Saeed is a 79 year old female who presents to clinic today for the following health issues:    HPI     History of left knee arthritis and pain. She has declined physical therapy in the past but has had two steroid injections. The first brought her relief for about 3-4 months but the last injection was not helpful at all. Denies any change in symptoms - no numbness, tingling, weakness.      Patient Active Problem List   Diagnosis     Benign essential hypertension     Coronary artery disease involving native coronary artery of native heart without angina pectoris     Hyperlipidemia LDL goal <70     Advanced directives, counseling/discussion     DDD (degenerative disc disease), lumbar     Lumbar spinal stenosis     Lumbago     Spinal stenosis, lumbar region, with neurogenic claudication     Atypical chest pain     Health Care Home     Anxiety     Mitral regurgitation     GERD (gastroesophageal reflux disease)     Long term current use of anticoagulant therapy     Hypothyroidism, unspecified type     Vertebral artery stenosis, right     Cerebrovascular accident involving cerebellum (H)     Chronic bilateral low back pain without sciatica     Status post arthrodesis     WILSON (dyspnea on exertion)     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)     Past Surgical History:   Procedure Laterality Date     CORONARY ANGIOGRAPHY ADULT ORDER  12/6/2012    40-50% stenosis to mid PDA, 80% in D1- medically treat     CORONARY ANGIOGRAPHY ADULT ORDER  5/28/2015    PTCA and 2.25x8mm Promus PREMIER NAILA to ostium of 2nd OM     DECOMPRESSION, FUSION LUMBAR POSTERIOR THREE + LEVELS, COMBINED  5/8/2013    Procedure: COMBINED DECOMPRESSION, FUSION LUMBAR POSTERIOR THREE + LEVELS;  Posterior Lumbar Decompression L3-S1, Fusion L4-S1;  Surgeon: Daniel Harris MD;  Location: [facility] OR     ENDOSCOPIC STRIPPING VEIN(S)       HEART CATH, ANGIOPLASTY  4/8/2009    PTCA  and two 2.5x15mm Xience stents to mid LAD lesion     HYSTERECTOMY, RICKIE      Fibroids, and Menorrhagia.. Bilateral Oophrectomy     ORTHOPEDIC SURGERY       Stenting of LAD, Angioplasty  09     TONSILLECTOMY      as a child       Social History     Tobacco Use     Smoking status: Former Smoker     Last attempt to quit: 1965     Years since quittin.0     Smokeless tobacco: Never Used   Substance Use Topics     Alcohol use: Yes     Alcohol/week: 0.0 standard drinks     Comment: 2 glasses wine per month     Family History   Problem Relation Age of Onset     Neurologic Disorder Mother         Dementia, Still living     Ovarian Cancer Mother      Thyroid Disease Mother      C.A.D. Father              Diabetes Father      Coronary Artery Disease Father      Alcohol/Drug Brother      Thyroid Disease Son      Diabetes Son            Reviewed and updated as needed this visit by Provider  Tobacco  Allergies  Meds  Problems  Med Hx  Surg Hx  Fam Hx         Review of Systems   ROS COMP: Constitutional, HEENT, cardiovascular, pulmonary, gi and gu systems are negative, except as otherwise noted.      Objective    /76   Pulse 67   Temp 98.2  F (36.8  C) (Oral)   Wt 73.5 kg (162 lb)   SpO2 97%   BMI 27.81 kg/m    Body mass index is 27.81 kg/m .  Physical Exam   GENERAL: healthy, alert and no distress  MS: no gross musculoskeletal defects noted, no edema    Diagnostic Test Results:  none         Assessment & Plan     1. Osteoarthritis of left knee, unspecified osteoarthritis type: again offered physical therapy, however she declined as it has not been helpful for other issues in the past. Will refer to ortho for further evaluation and recommendations.  - ORTHO  REFERRAL       Surya Dyer DO  [facility]

## 2020-01-09 ENCOUNTER — OFFICE VISIT (OUTPATIENT)
Dept: FAMILY MEDICINE | Facility: CLINIC | Age: 80
End: 2020-01-09
Payer: COMMERCIAL

## 2020-01-09 ENCOUNTER — ANTICOAGULATION THERAPY VISIT (OUTPATIENT)
Dept: NURSING | Facility: CLINIC | Age: 80
End: 2020-01-09
Payer: COMMERCIAL

## 2020-01-09 VITALS
SYSTOLIC BLOOD PRESSURE: 130 MMHG | BODY MASS INDEX: 27.81 KG/M2 | OXYGEN SATURATION: 97 % | DIASTOLIC BLOOD PRESSURE: 76 MMHG | WEIGHT: 162 LBS | TEMPERATURE: 98.2 F | HEART RATE: 67 BPM

## 2020-01-09 DIAGNOSIS — Z79.01 LONG TERM CURRENT USE OF ANTICOAGULANT THERAPY: ICD-10-CM

## 2020-01-09 DIAGNOSIS — M17.12 OSTEOARTHRITIS OF LEFT KNEE, UNSPECIFIED OSTEOARTHRITIS TYPE: Primary | ICD-10-CM

## 2020-01-09 LAB — INR POINT OF CARE: 2.5 (ref 0.86–1.14)

## 2020-01-09 PROCEDURE — 85610 PROTHROMBIN TIME: CPT | Mod: QW

## 2020-01-09 PROCEDURE — 99213 OFFICE O/P EST LOW 20 MIN: CPT | Performed by: FAMILY MEDICINE

## 2020-01-09 PROCEDURE — 36416 COLLJ CAPILLARY BLOOD SPEC: CPT

## 2020-01-09 NOTE — PROGRESS NOTES
ANTICOAGULATION FOLLOW-UP CLINIC VISIT    Patient Name:  Salome Saeed  Date:  2020  Contact Type:  Face to Face    SUBJECTIVE:  Patient Findings       The patient was assessed for diet, medication, and activity level changes, missed or extra doses, bruising or bleeding, with no problem findings.    Clinical Outcomes     Negatives:   Major bleeding event, Thromboembolic event, Anticoagulation-related hospital admission, Anticoagulation-related ED visit, Anticoagulation-related fatality           OBJECTIVE    INR Protime   Date Value Ref Range Status   2020 2.5 (A) 0.86 - 1.14 Final       ASSESSMENT / PLAN  INR assessment THER    Recheck INR In: 4 WEEKS    INR Location Clinic      Anticoagulation Summary  As of 2020    INR goal:   2.0-3.0   TTR:   61.1 % (1 y)   INR used for dosin.5 (2020)   Warfarin maintenance plan:   5 mg (5 mg x 1) every Mon, Wed, Fri; 2.5 mg (5 mg x 0.5) all other days   Full warfarin instructions:   5 mg every Mon, Wed, Fri; 2.5 mg all other days   Weekly warfarin total:   25 mg   No change documented:   Rosanne Rico, RN   Plan last modified:   Rosanne Rico, RN (2019)   Next INR check:   2020   Target end date:       Indications    Long term current use of anticoagulant therapy [Z79.01]  CVA (cerebral vascular accident) (Resolved) [I63.9]             Anticoagulation Episode Summary     INR check location:       Preferred lab:       Send INR reminders to:   JUDY ROCHE    Comments:               See the Encounter Report to view Anticoagulation Flowsheet and Dosing Calendar (Go to Encounters tab in chart review, and find the Anticoagulation Therapy Visit)    MURTAZA Javier, RN

## 2020-01-13 ENCOUNTER — OFFICE VISIT (OUTPATIENT)
Dept: ORTHOPEDICS | Facility: CLINIC | Age: 80
End: 2020-01-13
Payer: COMMERCIAL

## 2020-01-13 VITALS
HEIGHT: 64 IN | SYSTOLIC BLOOD PRESSURE: 144 MMHG | WEIGHT: 162 LBS | DIASTOLIC BLOOD PRESSURE: 70 MMHG | BODY MASS INDEX: 27.66 KG/M2

## 2020-01-13 DIAGNOSIS — M17.12 PRIMARY OSTEOARTHRITIS OF LEFT KNEE: Primary | ICD-10-CM

## 2020-01-13 PROCEDURE — 20611 DRAIN/INJ JOINT/BURSA W/US: CPT | Mod: LT | Performed by: FAMILY MEDICINE

## 2020-01-13 PROCEDURE — 99203 OFFICE O/P NEW LOW 30 MIN: CPT | Mod: 25 | Performed by: FAMILY MEDICINE

## 2020-01-13 ASSESSMENT — MIFFLIN-ST. JEOR: SCORE: 1204.83

## 2020-01-13 NOTE — PATIENT INSTRUCTIONS
1. Primary osteoarthritis of left knee      Reviewed your xray - osteoarthritis  Discussed the progression of arthritis and treatment options  Recommend Turmeric ([employer] Naturals - Omega Curcumin or Life Extension - Curcumin Elite)    Ok to take Tylenol, 2 tablets (1,000mg) three times a day x 2-3 weeks  Check out the information on the Arthritis Foundation website:  http://www.arthritis.org/about-arthritis/types/osteoarthritis  Look up Dr. Andrew Weil - recommendations regarding an anti-inflammatory diet  Physical therapy: [facility] - [phone]  Steroid injection for the left knee done today.  - Ok to shower  - No bathtub, hot tub or swimming for 2 days  - The lidocaine (what is giving you pain relief right now) will likely stop working in 1-2 hours.  You will then have pain again, similar to before you received the injection. The corticosteroid will not start working until approximately 1-2 weeks from now.  In a small percentage of people, cortisone can cause flushing/redness in the face. This usually lasts for 1-3 days and resolves. Cool compress and Ibuprofen/Tylenol can help if this happens.  Will start prior authorization for Visco    Follow-up if pain is not well controlled

## 2020-01-13 NOTE — PROGRESS NOTES
ASSESSMENT & PLAN    1. Primary osteoarthritis of left knee      Seen in consultation for chronic left knee pain in the setting of osteoarthritis  Reviewed xray - osteoarthritis  Discussed the progression of arthritis and treatment options  Recommend Turmeric (N[employer] Naturals - Omega Curcumin or Life Extension - Curcumin Elite)    Ok to take Tylenol, 2 tablets (1,000mg) three times a day x 2-3 weeks  Check out the information on the Arthritis Foundation website  Look up Dr. Andrew Weil - recommendations regarding an anti-inflammatory diet  Physical therapy: [facility] - [phone]. Declines and has not completed in the past.  Has had previous steroid injection and repeat today.  Steroid injection for the left knee done today.  Will start prior authorization for Visco    Follow-up if pain is not well controlled    -----    SUBJECTIVE  Salome Saeed is a/an 79 year old female who is seen in consultation at the request of  Surya Dyer D.O. for evaluation of left knee pain. The patient is seen by themselves.    Onset: 1+ years(s) ago. Reports insidious onset without acute precipitating event.  Location of Pain: left medial knee  Rating of Pain at worst: 10/10  Rating of Pain Currently: 7/10  Worsened by: tender to touch, pain worse at night, prolonged standing and walking  Better with: sleeping on back with small pillow underneath knee  Treatments tried: rest/activity avoidance, ice, heat, tylenol, aleve, previous imaging (xray 4/30/19), corticosteroid injection (most recent date: 8/6/19) that provided no relief - previous injection on 5/2/19 provided good relief for 3 months, biofreeze and aspercream  Associated symptoms: occasional swelling  Orthopedic history: NO  Relevant surgical history: NO  Patient Social History: retired    Patient's past medical, surgical, social, and family histories were reviewed today and no pertinent history related to patient's presenting problem.    REVIEW  "OF SYSTEMS:  10 point ROS is negative other than symptoms noted above in HPI, Past Medical History or as stated below  Constitutional: NEGATIVE for fever, chills, change in weight  Skin: NEGATIVE for worrisome rashes, moles or lesions  GI/: NEGATIVE for bowel or bladder changes  Neuro: NEGATIVE for weakness, dizziness or paresthesias    OBJECTIVE:  BP (!) 144/70   Ht 1.626 m (5' 4\")   Wt 73.5 kg (162 lb)   BMI 27.81 kg/m     General: healthy, alert and in no distress  HEENT: no scleral icterus or conjunctival erythema  Skin: no suspicious lesions or rash. No jaundice.  CV: no pedal edema  Resp: normal respiratory effort without conversational dyspnea   Psych: normal mood and affect  Gait: normal steady gait with appropriate coordination and balance  Neuro: Normal light sensory exam of lower extremity  MSK:  LEFT KNEE  Inspection:    normal alignment  Palpation:    Tender about the medial patellar facet and medial joint line. Remainder of bony and ligamentous landmarks are nontender.    No effusion is present    Patellofemoral crepitus is Present  Range of Motion:     00 extension to 1200 flexion  Strength:    Extensor mechanism intact  Special Tests:    Negative: Lachman's, Shar's    Independent visualization of the below image:  XR KNEE LT 3 VW 4/30/2019 3:56 PM     HISTORY: Chronic pain of left knee; Chronic pain of left knee                                                                      IMPRESSION: Mild degenerative narrowing medial compartment. Tiny knee  effusion. No other findings.     MILAN RAINEY MD    Large Joint Injection/Arthocentesis: L knee joint  Date/Time: 1/14/2020 3:20 PM  Performed by: Arron Alexander DO  Authorized by: Arron Alexander DO     Indications:  Osteoarthritis and pain  Needle Size:  22 G  Guidance: ultrasound    Approach:  Superolateral  Location:  Knee      Medications:  40 mg methylPREDNISolone 40 MG/ML  Aspirate amount (mL):  10  Aspirate:  Clear  Outcome:  " Tolerated well, no immediate complications  Procedure discussed: discussed risks, benefits, and alternatives    Consent Given by:  Patient  Timeout: timeout called immediately prior to procedure    Prep: patient was prepped and draped in usual sterile fashion       Pain noted to be a 7/10 before completion of the procedure and 0/10 after completion of the procedure.          Arron Alexander DO [facility]

## 2020-01-13 NOTE — LETTER
1/13/2020         RE: Salome Saeed  [id number]  [place] [address]        Dear Colleague,    Thank you for referring your patient, Salome Saeed, to the [facility]. Please see a copy of my visit note below.    ASSESSMENT & PLAN    1. Primary osteoarthritis of left knee      Seen in consultation for chronic left knee pain in the setting of osteoarthritis  Reviewed xray - osteoarthritis  Discussed the progression of arthritis and treatment options  Recommend Turmeric (Nordic Naturals - Omega Curcumin or Life Extension - Curcumin Elite)    Ok to take Tylenol, 2 tablets (1,000mg) three times a day x 2-3 weeks  Check out the information on the Arthritis Foundation website  Look up Dr. Andrew Weil - recommendations regarding an anti-inflammatory diet  Physical therapy: [facility] - [phone]. Declines and has not completed in the past.  Has had previous steroid injection and repeat today.  Steroid injection for the left knee done today.  Will start prior authorization for Visco    Follow-up if pain is not well controlled    -----    SUBJECTIVE  Salome Saeed is a/an 79 year old female who is seen in consultation at the request of  Surya Dyer D.O. for evaluation of left knee pain. The patient is seen by themselves.    Onset: 1+ years(s) ago. Reports insidious onset without acute precipitating event.  Location of Pain: left medial knee  Rating of Pain at worst: 10/10  Rating of Pain Currently: 7/10  Worsened by: tender to touch, pain worse at night, prolonged standing and walking  Better with: sleeping on back with small pillow underneath knee  Treatments tried: rest/activity avoidance, ice, heat, tylenol, aleve, previous imaging (xray 4/30/19), corticosteroid injection (most recent date: 8/6/19) that provided no relief - previous injection on 5/2/19 provided good relief for 3 months, biofreeze and aspercream  Associated symptoms:  "occasional swelling  Orthopedic history: NO  Relevant surgical history: NO  Patient Social History: retired    Patient's past medical, surgical, social, and family histories were reviewed today and no pertinent history related to patient's presenting problem.    REVIEW OF SYSTEMS:  10 point ROS is negative other than symptoms noted above in HPI, Past Medical History or as stated below  Constitutional: NEGATIVE for fever, chills, change in weight  Skin: NEGATIVE for worrisome rashes, moles or lesions  GI/: NEGATIVE for bowel or bladder changes  Neuro: NEGATIVE for weakness, dizziness or paresthesias    OBJECTIVE:  BP (!) 144/70   Ht 1.626 m (5' 4\")   Wt 73.5 kg (162 lb)   BMI 27.81 kg/m      General: healthy, alert and in no distress  HEENT: no scleral icterus or conjunctival erythema  Skin: no suspicious lesions or rash. No jaundice.  CV: no pedal edema  Resp: normal respiratory effort without conversational dyspnea   Psych: normal mood and affect  Gait: normal steady gait with appropriate coordination and balance  Neuro: Normal light sensory exam of lower extremity  MSK:  LEFT KNEE  Inspection:    normal alignment  Palpation:    Tender about the medial patellar facet and medial joint line. Remainder of bony and ligamentous landmarks are nontender.    No effusion is present    Patellofemoral crepitus is Present  Range of Motion:     00 extension to 1200 flexion  Strength:    Extensor mechanism intact  Special Tests:    Negative: Lachman's, Shar's    Independent visualization of the below image:  XR KNEE LT 3 VW 4/30/2019 3:56 PM     HISTORY: Chronic pain of left knee; Chronic pain of left knee                                                                      IMPRESSION: Mild degenerative narrowing medial compartment. Tiny knee  effusion. No other findings.     MD Arron CHAPMAN, DO [facility]      Again, thank you for allowing me to participate in the " care of your patient.        Sincerely,        Arron Alexander, DO

## 2020-01-14 ENCOUNTER — TELEPHONE (OUTPATIENT)
Dept: ORTHOPEDICS | Facility: CLINIC | Age: 80
End: 2020-01-14
Payer: COMMERCIAL

## 2020-01-14 DIAGNOSIS — M17.12 PRIMARY OSTEOARTHRITIS OF LEFT KNEE: Primary | ICD-10-CM

## 2020-01-14 RX ORDER — METHYLPREDNISOLONE ACETATE 40 MG/ML
40 INJECTION, SUSPENSION INTRA-ARTICULAR; INTRALESIONAL; INTRAMUSCULAR; SOFT TISSUE
Status: DISCONTINUED | OUTPATIENT
Start: 2020-01-14 | End: 2020-02-24

## 2020-01-14 RX ADMIN — METHYLPREDNISOLONE ACETATE 40 MG: 40 INJECTION, SUSPENSION INTRA-ARTICULAR; INTRALESIONAL; INTRAMUSCULAR; SOFT TISSUE at 15:20

## 2020-01-14 NOTE — TELEPHONE ENCOUNTER
Patient was seen for appointment for left knee pain on 1/13/20.    Steroid  injection last completed 1/13/20.       Prior authorization referral for Euflexxa injection pended.     Please advise.    Sheila Holly ATC

## 2020-01-15 NOTE — TELEPHONE ENCOUNTER
PA signed.    Arron Alexander DO, CASTEVEM  [facility]

## 2020-01-21 NOTE — TELEPHONE ENCOUNTER
"Per Tamar \"PER [employer] POLICY- PREFERRED PRODUCTS ARE ORTHOVISC AND MONOVISC. SENT INBASKET TO CARE TEAM LETTING THEM KNOW.\"    New order for Monovisc pending, please sign and route back to pool.    Jadyn Blanchard ATC    "

## 2020-01-21 NOTE — TELEPHONE ENCOUNTER
Prior authorization signed.    Arron Alexander DO, LES  [facility]

## 2020-01-28 NOTE — TELEPHONE ENCOUNTER
PA for Monovisc approved from 1/21/20 - 12/31/20.    Patient had cortisone injection on 1/13/20 and was instructed to follow up when pain returns. No follow up scheduled at this time.    Closing encounter.    Sheila Holly, ATC

## 2020-02-05 ENCOUNTER — TRANSFERRED RECORDS (OUTPATIENT)
Dept: HEALTH INFORMATION MANAGEMENT | Facility: CLINIC | Age: 80
End: 2020-02-05

## 2020-02-06 ENCOUNTER — ANTICOAGULATION THERAPY VISIT (OUTPATIENT)
Dept: NURSING | Facility: CLINIC | Age: 80
End: 2020-02-06
Payer: COMMERCIAL

## 2020-02-06 DIAGNOSIS — Z79.01 LONG TERM CURRENT USE OF ANTICOAGULANT THERAPY: ICD-10-CM

## 2020-02-06 LAB — INR POINT OF CARE: 3.3 (ref 0.86–1.14)

## 2020-02-06 PROCEDURE — 36416 COLLJ CAPILLARY BLOOD SPEC: CPT

## 2020-02-06 PROCEDURE — 85610 PROTHROMBIN TIME: CPT | Mod: QW

## 2020-02-06 NOTE — PROGRESS NOTES
ANTICOAGULATION FOLLOW-UP CLINIC VISIT    Patient Name:  Salome Saeed  Date:  2/6/2020  Contact Type:  Face to Face    SUBJECTIVE:         OBJECTIVE    INR Protime   Date Value Ref Range Status   02/06/2020 3.3 (A) 0.86 - 1.14 Final       ASSESSMENT / PLAN  INR assessment THER    Recheck INR In: 2 WEEKS    INR Location Clinic      Anticoagulation Summary  As of 2/6/2020    INR goal:   2.0-3.0   TTR:   58.3 % (1 y)   INR used for dosing:   3.3! (2/6/2020)   Warfarin maintenance plan:   5 mg (5 mg x 1) every Mon, Fri; 2.5 mg (5 mg x 0.5) all other days   Full warfarin instructions:   5 mg every Mon, Fri; 2.5 mg all other days   Weekly warfarin total:   22.5 mg   Plan last modified:   Rosanne Rico, RN (2/6/2020)   Next INR check:   2/20/2020   Target end date:       Indications    Long term current use of anticoagulant therapy [Z79.01]  CVA (cerebral vascular accident) (Resolved) [I63.9]             Anticoagulation Episode Summary     INR check location:       Preferred lab:       Send INR reminders to:   JUDY ROCHE    Comments:               See the Encounter Report to view Anticoagulation Flowsheet and Dosing Calendar (Go to Encounters tab in chart review, and find the Anticoagulation Therapy Visit)    Dosage adjustment made based on physician directed care plan - last three out of four INR's have been in the supratherapeutic range. Will reduce maintenance dose today from 25mg per week to 22.5mg per week. Recheck in two weeks.    MURTAZA Javier, RN

## 2020-02-11 ENCOUNTER — OFFICE VISIT (OUTPATIENT)
Dept: ORTHOPEDICS | Facility: CLINIC | Age: 80
End: 2020-02-11
Payer: COMMERCIAL

## 2020-02-11 VITALS
SYSTOLIC BLOOD PRESSURE: 120 MMHG | DIASTOLIC BLOOD PRESSURE: 62 MMHG | BODY MASS INDEX: 27.66 KG/M2 | WEIGHT: 162 LBS | HEIGHT: 64 IN

## 2020-02-11 DIAGNOSIS — M17.12 PRIMARY OSTEOARTHRITIS OF LEFT KNEE: Primary | ICD-10-CM

## 2020-02-11 PROCEDURE — 20611 DRAIN/INJ JOINT/BURSA W/US: CPT | Mod: LT | Performed by: FAMILY MEDICINE

## 2020-02-11 ASSESSMENT — MIFFLIN-ST. JEOR: SCORE: 1204.83

## 2020-02-11 NOTE — PROGRESS NOTES
"ASSESSMENT & PLAN    1. Primary osteoarthritis of left knee      Following up on chronic left knee pain in the setting of osteoarthritis  Minimal relief with intra-articular ultrasound-guided cortisone injection  Discussed progression to Visco supplementation and desires  Monovisc injection, insurance requirements of the left knee was performed today in clinic  Can repeat the injection anytime after 6 months and 1 day  -----    SUBJECTIVE:  Salome Saeed is a 79 year old female who is seen in follow-up for left knee pain.They were last seen 1/13/2020 and had an intra articular left knee cortisone injection which provided good relief lasting approximately 3 days. She reports pain started to return after 3 days and has continued to worsen.    She notes pain is worse with walking and sleeping on her side. They indicate that their current pain level is 8/10. They have tried rest/activity avoidance, Tylenol, Aleve, aspercream and biofreeze.      The patient is seen by themselves.    Patient's past medical, surgical, social, and family histories were reviewed today and no pertinent history related to patient's presenting problem.    REVIEW OF SYSTEMS:  Constitutional: NEGATIVE for fever, chills, change in weight  Skin: NEGATIVE for worrisome rashes, moles or lesions  GI/: NEGATIVE for bowel or bladder changes  Neuro: NEGATIVE for weakness, dizziness or paresthesias    OBJECTIVE:  /62   Ht 1.626 m (5' 4\")   Wt 73.5 kg (162 lb)   BMI 27.81 kg/m     General: healthy, alert and in no distress  HEENT: no scleral icterus or conjunctival erythema  Skin: no suspicious lesions or rash. No jaundice.  CV: regular rhythm by palpation, no pedal edema  Resp: normal respiratory effort without conversational dyspnea   Psych: normal mood and affect  Gait: normal steady gait with appropriate coordination and balance  Neuro: normal light touch sensory exam of the extremities.    MSK:  Deferred    Independent visualization of " the below image:  XR KNEE LT 3 VW 4/30/2019 3:56 PM     HISTORY: Chronic pain of left knee; Chronic pain of left knee                                                                      IMPRESSION: Mild degenerative narrowing medial compartment. Tiny knee  effusion. No other findings.     MILAN RAINEY MD    Large Joint Injection/Arthocentesis: L knee joint  Date/Time: 2/11/2020 10:28 AM  Performed by: Arron Alexander DO  Authorized by: Arron Alexander DO     Indications:  Osteoarthritis and pain  Needle Size:  22 G  Guidance: ultrasound    Approach:  Superolateral  Location:  Knee      Medications:  88 mg hyaluronan 88 MG/4ML  Aspirate amount (mL):  8  Aspirate:  Serous and yellow  Outcome:  Tolerated well, no immediate complications  Procedure discussed: discussed risks, benefits, and alternatives    Consent Given by:  Patient  Timeout: timeout called immediately prior to procedure    Prep: patient was prepped and draped in usual sterile fashion               Arron Alexander DO CAQSM  [facility]

## 2020-02-11 NOTE — LETTER
"    2/11/2020         RE: Salome Saeed  [id number]  [place] [address]        Dear Colleague,    Thank you for referring your patient, Salome Saeed, to the [facility]. Please see a copy of my visit note below.    ASSESSMENT & PLAN    1. Primary osteoarthritis of left knee      Following up on chronic left knee pain in the setting of osteoarthritis  Minimal relief with intra-articular ultrasound-guided cortisone injection  Usman progression to Visco supplementation and desires  Monovisc injection, insurance requirements of the left knee was performed today in clinic  Can repeat the injection anytime after 6 months and 1 day  -----    SUBJECTIVE:  Salome Saeed is a 79 year old female who is seen in follow-up for left knee pain.They were last seen 1/13/2020 and had an intra articular left knee cortisone injection which provided good relief lasting approximately 3 days. She reports pain started to return after 3 days and has continued to worsen.    She notes pain is worse with walking and sleeping on her side. They indicate that their current pain level is 8/10. They have tried rest/activity avoidance, Tylenol, Aleve, aspercream and biofreeze.      The patient is seen by themselves.    Patient's past medical, surgical, social, and family histories were reviewed today and no pertinent history related to patient's presenting problem.    REVIEW OF SYSTEMS:  Constitutional: NEGATIVE for fever, chills, change in weight  Skin: NEGATIVE for worrisome rashes, moles or lesions  GI/: NEGATIVE for bowel or bladder changes  Neuro: NEGATIVE for weakness, dizziness or paresthesias    OBJECTIVE:  /62   Ht 1.626 m (5' 4\")   Wt 73.5 kg (162 lb)   BMI 27.81 kg/m      General: healthy, alert and in no distress  HEENT: no scleral icterus or conjunctival erythema  Skin: no suspicious lesions or rash. No jaundice.  CV: regular rhythm by palpation, no pedal edema  Resp: normal " respiratory effort without conversational dyspnea   Psych: normal mood and affect  Gait: normal steady gait with appropriate coordination and balance  Neuro: normal light touch sensory exam of the extremities.    MSK:  Deferred    Independent visualization of the below image:  XR KNEE LT 3 VW 4/30/2019 3:56 PM     HISTORY: Chronic pain of left knee; Chronic pain of left knee                                                                      IMPRESSION: Mild degenerative narrowing medial compartment. Tiny knee  effusion. No other findings.     MILAN RAINEY MD    Large Joint Injection/Arthocentesis: L knee joint  Date/Time: 2/11/2020 10:28 AM  Performed by: Arron Alexander DO  Authorized by: Arron Alexander DO     Indications:  Osteoarthritis and pain  Needle Size:  22 G  Guidance: ultrasound    Approach:  Superolateral  Location:  Knee      Medications:  88 mg hyaluronan 88 MG/4ML  Aspirate amount (mL):  8  Aspirate:  Serous and yellow  Outcome:  Tolerated well, no immediate complications  Procedure discussed: discussed risks, benefits, and alternatives    Consent Given by:  Patient  Timeout: timeout called immediately prior to procedure    Prep: patient was prepped and draped in usual sterile fashion               Arron Alexander DO [facility]          Again, thank you for allowing me to participate in the care of your patient.        Sincerely,        Arron Alexander DO

## 2020-02-11 NOTE — PATIENT INSTRUCTIONS
1. Primary osteoarthritis of left knee      Monovisc injection of the left knee was performed today in clinic    - Would not soak in a hot tub, bath or swimming pool for 48 hours  - Ok to shower  - Ice today and only do your normal amounts of activity    Can repeat the injection anytime after 6 months and 1 day    Follow up for repeat injection when pain returns.

## 2020-02-20 ENCOUNTER — ANTICOAGULATION THERAPY VISIT (OUTPATIENT)
Dept: NURSING | Facility: CLINIC | Age: 80
End: 2020-02-20
Payer: COMMERCIAL

## 2020-02-20 DIAGNOSIS — Z79.01 LONG TERM CURRENT USE OF ANTICOAGULANT THERAPY: ICD-10-CM

## 2020-02-20 LAB — INR POINT OF CARE: 2.5 (ref 0.86–1.14)

## 2020-02-20 PROCEDURE — 36416 COLLJ CAPILLARY BLOOD SPEC: CPT

## 2020-02-20 PROCEDURE — 85610 PROTHROMBIN TIME: CPT | Mod: QW

## 2020-02-24 ENCOUNTER — ANTICOAGULATION THERAPY VISIT (OUTPATIENT)
Dept: NURSING | Facility: CLINIC | Age: 80
End: 2020-02-24
Payer: COMMERCIAL

## 2020-02-24 DIAGNOSIS — Z79.01 LONG TERM CURRENT USE OF ANTICOAGULANT THERAPY: ICD-10-CM

## 2020-02-24 LAB — INR POINT OF CARE: 1.7 (ref 0.86–1.14)

## 2020-02-24 PROCEDURE — 85610 PROTHROMBIN TIME: CPT | Mod: QW

## 2020-02-24 PROCEDURE — 36416 COLLJ CAPILLARY BLOOD SPEC: CPT

## 2020-02-24 NOTE — PROGRESS NOTES
ANTICOAGULATION FOLLOW-UP CLINIC VISIT    Patient Name:  Salome Saeed  Date:  2020  Contact Type:  Face to Face    SUBJECTIVE:  Patient Findings     Positives:   Upcoming invasive procedure (epidural injection 20 - on partial hold)        Clinical Outcomes     Negatives:   Major bleeding event, Thromboembolic event, Anticoagulation-related hospital admission, Anticoagulation-related ED visit, Anticoagulation-related fatality           OBJECTIVE    INR Protime   Date Value Ref Range Status   2020 1.7 (A) 0.86 - 1.14 Final       ASSESSMENT / PLAN  INR assessment SUB    Recheck INR In: 1 WEEK    INR Location Clinic      Anticoagulation Summary  As of 2020    INR goal:   2.0-3.0   TTR:   56.4 % (1 y)   INR used for dosin.7! (2020)   Warfarin maintenance plan:   5 mg (5 mg x 1) every Mon, Fri; 2.5 mg (5 mg x 0.5) all other days   Full warfarin instructions:   : 2.5 mg; : 5 mg; Otherwise 5 mg every Mon, Fri; 2.5 mg all other days   Weekly warfarin total:   22.5 mg   Plan last modified:   Rosanne Rico, RN (2020)   Next INR check:   3/3/2020   Target end date:       Indications    Long term current use of anticoagulant therapy [Z79.01]  CVA (cerebral vascular accident) (Resolved) [I63.9]             Anticoagulation Episode Summary     INR check location:       Preferred lab:       Send INR reminders to:   JUDY ROCHE    Comments:               See the Encounter Report to view Anticoagulation Flowsheet and Dosing Calendar (Go to Encounters tab in chart review, and find the Anticoagulation Therapy Visit)    Dosage adjustment made based on physician directed care plan - patient on lower dose of warfarin due to upcoming back injection tomorrow - they wanted patient's INR to be 2.0 or lower.     MURTAZA Javier, RN

## 2020-03-03 ENCOUNTER — ANTICOAGULATION THERAPY VISIT (OUTPATIENT)
Dept: NURSING | Facility: CLINIC | Age: 80
End: 2020-03-03
Payer: COMMERCIAL

## 2020-03-03 DIAGNOSIS — Z79.01 LONG TERM CURRENT USE OF ANTICOAGULANT THERAPY: ICD-10-CM

## 2020-03-03 LAB — INR POINT OF CARE: 2.2 (ref 0.86–1.14)

## 2020-03-03 PROCEDURE — 85610 PROTHROMBIN TIME: CPT | Mod: QW

## 2020-03-03 PROCEDURE — 36416 COLLJ CAPILLARY BLOOD SPEC: CPT

## 2020-03-03 NOTE — PROGRESS NOTES
ANTICOAGULATION FOLLOW-UP CLINIC VISIT    Patient Name:  Salome Saeed  Date:  3/3/2020  Contact Type:  Face to Face    SUBJECTIVE:  Patient Findings       The patient was assessed for diet, medication, and activity level changes, missed or extra doses, bruising or bleeding, with no problem findings.    Clinical Outcomes     Negatives:   Major bleeding event, Thromboembolic event, Anticoagulation-related hospital admission, Anticoagulation-related ED visit, Anticoagulation-related fatality           OBJECTIVE    INR Protime   Date Value Ref Range Status   2020 2.2 (A) 0.86 - 1.14 Final       ASSESSMENT / PLAN  INR assessment THER    Recheck INR In: 4 WEEKS    INR Location Clinic      Anticoagulation Summary  As of 3/3/2020    INR goal:   2.0-3.0   TTR:   55.1 % (1 y)   INR used for dosin.2 (3/3/2020)   Warfarin maintenance plan:   5 mg (5 mg x 1) every Mon, Fri; 2.5 mg (5 mg x 0.5) all other days   Full warfarin instructions:   5 mg every Mon, Fri; 2.5 mg all other days   Weekly warfarin total:   22.5 mg   No change documented:   Rosanne Rico RN   Plan last modified:   Rosanne Rico RN (2020)   Next INR check:   3/31/2020   Target end date:       Indications    Long term current use of anticoagulant therapy [Z79.01]  CVA (cerebral vascular accident) (Resolved) [I63.9]             Anticoagulation Episode Summary     INR check location:       Preferred lab:       Send INR reminders to:   JUDY ROCHE    Comments:               See the Encounter Report to view Anticoagulation Flowsheet and Dosing Calendar (Go to Encounters tab in chart review, and find the Anticoagulation Therapy Visit)    MURTAZA Javier, RN

## 2020-03-16 ENCOUNTER — OFFICE VISIT (OUTPATIENT)
Dept: ORTHOPEDICS | Facility: CLINIC | Age: 80
End: 2020-03-16
Payer: COMMERCIAL

## 2020-03-16 VITALS
HEIGHT: 64 IN | WEIGHT: 154 LBS | SYSTOLIC BLOOD PRESSURE: 130 MMHG | DIASTOLIC BLOOD PRESSURE: 60 MMHG | BODY MASS INDEX: 26.29 KG/M2

## 2020-03-16 DIAGNOSIS — M17.12 PRIMARY OSTEOARTHRITIS OF LEFT KNEE: Primary | ICD-10-CM

## 2020-03-16 PROCEDURE — 99214 OFFICE O/P EST MOD 30 MIN: CPT | Performed by: FAMILY MEDICINE

## 2020-03-16 ASSESSMENT — MIFFLIN-ST. JEOR: SCORE: 1168.54

## 2020-03-16 NOTE — PROGRESS NOTES
"ASSESSMENT & PLAN    1. Primary osteoarthritis of left knee      Returns for continued left knee pain in setting of moderate osteoarthritis  Has failed ultrasound guided IA cortisone and Monovisc with xrays that I would expect her to get decent relief from injections  Given worsening pain recommend MRI to evaluate for insufficiency fracture  MRI of your left knee has been ordered. Please be patient with us as it will take some time to call you to schedule this visit. You can also try to call them at [phone].  Discussed appropriate use / dosing of NSAIDs.  Has tried scheduled TID Tylenol x 4-6 weeks without any relief  Rx for topical diclofenac.  Limit weight bearing until we have the results of your MRI  Start taking Turmeric ([employer] - Omega Curcumin or Life Extension - Curcumin Elite)      I will call you with results of the MRI.  -----    SUBJECTIVE:  Salome Saeed is a 79 year old female who is seen in follow-up for left knee pain. They were last seen 2/11/2020 and received a monovisc injection for left knee.     Since their last visit reports worsening pain. She reports that pain keeps her up at night. She also notes pain with bending the left knee and lifting her left leg. They indicate that their current pain level is 8/10. They have tried rest/activity avoidance, ice, ibuprofen and Aleve.      The patient is seen by themselves.    Patient's past medical, surgical, social, and family histories were reviewed today and no pertinent history related to patient's presenting problem.    REVIEW OF SYSTEMS:  Constitutional: NEGATIVE for fever, chills, change in weight  Skin: NEGATIVE for worrisome rashes, moles or lesions  GI/: NEGATIVE for bowel or bladder changes  Neuro: NEGATIVE for weakness, dizziness or paresthesias    OBJECTIVE:  /60   Ht 1.626 m (5' 4\")   Wt 69.9 kg (154 lb)   BMI 26.43 kg/m     General: healthy, alert and in no distress  HEENT: no scleral icterus or conjunctival " erythema  Skin: no suspicious lesions or rash. No jaundice.  CV: regular rhythm by palpation, no pedal edema  Resp: normal respiratory effort without conversational dyspnea   Psych: normal mood and affect  Gait: using a cane, antalgic gait, fair coordination and balance  Neuro: normal light touch sensory exam of the extremities.    MSK:  LEFT KNEE  Palpation:    Tender about the medial patellar facet, medial femoral condyle >  medial tibial plateau . Remainder of bony and ligamentous landmarks are nontender.    Mild effusion is present  Range of Motion:     00 extension to 1200 flexion  Strength:    Extensor mechanism intact    Arron Alexander, DO [facility]

## 2020-03-16 NOTE — PATIENT INSTRUCTIONS
1. Primary osteoarthritis of left knee      Given worsening pain recommend MRI to evaluate for insufficiency fracture  MRI of your left knee has been ordered. Please be patient with us as it will take some time to call you to schedule this visit. You can also try to call them at [phone].  Recommend either Ibuprofen or Aleve and use it until we have your MRI results  Ibuprofen: 2-3 tablets twice a day. Take with food  Aleve: 1 tablet twice a day. Take with food.  Rx for topical diclofenac.  Limit weight bearing until we have the results of your MRI  Start taking Turmeric ([employer]atur[employer] - Omega Curcumin or Life Extension - Curcumin Elite)      I will call you with results of the MRI.

## 2020-03-16 NOTE — LETTER
3/16/2020         RE: Salome Saeed  [address]  [place] [address]        Dear Colleague,    Thank you for referring your patient, Salome Saeed, to the [facility]. Please see a copy of my visit note below.    ASSESSMENT & PLAN    1. Primary osteoarthritis of left knee      Returns for continued left knee pain in setting of moderate osteoarthritis  Has failed ultrasound guided IA cortisone and Monovisc with xrays that I would expect her to get decent relief from injections  Given worsening pain recommend MRI to evaluate for insufficiency fracture  MRI of your left knee has been ordered. Please be patient with us as it will take some time to call you to schedule this visit. You can also try to call them at [phone].  Discussed appropriate use / dosing of NSAIDs.  Has tried scheduled TID Tylenol x 4-6 weeks without any relief  Rx for topical diclofenac.  Limit weight bearing until we have the results of your MRI  Start taking Turmeric ([employer]aturals - Omega Curcumin or Life Extension - Curcumin Elite)      I will call you with results of the MRI.  -----    SUBJECTIVE:  Salome Saeed is a 79 year old female who is seen in follow-up for left knee pain. They were last seen 2/11/2020 and received a monovisc injection for left knee.     Since their last visit reports worsening pain. She reports that pain keeps her up at night. She also notes pain with bending the left knee and lifting her left leg. They indicate that their current pain level is 8/10. They have tried rest/activity avoidance, ice, ibuprofen and Aleve.      The patient is seen by themselves.    Patient's past medical, surgical, social, and family histories were reviewed today and no pertinent history related to patient's presenting problem.    REVIEW OF SYSTEMS:  Constitutional: NEGATIVE for fever, chills, change in weight  Skin: NEGATIVE for worrisome rashes, moles or lesions  GI/: NEGATIVE for  "bowel or bladder changes  Neuro: NEGATIVE for weakness, dizziness or paresthesias    OBJECTIVE:  /60   Ht 1.626 m (5' 4\")   Wt 69.9 kg (154 lb)   BMI 26.43 kg/m     General: healthy, alert and in no distress  HEENT: no scleral icterus or conjunctival erythema  Skin: no suspicious lesions or rash. No jaundice.  CV: regular rhythm by palpation, no pedal edema  Resp: normal respiratory effort without conversational dyspnea   Psych: normal mood and affect  Gait: using a cane, antalgic gait, fair coordination and balance  Neuro: normal light touch sensory exam of the extremities.    MSK:  LEFT KNEE  Palpation:    Tender about the medial patellar facet, medial femoral condyle >  medial tibial plateau . Remainder of bony and ligamentous landmarks are nontender.    Mild effusion is present  Range of Motion:     00 extension to 1200 flexion  Strength:    Extensor mechanism intact    Arron Alexander DO [facility]          Again, thank you for allowing me to participate in the care of your patient.        Sincerely,        Arron Alexander DO    "

## 2020-03-24 DIAGNOSIS — Z79.01 LONG TERM CURRENT USE OF ANTICOAGULANT THERAPY: Primary | ICD-10-CM

## 2020-03-31 DIAGNOSIS — Z79.01 LONG TERM CURRENT USE OF ANTICOAGULANT THERAPY: ICD-10-CM

## 2020-03-31 LAB
CAPILLARY BLOOD COLLECTION: NORMAL
INR BLD: 2.7 (ref 0.86–1.14)

## 2020-03-31 PROCEDURE — 36416 COLLJ CAPILLARY BLOOD SPEC: CPT | Performed by: FAMILY MEDICINE

## 2020-03-31 PROCEDURE — 85610 PROTHROMBIN TIME: CPT | Mod: QW | Performed by: FAMILY MEDICINE

## 2020-04-01 ENCOUNTER — ANTICOAGULATION THERAPY VISIT (OUTPATIENT)
Dept: FAMILY MEDICINE | Facility: CLINIC | Age: 80
End: 2020-04-01
Payer: COMMERCIAL

## 2020-04-01 DIAGNOSIS — Z79.01 LONG TERM CURRENT USE OF ANTICOAGULANT THERAPY: ICD-10-CM

## 2020-04-01 PROCEDURE — 99207 ZZC NO CHARGE NURSE ONLY: CPT | Performed by: FAMILY MEDICINE

## 2020-04-01 NOTE — PROGRESS NOTES
ANTICOAGULATION FOLLOW-UP CLINIC VISIT    Patient Name:  Salome Saeed  Date:  4/1/2020  Contact Type:  Telephone/ spoke with the patient    SUBJECTIVE:  Patient Findings         Clinical Outcomes     Negatives:   Major bleeding event, Thromboembolic event, Anticoagulation-related hospital admission, Anticoagulation-related ED visit, Anticoagulation-related fatality           OBJECTIVE    INR Point of Care   Date Value Ref Range Status   03/31/2020 2.7 (H) 0.86 - 1.14 Final     Comment:     This test is intended for monitoring Coumadin therapy.  Results are not   accurate in patients with prolonged INR due to factor deficiency.         ASSESSMENT / PLAN  INR assessment THER    Recheck INR In: 5 WEEKS    INR Location Outside lab      Anticoagulation Summary  As of 4/1/2020    INR goal:   2.0-3.0   TTR:   55.0 % (1 y)   INR used for dosing:   No new INR was available at the time of this encounter.   Warfarin maintenance plan:   5 mg (5 mg x 1) every Mon, Fri; 2.5 mg (5 mg x 0.5) all other days   Full warfarin instructions:   5 mg every Mon, Fri; 2.5 mg all other days   Weekly warfarin total:   22.5 mg   No change documented:   Kaylee Yu RN   Plan last modified:   Rosanne Rico RN (2/6/2020)   Next INR check:   5/5/2020   Target end date:       Indications    Long term current use of anticoagulant therapy [Z79.01]  CVA (cerebral vascular accident) (Resolved) [I63.9]             Anticoagulation Episode Summary     INR check location:       Preferred lab:       Send INR reminders to:   JUDY ROCHE    Comments:               See the Encounter Report to view Anticoagulation Flowsheet and Dosing Calendar (Go to Encounters tab in chart review, and find the Anticoagulation Therapy Visit)    INR is therapeutic yesterday at 2.7. Patient to continue weekly warfarin maintenance dose of 22.5 mg. Recheck INR in 5 weeks or sooner if any changes to health, medications, diet, activity or upcoming invasive procedure.        Kaylee Yu, RN

## 2020-04-16 ENCOUNTER — HOSPITAL ENCOUNTER (OUTPATIENT)
Dept: MRI IMAGING | Facility: CLINIC | Age: 80
Discharge: HOME | End: 2020-04-16
Attending: FAMILY MEDICINE | Admitting: FAMILY MEDICINE
Payer: COMMERCIAL

## 2020-04-16 DIAGNOSIS — M17.12 PRIMARY OSTEOARTHRITIS OF LEFT KNEE: ICD-10-CM

## 2020-04-16 PROCEDURE — 73721 MRI JNT OF LWR EXTRE W/O DYE: CPT | Mod: LT

## 2020-04-28 ENCOUNTER — TELEPHONE (OUTPATIENT)
Dept: ORTHOPEDICS | Facility: CLINIC | Age: 80
End: 2020-04-28

## 2020-04-28 DIAGNOSIS — M17.12 PRIMARY OSTEOARTHRITIS OF LEFT KNEE: Primary | ICD-10-CM

## 2020-04-28 DIAGNOSIS — M84.469A INSUFFICIENCY FRACTURE OF TIBIA, INITIAL ENCOUNTER: ICD-10-CM

## 2020-04-28 RX ORDER — TRAMADOL HYDROCHLORIDE 50 MG/1
50 TABLET ORAL
Qty: 10 TABLET | Refills: 0 | Status: SHIPPED | OUTPATIENT
Start: 2020-04-28 | End: 2020-05-28

## 2020-04-28 NOTE — TELEPHONE ENCOUNTER
Reviewed MRI - very advanced arthritis with insufficiency fracture.     Really need to be non-weight bearing. Use a walker and if need ok to write DME for scooter or whatever she needs  Rx for Tramadol. Use at night.    Referral to Ortho Surgery for knee replacement. Explained that there may be limitations on how / when they will be able to see her. Understands.    Arron Alexander DO, CAQSM  [facility]

## 2020-05-05 ENCOUNTER — ANTICOAGULATION THERAPY VISIT (OUTPATIENT)
Dept: FAMILY MEDICINE | Facility: CLINIC | Age: 80
End: 2020-05-05

## 2020-05-05 DIAGNOSIS — Z79.01 LONG TERM CURRENT USE OF ANTICOAGULANT THERAPY: ICD-10-CM

## 2020-05-05 LAB
CAPILLARY BLOOD COLLECTION: NORMAL
INR BLD: 2.3 (ref 0.86–1.14)

## 2020-05-05 PROCEDURE — 99207 ZZC NO CHARGE NURSE ONLY: CPT | Performed by: FAMILY MEDICINE

## 2020-05-05 PROCEDURE — 85610 PROTHROMBIN TIME: CPT | Mod: QW | Performed by: FAMILY MEDICINE

## 2020-05-05 PROCEDURE — 36416 COLLJ CAPILLARY BLOOD SPEC: CPT | Performed by: FAMILY MEDICINE

## 2020-05-05 NOTE — PROGRESS NOTES
ANTICOAGULATION FOLLOW-UP CLINIC VISIT    Patient Name:  Salome Saeed  Date:  2020  Contact Type:  Telephone/ spoke with patient    SUBJECTIVE:  Patient Findings     Comments:   The patient was assessed for diet, medication, and activity level changes, missed or extra doses, bruising or bleeding, with no problem findings.        Clinical Outcomes     Negatives:   Major bleeding event, Thromboembolic event, Anticoagulation-related hospital admission, Anticoagulation-related ED visit, Anticoagulation-related fatality    Comments:   The patient was assessed for diet, medication, and activity level changes, missed or extra doses, bruising or bleeding, with no problem findings.           OBJECTIVE    INR Point of Care   Date Value Ref Range Status   2020 2.3 (H) 0.86 - 1.14 Final     Comment:     This test is intended for monitoring Coumadin therapy.  Results are not   accurate in patients with prolonged INR due to factor deficiency.         ASSESSMENT / PLAN  INR assessment THER    Recheck INR In: 6 WEEKS    INR Location Clinic      Anticoagulation Summary  As of 2020    INR goal:   2.0-3.0   TTR:   55.2 % (1 y)   INR used for dosin.3 (2020)   Warfarin maintenance plan:   5 mg (5 mg x 1) every Mon, Fri; 2.5 mg (5 mg x 0.5) all other days   Full warfarin instructions:   5 mg every Mon, Fri; 2.5 mg all other days   Weekly warfarin total:   22.5 mg   No change documented:   Rosanne Rico RN   Plan last modified:   Rosanne Rico RN (2020)   Next INR check:   2020   Target end date:       Indications    Long term current use of anticoagulant therapy [Z79.01]  CVA (cerebral vascular accident) (Resolved) [I63.9]             Anticoagulation Episode Summary     INR check location:       Preferred lab:       Send INR reminders to:   JUDY ROCHE    Comments:               See the Encounter Report to view Anticoagulation Flowsheet and Dosing Calendar (Go to Encounters tab in chart review,  and find the Anticoagulation Therapy Visit)    Rosanne Rico, NICKYN, RN

## 2020-05-06 ENCOUNTER — OFFICE VISIT (OUTPATIENT)
Dept: ORTHOPEDICS | Facility: CLINIC | Age: 80
End: 2020-05-06
Attending: FAMILY MEDICINE
Payer: COMMERCIAL

## 2020-05-06 VITALS
SYSTOLIC BLOOD PRESSURE: 112 MMHG | HEIGHT: 64 IN | WEIGHT: 154 LBS | BODY MASS INDEX: 26.29 KG/M2 | DIASTOLIC BLOOD PRESSURE: 58 MMHG

## 2020-05-06 DIAGNOSIS — M84.469A INSUFFICIENCY FRACTURE OF TIBIA, INITIAL ENCOUNTER: ICD-10-CM

## 2020-05-06 DIAGNOSIS — M17.12 PRIMARY OSTEOARTHRITIS OF LEFT KNEE: ICD-10-CM

## 2020-05-06 PROCEDURE — 99214 OFFICE O/P EST MOD 30 MIN: CPT | Performed by: ORTHOPAEDIC SURGERY

## 2020-05-06 ASSESSMENT — MIFFLIN-ST. JEOR: SCORE: 1163.54

## 2020-05-06 NOTE — LETTER
5/6/2020         RE: Salome Saeed  [address]  [place] [address]        Dear Colleague,    Thank you for referring your patient, Salome Saeed, to the [facility]TH[facility]ERY. Please see a copy of my visit note below.    HISTORY OF PRESENT ILLNESS:    Salome Saeed is a 80 year old female who is seen in consultation at the request of Dr. Alexander for left knee pain.    Present symptoms: Anteromedial knee pain. Pain can be sharp, dull and aching.  Swelling present in the knee, constant. Patient notes sensation of buckling in the knee, no recent falls. Patient uses cane for ambulation.  Patient reports increased pain at night time, will need to sleep on her back which is difficult. Increased pain with walking and weight bearing. Pain improved with seated rest.   Her walking ability is less than 2 blocks as a result, she is quite frustrated with her  Inability to ambulate.  She has been waking up because of knee pain rather consistently.  She has pain even when she is not standing or walking.  The pain has been getting worse.  Current pain level: 8/10 , Worst pain level: 10/10  Treatments tried to this point: Visco: 2/11/20, Cortisone Injection: 1/13/20, Aleve, icing  Orthopedic PMH: left knee osteoarthritis, lumbar decompression L3-L4, fusion at L4-L5    Past Medical History:   Diagnosis Date     CAD (coronary artery disease) 4/9/2009 4/8/2009: PTCA and two 2.5x15mm Xience stents to mid LAD lesion; Cath 12/2012- 40-50% stenosis in mid PDA, 80% on D1- medically treat , 5/28/2015 - PTCA and 2.25x8mm Promus PREMIIER NAILA to ostium of 2nd OM     CVA (cerebral infarction)      DDD (degenerative disc disease)      Essential hypertension, benign      GERD (gastroesophageal reflux disease)      Headache      Hyperlipidemia      Hypothyroidism      Lumbago      Lumbar spinal stenosis      Mitral regurgitation     1+ per 7/2013 Echo     Vertebral artery stenosis, right        Past  Surgical History:   Procedure Laterality Date     CORONARY ANGIOGRAPHY ADULT ORDER  2012    40-50% stenosis to mid PDA, 80% in D1- medically treat     CORONARY ANGIOGRAPHY ADULT ORDER  2015    PTCA and 2.25x8mm Promus PREMIER NAILA to ostium of 2nd OM     DECOMPRESSION, FUSION LUMBAR POSTERIOR THREE + LEVELS, COMBINED  2013    Procedure: COMBINED DECOMPRESSION, FUSION LUMBAR POSTERIOR THREE + LEVELS;  Posterior Lumbar Decompression L3-S1, Fusion L4-S1;  Surgeon: Daniel Harris MD;  Location: [facility] OR     ENDOSCOPIC STRIPPING VEIN(S)       HEART CATH, ANGIOPLASTY  2009    PTCA and two 2.5x15mm Xience stents to mid LAD lesion     HYSTERECTOMY, RICKIE      Fibroids, and Menorrhagia.. Bilateral Oophrectomy     ORTHOPEDIC SURGERY       Stenting of LAD, Angioplasty  09     TONSILLECTOMY      as a child       Family History   Problem Relation Age of Onset     Neurologic Disorder Mother         Dementia, Still living     Ovarian Cancer Mother      Thyroid Disease Mother      C.A.D. Father              Diabetes Father      Coronary Artery Disease Father      Alcohol/Drug Brother      Thyroid Disease Son      Diabetes Son        Social History     Socioeconomic History     Marital status:      Spouse name: Not on file     Number of children: Not on file     Years of education: Not on file     Highest education level: Not on file   Occupational History     Not on file   Social Needs     Financial resource strain: Not on file     Food insecurity     Worry: Not on file     Inability: Not on file     Transportation needs     Medical: Not on file     Non-medical: Not on file   Tobacco Use     Smoking status: Former Smoker     Last attempt to quit: 1965     Years since quittin.3     Smokeless tobacco: Never Used   Substance and Sexual Activity     Alcohol use: Yes     Alcohol/week: 0.0 standard drinks     Comment: 2 glasses wine per month     Drug use: No     Sexual activity: Never    Lifestyle     Physical activity     Days per week: Not on file     Minutes per session: Not on file     Stress: Not on file   Relationships     Social connections     Talks on phone: Not on file     Gets together: Not on file     Attends Hindu service: Not on file     Active member of club or organization: Not on file     Attends meetings of clubs or organizations: Not on file     Relationship status: Not on file     Intimate partner violence     Fear of current or ex partner: Not on file     Emotionally abused: Not on file     Physically abused: Not on file     Forced sexual activity: Not on file   Other Topics Concern      Service Not Asked     Blood Transfusions No     Caffeine Concern Yes     Comment: 5 cups caffeine per day     Occupational Exposure Not Asked     Hobby Hazards Not Asked     Sleep Concern No     Stress Concern No     Weight Concern No     Comment: weight stable     Special Diet No     Comment: trying to eat healthier     Back Care Not Asked     Exercise Yes     Comment: stretching     Bike Helmet Not Asked     Seat Belt Yes     Self-Exams Yes     Parent/sibling w/ CABG, MI or angioplasty before 65F 55M? Yes   Social History Narrative    Works in an office       Current Outpatient Medications   Medication Sig Dispense Refill     amLODIPine (NORVASC) 5 MG tablet Take 1 tablet (5 mg) by mouth daily 90 tablet 3     ascorbic acid (VITAMIN C) 500 MG tablet Take 1,000 mg by mouth daily        aspirin EC 81 MG tablet Take 1 tablet (81 mg) by mouth daily       atenolol (TENORMIN) 25 MG tablet Take 1 tablet (25 mg) by mouth 2 times daily 180 tablet 3     Calcium Carb-Cholecalciferol (CALCIUM 600 + D PO) Take 1,200 mg by mouth daily        Cholecalciferol (VITAMIN D) 2000 UNITS tablet Take 2,000 Units by mouth daily       Coenzyme Q10 (COQ-10) 200 MG CAPS Take 400 mg by mouth daily        diclofenac (VOLTAREN) 1 % topical gel Place 4 g onto the skin 4 times daily 1 Tube 4     escitalopram  (LEXAPRO) 5 MG tablet TAKE 1 TABLET EVERY DAY 90 tablet 3     levothyroxine (SYNTHROID/LEVOTHROID) 100 MCG tablet Take 1 tablet (100 mcg) by mouth daily 90 tablet 3     lisinopril (PRINIVIL/ZESTRIL) 20 MG tablet Take 1 tablet (20 mg) by mouth daily 90 tablet 3     Multiple Vitamin (DAILY MULTIVITAMIN PO) Take by mouth daily       naproxen sodium 220 MG capsule        rosuvastatin (CRESTOR) 40 MG tablet Take 1 tablet (40 mg) by mouth daily 90 tablet 3     vitamin B complex with vitamin C (VITAMIN  B COMPLEX) TABS tablet Take 1 tablet by mouth daily       warfarin (COUMADIN) 5 MG tablet TAKE 1/2 TO 1 TABLET EVERY DAY AS DIRECTED  BY  INR  NURSE  BASED ON INR READING 90 tablet 1     acetaminophen (TYLENOL) 325 MG tablet Take 2 tablets by mouth every 6 hours as needed for pain.  0     nitroGLYcerin (NITROSTAT) 0.4 MG sublingual tablet Place 1 tablet (0.4 mg) under the tongue every 5 minutes as needed for chest pain 25 tablet 2     traMADol (ULTRAM) 50 MG tablet Take 1 tablet (50 mg) by mouth nightly as needed for severe pain (Patient not taking: Reported on 5/6/2020) 10 tablet 0       Allergies   Allergen Reactions     Atorvastatin      Leg cramps     Gluten      Sinuses affected by gluten     Magnesium Salicylate      Oat      Shellfish Allergy      hives     Wheat        REVIEW OF SYSTEMS:  CONSTITUTIONAL:  NEGATIVE for fever, chills, change in weight  INTEGUMENTARY/SKIN:  NEGATIVE for worrisome rashes, moles or lesions  EYES:  NEGATIVE for vision changes or irritation  ENT/MOUTH:  NEGATIVE for ear, mouth and throat problems  RESP:  NEGATIVE for significant cough or SOB  BREAST:  NEGATIVE for masses, tenderness or discharge  CV:  Hypertension   GI:  Reflux/ heartburn   :  Negative   MUSCULOSKELETAL:  See HPI above  NEURO:  NEGATIVE for weakness, dizziness or paresthesias  ENDOCRINE:  NEGATIVE for temperature intolerance, skin/hair changes  HEME/ALLERGY/IMMUNE:  NEGATIVE for bleeding problems  PSYCHIATRIC:  NEGATIVE  "for changes in mood or affect      PHYSICAL EXAM:  /58 (BP Location: Right arm, Patient Position: Chair, Cuff Size: Adult Regular)   Ht 1.626 m (5' 4\")   Wt 69.9 kg (154 lb)   BMI 26.43 kg/m    Body mass index is 26.43 kg/m .   GENERAL APPEARANCE: healthy, alert and no distress   HEENT: No apparent thyroid megaly. Clear sclera with normal ocular movement  RESPIRATORY: No labored breathing  SKIN: no suspicious lesions or rashes  NEURO: Normal strength and tone, mentation intact and speech normal  VASCULAR: Good pulses, and capillary refill   LYMPH: no lymphadenopathy   PSYCH:  mentation appears normal and affect normal/bright    MUSCULOSKELETAL:  Not in acute distress  She is slow to get up from sitting  She uses a cane to walk  Slow gait with a slight limp  No significant deformities of the knees otherwise  Left knee lacks about 5 degrees of terminal extension.  Flexion is limited to 90 as opposed to the right knee  with full extension and flexion to 100  Medial joint line pain, left  Some pain along the proximal medial tibial plateau  Ligaments are stable throughout the knee  No tenderness at the patellofemoral joint as well  Grossly motor strength is full  Skin is intact  Circulation is intact  Sensation is intact     ASSESSMENT:  Advanced left knee DJD, chronic primary particularly in the medial compartment    PLAN:  We visualized the images of plain x-ray from April 30, 2019 as well as recent MRI scan images of April 16, 2020.  She has complete loss of cartilage in the medial compartment along with secondary subchondral edema in both femur as well as tibia.  We had a long discussion about the nature of osteoarthritis.  She has gone through all reasonable nonoperative treatment so far.  For that reason, with ongoing pain and difficulty of ambulation as well as sleep, we felt that total knee arthroplasty is about the only reasonable option remaining.    We had a long discussion about the details of the " surgery including hospitalization, potential complications including but not exclusive of infection, continuing pain, periprosthetic fracture, DVT, she is on Coumadin because of her history of prior stroke.  She most likely would require bridging with Lovenox before the surgery.    Informational material was provided regarding total knee arthroplasty.  She will be placed on tier 3 priority list.        Imaging Interpretation:   MR left knee without contrast     Techniques: Multiplanar multisequence imaging of the left knee was   obtained without  administration of intra-articular or intravenous   contrast using routing protocol.     History: Primary osteoarthritis of left knee, worsening pain, failed   intra-articular injection. Evaluate for insufficiency fracture.     Additional History from EMR: None     Comparison: Radiograph 4/30/2019     Findings:     MENISCI:   Medial meniscus: Multidirectional tear of posterior horn and body of   the medial meniscus with radial tear component in the body and   associated peripheral meniscal extrusion and resultant bowing of the   tibial collateral ligament with surrounding edema.   Lateral meniscus: Intact.     LIGAMENTS   Cruciate ligaments: Intact.   Medial supporting structures: Bowing of the tibial collateral ligament   with surrounding edema, likely due to meniscal pathology. Medial   retinacular defect along the expected course of the patellotibial   ligament on the femoral attachment side. Small fluid in the tibial   collateral ligament bursa, likely representing prominent meniscal   cyst.   Lateral supporting structures: Intact.     EXTENSOR MECHANISM   Intact.     FLUID   Moderate knee joint effusion with synovial proliferation. Internally   septated/multiloculated fluid extending along the popliteus   myotendinous junction. Small Baker's cyst. Trace pes anserine bursa   fluid. Lobulated fluid extension along the anterior root of lateral   meniscus in the proximal  aspect, may be small synovial/ganglion cyst.     OSSEOUS and ARTICULAR STRUCTURES   Bones: No fracture, contusion, or osseous lesion is seen.   Osteophytosis.     Patellofemoral compartment: No high-grade chondral loss.     Medial compartment: Complete loss of the articular cartilage of the   central weightbearing femoral condyle and medial tibial plateau with   extensive opposing bone marrow edema with possible insufficiency   fracture component in the medial tibial plateau.     Lateral compartment: No high-grade hyaline cartilage disease.     ANCILLARY FINDINGS   None.    Impression:      Impression:   1. Multidirectional tear of the medial meniscus including radial tear   component in the body.    a. Associated, grade 4 chondromalacia of medial compartment.    b. Possible early insufficiency fracture component of medial tibial   plateau.    c. Parameniscal cyst extending into the tibial collateral ligament   bursa.    d. Medial retincular defect in the region of medial patellotibial   ligament femoral side with localized prominent bursitis/synovitis   along the area of tibial collateral ligament bursa.   2. Moderate knee joint effusion with likely synovitis.     I have personally reviewed the examination and initial interpretation   and I agree with the findings.     FARHAT Cheatham MD  Department of Orthopedic Surgery        Disclaimer: This note consists of symbols derived from keyboarding, dictation and/or voice recognition software. As a result, there may be errors in the script that have gone undetected. Please consider this when interpreting information found in this chart.      Again, thank you for allowing me to participate in the care of your patient.        Sincerely,        Jorge Luis Cheatham MD

## 2020-05-06 NOTE — PROGRESS NOTES
HISTORY OF PRESENT ILLNESS:    Salome Saeed is a 80 year old female who is seen in consultation at the request of Dr. Alexander for left knee pain.    Present symptoms: Anteromedial knee pain. Pain can be sharp, dull and aching.  Swelling present in the knee, constant. Patient notes sensation of buckling in the knee, no recent falls. Patient uses cane for ambulation.  Patient reports increased pain at night time, will need to sleep on her back which is difficult. Increased pain with walking and weight bearing. Pain improved with seated rest.   Her walking ability is less than 2 blocks as a result, she is quite frustrated with her  Inability to ambulate.  She has been waking up because of knee pain rather consistently.  She has pain even when she is not standing or walking.  The pain has been getting worse.  Current pain level: 8/10 , Worst pain level: 10/10  Treatments tried to this point: Visco: 2/11/20, Cortisone Injection: 1/13/20, Aleve, icing  Orthopedic PMH: left knee osteoarthritis, lumbar decompression L3-L4, fusion at L4-L5    Past Medical History:   Diagnosis Date     CAD (coronary artery disease) 4/9/2009 4/8/2009: PTCA and two 2.5x15mm Xience stents to mid LAD lesion; Cath 12/2012- 40-50% stenosis in mid PDA, 80% on D1- medically treat , 5/28/2015 - PTCA and 2.25x8mm Promus PREMIIER NAILA to ostium of 2nd OM     CVA (cerebral infarction)      DDD (degenerative disc disease)      Essential hypertension, benign      GERD (gastroesophageal reflux disease)      Headache      Hyperlipidemia      Hypothyroidism      Lumbago      Lumbar spinal stenosis      Mitral regurgitation     1+ per 7/2013 Echo     Vertebral artery stenosis, right        Past Surgical History:   Procedure Laterality Date     CORONARY ANGIOGRAPHY ADULT ORDER  12/6/2012    40-50% stenosis to mid PDA, 80% in D1- medically treat     CORONARY ANGIOGRAPHY ADULT ORDER  5/28/2015    PTCA and 2.25x8mm Promus PREMIER NAILA to ostium of 2nd OM      DECOMPRESSION, FUSION LUMBAR POSTERIOR THREE + LEVELS, COMBINED  2013    Procedure: COMBINED DECOMPRESSION, FUSION LUMBAR POSTERIOR THREE + LEVELS;  Posterior Lumbar Decompression L3-S1, Fusion L4-S1;  Surgeon: Daniel Harris MD;  Location: [facility] OR     ENDOSCOPIC STRIPPING VEIN(S)       HEART CATH, ANGIOPLASTY  2009    PTCA and two 2.5x15mm Xience stents to mid LAD lesion     HYSTERECTOMY, RICKIE      Fibroids, and Menorrhagia.. Bilateral Oophrectomy     ORTHOPEDIC SURGERY       Stenting of LAD, Angioplasty  09     TONSILLECTOMY      as a child       Family History   Problem Relation Age of Onset     Neurologic Disorder Mother         Dementia, Still living     Ovarian Cancer Mother      Thyroid Disease Mother      C.A.D. Father              Diabetes Father      Coronary Artery Disease Father      Alcohol/Drug Brother      Thyroid Disease Son      Diabetes Son        Social History     Socioeconomic History     Marital status:      Spouse name: Not on file     Number of children: Not on file     Years of education: Not on file     Highest education level: Not on file   Occupational History     Not on file   Social Needs     Financial resource strain: Not on file     Food insecurity     Worry: Not on file     Inability: Not on file     Transportation needs     Medical: Not on file     Non-medical: Not on file   Tobacco Use     Smoking status: Former Smoker     Last attempt to quit: 1965     Years since quittin.3     Smokeless tobacco: Never Used   Substance and Sexual Activity     Alcohol use: Yes     Alcohol/week: 0.0 standard drinks     Comment: 2 glasses wine per month     Drug use: No     Sexual activity: Never   Lifestyle     Physical activity     Days per week: Not on file     Minutes per session: Not on file     Stress: Not on file   Relationships     Social connections     Talks on phone: Not on file     Gets together: Not on file     Attends Hinduism service: Not on  file     Active member of club or organization: Not on file     Attends meetings of clubs or organizations: Not on file     Relationship status: Not on file     Intimate partner violence     Fear of current or ex partner: Not on file     Emotionally abused: Not on file     Physically abused: Not on file     Forced sexual activity: Not on file   Other Topics Concern      Service Not Asked     Blood Transfusions No     Caffeine Concern Yes     Comment: 5 cups caffeine per day     Occupational Exposure Not Asked     Hobby Hazards Not Asked     Sleep Concern No     Stress Concern No     Weight Concern No     Comment: weight stable     Special Diet No     Comment: trying to eat healthier     Back Care Not Asked     Exercise Yes     Comment: stretching     Bike Helmet Not Asked     Seat Belt Yes     Self-Exams Yes     Parent/sibling w/ CABG, MI or angioplasty before 65F 55M? Yes   Social History Narrative    Works in an office       Current Outpatient Medications   Medication Sig Dispense Refill     amLODIPine (NORVASC) 5 MG tablet Take 1 tablet (5 mg) by mouth daily 90 tablet 3     ascorbic acid (VITAMIN C) 500 MG tablet Take 1,000 mg by mouth daily        aspirin EC 81 MG tablet Take 1 tablet (81 mg) by mouth daily       atenolol (TENORMIN) 25 MG tablet Take 1 tablet (25 mg) by mouth 2 times daily 180 tablet 3     Calcium Carb-Cholecalciferol (CALCIUM 600 + D PO) Take 1,200 mg by mouth daily        Cholecalciferol (VITAMIN D) 2000 UNITS tablet Take 2,000 Units by mouth daily       Coenzyme Q10 (COQ-10) 200 MG CAPS Take 400 mg by mouth daily        diclofenac (VOLTAREN) 1 % topical gel Place 4 g onto the skin 4 times daily 1 Tube 4     escitalopram (LEXAPRO) 5 MG tablet TAKE 1 TABLET EVERY DAY 90 tablet 3     levothyroxine (SYNTHROID/LEVOTHROID) 100 MCG tablet Take 1 tablet (100 mcg) by mouth daily 90 tablet 3     lisinopril (PRINIVIL/ZESTRIL) 20 MG tablet Take 1 tablet (20 mg) by mouth daily 90 tablet 3      "Multiple Vitamin (DAILY MULTIVITAMIN PO) Take by mouth daily       naproxen sodium 220 MG capsule        rosuvastatin (CRESTOR) 40 MG tablet Take 1 tablet (40 mg) by mouth daily 90 tablet 3     vitamin B complex with vitamin C (VITAMIN  B COMPLEX) TABS tablet Take 1 tablet by mouth daily       warfarin (COUMADIN) 5 MG tablet TAKE 1/2 TO 1 TABLET EVERY DAY AS DIRECTED  BY  INR  NURSE  BASED ON INR READING 90 tablet 1     acetaminophen (TYLENOL) 325 MG tablet Take 2 tablets by mouth every 6 hours as needed for pain.  0     nitroGLYcerin (NITROSTAT) 0.4 MG sublingual tablet Place 1 tablet (0.4 mg) under the tongue every 5 minutes as needed for chest pain 25 tablet 2     traMADol (ULTRAM) 50 MG tablet Take 1 tablet (50 mg) by mouth nightly as needed for severe pain (Patient not taking: Reported on 5/6/2020) 10 tablet 0       Allergies   Allergen Reactions     Atorvastatin      Leg cramps     Gluten      Sinuses affected by gluten     Magnesium Salicylate      Oat      Shellfish Allergy      hives     Wheat        REVIEW OF SYSTEMS:  CONSTITUTIONAL:  NEGATIVE for fever, chills, change in weight  INTEGUMENTARY/SKIN:  NEGATIVE for worrisome rashes, moles or lesions  EYES:  NEGATIVE for vision changes or irritation  ENT/MOUTH:  NEGATIVE for ear, mouth and throat problems  RESP:  NEGATIVE for significant cough or SOB  BREAST:  NEGATIVE for masses, tenderness or discharge  CV:  Hypertension   GI:  Reflux/ heartburn   :  Negative   MUSCULOSKELETAL:  See HPI above  NEURO:  NEGATIVE for weakness, dizziness or paresthesias  ENDOCRINE:  NEGATIVE for temperature intolerance, skin/hair changes  HEME/ALLERGY/IMMUNE:  NEGATIVE for bleeding problems  PSYCHIATRIC:  NEGATIVE for changes in mood or affect      PHYSICAL EXAM:  /58 (BP Location: Right arm, Patient Position: Chair, Cuff Size: Adult Regular)   Ht 1.626 m (5' 4\")   Wt 69.9 kg (154 lb)   BMI 26.43 kg/m    Body mass index is 26.43 kg/m .   GENERAL APPEARANCE: healthy, " alert and no distress   HEENT: No apparent thyroid megaly. Clear sclera with normal ocular movement  RESPIRATORY: No labored breathing  SKIN: no suspicious lesions or rashes  NEURO: Normal strength and tone, mentation intact and speech normal  VASCULAR: Good pulses, and capillary refill   LYMPH: no lymphadenopathy   PSYCH:  mentation appears normal and affect normal/bright    MUSCULOSKELETAL:  Not in acute distress  She is slow to get up from sitting  She uses a cane to walk  Slow gait with a slight limp  No significant deformities of the knees otherwise  Left knee lacks about 5 degrees of terminal extension.  Flexion is limited to 90 as opposed to the right knee  with full extension and flexion to 100  Medial joint line pain, left  Some pain along the proximal medial tibial plateau  Ligaments are stable throughout the knee  No tenderness at the patellofemoral joint as well  Grossly motor strength is full  Skin is intact  Circulation is intact  Sensation is intact     ASSESSMENT:  Advanced left knee DJD, chronic primary particularly in the medial compartment    PLAN:  We visualized the images of plain x-ray from April 30, 2019 as well as recent MRI scan images of April 16, 2020.  She has complete loss of cartilage in the medial compartment along with secondary subchondral edema in both femur as well as tibia.  We had a long discussion about the nature of osteoarthritis.  She has gone through all reasonable nonoperative treatment so far.  For that reason, with ongoing pain and difficulty of ambulation as well as sleep, we felt that total knee arthroplasty is about the only reasonable option remaining.    We had a long discussion about the details of the surgery including hospitalization, potential complications including but not exclusive of infection, continuing pain, periprosthetic fracture, DVT, she is on Coumadin because of her history of prior stroke.  She most likely would require bridging with Lovenox before  the surgery.    Informational material was provided regarding total knee arthroplasty.  She will be placed on tier 3 priority list.        Imaging Interpretation:   MR left knee without contrast     Techniques: Multiplanar multisequence imaging of the left knee was   obtained without  administration of intra-articular or intravenous   contrast using routing protocol.     History: Primary osteoarthritis of left knee, worsening pain, failed   intra-articular injection. Evaluate for insufficiency fracture.     Additional History from EMR: None     Comparison: Radiograph 4/30/2019     Findings:     MENISCI:   Medial meniscus: Multidirectional tear of posterior horn and body of   the medial meniscus with radial tear component in the body and   associated peripheral meniscal extrusion and resultant bowing of the   tibial collateral ligament with surrounding edema.   Lateral meniscus: Intact.     LIGAMENTS   Cruciate ligaments: Intact.   Medial supporting structures: Bowing of the tibial collateral ligament   with surrounding edema, likely due to meniscal pathology. Medial   retinacular defect along the expected course of the patellotibial   ligament on the femoral attachment side. Small fluid in the tibial   collateral ligament bursa, likely representing prominent meniscal   cyst.   Lateral supporting structures: Intact.     EXTENSOR MECHANISM   Intact.     FLUID   Moderate knee joint effusion with synovial proliferation. Internally   septated/multiloculated fluid extending along the popliteus   myotendinous junction. Small Baker's cyst. Trace pes anserine bursa   fluid. Lobulated fluid extension along the anterior root of lateral   meniscus in the proximal aspect, may be small synovial/ganglion cyst.     OSSEOUS and ARTICULAR STRUCTURES   Bones: No fracture, contusion, or osseous lesion is seen.   Osteophytosis.     Patellofemoral compartment: No high-grade chondral loss.     Medial compartment: Complete loss of the  articular cartilage of the   central weightbearing femoral condyle and medial tibial plateau with   extensive opposing bone marrow edema with possible insufficiency   fracture component in the medial tibial plateau.     Lateral compartment: No high-grade hyaline cartilage disease.     ANCILLARY FINDINGS   None.    Impression:      Impression:   1. Multidirectional tear of the medial meniscus including radial tear   component in the body.    a. Associated, grade 4 chondromalacia of medial compartment.    b. Possible early insufficiency fracture component of medial tibial   plateau.    c. Parameniscal cyst extending into the tibial collateral ligament   bursa.    d. Medial retincular defect in the region of medial patellotibial   ligament femoral side with localized prominent bursitis/synovitis   along the area of tibial collateral ligament bursa.   2. Moderate knee joint effusion with likely synovitis.     I have personally reviewed the examination and initial interpretation   and I agree with the findings.     FARHAT Cheatham MD  Department of Orthopedic Surgery        Disclaimer: This note consists of symbols derived from keyboarding, dictation and/or voice recognition software. As a result, there may be errors in the script that have gone undetected. Please consider this when interpreting information found in this chart.

## 2020-05-11 DIAGNOSIS — I10 ESSENTIAL HYPERTENSION: ICD-10-CM

## 2020-05-11 RX ORDER — AMLODIPINE BESYLATE 5 MG/1
5 TABLET ORAL DAILY
Qty: 90 TABLET | Refills: 0 | Status: SHIPPED | OUTPATIENT
Start: 2020-05-11 | End: 2020-08-27

## 2020-05-12 ENCOUNTER — TELEPHONE (OUTPATIENT)
Dept: ORTHOPEDICS | Facility: CLINIC | Age: 80
End: 2020-05-12

## 2020-05-12 ENCOUNTER — PREP FOR PROCEDURE (OUTPATIENT)
Dept: ORTHOPEDICS | Facility: CLINIC | Age: 80
End: 2020-05-12

## 2020-05-12 DIAGNOSIS — M17.12 PRIMARY OSTEOARTHRITIS OF LEFT KNEE: Primary | ICD-10-CM

## 2020-05-12 NOTE — TELEPHONE ENCOUNTER
Spoke to patient.  Dr Cheatham indicated that she falls under tier 3 for surgery.  At this time we are still working on rescheduling tier 2 cases.     Informed patient, she verbalized understanding and appreciative of call.  Will reach out to patient as soon as we are able.  No estimate on time.       Constance Mckeon, Surgery Scheduler

## 2020-05-19 DIAGNOSIS — M17.12 PRIMARY OSTEOARTHRITIS OF LEFT KNEE: Primary | ICD-10-CM

## 2020-05-19 DIAGNOSIS — M84.469A INSUFFICIENCY FRACTURE OF TIBIA, INITIAL ENCOUNTER: ICD-10-CM

## 2020-05-19 NOTE — TELEPHONE ENCOUNTER
Medication Request for: Tramadol 50 mg            Patient currently takin tab at bedtime as needed  Patient has 0 of medication remaining.  Patient is also taking OTC: Aleve 1-2 tabs daily     Problems/ Concerns of Patient: no side effects reported  Prescription last written on 20 by Dr. Alexander  Sig: Take 1 tablet (50 mg) by mouth nightly as needed for severe pain   Last Fill Quantity: 10 with # refills: 0  Last Filled: 20     Last Office Visit by provider: 3/16/20 (MRI completed on 20), Patient referred to Dr. Cheatham for discussion of left TKA (LOV with Dr. Cheatham 20)    Date of Surgery (if applicable): unable to schedule at this time due to COVID    Future Office Visit:   None scheduled  Patient desires to have faxed or E-prescribe to pharmacy if available  Pharmacy selected in [place].    Phone number patient can be reached at: Home number on file [phone] (home)    Can we leave a detailed message on this number? NO    Medication requests may take up to 3 business days for a response  Has patient been notified of this Yes    Patient states that she has been taking the Tramadol as needed at bedtime when her pain is more severe due to increased activity. Patient reports that she has been using a cane to get around her home and putting weight on the left leg.    Instructed patient that she needs to be NWB due to her injury and offered Rx for DME - scooter or other option that allows her to be NWB. She declined and stated she would be better about limiting WB on left leg.    Please advise on refill request.    Sheila Holly, ATC

## 2020-05-21 RX ORDER — TRAMADOL HYDROCHLORIDE 50 MG/1
50 TABLET ORAL
Qty: 7 TABLET | Refills: 0 | Status: ON HOLD | OUTPATIENT
Start: 2020-05-21 | End: 2020-06-02

## 2020-05-21 NOTE — TELEPHONE ENCOUNTER
Called to speak with patient about medication / refill request for Tramadol. Left message and expressed the importance of being non-weight bearing.  If she needs a scooter / wheelchair let me know and I will order.  One time refill of 7 tabs. No further refills thereafter.    Also sent a message to surgery scheduling to see if she can be moved up for TKA.    Arron Alexander DO, CAQSM  [facility]

## 2020-05-22 ENCOUNTER — TELEPHONE (OUTPATIENT)
Dept: ORTHOPEDICS | Facility: CLINIC | Age: 80
End: 2020-05-22

## 2020-05-22 ENCOUNTER — TELEPHONE (OUTPATIENT)
Dept: FAMILY MEDICINE | Facility: CLINIC | Age: 80
End: 2020-05-22

## 2020-05-22 DIAGNOSIS — M17.12 PRIMARY OSTEOARTHRITIS OF LEFT KNEE: Primary | ICD-10-CM

## 2020-05-22 DIAGNOSIS — Z11.59 ENCOUNTER FOR SCREENING FOR OTHER VIRAL DISEASES: Primary | ICD-10-CM

## 2020-05-22 NOTE — TELEPHONE ENCOUNTER
Reason for Call:  Other call back    Detailed comments: Patient would like to have Rosanne call her back to adjust her meds & INR before her surgery    Phone Number Patient can be reached at: Home number on file [phone] (home)    Best Time: any    Can we leave a detailed message on this number? YES    Call taken on 5/22/2020 at 3:46 PM by Linette Moeller

## 2020-05-22 NOTE — TELEPHONE ENCOUNTER
Scheduled surgery.     Please place COVID test, review and sign PT order.     Type of surgery: left TKA  Location of surgery: [facility]  Date and time of surgery: 6/1/20 @ 1pm   Surgeon: Chaparrita   Pre-Op Appt Date: 5/27/20  Post-Op Appt Date: 6/11/20   Packet sent out: Yes  Pre-cert/Authorization completed:  No  Date: 5/22/20      Constance Mckeon Surgery Scheduler

## 2020-05-26 NOTE — TELEPHONE ENCOUNTER
Patient has pre-op appointment tomorrow. Can discuss at that time.  NICKY JavierN, RN  [facility]

## 2020-05-26 NOTE — PROGRESS NOTES
[facility] PRIOR [address] [facility] [address]  [phone]  Dept: [phone]    PRE-OP EVALUATION:  Today's date: 2020    Salome Saeed (: 1940) presents for pre-operative evaluation assessment as requested by Dr. Jorge Luis Cheatham.  She requires evaluation and anesthesia risk assessment prior to undergoing surgery/procedure for treatment of Primary osteoarthritis of left knee.    Proposed Surgery/ Procedure: Left total knee arthroplasty   Date of Surgery/ Procedure: 2020  Time of Surgery/ Procedure: 1:00 pm  Hospital/Surgical Facility: [facility]  Fax number for surgical facility:   Primary Physician: Surya Dyer  Type of Anesthesia Anticipated: General    Patient has a Health Care Directive or Living Will:  NO    1. YES - Do you have a history of heart attack, stroke, stent, bypass or surgery on an artery in the head, neck, heart or legs? - CVA, stents   2. NO - Do you ever have any pain or discomfort in your chest?  3. NO - Do you have a history of  Heart Failure?  4. NO - Are you troubled by shortness of breath when: walking on the level, up a slight hill or at night?  5. NO - Do you currently have a cold, bronchitis or other respiratory infection?  6. NO - Do you have a cough, shortness of breath or wheezing?  7. NO - Do you sometimes get pains in the calves of your legs when you walk?  8. NO - Do you or anyone in your family have previous history of blood clots?  9. NO - Do you or does anyone in your family have a serious bleeding problem such as prolonged bleeding following surgeries or cuts?  10. NO - Have you ever had problems with anemia or been told to take iron pills?  11. NO - Have you had any abnormal blood loss such as black, tarry or bloody stools, or abnormal vaginal bleeding?  12. NO - Have you ever had a blood transfusion?  13. NO - Have you or any of your relatives ever had problems with anesthesia?  14. NO - Do you have  sleep apnea, excessive snoring or daytime drowsiness?  15. NO - Do you have any prosthetic heart valves?  16. NO - Do you have prosthetic joints?  17. NO - Is there any chance that you may be pregnant?      HPI:     HPI related to upcoming procedure: history of left knee osteoarthritis. Has tried injections. Now planning for knee replacement.      CAD - Patient has a longstanding history of moderate-severe CAD. Patient denies recent chest pain or NTG use, denies exercise induced dyspnea or PND. Last Stress test 10/11/2019, EKG 5/27/2020.     HYPERLIPIDEMIA - Patient has a long history of significant Hyperlipidemia requiring medication for treatment with recent good control. Patient reports no problems or side effects with the medication.     HYPERTENSION - Patient has longstanding history of HTN , currently denies any symptoms referable to elevated blood pressure. Specifically denies chest pain, palpitations, dyspnea, orthopnea, PND or peripheral edema. Blood pressure readings have been in normal range. Current medication regimen is as listed below. Patient denies any side effects of medication.     HYPOTHYROIDISM - Patient has a longstanding history of chronic Hypothyroidism. Patient has been doing well, noting no tremor, insomnia, hair loss or changes in skin texture. Continues to take medications as directed, without adverse reactions or side effects. Last TSH   Lab Results   Component Value Date    TSH 0.41 02/19/2019     INR was 1.3. -- she missed dose yesterday.    MEDICAL HISTORY:     Patient Active Problem List    Diagnosis Date Noted     Hyperlipidemia LDL goal <70 04/01/2010     Priority: High     Coronary artery disease involving native coronary artery of native heart without angina pectoris 04/09/2009     Priority: High     5/28/2015 - PTCA and 2.25x8mm Promus PREMIER NAILA to ostium of small second OM  12/2012 cath - 40-50% stenosis in mid PDA, 80% on D1- medically treat  4/8/2009: PTCA and two 2.5x15mm  Xience stents to mid LAD lesion        Benign essential hypertension      Priority: High     Primary osteoarthritis of left knee 05/22/2020     Priority: Medium     Added automatically from request for surgery [id number]       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) 03/05/2019     Priority: Medium     WILSON (dyspnea on exertion) 10/09/2018     Priority: Medium     Chronic bilateral low back pain without sciatica 06/21/2017     Priority: Medium     Status post arthrodesis 06/21/2017     Priority: Medium     Cerebrovascular accident involving cerebellum (H) 10/26/2016     Priority: Medium     Vertebral artery stenosis, right      Priority: Medium     Hypothyroidism, unspecified type 05/18/2016     Priority: Medium     Long term current use of anticoagulant therapy 01/11/2016     Priority: Medium     GERD (gastroesophageal reflux disease)      Priority: Medium     Mitral regurgitation      Priority: Medium     1+ per 7/2013 Echo       Anxiety 08/08/2013     Priority: Medium     Health Care Home 07/23/2013     Priority: Medium     EMERGENCY CARE PLAN  Presenting Problem Signs and Symptoms Treatment Plan    Questions or conerns during clinic hours    I will call the clinic directly     Questions or conerns outside clinic hours    I will call the 24 hour nurse line at [phone]    Patient needs to schedule an appointment    I will call the 24 hour scheduling team at [phone] or clinic directly    Same day treatment     I will call the clinic first, nurse line if after hours, urgent care and express care if needed                          Clinic Care Coordinator:Apolinar Green -[phone]  **Pt not in active care coordination at this time.       Atypical chest pain 07/19/2013     Priority: Medium     Imo Update utility       Spinal stenosis, lumbar region, with neurogenic claudication 05/08/2013     Priority: Medium     Lumbago 09/07/2012     Priority: Medium      DDD (degenerative disc disease), lumbar 07/09/2012     Priority: Medium     Lumbar spinal stenosis 07/09/2012     Priority: Medium     Advanced directives, counseling/discussion 06/18/2012     Priority: Medium     Discussed advance care planning with patient; however, patient declined at this time. 6/18/2012           Past Medical History:   Diagnosis Date     Antiplatelet or antithrombotic long-term use      CAD (coronary artery disease) 4/9/2009 4/8/2009: PTCA and two 2.5x15mm Xience stents to mid LAD lesion; Cath 12/2012- 40-50% stenosis in mid PDA, 80% on D1- medically treat , 5/28/2015 - PTCA and 2.25x8mm Promus PREMIIER NAILA to ostium of 2nd OM     Cerebral artery occlusion with cerebral infarction (H) 2012     Coagulation disorder (H)     warafin     CVA (cerebral infarction)      DDD (degenerative disc disease)      Dyspnea on exertion      Essential hypertension, benign      GERD (gastroesophageal reflux disease)      Headache      Heart murmur      History of angina     rarely     Hyperlipidemia      Hypothyroidism      Lumbago      Lumbar spinal stenosis      Mitral regurgitation     1+ per 7/2013 Echo     Stented coronary artery      Vertebral artery stenosis, right      Past Surgical History:   Procedure Laterality Date     CORONARY ANGIOGRAPHY ADULT ORDER  12/6/2012    40-50% stenosis to mid PDA, 80% in D1- medically treat     CORONARY ANGIOGRAPHY ADULT ORDER  5/28/2015    PTCA and 2.25x8mm Promus PREMIER NAILA to ostium of 2nd OM     DECOMPRESSION, FUSION LUMBAR POSTERIOR THREE + LEVELS, COMBINED  5/8/2013    Procedure: COMBINED DECOMPRESSION, FUSION LUMBAR POSTERIOR THREE + LEVELS;  Posterior Lumbar Decompression L3-S1, Fusion L4-S1;  Surgeon: Daniel Harris MD;  Location: [facility] OR     ENDOSCOPIC STRIPPING VEIN(S)       HEART CATH, ANGIOPLASTY  4/8/2009    PTCA and two 2.5x15mm Xience stents to mid LAD lesion     HYSTERECTOMY, RICKIE      Fibroids, and Menorrhagia.. Bilateral Oophrectomy      ORTHOPEDIC SURGERY       Stenting of LAD, Angioplasty  4/8/09     TONSILLECTOMY      as a child     Current Outpatient Medications   Medication Sig Dispense Refill     acetaminophen (TYLENOL) 325 MG tablet Take 2 tablets by mouth every 6 hours as needed for pain.  0     amLODIPine (NORVASC) 5 MG tablet Take 1 tablet (5 mg) by mouth daily 90 tablet 0     ascorbic acid (VITAMIN C) 500 MG tablet Take 1,000 mg by mouth daily        aspirin EC 81 MG tablet Take 1 tablet (81 mg) by mouth daily       atenolol (TENORMIN) 25 MG tablet Take 1 tablet (25 mg) by mouth 2 times daily 180 tablet 3     Calcium Carb-Cholecalciferol (CALCIUM 600 + D PO) Take 1,200 mg by mouth daily        Cholecalciferol (VITAMIN D) 2000 UNITS tablet Take 2,000 Units by mouth daily       Coenzyme Q10 (COQ-10) 200 MG CAPS Take 400 mg by mouth daily        diclofenac (VOLTAREN) 1 % topical gel Place 4 g onto the skin 4 times daily 1 Tube 4     escitalopram (LEXAPRO) 5 MG tablet TAKE 1 TABLET EVERY DAY 90 tablet 3     levothyroxine (SYNTHROID/LEVOTHROID) 100 MCG tablet Take 1 tablet (100 mcg) by mouth daily 90 tablet 3     lisinopril (PRINIVIL/ZESTRIL) 20 MG tablet Take 1 tablet (20 mg) by mouth daily 90 tablet 3     Multiple Vitamin (DAILY MULTIVITAMIN PO) Take by mouth daily       naproxen sodium 220 MG capsule        nitroGLYcerin (NITROSTAT) 0.4 MG sublingual tablet Place 1 tablet (0.4 mg) under the tongue every 5 minutes as needed for chest pain 25 tablet 2     rosuvastatin (CRESTOR) 40 MG tablet Take 1 tablet (40 mg) by mouth daily 90 tablet 3     traMADol (ULTRAM) 50 MG tablet Take 1 tablet (50 mg) by mouth nightly as needed for severe pain 7 tablet 0     vitamin B complex with vitamin C (VITAMIN  B COMPLEX) TABS tablet Take 1 tablet by mouth daily       warfarin (COUMADIN) 5 MG tablet TAKE 1/2 TO 1 TABLET EVERY DAY AS DIRECTED  BY  INR  NURSE  BASED ON INR READING 90 tablet 1     OTC products: Aspirin    Allergies   Allergen Reactions      "Atorvastatin      Leg cramps     Cats Itching     Gluten      Sinuses affected by gluten     Oat      Shellfish Allergy      hives     Wheat       Latex Allergy: NO    Social History     Tobacco Use     Smoking status: Former Smoker     Packs/day: 0.10     Last attempt to quit: 1965     Years since quittin.4     Smokeless tobacco: Never Used   Substance Use Topics     Alcohol use: Yes     Alcohol/week: 0.0 standard drinks     Comment: 2 glasses wine per month, maybe     History   Drug Use No       REVIEW OF SYSTEMS:   CONSTITUTIONAL: NEGATIVE for fever, chills, change in weight  INTEGUMENTARY/SKIN: NEGATIVE for worrisome rashes, moles or lesions  EYES: NEGATIVE for vision changes or irritation  ENT/MOUTH: NEGATIVE for ear, mouth and throat problems  RESP: NEGATIVE for significant cough or SOB  BREAST: NEGATIVE for masses, tenderness or discharge  CV: NEGATIVE for chest pain, palpitations or peripheral edema  GI: NEGATIVE for nausea, abdominal pain, heartburn, or change in bowel habits  : NEGATIVE for frequency, dysuria, or hematuria  MUSCULOSKELETAL: NEGATIVE for significant arthralgias or myalgia  NEURO: NEGATIVE for weakness, dizziness or paresthesias  ENDOCRINE: NEGATIVE for temperature intolerance, skin/hair changes  HEME: NEGATIVE for bleeding problems  PSYCHIATRIC: NEGATIVE for changes in mood or affect    EXAM:   /64 (BP Location: Right arm, Cuff Size: Adult Regular)   Pulse 68   Temp 97.6  F (36.4  C) (Oral)   Ht 1.626 m (5' 4\")   SpO2 98%   BMI 26.43 kg/m      GENERAL APPEARANCE: healthy, alert and no distress     EYES: EOMI, PERRL     HENT: ear canals and TM's normal and nose and mouth without ulcers or lesions     NECK: no adenopathy, no asymmetry, masses, or scars and thyroid normal to palpation     RESP: lungs clear to auscultation - no rales, rhonchi or wheezes     CV: regular rates and rhythm, normal S1 S2, no S3 or S4 and no murmur, click or rub     ABDOMEN:  soft, nontender, no " HSM or masses and bowel sounds normal     MS: extremities normal- no gross deformities noted, no evidence of inflammation in joints, FROM in all extremities.     SKIN: no suspicious lesions or rashes     NEURO: Normal strength and tone, sensory exam grossly normal, mentation intact and speech normal     PSYCH: mentation appears normal. and affect normal/bright     LYMPHATICS: No cervical adenopathy    DIAGNOSTICS:   EKG: appears normal, NSR, normal axis, normal intervals, no acute ST/T changes c/w ischemia, no LVH by voltage criteria, unchanged from previous tracings    Recent Labs   Lab Test 05/05/20  1327 03/31/20  1348  10/04/19  0911  10/09/18  0827  02/08/18  1149  05/24/17  1229  04/10/17  1143   HGB  --   --   --   --   --   --   --  13.1  --  12.2  --  13.7   PLT  --   --   --   --   --   --   --   --   --  287  --  220   INR 2.3* 2.7*   < >  --    < >  --    < >  --    < >  --    < >  --    NA  --   --   --  136  --  136  --  137   < > 137  --  134   POTASSIUM  --   --   --  4.2  --  3.8  --  4.6   < > 4.5  --  5.0   CR  --   --   --  0.66  --  0.73  --  0.64   < > 0.60  --  0.68    < > = values in this interval not displayed.        IMPRESSION:   Reason for surgery/procedure: osteoarthritis of left knee  Diagnosis/reason for consult: preopeartive clearance    The proposed surgical procedure is considered INTERMEDIATE risk.    REVISED CARDIAC RISK INDEX  The patient has the following serious cardiovascular risks for perioperative complications such as (MI, PE, VFib and 3  AV Block):  Coronary Artery Disease (MI, positive stress test, angina, Qs on EKG)  Cerebrovascular Disease (TIA or CVA)  INTERPRETATION: 2 risks: Class III (moderate risk - 6.6% complication rate)    The patient has the following additional risks for perioperative complications:  No identified additional risks      ICD-10-CM    1. Preop general physical exam  Z01.818 EKG 12-lead complete w/read - Clinics     CBC with platelets     Basic  metabolic panel  (Ca, Cl, CO2, Creat, Gluc, K, Na, BUN)   2. Hypothyroidism, unspecified type  E03.9 TSH with free T4 reflex   3. 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)  I25.759    4. Hyperlipidemia LDL goal <70  E78.5    5. Coronary artery disease involving native coronary artery of native heart without angina pectoris  I25.10    6. Benign essential hypertension  I10    7. Cerebrovascular accident involving cerebellum (H)  I63.9    8. Long term current use of anticoagulant therapy  Z79.01        RECOMMENDATIONS:     --Consult hospital rounder / IM to assist post-op medical management    Cardiovascular Risk  Patient is already on a Beta Blocker. Continue Betablocker therapy after surgery, using Beta blocker order set as necessary for NPO status.      --Patient is to take all scheduled medications on the day of surgery EXCEPT for modifications listed below.    Anticoagulant or Antiplatelet Medication Use  ASPIRIN: Patient is at increased risk of thrombosis (e.g. MI, CVA) and aspirin 81 mg daily should be continued in the perioperative period  WARFARIN: Bridging therapy will be coordinated by RN        APPROVAL GIVEN to proceed with proposed procedure, without further diagnostic evaluation       Signed Electronically by: Surya Dyer DO    Copy of this evaluation report is provided to requesting physician.    [facility] Preop Guidelines    Revised Cardiac Risk Index                H

## 2020-05-26 NOTE — PATIENT INSTRUCTIONS
Before Your Surgery      Call your surgeon if there is any change in your health. This includes signs of a cold or flu (such as a sore throat, runny nose, cough, rash or fever).    Do not smoke, drink alcohol or take over the counter medicine (unless your surgeon or primary care doctor tells you to) for the 24 hours before and after surgery.    If you take prescribed drugs: Follow your doctor s orders about which medicines to take and which to stop until after surgery.    Eating and drinking prior to surgery: follow the instructions from your surgeon    Take a shower or bath the night before surgery. Use the soap your surgeon gave you to gently clean your skin. If you do not have soap from your surgeon, use your regular soap. Do not shave or scrub the surgery site.  Wear clean pajamas and have clean sheets on your bed.       On the day of surgery, take atenolol, amlodipine, levothyroxine, and Lexapro with a small sip of water.  Hold lisinopril 24 hours before surgery.

## 2020-05-27 ENCOUNTER — ANTICOAGULATION THERAPY VISIT (OUTPATIENT)
Dept: NURSING | Facility: CLINIC | Age: 80
End: 2020-05-27

## 2020-05-27 ENCOUNTER — OFFICE VISIT (OUTPATIENT)
Dept: FAMILY MEDICINE | Facility: CLINIC | Age: 80
End: 2020-05-27
Payer: COMMERCIAL

## 2020-05-27 VITALS
BODY MASS INDEX: 26.43 KG/M2 | SYSTOLIC BLOOD PRESSURE: 114 MMHG | TEMPERATURE: 97.6 F | HEIGHT: 64 IN | OXYGEN SATURATION: 98 % | HEART RATE: 68 BPM | DIASTOLIC BLOOD PRESSURE: 64 MMHG

## 2020-05-27 DIAGNOSIS — I10 BENIGN ESSENTIAL HYPERTENSION: ICD-10-CM

## 2020-05-27 DIAGNOSIS — Z79.01 LONG TERM CURRENT USE OF ANTICOAGULANT THERAPY: ICD-10-CM

## 2020-05-27 DIAGNOSIS — E03.9 HYPOTHYROIDISM, UNSPECIFIED TYPE: ICD-10-CM

## 2020-05-27 DIAGNOSIS — I25.759 CORONARY ARTERY DISEASE INVOLVING NATIVE ARTERY OF TRANSPLANTED HEART WITH ANGINA PECTORIS (H): ICD-10-CM

## 2020-05-27 DIAGNOSIS — E78.5 HYPERLIPIDEMIA LDL GOAL <70: ICD-10-CM

## 2020-05-27 DIAGNOSIS — I63.9 CEREBROVASCULAR ACCIDENT INVOLVING CEREBELLUM (H): ICD-10-CM

## 2020-05-27 DIAGNOSIS — I25.10 CORONARY ARTERY DISEASE INVOLVING NATIVE CORONARY ARTERY OF NATIVE HEART WITHOUT ANGINA PECTORIS: ICD-10-CM

## 2020-05-27 DIAGNOSIS — Z01.818 PREOP GENERAL PHYSICAL EXAM: Primary | ICD-10-CM

## 2020-05-27 DIAGNOSIS — I63.9 CEREBROVASCULAR ACCIDENT INVOLVING CEREBELLUM (H): Primary | ICD-10-CM

## 2020-05-27 LAB
CAPILLARY BLOOD COLLECTION: NORMAL
ERYTHROCYTE [DISTWIDTH] IN BLOOD BY AUTOMATED COUNT: 14.5 % (ref 10–15)
HCT VFR BLD AUTO: 36.9 % (ref 35–47)
HGB BLD-MCNC: 12.2 G/DL (ref 11.7–15.7)
INR BLD: 1.3 (ref 0.86–1.14)
MCH RBC QN AUTO: 28.8 PG (ref 26.5–33)
MCHC RBC AUTO-ENTMCNC: 33.1 G/DL (ref 31.5–36.5)
MCV RBC AUTO: 87 FL (ref 78–100)
PLATELET # BLD AUTO: 227 10E9/L (ref 150–450)
RBC # BLD AUTO: 4.24 10E12/L (ref 3.8–5.2)
WBC # BLD AUTO: 5.7 10E9/L (ref 4–11)

## 2020-05-27 PROCEDURE — 80048 BASIC METABOLIC PNL TOTAL CA: CPT | Performed by: FAMILY MEDICINE

## 2020-05-27 PROCEDURE — 36415 COLL VENOUS BLD VENIPUNCTURE: CPT | Performed by: FAMILY MEDICINE

## 2020-05-27 PROCEDURE — 85610 PROTHROMBIN TIME: CPT | Mod: QW | Performed by: FAMILY MEDICINE

## 2020-05-27 PROCEDURE — 84443 ASSAY THYROID STIM HORMONE: CPT | Performed by: FAMILY MEDICINE

## 2020-05-27 PROCEDURE — 93000 ELECTROCARDIOGRAM COMPLETE: CPT | Performed by: FAMILY MEDICINE

## 2020-05-27 PROCEDURE — 85027 COMPLETE CBC AUTOMATED: CPT | Performed by: FAMILY MEDICINE

## 2020-05-27 PROCEDURE — 99215 OFFICE O/P EST HI 40 MIN: CPT | Performed by: FAMILY MEDICINE

## 2020-05-27 NOTE — PROGRESS NOTES
ANTICOAGULATION FOLLOW-UP CLINIC VISIT    Patient Name:  Salome Saeed  Date:  2020  Contact Type:  Telephone/ LAB POC    SUBJECTIVE:  Patient Findings     Positives:   Missed doses    Comments:   Upcoming TKR        Clinical Outcomes     Comments:   Upcoming TKR           OBJECTIVE    Recent labs: (last 7 days)     20  1415   INR 1.3*       ASSESSMENT / PLAN  No question data found.  Anticoagulation Summary  As of 2020    INR goal:   2.0-3.0   TTR:   50.9 % (1 y)   INR used for dosin.3! (2020)   Warfarin maintenance plan:   5 mg (5 mg x 1) every Mon, Fri; 2.5 mg (5 mg x 0.5) all other days   Full warfarin instructions:   : Hold; : Hold; : Hold; : Hold; : Hold; : 10 mg; Otherwise 5 mg every Mon, Fri; 2.5 mg all other days   Weekly warfarin total:   22.5 mg   Plan last modified:   Rosanne Rico RN (2020)   Next INR check:   2020   Target end date:       Indications    Long term current use of anticoagulant therapy [Z79.01]  CVA (cerebral vascular accident) (Resolved) [I63.9]             Anticoagulation Episode Summary     INR check location:       Preferred lab:       Send INR reminders to:   JUDY ROCHE    Comments:               See the Encounter Report to view Anticoagulation Flowsheet and Dosing Calendar (Go to Encounters tab in chart review, and find the Anticoagulation Therapy Visit)    Dosage adjustment made based on physician directed care plan.    Loreta is having surgery TKR on 20. Saw Dr. Dyer in clinic today. I spoke to Dr. Dyer and patient will need to bridge. I am waiting for creat lab to come back for dosing. Reviewed below for 20 min on phone. Will type directions and try to have patient  tomorrow or Friday. I feel she had good understanding but would do better with written instructions.     --hold Warfarin no Lovenox   Hold Warfarin no Lovenox    Hold Warfarin Start Lovenox every 12 hours    Hold  Warfarin Lovenox every 12 hours  5/31 Hold Warfarin, Lovenox am dose only.  6/1  Day of Surgery, resume Warfarin 10 mg if okay with surgeon. No Lovenox  6/2 Warfarin 2.5 mg, no lovenox in am, restart Lovenox in PM  6/3 Warfarin 2.5 mg Lovenox BID  6/4 Warfarin 2.5 mg Lovenox BID    Patient states she will be in hospital overnight but is not going to TCU. Advised to have them check notes for dosing but she can call us when discharge to home if dosing needs adjusting.     Will route to INR RN to send in Lovenox dosing as Creat is not yet done to figure out dosing instructions for this. Also please call Loreta and set up follow up appointment as we discussed but appointment not made. She would also like copy of instructions so based on where INR nurse is tomorrow to get dosing instructions.       Renee Hoang RN

## 2020-05-27 NOTE — LETTER
June 5, 2020      Salome I Darlin  [address]  [place] [address]        Dear ,    We are writing to inform you of your test results.      -Normal red blood cell (hgb) levels, normal white blood cell count and normal platelet levels.   -Kidney function is normal (Cr, GFR), Sodium is normal, Potassium is normal, Calcium is normal, Glucose is normal.   -TSH (thyroid stimulating hormone) level is normal which indicates appropriate thyroid replacement dosing.  ADVISE: continuing same replacement dose and rechecking this in 12 months.     Resulted Orders   TSH with free T4 reflex   Result Value Ref Range    TSH 0.90 0.40 - 4.00 mU/L   CBC with platelets   Result Value Ref Range    WBC 5.7 4.0 - 11.0 10e9/L    RBC Count 4.24 3.8 - 5.2 10e12/L    Hemoglobin 12.2 11.7 - 15.7 g/dL    Hematocrit 36.9 35.0 - 47.0 %    MCV 87 78 - 100 fl    MCH 28.8 26.5 - 33.0 pg    MCHC 33.1 31.5 - 36.5 g/dL    RDW 14.5 10.0 - 15.0 %    Platelet Count 227 150 - 450 10e9/L   Basic metabolic panel  (Ca, Cl, CO2, Creat, Gluc, K, Na, BUN)   Result Value Ref Range    Sodium 132 (L) 133 - 144 mmol/L    Potassium 4.2 3.4 - 5.3 mmol/L    Chloride 99 94 - 109 mmol/L    Carbon Dioxide 27 20 - 32 mmol/L    Anion Gap 6 3 - 14 mmol/L    Glucose 94 70 - 99 mg/dL    Urea Nitrogen 18 7 - 30 mg/dL    Creatinine 0.69 0.52 - 1.04 mg/dL    GFR Estimate 83 >60 mL/min/[1.73_m2]      Comment:      Non  GFR Calc  Starting 12/18/2018, serum creatinine based estimated GFR (eGFR) will be   calculated using the Chronic Kidney Disease Epidemiology Collaboration   (CKD-EPI) equation.      GFR Estimate If Black >90 >60 mL/min/[1.73_m2]      Comment:       GFR Calc  Starting 12/18/2018, serum creatinine based estimated GFR (eGFR) will be   calculated using the Chronic Kidney Disease Epidemiology Collaboration   (CKD-EPI) equation.      Calcium 8.5 8.5 - 10.1 mg/dL       If you have any questions or concerns, please  call the clinic at the number listed above.       Sincerely,        Surya Dyer, DO

## 2020-05-28 ENCOUNTER — TELEPHONE (OUTPATIENT)
Dept: FAMILY MEDICINE | Facility: CLINIC | Age: 80
End: 2020-05-28

## 2020-05-28 DIAGNOSIS — Z11.59 ENCOUNTER FOR SCREENING FOR OTHER VIRAL DISEASES: ICD-10-CM

## 2020-05-28 LAB
ANION GAP SERPL CALCULATED.3IONS-SCNC: 6 MMOL/L (ref 3–14)
BUN SERPL-MCNC: 18 MG/DL (ref 7–30)
CALCIUM SERPL-MCNC: 8.5 MG/DL (ref 8.5–10.1)
CHLORIDE SERPL-SCNC: 99 MMOL/L (ref 94–109)
CO2 SERPL-SCNC: 27 MMOL/L (ref 20–32)
CREAT SERPL-MCNC: 0.69 MG/DL (ref 0.52–1.04)
GFR SERPL CREATININE-BSD FRML MDRD: 83 ML/MIN/{1.73_M2}
GLUCOSE SERPL-MCNC: 94 MG/DL (ref 70–99)
POTASSIUM SERPL-SCNC: 4.2 MMOL/L (ref 3.4–5.3)
SODIUM SERPL-SCNC: 132 MMOL/L (ref 133–144)
TSH SERPL DL<=0.005 MIU/L-ACNC: 0.9 MU/L (ref 0.4–4)

## 2020-05-28 PROCEDURE — 87635 SARS-COV-2 COVID-19 AMP PRB: CPT | Performed by: ORTHOPAEDIC SURGERY

## 2020-05-28 PROCEDURE — 99207 ZZC NO BILLABLE SERVICE THIS VISIT: CPT

## 2020-05-28 PROCEDURE — 99000 SPECIMEN HANDLING OFFICE-LAB: CPT | Performed by: ORTHOPAEDIC SURGERY

## 2020-05-28 NOTE — TELEPHONE ENCOUNTER
Prior Authorization Approval    Authorization Effective Date: 5/28/2020  Authorization Expiration Date: 12/31/2020  Medication: Enoxaparin- APPROVED   Approved Dose/Quantity:   Reference #:      Insurance Company: [employer] - Phone [phone] Fax [phone]  Expected CoPay:       CoPay Card Available:      Foundation Assistance Needed:    Which Pharmacy is filling the prescription (Not needed for infusion/clinic administered): [id number] - [place], [place] - [address] [address]  Pharmacy Notified: Yes  Patient Notified: Comment:  **Instructed pharmacy to notify patient when script is ready to /ship.**

## 2020-05-28 NOTE — PROGRESS NOTES
Lab results received. Lovenox prescription sent to pharmacy. Spoke with patient and reviewed instructions. Patient verbalized understanding and agrees with plan.    Will call back if further questions or concerns.    NICKY JavierN, RN  [facility]

## 2020-05-28 NOTE — TELEPHONE ENCOUNTER
Surya calling from [facility] needs a PA for enoxaparin there is a daily limit of 1.04 mL per day and patient is taking 1.4 mL which is why a PA is needed.  patient will need tomorrow.      NICKY SanchezN, RN  [employer] [facility]

## 2020-05-28 NOTE — TELEPHONE ENCOUNTER
Central Prior Authorization Team  Phone: [phone]    PA Initiation    Medication: Enoxaparin  Insurance Company: [email or web address] - Phone [phone] Fax [phone]  Pharmacy Filling the Rx: [id number] - [place], [place] - [address] [address]  Filling Pharmacy Phone: [phone]  Filling Pharmacy Fax:    Start Date: 5/28/2020

## 2020-05-29 LAB
SARS-COV-2 RNA SPEC QL NAA+PROBE: NOT DETECTED
SPECIMEN SOURCE: NORMAL

## 2020-06-01 ENCOUNTER — HOSPITAL ENCOUNTER (OUTPATIENT)
Facility: CLINIC | Age: 80
Discharge: HOME | End: 2020-06-02
Attending: ORTHOPAEDIC SURGERY | Admitting: ORTHOPAEDIC SURGERY
Payer: COMMERCIAL

## 2020-06-01 ENCOUNTER — ANESTHESIA EVENT (OUTPATIENT)
Dept: SURGERY | Facility: CLINIC | Age: 80
End: 2020-06-01
Payer: COMMERCIAL

## 2020-06-01 ENCOUNTER — ANESTHESIA (OUTPATIENT)
Dept: SURGERY | Facility: CLINIC | Age: 80
End: 2020-06-01
Payer: COMMERCIAL

## 2020-06-01 DIAGNOSIS — Z96.652 S/P TKR (TOTAL KNEE REPLACEMENT) USING CEMENT, LEFT: Primary | ICD-10-CM

## 2020-06-01 DIAGNOSIS — T40.2X5A CONSTIPATION DUE TO OPIOID THERAPY: ICD-10-CM

## 2020-06-01 DIAGNOSIS — K59.03 CONSTIPATION DUE TO OPIOID THERAPY: ICD-10-CM

## 2020-06-01 DIAGNOSIS — M17.12 PRIMARY OSTEOARTHRITIS OF LEFT KNEE: ICD-10-CM

## 2020-06-01 LAB — INR PPP: 1 (ref 0.86–1.14)

## 2020-06-01 PROCEDURE — 25800030 ZZH RX IP 258 OP 636: Performed by: ANESTHESIOLOGY

## 2020-06-01 PROCEDURE — 25000128 H RX IP 250 OP 636: Performed by: ORTHOPAEDIC SURGERY

## 2020-06-01 PROCEDURE — C1776 JOINT DEVICE (IMPLANTABLE): HCPCS | Performed by: ORTHOPAEDIC SURGERY

## 2020-06-01 PROCEDURE — 37000009 ZZH ANESTHESIA TECHNICAL FEE, EACH ADDTL 15 MIN: Performed by: ORTHOPAEDIC SURGERY

## 2020-06-01 PROCEDURE — C1713 ANCHOR/SCREW BN/BN,TIS/BN: HCPCS | Performed by: ORTHOPAEDIC SURGERY

## 2020-06-01 PROCEDURE — 27810169 ZZH OR IMPLANT GENERAL: Performed by: ORTHOPAEDIC SURGERY

## 2020-06-01 PROCEDURE — 27447 TOTAL KNEE ARTHROPLASTY: CPT | Mod: LT | Performed by: ORTHOPAEDIC SURGERY

## 2020-06-01 PROCEDURE — 25000132 ZZH RX MED GY IP 250 OP 250 PS 637: Performed by: ORTHOPAEDIC SURGERY

## 2020-06-01 PROCEDURE — 25800030 ZZH RX IP 258 OP 636: Performed by: NURSE ANESTHETIST, CERTIFIED REGISTERED

## 2020-06-01 PROCEDURE — 36000093 ZZH SURGERY LEVEL 4 1ST 30 MIN: Performed by: ORTHOPAEDIC SURGERY

## 2020-06-01 PROCEDURE — 25000132 ZZH RX MED GY IP 250 OP 250 PS 637: Performed by: PHYSICIAN ASSISTANT

## 2020-06-01 PROCEDURE — 36000063 ZZH SURGERY LEVEL 4 EA 15 ADDTL MIN: Performed by: ORTHOPAEDIC SURGERY

## 2020-06-01 PROCEDURE — 25000125 ZZHC RX 250: Performed by: ORTHOPAEDIC SURGERY

## 2020-06-01 PROCEDURE — 27447 TOTAL KNEE ARTHROPLASTY: CPT | Mod: AS | Performed by: PHYSICIAN ASSISTANT

## 2020-06-01 PROCEDURE — 25000125 ZZHC RX 250: Performed by: NURSE ANESTHETIST, CERTIFIED REGISTERED

## 2020-06-01 PROCEDURE — 27210794 ZZH OR GENERAL SUPPLY STERILE: Performed by: ORTHOPAEDIC SURGERY

## 2020-06-01 PROCEDURE — 25800030 ZZH RX IP 258 OP 636: Performed by: ORTHOPAEDIC SURGERY

## 2020-06-01 PROCEDURE — 71000012 ZZH RECOVERY PHASE 1 LEVEL 1 FIRST HR: Performed by: ORTHOPAEDIC SURGERY

## 2020-06-01 PROCEDURE — 85610 PROTHROMBIN TIME: CPT | Performed by: ANESTHESIOLOGY

## 2020-06-01 PROCEDURE — 25000128 H RX IP 250 OP 636: Performed by: NURSE ANESTHETIST, CERTIFIED REGISTERED

## 2020-06-01 PROCEDURE — 36415 COLL VENOUS BLD VENIPUNCTURE: CPT | Performed by: ANESTHESIOLOGY

## 2020-06-01 PROCEDURE — 40000306 ZZH STATISTIC PRE PROC ASSESS II: Performed by: ORTHOPAEDIC SURGERY

## 2020-06-01 PROCEDURE — 37000008 ZZH ANESTHESIA TECHNICAL FEE, 1ST 30 MIN: Performed by: ORTHOPAEDIC SURGERY

## 2020-06-01 DEVICE — LEGION CRUCIATE RETAINING NON                                    POROUS NARROW FEMORAL SIZE 4 LEFT
Type: IMPLANTABLE DEVICE | Site: KNEE | Status: FUNCTIONAL
Brand: LEGION

## 2020-06-01 DEVICE — GENESIS II NON-POROUS TIBIAL                                    BASEPLATE SIZE 3 LEFT
Type: IMPLANTABLE DEVICE | Site: KNEE | Status: FUNCTIONAL
Brand: GENESIS II

## 2020-06-01 DEVICE — GENESIS II RESURFACING PATELLAR 35MM
Type: IMPLANTABLE DEVICE | Site: KNEE | Status: FUNCTIONAL
Brand: GENESIS II

## 2020-06-01 DEVICE — FULL DOSE BONE CEMENT, 10 PACK CATALOG NUMBER IS 6191-1-010
Type: IMPLANTABLE DEVICE | Site: KNEE | Status: FUNCTIONAL
Brand: SIMPLEX

## 2020-06-01 RX ORDER — ONDANSETRON 4 MG/1
4 TABLET, ORALLY DISINTEGRATING ORAL EVERY 6 HOURS PRN
Status: DISCONTINUED | OUTPATIENT
Start: 2020-06-01 | End: 2020-06-02 | Stop reason: HOSPADM

## 2020-06-01 RX ORDER — ESCITALOPRAM OXALATE 10 MG/1
10 TABLET ORAL DAILY
Status: DISCONTINUED | OUTPATIENT
Start: 2020-06-02 | End: 2020-06-02 | Stop reason: HOSPADM

## 2020-06-01 RX ORDER — AMOXICILLIN 250 MG
2 CAPSULE ORAL 2 TIMES DAILY
Status: DISCONTINUED | OUTPATIENT
Start: 2020-06-01 | End: 2020-06-02 | Stop reason: HOSPADM

## 2020-06-01 RX ORDER — AMLODIPINE BESYLATE 5 MG/1
5 TABLET ORAL DAILY
Status: DISCONTINUED | OUTPATIENT
Start: 2020-06-02 | End: 2020-06-01

## 2020-06-01 RX ORDER — CEFAZOLIN SODIUM 1 G/3ML
1 INJECTION, POWDER, FOR SOLUTION INTRAMUSCULAR; INTRAVENOUS EVERY 8 HOURS
Status: COMPLETED | OUTPATIENT
Start: 2020-06-01 | End: 2020-06-02

## 2020-06-01 RX ORDER — DEXAMETHASONE SODIUM PHOSPHATE 4 MG/ML
4 INJECTION, SOLUTION INTRA-ARTICULAR; INTRALESIONAL; INTRAMUSCULAR; INTRAVENOUS; SOFT TISSUE
Status: DISCONTINUED | OUTPATIENT
Start: 2020-06-01 | End: 2020-06-01 | Stop reason: HOSPADM

## 2020-06-01 RX ORDER — CEFAZOLIN SODIUM 1 G/3ML
1 INJECTION, POWDER, FOR SOLUTION INTRAMUSCULAR; INTRAVENOUS SEE ADMIN INSTRUCTIONS
Status: DISCONTINUED | OUTPATIENT
Start: 2020-06-01 | End: 2020-06-01 | Stop reason: HOSPADM

## 2020-06-01 RX ORDER — MEPERIDINE HYDROCHLORIDE 25 MG/ML
12.5 INJECTION INTRAMUSCULAR; INTRAVENOUS; SUBCUTANEOUS EVERY 5 MIN PRN
Status: DISCONTINUED | OUTPATIENT
Start: 2020-06-01 | End: 2020-06-01 | Stop reason: HOSPADM

## 2020-06-01 RX ORDER — LIDOCAINE 40 MG/G
CREAM TOPICAL
Status: DISCONTINUED | OUTPATIENT
Start: 2020-06-01 | End: 2020-06-02

## 2020-06-01 RX ORDER — ACETAMINOPHEN 325 MG/1
650 TABLET ORAL EVERY 4 HOURS PRN
Status: DISCONTINUED | OUTPATIENT
Start: 2020-06-04 | End: 2020-06-02 | Stop reason: HOSPADM

## 2020-06-01 RX ORDER — VITAMIN B COMPLEX
2000 TABLET ORAL DAILY
Status: DISCONTINUED | OUTPATIENT
Start: 2020-06-02 | End: 2020-06-02 | Stop reason: HOSPADM

## 2020-06-01 RX ORDER — BISACODYL 10 MG
10 SUPPOSITORY, RECTAL RECTAL DAILY PRN
Status: DISCONTINUED | OUTPATIENT
Start: 2020-06-01 | End: 2020-06-02 | Stop reason: HOSPADM

## 2020-06-01 RX ORDER — TRANEXAMIC ACID 10 MG/ML
1 INJECTION, SOLUTION INTRAVENOUS
Status: COMPLETED | OUTPATIENT
Start: 2020-06-01 | End: 2020-06-01

## 2020-06-01 RX ORDER — LIDOCAINE 40 MG/G
CREAM TOPICAL
Status: DISCONTINUED | OUTPATIENT
Start: 2020-06-01 | End: 2020-06-01 | Stop reason: HOSPADM

## 2020-06-01 RX ORDER — ONDANSETRON 2 MG/ML
4 INJECTION INTRAMUSCULAR; INTRAVENOUS EVERY 6 HOURS PRN
Status: DISCONTINUED | OUTPATIENT
Start: 2020-06-01 | End: 2020-06-02 | Stop reason: HOSPADM

## 2020-06-01 RX ORDER — LISINOPRIL 20 MG/1
20 TABLET ORAL DAILY
Status: DISCONTINUED | OUTPATIENT
Start: 2020-06-02 | End: 2020-06-02 | Stop reason: HOSPADM

## 2020-06-01 RX ORDER — NITROGLYCERIN 0.4 MG/1
0.4 TABLET SUBLINGUAL EVERY 5 MIN PRN
Status: DISCONTINUED | OUTPATIENT
Start: 2020-06-01 | End: 2020-06-02 | Stop reason: HOSPADM

## 2020-06-01 RX ORDER — POLYETHYLENE GLYCOL 3350 17 G/17G
17 POWDER, FOR SOLUTION ORAL DAILY
Status: DISCONTINUED | OUTPATIENT
Start: 2020-06-01 | End: 2020-06-02 | Stop reason: HOSPADM

## 2020-06-01 RX ORDER — ONDANSETRON 2 MG/ML
4 INJECTION INTRAMUSCULAR; INTRAVENOUS EVERY 30 MIN PRN
Status: DISCONTINUED | OUTPATIENT
Start: 2020-06-01 | End: 2020-06-01 | Stop reason: HOSPADM

## 2020-06-01 RX ORDER — METOPROLOL TARTRATE 1 MG/ML
1-2 INJECTION, SOLUTION INTRAVENOUS EVERY 5 MIN PRN
Status: DISCONTINUED | OUTPATIENT
Start: 2020-06-01 | End: 2020-06-01 | Stop reason: HOSPADM

## 2020-06-01 RX ORDER — ONDANSETRON 4 MG/1
4 TABLET, ORALLY DISINTEGRATING ORAL EVERY 30 MIN PRN
Status: DISCONTINUED | OUTPATIENT
Start: 2020-06-01 | End: 2020-06-01 | Stop reason: HOSPADM

## 2020-06-01 RX ORDER — HYDROXYZINE HYDROCHLORIDE 10 MG/1
10 TABLET, FILM COATED ORAL EVERY 6 HOURS PRN
Status: DISCONTINUED | OUTPATIENT
Start: 2020-06-01 | End: 2020-06-02 | Stop reason: HOSPADM

## 2020-06-01 RX ORDER — HYDROMORPHONE HYDROCHLORIDE 1 MG/ML
0.2 INJECTION, SOLUTION INTRAMUSCULAR; INTRAVENOUS; SUBCUTANEOUS
Status: DISCONTINUED | OUTPATIENT
Start: 2020-06-01 | End: 2020-06-02 | Stop reason: HOSPADM

## 2020-06-01 RX ORDER — SODIUM CHLORIDE, SODIUM LACTATE, POTASSIUM CHLORIDE, CALCIUM CHLORIDE 600; 310; 30; 20 MG/100ML; MG/100ML; MG/100ML; MG/100ML
INJECTION, SOLUTION INTRAVENOUS CONTINUOUS
Status: DISCONTINUED | OUTPATIENT
Start: 2020-06-01 | End: 2020-06-01 | Stop reason: HOSPADM

## 2020-06-01 RX ORDER — DEXAMETHASONE SODIUM PHOSPHATE 4 MG/ML
INJECTION, SOLUTION INTRA-ARTICULAR; INTRALESIONAL; INTRAMUSCULAR; INTRAVENOUS; SOFT TISSUE PRN
Status: DISCONTINUED | OUTPATIENT
Start: 2020-06-01 | End: 2020-06-01

## 2020-06-01 RX ORDER — CELECOXIB 100 MG/1
100 CAPSULE ORAL 2 TIMES DAILY
Status: DISCONTINUED | OUTPATIENT
Start: 2020-06-01 | End: 2020-06-02 | Stop reason: HOSPADM

## 2020-06-01 RX ORDER — OXYCODONE HYDROCHLORIDE 5 MG/1
5-10 TABLET ORAL
Status: DISCONTINUED | OUTPATIENT
Start: 2020-06-01 | End: 2020-06-02 | Stop reason: HOSPADM

## 2020-06-01 RX ORDER — TRANEXAMIC ACID 10 MG/ML
1 INJECTION, SOLUTION INTRAVENOUS ONCE
Status: COMPLETED | OUTPATIENT
Start: 2020-06-01 | End: 2020-06-01

## 2020-06-01 RX ORDER — ASPIRIN 81 MG/1
81 TABLET ORAL DAILY
Status: DISCONTINUED | OUTPATIENT
Start: 2020-06-02 | End: 2020-06-02 | Stop reason: HOSPADM

## 2020-06-01 RX ORDER — ASCORBIC ACID 500 MG
1000 TABLET ORAL DAILY
Status: DISCONTINUED | OUTPATIENT
Start: 2020-06-01 | End: 2020-06-02 | Stop reason: HOSPADM

## 2020-06-01 RX ORDER — PROCHLORPERAZINE MALEATE 5 MG
5 TABLET ORAL EVERY 6 HOURS PRN
Status: DISCONTINUED | OUTPATIENT
Start: 2020-06-01 | End: 2020-06-02 | Stop reason: HOSPADM

## 2020-06-01 RX ORDER — METOCLOPRAMIDE HYDROCHLORIDE 5 MG/ML
5 INJECTION INTRAMUSCULAR; INTRAVENOUS EVERY 6 HOURS PRN
Status: DISCONTINUED | OUTPATIENT
Start: 2020-06-01 | End: 2020-06-02 | Stop reason: HOSPADM

## 2020-06-01 RX ORDER — LABETALOL 20 MG/4 ML (5 MG/ML) INTRAVENOUS SYRINGE
PRN
Status: DISCONTINUED | OUTPATIENT
Start: 2020-06-01 | End: 2020-06-01

## 2020-06-01 RX ORDER — ATENOLOL 25 MG/1
25 TABLET ORAL 2 TIMES DAILY
Status: DISCONTINUED | OUTPATIENT
Start: 2020-06-01 | End: 2020-06-02 | Stop reason: HOSPADM

## 2020-06-01 RX ORDER — GABAPENTIN 100 MG/1
100 CAPSULE ORAL 3 TIMES DAILY
Status: DISCONTINUED | OUTPATIENT
Start: 2020-06-01 | End: 2020-06-02 | Stop reason: HOSPADM

## 2020-06-01 RX ORDER — NALOXONE HYDROCHLORIDE 0.4 MG/ML
.1-.4 INJECTION, SOLUTION INTRAMUSCULAR; INTRAVENOUS; SUBCUTANEOUS
Status: DISCONTINUED | OUTPATIENT
Start: 2020-06-01 | End: 2020-06-02 | Stop reason: HOSPADM

## 2020-06-01 RX ORDER — CEFAZOLIN SODIUM 2 G/100ML
2 INJECTION, SOLUTION INTRAVENOUS
Status: COMPLETED | OUTPATIENT
Start: 2020-06-01 | End: 2020-06-01

## 2020-06-01 RX ORDER — SODIUM CHLORIDE 9 MG/ML
INJECTION, SOLUTION INTRAVENOUS CONTINUOUS
Status: DISCONTINUED | OUTPATIENT
Start: 2020-06-01 | End: 2020-06-02

## 2020-06-01 RX ORDER — DIMENHYDRINATE 50 MG/ML
25 INJECTION, SOLUTION INTRAMUSCULAR; INTRAVENOUS
Status: DISCONTINUED | OUTPATIENT
Start: 2020-06-01 | End: 2020-06-01 | Stop reason: HOSPADM

## 2020-06-01 RX ORDER — PROPOFOL 10 MG/ML
INJECTION, EMULSION INTRAVENOUS CONTINUOUS PRN
Status: DISCONTINUED | OUTPATIENT
Start: 2020-06-01 | End: 2020-06-01

## 2020-06-01 RX ORDER — HYDRALAZINE HYDROCHLORIDE 20 MG/ML
2.5-5 INJECTION INTRAMUSCULAR; INTRAVENOUS EVERY 10 MIN PRN
Status: DISCONTINUED | OUTPATIENT
Start: 2020-06-01 | End: 2020-06-01 | Stop reason: HOSPADM

## 2020-06-01 RX ORDER — GABAPENTIN 100 MG/1
100 CAPSULE ORAL
Status: COMPLETED | OUTPATIENT
Start: 2020-06-01 | End: 2020-06-01

## 2020-06-01 RX ORDER — ROSUVASTATIN CALCIUM 20 MG/1
40 TABLET, COATED ORAL DAILY
Status: DISCONTINUED | OUTPATIENT
Start: 2020-06-02 | End: 2020-06-02 | Stop reason: HOSPADM

## 2020-06-01 RX ORDER — WARFARIN SODIUM 5 MG/1
10 TABLET ORAL
Status: COMPLETED | OUTPATIENT
Start: 2020-06-01 | End: 2020-06-01

## 2020-06-01 RX ORDER — LIDOCAINE HYDROCHLORIDE ANHYDROUS AND EPINEPHRINE 10; 10 MG/ML; UG/ML
30 INJECTION, SOLUTION INFILTRATION ONCE
Status: DISCONTINUED | OUTPATIENT
Start: 2020-06-01 | End: 2020-06-02 | Stop reason: HOSPADM

## 2020-06-01 RX ORDER — LISINOPRIL 20 MG/1
20 TABLET ORAL DAILY
Status: DISCONTINUED | OUTPATIENT
Start: 2020-06-02 | End: 2020-06-01

## 2020-06-01 RX ORDER — KETOROLAC TROMETHAMINE 30 MG/ML
15 INJECTION, SOLUTION INTRAMUSCULAR; INTRAVENOUS
Status: DISCONTINUED | OUTPATIENT
Start: 2020-06-01 | End: 2020-06-01 | Stop reason: HOSPADM

## 2020-06-01 RX ORDER — ACETAMINOPHEN 325 MG/1
975 TABLET ORAL EVERY 8 HOURS
Status: DISCONTINUED | OUTPATIENT
Start: 2020-06-01 | End: 2020-06-02 | Stop reason: HOSPADM

## 2020-06-01 RX ORDER — FENTANYL CITRATE 50 UG/ML
INJECTION, SOLUTION INTRAMUSCULAR; INTRAVENOUS PRN
Status: DISCONTINUED | OUTPATIENT
Start: 2020-06-01 | End: 2020-06-01

## 2020-06-01 RX ORDER — GLYCOPYRROLATE 0.2 MG/ML
INJECTION, SOLUTION INTRAMUSCULAR; INTRAVENOUS PRN
Status: DISCONTINUED | OUTPATIENT
Start: 2020-06-01 | End: 2020-06-01

## 2020-06-01 RX ORDER — METOCLOPRAMIDE 5 MG/1
5 TABLET ORAL EVERY 6 HOURS PRN
Status: DISCONTINUED | OUTPATIENT
Start: 2020-06-01 | End: 2020-06-02 | Stop reason: HOSPADM

## 2020-06-01 RX ORDER — LIDOCAINE HYDROCHLORIDE 10 MG/ML
INJECTION, SOLUTION INFILTRATION; PERINEURAL PRN
Status: DISCONTINUED | OUTPATIENT
Start: 2020-06-01 | End: 2020-06-01

## 2020-06-01 RX ORDER — ONDANSETRON 2 MG/ML
INJECTION INTRAMUSCULAR; INTRAVENOUS PRN
Status: DISCONTINUED | OUTPATIENT
Start: 2020-06-01 | End: 2020-06-01

## 2020-06-01 RX ORDER — ACETAMINOPHEN 500 MG
1000 TABLET ORAL ONCE
Status: COMPLETED | OUTPATIENT
Start: 2020-06-01 | End: 2020-06-01

## 2020-06-01 RX ORDER — ATENOLOL 25 MG/1
25 TABLET ORAL 2 TIMES DAILY
Status: DISCONTINUED | OUTPATIENT
Start: 2020-06-01 | End: 2020-06-01

## 2020-06-01 RX ORDER — MULTIVITAMIN,THERAPEUTIC
1 TABLET ORAL DAILY
Status: DISCONTINUED | OUTPATIENT
Start: 2020-06-02 | End: 2020-06-02 | Stop reason: HOSPADM

## 2020-06-01 RX ORDER — METHOCARBAMOL 500 MG/1
500 TABLET, FILM COATED ORAL 4 TIMES DAILY PRN
Status: DISCONTINUED | OUTPATIENT
Start: 2020-06-01 | End: 2020-06-02 | Stop reason: HOSPADM

## 2020-06-01 RX ORDER — NEOSTIGMINE METHYLSULFATE 1 MG/ML
VIAL (ML) INJECTION PRN
Status: DISCONTINUED | OUTPATIENT
Start: 2020-06-01 | End: 2020-06-01

## 2020-06-01 RX ORDER — PROPOFOL 10 MG/ML
INJECTION, EMULSION INTRAVENOUS PRN
Status: DISCONTINUED | OUTPATIENT
Start: 2020-06-01 | End: 2020-06-01

## 2020-06-01 RX ORDER — NALOXONE HYDROCHLORIDE 0.4 MG/ML
.1-.4 INJECTION, SOLUTION INTRAMUSCULAR; INTRAVENOUS; SUBCUTANEOUS
Status: DISCONTINUED | OUTPATIENT
Start: 2020-06-01 | End: 2020-06-01

## 2020-06-01 RX ORDER — AMLODIPINE BESYLATE 5 MG/1
5 TABLET ORAL DAILY
Status: DISCONTINUED | OUTPATIENT
Start: 2020-06-02 | End: 2020-06-02 | Stop reason: HOSPADM

## 2020-06-01 RX ORDER — FENTANYL CITRATE 50 UG/ML
25-50 INJECTION, SOLUTION INTRAMUSCULAR; INTRAVENOUS
Status: DISCONTINUED | OUTPATIENT
Start: 2020-06-01 | End: 2020-06-01 | Stop reason: HOSPADM

## 2020-06-01 RX ORDER — LEVOTHYROXINE SODIUM 100 UG/1
100 TABLET ORAL DAILY
Status: DISCONTINUED | OUTPATIENT
Start: 2020-06-02 | End: 2020-06-02 | Stop reason: HOSPADM

## 2020-06-01 RX ADMIN — FENTANYL CITRATE 100 MCG: 50 INJECTION, SOLUTION INTRAMUSCULAR; INTRAVENOUS at 13:43

## 2020-06-01 RX ADMIN — PROPOFOL 30 MCG/KG/MIN: 10 INJECTION, EMULSION INTRAVENOUS at 13:30

## 2020-06-01 RX ADMIN — GLYCOPYRROLATE 0.2 MG: 0.2 INJECTION, SOLUTION INTRAMUSCULAR; INTRAVENOUS at 13:09

## 2020-06-01 RX ADMIN — ONDANSETRON HYDROCHLORIDE 4 MG: 2 INJECTION, SOLUTION INTRAVENOUS at 14:02

## 2020-06-01 RX ADMIN — Medication 3.5 MG: at 14:20

## 2020-06-01 RX ADMIN — DEXAMETHASONE SODIUM PHOSPHATE 4 MG: 4 INJECTION, SOLUTION INTRA-ARTICULAR; INTRALESIONAL; INTRAMUSCULAR; INTRAVENOUS; SOFT TISSUE at 13:09

## 2020-06-01 RX ADMIN — CEFAZOLIN SODIUM 2 G: 2 INJECTION, SOLUTION INTRAVENOUS at 13:01

## 2020-06-01 RX ADMIN — FENTANYL CITRATE 50 MCG: 50 INJECTION, SOLUTION INTRAMUSCULAR; INTRAVENOUS at 13:36

## 2020-06-01 RX ADMIN — PHENYLEPHRINE HYDROCHLORIDE 100 MCG: 10 INJECTION INTRAVENOUS at 13:12

## 2020-06-01 RX ADMIN — ROCURONIUM BROMIDE 20 MG: 10 INJECTION INTRAVENOUS at 13:42

## 2020-06-01 RX ADMIN — PHENYLEPHRINE HYDROCHLORIDE 50 MCG: 10 INJECTION INTRAVENOUS at 13:27

## 2020-06-01 RX ADMIN — LABETALOL 20 MG/4 ML (5 MG/ML) INTRAVENOUS SYRINGE 5 MG: at 13:46

## 2020-06-01 RX ADMIN — CEFAZOLIN 1 G: 330 INJECTION, POWDER, FOR SOLUTION INTRAMUSCULAR; INTRAVENOUS at 20:34

## 2020-06-01 RX ADMIN — PROPOFOL 140 MG: 10 INJECTION, EMULSION INTRAVENOUS at 13:09

## 2020-06-01 RX ADMIN — GABAPENTIN 100 MG: 100 CAPSULE ORAL at 20:30

## 2020-06-01 RX ADMIN — GABAPENTIN 100 MG: 100 CAPSULE ORAL at 12:20

## 2020-06-01 RX ADMIN — LABETALOL 20 MG/4 ML (5 MG/ML) INTRAVENOUS SYRINGE 5 MG: at 14:43

## 2020-06-01 RX ADMIN — ACETAMINOPHEN 1000 MG: 500 TABLET, FILM COATED ORAL at 12:20

## 2020-06-01 RX ADMIN — FENTANYL CITRATE 50 MCG: 50 INJECTION, SOLUTION INTRAMUSCULAR; INTRAVENOUS at 13:28

## 2020-06-01 RX ADMIN — SODIUM CHLORIDE, POTASSIUM CHLORIDE, SODIUM LACTATE AND CALCIUM CHLORIDE: 600; 310; 30; 20 INJECTION, SOLUTION INTRAVENOUS at 12:38

## 2020-06-01 RX ADMIN — TRANEXAMIC ACID 1 G: 10 INJECTION, SOLUTION INTRAVENOUS at 14:31

## 2020-06-01 RX ADMIN — HYDROMORPHONE HYDROCHLORIDE 0.5 MG: 1 INJECTION, SOLUTION INTRAMUSCULAR; INTRAVENOUS; SUBCUTANEOUS at 13:53

## 2020-06-01 RX ADMIN — SODIUM CHLORIDE: 9 INJECTION, SOLUTION INTRAVENOUS at 16:43

## 2020-06-01 RX ADMIN — FENTANYL CITRATE 100 MCG: 50 INJECTION, SOLUTION INTRAMUSCULAR; INTRAVENOUS at 13:09

## 2020-06-01 RX ADMIN — LIDOCAINE HYDROCHLORIDE 50 MG: 10 INJECTION, SOLUTION INFILTRATION; PERINEURAL at 13:09

## 2020-06-01 RX ADMIN — SENNOSIDES AND DOCUSATE SODIUM 2 TABLET: 8.6; 5 TABLET ORAL at 20:33

## 2020-06-01 RX ADMIN — Medication 100 MG: at 13:09

## 2020-06-01 RX ADMIN — GLYCOPYRROLATE 0.4 MG: 0.2 INJECTION, SOLUTION INTRAMUSCULAR; INTRAVENOUS at 14:19

## 2020-06-01 RX ADMIN — TRANEXAMIC ACID 1 G: 10 INJECTION, SOLUTION INTRAVENOUS at 13:11

## 2020-06-01 RX ADMIN — ACETAMINOPHEN 975 MG: 325 TABLET, FILM COATED ORAL at 20:29

## 2020-06-01 RX ADMIN — ATENOLOL 25 MG: 25 TABLET ORAL at 20:33

## 2020-06-01 RX ADMIN — CELECOXIB 100 MG: 100 CAPSULE ORAL at 20:33

## 2020-06-01 RX ADMIN — WARFARIN SODIUM 10 MG: 5 TABLET ORAL at 20:30

## 2020-06-01 ASSESSMENT — MIFFLIN-ST. JEOR: SCORE: 1164.59

## 2020-06-01 NOTE — BRIEF OP NOTE
[facility]    Brief Operative Note    Pre-operative diagnosis: Primary osteoarthritis of left knee [M17.12]  Post-operative diagnosis left knee djd    Procedure: Procedure(s):  Left total knee arthroplasty  Surgeon: Surgeon(s) and Role:     * Joreg Luis Cheatham MD - Primary     * Paul Coronel PA-C - Assisting  Anesthesia: General   Estimated blood loss: Minimal  Drains: Hemovac  Specimens: * No specimens in log *  Findings:   None.  Complications: none  Implants:   Implant Name Type Inv. Item Serial No.  Lot No. LRB No. Used Action   BONE CEMENT SIMPLEX FULL DOSE 6191-1-001 Cement, Bone BONE CEMENT SIMPLEX FULL DOSE 6191-1-001  [employer] [id number] Left 1 Implanted   Jim II Left Non-Porous Tibial Baseplate Size 3    [employer] [id number] Left 1 Implanted   IMP COMP FEMORAL S&amp;N LEGION CR NP NARROW 4 LT [id number] Total Joint Component/Insert IMP COMP FEMORAL S&amp;N LEGION CR NP NARROW 4 LT [id number]  [place]  [id number] Left 1 Implanted   IMP COMP PATELLA JIM II 9X35MM [id number] Total Joint Component/Insert IMP COMP PATELLA JIM II 9X35MM [id number]  [place]  [id number] Left 1 Implanted   Legion CR XLP High Flexion Articular Insert Size 3-4, 9mm    [employer] [id number] Left 1 Implanted

## 2020-06-01 NOTE — ANESTHESIA PREPROCEDURE EVALUATION
Anesthesia Pre-Procedure Evaluation    Patient: Salome Saeed   MRN: [id number] : 1940          Preoperative Diagnosis: Primary osteoarthritis of left knee [M17.12]    Procedure(s):  Left total knee arthroplasty    Past Medical History:   Diagnosis Date     Antiplatelet or antithrombotic long-term use      CAD (coronary artery disease) 2009: PTCA and two 2.5x15mm Xience stents to mid LAD lesion; Cath 2012- 40-50% stenosis in mid PDA, 80% on D1- medically treat , 2015 - PTCA and 2.25x8mm Promus PREMIIER NAILA to ostium of 2nd OM     Cerebral artery occlusion with cerebral infarction (H)      Coagulation disorder (H)     warafin     CVA (cerebral infarction)      DDD (degenerative disc disease)      Dyspnea on exertion      Essential hypertension, benign      GERD (gastroesophageal reflux disease)      Headache      Heart murmur      History of angina     rarely     Hyperlipidemia      Hypothyroidism      Lumbago      Lumbar spinal stenosis      Mitral regurgitation     1+ per 2013 Echo     Stented coronary artery      Vertebral artery stenosis, right      Past Surgical History:   Procedure Laterality Date     CORONARY ANGIOGRAPHY ADULT ORDER  2012    40-50% stenosis to mid PDA, 80% in D1- medically treat     CORONARY ANGIOGRAPHY ADULT ORDER  2015    PTCA and 2.25x8mm Promus PREMIER NAILA to ostium of 2nd OM     DECOMPRESSION, FUSION LUMBAR POSTERIOR THREE + LEVELS, COMBINED  2013    Procedure: COMBINED DECOMPRESSION, FUSION LUMBAR POSTERIOR THREE + LEVELS;  Posterior Lumbar Decompression L3-S1, Fusion L4-S1;  Surgeon: Daniel Harris MD;  Location: [facility] OR     ENDOSCOPIC STRIPPING VEIN(S)       HEART CATH, ANGIOPLASTY  2009    PTCA and two 2.5x15mm Xience stents to mid LAD lesion     HYSTERECTOMY, RICKIE      Fibroids, and Menorrhagia.. Bilateral Oophrectomy     ORTHOPEDIC SURGERY       Stenting of LAD, Angioplasty  09     TONSILLECTOMY      as a child      Anesthesia Evaluation     . Pt has had prior anesthetic. Type: General           ROS/MED HX    ENT/Pulmonary:  - neg pulmonary ROS     Neurologic:     (+)CVA TIA     Cardiovascular: Comment: 4/8/2009: PTCA and two 2.5x15mm Xience stents to mid LAD lesion; Cath 12/2012- 40-50% stenosis in mid PDA, 80% on D1- medically treat , 5/28/2015 - PTCA and 2.25x8mm Promus PREMIIER NAILA to ostium of 2nd OM    (+) Dyslipidemia, hypertension--CAD, angina-with excertion, -. Taking blood thinners : . . WILSON, . :. valvular problems/murmurs .       METS/Exercise Tolerance:     Hematologic:  - neg hematologic  ROS       Musculoskeletal:   (+) arthritis,  other musculoskeletal- ddd knee      GI/Hepatic:     (+) GERD Asymptomatic on medication,       Renal/Genitourinary:         Endo:     (+) thyroid problem hypothyroidism, .      Psychiatric:  - neg psychiatric ROS       Infectious Disease:  - neg infectious disease ROS       Malignancy:      - no malignancy   Other:    (+) No chance of pregnancy C-spine cleared: N/A, no H/O Chronic Pain,no other significant disability                         Physical Exam  Normal systems: cardiovascular and dental    Airway   Mallampati: II  TM distance: >3 FB  Neck ROM: full    Dental     Cardiovascular       Pulmonary             Lab Results   Component Value Date    WBC 5.7 05/27/2020    HGB 12.2 05/27/2020    HCT 36.9 05/27/2020     05/27/2020    CRP 5.2 05/24/2017    SED 38 (H) 05/24/2017     (L) 05/27/2020    POTASSIUM 4.2 05/27/2020    CHLORIDE 99 05/27/2020    CO2 27 05/27/2020    BUN 18 05/27/2020    CR 0.69 05/27/2020    GLC 94 05/27/2020    ERIC 8.5 05/27/2020    MAG 1.9 07/19/2013    ALBUMIN 3.6 05/24/2017    PROTTOTAL 7.4 05/24/2017    ALT 29 11/07/2019    AST 21 05/24/2017    ALKPHOS 145 05/24/2017    BILITOTAL 0.4 05/24/2017    LIPASE 176 05/24/2017    PTT 60 (H) 05/28/2015    INR 1.00 06/01/2020    TSH 0.90 05/27/2020    T4 1.19 08/22/2018    T3 84 02/19/2019       Preop  "Vitals  BP Readings from Last 3 Encounters:   06/01/20 136/59   05/27/20 114/64   05/06/20 112/58    Pulse Readings from Last 3 Encounters:   05/27/20 68   01/09/20 67   10/04/19 80      Resp Readings from Last 3 Encounters:   06/01/20 16   05/04/16 14   05/29/15 14    SpO2 Readings from Last 3 Encounters:   06/01/20 99%   05/27/20 98%   01/09/20 97%      Temp Readings from Last 1 Encounters:   06/01/20 98.3  F (36.8  C) (Temporal)    Ht Readings from Last 1 Encounters:   06/01/20 1.62 m (5' 3.78\")      Wt Readings from Last 1 Encounters:   06/01/20 70.3 kg (155 lb)    Estimated body mass index is 26.79 kg/m  as calculated from the following:    Height as of this encounter: 1.62 m (5' 3.78\").    Weight as of this encounter: 70.3 kg (155 lb).       Anesthesia Plan      History & Physical Review  History and physical reviewed and following examination; no interval change.    ASA Status:  3 .    NPO Status:  > 8 hours    Plan for General with Propofol and Intravenous induction. Maintenance will be Inhalation.    PONV prophylaxis:  Ondansetron (or other 5HT-3) and Dexamethasone or Solumedrol         Postoperative Care  Postoperative pain management:  IV analgesics.      Consents  Anesthetic plan, risks, benefits and alternatives discussed with:  Patient.  Use of blood products discussed: Yes.   Use of blood products discussed with Patient.  Consented to blood products.  .                 Víctor Anderson MD                    .  "

## 2020-06-01 NOTE — OP NOTE
"June 1, 2020    Pre Operative Diagnosis: Left Knee DJD  Post operative Diagnosis: Left Knee DJD  Title of the Procedure: Left Total Knee Arthroplasty ( Smith and Nephew #5 narrow femur; #3 tibia; 9 mm tibial poly and 35 mm patella  )  Anesthesia: general  Surgeon: Jorge Luis Cheatham M.D.  Assistant:KATIE Steen    Assistance from the PA was necessary for this case due to the complexity of the procedure. PA helped with satisfactory exposure of the bones, protecting vital neurovascular and ligamentous structures. He also helped with maintaining the instruments for bone osteotomy and subsequent implantation of the components. He also performed wound closure and placement of postoperative dressing.    Drain: Hemovac    Indication of the Procedure:  This patient is a 80 y.o. female who has had chronic knee pain with progressively worsening due to advanced osteoarthritis. All reasonable and appropriate non-operative treatments have been tried. The knee pain is severe to the point of affecting this patient's day-to-day activities and There has been disturbances of sleeping. With understanding of the available options including further observation, nature of the surgery and potential complications of the surgery, total knee arthroplasty is chosen to be carried out today. As far as potential complications are concerned, emphasis has been placed on infection, deep vein thrombosis, arthrofibrosis, karyn-prosthetic fractures, loosening of the components, unexpected anesthetic complications as well as persisting pain that cannot be explained.    Description of the procedure:  After satisfactory anesthesia was administered, the correct lower extremity was then prepped and draped in a routine sterile fashion in a supine position. The \"time out\" was then carried out per protocol. At this time administration of preoperative prophylactic antibiotic was confirmed.    Following exsanguination, the  tourniquet was then inflated to 300 mm Hg. A " midline longitudinal incision was made from just above the patella  down to the tibial tubercle. A sharp dissection was carried through the subcutaneous tissue and subsequently,  a medial parapatellar arthrotomy was carried out entering the knee joint. The patella was then displaced laterally and knee was flexed. All osteophytes were removed from the femur,  tibia and patella.    After placement of the intramedullary anne-marie into the femur distal femoral cut was made using 5  valgus angle. After measurement of the size, the rest of the femoral cuts were made;anterior and posterior as well as chamfer.The trial femoral component was then applied and noted that fitting was satisfactory.  Using the extra medullary alignment guide, a perpendicular osteotomy was made on the proximal tibia. Using the appropriately sized tibial trial components, alignment was assessed using the alignment anne-marie. It was felt that a normal medical axis from the hip joint to the ankle joint was reestablished when all trial components were placed. Valgus and varus stress were applied and soft tissue tension was evaluated at this time. It was also confirmed there is a full extension and full flexion with gravity alone. The space for the tibial keel was then punched into the proximal tibia.    Subsequently, the patella was osteotomized after measurement of the thickness. The size was measured and the pegs were drilled. Patellofemoral tracking was then evaluated and noted to be satisfactory.    All osteotomized bone surfaces were thoroughly irrigated with the jet lavage system. The bone cement was mixed and components were placed while the bone cement was doughy.Excess cement was removed from components. When the bonecement became completely hard, another trial reduction was carried out to determine the thickness of the tibial polyethylene component. The final polyethylene tibial component was then inserted.    With further irrigation of the knee joint a  medium Hemovac drain was placed. The wound was closed In layers in a routine fashion.The skin layer was closed with staples.A routine compression soft dressing was applied and the knee immobilizer was applied. Tourniquet was deflated at the end of the procedure.  The needle and sponge counts were correct at the end of the case. Blood loss was minimum. No intraoperative complications were identified at the time of finishing the operation.    Jorge Luis Cheatham M.D.

## 2020-06-01 NOTE — ANESTHESIA POSTPROCEDURE EVALUATION
Patient: Salome Saeed    Procedure(s):  Left total knee arthroplasty    Diagnosis:Primary osteoarthritis of left knee [M17.12]  Diagnosis Additional Information: No value filed.    Anesthesia Type:  General    Note:  Anesthesia Post Evaluation    Patient location during evaluation: PACU  Patient participation: Able to fully participate in evaluation  Level of consciousness: awake and alert  Pain management: adequate  Airway patency: patent  Cardiovascular status: acceptable  Respiratory status: acceptable  Hydration status: acceptable  PONV: controlled     Anesthetic complications: None          Last vitals:  Vitals:    06/01/20 1520 06/01/20 1525 06/01/20 1530   BP: 133/63 125/60    Pulse:  69    Resp:  16    Temp:   98.3  F (36.8  C)   SpO2:  100%          Electronically Signed By: Víctor Anderson MD  June 1, 2020  3:39 PM

## 2020-06-01 NOTE — ANESTHESIA CARE TRANSFER NOTE
Patient: Salome Saeed    Procedure(s):  Left total knee arthroplasty    Diagnosis: Primary osteoarthritis of left knee [M17.12]  Diagnosis Additional Information: No value filed.    Anesthesia Type:   General     Note:  Airway :Face Mask  Patient transferred to:PACU  Comments:   Spontaneous respirations, oral suctioned, bilateral eye opening and hand grasps.  Extubated to FM O2 4lpm.  VSS to PACU.Handoff Report: Identifed the Patient, Identified the Reponsible Provider, Reviewed the pertinent medical history, Discussed the surgical course, Reviewed Intra-OP anesthesia mangement and issues during anesthesia, Set expectations for post-procedure period and Allowed opportunity for questions and acknowledgement of understanding      Vitals: (Last set prior to Anesthesia Care Transfer)    CRNA VITALS  6/1/2020 1416 - 6/1/2020 1446      6/1/2020             NIBP:  164/85    Pulse:  87    NIBP Mean:  115    SpO2:  96 %                Electronically Signed By: KIANA Davis CRNA  June 1, 2020  2:46 PM

## 2020-06-01 NOTE — PHARMACY-ADMISSION MEDICATION HISTORY
Medication reconciliation interview completed by pre-admitting nurse Clara Haskins, reviewed by pharmacy. No further clarifications needed.       Prior to Admission medications    Medication Sig Last Dose Taking? Auth Provider   acetaminophen (TYLENOL) 325 MG tablet Take 2 tablets by mouth every 6 hours as needed for pain. 5/31/2020 at Unknown time Yes Weiler, Karen, MD   amLODIPine (NORVASC) 5 MG tablet Take 1 tablet (5 mg) by mouth daily 6/1/2020 at Unknown time Yes Nikolai Amaya MD   atenolol (TENORMIN) 25 MG tablet Take 1 tablet (25 mg) by mouth 2 times daily 6/1/2020 at Unknown time Yes Emmett Araujo APRN CNP   diclofenac (VOLTAREN) 1 % topical gel Place 4 g onto the skin 4 times daily Past Week at Unknown time Yes Arron Alexander, DO   enoxaparin ANTICOAGULANT (LOVENOX) 80 MG/0.8ML syringe Inject 0.7 mLs (70 mg) Subcutaneous every 12 hours 5/27--hold Warfarin no Lovenox, 5/28 Hold Warfarin no Lovenox, 5/29  Hold Warfarin Start Lovenox every 12 hours, 5/30 Hold Warfarin Lovenox every 12 hours, 5/31 Hold Warfarin, Lovenox am dose only. 6/1  Day of Surgery, resume Warfarin 10 mg if okay with surgeon. No Lovenox, 6/2 Warfarin 2.5 mg, no lovenox in am, restart Lovenox in PM, 6/3 Warfarin 2.5 mg Lovenox BID, 6/4 Warfarin 2.5 mg Lovenox BID 5/31/2020 at Unknown time Yes Surya Dyer, DO   escitalopram (LEXAPRO) 5 MG tablet TAKE 1 TABLET EVERY DAY 6/1/2020 at Unknown time Yes Surya Dyer, DO   levothyroxine (SYNTHROID/LEVOTHROID) 100 MCG tablet Take 1 tablet (100 mcg) by mouth daily 6/1/2020 at Unknown time Yes Alida Ugarte APRN CNP   lisinopril (PRINIVIL/ZESTRIL) 20 MG tablet Take 1 tablet (20 mg) by mouth daily 5/31/2020 at Unknown time Yes Yonatan-Kvidera, Emmett M, APRN CNP   naproxen sodium 220 MG capsule  Past Week at Unknown time Yes Reported, Patient   rosuvastatin (CRESTOR) 40 MG tablet Take 1 tablet (40 mg) by mouth daily 6/1/2020 at Unknown time Yes Gayle,  KIANA Young CNP   warfarin (COUMADIN) 5 MG tablet TAKE 1/2 TO 1 TABLET EVERY DAY AS DIRECTED  BY  INR  NURSE  BASED ON INR READING Past Week at Unknown time Yes Surya Dyer,    ascorbic acid (VITAMIN C) 500 MG tablet Take 1,000 mg by mouth daily  5/30/2020  Reported, Patient   aspirin EC 81 MG tablet Take 1 tablet (81 mg) by mouth daily 6/1/2020  Nikolai Amaya MD   Calcium Carb-Cholecalciferol (CALCIUM 600 + D PO) Take 1,200 mg by mouth daily  5/30  Reported, Patient   Cholecalciferol (VITAMIN D) 2000 UNITS tablet Take 2,000 Units by mouth daily 5/30  Reported, Patient   Coenzyme Q10 (COQ-10) 200 MG CAPS Take 400 mg by mouth daily  5/30/2020  Reported, Patient   Multiple Vitamin (DAILY MULTIVITAMIN PO) Take by mouth daily 5/30  Reported, Patient   nitroGLYcerin (NITROSTAT) 0.4 MG sublingual tablet Place 1 tablet (0.4 mg) under the tongue every 5 minutes as needed for chest pain More than a month at Unknown time  Emmett Araujo APRN CNP   traMADol (ULTRAM) 50 MG tablet Take 1 tablet (50 mg) by mouth nightly as needed for severe pain 5/30  Arron Alexander,    vitamin B complex with vitamin C (VITAMIN  B COMPLEX) TABS tablet Take 1 tablet by mouth daily 5/30/2020  Reported, Patient

## 2020-06-02 ENCOUNTER — APPOINTMENT (OUTPATIENT)
Dept: PHYSICAL THERAPY | Facility: CLINIC | Age: 80
End: 2020-06-02
Attending: ORTHOPAEDIC SURGERY
Payer: COMMERCIAL

## 2020-06-02 ENCOUNTER — APPOINTMENT (OUTPATIENT)
Dept: OCCUPATIONAL THERAPY | Facility: CLINIC | Age: 80
End: 2020-06-02
Attending: ORTHOPAEDIC SURGERY
Payer: COMMERCIAL

## 2020-06-02 VITALS
HEART RATE: 67 BPM | RESPIRATION RATE: 16 BRPM | SYSTOLIC BLOOD PRESSURE: 156 MMHG | TEMPERATURE: 98.8 F | HEIGHT: 64 IN | DIASTOLIC BLOOD PRESSURE: 70 MMHG | OXYGEN SATURATION: 97 % | WEIGHT: 155 LBS | BODY MASS INDEX: 26.46 KG/M2

## 2020-06-02 LAB
GLUCOSE SERPL-MCNC: 167 MG/DL (ref 70–99)
HGB BLD-MCNC: 11 G/DL (ref 11.7–15.7)
INR PPP: 1.08 (ref 0.86–1.14)
PLATELET # BLD AUTO: 182 10E9/L (ref 150–450)

## 2020-06-02 PROCEDURE — 25000128 H RX IP 250 OP 636: Performed by: ORTHOPAEDIC SURGERY

## 2020-06-02 PROCEDURE — 82947 ASSAY GLUCOSE BLOOD QUANT: CPT | Performed by: ORTHOPAEDIC SURGERY

## 2020-06-02 PROCEDURE — 97161 PT EVAL LOW COMPLEX 20 MIN: CPT | Mod: GP

## 2020-06-02 PROCEDURE — 97116 GAIT TRAINING THERAPY: CPT | Mod: GP

## 2020-06-02 PROCEDURE — 99207 ZZC CDG-CODE CATEGORY CHANGED: CPT | Performed by: PHYSICIAN ASSISTANT

## 2020-06-02 PROCEDURE — 25000132 ZZH RX MED GY IP 250 OP 250 PS 637: Performed by: ORTHOPAEDIC SURGERY

## 2020-06-02 PROCEDURE — 36415 COLL VENOUS BLD VENIPUNCTURE: CPT | Performed by: PHYSICIAN ASSISTANT

## 2020-06-02 PROCEDURE — 25000132 ZZH RX MED GY IP 250 OP 250 PS 637: Performed by: PHYSICIAN ASSISTANT

## 2020-06-02 PROCEDURE — 36415 COLL VENOUS BLD VENIPUNCTURE: CPT | Performed by: ORTHOPAEDIC SURGERY

## 2020-06-02 PROCEDURE — 99202 OFFICE O/P NEW SF 15 MIN: CPT | Performed by: PHYSICIAN ASSISTANT

## 2020-06-02 PROCEDURE — 97165 OT EVAL LOW COMPLEX 30 MIN: CPT | Mod: GO | Performed by: REHABILITATION PRACTITIONER

## 2020-06-02 PROCEDURE — 85018 HEMOGLOBIN: CPT | Performed by: ORTHOPAEDIC SURGERY

## 2020-06-02 PROCEDURE — 97530 THERAPEUTIC ACTIVITIES: CPT | Mod: GO | Performed by: REHABILITATION PRACTITIONER

## 2020-06-02 PROCEDURE — 97535 SELF CARE MNGMENT TRAINING: CPT | Mod: GO | Performed by: REHABILITATION PRACTITIONER

## 2020-06-02 PROCEDURE — 97530 THERAPEUTIC ACTIVITIES: CPT | Mod: GP

## 2020-06-02 PROCEDURE — 85049 AUTOMATED PLATELET COUNT: CPT | Performed by: PHYSICIAN ASSISTANT

## 2020-06-02 PROCEDURE — 85610 PROTHROMBIN TIME: CPT | Performed by: ORTHOPAEDIC SURGERY

## 2020-06-02 RX ORDER — OXYCODONE HYDROCHLORIDE 5 MG/1
2.5-5 TABLET ORAL EVERY 4 HOURS PRN
Qty: 15 TABLET | Refills: 0 | Status: SHIPPED | OUTPATIENT
Start: 2020-06-02 | End: 2020-10-14

## 2020-06-02 RX ORDER — POLYETHYLENE GLYCOL 3350 17 G/17G
1 POWDER, FOR SOLUTION ORAL DAILY PRN
Qty: 10 PACKET | Refills: 0 | Status: SHIPPED | OUTPATIENT
Start: 2020-06-02 | End: 2020-10-14

## 2020-06-02 RX ORDER — GABAPENTIN 100 MG/1
100 CAPSULE ORAL 3 TIMES DAILY
Qty: 15 CAPSULE | Refills: 0 | Status: SHIPPED | OUTPATIENT
Start: 2020-06-02 | End: 2020-10-14

## 2020-06-02 RX ORDER — AMOXICILLIN 250 MG
1 CAPSULE ORAL 2 TIMES DAILY PRN
Qty: 20 TABLET | Refills: 0 | Status: SHIPPED | OUTPATIENT
Start: 2020-06-02 | End: 2021-12-14

## 2020-06-02 RX ADMIN — ATENOLOL 25 MG: 25 TABLET ORAL at 08:18

## 2020-06-02 RX ADMIN — LEVOTHYROXINE SODIUM 100 MCG: 0.1 TABLET ORAL at 08:18

## 2020-06-02 RX ADMIN — ROSUVASTATIN CALCIUM 40 MG: 20 TABLET, FILM COATED ORAL at 08:19

## 2020-06-02 RX ADMIN — Medication 1000 MG: at 08:19

## 2020-06-02 RX ADMIN — Medication 1 TABLET: at 08:22

## 2020-06-02 RX ADMIN — ASPIRIN 81 MG: 81 TABLET, COATED ORAL at 08:18

## 2020-06-02 RX ADMIN — GABAPENTIN 100 MG: 100 CAPSULE ORAL at 08:18

## 2020-06-02 RX ADMIN — MELATONIN 2000 UNITS: at 08:18

## 2020-06-02 RX ADMIN — ACETAMINOPHEN 975 MG: 325 TABLET, FILM COATED ORAL at 11:40

## 2020-06-02 RX ADMIN — THERA TABS 1 TABLET: TAB at 08:18

## 2020-06-02 RX ADMIN — GABAPENTIN 100 MG: 100 CAPSULE ORAL at 15:10

## 2020-06-02 RX ADMIN — ACETAMINOPHEN 975 MG: 325 TABLET, FILM COATED ORAL at 03:59

## 2020-06-02 RX ADMIN — CEFAZOLIN 1 G: 330 INJECTION, POWDER, FOR SOLUTION INTRAMUSCULAR; INTRAVENOUS at 04:00

## 2020-06-02 RX ADMIN — SENNOSIDES AND DOCUSATE SODIUM 2 TABLET: 8.6; 5 TABLET ORAL at 08:19

## 2020-06-02 RX ADMIN — ESCITALOPRAM OXALATE 10 MG: 10 TABLET ORAL at 08:18

## 2020-06-02 RX ADMIN — OYSTER SHELL CALCIUM WITH VITAMIN D 1 TABLET: 500; 200 TABLET, FILM COATED ORAL at 08:19

## 2020-06-02 RX ADMIN — POLYETHYLENE GLYCOL 3350 17 G: 17 POWDER, FOR SOLUTION ORAL at 08:47

## 2020-06-02 RX ADMIN — LISINOPRIL 20 MG: 20 TABLET ORAL at 08:18

## 2020-06-02 RX ADMIN — OXYCODONE HYDROCHLORIDE 5 MG: 5 TABLET ORAL at 12:55

## 2020-06-02 RX ADMIN — CELECOXIB 100 MG: 100 CAPSULE ORAL at 08:19

## 2020-06-02 RX ADMIN — AMLODIPINE BESYLATE 5 MG: 5 TABLET ORAL at 08:18

## 2020-06-02 ASSESSMENT — ACTIVITIES OF DAILY LIVING (ADL): IADL_COMMENTS: SPOUSE TO A AS NEEDED

## 2020-06-02 NOTE — DISCHARGE SUMMARY
[facility]  Discharge Summary            Interval History:     80 year old female admitted postoperatively for elective Left TKA  with Dr. Jorge Luis cummings due to DJD unresponsive to non operative treatment.  There were no notable intraoperative complications.  Patient being discharged post op Day #1.  Salome Saeed received skilled nursing, PT, OT, SW inquiry.  There was also Hospitalist care during the stay.     Salome Saeed has progressed satisfactorily with ambulation and pain management and without medical complication.  Patient is confident in their discharge and has  met goals with PT and OT. Pt lives at home with family support and will be discharged to home based on home living conditions and level of support.  Salome Saeed leaves the hospital in stable condition with good chance for recovery.  Today: Doing well.  Pain tolerable.  Eating, voiding, resting, ambulating.    Pain: tolerable.   Nausea: none.   Light headedness : none  Chest pain: none  Shortness of breath: none              Assessment and Plan:   S/P Left TKA Day #1.  Final Diagnosis: same.  VSS, Hemodynamically asymptomatic, pain management adequate.    PLAN:  DVT prophylaxis with COAG team recommendations will cover risk of VTE.  Pain management with tylenol, Neurontin and oxycodone.  OP PT.  Bowel regimen.    Patient instructions attached to discharge.      Discharge to home.  Follow up in clinic as previously scheduled, approx 10-14 days post op.    [facility]  [address]  [facility]  [address]  [phone]                    Physical Exam:     Patient Vitals for the past 12 hrs:   BP Temp Temp src Pulse Heart Rate Resp SpO2   06/02/20 0815 129/42 98.4  F (36.9  C) Temporal -- 68 16 97 %   06/02/20 0444 -- -- -- -- -- 14 --   06/02/20 0352 (!) 153/63 98.4  F (36.9  C) Temporal 67 -- 12 99 %   06/01/20 2339 128/57 98.8  F (37.1  C) Temporal -- 73 16 95 %     Hemoglobin   Date Value Ref Range  Status   06/02/2020 11.0 (L) 11.7 - 15.7 g/dL Final   05/27/2020 12.2 11.7 - 15.7 g/dL Final       Patient is alert and oriented.  Vitals: stable  Neurovascular status: intact  Dressing: clean and dry  Calves: soft and non tender    SLR able.    Paul Coronel PA-C  [facility] - Surgery    6/2/2020

## 2020-06-02 NOTE — PROGRESS NOTES
06/02/20 1000   Quick Adds   Type of Visit Initial PT Evaluation   Living Environment   Lives With spouse   Living Arrangements house   Home Accessibility stairs to enter home   Number of Stairs, Main Entrance 3   Self-Care   Usual Activity Tolerance good   Current Activity Tolerance moderate   Equipment Currently Used at Home cane, straight   Functional Level Prior   Ambulation 1-->assistive equipment   Transferring 1-->assistive equipment   Toileting 0-->independent   Bathing 0-->independent   Fall history within last six months yes   Number of times patient has fallen within last six months 1   Which of the above functional risks had a recent onset or change? ambulation;transferring   General Information   Onset of Illness/Injury or Date of Surgery - Date 06/01/20   Referring Physician Jorge Luis Cheatham MD    Patient/Family Goals Statement Discharge home with OP PT   Pertinent History of Current Problem (include personal factors and/or comorbidities that impact the POC) Patient admitted on 6/1/2020 and is now POD-1 from L TKA with orders for WBAT on L LE.    Precautions/Limitations fall precautions   Weight-Bearing Status - LLE weight-bearing as tolerated   Weight-Bearing Status - RLE full weight-bearing   General Observations Patient supine in bed upon arrival of therapist, agreeable to working with PT    Cognitive Status Examination   Orientation orientation to person, place and time   Level of Consciousness alert   Follows Commands and Answers Questions 100% of the time;able to follow multistep instructions   Pain Assessment   Patient Currently in Pain Yes, see Vital Sign flowsheet  (4/10)   Integumentary/Edema   Integumentary/Edema Comments ACE wrap in place on L LE and hemovac drain present   Posture    Posture Forward head position   Left Knee Extension/Flexion ROM   Left Knee Extension/Flexion AROM - degrees 0-6-43   Strength   Strength Comments Not formally assessed but at least 3+/5 with functional  "transfers and gait   Bed Mobility   Bed Mobility Comments Supine>sit with SBA   Transfer Skills   Transfer Comments Sit>stand from EOB with FWW and CGA   Gait   Gait Comments Gait x 10 feet with FWW and CGA, step through gait, no knee buckling noted with KI donned   Balance   Balance Comments Some unsteadiness with initial mobility, at times posterior lean requiring CGA to support with FWW   Modality Interventions   Planned Modality Interventions Cryotherapy   Planned Modality Interventions Comments PRN   General Therapy Interventions   Planned Therapy Interventions bed mobility training;gait training;strengthening;stretching;transfer training;ROM;home program guidelines   Clinical Impression   Criteria for Skilled Therapeutic Intervention yes, treatment indicated   PT Diagnosis Impaired mobility and gait   Influenced by the following impairments pain, weakness, decreased ROM    Functional limitations due to impairments bed mobility, transfers, gait, stairs   Clinical Presentation Stable/Uncomplicated   Clinical Presentation Rationale Based on PMH, current presentation, and social support    Clinical Decision Making (Complexity) Low complexity   Therapy Frequency 2x/day   Predicted Duration of Therapy Intervention (days/wks) 2 days   Anticipated Discharge Disposition   (Defer to ortho surgeon/PA)   Risk & Benefits of therapy have been explained Yes   Patient, Family & other staff in agreement with plan of care Yes   [facility] AM-PAC TM \"6 Clicks\"   2016, Trustees of [facility], under license to [employer].  All rights reserved.   6 Clicks Short Forms Basic Mobility Inpatient Short Form   [facility] AM-PAC  \"6 Clicks\" V.2 Basic Mobility Inpatient Short Form   1. Turning from your back to your side while in a flat bed without using bedrails? 4 - None   2. Moving from lying on your back to sitting on the side of a flat bed without using bedrails? 3 - A Little   3. Moving to and from a bed to a " chair (including a wheelchair)? 3 - A Little   4. Standing up from a chair using your arms (e.g., wheelchair, or bedside chair)? 3 - A Little   5. To walk in hospital room? 3 - A Little   6. Climbing 3-5 steps with a railing? 3 - A Little   Basic Mobility Raw Score (Score out of 24.Lower scores equate to lower levels of function) 19   Total Evaluation Time   Total Evaluation Time (Minutes) 6

## 2020-06-02 NOTE — PLAN OF CARE
OT- eval and treatment completed. Patient is POD #1 for L TKA, KI not needed per PT. Prior to admission, patient was living with supportive spouse in [facility] with all needs met on main level. Spouse is in good health and able to A as needed with ADLs/IADLs.  Patient plan: home, hoping today  Current status: Educated in walker safety, EC/WS techniques, advancement of activity following surgery, car transfers and driving readiness, patient was engaged in instruction and verbalized understanding. Patient was dressing upon OT arrival. Educated in LE dressing techniques, patient unable to doff socks d/t pain, reports no concerns and spouse to A as needed. After instruction, patient was SBA, fww for walk in shower transfer. Educated in AE recommendations; patient to ask friends to borrow shower chair, she feels she can find one to use in short term. Owns RTS, 2 reachers and LHS. Throughout session, patient was SBA, fww for sit<>stand, functional mobility in room and bathroom. Completed toilet transfer and toileting skills, including clothing management, pericares and sinkside hand hygiene with SBA. Frequent cues provided for safe hand placement.   Anticipated status at discharge:  SBA, fww with basic functional mobility and ADL's. Anticipate spouse support for IADL's; household chores, driving, errands, cooking and bathing. Recommended AE; shower chair, patient has all other needed AE. Will discharge from OT services.     Occupational Therapy Discharge Summary    Reason for therapy discharge:    All goals and outcomes met, no further needs identified.    Progress towards therapy goal(s). See goals on [facility] Plan in [facility] electronic [facility] record for goal details.  Goals met    Therapy recommendation(s):    No further therapy is recommended.

## 2020-06-02 NOTE — PROGRESS NOTES
06/02/20 1200   Quick Adds   Type of Visit Initial Occupational Therapy Evaluation   Living Environment   Lives With spouse   Living Arrangements house   Home Accessibility stairs to enter home   Number of Stairs, Main Entrance 3   Living Environment Comment all needs on main level, will use walk in shower   Self-Care   Usual Activity Tolerance good   Current Activity Tolerance good   Equipment Currently Used at Home cane, straight;raised toilet  (2 reachers and LHS, thinks she has access to shower chair)   Functional Level   Ambulation 1-->assistive equipment   Transferring 1-->assistive equipment   Toileting 0-->independent   Bathing 0-->independent   Dressing 0-->independent   Eating 0-->independent   Communication 0-->understands/communicates without difficulty   Swallowing 0-->swallows foods/liquids without difficulty   Fall history within last six months yes   Number of times patient has fallen within last six months 1   Which of the above functional risks had a recent onset or change? ambulation;transferring;toileting;bathing;dressing   Prior Functional Level Comment I with ADLs/IADLs   General Information   Onset of Illness/Injury or Date of Surgery - Date 06/01/20   Referring Physician Dr. Cheatham   Patient/Family Goals Statement to return home    Additional Occupational Profile Info/Pertinent History of Current Problem Patient is POD #1 for L TKA   Precautions/Limitations fall precautions   Weight-Bearing Status - LLE weight-bearing as tolerated   General Observations patient was in bed and agreeable OT session   Cognitive Status Examination   Orientation orientation to person, place and time   Level of Consciousness alert   Follows Commands (Cognition) WNL   Memory intact   Visual Perception   Visual Perception Wears glasses   Sensory Examination   Sensory Quick Adds No deficits were identified   Pain Assessment   Patient Currently in Pain Yes, see Vital Sign flowsheet  (rating pain 6/10)   Posture    Posture not impaired   Range of Motion (ROM)   ROM Quick Adds No deficits were identified   Strength   Manual Muscle Testing Quick Adds No deficits were identified   Hand Strength   Hand Strength Comments intact   Muscle Tone Assessment   Muscle Tone Quick Adds No deficits were identified   Coordination   Upper Extremity Coordination No deficits were identified   Mobility   Bed Mobility Comments SBA for bed mobility   Transfer Skill: Bed to Chair/Chair to Bed   Level of Mora: Bed to Chair stand-by assist   Physical Assist/Nonphysical Assist: Bed to Chair supervision   Weight-Bearing Restrictions weight-bearing as tolerated   Assistive Device - Transfer Skill Bed to Chair Chair to Bed Rehab Eval rolling walker   Transfer Skill: Sit to Stand   Level of Mora: Sit/Stand stand-by assist   Physical Assist/Nonphysical Assist: Sit/Stand supervision;verbal cues   Transfer Skill: Sit to Stand weight-bearing as tolerated   Assistive Device for Transfer: Sit/Stand rolling walker   Transfer Skill: Toilet Transfer   Level of Mora: Toilet stand-by assist   Physical Assist/Nonphysical Assist: Toilet verbal cues;supervision   Weight-Bearing Restrictions: Toilet weight-bearing as tolerated   Assistive Device rolling walker;seat riser;grab bars   Tub/Shower Transfer   Tub/Shower Transfer Comments treatment initiated-defer to OT daily note for details   Balance   Balance Comments LOB was not noted   Lower Body Dressing   Level of Mora: Dress Lower Body   (treatment initiated-defer to OT daily note for details)   Toileting   Level of Mora: Toilet stand-by assist   Physical Assist/Nonphysical Assist: Toilet supervision   Assistive Device rolling walker;raised toilet seat   Grooming   Level of Mora: Grooming independent   Eating/Self Feeding   Level of Mora: Eating independent   Instrumental Activities of Daily Living (IADL)   IADL Comments spouse to A as needed   Activities of  "Daily Living Analysis   Impairments Contributing to Impaired Activities of Daily Living balance impaired;muscle tone abnormal;ROM decreased;strength decreased   General Therapy Interventions   Planned Therapy Interventions ADL retraining;IADL retraining;progressive activity/exercise;transfer training   Clinical Impression   Criteria for Skilled Therapeutic Interventions Met yes, treatment indicated   OT Diagnosis decreased ADLs   Influenced by the following impairments balance impaired;muscle tone abnormal;ROM decreased;strength decreased   Assessment of Occupational Performance 5 or more Performance Deficits   Identified Performance Deficits decreased ADl/IADLs- dsg, toileting, bathing, community mobility, household chores, driving, errands   Clinical Decision Making (Complexity) Low complexity   Predicted Duration of Therapy Intervention (days/wks) 1 time session   Anticipated Equipment Needs at Discharge shower chair   Anticipated Discharge Disposition Home   Risks and Benefits of Treatment have been explained. Yes   Patient, Family & other staff in agreement with plan of care Yes   [facility] TM \"6 Clicks\"   2016, Trustees of [facility], under license to "[employer]".  All rights reserved.   6 Clicks Short Forms Daily Activity Inpatient Short Form   [facility]  \"6 Clicks\" Daily Activity Inpatient Short Form   1. Putting on and taking off regular lower body clothing? 3 - A Little   2. Bathing (including washing, rinsing, drying)? 3 - A Little   3. Toileting, which includes using toilet, bedpan or urinal? 3 - A Little   4. Putting on and taking off regular upper body clothing? 4 - None   5. Taking care of personal grooming such as brushing teeth? 4 - None   6. Eating meals? 4 - None   Daily Activity Raw Score (Score out of 24.Lower scores equate to lower levels of function) 21   Total Evaluation Time   Total Evaluation Time (Minutes) 8     "

## 2020-06-02 NOTE — PLAN OF CARE
Patient vital signs are at baseline: Yes  Patient able to ambulate as they were prior to admission or with assist devices provided by therapies during their stay:  Yes  Patient MUST void prior to discharge:  Yes  Patient able to tolerate oral intake:  Yes  Pain has adequate pain control using Oral analgesics:  Yes         Discharge instructions given to pt.  Verbalizes understanding. Prescriptions for Oxycodone, Senna, Clearlax, and Gabapentin given to pt. Personal belongings gathered and sent with pt.  Pt escorted via w/c accompanied by NA to front entrance.  Leaves for home with spouse.

## 2020-06-02 NOTE — DISCHARGE INSTRUCTIONS
POST OP TOTAL KNEE INSTRUCTIONS:    Medication: You will be discharged from the hospital with pain medication(s). In most cases, consistent use  of the prescription opioid pain medication can be limited to a few days.  After this period, the medication should be weaned off as soon as possible to avoid potential complications of constipation, nausea, or rash, etc. Until you can be completely off the pain pills, using the prescription medications only at nighttime along with controlling the pain with over-the-counter medication(s) such Tylenol during the day would be a good way of managing the pain, unless you have been told previously by a physician to avoid these medications.    A Daily medication for blood clot prevention will be specified when you are discharged, PLEASE REFER TO YOUR COAGULATION TEAM INSTRUCTIONS.    This is what was printed on 5/27/20 by Renee Hoang RN  /27--hold Warfarin no Lovenox  5/28 Hold Warfarin no Lovenox  5/29  Hold Warfarin Start Lovenox every 12 hours   5/30 Hold Warfarin Lovenox every 12 hours  5/31 Hold Warfarin, Lovenox am dose only.  6/1  Day of Surgery, resume Warfarin 10 mg if okay with surgeon. No Lovenox  6/2 Warfarin 2.5 mg, no lovenox in am, restart Lovenox in PM  6/3 Warfarin 2.5 mg Lovenox BID  6/4 Warfarin 2.5 mg Lovenox BID       Increase water and fiber intake while on pain medication.  You may also be sent home with stool softener, it is recommended you take this until your bowel movements become normal for you.  You may also need to add a laxative to the routine if you have not had a bowel movement for several days following surgery.    Activity: You will be using a walker or crutches until you feel confident. You also need to use the knee immobilizer until you are able to straight leg raise 10-15 times. Gradual rather than sudden increase of walking distance is recommended as the comfort level dictates. A cane instead of walker or crutches can be used when your  strength and balance are improved.    Dressing: Typically, the first dressing change will be done on postop day #2. Showers can be taken with plastic covering over the original post surgical dressing for the first 4 days from the surgery. At that point, the incision site can be wet for showering but should not be soaked. A light gauze dressing along with paper tape should be placed over the wound after each shower. The staples will be removed 10-14 days from the operation. During that time it is best to cover the incision site to protect the staples from being pulled or snagged.    If you were discharged with an aquacell dressing, (flesh colored rubber like bandage), you may leave this dressing in place until your follow up appointment in the clinic, unless the dressing; becomes completely saturated, is leaking, gets water inside as evident by moisture appearing on the inside, or you have a skin reaction.  In this case you should remove it and use dressings like what is mentioned in the paragraph above.    Exercises: It is recommended that you do the flexibility exercises (range of motion) and strengthening exercises in a small amounts frequently throughout the day rather than infrequent long sessions. Being overly aggressive with the exercises can hinder rather than help. We are looking for a gradual steady improvement even though the general goal for range of motion post-operatively is 0-90 degrees of bending within the first 2 weeks. During this time, gaining full Extension (straightening) is far more important than gaining flexion. Generally, you be working with a physical therapist 2-3 times per week for the first 2-4 weeks.    5 keys to the knee,- to be done throughout the day:  1. Knee Bending: Dangle- in a seated position where foot can swing, let the leg from the knee down dangle off the edge of bed or table. You may use the other leg/foot to gently push the affected leg into more bending.  2. Knee  straightening: Heel blocking - While sitting or laying, place pillow, ottoman or some other support under the heel with toes pointing up but ankle relaxed.  Rest there, working up to 10 minutes relaxing the leg to induce knee straightening.  Once relaxed, push/squeeze the knee towards the floor, hold for 2 seconds, and relax. Repeat x 10.    NOTE:  Alternate 1 and 2 every other hour.  3. Straight leg raises 3x daily: from a laying or sitting position, while keeping the knee locked straight, slowly lift the foot up 12-16 inches and hold for 2 seconds and relax.  Repeat working up to 20 repetitions.    4. Ice and elevate: whenever you are not up and about.  No standing around or sitting with the knee bend for more than very short periods.  5. Walk:  5 minutes of short laps every couple hours.  Use assistive devices as needed such as walker or cane to promote safety.  Walk focusing more on good form than getting somewhere.  Lead with the knee and then straighten the knee trying to make the surgical leg move like normal knee.    Soft tissue: It is not unusual to have swelling in the leg; thigh down to through the toes for several months from the surgery.  Frequent ankle pumping, elevation of the leg above the heart level and gentle compression support with ace wrap should help with swelling. If you wrap the leg, start at the toes and end in the thigh.  Icing regularly, for 20 minutes every hour, will also help with swelling and pain.  Flushing of the tissue is also often noted due to excess blood flow for healing.   You may also experience bruising.  This may occur anywhere from your toes, through the leg and even onto your torso.  It may show up even 1 week after surgery.     Progressively worsening redness along with increasing pain or increasing drainage from the wound should be a reason to alert us immediately.     Follow up in clinic within 14 days after surgery.  May call [phone] to schedule.    Paul  LUIS Coronel  [facility] - Surgery  [facility]  [address] [place]  [address]  [phone]

## 2020-06-02 NOTE — PLAN OF CARE
Patient plan: Home with spouse  Current status: Evaluation completed and treatment initiated. Patient admitted on 6/1/2020 and is now POD-1 from L TKA with orders for WBAT on L LE. Patient lives in a house with her spouse with 3 steps to enter. Patient is mod I with SEC at baseline.     Patient completing bed mobility with supervision, sit<>stand with FWW and CGA, gait x 100 feet with FWW and CGA with no knee buckling noted with KLEVER andersen. Completed supine TKA exercises and provided patient with HEP handout. L knee AAROM noted as 0-6-43 degrees. All needs in reach and patient in supine upon therapist exit, bed alarm on.   Anticipated status at discharge: SBA for mobility/ambulation with FWW, A x 1 (SBA to Min A) for stair navigation    PM session: patient completing bed mobility independent, sit<>stand with FWW and SBA, gait x 300 feet with FWW and supervision, stairs x 8 with single rail, SEC, and SBA. All IP PT goals met, discharge from therapy.     Physical Therapy Discharge Summary    Reason for therapy discharge:    All goals and outcomes met, no further needs identified.    Progress towards therapy goal(s). See goals on Care Plan in [facility] electronic health record for goal details.  Goals met    Therapy recommendation(s):    SBA for stair navigation with SEC and single rail, FWW for all mobility/ambulation

## 2020-06-02 NOTE — CONSULTS
Hospitalist Consultation      Salome Saeed MRN# [id number]   YOB: 1940 Age: 80 year old   Date of Admission: 6/1/2020     Requesting Physician:  Chaparrita  Reason for consult: Postop medical assistance           Assessment and Plan:   This patient is a 80 year old female with a PMH listed below including CVA on coumadin, CAD amongst others who is POD 1 s/p LTKA. Overall doing well.     1. S/p LTKA: doing well, cont PT/OT and pain control as per primary  2. Hx of CVA/TIA--no recent issues. Cont BP control and warfarin resumed last night. Has specific instructions from INR clinic involving resuming coumadin and Lovenox. Will resume as per their instructions.   3. HTN: BP stable. Resume Atenolol and norvasc and lisinopril.   4. CAD: stable. Resume BB, statin, ASA resumed per Ortho.  5. Hypothyroidism: resume levothyroxine.   6. DVT Prophylaxis: on SCD as per primary  7. D/C planning: To home as per primary, no medical issues.              History of Present Illness:   This patient is a 80 year old female who is POD 1 s/p LTKA  Intra-op report reviewed and showed no intra-op complications.   I/o's reviewed, Currently net 800cc with good UOP since OR. Hgb stable this am at 11.0  Overnight did pretty well, no complaints, VSS.   Currently, today coni diet, pain controlled, no CP, SOB, no n/v.   O/w other medical problems have been stable, with no recent c/o illness.               Past Medical History:     Past Medical History:   Diagnosis Date     Antiplatelet or antithrombotic long-term use      CAD (coronary artery disease) 4/9/2009 4/8/2009: PTCA and two 2.5x15mm Xience stents to mid LAD lesion; Cath 12/2012- 40-50% stenosis in mid PDA, 80% on D1- medically treat , 5/28/2015 - PTCA and 2.25x8mm Promus PREMIIER NAILA to ostium of 2nd OM     Cerebral artery occlusion with cerebral infarction (H) 2012     Coagulation disorder (H)     warafin     CVA (cerebral infarction)      DDD (degenerative disc disease)       Dyspnea on exertion      Essential hypertension, benign      GERD (gastroesophageal reflux disease)      Headache      Heart murmur      History of angina     rarely     Hyperlipidemia      Hypothyroidism      Lumbago      Lumbar spinal stenosis      Mitral regurgitation     1+ per 2013 Echo     Stented coronary artery      Vertebral artery stenosis, right                Past Surgical History:     Past Surgical History:   Procedure Laterality Date     CORONARY ANGIOGRAPHY ADULT ORDER  2012    40-50% stenosis to mid PDA, 80% in D1- medically treat     CORONARY ANGIOGRAPHY ADULT ORDER  2015    PTCA and 2.25x8mm Promus PREMIER NAILA to ostium of 2nd OM     DECOMPRESSION, FUSION LUMBAR POSTERIOR THREE + LEVELS, COMBINED  2013    Procedure: COMBINED DECOMPRESSION, FUSION LUMBAR POSTERIOR THREE + LEVELS;  Posterior Lumbar Decompression L3-S1, Fusion L4-S1;  Surgeon: Daniel Harris MD;  Location: [facility] OR     ENDOSCOPIC STRIPPING VEIN(S)       HEART CATH, ANGIOPLASTY  2009    PTCA and two 2.5x15mm Xience stents to mid LAD lesion     HYSTERECTOMY, RICKIE      Fibroids, and Menorrhagia.. Bilateral Oophrectomy     ORTHOPEDIC SURGERY       Stenting of LAD, Angioplasty  09     TONSILLECTOMY      as a child                 Social History:     Social History     Tobacco Use     Smoking status: Former Smoker     Packs/day: 0.10     Last attempt to quit: 1965     Years since quittin.4     Smokeless tobacco: Never Used   Substance Use Topics     Alcohol use: Yes     Alcohol/week: 0.0 standard drinks     Comment: 2 glasses wine per month, maybe     Drug use: No                 Family History:     family history includes Alcohol/Drug in her brother; C.A.D. in her father; Coronary Artery Disease in her father; Diabetes in her father and son; Neurologic Disorder in her mother; Ovarian Cancer in her mother; Thyroid Disease in her mother and son.  Family Hx fully reviewed and is non contributory to this  admission.             Allergies:     Allergies   Allergen Reactions     Atorvastatin      Leg cramps     Cats Itching     Gluten      Sinuses affected by gluten     Oat      Shellfish Allergy      hives     Wheat              Medications:     Prior to Admission medications    Medication Sig Last Dose Taking? Auth Provider   acetaminophen (TYLENOL) 325 MG tablet Take 2 tablets by mouth every 6 hours as needed for pain. 5/31/2020 at Unknown time Yes Weiler, Karen, MD   amLODIPine (NORVASC) 5 MG tablet Take 1 tablet (5 mg) by mouth daily 6/1/2020 at Unknown time Yes Nikolai Amaya MD   atenolol (TENORMIN) 25 MG tablet Take 1 tablet (25 mg) by mouth 2 times daily 6/1/2020 at Unknown time Yes Emmett Araujo APRN CNP   diclofenac (VOLTAREN) 1 % topical gel Place 4 g onto the skin 4 times daily Past Week at Unknown time Yes Arron Alexander,    enoxaparin ANTICOAGULANT (LOVENOX) 80 MG/0.8ML syringe Inject 0.7 mLs (70 mg) Subcutaneous every 12 hours 5/27--hold Warfarin no Lovenox, 5/28 Hold Warfarin no Lovenox, 5/29  Hold Warfarin Start Lovenox every 12 hours, 5/30 Hold Warfarin Lovenox every 12 hours, 5/31 Hold Warfarin, Lovenox am dose only. 6/1  Day of Surgery, resume Warfarin 10 mg if okay with surgeon. No Lovenox, 6/2 Warfarin 2.5 mg, no lovenox in am, restart Lovenox in PM, 6/3 Warfarin 2.5 mg Lovenox BID, 6/4 Warfarin 2.5 mg Lovenox BID 5/31/2020 at Unknown time Yes Surya Dyer, DO   escitalopram (LEXAPRO) 5 MG tablet TAKE 1 TABLET EVERY DAY 6/1/2020 at Unknown time Yes Surya Dyer, DO   gabapentin (NEURONTIN) 100 MG capsule Take 1 capsule (100 mg) by mouth 3 times daily  Yes Paul Coronel PA-C   levothyroxine (SYNTHROID/LEVOTHROID) 100 MCG tablet Take 1 tablet (100 mcg) by mouth daily 6/1/2020 at Unknown time Yes Alida Ugarte APRN CNP   lisinopril (PRINIVIL/ZESTRIL) 20 MG tablet Take 1 tablet (20 mg) by mouth daily 5/31/2020 at Unknown time Yes Emmett Araujo,  APRN CNP   oxyCODONE (ROXICODONE) 5 MG tablet Take 0.5-1 tablets (2.5-5 mg) by mouth every 4 hours as needed for moderate to severe pain  Yes Paul Coronel PA-C   polyethylene glycol (MIRALAX) 17 g packet Take 17 g by mouth daily as needed for constipation  Yes Paul Coronel PA-C   rosuvastatin (CRESTOR) 40 MG tablet Take 1 tablet (40 mg) by mouth daily 6/1/2020 at Unknown time Yes Emmett Araujo APRN CNP   senna-docusate (SENOKOT-S/PERICOLACE) 8.6-50 MG tablet Take 1 tablet by mouth 2 times daily as needed for constipation  Yes Paul Coronel PA-C   warfarin (COUMADIN) 5 MG tablet TAKE 1/2 TO 1 TABLET EVERY DAY AS DIRECTED  BY  INR  NURSE  BASED ON INR READING Past Week at Unknown time Yes Surya Dyer,    ascorbic acid (VITAMIN C) 500 MG tablet Take 1,000 mg by mouth daily  5/30/2020  Reported, Patient   aspirin EC 81 MG tablet Take 1 tablet (81 mg) by mouth daily 6/1/2020  Nikolai Amaya MD   Calcium Carb-Cholecalciferol (CALCIUM 600 + D PO) Take 1,200 mg by mouth daily  5/30  Reported, Patient   Cholecalciferol (VITAMIN D) 2000 UNITS tablet Take 2,000 Units by mouth daily 5/30  Reported, Patient   Coenzyme Q10 (COQ-10) 200 MG CAPS Take 400 mg by mouth daily  5/30/2020  Reported, Patient   Multiple Vitamin (DAILY MULTIVITAMIN PO) Take by mouth daily 5/30  Reported, Patient   nitroGLYcerin (NITROSTAT) 0.4 MG sublingual tablet Place 1 tablet (0.4 mg) under the tongue every 5 minutes as needed for chest pain More than a month at Unknown time  Emmett Araujo APRN CNP   vitamin B complex with vitamin C (VITAMIN  B COMPLEX) TABS tablet Take 1 tablet by mouth daily 5/30/2020  Reported, Patient               Review of Systems:   A comprehensive greater than 10 system review of systems was carried out.  Pertinent positives and negatives are noted above.  Otherwise negative for contributory info.            Physical Exam:   Vitals were reviewed  Blood pressure  "129/42, pulse 67, temperature 98.4  F (36.9  C), temperature source Temporal, resp. rate 16, height 1.62 m (5' 3.78\"), weight 70.3 kg (155 lb), SpO2 97 %, not currently breastfeeding.  Exam:    GENERAL:  Comfortable.  PSYCH: pleasant, oriented, No acute distress.  HEENT:  PERRLA. Normal conjunctiva, normal hearing, nasal mucosa and Oropharynx are normal.  NECK:  Supple, no neck vein distention, adenopathy or bruits, normal thyroid.  HEART:  Normal S1, S2 with no murmur, no pericardial rub, S3 or S4.  LUNGS:  Clear to auscultation, normal Respiratory effort.  ABDOMEN:  Soft, no hepatosplenomegaly, normal bowel sounds.  EXTREMITIES:  No pedal edema, +2 pulses bilateral and equal.  Left leg ace dressing c/d/i  SKIN:  Dry to touch, No rash, wound or ulcerations.  NEUROLOGIC:  Nonfocal with normal cranial nerve and motor power and sensation.            Data:   Past 24 hours labs, studies, and imaging were reviewed.  Recent Labs   Lab 06/02/20  0651 05/27/20  1534   WBC  --  5.7   HGB 11.0* 12.2   HCT  --  36.9   MCV  --  87   PLT  --  227     Recent Labs   Lab 06/02/20  0651 05/27/20  1534   NA  --  132*   POTASSIUM  --  4.2   CHLORIDE  --  99   CO2  --  27   ANIONGAP  --  6   * 94   BUN  --  18   CR  --  0.69   GFRESTIMATED  --  83   GFRESTBLACK  --  >90   ERIC  --  8.5       Sanna Ramirez PA-C    "

## 2020-06-02 NOTE — PLAN OF CARE
A/O, VSS pain 1/10 scheduled tylenol.  Up with assist of 1 to BSC voiding hemovac in place.Plans to discharge home today per patient

## 2020-06-02 NOTE — PROGRESS NOTES
Pt S/P Left TKA, day #1.  Doing well.  No PT or hospitalist as yet, will set up discharge, ok to go if pass PT and Hospitalist ok.  consult discharge note.    Paul Coronel PA-C  [facility] - Surgery

## 2020-06-04 ENCOUNTER — TELEPHONE (OUTPATIENT)
Dept: ORTHOPEDICS | Facility: CLINIC | Age: 80
End: 2020-06-04

## 2020-06-04 NOTE — TELEPHONE ENCOUNTER
Spoke with patient.  Doing well, no questions at this time.  Offered number for nurse triage and confirmed follow up appointment.    Swelling- RICE as it is not painful at calf, drum tight and varies.  INR - recommend she sched draw for Monday.      Paul Coronel PA-C  [facility] - Surgery

## 2020-06-05 ENCOUNTER — OFFICE VISIT (OUTPATIENT)
Dept: ORTHOPEDICS | Facility: CLINIC | Age: 80
End: 2020-06-05
Payer: COMMERCIAL

## 2020-06-05 DIAGNOSIS — Z47.89 ORTHOPEDIC AFTERCARE: Primary | ICD-10-CM

## 2020-06-05 PROCEDURE — 99024 POSTOP FOLLOW-UP VISIT: CPT | Performed by: PHYSICIAN ASSISTANT

## 2020-06-05 NOTE — PROGRESS NOTES
HISTORY OF PRESENT ILLNESS:    Salome Saeed is a 80 year old female who is seen in follow up for Left TKA, DOS 6/1/20, DR. Cheatham.    Presents early due to concerns of swelling.  Present symptoms: Pt reports increased swelling, started at ankle and now moving to knee.  Onset of bruising as well. Some drainage noted on aquacell. Treatments include ice and elevation.  Walking lap around house every couple hours.  Some home exercises. Sleeping well with 205 mg Oxycodone at night. Using walker for ambulation.  anticoag resumed per protocol.  No new injuries.  No new complaints.  Currently on anticoagulation, coumadin with Lovenox x 7 days for bridging.  Denies Chest pain, Calve pain, Fever, Chills.    PHYSICAL EXAM:  There were no vitals taken for this visit.  There is no height or weight on file to calculate BMI.   GENERAL APPEARANCE: healthy, alert and no distress   PSYCH:  mentation appears normal and affect normal/bright    MSK:  Left:  Knee.  Ambulates: slowly with walker, good F/E.  Incision clean and dry, intact aquacell with 3 - 2cm dots present, no overt drainage with removal, staples intact, healing.  Appropriate incisional erythema.   Yes Ecchymosis present medial knee and lower leg.  No calve pain on palpation.  Edema moderate at knee to ankle, skin mobile, non shiny.  CMS: karyn incisional numbness, otherwise grossly intact.  AROM Flexion 5-70.    IMAGING INTERPRETATION:  None today.     ASSESSMENT:  Salome Saeed is a 80 year old female S/P  Left TKA, DOS 6/1/20, DR. Cheatham.  .  Edema and ecchymosis appear normal for course, discussed with coumadin, knee may hemorrhage causing bruising and swelling.  No overt signs of VTE or infection, however would treat VTE the same as her current anticoag.    PLAN:  - Surgery discussed, images reviewed if applicable, and all questions were answered at this time.  - aquacell removed with sterile technique, and re dressed with gauze and tape, supplies provided.  -  care instructions given and verbally acknowledged.  - Medications: no changes per ortho.  - Physical Therapy: HEP until formal therapy.  - Focus on edema control:  Ankle pumps, RICE, applied ACE wrap - on in morning, off at night.    Return to clinic next week as scheduled.    Paul Coronel PA-C    Dept. Orthopedic Surgery  [facility]   6/5/2020

## 2020-06-05 NOTE — PATIENT INSTRUCTIONS
Keep incision covered with clean dressing, must be changed during showers.  Showers are ok, let soap and water run over incision, do not scrub.  Pat dry with clean towel after and then apply dressing.    Swelling:  Elevate and ice, short walks and ankle pumps x 20 every 20 minutes.  Compression wrap:  Start at foot through thigh over a dressing.  Sock tight.  Apply in morning, off for the nighttime.    Contact office for drainage at the incision, or increased lower leg pain.    Follow up as scheduled.

## 2020-06-09 ENCOUNTER — ANTICOAGULATION THERAPY VISIT (OUTPATIENT)
Dept: FAMILY MEDICINE | Facility: CLINIC | Age: 80
End: 2020-06-09

## 2020-06-09 DIAGNOSIS — I25.10 CORONARY ARTERY DISEASE INVOLVING NATIVE CORONARY ARTERY OF NATIVE HEART WITHOUT ANGINA PECTORIS: Primary | ICD-10-CM

## 2020-06-09 DIAGNOSIS — Z79.01 LONG TERM CURRENT USE OF ANTICOAGULANT THERAPY: ICD-10-CM

## 2020-06-09 LAB
CAPILLARY BLOOD COLLECTION: NORMAL
HGB BLD-MCNC: 9.5 G/DL (ref 11.7–15.7)
INR BLD: 1.7 (ref 0.86–1.14)

## 2020-06-09 PROCEDURE — 85610 PROTHROMBIN TIME: CPT | Mod: QW | Performed by: FAMILY MEDICINE

## 2020-06-09 PROCEDURE — 99207 ZZC NO CHARGE NURSE ONLY: CPT | Performed by: FAMILY MEDICINE

## 2020-06-09 PROCEDURE — 36416 COLLJ CAPILLARY BLOOD SPEC: CPT | Performed by: FAMILY MEDICINE

## 2020-06-09 PROCEDURE — 85018 HEMOGLOBIN: CPT | Performed by: FAMILY MEDICINE

## 2020-06-09 NOTE — PROGRESS NOTES
ANTICOAGULATION FOLLOW-UP CLINIC VISIT    Patient Name:  Salome Saeed  Date:  6/9/2020  Contact Type:  Telephone/ spoke with the patient    SUBJECTIVE:         OBJECTIVE    Recent labs: (last 7 days)     06/09/20  1114   INR 1.7*       ASSESSMENT / PLAN  INR assessment SUB    Recheck INR In: 3 DAYS    INR Location Outside lab      Anticoagulation Summary  As of 6/9/2020    INR goal:   2.0-3.0   TTR:   49.9 % (1 y)   INR used for dosing:   No new INR was available at the time of this encounter.   Warfarin maintenance plan:   5 mg (5 mg x 1) every Mon, Fri; 2.5 mg (5 mg x 0.5) all other days   Full warfarin instructions:   6/9: 5 mg; Otherwise 5 mg every Mon, Fri; 2.5 mg all other days   Weekly warfarin total:   22.5 mg   Plan last modified:   Rosanne Rico RN (2/6/2020)   Next INR check:   6/12/2020   Target end date:       Indications    Long term current use of anticoagulant therapy [Z79.01]  CVA (cerebral vascular accident) (Resolved) [I63.9]             Anticoagulation Episode Summary     INR check location:       Preferred lab:       Send INR reminders to:   JUDY ROCHE    Comments:               See the Encounter Report to view Anticoagulation Flowsheet and Dosing Calendar (Go to Encounters tab in chart review, and find the Anticoagulation Therapy Visit)    INR is sub therapeutic today at 1.7 post procedure (total knee replacement). Patient states that she ran out of Lovenox a couple of days ago. She did not try to refill it. Advised the patient to resume Lovenox bridging until INR is 2.3 or higher. Patient agrees to  prescription today. Reviewed signs of clotting and when to seek medical attention.     Kaylee Yu, RN

## 2020-06-11 ENCOUNTER — TELEPHONE (OUTPATIENT)
Dept: FAMILY MEDICINE | Facility: CLINIC | Age: 80
End: 2020-06-11

## 2020-06-11 ENCOUNTER — OFFICE VISIT (OUTPATIENT)
Dept: ORTHOPEDICS | Facility: CLINIC | Age: 80
End: 2020-06-11
Payer: COMMERCIAL

## 2020-06-11 DIAGNOSIS — D64.9 ANEMIA, UNSPECIFIED TYPE: Primary | ICD-10-CM

## 2020-06-11 DIAGNOSIS — Z47.89 ORTHOPEDIC AFTERCARE: Primary | ICD-10-CM

## 2020-06-11 PROCEDURE — 99024 POSTOP FOLLOW-UP VISIT: CPT | Performed by: PHYSICIAN ASSISTANT

## 2020-06-11 NOTE — TELEPHONE ENCOUNTER
I spoke with patient today regarding Hgb results. Hgb was 9.5 - likely down due to recent knee replacement surgery. She was advised to continue Lovenox injections on 6/9/20 as her INR was not yet therapeutic at 1.7. However, after further discussion with SW, DO today, given lower Hgb and notes from Ortho about significant bruising at the surgical site and some bleeding, let's go ahead and stop Lovenox today. Patient will have INR rechecked tomorrow before the weekend - adjust warfarin as needed. Recheck Hgb in 2 weeks, sooner if any significant symptoms of anemia occur.    Will route to  triage pool to review prior to patient's INR recheck tomorrow.  MURTAZA Javier, RN  [employer]

## 2020-06-11 NOTE — PROGRESS NOTES
HISTORY OF PRESENT ILLNESS:    Salome Saeed is a 80 year old female who is seen in follow up for Left TKA,DOS 6/01/20, Dr. Cheatham.  Present symptoms: Pt reports some improvement.  Sleeping well.  Notices significant bruising and some swelling.  Has had INR checked.  Treatments include PT, RICE.  Using nothing for ambulation.  Overall doing ok.  No new complaints.  Denies Chest pain, Calve pain, Fever, Chills.    PHYSICAL EXAM:  There were no vitals taken for this visit.  There is no height or weight on file to calculate BMI.   GENERAL APPEARANCE: healthy, alert and no distress   PSYCH:  mentation appears normal and affect normal/bright    MSK:  Left:  Knee.  Ambulates: Well with walker.  Incision clean and dry, staples under gauze present, healing.  Incision with some staple irritation with early bruising, follows staple shape, some skin irritation.  Yes Ecchymosis, significant and prominent at thigh through foot.  No calve pain on palpation.  Edema mild to moderate at knee , min at ankle..  CMS: karyn incisional numbness, otherwise grossly intact.  AROM Flexion0-85.    IMAGING INTERPRETATION:  None today.     ASSESSMENT:  Salome Saeed is a 80 year old female S/P Left TKA,DOS 6/01/20, Dr. Cheatham.  .  Ecchymosis, hematoma - Coumadin related.  Incision does not appear infected.    PLAN:  - Surgery discussed, images reviewed if applicable, and all questions were answered at this time.  - Staples removed with sterile technique, steri-strips applied in usual fashion, care instructions given and verbally acknowledged.  - Medications: as current.  - Physical Therapy: Physical therapy: continue with current physical therapy  - AAT.  - Compression wrap applied, maintain 2 weeks as INR rises to therapeutic.    Return to clinic 4 weeks for x ray.    Paul Coronel PA-C    Dept. Orthopedic Surgery  [facility]   6/11/2020

## 2020-06-11 NOTE — PATIENT INSTRUCTIONS
Incision care instructions:  Keep dry 24 hours.  Showering is ok after that time, however no soaking or scrubbing of incision for 2 weeks.  Tape-strips will most likely fall off on their own, however they may be removed after 2 weeks with rubbing alcohol if they have not.    If draining or bleeding stops the tape-strips are enough coverage unless you were instructed otherwise or you would like to cover for comfort.  If drainage or bleeding continues please cover with clean dressings.     Compression: for the next 2 weeks use the wrap, from your foot through the thigh, or use a snug sock and wrap from the ankle to the thigh.  Wrap approximately sock tight.  You may have to readjust  It throughout the day.      5 keys to the knee,- to be done every 2 hours during the day:  1. Knee Bending: Dangle- in a seated position where foot can swing, let the leg from the knee down dangle off the edge of bed or table. You may use the other leg/foot to gently push the affected leg into more bending.  2. Knee straightening: Heel blocking - While sitting or laying, place pillow, ottoman or some other support under the heel with toes pointing up but ankle relaxed.  Rest there, working up to 10 minutes relaxing the leg to induce knee straightening.  Once relaxed, push/squeeze the knee towards the floor, hold for 2 seconds, and relax. Repeat x 10.    NOTE:  Alternate 1 and 2 every other hour.  3. Straight leg raises: from a laying or sitting position, while keeping the knee locked straight, slowly lift the foot up 12-16 inches and hold for 2 seconds and relax.  Repeat working up to 20 repetitions.    4. Ice and elevate: whenever you are not up and about.  No standing around or sitting with the knee bend for more than very short periods.  5. Walk:  Use assistive devices as needed such as walker or cane to promote safety.  Walk focusing more on good form than getting somewhere.  Lead with the knee and then straighten the knee trying to  make the surgical leg move like normal knee.    It is recommended that you avoid invasive procedures such as dental work, colonoscopy or other surgery for 4 months after surgery unless it is an emergency.  In the case of an emergency, please notify the provider, and an antibiotic may be prescribed for you.    You may increase your activities as you can tolerate them.  It is recommended that you do not do prolonged kneeling.    It is also recommended that you continue your exercises on your own for several additional months to avoid regressing.    Please follow up in 4 weeks for an X ray of your knee.

## 2020-06-12 ENCOUNTER — ANTICOAGULATION THERAPY VISIT (OUTPATIENT)
Dept: FAMILY MEDICINE | Facility: CLINIC | Age: 80
End: 2020-06-12

## 2020-06-12 DIAGNOSIS — Z79.01 LONG TERM CURRENT USE OF ANTICOAGULANT THERAPY: ICD-10-CM

## 2020-06-12 LAB
CAPILLARY BLOOD COLLECTION: NORMAL
INR BLD: 2 (ref 0.86–1.14)

## 2020-06-12 PROCEDURE — 85610 PROTHROMBIN TIME: CPT | Mod: QW | Performed by: FAMILY MEDICINE

## 2020-06-12 PROCEDURE — 99207 ZZC NO CHARGE NURSE ONLY: CPT | Performed by: FAMILY MEDICINE

## 2020-06-12 PROCEDURE — 36416 COLLJ CAPILLARY BLOOD SPEC: CPT | Performed by: FAMILY MEDICINE

## 2020-06-12 NOTE — PROGRESS NOTES
ANTICOAGULATION FOLLOW-UP CLINIC VISIT    Patient Name:  Salome Saeed  Date:  2020  Contact Type:  Telephone/ Lab/POC    SUBJECTIVE:  Patient Findings     Positives:   Hospital admission, Bruising    Comments:   Post op knee replacement. Says bruises are beginning to fade. No other signs of bleeding noted at this time.         Clinical Outcomes     Comments:   Post op knee replacement. Says bruises are beginning to fade. No other signs of bleeding noted at this time.            OBJECTIVE    Recent labs: (last 7 days)     20  1255   INR 2.0*       ASSESSMENT / PLAN  INR assessment THER    Recheck INR In: 2 WEEKS    INR Location Clinic      Anticoagulation Summary  As of 2020    INR goal:   2.0-3.0   TTR:   49.9 % (1 y)   INR used for dosin.0 (2020)   Warfarin maintenance plan:   5 mg (5 mg x 1) every Mon, Fri; 2.5 mg (5 mg x 0.5) all other days   Full warfarin instructions:   5 mg every Mon, Fri; 2.5 mg all other days   Weekly warfarin total:   22.5 mg   No change documented:   Renee Hoang, RN   Plan last modified:   Rosanne Rico, RN (2020)   Next INR check:   2020   Target end date:       Indications    Long term current use of anticoagulant therapy [Z79.01]  CVA (cerebral vascular accident) (Resolved) [I63.9]             Anticoagulation Episode Summary     INR check location:       Preferred lab:       Send INR reminders to:   JUDY ROCHE    Comments:               See the Encounter Report to view Anticoagulation Flowsheet and Dosing Calendar (Go to Encounters tab in chart review, and find the Anticoagulation Therapy Visit)    Dosage adjustment made based on physician directed care plan.    In range today. Will recheck INR and her Hgb in 2 weeks. Advised Loreta to call us back if any other signs of bleeding or concerns.     The patient was assessed for diet, medication, and activity level changes, missed or extra doses, bruising or bleeding, with bruising fading,  activity somewhat decreased due to post op knee. However patient says she is sleeping well and is surprised how her pain is not as bad as expected. No other problem findings noted.     Follow up appointment made.       Renee Hoang RN

## 2020-06-12 NOTE — TELEPHONE ENCOUNTER
INR today 2.0 in range. Appointment set up for Hgb and INR recheck in 2 weeks. Advised Loreta to call us back if any bleeding symptoms or concerns. Says she was having bruising but now they are beginning to fade. Renee Hoang R.N.

## 2020-06-15 ENCOUNTER — THERAPY VISIT (OUTPATIENT)
Dept: PHYSICAL THERAPY | Facility: CLINIC | Age: 80
End: 2020-06-15
Attending: ORTHOPAEDIC SURGERY
Payer: COMMERCIAL

## 2020-06-15 DIAGNOSIS — M17.12 PRIMARY OSTEOARTHRITIS OF LEFT KNEE: ICD-10-CM

## 2020-06-15 DIAGNOSIS — M25.562 LEFT KNEE PAIN: ICD-10-CM

## 2020-06-15 DIAGNOSIS — Z96.652 S/P TOTAL KNEE ARTHROPLASTY, LEFT: ICD-10-CM

## 2020-06-15 PROCEDURE — 97161 PT EVAL LOW COMPLEX 20 MIN: CPT | Mod: GP | Performed by: PHYSICAL THERAPIST

## 2020-06-15 PROCEDURE — 97110 THERAPEUTIC EXERCISES: CPT | Mod: GP | Performed by: PHYSICAL THERAPIST

## 2020-06-15 ASSESSMENT — ACTIVITIES OF DAILY LIVING (ADL)
AS_A_RESULT_OF_YOUR_KNEE_INJURY,_HOW_WOULD_YOU_RATE_YOUR_CURRENT_LEVEL_OF_DAILY_ACTIVITY?: NEARLY NORMAL
LIMPING: THE SYMPTOM AFFECTS MY ACTIVITY SLIGHTLY
SQUAT: ACTIVITY IS SOMEWHAT DIFFICULT
KNEE_ACTIVITY_OF_DAILY_LIVING_SCORE: 65.71
GO UP STAIRS: ACTIVITY IS NOT DIFFICULT
PAIN: THE SYMPTOM AFFECTS MY ACTIVITY MODERATELY
SIT WITH YOUR KNEE BENT: ACTIVITY IS NOT DIFFICULT
SWELLING: THE SYMPTOM AFFECTS MY ACTIVITY MODERATELY
STIFFNESS: THE SYMPTOM AFFECTS MY ACTIVITY MODERATELY
KNEE_ACTIVITY_OF_DAILY_LIVING_SUM: 46
WALK: ACTIVITY IS SOMEWHAT DIFFICULT
GIVING WAY, BUCKLING OR SHIFTING OF KNEE: I DO NOT HAVE THE SYMPTOM
RAW_SCORE: 46
GO DOWN STAIRS: ACTIVITY IS NOT DIFFICULT
HOW_WOULD_YOU_RATE_THE_CURRENT_FUNCTION_OF_YOUR_KNEE_DURING_YOUR_USUAL_DAILY_ACTIVITIES_ON_A_SCALE_FROM_0_TO_100_WITH_100_BEING_YOUR_LEVEL_OF_KNEE_FUNCTION_PRIOR_TO_YOUR_INJURY_AND_0_BEING_THE_INABILITY_TO_PERFORM_ANY_OF_YOUR_USUAL_DAILY_ACTIVITIES?: 50
KNEEL ON THE FRONT OF YOUR KNEE: I AM UNABLE TO DO THE ACTIVITY
STAND: ACTIVITY IS MINIMALLY DIFFICULT
WEAKNESS: THE SYMPTOM AFFECTS MY ACTIVITY SLIGHTLY
HOW_WOULD_YOU_RATE_THE_OVERALL_FUNCTION_OF_YOUR_KNEE_DURING_YOUR_USUAL_DAILY_ACTIVITIES?: NEARLY NORMAL
RISE FROM A CHAIR: ACTIVITY IS MINIMALLY DIFFICULT

## 2020-06-15 NOTE — PROGRESS NOTES
[facility]: Physical Therapy Initial Evaluation   Jerry 15, 2020    Subjective:   Chief Complaint: S/P Left Total Knee Arthroplasty   Pain: Rarely in the front the of the knee.    Numbness/Tingling: some numbness on the outside of the knee.    Weakness: not too weak   Stiffness: gets really stiff in the mornings, bending more than straightening   Other: itchiness,   DOS: 6/1/2020 - Jorge Luis Cheatham MD  Referral Date: 5/23/2020 - Jorge Luis Cheatham MD   PMH/surgical history/trauma:    - heart problems (coronary artery disease, 3 stents placed 6-7 years ago)   - HTN   - implanted device (hard ware knee and back surgeries)   - rheumatoid arthritis   - stroke (mild, 8 years ago)   - thyroid problems   - history of lumbar decompression/fusion  General health as reported by patient: Good  Medications: Anticoagulants, HTN, Thyroid,   Occupation: Retired   AM/PM: more stiff in the morning, pain with increased activity, but not severely   Quality of Pain: itching, dull, not severe  Pain: 0/10 at present, 0/10 at best, 4/10 at worst  Worse: being on her feet a lot without a break,   Better: resting, ice,   Hx of Falls: Had a fall shortly before the surgery. Slipped on some wet rocks.   Sleeping: no disturbance   Current Functional Status:   - walking - with walker, smooth gait   - stairs - has stairs to the lower level, has not used them since the surgery, but does not feel it would be a problem  Current HEP/exercise regimen: None  Transportation to Therapy:  drove her today.   Live with Others: , 2 little dogs,   Red Flags:   - Patient denies the following: Fever ; Numbness/Tingling ; Non-Healing Wounds ; Calf Pain/Swelling/Warmth    Patient's goal(s): Get the flexibility of the knee better.       Weightbearing Restrictions: WBAT per patient    Objective:    Stairs: modified independent with stairs using a handrail and a step-to gait pattern    Bed Transfers: modified  independent    Skin/Surgical Site: looks to be healing well with no signs of excess redness or infection    Gait/AD: smooth gait with use of front wheeled walker      ROM:   L R   KNEE     Hyper Ext 0 5   Ext 2 0   Flex 105 137       Stoney's: negative    Quad Set: mild-moderate extensor lag        Assessment/Plan:    Patient is a 80 year old female with left side knee complaints.    Patient has the following significant findings with corresponding treatment plan.                Referring Diagnosis: S/P Left Total Knee Arthroplasty  Pain -  hot/cold therapy, manual therapy, splint/taping/bracing/orthotics, self management, education and home program  Decreased ROM/flexibility - manual therapy, therapeutic exercise, therapeutic activity and home program  Decreased strength - therapeutic exercise, therapeutic activities and home program  Impaired gait - gait training and home program  Decreased function - therapeutic activities and home program          Therapy Evaluation Codes:   1) History comprised of:   Personal factors that impact the plan of care:      Age.    Comorbidity factors that impact the plan of care are:      Heart problems, Stroke and history of lumbar surgery.     Medications impacting care: None.  2) Examination of Body Systems comprised of:   Body structures and functions that impact the plan of care:      Knee.   Activity limitations that impact the plan of care are:      Stairs and Walking.  3) Clinical presentation characteristics are:   Stable/Uncomplicated.  4) Decision-Making    Low complexity using standardized patient assessment instrument and/or measureable assessment of functional outcome.  Cumulative Therapy Evaluation is: Low complexity.    Previous and current functional limitations:  (See Goal Flow Sheet for this information)    Short term and Long term goals: (See Goal Flow Sheet for this information)     Communication ability:  Patient appears to be able to clearly communicate and  understand verbal and written communication and follow directions correctly.  Treatment Explanation - The following has been discussed with the patient:   RX ordered/plan of care  Anticipated outcomes  Possible risks and side effects  This patient would benefit from PT intervention to resume normal activities.   Rehab potential is good.    Frequency:  1 X week, once daily  Duration:  for 4 weeks tapering to 2 X a month over 4 weeks  Discharge Plan:  Achieve all LTG.  Independent in home treatment program.  Reach maximal therapeutic benefit.    Please refer to the daily flowsheet for treatment today, total treatment time and time spent performing 1:1 timed codes.

## 2020-06-16 ENCOUNTER — ANTICOAGULATION THERAPY VISIT (OUTPATIENT)
Dept: FAMILY MEDICINE | Facility: CLINIC | Age: 80
End: 2020-06-16

## 2020-06-16 DIAGNOSIS — Z79.01 LONG TERM CURRENT USE OF ANTICOAGULANT THERAPY: ICD-10-CM

## 2020-06-16 LAB
CAPILLARY BLOOD COLLECTION: NORMAL
INR BLD: 1.3 (ref 0.86–1.14)

## 2020-06-16 PROCEDURE — 36416 COLLJ CAPILLARY BLOOD SPEC: CPT | Performed by: FAMILY MEDICINE

## 2020-06-16 PROCEDURE — 99207 ZZC NO CHARGE NURSE ONLY: CPT | Performed by: FAMILY MEDICINE

## 2020-06-16 PROCEDURE — 85610 PROTHROMBIN TIME: CPT | Mod: QW | Performed by: FAMILY MEDICINE

## 2020-06-16 NOTE — PROGRESS NOTES
ANTICOAGULATION FOLLOW-UP CLINIC VISIT    Patient Name:  Salome Saeed  Date:  2020  Contact Type:  Telephone/ spoke with patient    SUBJECTIVE:  Patient Findings     Positives:   Change in diet/appetite (appetite has not been great), Other complaints (recent knee replacement surgery, but has been doing well with PT)    Comments:   The patient was assessed for diet, medication, and activity level changes, missed or extra doses, bruising or bleeding, with no problem findings. Patient denies any identifiable changes that caused the subtherapeutic INR.           Clinical Outcomes     Negatives:   Major bleeding event, Thromboembolic event, Anticoagulation-related hospital admission, Anticoagulation-related ED visit, Anticoagulation-related fatality    Comments:   The patient was assessed for diet, medication, and activity level changes, missed or extra doses, bruising or bleeding, with no problem findings. Patient denies any identifiable changes that caused the subtherapeutic INR.              OBJECTIVE    Recent labs: (last 7 days)     20  1306   INR 1.3*       ASSESSMENT / PLAN  INR assessment SUB    Recheck INR In: 2 WEEKS    INR Location Clinic      Anticoagulation Summary  As of 2020    INR goal:   2.0-3.0   TTR:   49.9 % (1 y)   INR used for dosin.3! (2020)   Warfarin maintenance plan:   5 mg (5 mg x 1) every Mon, Fri; 2.5 mg (5 mg x 0.5) all other days   Full warfarin instructions:   : 5 mg; : 5 mg; Otherwise 5 mg every Mon, Fri; 2.5 mg all other days   Weekly warfarin total:   22.5 mg   Plan last modified:   Rosanne Rico RN (2020)   Next INR check:   2020   Target end date:       Indications    Long term current use of anticoagulant therapy [Z79.01]  CVA (cerebral vascular accident) (Resolved) [I63.9]             Anticoagulation Episode Summary     INR check location:       Preferred lab:       Send INR reminders to:   JUDY ROCHE    Comments:                See the Encounter Report to view Anticoagulation Flowsheet and Dosing Calendar (Go to Encounters tab in chart review, and find the Anticoagulation Therapy Visit)    Dosage adjustment made based on physician directed care plan - see full instructions above.    NICKY JavierN, RN

## 2020-06-23 ENCOUNTER — THERAPY VISIT (OUTPATIENT)
Dept: PHYSICAL THERAPY | Facility: CLINIC | Age: 80
End: 2020-06-23
Payer: COMMERCIAL

## 2020-06-23 DIAGNOSIS — Z96.652 S/P TOTAL KNEE ARTHROPLASTY, LEFT: ICD-10-CM

## 2020-06-23 DIAGNOSIS — M25.562 LEFT KNEE PAIN: ICD-10-CM

## 2020-06-23 PROCEDURE — 97110 THERAPEUTIC EXERCISES: CPT | Mod: GP | Performed by: PHYSICAL THERAPIST

## 2020-06-29 ENCOUNTER — ANTICOAGULATION THERAPY VISIT (OUTPATIENT)
Dept: FAMILY MEDICINE | Facility: CLINIC | Age: 80
End: 2020-06-29

## 2020-06-29 DIAGNOSIS — Z79.01 LONG TERM CURRENT USE OF ANTICOAGULANT THERAPY: ICD-10-CM

## 2020-06-29 LAB
CAPILLARY BLOOD COLLECTION: NORMAL
INR BLD: 1.9 (ref 0.86–1.14)

## 2020-06-29 PROCEDURE — 99207 ZZC NO CHARGE NURSE ONLY: CPT | Performed by: FAMILY MEDICINE

## 2020-06-29 PROCEDURE — 36416 COLLJ CAPILLARY BLOOD SPEC: CPT | Performed by: FAMILY MEDICINE

## 2020-06-29 PROCEDURE — 85610 PROTHROMBIN TIME: CPT | Mod: QW | Performed by: FAMILY MEDICINE

## 2020-06-29 NOTE — PROGRESS NOTES
ANTICOAGULATION FOLLOW-UP CLINIC VISIT    Patient Name:  Salome Saeed  Date:  2020  Contact Type:  Face to Face    SUBJECTIVE:  Patient Findings         Clinical Outcomes     Negatives:   Major bleeding event, Thromboembolic event, Anticoagulation-related hospital admission, Anticoagulation-related ED visit, Anticoagulation-related fatality           OBJECTIVE    Recent labs: (last 7 days)     20  1318   INR 1.9*       ASSESSMENT / PLAN  INR assessment THER    Recheck INR In: 2 WEEKS    INR Location Clinic      Anticoagulation Summary  As of 2020    INR goal:   2.0-3.0   TTR:   49.8 % (1 y)   INR used for dosin.9! (2020)   Warfarin maintenance plan:   5 mg (5 mg x 1) every Mon, Wed, Fri; 2.5 mg (5 mg x 0.5) all other days   Full warfarin instructions:   5 mg every Mon, Wed, Fri; 2.5 mg all other days   Weekly warfarin total:   25 mg   Plan last modified:   Rosanne Rico, RN (2020)   Next INR check:   2020   Target end date:       Indications    Long term current use of anticoagulant therapy [Z79.01]  CVA (cerebral vascular accident) (Resolved) [I63.9]             Anticoagulation Episode Summary     INR check location:       Preferred lab:       Send INR reminders to:   JUDY ROCHE    Comments:               See the Encounter Report to view Anticoagulation Flowsheet and Dosing Calendar (Go to Encounters tab in chart review, and find the Anticoagulation Therapy Visit)    Dosage adjustment made based on physician directed care plan - maintenance dose increased today from 22.5mg per week to 25mg per week.    MURTAZA Javier, RN

## 2020-06-30 ENCOUNTER — THERAPY VISIT (OUTPATIENT)
Dept: PHYSICAL THERAPY | Facility: CLINIC | Age: 80
End: 2020-06-30
Payer: COMMERCIAL

## 2020-06-30 DIAGNOSIS — M25.562 LEFT KNEE PAIN: ICD-10-CM

## 2020-06-30 DIAGNOSIS — Z96.652 S/P TOTAL KNEE ARTHROPLASTY, LEFT: ICD-10-CM

## 2020-06-30 PROCEDURE — 97110 THERAPEUTIC EXERCISES: CPT | Mod: GP | Performed by: PHYSICAL THERAPIST

## 2020-06-30 PROCEDURE — 97112 NEUROMUSCULAR REEDUCATION: CPT | Mod: GP | Performed by: PHYSICAL THERAPIST

## 2020-07-07 ENCOUNTER — THERAPY VISIT (OUTPATIENT)
Dept: PHYSICAL THERAPY | Facility: CLINIC | Age: 80
End: 2020-07-07
Payer: COMMERCIAL

## 2020-07-07 DIAGNOSIS — Z96.652 S/P TOTAL KNEE ARTHROPLASTY, LEFT: ICD-10-CM

## 2020-07-07 DIAGNOSIS — M25.562 LEFT KNEE PAIN: ICD-10-CM

## 2020-07-07 PROCEDURE — 97110 THERAPEUTIC EXERCISES: CPT | Mod: GP | Performed by: PHYSICAL THERAPIST

## 2020-07-13 ENCOUNTER — TELEPHONE (OUTPATIENT)
Dept: FAMILY MEDICINE | Facility: CLINIC | Age: 80
End: 2020-07-13

## 2020-07-13 ENCOUNTER — ANTICOAGULATION THERAPY VISIT (OUTPATIENT)
Dept: FAMILY MEDICINE | Facility: CLINIC | Age: 80
End: 2020-07-13

## 2020-07-13 DIAGNOSIS — Z79.01 LONG TERM CURRENT USE OF ANTICOAGULANT THERAPY: ICD-10-CM

## 2020-07-13 DIAGNOSIS — I63.9 CEREBROVASCULAR ACCIDENT INVOLVING CEREBELLUM (H): Primary | ICD-10-CM

## 2020-07-13 LAB
CAPILLARY BLOOD COLLECTION: NORMAL
INR BLD: 2.6 (ref 0.86–1.14)

## 2020-07-13 PROCEDURE — 99207 ZZC NO CHARGE NURSE ONLY: CPT | Performed by: FAMILY MEDICINE

## 2020-07-13 PROCEDURE — 85610 PROTHROMBIN TIME: CPT | Mod: QW | Performed by: FAMILY MEDICINE

## 2020-07-13 PROCEDURE — 36416 COLLJ CAPILLARY BLOOD SPEC: CPT | Performed by: FAMILY MEDICINE

## 2020-07-13 NOTE — PROGRESS NOTES
ANTICOAGULATION FOLLOW-UP CLINIC VISIT    Patient Name:  Salome Saeed  Date:  2020  Contact Type:  Telephone/ left a voicemail for patient to call clinic back    SUBJECTIVE:  Patient Findings         Clinical Outcomes     Negatives:   Major bleeding event, Thromboembolic event, Anticoagulation-related hospital admission, Anticoagulation-related ED visit, Anticoagulation-related fatality           OBJECTIVE    Recent labs: (last 7 days)     20  1421   INR 2.6*       ASSESSMENT / PLAN  INR assessment THER    Recheck INR In: 4 WEEKS    INR Location Clinic      Anticoagulation Summary  As of 2020    INR goal:   2.0-3.0   TTR:   50.7 % (1 y)   INR used for dosin.6 (2020)   Warfarin maintenance plan:   5 mg (5 mg x 1) every Mon, Wed, Fri; 2.5 mg (5 mg x 0.5) all other days   Full warfarin instructions:   5 mg every Mon, Wed, Fri; 2.5 mg all other days   Weekly warfarin total:   25 mg   No change documented:   Rosanne Rico RN   Plan last modified:   Rosanne Rico RN (2020)   Next INR check:   8/10/2020   Priority:   Maintenance   Target end date:       Indications    Long term current use of anticoagulant therapy [Z79.01]  CVA (cerebral vascular accident) (Resolved) [I63.9]             Anticoagulation Episode Summary     INR check location:       Preferred lab:       Send INR reminders to:   JUDY ROCHE    Comments:               See the Encounter Report to view Anticoagulation Flowsheet and Dosing Calendar (Go to Encounters tab in chart review, and find the Anticoagulation Therapy Visit)    MURTAZA Javier, RN

## 2020-07-13 NOTE — PROGRESS NOTES
ANTICOAGULATION FOLLOW-UP CLINIC VISIT    Patient Name:  Salome Saeed  Date:  2020  Contact Type:  Telephone    SUBJECTIVE:  Patient Findings     Comments:   The patient was assessed for diet, medication, and activity level changes, missed or extra doses, bruising or bleeding, with no problem findings.          Clinical Outcomes     Negatives:   Major bleeding event, Thromboembolic event, Anticoagulation-related hospital admission, Anticoagulation-related ED visit, Anticoagulation-related fatality    Comments:   The patient was assessed for diet, medication, and activity level changes, missed or extra doses, bruising or bleeding, with no problem findings.             OBJECTIVE    Recent labs: (last 7 days)     20  1421   INR 2.6*       ASSESSMENT / PLAN  INR assessment THER    Recheck INR In: 4 WEEKS    INR Location Clinic      Anticoagulation Summary  As of 2020    INR goal:   2.0-3.0   TTR:   50.7 % (1 y)   INR used for dosin.6 (2020)   Warfarin maintenance plan:   5 mg (5 mg x 1) every Mon, Wed, Fri; 2.5 mg (5 mg x 0.5) all other days   Full warfarin instructions:   5 mg every Mon, Wed, Fri; 2.5 mg all other days   Weekly warfarin total:   25 mg   No change documented:   Rosanne Rico RN   Plan last modified:   Rosanne Rico RN (2020)   Next INR check:   8/10/2020   Priority:   Maintenance   Target end date:       Indications    Long term current use of anticoagulant therapy [Z79.01]  CVA (cerebral vascular accident) (Resolved) [I63.9]             Anticoagulation Episode Summary     INR check location:       Preferred lab:       Send INR reminders to:   JUDY ROCHE    Comments:               See the Encounter Report to view Anticoagulation Flowsheet and Dosing Calendar (Go to Encounters tab in chart review, and find the Anticoagulation Therapy Visit)        Linette Sequeira, RN

## 2020-07-13 NOTE — TELEPHONE ENCOUNTER
ANTICOAGULATION MANAGEMENT      Salome Saeed due for annual renewal of referral to anticoagulation monitoring. Order pended for your review and signature.      ANTICOAGULATION SUMMARY      Warfarin indication(s)     CVA    Heart valve present?  NO       Current goal range   INR: 2.0-3.0     Goal appropriate for indication? Yes, INR 2-3 appropriate for hx of DVT, PE, hypercoagulable state, Afib, LVAD, or bileaflet AVR without risk factors     Current duration of therapy Indefinite/long term therapy   Time in Therapeutic Range (TTR)  (Goal > 60%) 50.7 %       Office visit with referring provider's group within last year yes on 5/27/2020       Linette Sequeira RN

## 2020-07-14 ENCOUNTER — THERAPY VISIT (OUTPATIENT)
Dept: PHYSICAL THERAPY | Facility: CLINIC | Age: 80
End: 2020-07-14
Payer: COMMERCIAL

## 2020-07-14 DIAGNOSIS — M25.562 LEFT KNEE PAIN: ICD-10-CM

## 2020-07-14 DIAGNOSIS — Z96.652 S/P TOTAL KNEE ARTHROPLASTY, LEFT: ICD-10-CM

## 2020-07-14 PROCEDURE — 97110 THERAPEUTIC EXERCISES: CPT | Mod: GP | Performed by: PHYSICAL THERAPIST

## 2020-07-14 ASSESSMENT — ACTIVITIES OF DAILY LIVING (ADL)
SIT WITH YOUR KNEE BENT: ACTIVITY IS NOT DIFFICULT
LIMPING: I DO NOT HAVE THE SYMPTOM
STIFFNESS: I HAVE THE SYMPTOM BUT IT DOES NOT AFFECT MY ACTIVITY
RAW_SCORE: 60
HOW_WOULD_YOU_RATE_THE_OVERALL_FUNCTION_OF_YOUR_KNEE_DURING_YOUR_USUAL_DAILY_ACTIVITIES?: NEARLY NORMAL
SQUAT: ACTIVITY IS SOMEWHAT DIFFICULT
GO UP STAIRS: ACTIVITY IS NOT DIFFICULT
STAND: ACTIVITY IS NOT DIFFICULT
KNEE_ACTIVITY_OF_DAILY_LIVING_SCORE: 85.71
WALK: ACTIVITY IS NOT DIFFICULT
RISE FROM A CHAIR: ACTIVITY IS MINIMALLY DIFFICULT
GIVING WAY, BUCKLING OR SHIFTING OF KNEE: I HAVE THE SYMPTOM BUT IT DOES NOT AFFECT MY ACTIVITY
HOW_WOULD_YOU_RATE_THE_CURRENT_FUNCTION_OF_YOUR_KNEE_DURING_YOUR_USUAL_DAILY_ACTIVITIES_ON_A_SCALE_FROM_0_TO_100_WITH_100_BEING_YOUR_LEVEL_OF_KNEE_FUNCTION_PRIOR_TO_YOUR_INJURY_AND_0_BEING_THE_INABILITY_TO_PERFORM_ANY_OF_YOUR_USUAL_DAILY_ACTIVITIES?: 90
GO DOWN STAIRS: ACTIVITY IS NOT DIFFICULT
WEAKNESS: I HAVE THE SYMPTOM BUT IT DOES NOT AFFECT MY ACTIVITY
AS_A_RESULT_OF_YOUR_KNEE_INJURY,_HOW_WOULD_YOU_RATE_YOUR_CURRENT_LEVEL_OF_DAILY_ACTIVITY?: NEARLY NORMAL
KNEE_ACTIVITY_OF_DAILY_LIVING_SUM: 60
SWELLING: I HAVE THE SYMPTOM BUT IT DOES NOT AFFECT MY ACTIVITY
PAIN: I HAVE THE SYMPTOM BUT IT DOES NOT AFFECT MY ACTIVITY
KNEEL ON THE FRONT OF YOUR KNEE: ACTIVITY IS SOMEWHAT DIFFICULT

## 2020-07-14 NOTE — PROGRESS NOTES
DISCHARGE  REPORT    Progress reporting period is from Jerry 15, 2020 to Jul 14, 2020.       SUBJECTIVE  Subjective changes noted by patient: Sunday morning the whole front of the left leg felt stiff and was painful. lasted most of the day. Not sure why. Better by the next day, but still is bothering her some. Most worrisome part is the pain in the front of the shin.   Patient overall feels she is doing well and that she is ready to be done with physical therapy  Current pain level is 0/10  .     Initial Pain level: 4/10.   Changes in function:  Yes (See Goal flowsheet attached for changes in current functional level)  Adverse reaction to treatment or activity: None    OBJECTIVE  Changes noted in objective findings:  Yes, patient demonstrates improvements in ROM and adequate functional strength    Knee ROM: 0-2-112. Previously had hit 118 degrees flexion.     Patient is able to perform a sit to stand from a standard height chair without assistance from the upper extremity.     Patient is able to go up the stairs in a reciprocal pattern with appropriate use of handrail (not excessive).         ASSESSMENT/PLAN  Updated problem list and treatment plan: Diagnosis 1:  S/P Left Total Knee Arthroplasty    Decreased ROM/flexibility - home program  Decreased strength - home program  Decreased function - home program  STG/LTGs have been met or progress has been made towards goals:  Yes (See Goal flow sheet completed today.)  Assessment of Progress: The patient's condition is improving.  Self Management Plans:  Patient is independent in a home treatment program.  Patient is independent in self management of symptoms.  I have re-evaluated this patient and find that the nature, scope, duration and intensity of the therapy is appropriate for the medical condition of the patient.  Salome continues to require the following intervention to meet STG and LTG's:  PT intervention is no longer required to meet  STG/LTG.    Recommendations:  This patient is ready to be discharged from therapy and continue their home treatment program.    Patient is doing very well following her total knee arthroplasty. She has some limitation with walking which she attributes more to her neck and upper back--not the knee.     Please refer to the daily flowsheet for treatment today, total treatment time and time spent performing 1:1 timed codes.

## 2020-07-15 DIAGNOSIS — E78.5 HYPERLIPIDEMIA LDL GOAL <70: ICD-10-CM

## 2020-07-15 DIAGNOSIS — I25.10 CORONARY ARTERY DISEASE INVOLVING NATIVE CORONARY ARTERY OF NATIVE HEART WITHOUT ANGINA PECTORIS: Chronic | ICD-10-CM

## 2020-07-15 RX ORDER — ROSUVASTATIN CALCIUM 40 MG/1
40 TABLET, COATED ORAL DAILY
Qty: 90 TABLET | Refills: 0 | Status: SHIPPED | OUTPATIENT
Start: 2020-07-15 | End: 2020-09-23

## 2020-07-15 RX ORDER — ATENOLOL 25 MG/1
25 TABLET ORAL 2 TIMES DAILY
Qty: 180 TABLET | Refills: 0 | Status: SHIPPED | OUTPATIENT
Start: 2020-07-15 | End: 2020-09-23

## 2020-07-16 ENCOUNTER — OFFICE VISIT (OUTPATIENT)
Dept: ORTHOPEDICS | Facility: CLINIC | Age: 80
End: 2020-07-16
Payer: COMMERCIAL

## 2020-07-16 ENCOUNTER — ANCILLARY PROCEDURE (OUTPATIENT)
Dept: GENERAL RADIOLOGY | Facility: CLINIC | Age: 80
End: 2020-07-16
Attending: PHYSICIAN ASSISTANT
Payer: COMMERCIAL

## 2020-07-16 DIAGNOSIS — Z96.652 S/P TKR (TOTAL KNEE REPLACEMENT) USING CEMENT, LEFT: Primary | ICD-10-CM

## 2020-07-16 DIAGNOSIS — Z96.652 S/P TKR (TOTAL KNEE REPLACEMENT) USING CEMENT, LEFT: ICD-10-CM

## 2020-07-16 PROCEDURE — 99024 POSTOP FOLLOW-UP VISIT: CPT | Performed by: PHYSICIAN ASSISTANT

## 2020-07-16 PROCEDURE — 73562 X-RAY EXAM OF KNEE 3: CPT | Mod: LT | Performed by: ORTHOPAEDIC SURGERY

## 2020-07-16 NOTE — PATIENT INSTRUCTIONS
Please avoid invasive procedure for 4 months following your surgery.  After the 4 months, based on recommendations by the orthopedic and dental associations, there is no need to use an antibiotic prior to any invasive procedure such as dental work, colonoscopy or surgery unless you are at risk for an infection.  Risks include cancer, immune system diseases, prior joint or chronic infections, and any medication that suppresses or weakens your immune system.  Please notify any providers of your implant prior to invasive procedures and if you have any risk factors or your health has changed since the last procedure. An antibiotic may be prescribed for you.    You may increase your activities as you can tolerate them.  It is recommended that you do not do prolonged kneeling.    It is also recommended that you continue your exercises on your own for several additional months to avoid regressing.    Please follow up with any issues in clinic and with your surgeon 1 year from your surgery date for evaluation.

## 2020-07-16 NOTE — PROGRESS NOTES
HISTORY OF PRESENT ILLNESS:    Salome Saeed is a 80 year old female who is seen in follow up for  S/P left TKA, DOS 06/01/2020, DR. Cheatham.  .  Present symptoms: Pt reports doing well.  Graduated PT.  No cane.  Stairs ok, sleeping well.  No pain.  Some swelling at knee.  No new injuries or complaints.   Denies Chest pain, Calve pain, Fever, Chills.    Current Treatment: POstop, HEP.    PHYSICAL EXAM:  There were no vitals taken for this visit.  There is no height or weight on file to calculate BMI.   GENERAL APPEARANCE: healthy, alert and no distress   PSYCH:  mentation appears normal and affect normal/bright    MSK:  Left: Knee .  Ambulates: WNG.  Incision clean and dry, well healed.  Appropriate incisional erythema.   No Ecchymosis.  No calve pain on palpation.  Edema mod at knee, none at ankle.  CMS: karyn incisional numbness, otherwise grossly intact.  AROM Flexion 0-110+.      IMAGING INTERPRETATION:  XR left Knee 3 views.    Images demonstrate the post operative changes of a left total knee arthroplasty.  Implants appear well seated with good placement and anatomic alignment.  No signs of patella maltracking.  No new pathology noted.      Images read and interpreted by me, see also Dr. Cheatham's read.     ASSESSMENT:  Salome Saeed is a 80 year old female S/P S/P left TKA, DOS 06/01/2020, DR. Cheatham.    Doing well.    PLAN:  - Surgery discussed, images reviewed if applicable, and all questions were answered at this time.  - Long term care instructions given and verbally acknowledged.  - Medications: as current.  - continue exercises at home.  - AAT.    Return to clinic 1, year post op.    Paul Coronel PA-C    Dept. Orthopedic Surgery  [facility]   7/16/2020

## 2020-07-16 NOTE — LETTER
7/16/2020         RE: Salome Saeed  [id number]  [place] [address]        Dear Colleague,    Thank you for referring your patient, Salome Saeed, to the [facility] ORTHOPEDIC SURGERY. Please see a copy of my visit note below.    HISTORY OF PRESENT ILLNESS:    Salome Saeed is a 80 year old female who is seen in follow up for  S/P left TKA, DOS 06/01/2020, DR. Cheatham.  .  Present symptoms: Pt reports doing well.  Graduated PT.  No cane.  Stairs ok, sleeping well.  No pain.  Some swelling at knee.  No new injuries or complaints.   Denies Chest pain, Calve pain, Fever, Chills.    Current Treatment: POstop, HEP.    PHYSICAL EXAM:  There were no vitals taken for this visit.  There is no height or weight on file to calculate BMI.   GENERAL APPEARANCE: healthy, alert and no distress   PSYCH:  mentation appears normal and affect normal/bright    MSK:  Left: Knee .  Ambulates: WNG.  Incision clean and dry, well healed.  Appropriate incisional erythema.   No Ecchymosis.  No calve pain on palpation.  Edema mod at knee, none at ankle.  CMS: karyn incisional numbness, otherwise grossly intact.  AROM Flexion 0-110+.      IMAGING INTERPRETATION:  XR left Knee 3 views.    Images demonstrate the post operative changes of a left total knee arthroplasty.  Implants appear well seated with good placement and anatomic alignment.  No signs of patella maltracking.  No new pathology noted.      Images read and interpreted by me, see also Dr. Cheatham's read.     ASSESSMENT:  Salome Saeed is a 80 year old female S/P S/P left TKA, DOS 06/01/2020, DR. Cheatham.    Doing well.    PLAN:  - Surgery discussed, images reviewed if applicable, and all questions were answered at this time.  - Long term care instructions given and verbally acknowledged.  - Medications: as current.  - continue exercises at home.  - AAT.    Return to clinic 1, year post op.    Paul Coronel PA-C    Dept. Orthopedic Surgery  [place]  [facility]   7/16/2020        Again, thank you for allowing me to participate in the care of your patient.        Sincerely,        Paul Coronel PA-C

## 2020-08-10 ENCOUNTER — ANTICOAGULATION THERAPY VISIT (OUTPATIENT)
Dept: NURSING | Facility: CLINIC | Age: 80
End: 2020-08-10
Payer: COMMERCIAL

## 2020-08-10 DIAGNOSIS — I63.9 CEREBROVASCULAR ACCIDENT INVOLVING CEREBELLUM (H): ICD-10-CM

## 2020-08-10 DIAGNOSIS — Z79.01 LONG TERM CURRENT USE OF ANTICOAGULANT THERAPY: ICD-10-CM

## 2020-08-10 LAB
CAPILLARY BLOOD COLLECTION: NORMAL
INR BLD: 2.7 (ref 0.86–1.14)

## 2020-08-10 PROCEDURE — 85610 PROTHROMBIN TIME: CPT | Mod: QW | Performed by: FAMILY MEDICINE

## 2020-08-10 PROCEDURE — 36416 COLLJ CAPILLARY BLOOD SPEC: CPT | Performed by: FAMILY MEDICINE

## 2020-08-10 PROCEDURE — 99207 ZZC NO CHARGE NURSE ONLY: CPT

## 2020-08-10 RX ORDER — WARFARIN SODIUM 5 MG/1
TABLET ORAL
Qty: 90 TABLET | Refills: 1
Start: 2020-08-10 | End: 2020-09-01

## 2020-08-10 NOTE — PATIENT INSTRUCTIONS
It was a pleasure speaking with you today Loreta. Hopefully we'll get to know each other better with each phone call.  Fela Jensen RN  Anticoagulation

## 2020-08-10 NOTE — PROGRESS NOTES
"ANTICOAGULATION FOLLOW-UP CLINIC VISIT    Patient Name:  Salome SPRAGUE Gjdayande  Date:  8/10/2020  Contact Type:  Telephone    SUBJECTIVE:  Patient Findings     Comments:   Called patient to discuss today's INR results: The patient was assessed for diet, medication, and activity level changes, missed or extra doses, bruising or bleeding, with no problem findings. Patient states her knee is doing well after surgery back at beginning of June. Her back is bothering her a bit and she will occasionally take ibuprofen for this. Patient states she is aware taking ibuprofen and warfarin puts her at increased risk of bleeding. \"That's why I don't do it too much.\". Reviewed maintenance warfarin dosing with patient. Patient will remain on the same dose until next INR check. No other questions or concerns. Advised next INR in 4-6 weeks. Patient wishes to be scheduled in six weeks. She would also prefer to be managed by same INR Nurse if possible and have the AVS mailed to her while being managed telephonically.  Fela ORTA RN  Anticoagulation Clinic  [place]          Clinical Outcomes     Negatives:   Major bleeding event, Thromboembolic event, Anticoagulation-related hospital admission, Anticoagulation-related ED visit, Anticoagulation-related fatality    Comments:   Called patient to discuss today's INR results: The patient was assessed for diet, medication, and activity level changes, missed or extra doses, bruising or bleeding, with no problem findings. Patient states her knee is doing well after surgery back at beginning of June. Her back is bothering her a bit and she will occasionally take ibuprofen for this. Patient states she is aware taking ibuprofen and warfarin puts her at increased risk of bleeding. \"That's why I don't do it too much.\". Reviewed maintenance warfarin dosing with patient. Patient will remain on the same dose until next INR check. No other questions or concerns. Advised next INR in 4-6 weeks. Patient " wishes to be scheduled in six weeks. She would also prefer to be managed by same INR Nurse if possible and have the AVS mailed to her while being managed telephonically.  Fela ORTA RN  Anticoagulation Clinic  [place]             OBJECTIVE    Recent labs: (last 7 days)     08/10/20  1321   INR 2.7*       ASSESSMENT / PLAN  INR assessment THER    Recheck INR In: 6 WEEKS    INR Location Clinic      Anticoagulation Summary  As of 8/10/2020    INR goal:   2.0-3.0   TTR:   55.1 % (1 y)   Prior goal:   2.0-3.0   INR used for dosin.7 (8/10/2020)   Warfarin maintenance plan:   5 mg (5 mg x 1) every Mon, Wed, Fri; 2.5 mg (5 mg x 0.5) all other days   Full warfarin instructions:   5 mg every Mon, Wed, Fri; 2.5 mg all other days   Weekly warfarin total:   25 mg   No change documented:   Fela Jensen RN   Plan last modified:   Rosanne Rico RN (2020)   Next INR check:   2020   Priority:   Maintenance   Target end date:   Indefinite    Indications    Long term current use of anticoagulant therapy [Z79.01]  CVA (cerebral vascular accident) (Resolved) [I63.9]  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      Anticoagulation Care Providers     Provider Role Specialty Phone number    Surya Dyer,  [facility] [phone]            See the Encounter Report to view Anticoagulation Flowsheet and Dosing Calendar (Go to Encounters tab in chart review, and find the Anticoagulation Therapy Visit)    Fela Jensen RN

## 2020-08-24 PROBLEM — M25.562 LEFT KNEE PAIN: Status: RESOLVED | Noted: 2020-06-15 | Resolved: 2020-08-24

## 2020-08-24 PROBLEM — Z96.652 S/P TOTAL KNEE ARTHROPLASTY, LEFT: Status: RESOLVED | Noted: 2020-06-15 | Resolved: 2020-08-24

## 2020-08-27 DIAGNOSIS — I10 ESSENTIAL HYPERTENSION: ICD-10-CM

## 2020-08-27 RX ORDER — AMLODIPINE BESYLATE 5 MG/1
5 TABLET ORAL DAILY
Qty: 90 TABLET | Refills: 0 | Status: SHIPPED | OUTPATIENT
Start: 2020-08-27 | End: 2020-10-26

## 2020-09-01 DIAGNOSIS — I63.9 CEREBROVASCULAR ACCIDENT INVOLVING CEREBELLUM (H): ICD-10-CM

## 2020-09-01 NOTE — TELEPHONE ENCOUNTER
Routing refill request to provider for review/approval because:  Drug interaction warning - diclofenac    Claudine Winn RN   [facility]  [place], [place], [place]

## 2020-09-01 NOTE — TELEPHONE ENCOUNTER
Medication Question or Refill    Who is calling: Pt    What medication are you calling about (include dose and sig)?: warfarin ANTICOAGULANT (COUMADIN) 5 MG tablet     Controlled Substance Agreement on file: No    Who prescribed the medication?: na    Do you need a refill? Yes   Pt not sure,   See refilled on 8/10 but did it go to pharmacy    When did you use the medication last? na    Patient offered an appointment? No    Do you have any questions or concerns?  No    Requested Pharmacy: [facility] mail order    Okay to leave a detailed message?: Yes at Home number on file [phone] (home)    Pt is not available between 12:30 to 2:30

## 2020-09-02 RX ORDER — WARFARIN SODIUM 5 MG/1
TABLET ORAL
Qty: 90 TABLET | Refills: 1 | Status: SHIPPED | OUTPATIENT
Start: 2020-09-02 | End: 2021-01-08

## 2020-09-08 DIAGNOSIS — I10 ESSENTIAL HYPERTENSION, BENIGN: ICD-10-CM

## 2020-09-08 RX ORDER — LISINOPRIL 20 MG/1
20 TABLET ORAL DAILY
Qty: 90 TABLET | Refills: 3 | Status: SHIPPED | OUTPATIENT
Start: 2020-09-08 | End: 2021-02-04

## 2020-09-21 ENCOUNTER — ANTICOAGULATION THERAPY VISIT (OUTPATIENT)
Dept: NURSING | Facility: CLINIC | Age: 80
End: 2020-09-21
Payer: COMMERCIAL

## 2020-09-21 ENCOUNTER — TELEPHONE (OUTPATIENT)
Dept: FAMILY MEDICINE | Facility: CLINIC | Age: 80
End: 2020-09-21

## 2020-09-21 DIAGNOSIS — Z79.01 LONG TERM CURRENT USE OF ANTICOAGULANT THERAPY: ICD-10-CM

## 2020-09-21 DIAGNOSIS — I63.9 CEREBROVASCULAR ACCIDENT INVOLVING CEREBELLUM (H): ICD-10-CM

## 2020-09-21 LAB
CAPILLARY BLOOD COLLECTION: NORMAL
INR PPP: 2.9 (ref 0.86–1.14)

## 2020-09-21 PROCEDURE — 36416 COLLJ CAPILLARY BLOOD SPEC: CPT | Performed by: FAMILY MEDICINE

## 2020-09-21 PROCEDURE — 99207 ZZC NO CHARGE NURSE ONLY: CPT

## 2020-09-21 PROCEDURE — 85610 PROTHROMBIN TIME: CPT | Performed by: FAMILY MEDICINE

## 2020-09-21 NOTE — TELEPHONE ENCOUNTER
Please see message from patient below. Please have patient contact her Ortho provider to find out if she needs prophylactic antibiotics. Some ortho's recommend this for a certain period of time, others do not. Thank you.  MURTAZA Javier, RN  [facility]

## 2020-09-21 NOTE — TELEPHONE ENCOUNTER
Medication Question or Refill    Who is calling: Patient    What medication are you calling about (include dose and sig)?: Antibiotic, general, nonspecific    Controlled Substance Agreement on file:     Who prescribed the medication?: Dr. Dyer    Do you need a refill? No    When did you use the medication last? N/A    Patient offered an appointment? No    Do you have any questions or concerns?  Yes: Pt had knee replacement June 1st and has dental appt October 20th. Dentist is saying patient doesn't need an antibiotic, but patient thinks she might need it anyway.     Requested Pharmacy:  [id number] [place] [address] [address]    Okay to leave a detailed message?: Yes at Home number on file [phone] (home)

## 2020-09-21 NOTE — TELEPHONE ENCOUNTER
Called pt and left detailed message letting her know she will need to check with Ortho.  Can call us back if she has further needs.  Janie Mccall

## 2020-09-21 NOTE — PROGRESS NOTES
Anticoagulation Management    Unable to reach Loreta today.    Today's INR result of 2.9 is therapeutic (goal INR of 2.0-3.0).  Result received from: Clinic Lab    Follow up required to confirm warfarin dose taken and assess for changes    Pt to call  INR at [phone]; if no answer then call Central INR at [phone]. Left message to continue current dose of warfarin 5 mg tonight.    Anticoagulation clinic to follow up    Fela Jensen RN    ANTICOAGULATION FOLLOW-UP CLINIC VISIT    Patient Name:  Salome Saeed  Date:  9/21/2020  Contact Type:  Telephone    SUBJECTIVE:  Patient Findings     Comments:   Loreta returned call. The patient was assessed for diet, medication, and activity level changes, missed or extra doses, bruising or bleeding, with no problem findings. Reviewed maintenance warfarin dosing with patient. Patient will remain on the same dose until next INR check. No other questions or concerns. Scheduled next lab-only INR in 6 weeks. AVS printed and mailed.  Fela ORTA RN  Anticoagulation Clinic  [place]          Clinical Outcomes     Negatives:   Major bleeding event, Thromboembolic event, Anticoagulation-related hospital admission, Anticoagulation-related ED visit, Anticoagulation-related fatality    Comments:   Loreta returned call. The patient was assessed for diet, medication, and activity level changes, missed or extra doses, bruising or bleeding, with no problem findings. Reviewed maintenance warfarin dosing with patient. Patient will remain on the same dose until next INR check. No other questions or concerns. Scheduled next lab-only INR in 6 weeks. AVS printed and mailed.  Fela ORTA RN  Anticoagulation Clinic  [place]             OBJECTIVE    Recent labs: (last 7 days)     09/21/20  1359   INR 2.90*       ASSESSMENT / PLAN  INR assessment THER    Recheck INR In: 6 WEEKS    INR Location Clinic      Anticoagulation Summary  As of 9/21/2020    INR goal:   2.0-3.0   TTR:   60.8  % (1 y)   INR used for dosin.90 (2020)   Warfarin maintenance plan:   5 mg (5 mg x 1) every Mon, Wed, Fri; 2.5 mg (5 mg x 0.5) all other days   Full warfarin instructions:   5 mg every Mon, Wed, Fri; 2.5 mg all other days   Weekly warfarin total:   25 mg   No change documented:   Fela Jensen RN   Plan last modified:   Rosanne Rico RN (2020)   Next INR check:   2020   Priority:   Maintenance   Target end date:   Indefinite    Indications    Long term current use of anticoagulant therapy [Z79.01]  CVA (cerebral vascular accident) (Resolved) [I63.9]  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      Anticoagulation Care Providers     Provider Role Specialty Phone number    Surya Dyer,  [place] [phone]            See the Encounter Report to view Anticoagulation Flowsheet and Dosing Calendar (Go to Encounters tab in chart review, and find the Anticoagulation Therapy Visit)    Fela Jensen, [place]

## 2020-09-23 DIAGNOSIS — E78.5 HYPERLIPIDEMIA LDL GOAL <70: ICD-10-CM

## 2020-09-23 DIAGNOSIS — I25.10 CORONARY ARTERY DISEASE INVOLVING NATIVE CORONARY ARTERY OF NATIVE HEART WITHOUT ANGINA PECTORIS: Chronic | ICD-10-CM

## 2020-09-23 RX ORDER — ROSUVASTATIN CALCIUM 40 MG/1
40 TABLET, COATED ORAL DAILY
Qty: 90 TABLET | Refills: 0 | Status: SHIPPED | OUTPATIENT
Start: 2020-09-23 | End: 2020-12-03

## 2020-09-23 RX ORDER — ATENOLOL 25 MG/1
25 TABLET ORAL 2 TIMES DAILY
Qty: 180 TABLET | Refills: 0 | Status: SHIPPED | OUTPATIENT
Start: 2020-09-23 | End: 2020-12-03

## 2020-10-13 ENCOUNTER — PRE VISIT (OUTPATIENT)
Dept: CARDIOLOGY | Facility: CLINIC | Age: 80
End: 2020-10-13

## 2020-10-13 DIAGNOSIS — I25.10 CORONARY ARTERY DISEASE INVOLVING NATIVE CORONARY ARTERY OF NATIVE HEART WITHOUT ANGINA PECTORIS: Primary | ICD-10-CM

## 2020-10-13 DIAGNOSIS — E78.5 HYPERLIPIDEMIA LDL GOAL <70: ICD-10-CM

## 2020-10-13 DIAGNOSIS — I10 ESSENTIAL HYPERTENSION: ICD-10-CM

## 2020-10-13 NOTE — TELEPHONE ENCOUNTER
Chart updated for 10/14 appt with Dr. Amaya.  Orders placed for BMP, FLP, ALT per Dr. Amaya.  PMD in Epic. KMortimer RN

## 2020-10-14 ENCOUNTER — OFFICE VISIT (OUTPATIENT)
Dept: CARDIOLOGY | Facility: CLINIC | Age: 80
End: 2020-10-14
Attending: INTERNAL MEDICINE
Payer: COMMERCIAL

## 2020-10-14 VITALS
HEIGHT: 64 IN | BODY MASS INDEX: 26.73 KG/M2 | SYSTOLIC BLOOD PRESSURE: 127 MMHG | DIASTOLIC BLOOD PRESSURE: 78 MMHG | WEIGHT: 156.6 LBS | HEART RATE: 60 BPM

## 2020-10-14 DIAGNOSIS — I25.110 CORONARY ARTERY DISEASE INVOLVING NATIVE CORONARY ARTERY OF NATIVE HEART WITH UNSTABLE ANGINA PECTORIS (H): Primary | ICD-10-CM

## 2020-10-14 DIAGNOSIS — I25.10 CORONARY ARTERY DISEASE INVOLVING NATIVE CORONARY ARTERY OF NATIVE HEART WITHOUT ANGINA PECTORIS: ICD-10-CM

## 2020-10-14 DIAGNOSIS — E78.5 HYPERLIPIDEMIA LDL GOAL <70: ICD-10-CM

## 2020-10-14 DIAGNOSIS — Z79.01 LONG TERM CURRENT USE OF ANTICOAGULANT THERAPY: ICD-10-CM

## 2020-10-14 DIAGNOSIS — I10 ESSENTIAL HYPERTENSION: ICD-10-CM

## 2020-10-14 DIAGNOSIS — I10 BENIGN ESSENTIAL HYPERTENSION: Chronic | ICD-10-CM

## 2020-10-14 DIAGNOSIS — R07.89 CHEST DISCOMFORT: ICD-10-CM

## 2020-10-14 LAB
ALT SERPL W P-5'-P-CCNC: 28 U/L (ref 0–50)
ANION GAP SERPL CALCULATED.3IONS-SCNC: 7 MMOL/L (ref 3–14)
BUN SERPL-MCNC: 14 MG/DL (ref 7–30)
CALCIUM SERPL-MCNC: 8.5 MG/DL (ref 8.5–10.1)
CHLORIDE SERPL-SCNC: 98 MMOL/L (ref 94–109)
CHOLEST SERPL-MCNC: 149 MG/DL
CO2 SERPL-SCNC: 28 MMOL/L (ref 20–32)
CREAT SERPL-MCNC: 0.77 MG/DL (ref 0.52–1.04)
GFR SERPL CREATININE-BSD FRML MDRD: 73 ML/MIN/{1.73_M2}
GLUCOSE SERPL-MCNC: 102 MG/DL (ref 70–99)
HDLC SERPL-MCNC: 78 MG/DL
LDLC SERPL CALC-MCNC: 57 MG/DL
NONHDLC SERPL-MCNC: 71 MG/DL
POTASSIUM SERPL-SCNC: 4 MMOL/L (ref 3.4–5.3)
SODIUM SERPL-SCNC: 133 MMOL/L (ref 133–144)
TRIGL SERPL-MCNC: 70 MG/DL

## 2020-10-14 PROCEDURE — 36415 COLL VENOUS BLD VENIPUNCTURE: CPT | Performed by: INTERNAL MEDICINE

## 2020-10-14 PROCEDURE — 80061 LIPID PANEL: CPT | Performed by: INTERNAL MEDICINE

## 2020-10-14 PROCEDURE — 84460 ALANINE AMINO (ALT) (SGPT): CPT | Performed by: INTERNAL MEDICINE

## 2020-10-14 PROCEDURE — 80048 BASIC METABOLIC PNL TOTAL CA: CPT | Performed by: INTERNAL MEDICINE

## 2020-10-14 PROCEDURE — 99214 OFFICE O/P EST MOD 30 MIN: CPT | Performed by: INTERNAL MEDICINE

## 2020-10-14 ASSESSMENT — MIFFLIN-ST. JEOR: SCORE: 1175.33

## 2020-10-14 NOTE — PROGRESS NOTES
HPI and Plan:   See dictation    Orders Placed This Encounter   Procedures     Basic metabolic panel     Lipid Profile     Follow-Up with Cardiac Advanced Practice Provider     Follow-Up with Cardiologist       No orders of the defined types were placed in this encounter.      Medications Discontinued During This Encounter   Medication Reason     enoxaparin ANTICOAGULANT (LOVENOX) 80 MG/0.8ML syringe      gabapentin (NEURONTIN) 100 MG capsule      oxyCODONE (ROXICODONE) 5 MG tablet      polyethylene glycol (MIRALAX) 17 g packet          Encounter Diagnoses   Name Primary?     Chest discomfort      Coronary artery disease involving native coronary artery of native heart with unstable angina pectoris (H) Yes     Hyperlipidemia LDL goal <70      Benign essential hypertension      Long term current use of anticoagulant therapy        CURRENT MEDICATIONS:  Current Outpatient Medications   Medication Sig Dispense Refill     amLODIPine (NORVASC) 5 MG tablet Take 1 tablet (5 mg) by mouth daily 90 tablet 0     ascorbic acid (VITAMIN C) 500 MG tablet Take 1,000 mg by mouth daily        aspirin EC 81 MG tablet Take 1 tablet (81 mg) by mouth daily       atenolol (TENORMIN) 25 MG tablet Take 1 tablet (25 mg) by mouth 2 times daily 180 tablet 0     Calcium Carb-Cholecalciferol (CALCIUM 600 + D PO) Take 1,200 mg by mouth daily        Cholecalciferol (VITAMIN D) 2000 UNITS tablet Take 2,000 Units by mouth daily       Coenzyme Q10 (COQ-10) 200 MG CAPS Take 400 mg by mouth daily        diclofenac (VOLTAREN) 1 % topical gel Place 4 g onto the skin 4 times daily 1 Tube 4     escitalopram (LEXAPRO) 5 MG tablet TAKE 1 TABLET EVERY DAY 90 tablet 3     levothyroxine (SYNTHROID/LEVOTHROID) 100 MCG tablet Take 1 tablet (100 mcg) by mouth daily 90 tablet 3     lisinopril (ZESTRIL) 20 MG tablet Take 1 tablet (20 mg) by mouth daily 90 tablet 3     Multiple Vitamin (DAILY MULTIVITAMIN PO) Take by mouth daily       nitroGLYcerin (NITROSTAT) 0.4  MG sublingual tablet Place 1 tablet (0.4 mg) under the tongue every 5 minutes as needed for chest pain 25 tablet 2     rosuvastatin (CRESTOR) 40 MG tablet Take 1 tablet (40 mg) by mouth daily 90 tablet 0     vitamin B complex with vitamin C (VITAMIN  B COMPLEX) TABS tablet Take 1 tablet by mouth daily       warfarin ANTICOAGULANT (COUMADIN) 5 MG tablet Take 1 tablet (5 mg) by mouth M, W, F; take 1/2 tablet (2.5 mg) by mouth T, Th, Sat, Sun; or as directed by INR Clinic. 90 tablet 1     acetaminophen (TYLENOL) 325 MG tablet Take 2 tablets by mouth every 6 hours as needed for pain.  0     enoxaparin ANTICOAGULANT (LOVENOX) 80 MG/0.8ML syringe Inject 0.7 mLs (70 mg) Subcutaneous every 12 hours 6/2 Warfarin 2.5 mg, no lovenox in am, restart Lovenox in PM  6/3 Warfarin 2.5 mg Lovenox BID  6/4 Warfarin 2.5 mg Lovenox BID 7 Syringe 0     senna-docusate (SENOKOT-S/PERICOLACE) 8.6-50 MG tablet Take 1 tablet by mouth 2 times daily as needed for constipation 20 tablet 0       ALLERGIES     Allergies   Allergen Reactions     Atorvastatin      Leg cramps     Cats Itching     Gluten      Sinuses affected by gluten     Oat      Shellfish Allergy      hives     Wheat        PAST MEDICAL HISTORY:  Past Medical History:   Diagnosis Date     Antiplatelet or antithrombotic long-term use      CAD (coronary artery disease) 4/9/2009 4/8/2009: PTCA and two 2.5x15mm Xience stents to mid LAD lesion; Cath 12/2012- 40-50% stenosis in mid PDA, 80% on D1- medically treat , 5/28/2015 - PTCA and 2.25x8mm Promus PREMIIER NAILA to ostium of 2nd OM     Cerebral artery occlusion with cerebral infarction (H) 2012     Coagulation disorder (H)     warafin     CVA (cerebral infarction)      DDD (degenerative disc disease)      Dyspnea on exertion      Essential hypertension, benign      GERD (gastroesophageal reflux disease)      Headache      Heart murmur      History of angina     rarely     Hyperlipidemia      Hypothyroidism      Lumbago      Lumbar  spinal stenosis      Mitral regurgitation     1+ per 2013 Echo     Stented coronary artery      Vertebral artery stenosis, right        PAST SURGICAL HISTORY:  Past Surgical History:   Procedure Laterality Date     ARTHROPLASTY KNEE Left 2020    Procedure: Left total knee arthroplasty (Ward and Nephew #5 narrow femur; #3 tibia; 9 mm tibial poly and 35 mm patella);  Surgeon: Jorge Luis Cheatham MD;  Location: [facility] OR     CORONARY ANGIOGRAPHY ADULT ORDER  2012    40-50% stenosis to mid PDA, 80% in D1- medically treat     CORONARY ANGIOGRAPHY ADULT ORDER  2015    PTCA and 2.25x8mm Promus PREMIER NAILA to ostium of 2nd OM     DECOMPRESSION, FUSION LUMBAR POSTERIOR THREE + LEVELS, COMBINED  2013    Procedure: COMBINED DECOMPRESSION, FUSION LUMBAR POSTERIOR THREE + LEVELS;  Posterior Lumbar Decompression L3-S1, Fusion L4-S1;  Surgeon: Daniel Harris MD;  Location: [facility] OR     ENDOSCOPIC STRIPPING VEIN(S)       HEART CATH, ANGIOPLASTY  2009    PTCA and two 2.5x15mm Xience stents to mid LAD lesion     HYSTERECTOMY, RICKIE      Fibroids, and Menorrhagia.. Bilateral Oophrectomy     ORTHOPEDIC SURGERY       Stenting of LAD, Angioplasty  09     TONSILLECTOMY      as a child       FAMILY HISTORY:  Family History   Problem Relation Age of Onset     Neurologic Disorder Mother         Dementia, Still living     Ovarian Cancer Mother      Thyroid Disease Mother      C.A.D. Father              Diabetes Father      Coronary Artery Disease Father      Alcohol/Drug Brother      Thyroid Disease Son      Diabetes Son        SOCIAL HISTORY:  Social History     Socioeconomic History     Marital status:      Spouse name: None     Number of children: None     Years of education: None     Highest education level: None   Occupational History     None   Social Needs     Financial resource strain: None     Food insecurity     Worry: None     Inability: None     Transportation needs     Medical: None      Non-medical: None   Tobacco Use     Smoking status: Former Smoker     Packs/day: 0.10     Quit date: 1965     Years since quittin.8     Smokeless tobacco: Never Used   Substance and Sexual Activity     Alcohol use: Yes     Alcohol/week: 0.0 standard drinks     Comment: 2 glasses wine per month, maybe     Drug use: No     Sexual activity: Never   Lifestyle     Physical activity     Days per week: None     Minutes per session: None     Stress: None   Relationships     Social connections     Talks on phone: None     Gets together: None     Attends Mandaen service: None     Active member of club or organization: None     Attends meetings of clubs or organizations: None     Relationship status: None     Intimate partner violence     Fear of current or ex partner: None     Emotionally abused: None     Physically abused: None     Forced sexual activity: None   Other Topics Concern      Service Not Asked     Blood Transfusions No     Caffeine Concern Yes     Comment: 5 cups caffeine per day     Occupational Exposure Not Asked     Hobby Hazards Not Asked     Sleep Concern No     Stress Concern No     Weight Concern No     Comment: weight stable     Special Diet No     Comment: trying to eat healthier     Back Care Not Asked     Exercise Yes     Comment: stretching     Bike Helmet Not Asked     Seat Belt Yes     Self-Exams Yes     Parent/sibling w/ CABG, MI or angioplasty before 65F 55M? Yes   Social History Narrative    Works in an office       Review of Systems:  Skin:  Negative       Eyes:  Positive for glasses    ENT:  Negative      Respiratory:  Positive for dyspnea on exertion     Cardiovascular:    chest pain;Positive for;dizziness    Gastroenterology: Negative      Genitourinary:  Negative      Musculoskeletal:  Positive for back pain;neck pain    Neurologic:  Negative      Psychiatric:  Negative      Heme/Lymph/Imm:  Negative      Endocrine:  Positive for thyroid disorder      Physical  "Exam:  Vitals: /78 (BP Location: Right arm, Patient Position: Sitting, Cuff Size: Adult Regular)   Pulse 60   Ht 1.626 m (5' 4\")   Wt 71 kg (156 lb 9.6 oz)   LMP  (LMP Unknown)   Breastfeeding No   BMI 26.88 kg/m      Constitutional:  cooperative, alert and oriented, well developed, well nourished, in no acute distress overweight      Skin:  warm and dry to the touch, no apparent skin lesions or masses noted          Head:  normocephalic, no masses or lesions        Eyes:  pupils equal and round, conjunctivae and lids unremarkable, sclera white, no xanthalasma, EOMS intact, no nystagmus        Lymph:      ENT:  no pallor or cyanosis, dentition good        Neck:  carotid pulses are full and equal bilaterally;no carotid bruit        Respiratory:  normal breath sounds, clear to auscultation, normal A-P diameter, normal symmetry, normal respiratory excursion, no use of accessory muscles         Cardiac: regular rhythm;normal S1 and S2   S4   grade 1;LUSB;systolic murmur        pulses full and equal                                   left wrist    GI:           Extremities and Muscular Skeletal:  no edema;no spinal abnormalities noted;normal muscle strength and tone         left wrist and forearm ecchymotic and mildy swollen without hum or bruit    Neurological:  no gross motor deficits        Psych:  affect appropriate, oriented to time, person and place        CC  Nikolai Amaya MD  [address]  [place] [id number]              "

## 2020-10-14 NOTE — PROGRESS NOTES
Service Date: 10/14/2020      HISTORY OF PRESENT ILLNESS:  Ms. Saeed is a delightful 80-year-old woman with past medical history significant for unstable angina leading to 2 Xience drug-eluting stents being placed in her left anterior descending artery in 2009.  She has hypercholesterolemia, hypertension, hypothyroidism, chronic back pain, strong family history of coronary artery disease.      Chest discomfort in 2012 led to angiography demonstrating no change in her anatomy.  She had moderate disease in a posterior descending artery, an ostial pinch in her first diagonal and 80% stenosis in the branch of an obtuse marginal that I recommended medical management.      In the spring of 2015, again with symptoms consistent with fairly classical angina, angiography demonstrated no change in her anatomy and we continued with medical management.  One month later, she came back to the Emergency Room and brought her back to the Cath Lab, where we stented a small obtuse marginal, placing a 2.25 x 8 mm Promus drug-eluting stent.  She initially had some problems with her wrist after the radial approach.  This did resolve and also resulted in a resolution of her anginal symptoms.      In 2013, she had a cerebrovascular accident, manifesting as balance issues.  It was thought to be an embolic event from her vertebral artery and she has been on warfarin therapy ever since.      Over the years, she has had multiple lower back surgeries by Dr. Daniel Harris, most recently in 2017.  Subsequent to that, she had another episode of angina for which a stress nuclear scan appeared to demonstrate no evidence of ischemia.  When she was seen last year by my RASHAD, again she complained of a variety of chest discomforts, some of which could be anginal, and a Lexiscan  at that time was negative as well.      Salome returns to the clinic and states she is getting quite frustrated with COVID.  She does not appear to be having any COVID  symptoms.      She continues to have occasional chest pain and left arm pain.  She said she had 1 episode yesterday when she was walking into the store that lasted just minutes.  It was a chest discomfort and left arm discomfort.  She did not take a nitroglycerin.  She has had no further symptoms since then.  She states it is similar to the symptoms she has had over the last couple of years.      She notes no problems with her warfarin therapy.  No bleeding issues.  No symptoms to suggest another TIA or CVA.  Her activity is limited by neck pain.  She states she has had lower back surgery, but has never had surgery on her upper back and neck and this is bothering her now.      ASSESSMENT AND PLAN:  Salome again has suspicious symptoms for possible unstable angina, although we have tested her several times in the past and at this time I would try to perform a stress nuclear scan, holding her beta blockers and we will see what it shows.  Depending on the results, we may be proceeding to the Cath Lab.  If there does not appear to be any significant area of ischemia, then we will continue with medical management.      She has no symptoms to suggest heart failure or significant arrhythmia.      Blood pressure is very well-controlled at this time at 127/78 with a pulse of 60.      Weight is 156 pounds, which is down 6 pounds from last winter, giving her a body mass index of 26.9.      Fasting lipid profile is outstanding.  Total cholesterol of 149, HDL 78, LDL 57, triglycerides of 70.  This is the best cholesterol profile she has had in some time and I suspect it is due to her weight loss.      Electrolytes are within normal limits except for some mild hyponatremia at 133, which she has had in the past.  Creatinine is 0.77.      I have encouraged her to exercise as much as she is capable of doing.      Of note, her hemoglobin was 9.5 back in June.  I suspect this is postoperatively after her surgery.  I will recheck  this when she comes in for her stress test.      Thank you for allowing me to participate in her care.      MD OMAR Nolan MD, [facility]             D: 10/14/2020   T: 10/14/2020   MT: al      Name:     JESUS AVELAR   MRN:      [id number]        Account:      [id number]   :      1940           Service Date: 10/14/2020      Document: [id number]

## 2020-10-14 NOTE — LETTER
10/14/2020      Surya Dyer, DO  [address]  [facility] [address]      RE: Salome Saeed       Dear Colleague,    I had the pleasure of seeing Salome Saeed in the [facility].    Service Date: 10/14/2020      HISTORY OF PRESENT ILLNESS:  Ms. Saeed is a delightful 80-year-old woman with past medical history significant for unstable angina leading to 2 Xience drug-eluting stents being placed in her left anterior descending artery in 2009.  She has hypercholesterolemia, hypertension, hypothyroidism, chronic back pain, strong family history of coronary artery disease.      Chest discomfort in 2012 led to angiography demonstrating no change in her anatomy.  She had moderate disease in a posterior descending artery, an ostial pinch in her first diagonal and 80% stenosis in the branch of an obtuse marginal that I recommended medical management.      In the spring of 2015, again with symptoms consistent with fairly classical angina, angiography demonstrated no change in her anatomy and we continued with medical management.  One month later, she came back to the Emergency Room and brought her back to the Cath Lab, where we stented a small obtuse marginal, placing a 2.25 x 8 mm Promus drug-eluting stent.  She initially had some problems with her wrist after the radial approach.  This did resolve and also resulted in a resolution of her anginal symptoms.      In 2013, she had a cerebrovascular accident, manifesting as balance issues.  It was thought to be an embolic event from her vertebral artery and she has been on warfarin therapy ever since.      Over the years, she has had multiple lower back surgeries by Dr. Daniel Harris, most recently in 2017.  Subsequent to that, she had another episode of angina for which a stress nuclear scan appeared to demonstrate no evidence of ischemia.  When she was seen last year by my RASHAD, again she complained of a variety of chest discomforts,  some of which could be anginal, and a Lexiscan  at that time was negative as well.      Salome returns to the clinic and states she is getting quite frustrated with COVID.  She does not appear to be having any COVID symptoms.      She continues to have occasional chest pain and left arm pain.  She said she had 1 episode yesterday when she was walking into the store that lasted just minutes.  It was a chest discomfort and left arm discomfort.  She did not take a nitroglycerin.  She has had no further symptoms since then.  She states it is similar to the symptoms she has had over the last couple of years.      She notes no problems with her warfarin therapy.  No bleeding issues.  No symptoms to suggest another TIA or CVA.  Her activity is limited by neck pain.  She states she has had lower back surgery, but has never had surgery on her upper back and neck and this is bothering her now.      ASSESSMENT AND PLAN:  Salome again has suspicious symptoms for possible unstable angina, although we have tested her several times in the past and at this time I would try to perform a stress nuclear scan, holding her beta blockers and we will see what it shows.  Depending on the results, we may be proceeding to the Cath Lab.  If there does not appear to be any significant area of ischemia, then we will continue with medical management.      She has no symptoms to suggest heart failure or significant arrhythmia.      Blood pressure is very well-controlled at this time at 127/78 with a pulse of 60.      Weight is 156 pounds, which is down 6 pounds from last winter, giving her a body mass index of 26.9.      Fasting lipid profile is outstanding.  Total cholesterol of 149, HDL 78, LDL 57, triglycerides of 70.  This is the best cholesterol profile she has had in some time and I suspect it is due to her weight loss.      Electrolytes are within normal limits except for some mild hyponatremia at 133, which she has had in the past.   Creatinine is 0.77.      I have encouraged her to exercise as much as she is capable of doing.      Of note, her hemoglobin was 9.5 back in .  I suspect this is postoperatively after her surgery.  I will recheck this when she comes in for her stress test.      Thank you for allowing me to participate in her care.      Nikolai Amaya MD        D: 10/14/2020   T: 10/14/2020   MT: al      Name:     JESUS AVELAR   MRN:      [id number]        Account:      [id number]   :      1940           Service Date: 10/14/2020      Document: [id number]          Outpatient Encounter Medications as of 10/14/2020   Medication Sig Dispense Refill     amLODIPine (NORVASC) 5 MG tablet Take 1 tablet (5 mg) by mouth daily 90 tablet 0     ascorbic acid (VITAMIN C) 500 MG tablet Take 1,000 mg by mouth daily        aspirin EC 81 MG tablet Take 1 tablet (81 mg) by mouth daily       atenolol (TENORMIN) 25 MG tablet Take 1 tablet (25 mg) by mouth 2 times daily 180 tablet 0     Calcium Carb-Cholecalciferol (CALCIUM 600 + D PO) Take 1,200 mg by mouth daily        Cholecalciferol (VITAMIN D) 2000 UNITS tablet Take 2,000 Units by mouth daily       Coenzyme Q10 (COQ-10) 200 MG CAPS Take 400 mg by mouth daily        diclofenac (VOLTAREN) 1 % topical gel Place 4 g onto the skin 4 times daily 1 Tube 4     escitalopram (LEXAPRO) 5 MG tablet TAKE 1 TABLET EVERY DAY 90 tablet 3     levothyroxine (SYNTHROID/LEVOTHROID) 100 MCG tablet Take 1 tablet (100 mcg) by mouth daily 90 tablet 3     lisinopril (ZESTRIL) 20 MG tablet Take 1 tablet (20 mg) by mouth daily 90 tablet 3     Multiple Vitamin (DAILY MULTIVITAMIN PO) Take by mouth daily       nitroGLYcerin (NITROSTAT) 0.4 MG sublingual tablet Place 1 tablet (0.4 mg) under the tongue every 5 minutes as needed for chest pain 25 tablet 2     rosuvastatin (CRESTOR) 40 MG tablet Take 1 tablet (40 mg) by mouth daily 90 tablet 0     vitamin B complex with vitamin C (VITAMIN  B COMPLEX) TABS  tablet Take 1 tablet by mouth daily       warfarin ANTICOAGULANT (COUMADIN) 5 MG tablet Take 1 tablet (5 mg) by mouth M, W, F; take 1/2 tablet (2.5 mg) by mouth T, Th, Sat, Sun; or as directed by INR Clinic. 90 tablet 1     acetaminophen (TYLENOL) 325 MG tablet Take 2 tablets by mouth every 6 hours as needed for pain.  0     enoxaparin ANTICOAGULANT (LOVENOX) 80 MG/0.8ML syringe Inject 0.7 mLs (70 mg) Subcutaneous every 12 hours 6/2 Warfarin 2.5 mg, no lovenox in am, restart Lovenox in PM  6/3 Warfarin 2.5 mg Lovenox BID  6/4 Warfarin 2.5 mg Lovenox BID 7 Syringe 0     senna-docusate (SENOKOT-S/PERICOLACE) 8.6-50 MG tablet Take 1 tablet by mouth 2 times daily as needed for constipation 20 tablet 0     [DISCONTINUED] enoxaparin ANTICOAGULANT (LOVENOX) 80 MG/0.8ML syringe Inject 0.7 mLs (70 mg) Subcutaneous every 12 hours (Patient not taking: Reported on 10/14/2020) 7 Syringe 1     [DISCONTINUED] gabapentin (NEURONTIN) 100 MG capsule Take 1 capsule (100 mg) by mouth 3 times daily (Patient not taking: Reported on 10/14/2020) 15 capsule 0     [DISCONTINUED] oxyCODONE (ROXICODONE) 5 MG tablet Take 0.5-1 tablets (2.5-5 mg) by mouth every 4 hours as needed for moderate to severe pain (Patient not taking: Reported on 10/14/2020) 15 tablet 0     [DISCONTINUED] polyethylene glycol (MIRALAX) 17 g packet Take 17 g by mouth daily as needed for constipation (Patient not taking: Reported on 10/14/2020) 10 packet 0     No facility-administered encounter medications on file as of 10/14/2020.        Again, thank you for allowing me to participate in the care of your patient.      Sincerely,    Nikolai Amaya MD     [facility]

## 2020-10-14 NOTE — LETTER
10/14/2020    Surya Dyer, DO  [address]  [place] [address]    RE: Salome Saeed       Dear Colleague,    I had the pleasure of seeing Salomeduane Saeed in the [facility].    HPI and Plan:   See dictation    Orders Placed This Encounter   Procedures     Basic metabolic panel     Lipid Profile     Follow-Up with Cardiac Advanced Practice Provider     Follow-Up with Cardiologist       No orders of the defined types were placed in this encounter.      Medications Discontinued During This Encounter   Medication Reason     enoxaparin ANTICOAGULANT (LOVENOX) 80 MG/0.8ML syringe      gabapentin (NEURONTIN) 100 MG capsule      oxyCODONE (ROXICODONE) 5 MG tablet      polyethylene glycol (MIRALAX) 17 g packet          Encounter Diagnoses   Name Primary?     Chest discomfort      Coronary artery disease involving native coronary artery of native heart with unstable angina pectoris (H) Yes     Hyperlipidemia LDL goal <70      Benign essential hypertension      Long term current use of anticoagulant therapy        CURRENT MEDICATIONS:  Current Outpatient Medications   Medication Sig Dispense Refill     amLODIPine (NORVASC) 5 MG tablet Take 1 tablet (5 mg) by mouth daily 90 tablet 0     ascorbic acid (VITAMIN C) 500 MG tablet Take 1,000 mg by mouth daily        aspirin EC 81 MG tablet Take 1 tablet (81 mg) by mouth daily       atenolol (TENORMIN) 25 MG tablet Take 1 tablet (25 mg) by mouth 2 times daily 180 tablet 0     Calcium Carb-Cholecalciferol (CALCIUM 600 + D PO) Take 1,200 mg by mouth daily        Cholecalciferol (VITAMIN D) 2000 UNITS tablet Take 2,000 Units by mouth daily       Coenzyme Q10 (COQ-10) 200 MG CAPS Take 400 mg by mouth daily        diclofenac (VOLTAREN) 1 % topical gel Place 4 g onto the skin 4 times daily 1 Tube 4     escitalopram (LEXAPRO) 5 MG tablet TAKE 1 TABLET EVERY DAY 90 tablet 3     levothyroxine (SYNTHROID/LEVOTHROID) 100 MCG tablet Take 1 tablet (100  mcg) by mouth daily 90 tablet 3     lisinopril (ZESTRIL) 20 MG tablet Take 1 tablet (20 mg) by mouth daily 90 tablet 3     Multiple Vitamin (DAILY MULTIVITAMIN PO) Take by mouth daily       nitroGLYcerin (NITROSTAT) 0.4 MG sublingual tablet Place 1 tablet (0.4 mg) under the tongue every 5 minutes as needed for chest pain 25 tablet 2     rosuvastatin (CRESTOR) 40 MG tablet Take 1 tablet (40 mg) by mouth daily 90 tablet 0     vitamin B complex with vitamin C (VITAMIN  B COMPLEX) TABS tablet Take 1 tablet by mouth daily       warfarin ANTICOAGULANT (COUMADIN) 5 MG tablet Take 1 tablet (5 mg) by mouth M, W, F; take 1/2 tablet (2.5 mg) by mouth T, Th, Sat, Sun; or as directed by INR Clinic. 90 tablet 1     acetaminophen (TYLENOL) 325 MG tablet Take 2 tablets by mouth every 6 hours as needed for pain.  0     enoxaparin ANTICOAGULANT (LOVENOX) 80 MG/0.8ML syringe Inject 0.7 mLs (70 mg) Subcutaneous every 12 hours 6/2 Warfarin 2.5 mg, no lovenox in am, restart Lovenox in PM  6/3 Warfarin 2.5 mg Lovenox BID  6/4 Warfarin 2.5 mg Lovenox BID 7 Syringe 0     senna-docusate (SENOKOT-S/PERICOLACE) 8.6-50 MG tablet Take 1 tablet by mouth 2 times daily as needed for constipation 20 tablet 0       ALLERGIES     Allergies   Allergen Reactions     Atorvastatin      Leg cramps     Cats Itching     Gluten      Sinuses affected by gluten     Oat      Shellfish Allergy      hives     Wheat        PAST MEDICAL HISTORY:  Past Medical History:   Diagnosis Date     Antiplatelet or antithrombotic long-term use      CAD (coronary artery disease) 4/9/2009 4/8/2009: PTCA and two 2.5x15mm Xience stents to mid LAD lesion; Cath 12/2012- 40-50% stenosis in mid PDA, 80% on D1- medically treat , 5/28/2015 - PTCA and 2.25x8mm Promus PREMIIER NAILA to ostium of 2nd OM     Cerebral artery occlusion with cerebral infarction (H) 2012     Coagulation disorder (H)     warafin     CVA (cerebral infarction)      DDD (degenerative disc disease)      Dyspnea on  exertion      Essential hypertension, benign      GERD (gastroesophageal reflux disease)      Headache      Heart murmur      History of angina     rarely     Hyperlipidemia      Hypothyroidism      Lumbago      Lumbar spinal stenosis      Mitral regurgitation     1+ per 2013 Echo     Stented coronary artery      Vertebral artery stenosis, right        PAST SURGICAL HISTORY:  Past Surgical History:   Procedure Laterality Date     ARTHROPLASTY KNEE Left 2020    Procedure: Left total knee arthroplasty (Ward and Nephew #5 narrow femur; #3 tibia; 9 mm tibial poly and 35 mm patella);  Surgeon: Jorge Luis Cheatham MD;  Location: [facility] OR     CORONARY ANGIOGRAPHY ADULT ORDER  2012    40-50% stenosis to mid PDA, 80% in D1- medically treat     CORONARY ANGIOGRAPHY ADULT ORDER  2015    PTCA and 2.25x8mm Promus PREMIER NAILA to ostium of 2nd OM     DECOMPRESSION, FUSION LUMBAR POSTERIOR THREE + LEVELS, COMBINED  2013    Procedure: COMBINED DECOMPRESSION, FUSION LUMBAR POSTERIOR THREE + LEVELS;  Posterior Lumbar Decompression L3-S1, Fusion L4-S1;  Surgeon: Daniel Harris MD;  Location: [facility] OR     ENDOSCOPIC STRIPPING VEIN(S)       HEART CATH, ANGIOPLASTY  2009    PTCA and two 2.5x15mm Xience stents to mid LAD lesion     HYSTERECTOMY, RICKIE      Fibroids, and Menorrhagia.. Bilateral Oophrectomy     ORTHOPEDIC SURGERY       Stenting of LAD, Angioplasty  09     TONSILLECTOMY      as a child       FAMILY HISTORY:  Family History   Problem Relation Age of Onset     Neurologic Disorder Mother         Dementia, Still living     Ovarian Cancer Mother      Thyroid Disease Mother      C.A.D. Father              Diabetes Father      Coronary Artery Disease Father      Alcohol/Drug Brother      Thyroid Disease Son      Diabetes Son        SOCIAL HISTORY:  Social History     Socioeconomic History     Marital status:      Spouse name: None     Number of children: None     Years of education: None      Highest education level: None   Occupational History     None   Social Needs     Financial resource strain: None     Food insecurity     Worry: None     Inability: None     Transportation needs     Medical: None     Non-medical: None   Tobacco Use     Smoking status: Former Smoker     Packs/day: 0.10     Quit date: 1965     Years since quittin.8     Smokeless tobacco: Never Used   Substance and Sexual Activity     Alcohol use: Yes     Alcohol/week: 0.0 standard drinks     Comment: 2 glasses wine per month, maybe     Drug use: No     Sexual activity: Never   Lifestyle     Physical activity     Days per week: None     Minutes per session: None     Stress: None   Relationships     Social connections     Talks on phone: None     Gets together: None     Attends [employer] service: None     Active member of club or organization: None     Attends meetings of clubs or organizations: None     Relationship status: None     Intimate partner violence     Fear of current or ex partner: None     Emotionally abused: None     Physically abused: None     Forced sexual activity: None   Other Topics Concern      Service Not Asked     Blood Transfusions No     Caffeine Concern Yes     Comment: 5 cups caffeine per day     Occupational Exposure Not Asked     Hobby Hazards Not Asked     Sleep Concern No     Stress Concern No     Weight Concern No     Comment: weight stable     Special Diet No     Comment: trying to eat healthier     Back Care Not Asked     Exercise Yes     Comment: stretching     Bike Helmet Not Asked     Seat Belt Yes     Self-Exams Yes     Parent/sibling w/ CABG, MI or angioplasty before 65F 55M? Yes   Social History Narrative    Works in an office       Review of Systems:  Skin:  Negative       Eyes:  Positive for glasses    ENT:  Negative      Respiratory:  Positive for dyspnea on exertion     Cardiovascular:    chest pain;Positive for;dizziness    Gastroenterology: Negative      Genitourinary:   "Negative      Musculoskeletal:  Positive for back pain;neck pain    Neurologic:  Negative      Psychiatric:  Negative      Heme/Lymph/Imm:  Negative      Endocrine:  Positive for thyroid disorder      Physical Exam:  Vitals: /78 (BP Location: Right arm, Patient Position: Sitting, Cuff Size: Adult Regular)   Pulse 60   Ht 1.626 m (5' 4\")   Wt 71 kg (156 lb 9.6 oz)   LMP  (LMP Unknown)   Breastfeeding No   BMI 26.88 kg/m      Constitutional:  cooperative, alert and oriented, well developed, well nourished, in no acute distress overweight      Skin:  warm and dry to the touch, no apparent skin lesions or masses noted          Head:  normocephalic, no masses or lesions        Eyes:  pupils equal and round, conjunctivae and lids unremarkable, sclera white, no xanthalasma, EOMS intact, no nystagmus        Lymph:      ENT:  no pallor or cyanosis, dentition good        Neck:  carotid pulses are full and equal bilaterally;no carotid bruit        Respiratory:  normal breath sounds, clear to auscultation, normal A-P diameter, normal symmetry, normal respiratory excursion, no use of accessory muscles         Cardiac: regular rhythm;normal S1 and S2   S4   grade 1;LUSB;systolic murmur        pulses full and equal                                   left wrist    GI:           Extremities and Muscular Skeletal:  no edema;no spinal abnormalities noted;normal muscle strength and tone         left wrist and forearm ecchymotic and mildy swollen without hum or bruit    Neurological:  no gross motor deficits        Psych:  affect appropriate, oriented to time, person and place        CC  Nikolai Amaya MD  [address]  [place] [address]                  Thank you for allowing me to participate in the care of your patient.      Sincerely,     Nikolai Amaya MD     [facility]    cc:   Nikolai Amaya MD  [address]  [place] [address]        "

## 2020-10-16 ENCOUNTER — HOSPITAL ENCOUNTER (OUTPATIENT)
Dept: NUCLEAR MEDICINE | Facility: CLINIC | Age: 80
Setting detail: NUCLEAR MEDICINE
End: 2020-10-16
Attending: INTERNAL MEDICINE
Payer: COMMERCIAL

## 2020-10-16 PROCEDURE — 93017 CV STRESS TEST TRACING ONLY: CPT

## 2020-10-16 PROCEDURE — A9502 TC99M TETROFOSMIN: HCPCS | Performed by: INTERNAL MEDICINE

## 2020-10-16 PROCEDURE — 343N000001 HC RX 343: Performed by: INTERNAL MEDICINE

## 2020-10-16 RX ADMIN — TETROFOSMIN 10.5 MCI.: 1.38 INJECTION, POWDER, LYOPHILIZED, FOR SOLUTION INTRAVENOUS at 11:36

## 2020-10-21 ENCOUNTER — HOSPITAL ENCOUNTER (OUTPATIENT)
Dept: NUCLEAR MEDICINE | Facility: CLINIC | Age: 80
Setting detail: NUCLEAR MEDICINE
End: 2020-10-21
Attending: INTERNAL MEDICINE
Payer: COMMERCIAL

## 2020-10-21 ENCOUNTER — HOSPITAL ENCOUNTER (OUTPATIENT)
Dept: CARDIOLOGY | Facility: CLINIC | Age: 80
End: 2020-10-16
Attending: INTERNAL MEDICINE
Payer: COMMERCIAL

## 2020-10-21 ENCOUNTER — DOCUMENTATION ONLY (OUTPATIENT)
Dept: CARDIOLOGY | Facility: CLINIC | Age: 80
End: 2020-10-21

## 2020-10-21 DIAGNOSIS — R07.89 CHEST DISCOMFORT: ICD-10-CM

## 2020-10-21 LAB
CV STRESS MAX HR HE: 83
RATE PRESSURE PRODUCT: 9296
STRESS ECHO BASELINE DIASTOLIC HE: 60
STRESS ECHO BASELINE HR: 73
STRESS ECHO BASELINE SYSTOLIC BP: 118
STRESS ECHO CALCULATED PERCENT HR: 58 %
STRESS ECHO LAST STRESS DIASTOLIC BP: 54
STRESS ECHO LAST STRESS SYSTOLIC BP: 112
STRESS ECHO TARGET HR: 142

## 2020-10-21 PROCEDURE — 93016 CV STRESS TEST SUPVJ ONLY: CPT | Performed by: INTERNAL MEDICINE

## 2020-10-21 PROCEDURE — 93018 CV STRESS TEST I&R ONLY: CPT | Performed by: INTERNAL MEDICINE

## 2020-10-21 PROCEDURE — A9502 TC99M TETROFOSMIN: HCPCS | Performed by: INTERNAL MEDICINE

## 2020-10-21 PROCEDURE — 343N000001 HC RX 343: Performed by: INTERNAL MEDICINE

## 2020-10-21 PROCEDURE — 78452 HT MUSCLE IMAGE SPECT MULT: CPT

## 2020-10-21 PROCEDURE — 250N000011 HC RX IP 250 OP 636: Performed by: INTERNAL MEDICINE

## 2020-10-21 PROCEDURE — 78452 HT MUSCLE IMAGE SPECT MULT: CPT | Mod: 26 | Performed by: INTERNAL MEDICINE

## 2020-10-21 RX ORDER — REGADENOSON 0.08 MG/ML
0.4 INJECTION, SOLUTION INTRAVENOUS ONCE
Status: COMPLETED | OUTPATIENT
Start: 2020-10-21 | End: 2020-10-21

## 2020-10-21 RX ADMIN — REGADENOSON 0.4 MG: 0.08 INJECTION, SOLUTION INTRAVENOUS at 11:34

## 2020-10-21 RX ADMIN — Medication 30 MILLICURIE: at 11:06

## 2020-10-21 NOTE — PROGRESS NOTES
Nuclear study 10/21/2020 noted. Ordered for history of CAD/stents and angina with recent episode of chest discomfort.    Nuclear study     The nuclear stress test is probably negative for inducible myocardial ischemia or infarction.     There is small sized, mid intensity, fixed, myocardial perfusion defect involving the apex of LV most likely consistent with apical thinning.     Left ventricular function is hyperdynamic.     The left ventricular ejection fraction at rest is greater than 70%. The left ventricular ejection fraction at stress is greater than 70%.     A prior study was conducted on 10/11/2019.    Will message Dr. Amaya to review    10/22/2020 reply from Dr. Amaya:  Looks good. Reassure patient. Continue Med Rx    0844 spoke with patient to review nuclear study results as stable and Dr. Amaya's recommendation to continue same therapy. Patient verbalized understanding and agreed with plan. Patient notes that she will investigate her neck issues more now that she knows it is not her heart.

## 2020-10-26 DIAGNOSIS — I10 ESSENTIAL HYPERTENSION: ICD-10-CM

## 2020-10-26 RX ORDER — AMLODIPINE BESYLATE 5 MG/1
5 TABLET ORAL DAILY
Qty: 90 TABLET | Refills: 3 | Status: SHIPPED | OUTPATIENT
Start: 2020-10-26 | End: 2021-02-04

## 2020-11-02 ENCOUNTER — ANTICOAGULATION THERAPY VISIT (OUTPATIENT)
Dept: NURSING | Facility: CLINIC | Age: 80
End: 2020-11-02

## 2020-11-02 DIAGNOSIS — I63.9 CEREBROVASCULAR ACCIDENT INVOLVING CEREBELLUM (H): ICD-10-CM

## 2020-11-02 DIAGNOSIS — Z79.01 LONG TERM CURRENT USE OF ANTICOAGULANT THERAPY: ICD-10-CM

## 2020-11-02 LAB
CAPILLARY BLOOD COLLECTION: NORMAL
INR PPP: 2.2 (ref 0.86–1.14)

## 2020-11-02 PROCEDURE — 99207 PR NO CHARGE NURSE ONLY: CPT

## 2020-11-02 PROCEDURE — 36416 COLLJ CAPILLARY BLOOD SPEC: CPT | Performed by: FAMILY MEDICINE

## 2020-11-02 PROCEDURE — 85610 PROTHROMBIN TIME: CPT | Performed by: FAMILY MEDICINE

## 2020-11-02 NOTE — PROGRESS NOTES
Anticoagulation Management    Unable to reach Loreta today. She was not at home, lvm on cell phone.    Today's INR result of 2.2 is therapeutic (goal INR of 2.0-3.0).  Result received from: Clinic Lab    Follow up required to confirm warfarin dose taken and assess for changes    Pt to call  INR at [phone]; if no answer then call Central INR at [phone]. Left message to continue current dose of warfarin 5 mg tonight.    Anticoagulation clinic to follow up    Fela Jensen RN    ANTICOAGULATION FOLLOW-UP CLINIC VISIT    Patient Name:  Salome Saeed  Date:  11/3/2020  Contact Type:  Telephone    SUBJECTIVE:  Patient Findings     Comments:  Called patient to discuss today's INR results: The patient was assessed for diet, medication, and activity level changes, missed or extra doses, bruising or bleeding, with no problem findings. Reviewed maintenance warfarin dosing with patient. Patient will remain on the same dose until next INR check. No other questions or concerns. Scheduled next lab-only INR in 6 weeks at [facility]. Mailed AVS.          Clinical Outcomes     Negatives:  Major bleeding event, Thromboembolic event, Anticoagulation-related hospital admission, Anticoagulation-related ED visit, Anticoagulation-related fatality    Comments:  Called patient to discuss today's INR results: The patient was assessed for diet, medication, and activity level changes, missed or extra doses, bruising or bleeding, with no problem findings. Reviewed maintenance warfarin dosing with patient. Patient will remain on the same dose until next INR check. No other questions or concerns. Scheduled next lab-only INR in 6 weeks at [facility]. Mailed AVS.             OBJECTIVE    Recent labs: (last 7 days)     11/02/20  1332   INR 2.20*       ASSESSMENT / PLAN  INR assessment THER    Recheck INR In: 6 WEEKS    INR Location Clinic      Anticoagulation Summary  As of 11/2/2020    INR goal:  2.0-3.0   TTR:  64.4 %  (1 y)   INR used for dosin.20 (2020)   Warfarin maintenance plan:  5 mg (5 mg x 1) every Mon, Wed, Fri; 2.5 mg (5 mg x 0.5) all other days   Full warfarin instructions:  5 mg every Mon, Wed, Fri; 2.5 mg all other days   Weekly warfarin total:  25 mg   No change documented:  Fela Jensen RN   Plan last modified:  Rosanne Rico RN (2020)   Next INR check:  2020   Priority:  Maintenance   Target end date:  Indefinite    Indications    Long term current use of anticoagulant therapy [Z79.01]  CVA (cerebral vascular accident) (Resolved) [I63.9]  Cerebrovascular accident involving cerebellum (H) [I63.9]             Anticoagulation Episode Summary     INR check location:      Preferred lab:  [facility] [place]    Send INR reminders to:  [facility] [place]    Comments:  Mail AVS to patient while managing telephonically.      Anticoagulation Care Providers     Provider Role Specialty Phone number    Surya Dyer,  Referring Family Medicine [phone]            See the Encounter Report to view Anticoagulation Flowsheet and Dosing Calendar (Go to Encounters tab in chart review, and find the Anticoagulation Therapy Visit)    Fela Jensen, RN

## 2020-11-20 DIAGNOSIS — I25.759 CORONARY ARTERY DISEASE INVOLVING NATIVE ARTERY OF TRANSPLANTED HEART WITH ANGINA PECTORIS (H): ICD-10-CM

## 2020-11-20 RX ORDER — NITROGLYCERIN 0.4 MG/1
0.4 TABLET SUBLINGUAL EVERY 5 MIN PRN
Qty: 25 TABLET | Refills: 2 | Status: SHIPPED | OUTPATIENT
Start: 2020-11-20 | End: 2021-02-04

## 2020-11-30 DIAGNOSIS — E03.9 HYPOTHYROIDISM, UNSPECIFIED TYPE: ICD-10-CM

## 2020-11-30 RX ORDER — LEVOTHYROXINE SODIUM 100 UG/1
100 TABLET ORAL DAILY
Qty: 90 TABLET | Refills: 1 | Status: SHIPPED | OUTPATIENT
Start: 2020-11-30 | End: 2021-02-15

## 2020-11-30 NOTE — TELEPHONE ENCOUNTER
Reason for Call:  Medication or medication refill:    Do you use a [facility]?  Name of the pharmacy and phone number for the current request:  [facility] in [place]    Name of the medication requested: levothyroxine (SYNTHROID/LEVOTHROID) 100 MCG tablet    Other request: Pt has med till Saturday but by Saturday she will need new refill.    Can we leave a detailed message on this number? YES    Phone number patient can be reached at: Home number on file [phone] (home)    Best Time: anytime    Call taken on 11/30/2020 at 1:41 PM by Acacia Almazan

## 2020-12-03 DIAGNOSIS — E78.5 HYPERLIPIDEMIA LDL GOAL <70: ICD-10-CM

## 2020-12-03 DIAGNOSIS — I25.10 CORONARY ARTERY DISEASE INVOLVING NATIVE CORONARY ARTERY OF NATIVE HEART WITHOUT ANGINA PECTORIS: Chronic | ICD-10-CM

## 2020-12-03 RX ORDER — ROSUVASTATIN CALCIUM 40 MG/1
40 TABLET, COATED ORAL DAILY
Qty: 90 TABLET | Refills: 3 | Status: SHIPPED | OUTPATIENT
Start: 2020-12-03 | End: 2021-02-04

## 2020-12-03 RX ORDER — ATENOLOL 25 MG/1
25 TABLET ORAL 2 TIMES DAILY
Qty: 180 TABLET | Refills: 1 | Status: SHIPPED | OUTPATIENT
Start: 2020-12-03 | End: 2021-02-04

## 2020-12-14 ENCOUNTER — ANTICOAGULATION THERAPY VISIT (OUTPATIENT)
Dept: NURSING | Facility: CLINIC | Age: 80
End: 2020-12-14

## 2020-12-14 DIAGNOSIS — I63.9 CEREBROVASCULAR ACCIDENT INVOLVING CEREBELLUM (H): ICD-10-CM

## 2020-12-14 DIAGNOSIS — Z79.01 LONG TERM CURRENT USE OF ANTICOAGULANT THERAPY: ICD-10-CM

## 2020-12-14 LAB
CAPILLARY BLOOD COLLECTION: NORMAL
INR PPP: 2.9 (ref 0.86–1.14)

## 2020-12-14 PROCEDURE — 85610 PROTHROMBIN TIME: CPT | Performed by: FAMILY MEDICINE

## 2020-12-14 PROCEDURE — 36416 COLLJ CAPILLARY BLOOD SPEC: CPT | Performed by: FAMILY MEDICINE

## 2020-12-14 NOTE — PROGRESS NOTES
Anticoagulation Management    Unable to reach Loreta today.    Today's INR result of 2.9 is therapeutic (goal INR of 2.0-3.0).  Result received from: Clinic Lab    Follow up required to confirm warfarin dose taken and assess for changes    Left message to continue current dose of warfarin 5 mg tonight.      Anticoagulation clinic to follow up    Amy Singh RN

## 2020-12-15 NOTE — PROGRESS NOTES
ANTICOAGULATION FOLLOW-UP CLINIC VISIT    Patient Name:  Salome Saeed  Date:  12/15/2020  Contact Type:  Telephone/ Loreta    SUBJECTIVE:  Patient Findings     Comments:  The patient was assessed for diet, medication, and activity level changes, missed or extra doses, bruising or bleeding, with no problem findings.          Clinical Outcomes     Negatives:  Major bleeding event, Thromboembolic event, Anticoagulation-related hospital admission, Anticoagulation-related ED visit, Anticoagulation-related fatality    Comments:  The patient was assessed for diet, medication, and activity level changes, missed or extra doses, bruising or bleeding, with no problem findings.             OBJECTIVE    Recent labs: (last 7 days)     20  1357   INR 2.90*       ASSESSMENT / PLAN  INR assessment THER    Recheck INR In: 6 WEEKS    INR Location Clinic      Anticoagulation Summary  As of 2020    INR goal:  2.0-3.0   TTR:  75.7 % (1 y)   INR used for dosin.90 (2020)   Warfarin maintenance plan:  5 mg (5 mg x 1) every Mon, Wed, Fri; 2.5 mg (5 mg x 0.5) all other days   Full warfarin instructions:  5 mg every Mon, Wed, Fri; 2.5 mg all other days   Weekly warfarin total:  25 mg   No change documented:  Neel Winn RN   Plan last modified:  Rosanne Rico, RN (2020)   Next INR check:  2021   Priority:  Maintenance   Target end date:  Indefinite    Indications    Long term current use of anticoagulant therapy [Z79.01]  CVA (cerebral vascular accident) (Resolved) [I63.9]  Cerebrovascular accident involving cerebellum (H) [I63.9]             Anticoagulation Episode Summary     INR check location:      Preferred lab:  [facility] [place]    Send INR reminders to:  [facility] [place]    Comments:  Mail AVS to patient while managing telephonically.      Anticoagulation Care Providers     Provider Role Specialty Phone number    Surya Dyer DO Referring Family Medicine [phone]             See the Encounter Report to view Anticoagulation Flowsheet and Dosing Calendar (Go to Encounters tab in chart review, and find the Anticoagulation Therapy Visit)    Patient INR is therapeutic.  Patient will continue to take 25 mg weekly dosing and follow up in 6 weeks or sooner for any problems or concerns.        Neel Winn RN

## 2021-01-06 DIAGNOSIS — I63.9 CEREBROVASCULAR ACCIDENT INVOLVING CEREBELLUM (H): ICD-10-CM

## 2021-01-08 RX ORDER — WARFARIN SODIUM 5 MG/1
TABLET ORAL
Qty: 65 TABLET | Refills: 0 | Status: SHIPPED | OUTPATIENT
Start: 2021-01-08 | End: 2021-02-05

## 2021-01-08 NOTE — TELEPHONE ENCOUNTER
Per [facility] mail order pharmacy, patient may still have warfarin tablets available at home, but she has been given the 1 refill and they just want to make sure there is another refill on file since it can take weeks to get the medication to the patient.    Provided 65 tabs with 0 refills.

## 2021-01-25 ENCOUNTER — ANTICOAGULATION THERAPY VISIT (OUTPATIENT)
Dept: FAMILY MEDICINE | Facility: CLINIC | Age: 81
End: 2021-01-25

## 2021-01-25 DIAGNOSIS — I63.9 CEREBROVASCULAR ACCIDENT INVOLVING CEREBELLUM (H): ICD-10-CM

## 2021-01-25 DIAGNOSIS — Z79.01 LONG TERM CURRENT USE OF ANTICOAGULANT THERAPY: ICD-10-CM

## 2021-01-25 LAB — INR PPP: 2.5 (ref 0.86–1.14)

## 2021-01-25 PROCEDURE — 85610 PROTHROMBIN TIME: CPT | Performed by: FAMILY MEDICINE

## 2021-01-25 PROCEDURE — 36416 COLLJ CAPILLARY BLOOD SPEC: CPT | Performed by: FAMILY MEDICINE

## 2021-01-25 NOTE — PROGRESS NOTES
0ANTICOAGULATION FOLLOW-UP CLINIC VISIT    Patient Name:  Salome Saeed  Date:  2021  Contact Type:  Telephone    SUBJECTIVE:  Patient Findings     Comments:    Assessed for S/S bleeding, clotting, medication, diet, health, activity and alcohol changes          Clinical Outcomes     Negatives:  Major bleeding event, Thromboembolic event, Anticoagulation-related hospital admission, Anticoagulation-related ED visit, Anticoagulation-related fatality    Comments:    Assessed for S/S bleeding, clotting, medication, diet, health, activity and alcohol changes             OBJECTIVE    Recent labs: (last 7 days)     21  1125   INR 2.50*       ASSESSMENT / PLAN  INR assessment THER    Recheck INR In: 6 WEEKS    INR Location Clinic      Anticoagulation Summary  As of 2021    INR goal:  2.0-3.0   TTR:  80.2 % (1 y)   INR used for dosin.50 (2021)   Warfarin maintenance plan:  5 mg (5 mg x 1) every Mon, Wed, Fri; 2.5 mg (5 mg x 0.5) all other days   Full warfarin instructions:  5 mg every Mon, Wed, Fri; 2.5 mg all other days   Weekly warfarin total:  25 mg   No change documented:  Lori Parham, RN   Plan last modified:  Rosanne Rico, RN (2020)   Next INR check:  3/8/2021   Priority:  Maintenance   Target end date:  Indefinite    Indications    Long term current use of anticoagulant therapy [Z79.01]  CVA (cerebral vascular accident) (Resolved) [I63.9]  Cerebrovascular accident involving cerebellum (H) [I63.9]             Anticoagulation Episode Summary     INR check location:      Preferred lab:  [facility] [place]    Send INR reminders to:  [facility] [place]    Comments:  Mail AVS to patient while managing telephonically.      Anticoagulation Care Providers     Provider Role Specialty Phone number    Surya Dyer DO Referring Family Medicine [phone]            See the Encounter Report to view Anticoagulation Flowsheet and Dosing Calendar (Go to Encounters tab in chart  review, and find the Anticoagulation Therapy Visit)        Lori Parham RN

## 2021-02-04 ENCOUNTER — TELEPHONE (OUTPATIENT)
Dept: CARDIOLOGY | Facility: CLINIC | Age: 81
End: 2021-02-04

## 2021-02-04 DIAGNOSIS — E78.5 HYPERLIPIDEMIA LDL GOAL <70: ICD-10-CM

## 2021-02-04 DIAGNOSIS — I10 ESSENTIAL HYPERTENSION: ICD-10-CM

## 2021-02-04 DIAGNOSIS — F41.9 ANXIETY: ICD-10-CM

## 2021-02-04 DIAGNOSIS — I25.759 CORONARY ARTERY DISEASE INVOLVING NATIVE ARTERY OF TRANSPLANTED HEART WITH ANGINA PECTORIS (H): ICD-10-CM

## 2021-02-04 DIAGNOSIS — I25.10 CORONARY ARTERY DISEASE INVOLVING NATIVE CORONARY ARTERY OF NATIVE HEART WITHOUT ANGINA PECTORIS: Chronic | ICD-10-CM

## 2021-02-04 DIAGNOSIS — I63.9 CEREBROVASCULAR ACCIDENT INVOLVING CEREBELLUM (H): ICD-10-CM

## 2021-02-04 DIAGNOSIS — E03.9 HYPOTHYROIDISM, UNSPECIFIED TYPE: ICD-10-CM

## 2021-02-04 DIAGNOSIS — I10 ESSENTIAL HYPERTENSION, BENIGN: ICD-10-CM

## 2021-02-04 RX ORDER — NITROGLYCERIN 0.4 MG/1
0.4 TABLET SUBLINGUAL EVERY 5 MIN PRN
Qty: 25 TABLET | Refills: 2 | Status: SHIPPED | OUTPATIENT
Start: 2021-02-04 | End: 2023-11-03

## 2021-02-04 RX ORDER — AMLODIPINE BESYLATE 5 MG/1
5 TABLET ORAL DAILY
Qty: 90 TABLET | Refills: 2 | Status: SHIPPED | OUTPATIENT
Start: 2021-02-04 | End: 2022-02-04

## 2021-02-04 RX ORDER — ATENOLOL 25 MG/1
25 TABLET ORAL 2 TIMES DAILY
Qty: 180 TABLET | Refills: 2 | Status: SHIPPED | OUTPATIENT
Start: 2021-02-04 | End: 2021-12-14

## 2021-02-04 RX ORDER — ROSUVASTATIN CALCIUM 40 MG/1
40 TABLET, COATED ORAL DAILY
Qty: 90 TABLET | Refills: 2 | Status: SHIPPED | OUTPATIENT
Start: 2021-02-04 | End: 2021-11-18

## 2021-02-04 RX ORDER — LISINOPRIL 20 MG/1
20 TABLET ORAL DAILY
Qty: 90 TABLET | Refills: 2 | Status: SHIPPED | OUTPATIENT
Start: 2021-02-04 | End: 2021-11-18

## 2021-02-04 NOTE — TELEPHONE ENCOUNTER
Patient  changing pharmacy and request refills be sent to [facility].   Amlodipine 5 mg once daily, Lisinopril 20 mg daily, Atenolol 25 mg twice daily, Rosuvastatin 40 mg daily and Nitroglycerine SL for just in case.     Refills e scribed.  Last Dr. Amaya visit 10-

## 2021-02-05 RX ORDER — LEVOTHYROXINE SODIUM 100 UG/1
100 TABLET ORAL DAILY
Qty: 90 TABLET | Refills: 1 | OUTPATIENT
Start: 2021-02-05

## 2021-02-05 RX ORDER — WARFARIN SODIUM 5 MG/1
TABLET ORAL
Qty: 65 TABLET | Refills: 1 | Status: SHIPPED | OUTPATIENT
Start: 2021-02-05 | End: 2021-09-09

## 2021-02-05 RX ORDER — ESCITALOPRAM OXALATE 5 MG/1
TABLET ORAL
Qty: 90 TABLET | Refills: 3 | OUTPATIENT
Start: 2021-02-05

## 2021-02-05 NOTE — TELEPHONE ENCOUNTER
Physical scheduled 2/15.    Pt has enough meds to get to appointment but changed insurance and changed pharmacy so needs new RXs [facility] to [facility]

## 2021-02-05 NOTE — TELEPHONE ENCOUNTER
Lexapro, Levothyroxine -   LOV: 5/27/2020  Patient due for physical  No future appt scheduled    Routing to [facility] to assist in scheduling        Warfarin - routing to INR RN        Yesi Weir RN  [facility]

## 2021-02-05 NOTE — TELEPHONE ENCOUNTER
Warfarin refill approved per [facility] protocol. Aysha Tilley, BSN, RN

## 2021-02-14 ENCOUNTER — HOSPITAL ENCOUNTER (EMERGENCY)
Facility: CLINIC | Age: 81
Discharge: HOME | End: 2021-02-14
Attending: EMERGENCY MEDICINE | Admitting: EMERGENCY MEDICINE
Payer: COMMERCIAL

## 2021-02-14 ENCOUNTER — APPOINTMENT (OUTPATIENT)
Dept: CT IMAGING | Facility: CLINIC | Age: 81
End: 2021-02-14
Attending: EMERGENCY MEDICINE
Payer: COMMERCIAL

## 2021-02-14 VITALS
SYSTOLIC BLOOD PRESSURE: 169 MMHG | OXYGEN SATURATION: 99 % | TEMPERATURE: 97.5 F | BODY MASS INDEX: 25.61 KG/M2 | HEART RATE: 66 BPM | DIASTOLIC BLOOD PRESSURE: 75 MMHG | WEIGHT: 150 LBS | HEIGHT: 64 IN | RESPIRATION RATE: 18 BRPM

## 2021-02-14 DIAGNOSIS — Z79.01 WARFARIN ANTICOAGULATION: ICD-10-CM

## 2021-02-14 DIAGNOSIS — S09.90XA CLOSED HEAD INJURY, INITIAL ENCOUNTER: ICD-10-CM

## 2021-02-14 LAB — INR PPP: 2.38 (ref 0.86–1.14)

## 2021-02-14 PROCEDURE — 85610 PROTHROMBIN TIME: CPT | Performed by: EMERGENCY MEDICINE

## 2021-02-14 PROCEDURE — 70450 CT HEAD/BRAIN W/O DYE: CPT

## 2021-02-14 PROCEDURE — 99284 EMERGENCY DEPT VISIT MOD MDM: CPT | Mod: 25

## 2021-02-14 ASSESSMENT — ENCOUNTER SYMPTOMS: WOUND: 1

## 2021-02-14 ASSESSMENT — MIFFLIN-ST. JEOR: SCORE: 1145.4

## 2021-02-14 NOTE — ED PROVIDER NOTES
"History     Chief Complaint:  Fall     HPI  Salome Saeed is a 80 year old female with a history of hypertension and CAD who presents for evaluation of fall.  The patient states that this morning she tripped and fell, hitting the right side of her head on the door frame.  She denies any loss of consciousness.  She decided to present today because she is on Warfarin.  She states it has been a couple of weeks since she had her INR checked last.    Allergies:  Atorvastatin    Medications:   Amlodipine  Tenormin  Coq-10  Lovenox  Lexapro  Synthroid  Lisinopril  Nitrostat  Crestor  Senokot  Warfarin     Medical History:   CAD  Cerebral artery occlusion  CVA  DDD  Hypertension   GERD  Hyperlipidemia  Hypothyroidism  Mitral regurgitation  Hyperlipidemia  Lumbago  Anxiety    Surgical History   Knee arthroplasty  Coronary angiogram x2  Decompression, fusion lumbar 3 levels  Endoscopic stripping veins  Heart cath angioplasty  Hysterectomy  Tonsillectomy  Stenting LAD    Family History:   Mother: dementia, ovarian cancer, thyroid disease  Father: CAD, diabetes    Social History:  Patient presents to ED alone.  PCP: Surya Dyer    Review of Systems   Skin: Positive for wound.   Neurological:        No loss of consciousness    All other systems reviewed and are negative.      Physical Exam     Patient Vitals for the past 24 hrs:   BP Temp Temp src Pulse Resp SpO2 Height Weight   02/14/21 0507 169/75 97.5  F (36.4  C) Oral 66 18 99 % 1.626 m (5' 4\") 68 kg (150 lb)      Physical Exam     Nursing note and vitals reviewed.  Constitutional: Cooperative.   HENT:   Mouth/Throat: Mucous membranes are normal. Full ROM with no SP tenderness. Hematoma to right parietal region.  Cardiovascular: Normal rate, regular rhythm and normal heart sounds.  No murmur.  Pulmonary/Chest: Effort normal and breath sounds normal. No respiratory distress. No wheezes. No rales.   Abdominal: Soft. Normal appearance and bowel sounds are normal. No " distension. There is no tenderness. There is no rigidity and no guarding.   Musculoskeletal: Normal range of motion of right shoulder and elbow.  Large contusion to right deltoid region.   Neurological: Alert. GCS 15. Oriented x4.   Skin: Skin is warm and dry. See above   Psychiatric: Normal mood and affect.     Emergency Department Course     Imaging:    Head CT w/o contrast  IMPRESSION:  1.  No CT evidence for acute intracranial process.  2.  Brain atrophy and presumed chronic microvascular ischemic changes as above.  Reading per radiology     Laboratory:  INR: 2.38(H)     Reviewed:  I reviewed the patient's nursing notes, vitals, past medical records, Care Everywhere.      Assessments:  0507 I preformed my initial assessment of the patient.    0600 Patient rechecked and updated.  INR is appropriate.     Disposition:  Discharged to home.     Impression & Plan     Medical Decision Making:  This patient presents with a history consistent with a mild head injury. The differential diagnosis includes skull fracture, epidural hematoma, subdural hematoma, intracerebral hemorrhage, and traumatic subarachnoid hemorrhage. I felt that a CT scan was warranted due to blood thinner medication use.  This decision making is consistent with Brain CT Guidelines.     The CT scan did not detect an intracranial injury. Patient's INR levels were discussed and found to be appropriate.  Although unlikely with normal neuroimaging,  the patient understands the importance of returning to the ED for a repeat evaluation with the development of worrisome symptoms, in particular we discussed: headaches that get worse, increased drowsiness, strange behavior, repetitive speech, seizures, repeated vomiting, growing confusion, increased irritability, slurred speech, weakness or numbness, and loss of responsiveness. Although rare, delayed CNS bleeds can occur, and the patient was notified of this.   Post concussive syndrome is also discussed.  The  patient was provided with the [facility] Concussion Discharge Instructions.     Diagnosis:     ICD-10-CM    1. Closed head injury, initial encounter  S09.90XA INR   2. Warfarin anticoagulation  Z79.01        Disposition:  Discharged to home.    Scribe Disclosure:  I, Gracie Nash, am serving as a scribe at 5:09 AM on 2/14/2021 to document services personally performed by Mark Contreras MD based on my observations and the provider's statements to me.     Onamia Mark Siegel MD  02/14/21 0638

## 2021-02-14 NOTE — ED TRIAGE NOTES
Stated she trip and fell down hitting her right side of head onto the door. Denies any LOC or midline tenderness. Is currently taking warfarin for history of stroke. ABCs intact.

## 2021-02-15 ENCOUNTER — OFFICE VISIT (OUTPATIENT)
Dept: FAMILY MEDICINE | Facility: CLINIC | Age: 81
End: 2021-02-15
Payer: COMMERCIAL

## 2021-02-15 ENCOUNTER — TELEPHONE (OUTPATIENT)
Dept: FAMILY MEDICINE | Facility: CLINIC | Age: 81
End: 2021-02-15

## 2021-02-15 VITALS
DIASTOLIC BLOOD PRESSURE: 68 MMHG | BODY MASS INDEX: 27.85 KG/M2 | OXYGEN SATURATION: 97 % | HEART RATE: 69 BPM | SYSTOLIC BLOOD PRESSURE: 124 MMHG | HEIGHT: 63 IN | WEIGHT: 157.2 LBS | TEMPERATURE: 96.8 F

## 2021-02-15 DIAGNOSIS — I63.9 CEREBROVASCULAR ACCIDENT INVOLVING CEREBELLUM (H): ICD-10-CM

## 2021-02-15 DIAGNOSIS — M54.12 CERVICAL RADICULOPATHY: ICD-10-CM

## 2021-02-15 DIAGNOSIS — D64.9 ANEMIA, UNSPECIFIED TYPE: ICD-10-CM

## 2021-02-15 DIAGNOSIS — M81.0 OSTEOPOROSIS, UNSPECIFIED OSTEOPOROSIS TYPE, UNSPECIFIED PATHOLOGICAL FRACTURE PRESENCE: ICD-10-CM

## 2021-02-15 DIAGNOSIS — Z00.00 ENCOUNTER FOR MEDICARE ANNUAL WELLNESS EXAM: Primary | ICD-10-CM

## 2021-02-15 DIAGNOSIS — Z79.01 LONG TERM CURRENT USE OF ANTICOAGULANT THERAPY: ICD-10-CM

## 2021-02-15 DIAGNOSIS — M54.2 CERVICALGIA: ICD-10-CM

## 2021-02-15 DIAGNOSIS — F41.9 ANXIETY: ICD-10-CM

## 2021-02-15 DIAGNOSIS — M81.0 AGE-RELATED OSTEOPOROSIS WITHOUT CURRENT PATHOLOGICAL FRACTURE: ICD-10-CM

## 2021-02-15 DIAGNOSIS — I10 BENIGN ESSENTIAL HYPERTENSION: ICD-10-CM

## 2021-02-15 DIAGNOSIS — E03.9 HYPOTHYROIDISM, UNSPECIFIED TYPE: ICD-10-CM

## 2021-02-15 DIAGNOSIS — Z12.31 ENCOUNTER FOR SCREENING MAMMOGRAM FOR BREAST CANCER: ICD-10-CM

## 2021-02-15 DIAGNOSIS — I25.10 CORONARY ARTERY DISEASE INVOLVING NATIVE CORONARY ARTERY OF NATIVE HEART WITHOUT ANGINA PECTORIS: ICD-10-CM

## 2021-02-15 DIAGNOSIS — E78.5 HYPERLIPIDEMIA LDL GOAL <70: ICD-10-CM

## 2021-02-15 DIAGNOSIS — M48.02 SPINAL STENOSIS IN CERVICAL REGION: ICD-10-CM

## 2021-02-15 LAB
ERYTHROCYTE [DISTWIDTH] IN BLOOD BY AUTOMATED COUNT: 13.8 % (ref 10–15)
HCT VFR BLD AUTO: 40.1 % (ref 35–47)
HGB BLD-MCNC: 13.4 G/DL (ref 11.7–15.7)
MCH RBC QN AUTO: 29 PG (ref 26.5–33)
MCHC RBC AUTO-ENTMCNC: 33.4 G/DL (ref 31.5–36.5)
MCV RBC AUTO: 87 FL (ref 78–100)
PLATELET # BLD AUTO: 212 10E9/L (ref 150–450)
RBC # BLD AUTO: 4.62 10E12/L (ref 3.8–5.2)
WBC # BLD AUTO: 6.1 10E9/L (ref 4–11)

## 2021-02-15 PROCEDURE — 99397 PER PM REEVAL EST PAT 65+ YR: CPT | Performed by: FAMILY MEDICINE

## 2021-02-15 PROCEDURE — 82306 VITAMIN D 25 HYDROXY: CPT | Performed by: FAMILY MEDICINE

## 2021-02-15 PROCEDURE — 85027 COMPLETE CBC AUTOMATED: CPT | Performed by: FAMILY MEDICINE

## 2021-02-15 PROCEDURE — 84443 ASSAY THYROID STIM HORMONE: CPT | Performed by: FAMILY MEDICINE

## 2021-02-15 PROCEDURE — 36415 COLL VENOUS BLD VENIPUNCTURE: CPT | Performed by: FAMILY MEDICINE

## 2021-02-15 RX ORDER — LEVOTHYROXINE SODIUM 100 UG/1
100 TABLET ORAL DAILY
Qty: 90 TABLET | Refills: 1 | Status: SHIPPED | OUTPATIENT
Start: 2021-02-15 | End: 2021-07-12

## 2021-02-15 ASSESSMENT — ANXIETY QUESTIONNAIRES
7. FEELING AFRAID AS IF SOMETHING AWFUL MIGHT HAPPEN: NOT AT ALL
3. WORRYING TOO MUCH ABOUT DIFFERENT THINGS: NOT AT ALL
IF YOU CHECKED OFF ANY PROBLEMS ON THIS QUESTIONNAIRE, HOW DIFFICULT HAVE THESE PROBLEMS MADE IT FOR YOU TO DO YOUR WORK, TAKE CARE OF THINGS AT HOME, OR GET ALONG WITH OTHER PEOPLE: NOT DIFFICULT AT ALL
GAD7 TOTAL SCORE: 0
1. FEELING NERVOUS, ANXIOUS, OR ON EDGE: NOT AT ALL
5. BEING SO RESTLESS THAT IT IS HARD TO SIT STILL: NOT AT ALL
2. NOT BEING ABLE TO STOP OR CONTROL WORRYING: NOT AT ALL
6. BECOMING EASILY ANNOYED OR IRRITABLE: NOT AT ALL

## 2021-02-15 ASSESSMENT — PATIENT HEALTH QUESTIONNAIRE - PHQ9
5. POOR APPETITE OR OVEREATING: NOT AT ALL
SUM OF ALL RESPONSES TO PHQ QUESTIONS 1-9: 0

## 2021-02-15 ASSESSMENT — MIFFLIN-ST. JEOR: SCORE: 1162.18

## 2021-02-15 NOTE — TELEPHONE ENCOUNTER
ANTICOAGULATION  MANAGEMENT: Discharge Review    Salome Saeed chart reviewed for anticoagulation continuity of care    Emergency room visit on 2/15/21 for fall.    Discharge disposition: Home    Results:    Recent labs: (last 7 days)     02/14/21  0527   INR 2.38*     Anticoagulation inpatient management:     not applicable     Anticoagulation discharge instructions:     Warfarin dosing: home regimen continued   Bridging: No   INR goal change: No      Medication changes affecting anticoagulation: No    Additional factors affecting anticoagulation: No    Plan     No adjustment to anticoagulation plan needed    Patient not contacted    No adjustment to Anticoagulation Calendar was required    Neel Winn RN

## 2021-02-15 NOTE — PROGRESS NOTES
"  SUBJECTIVE:   Salome Saeed is a 80 year old female who presents for Preventive Visit.      Patient has been advised of split billing requirements and indicates understanding: Yes  Are you in the first 12 months of your Medicare Part B coverage?  No    Physical Health:    In general, how would you rate your overall physical health? good    Outside of work, how many days during the week do you exercise? 2-3 days/week    Outside of work, approximately how many minutes a day do you exercise?less than 15 minutes    If you drink alcohol do you typically have >3 drinks per day or >7 drinks per week? No    Do you usually eat at least 4 servings of fruit and vegetables a day, include whole grains & fiber and avoid regularly eating high fat or \"junk\" foods? NO    Do you have any problems taking medications regularly?  No    Do you have any side effects from medications? none    Needs assistance for the following daily activities: no assistance needed    Which of the following safety concerns are present in your home?  lack of grab bars in the bathroom     Hearing impairment: No    In the past 6 months, have you been bothered by leaking of urine? no    Mental Health:    In general, how would you rate your overall mental or emotional health? good  PHQ-2 Score:      Do you feel safe in your environment? Yes.    Have you ever done Advance Care Planning? (For example, a Health Directive, POLST, or a discussion with a medical provider or your loved ones about your wishes): No, advance care planning information given to patient to review.  Patient plans to discuss their wishes with loved ones or provider.      Additional concerns to address?  YES, patient fell and hit yesterday, was seen at [facility]. CT scan of head completed and unremarkable. Still having some pain on side of head. Has bruising on right shoulder and anterior left arm.     Also notes increase nightmares at night.    Fall risk:  Fallen 2 or more times " in the past year?: No  Any fall with injury in the past year?: Yes    Cognitive Screenin) Repeat 3 items (Leader, Season, Table)    2) Clock draw: NORMAL  3) 3 item recall: Recalls 3 objects  Results: 3 items recalled: COGNITIVE IMPAIRMENT LESS LIKELY    Mini-CogTM Copyright DEMARCO Dejesus. Licensed by the author for use in [facility]; reprinted with permission (garo@[facility]). All rights reserved.      Do you have sleep apnea, excessive snoring or daytime drowsiness?: no        Hyperlipidemia Follow-Up      Are you regularly taking any medication or supplement to lower your cholesterol?   Yes- rosuvastatin    Are you having muscle aches or other side effects that you think could be caused by your cholesterol lowering medication?  No    Hypertension Follow-up      Do you check your blood pressure regularly outside of the clinic? Yes     Are you following a low salt diet? Yes    Are your blood pressures ever more than 140 on the top number (systolic) OR more   than 90 on the bottom number (diastolic), for example 140/90? No    Denies any headaches, lightheadedness, dizziness, vision changes, chest pain, palpitations, shortness of breath, dyspnea      Anxiety Follow-Up    How are you doing with your anxiety since your last visit? No change    Are you having other symptoms that might be associated with anxiety? No    Have you had a significant life event? No     Are you feeling depressed? No    Do you have any concerns with your use of alcohol or other drugs? No    Social History     Tobacco Use     Smoking status: Former Smoker     Packs/day: 0.10     Quit date: 1965     Years since quittin.1     Smokeless tobacco: Never Used   Substance Use Topics     Alcohol use: Yes     Alcohol/week: 0.0 standard drinks     Comment: 2 glasses wine per month, maybe     Drug use: No     VALERIA-7 SCORE 2/3/2014 2017 3/5/2019   Total Score 0 - -   Total Score - 0 0     PHQ 2017 3/5/2019   PHQ-9 Total Score 0  0   Q9: Thoughts of better off dead/self-harm past 2 weeks Not at all Not at all       Hypothyroidism Follow-up      Since last visit, patient describes the following symptoms: Weight stable, no hair loss, no skin changes, no constipation, no loose stools      Reviewed and updated as needed this visit by clinical staff  Tobacco  Allergies  Meds  Problems  Med Hx  Surg Hx  Fam Hx          Reviewed and updated as needed this visit by Provider     Problems            Social History     Tobacco Use     Smoking status: Former Smoker     Packs/day: 0.10     Quit date: 1965     Years since quittin.1     Smokeless tobacco: Never Used   Substance Use Topics     Alcohol use: Yes     Alcohol/week: 0.0 standard drinks     Comment: 2 glasses wine per month, maybe                           Current providers sharing in care for this patient include:   Patient Care Team:  Surya Dyer DO as PCP - General (Family Practice)  Surya Dyer DO as Assigned PCP  Jorge Luis Cheatham MD as Assigned Musculoskeletal Provider  Nikolai Amaya MD as Assigned Heart and Vascular Provider    The following health maintenance items are reviewed in Epic and correct as of today:  Health Maintenance   Topic Date Due     COVID-19 Vaccine (1 of 2) 1958     HEPATITIS C SCREENING  1960     MAMMO SCREENING  2020     FALL RISK ASSESSMENT  2020     PHQ-2  2021     TSH W/FREE T4 REFLEX  2021     LIPID  10/14/2021     MEDICARE ANNUAL WELLNESS VISIT  02/15/2022     ANNUAL REVIEW OF HM ORDERS  02/15/2022     DEXA  2022     ADVANCE CARE PLANNING  02/15/2026     DTAP/TDAP/TD IMMUNIZATION (4 - Td) 2029     INFLUENZA VACCINE  Completed     Pneumococcal Vaccine: 65+ Years  Completed     ZOSTER IMMUNIZATION  Completed     Pneumococcal Vaccine: Pediatrics (0 to 5 Years) and At-Risk Patients (6 to 64 Years)  Aged Out     IPV IMMUNIZATION  Aged Out     MENINGITIS IMMUNIZATION  Aged Out      "HEPATITIS B IMMUNIZATION  Aged Out         ROS:  Constitutional, HEENT, cardiovascular, pulmonary, gi and gu systems are negative, except as otherwise noted.    OBJECTIVE:   /68 (BP Location: Right arm, Patient Position: Sitting, Cuff Size: Adult Regular)   Pulse 69   Temp 96.8  F (36  C) (Tympanic)   Ht 1.6 m (5' 3\")   Wt 71.3 kg (157 lb 3.2 oz)   LMP  (LMP Unknown)   SpO2 97%   BMI 27.85 kg/m   Estimated body mass index is 27.85 kg/m  as calculated from the following:    Height as of this encounter: 1.6 m (5' 3\").    Weight as of this encounter: 71.3 kg (157 lb 3.2 oz).  EXAM:   GENERAL: healthy, alert and no distress  EYES: Eyes grossly normal to inspection, PERRL and conjunctivae and sclerae normal  HENT: ear canals and TM's normal, nose and mouth without ulcers or lesions  NECK: no adenopathy, no asymmetry, masses, or scars and thyroid normal to palpation  RESP: lungs clear to auscultation - no rales, rhonchi or wheezes  CV: regular rate and rhythm, normal S1 S2, no S3 or S4, no murmur, click or rub, no peripheral edema and peripheral pulses strong  ABDOMEN: soft, nontender, no hepatosplenomegaly, no masses and bowel sounds normal  MS: no gross musculoskeletal defects noted, no edema  SKIN: no suspicious lesions or rashes, bruising over right shoulder and anterior left arm  PSYCH: mentation appears normal, affect normal/bright    Diagnostic Test Results:  Labs reviewed in Epic    ASSESSMENT / PLAN:   1. Encounter for Medicare annual wellness exam  - Vitamin D Deficiency    2. Anemia, unspecified type: recheck blood count  - CBC with platelets    3. Anxiety: in remission. Discussed that Lexapro dose list nightmares as possible SE. Can try tapering off over the next few weeks and see if this improves sleep. If mood worsens, can always restart and consider referral to sleep medicine for nightmares if persistent.    4. Hypothyroidism, unspecified type: stable. Recheck TSH and continue Synthroid.  - TSH " "with free T4 reflex  - levothyroxine (SYNTHROID/LEVOTHROID) 100 MCG tablet; Take 1 tablet (100 mcg) by mouth daily  Dispense: 90 tablet; Refill: 1    5. Cerebrovascular accident involving cerebellum (H)    6. Long term current use of anticoagulant therapy    7. Hyperlipidemia LDL goal <70: stable, continue rosuvastatin.    8. Benign essential hypertension: well controlled.    9. Coronary artery disease involving native coronary artery of native heart without angina pectoris:asymptomatic. Follows annually with cardiology.    10. Encounter for screening mammogram for breast cancer  - MA SCREENING DIGITAL BILAT - Future  (s+30); Future    11. Cervicalgia  - [facility] REFERRAL; Future    12. Cervical radiculopathy  - [facility] REFERRAL; Future    13. Spinal stenosis in cervical region  - [facility] REFERRAL; Future    14. Osteoporosis, unspecified osteoporosis type, unspecified pathological fracture presence  - DX Hip/Pelvis/Spine; Future    15. Age-related osteoporosis without current pathological fracture     - Vitamin D Deficiency    Patient has been advised of split billing requirements and indicates understanding: Yes    COUNSELING:  Reviewed preventive health counseling, as reflected in patient instructions       Regular exercise       Healthy diet/nutrition    Estimated body mass index is 27.85 kg/m  as calculated from the following:    Height as of this encounter: 1.6 m (5' 3\").    Weight as of this encounter: 71.3 kg (157 lb 3.2 oz).        She reports that she quit smoking about 56 years ago. She smoked 0.10 packs per day. She has never used smokeless tobacco.    Appropriate preventive services were discussed with this patient, including applicable screening as appropriate for cardiovascular disease, diabetes, osteopenia/osteoporosis, and glaucoma.  As appropriate for age/gender, discussed screening for colorectal cancer, prostate cancer, breast " cancer, and cervical cancer. Checklist reviewing preventive services available has been given to the patient.    Reviewed patients plan of care and provided an AVS. The Basic Care Plan (routine screening as documented in Health Maintenance) for Salome meets the Care Plan requirement. This Care Plan has been established and reviewed with the Patient.    Counseling Resources:  ATP IV Guidelines  Pooled Cohorts Equation Calculator  Breast Cancer Risk Calculator  BRCA-Related Cancer Risk Assessment: FHS-7 Tool  FRAX Risk Assessment  ICSI Preventive Guidelines  Dietary Guidelines for Americans, 2010  USDA's MyPlate  ASA Prophylaxis  Lung CA Screening    Surya Dyer [facility]

## 2021-02-15 NOTE — PATIENT INSTRUCTIONS
Patient Education   Personalized Prevention Plan  You are due for the preventive services outlined below.  Your care team is available to assist you in scheduling these services.  If you have already completed any of these items, please share that information with your care team to update in your medical record.  Health Maintenance Due   Topic Date Due     URINE DRUG SCREEN  1940     ANNUAL REVIEW OF HM ORDERS  1940     COVID-19 Vaccine (1 of 2) 04/02/1958     Hepatitis C Screening  04/02/1960     Mammogram  03/18/2020     Annual Wellness Visit  04/30/2020     FALL RISK ASSESSMENT  04/30/2020     PHQ-2  01/01/2021           We will gradually discontinue Lexapro (escitalopram). Start taking every other day for 2-3 weeks then discontinue all together. We'll see if this helps with nightmares. If your mood worsens being off the medication, we can always restart it.

## 2021-02-15 NOTE — LETTER
[facility]  [address], [place] [id number] [phone]                    February 16, 2021    Salome Saeed  [address]  [place] [address]      Dear Salome,    Here is a summary of your recent test results:    -Normal red blood cell (hgb) levels, normal white blood cell count and normal platelet levels.   -TSH (thyroid stimulating hormone) level is normal which indicates appropriate thyroid replacement dosing.  ADVISE: continuing same replacement dose and rechecking this in 12 months.   -Vitamin D level is normal and getting 1000 IU daily in your diet or supplements is recommended.     For additional lab test information, labtestsonline.org is an excellent reference.     Your test results are enclosed.      Please contact me if you have any questions.    In addition, here is a list of due or overdue Health Maintenance reminders.    Health Maintenance Due   Topic Date Due     COVID-19 Vaccine (1 of 2) [birth year]02/1958     Mammogram  03/18/2020       Please call us at [phone] (or use [employer]) to address the above recommendations.            Thank you very much for trusting [facility].     Healthy regards,  Dr. Surya Dyer, DO             Results for orders placed or performed in visit on 02/15/21   CBC with platelets     Status: None   Result Value Ref Range    WBC 6.1 4.0 - 11.0 10e9/L    RBC Count 4.62 3.8 - 5.2 10e12/L    Hemoglobin 13.4 11.7 - 15.7 g/dL    Hematocrit 40.1 35.0 - 47.0 %    MCV 87 78 - 100 fl    MCH 29.0 26.5 - 33.0 pg    MCHC 33.4 31.5 - 36.5 g/dL    RDW 13.8 10.0 - 15.0 %    Platelet Count 212 150 - 450 10e9/L   TSH with free T4 reflex     Status: None   Result Value Ref Range    TSH 0.58 0.40 - 4.00 mU/L   Vitamin D Deficiency     Status: None   Result Value Ref Range    Vitamin D Deficiency screening 39 20 - 75 ug/L

## 2021-02-16 LAB
DEPRECATED CALCIDIOL+CALCIFEROL SERPL-MC: 39 UG/L (ref 20–75)
TSH SERPL DL<=0.005 MIU/L-ACNC: 0.58 MU/L (ref 0.4–4)

## 2021-02-16 ASSESSMENT — ANXIETY QUESTIONNAIRES: GAD7 TOTAL SCORE: 0

## 2021-02-22 ENCOUNTER — HOSPITAL ENCOUNTER (OUTPATIENT)
Dept: MAMMOGRAPHY | Facility: CLINIC | Age: 81
Discharge: HOME | End: 2021-02-22
Attending: FAMILY MEDICINE | Admitting: FAMILY MEDICINE
Payer: COMMERCIAL

## 2021-02-22 DIAGNOSIS — Z12.31 ENCOUNTER FOR SCREENING MAMMOGRAM FOR BREAST CANCER: ICD-10-CM

## 2021-02-22 PROCEDURE — 77063 BREAST TOMOSYNTHESIS BI: CPT

## 2021-03-01 ENCOUNTER — THERAPY VISIT (OUTPATIENT)
Dept: PHYSICAL THERAPY | Facility: CLINIC | Age: 81
End: 2021-03-01
Attending: FAMILY MEDICINE
Payer: COMMERCIAL

## 2021-03-01 DIAGNOSIS — M54.12 CERVICAL RADICULOPATHY: ICD-10-CM

## 2021-03-01 DIAGNOSIS — M48.02 SPINAL STENOSIS IN CERVICAL REGION: ICD-10-CM

## 2021-03-01 DIAGNOSIS — M54.2 CERVICALGIA: ICD-10-CM

## 2021-03-01 PROCEDURE — 97162 PT EVAL MOD COMPLEX 30 MIN: CPT | Mod: GP | Performed by: PHYSICAL THERAPIST

## 2021-03-01 PROCEDURE — 97110 THERAPEUTIC EXERCISES: CPT | Mod: GP | Performed by: PHYSICAL THERAPIST

## 2021-03-01 NOTE — PROGRESS NOTES
"[facility] Initial Evaluation -- Cervical    Evaluation Date: March 1, 2021  Salome Saeed is a 80 year old female with a cervical condition.   Referral: Dr. Dyer  Work mechanical stresses: retired   Employment status: retired  Leisure mechanical stresses: unable to walk for exercise due to pain/numbness  Functional disability score (NDI):  See flow sheet  VAS score (0-10): 4/10  Patient goals/expectations:  \"I would like to avoid surgery, I would like the pain to diminish\"    HISTORY:    Present symptoms:  Lower neck, tops of shoulders.  Pain quality (sharp/shooting/stabbing/aching/burning/cramping):   aching.  Paresthesia (yes/no):  Yes, intermittent into both UE's    Present since (onset date): February 14, 2021      Symptoms (improving/unchanging/worsening):  unchanging    Symptoms commenced as a result of: falling into door jam due to getting up from nightmare; ongoing cervical stenosis   Condition occurred in the following environment:  home    Symptoms at onset (neck/arm/forearm/headache): neck, head, shoulders  Constant symptoms (neck/arm/forearm/headache):   Intermittent symptoms (neck/arm/forearm/headache): neck/shoulders    Symptoms are made worse with the following: Sometimes Sitting, Always Turning, time of day - No effect, Always On the move and lifting   Symptoms are made better with the following: Always Lying and Always When still    Disturbed sleep (yes/no): no  Number of pillows: 1  Sleeping postures (prone/sup/side R/L): sides    Previous episodes (0/1-5/6-10/11+): chronic Year of first episode: 2020    Previous history: lumbar stenosis, thoracic stenosis  Previous treatments: surgery, CHRIS (not helpful)    Specific Questions: (as reported by the patient)  Dizziness/Tinnitus/Nausea/Swallowing (pos/neg): neg  Gait/Upper Limbs (normal/abnormal): abnormal when using overhead  Medications (nil/NSAIDS/anlag/steroids/anticoag/other):  Anticoag and Other - Thyroid and High " blood pressure  Medical allergies:  none  General health (excellent/good/fair/poor):  good  Pertinent medical history:  Heart problems, High blood pressure, Overweight, Stroke and Thyroid problems  Imaging (None/Xray/MRI/Other):  None recent  Recent or major surgery (yes/no): no  Night pain (yes/no): no  Accidents (yes/no): no  Unexplained weight loss (yes/no): none  Barriers at home: none  Other red flags: none    EXAMINATION    Posture:   Sitting (good/fair/poor): fair  Standing (good/fair/poor): fair     Protruded head (yes/no): yes    Wry Neck (right/left/nil):  nil  Relevant (yes/no):  no     Correction of posture(better/worse/no effect): worse  Other observations:      Neurological:    Motor Deficit:  intact   Reflexes:  Not assessed  Sensory Deficit:  intact   Dural signs:  Not assessed    Movement Loss:   Rohit Mod Min Nil Pain   Protrusion   x x    Flexion    x erp   Retraction  x   pdm   Extension  x   pdm   Lateral flexion R  x x  erp   Lateral flexion L  x   erp   Rotation R  x   erp   Rotation L  x   erp     Test Movements:   During: produces, abolishes, increases, decreases, no effect, centralizing, peripheralizing  After: better, worse, no better, no worse, no effect, centralized, peripheralized    Pretest symptoms sitting: central, lower neck   Symptoms During Symptoms After ROM increased ROM decreased No Effect   PRO        Rep PRO        RET Increases    Worse         Rep RET Increases    Worse      x   RET EXT        Rep RET EXT          Pretest symptoms lying:  Central lower neck (less than sitting)   Symptoms During Symptoms After ROM increased ROM decreased No Effect   RET (sustained) Increases    No Worse         Rep RET  (sustained) Increases    No Worse      x   RET EXT        Rep RET EXT          If required, pretest symptoms sitting:      Symptoms During Symptoms After ROM increased ROM decreased No Effect   LF-R        Rep LF-R        LF-L        Rep LF-L        ROT-R        Rep ROT-R         ROT-L        Rep ROT-L        FLEX        Rep FLEX            Static Tests:   Protrusion:    Flexion:    Retraction:    Extension (sitting/prone/supine):      Other Tests:     Provisional Classification:  Inconclusive/Other - Spinal Stenosis    Principle of Management:  Education:  Traffic light, therapeutic dose of exercise, posture     Equipment provided:    Mechanical therapy (Y/N):  ?   Extension principle:  sustained RET in supine x 1 minute/4 x day     Lateral principle:    Flexion principle:     Other:      ASSESSMENT/PLAN:    Patient is a 80 year old female with cervical complaints.    Patient has the following significant findings with corresponding treatment plan.                Diagnosis 1:  Cervical stenosisi  Pain -  mechanical traction, manual therapy, self management, education, directional preference exercise and home program  Decreased ROM/flexibility - manual therapy and therapeutic exercise  Decreased joint mobility - manual therapy and therapeutic exercise  Decreased function - therapeutic activities  Impaired posture - neuro re-education    Therapy Evaluation Codes:   1) History comprised of:   Personal factors that impact the plan of care:      None.    Comorbidity factors that impact the plan of care are:      Heart problems, High blood pressure, Overweight and Stroke.     Medications impacting care: High blood pressure and Thyroid.  2) Examination of Body Systems comprised of:   Body structures and functions that impact the plan of care:      Cervical spine.   Activity limitations that impact the plan of care are:      Lifting, Standing and Walking.  3) Clinical presentation characteristics are:   Evolving/Changing.  4) Decision-Making    Moderate complexity using standardized patient assessment instrument and/or measureable assessment of functional outcome.  Cumulative Therapy Evaluation is: Moderate complexity.    Previous and current functional limitations:  (See Goal Flow Sheet for this  information)    Short term and Long term goals: (See Goal Flow Sheet for this information)     Communication ability:  Patient appears to be able to clearly communicate and understand verbal and written communication and follow directions correctly.  Treatment Explanation - The following has been discussed with the patient:   RX ordered/plan of care  Anticipated outcomes  Possible risks and side effects  This patient would benefit from PT intervention to resume normal activities.   Rehab potential is good.    Frequency:  1 X week, once daily  Duration:  for 6 weeks  Discharge Plan:  Achieve all LTG.  Independent in home treatment program.  Reach maximal therapeutic benefit.    Please refer to the daily flowsheet for treatment today, total treatment time and time spent performing 1:1 timed codes.

## 2021-03-08 ENCOUNTER — THERAPY VISIT (OUTPATIENT)
Dept: PHYSICAL THERAPY | Facility: CLINIC | Age: 81
End: 2021-03-08
Payer: COMMERCIAL

## 2021-03-08 ENCOUNTER — ANTICOAGULATION THERAPY VISIT (OUTPATIENT)
Dept: FAMILY MEDICINE | Facility: CLINIC | Age: 81
End: 2021-03-08

## 2021-03-08 DIAGNOSIS — I63.9 CEREBROVASCULAR ACCIDENT INVOLVING CEREBELLUM (H): ICD-10-CM

## 2021-03-08 DIAGNOSIS — M48.02 SPINAL STENOSIS IN CERVICAL REGION: ICD-10-CM

## 2021-03-08 DIAGNOSIS — Z79.01 LONG TERM CURRENT USE OF ANTICOAGULANT THERAPY: ICD-10-CM

## 2021-03-08 DIAGNOSIS — M54.2 CERVICALGIA: Primary | ICD-10-CM

## 2021-03-08 LAB
CAPILLARY BLOOD COLLECTION: NORMAL
INR BLD: 1.9 (ref 0.86–1.14)

## 2021-03-08 PROCEDURE — 97110 THERAPEUTIC EXERCISES: CPT | Mod: GP | Performed by: PHYSICAL THERAPIST

## 2021-03-08 PROCEDURE — 85610 PROTHROMBIN TIME: CPT | Performed by: FAMILY MEDICINE

## 2021-03-08 PROCEDURE — 97140 MANUAL THERAPY 1/> REGIONS: CPT | Mod: GP | Performed by: PHYSICAL THERAPIST

## 2021-03-08 PROCEDURE — 97112 NEUROMUSCULAR REEDUCATION: CPT | Mod: GP | Performed by: PHYSICAL THERAPIST

## 2021-03-08 PROCEDURE — 36416 COLLJ CAPILLARY BLOOD SPEC: CPT | Performed by: FAMILY MEDICINE

## 2021-03-08 NOTE — PROGRESS NOTES
Anticoagulation Management    Unable to reach Loreta today.    Today's INR result of 1.9 is subtherapeutic (goal INR of 2.0-3.0).  Result received from: Clinic Lab    Follow up required to confirm warfarin dose taken and assess for changes    Left message with  for Loreta to call [phone]. Can transfer call to Fela at [phone],    Anticoagulation clinic to follow up    Fela Jensen RN    ANTICOAGULATION FOLLOW-UP CLINIC VISIT    Patient Name:  Salome Saeed  Date:  3/8/2021  Contact Type:  Telephone    SUBJECTIVE:  Patient Findings     Positives:  Change in diet/appetite    Comments:  Called patient to discuss today's INR results: The patient was assessed for diet, medication, and activity level changes, missed or extra doses, bruising or bleeding, with no problem findings. However, patient has probably had more green veggies than usual this past week. She plans to get back to her regular intake. Per protocol, reviewed maintenance warfarin dosing with patient and she will remain on the same dose until next INR check. No other questions or concerns. Scheduled next lab-only INR in 2 weeks.         Clinical Outcomes     Negatives:  Major bleeding event, Thromboembolic event, Anticoagulation-related hospital admission, Anticoagulation-related ED visit, Anticoagulation-related fatality    Comments:  Called patient to discuss today's INR results: The patient was assessed for diet, medication, and activity level changes, missed or extra doses, bruising or bleeding, with no problem findings. However, patient has probably had more green veggies than usual this past week. She plans to get back to her regular intake. Per protocol, reviewed maintenance warfarin dosing with patient and she will remain on the same dose until next INR check. No other questions or concerns. Scheduled next lab-only INR in 2 weeks.            OBJECTIVE    Recent labs: (last 7 days)     03/08/21  1137   INR 1.9*        ASSESSMENT / PLAN  INR assessment SUB    Recheck INR In: 2 WEEKS    INR Location Clinic      Anticoagulation Summary  As of 3/8/2021    INR goal:  2.0-3.0   TTR:  84.9 % (1 y)   INR used for dosin.9 (3/8/2021)   Warfarin maintenance plan:  5 mg (5 mg x 1) every Mon, Wed, Fri; 2.5 mg (5 mg x 0.5) all other days   Full warfarin instructions:  5 mg every Mon, Wed, Fri; 2.5 mg all other days   Weekly warfarin total:  25 mg   No change documented:  Fela Jensen, RN   Plan last modified:  Rosanne Rico RN (2020)   Next INR check:  3/22/2021   Priority:  Maintenance   Target end date:  Indefinite    Indications    Long term current use of anticoagulant therapy [Z79.01]  CVA (cerebral vascular accident) (Resolved) [I63.9]  Cerebrovascular accident involving cerebellum (H) [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 [place]    Send INR reminders to:  [facility] [place]    Comments:  Mail AVS to patient while managing telephonically.      Anticoagulation Care Providers     Provider Role Specialty Phone number    Surya Dyer,  Referring Family Medicine [phone]            See the Encounter Report to view Anticoagulation Flowsheet and Dosing Calendar (Go to Encounters tab in chart review, and find the Anticoagulation Therapy Visit)    Fela Jensen, RN

## 2021-03-16 ENCOUNTER — THERAPY VISIT (OUTPATIENT)
Dept: PHYSICAL THERAPY | Facility: CLINIC | Age: 81
End: 2021-03-16
Payer: COMMERCIAL

## 2021-03-16 DIAGNOSIS — M48.02 SPINAL STENOSIS IN CERVICAL REGION: Primary | ICD-10-CM

## 2021-03-16 DIAGNOSIS — M54.2 CERVICALGIA: ICD-10-CM

## 2021-03-16 PROCEDURE — 97012 MECHANICAL TRACTION THERAPY: CPT | Mod: GP | Performed by: PHYSICAL THERAPIST

## 2021-03-22 ENCOUNTER — ANTICOAGULATION THERAPY VISIT (OUTPATIENT)
Dept: FAMILY MEDICINE | Facility: CLINIC | Age: 81
End: 2021-03-22

## 2021-03-22 ENCOUNTER — OFFICE VISIT (OUTPATIENT)
Dept: FAMILY MEDICINE | Facility: CLINIC | Age: 81
End: 2021-03-22
Payer: COMMERCIAL

## 2021-03-22 ENCOUNTER — NURSE TRIAGE (OUTPATIENT)
Dept: FAMILY MEDICINE | Facility: CLINIC | Age: 81
End: 2021-03-22

## 2021-03-22 VITALS
DIASTOLIC BLOOD PRESSURE: 74 MMHG | HEIGHT: 63 IN | WEIGHT: 155 LBS | OXYGEN SATURATION: 98 % | HEART RATE: 69 BPM | TEMPERATURE: 97.9 F | BODY MASS INDEX: 27.46 KG/M2 | SYSTOLIC BLOOD PRESSURE: 132 MMHG

## 2021-03-22 DIAGNOSIS — I63.9 CEREBROVASCULAR ACCIDENT INVOLVING CEREBELLUM (H): ICD-10-CM

## 2021-03-22 DIAGNOSIS — R51.9 NONINTRACTABLE HEADACHE, UNSPECIFIED CHRONICITY PATTERN, UNSPECIFIED HEADACHE TYPE: ICD-10-CM

## 2021-03-22 DIAGNOSIS — Z79.01 LONG TERM CURRENT USE OF ANTICOAGULANT THERAPY: ICD-10-CM

## 2021-03-22 DIAGNOSIS — R42 LIGHTHEADEDNESS: Primary | ICD-10-CM

## 2021-03-22 LAB
CAPILLARY BLOOD COLLECTION: NORMAL
INR BLD: 3.5 (ref 0.86–1.14)

## 2021-03-22 PROCEDURE — 99213 OFFICE O/P EST LOW 20 MIN: CPT | Performed by: FAMILY MEDICINE

## 2021-03-22 PROCEDURE — 36416 COLLJ CAPILLARY BLOOD SPEC: CPT | Performed by: FAMILY MEDICINE

## 2021-03-22 PROCEDURE — 85610 PROTHROMBIN TIME: CPT | Performed by: FAMILY MEDICINE

## 2021-03-22 RX ORDER — PREDNISOLONE ACETATE 10 MG/ML
1 SUSPENSION/ DROPS OPHTHALMIC DAILY
COMMUNITY
Start: 2021-02-23 | End: 2024-09-13

## 2021-03-22 ASSESSMENT — MIFFLIN-ST. JEOR: SCORE: 1152.21

## 2021-03-22 NOTE — PROGRESS NOTES
Anticoagulation Management    Unable to reach Loreta today (attempt #1).    Today's INR result of 3.5 is supratherapeutic (goal INR of 2.0-3.0).  Result received from: Clinic Lab    Follow up required to discuss out of range INR     Left message to take reduced dose of warfarin, 2.5 mg tonight.      Anticoagulation clinic to follow up    Aysha Tilley RN

## 2021-03-22 NOTE — PROGRESS NOTES
"    Assessment & Plan     Lightheadedness: vitals stable. Neurological exam within normal limits. Discussed that symptoms sound orthostatic in nature. Push fluids and slow change of position. Given past history of stroke along with headache, tinnitus, will refer to neurology for further evaluation.  - NEUROLOGY ADULT REFERRAL    Nonintractable headache, unspecified chronicity pattern, unspecified headache type  - NEUROLOGY ADULT REFERRAL          Return in about 2 weeks (around 4/5/2021) for follow up if symptoms not improving.    Surya Dyer DO  [facility] CLINIC [place]    Efe Kan is a 80 year old who presents for the following health issues     HPI     Triaged 03/22/2021  Patient calls with lightheadedness the past week, more frequent the last few days. Notices it when she stands up.  States she will stop what she's doing then it goes away. Denies chest pain, shortness of breath. States she has had trouble reading the last week. Has eye appointment. States she has noticed over the past few weeks that she will be talking and \"lose the word\" she is trying to say. Notes headache 5/10 on left side of head. Left arm is achey from \"neck problem\".  Notes a faint clicking in her ears. History of stroke, on warfarin.      Home BP reading today (3/22)  0920 - 124/58, pulse 58  930 - 119/60, 57      Discussed with Dr. Dyer, he will see her at 5:00 today, patient will do labs at 4:45. Instructed patient to call back if she feels like fainting or symptoms worsen. Patient stated an understanding and agreed with plan.      Dinah Ward RN  [facility] [place]       Additional Information    [1] Dizziness caused by heat exposure, sudden standing, or poor fluid intake AND [2] no improvement after 2 hours of rest and fluids    Answer Assessment - Initial Assessment Questions  1. DESCRIPTION: \"Describe your dizziness.\"      Feel lightheaded when I stand up and walk  2. LIGHTHEADED: \"Do you " "feel lightheaded?\" (e.g., somewhat faint, woozy, weak upon standing)      Upon standing  3. VERTIGO: \"Do you feel like either you or the room is spinning or tilting?\" (i.e. vertigo)      no  4. SEVERITY: \"How bad is it?\"  \"Do you feel like you are going to faint?\" \"Can you stand and walk?\"    - MILD - walking normally    - MODERATE - interferes with normal activities (e.g., work, school)     - SEVERE - unable to stand, requires support to walk, feels like passing out now.       Stop what im doing and it goes away  5. ONSET:  \"When did the dizziness begin?\"      Had some lightheadedness occasionally since fall. Noticed it over the last week,  More often the last few days,   6. AGGRAVATING FACTORS: \"Does anything make it worse?\" (e.g., standing, change in head position)      Getting up and walking   7. HEART RATE: \"Can you tell me your heart rate?\" \"How many beats in 15 seconds?\"  (Note: not all patients can do this)        0920 - 124/58, pulse 58 - 119/60, 57   8. CAUSE: \"What do you think is causing the dizziness?\"      Not sure, maybe related to fall  9. RECURRENT SYMPTOM: \"Have you had dizziness before?\" If so, ask: \"When was the last time?\" \"What happened that time?\"      See above  10. OTHER SYMPTOMS: \"Do you have any other symptoms?\" (e.g., fever, chest pain, vomiting, diarrhea, bleeding)        no  11. PREGNANCY: \"Is there any chance you are pregnant?\" \"When was your last menstrual period?\"        n/a    Protocols used: DIZZINESS - [id number]      First Covid vaccine 03/20/2021 - Moderna        Lightheadedness:    For about the past week has been experiencing some lightheadedness feeling like she might pass out. She has noticed when standing up or walking. Did feel a little dizzy while seated as well. No associated palpitations, chest pain, or dyspnea. She is eating and drinking well. She does notice some tinnitus.    Over the past month has also noticed some change in her vision in distance and with " "reading. Has eye appointment scheduled for Thursday.         Review of Systems   Constitutional, HEENT, cardiovascular, pulmonary, gi and gu systems are negative, except as otherwise noted.      Objective    /74 (BP Location: Left arm, Patient Position: Chair, Cuff Size: Adult Regular)   Pulse 69   Temp 97.9  F (36.6  C) (Tympanic)   Ht 1.6 m (5' 3\")   Wt 70.3 kg (155 lb)   LMP  (LMP Unknown)   SpO2 98%   Breastfeeding No   BMI 27.46 kg/m    Body mass index is 27.46 kg/m .  Physical Exam   GENERAL: healthy, alert and no distress  EYES: Eyes grossly normal to inspection, PERRL and conjunctivae and sclerae normal  HENT: ear canals and TM's normal, nose and mouth without ulcers or lesions  NECK: no adenopathy, no asymmetry, masses, or scars and thyroid normal to palpation  RESP: lungs clear to auscultation - no rales, rhonchi or wheezes  CV: regular rate and rhythm, normal S1 S2, no S3 or S4, no murmur, click or rub, no peripheral edema and peripheral pulses strong  MS: no gross musculoskeletal defects noted, no edema  NEURO: Normal strength and tone, mentation intact and speech normal            "

## 2021-03-22 NOTE — TELEPHONE ENCOUNTER
"Patient calls with lightheadedness the past week, more frequent the last few days. Notices it when she stands up.  States she will stop what she's doing then it goes away. Denies chest pain, shortness of breath. States she has had trouble reading the last week. Has eye appointment. States she has noticed over the past few weeks that she will be talking and \"lose the word\" she is trying to say. Notes headache 5/10 on left side of head. Left arm is achey from \"neck problem\".  Notes a faint clicking in her ears. History of stroke, on warfarin.     Home BP reading today (3/22)  0920 - 124/58, pulse 58  930 - 119/60, 57     Discussed with Dr. Dyer, he will see her at 5:00 today, patient will do labs at 4:45. Instructed patient to call back if she feels like fainting or symptoms worsen. Patient stated an understanding and agreed with plan.     Dinah Ward RN  [facility]      Additional Information    [1] Dizziness caused by heat exposure, sudden standing, or poor fluid intake AND [2] no improvement after 2 hours of rest and fluids    Answer Assessment - Initial Assessment Questions  1. DESCRIPTION: \"Describe your dizziness.\"      Feel lightheaded when I stand up and walk  2. LIGHTHEADED: \"Do you feel lightheaded?\" (e.g., somewhat faint, woozy, weak upon standing)      Upon standing  3. VERTIGO: \"Do you feel like either you or the room is spinning or tilting?\" (i.e. vertigo)      no  4. SEVERITY: \"How bad is it?\"  \"Do you feel like you are going to faint?\" \"Can you stand and walk?\"    - MILD - walking normally    - MODERATE - interferes with normal activities (e.g., work, school)     - SEVERE - unable to stand, requires support to walk, feels like passing out now.       Stop what im doing and it goes away  5. ONSET:  \"When did the dizziness begin?\"      Had some lightheadedness occasionally since fall. Noticed it over the last week,  More often the last few days,   6. AGGRAVATING FACTORS: \"Does " "anything make it worse?\" (e.g., standing, change in head position)      Getting up and walking   7. HEART RATE: \"Can you tell me your heart rate?\" \"How many beats in 15 seconds?\"  (Note: not all patients can do this)        0920 - 124/58, pulse 58 - 119/60, 57   8. CAUSE: \"What do you think is causing the dizziness?\"      Not sure, maybe related to fall  9. RECURRENT SYMPTOM: \"Have you had dizziness before?\" If so, ask: \"When was the last time?\" \"What happened that time?\"      See above  10. OTHER SYMPTOMS: \"Do you have any other symptoms?\" (e.g., fever, chest pain, vomiting, diarrhea, bleeding)        no  11. PREGNANCY: \"Is there any chance you are pregnant?\" \"When was your last menstrual period?\"        n/a    Protocols used: DIZZINESS - [id number]      "

## 2021-03-22 NOTE — TELEPHONE ENCOUNTER
Reason for Call:  Same Day Appointment, Requested Provider:  Dr. Dyer    PCP: Surya Dyer    Reason for visit: Follow up after fall, experiencing light headedness since fall    Duration of symptoms: 2 weeks    Have you been treated for this in the past? No    Additional comments: Patient is going to be at the clinic at 2:15 pm for her INR and asked if there is any way Dr. Dyer could work her into his schedule around that time. She stated she has been having lightheaded episodes ever since her fall. Please call back to discuss.    Can we leave a detailed message on this number? YES    Phone number patient can be reached at: Home number on file [phone] (home)    Best Time: Anytime    Call taken on 3/22/2021 at 10:50 AM by Roxy Hernandez

## 2021-03-23 NOTE — PROGRESS NOTES
ANTICOAGULATION FOLLOW-UP CLINIC VISIT    Patient Name:  Salome Saeed  Date:  3/23/2021  Contact Type:  Telephone    SUBJECTIVE:  Patient Findings     Positives:  Change in health (at LOV, c/o lightheadedness, h/a, vision issues, loss of words at time (concerns for concussion after fall 2/14/21), Change in diet/appetite (less green veggies)    Comments:  Called patient to discuss yesterday's INR results: The patient was assessed for medication, and activity level changes, missed or extra doses, bruising or bleeding, with no problem findings short of what she was seen for by PCP yesterday. She does state she has had less green veggies recently. This has been an ongoing problem for Loreta. Discussed importance of consistent vit K intake from week to week and ways to help achieve this (measuring, using a calendar to record for a while). Patient stated understanding and is in agreement with plan.  She did receive the message to take a 1/2 dose of warfarin last night. So she will now continue with reviewed maintenance dosing and recheck INR in 2 weeks.  Fela CANO RN  Anticoagulation Team              Clinical Outcomes     Negatives:  Major bleeding event, Thromboembolic event, Anticoagulation-related hospital admission, Anticoagulation-related ED visit, Anticoagulation-related fatality    Comments:  Called patient to discuss yesterday's INR results: The patient was assessed for medication, and activity level changes, missed or extra doses, bruising or bleeding, with no problem findings short of what she was seen for by PCP yesterday. She does state she has had less green veggies recently. This has been an ongoing problem for Loreta. Discussed importance of consistent vit K intake from week to week and ways to help achieve this (measuring, using a calendar to record for a while). Patient stated understanding and is in agreement with plan.  She did receive the message to take a 1/2 dose of warfarin last night. So she  will now continue with reviewed maintenance dosing and recheck INR in 2 weeks.  Fela CANO RN  Anticoagulation Team                 OBJECTIVE    Recent labs: (last 7 days)     03/22/21  1657   INR 3.5*       ASSESSMENT / PLAN  INR assessment SUPRA    Recheck INR In: 2 WEEKS    INR Location Clinic      Anticoagulation Summary  As of 3/22/2021    INR goal:  2.0-3.0   TTR:  83.4 % (1 y)   INR used for dosing:  3.5 (3/22/2021)   Warfarin maintenance plan:  5 mg (5 mg x 1) every Mon, Wed, Fri; 2.5 mg (5 mg x 0.5) all other days   Full warfarin instructions:  3/22: 2.5 mg; Otherwise 5 mg every Mon, Wed, Fri; 2.5 mg all other days   Weekly warfarin total:  25 mg   Plan last modified:  Rosanne Rico RN (6/29/2020)   Next INR check:  4/5/2021   Priority:  Maintenance   Target end date:  Indefinite    Indications    Long term current use of anticoagulant therapy [Z79.01]  CVA (cerebral vascular accident) (Resolved) [I63.9]  Cerebrovascular accident involving cerebellum (H) [I63.9]             Anticoagulation Episode Summary     INR check location:      Preferred lab:  [facility] [place]    Send INR reminders to:  [facility] [place]    Comments:  Mail AVS to patient while managing telephonically.      Anticoagulation Care Providers     Provider Role Specialty Phone number    Surya Dyer DO Referring Family Medicine [phone]            See the Encounter Report to view Anticoagulation Flowsheet and Dosing Calendar (Go to Encounters tab in chart review, and find the Anticoagulation Therapy Visit)    Dosage adjustment made based on physician directed care plan.    Fela Jensen RN

## 2021-03-24 ENCOUNTER — THERAPY VISIT (OUTPATIENT)
Dept: PHYSICAL THERAPY | Facility: CLINIC | Age: 81
End: 2021-03-24
Payer: COMMERCIAL

## 2021-03-24 DIAGNOSIS — M48.02 SPINAL STENOSIS IN CERVICAL REGION: Primary | ICD-10-CM

## 2021-03-24 PROCEDURE — 97010 HOT OR COLD PACKS THERAPY: CPT | Mod: GP | Performed by: PHYSICAL THERAPIST

## 2021-03-24 PROCEDURE — 97012 MECHANICAL TRACTION THERAPY: CPT | Mod: GP | Performed by: PHYSICAL THERAPIST

## 2021-03-26 DIAGNOSIS — M48.02 SPINAL STENOSIS IN CERVICAL REGION: Primary | ICD-10-CM

## 2021-04-01 ENCOUNTER — ANCILLARY PROCEDURE (OUTPATIENT)
Dept: BONE DENSITY | Facility: CLINIC | Age: 81
End: 2021-04-01
Payer: COMMERCIAL

## 2021-04-01 DIAGNOSIS — Z78.0 ASYMPTOMATIC MENOPAUSAL STATE: ICD-10-CM

## 2021-04-01 PROCEDURE — 77081 DXA BONE DENSITY APPENDICULR: CPT | Mod: 59 | Performed by: INTERNAL MEDICINE

## 2021-04-01 PROCEDURE — 77080 DXA BONE DENSITY AXIAL: CPT | Performed by: INTERNAL MEDICINE

## 2021-04-05 ENCOUNTER — ANTICOAGULATION THERAPY VISIT (OUTPATIENT)
Dept: FAMILY MEDICINE | Facility: CLINIC | Age: 81
End: 2021-04-05

## 2021-04-05 DIAGNOSIS — Z79.01 LONG TERM CURRENT USE OF ANTICOAGULANT THERAPY: ICD-10-CM

## 2021-04-05 DIAGNOSIS — I63.9 CEREBROVASCULAR ACCIDENT INVOLVING CEREBELLUM (H): ICD-10-CM

## 2021-04-05 LAB
CAPILLARY BLOOD COLLECTION: NORMAL
INR BLD: 2.4 (ref 0.86–1.14)

## 2021-04-05 PROCEDURE — 36416 COLLJ CAPILLARY BLOOD SPEC: CPT | Performed by: FAMILY MEDICINE

## 2021-04-05 PROCEDURE — 85610 PROTHROMBIN TIME: CPT | Performed by: FAMILY MEDICINE

## 2021-04-05 NOTE — PROGRESS NOTES
ANTICOAGULATION FOLLOW-UP CLINIC VISIT    Patient Name:  Salome Saeed  Date:  2021  Contact Type:  Telephone    SUBJECTIVE:  Patient Findings     Comments:  Called patient to discuss today's INR results: The patient was assessed for diet, medication, and activity level changes, missed or extra doses, bruising or bleeding, with no problem findings. She has an upcoming appt with Neurology to address the continuing dizziness, head, neck and spine pains she has been experiencing. Reviewed maintenance warfarin dosing with patient. Patient will remain on the same dose until next INR check. No other questions or concerns. Scheduled next lab-only INR in 3 weeks.  Fela CANO RN  Anticoagulation Team          Clinical Outcomes     Negatives:  Major bleeding event, Thromboembolic event, Anticoagulation-related hospital admission, Anticoagulation-related ED visit, Anticoagulation-related fatality    Comments:  Called patient to discuss today's INR results: The patient was assessed for diet, medication, and activity level changes, missed or extra doses, bruising or bleeding, with no problem findings. She has an upcoming appt with Neurology to address the continuing dizziness, head, neck and spine pains she has been experiencing. Reviewed maintenance warfarin dosing with patient. Patient will remain on the same dose until next INR check. No other questions or concerns. Scheduled next lab-only INR in 3 weeks.  Fela CANO RN  Anticoagulation Team             OBJECTIVE    Recent labs: (last 7 days)     21  1139   INR 2.4*       ASSESSMENT / PLAN  INR assessment THER    Recheck INR In: 3 WEEKS    INR Location Clinic      Anticoagulation Summary  As of 2021    INR goal:  2.0-3.0   TTR:  81.8 % (1 y)   INR used for dosin.4 (2021)   Warfarin maintenance plan:  5 mg (5 mg x 1) every Mon, Wed, Fri; 2.5 mg (5 mg x 0.5) all other days   Full warfarin instructions:  5 mg every Mon, Wed, Fri; 2.5 mg all other days    Weekly warfarin total:  25 mg   No change documented:  Fela Jensen RN   Plan last modified:  Rosanne Rico RN (6/29/2020)   Next INR check:  4/26/2021   Priority:  Maintenance   Target end date:  Indefinite    Indications    Long term current use of anticoagulant therapy [Z79.01]  CVA (cerebral vascular accident) (Resolved) [I63.9]  Cerebrovascular accident involving cerebellum (H) [I63.9]             Anticoagulation Episode Summary     INR check location:      Preferred lab:  [facility] [place]    Send INR reminders to:  [facility] [place]    Comments:  Mail AVS to patient while managing telephonically.      Anticoagulation Care Providers     Provider Role Specialty Phone number    Suray Dyer DO Referring Family Medicine [phone]            See the Encounter Report to view Anticoagulation Flowsheet and Dosing Calendar (Go to Encounters tab in chart review, and find the Anticoagulation Therapy Visit)    Fela Jensen, RN

## 2021-04-06 ENCOUNTER — TRANSFERRED RECORDS (OUTPATIENT)
Dept: HEALTH INFORMATION MANAGEMENT | Facility: CLINIC | Age: 81
End: 2021-04-06

## 2021-04-09 ENCOUNTER — TELEPHONE (OUTPATIENT)
Dept: FAMILY MEDICINE | Facility: CLINIC | Age: 81
End: 2021-04-09

## 2021-04-09 ENCOUNTER — OFFICE VISIT (OUTPATIENT)
Dept: FAMILY MEDICINE | Facility: CLINIC | Age: 81
End: 2021-04-09
Payer: COMMERCIAL

## 2021-04-09 VITALS
RESPIRATION RATE: 20 BRPM | HEIGHT: 63 IN | DIASTOLIC BLOOD PRESSURE: 72 MMHG | WEIGHT: 156 LBS | TEMPERATURE: 98 F | BODY MASS INDEX: 27.64 KG/M2 | OXYGEN SATURATION: 98 % | SYSTOLIC BLOOD PRESSURE: 122 MMHG | HEART RATE: 67 BPM

## 2021-04-09 DIAGNOSIS — G20.A1 PARKINSON DISEASE (H): ICD-10-CM

## 2021-04-09 DIAGNOSIS — F41.9 ANXIETY: Primary | ICD-10-CM

## 2021-04-09 PROCEDURE — 99213 OFFICE O/P EST LOW 20 MIN: CPT | Performed by: FAMILY MEDICINE

## 2021-04-09 RX ORDER — CLONAZEPAM 0.5 MG/1
0.25 TABLET ORAL 2 TIMES DAILY PRN
Qty: 30 TABLET | Refills: 0 | Status: SHIPPED | OUTPATIENT
Start: 2021-04-09 | End: 2021-09-14

## 2021-04-09 RX ORDER — CLONAZEPAM 0.5 MG/1
0.25 TABLET ORAL 2 TIMES DAILY PRN
Qty: 30 TABLET | Refills: 0 | Status: SHIPPED | OUTPATIENT
Start: 2021-04-09 | End: 2021-04-09

## 2021-04-09 ASSESSMENT — MIFFLIN-ST. JEOR: SCORE: 1151.74

## 2021-04-09 NOTE — PROGRESS NOTES
"    Assessment & Plan     Anxiety: stable, okay for PRN clonazepam. Use sparingly as it can have adverse effect on mentation, balance, etc...  - clonazePAM (KLONOPIN) 0.5 MG tablet; Take 0.5 tablets (0.25 mg) by mouth 2 times daily as needed for anxiety    Parkinson disease (H): hold off on starting Sinemet. Follow up with neurology for second opionion.  - NEUROLOGY ADULT REFERRAL        Return in about 6 months (around 10/9/2021) for follow up.    Surya Dyer [facility]    Subjective   Loreta is a 81 year old who presents for the following health issues   HPI         Loreta was recently evaluated at [facility] for ongoing headaches/dizziness. During that visit, she was felt to have symptoms of Parkinsonism. At that time, she was prescribed Sinemet but has not started taking medication.She states she has noticed some motor changes - worse balance, slower gait and feels forgetful at times. Since hearing Parkinson disease, she has had more anxiety as well. She is wondering about a second opinion.     Review of Systems   Constitutional, HEENT, cardiovascular, pulmonary, gi and gu systems are negative, except as otherwise noted.      Objective    /72   Pulse 67   Temp 98  F (36.7  C)   Resp 20   Ht 1.6 m (5' 3\")   Wt 70.8 kg (156 lb)   LMP  (LMP Unknown)   SpO2 98%   BMI 27.63 kg/m    Body mass index is 27.63 kg/m .  Physical Exam   GENERAL: healthy, alert and no distress  RESP: lungs clear to auscultation - no rales, rhonchi or wheezes  CV: regular rate and rhythm, normal S1 S2, no S3 or S4, no murmur, click or rub, no peripheral edema and peripheral pulses strong  PSYCH: mentation appears normal, affect normal/bright              "

## 2021-04-09 NOTE — TELEPHONE ENCOUNTER
Patient has 2 questions she forgot to ask when she saw youtoday    1.  Should she take the medication the neurologist recommended or should she wait until after she gets a 2nd opinion?    2.  She is scheduled for her 2nd covid vaccine on the 17th-- should she still get it even with all her stuff going on?     Please call patient       Aga Amato

## 2021-04-09 NOTE — PATIENT INSTRUCTIONS
Patient Education   Understanding Parkinson Disease    Parkinson disease is a condition that affects control over your movements. It s caused by a lack of dopamine, a chemical that helps the nerve cells in your brain communicate with each other. When dopamine is missing from certain areas of the brain, the messages that tell your body how to move are lost or distorted. This can lead to symptoms such as shaking, stiffness, and slow movement. There s no cure for Parkinson disease. But proper treatment can help ease symptoms and allow you to live a full, active life.  Changes in the brain  Dopamine is produced in a small area of the brain called the substantia nigra. For reasons that aren t yet clear, the nerve cells in this region that make dopamine begin to die. This means less dopamine is available to help control your movements. When healthy, the substantia nigra makes enough dopamine to help control your body s movements.  Symptoms of Parkinson disease  Parkinson symptoms often appear gradually. Some may take years to develop. Others you may not have at all. Below are the most common:    Shaking (resting tremor). This can affect the hands, arms, and legs. Most often, the shaking is worse on one side of the body. It usually lessens when the arm or leg (limb) is used.    Slow movement (bradykinesia). This can affect the whole body. People may walk with short, shuffling steps. They can also feel  frozen  and unable to move.    Stiffness (rigidity). This occurs when muscles don t relax. It can cause muscle aches and stooped posture.    Other symptoms. This includes balance problems, small handwriting, soft voice volume, constipation, reduced or  flat  facial expression, and sleep problems. Memory loss or other problems with thinking may also occur later in the progression of the disease.  How is Parkinson diagnosed?  There is no single test for Parkinson disease. The diagnosis is based on your symptoms, health  history, and a physical exam. You may also have tests to help rule out other problems. These may include blood tests to look for diseases that cause similar symptoms. They can also include brain-imaging tests, such as an MRI of the brain  Parkinsonism is the name for a group of brain conditions that all have symptoms similar to Parkinson disease. However, the causes of these symptoms are different. In some cases, Parkinson-type symptoms may result from strokes or head injury. They can also be caused by medicines or other diseases that affect the brain. In general, these conditions can t be treated as well using the medicines that help people with Parkinson disease.  [employer] last reviewed this educational content on 2/1/2018 2000-2021 [employer]. All rights reserved. This information is not intended as a substitute for professional medical care. Always follow your healthcare professional's instructions.           Patient Education     Common Symptoms of Parkinson Disease    You have Parkinson disease. This disease is caused by a loss of a chemical in your brain needed to help control movement and balance. For reasons that are not clear, cells that make this brain chemical stop working. This causes symptoms. This sheet tells you more about symptoms of Parkinson disease.  How symptoms may affect you  Parkinson disease symptoms vary for each person. You may have many severe symptoms. Or you may have only a few mild ones. Your symptoms may involve only one side of your body. Or they may involve both sides of your body. Also, your symptoms may change over time. And you may have different symptoms at different stages. Your symptoms may also get worse as your disease progresses.  Symptoms that affect movement and balance  These can include:    Tremor (shaking). This is a very common symptom. Most often, a hand or arm shakes on one or both sides of the body. Tremor may also affect other areas of the body,  "such as a leg, a foot, or the chin. Shaking may lessen when the affected part is used. It may worsen when at rest.    Rigidity. This refers to having stiff or tight muscles. This happens because the muscles don t get the signal to relax. Rigidity may cause muscle pain and cramping. It may also cause a stooped posture.    Problems with balance. This can affect how well you stand and move. This can also increase your risk of falls.  Other symptoms  Other symptoms of Parkinson disease include speaking too softly and in a monotone, writing that gets shaky and smaller across the page, and trouble swallowing. They also include constipation, oily skin, and changes in blood pressure. Memory loss and other problems with thinking can occur later in the disease progression. Bradykinesia--or slow movement--can also occur. This can cause problems with actions such as getting out of chairs and beds. Walking may be limited to short, shuffling steps. You may feel \"frozen,\" or unable to move. Blinking, facial expressions, swinging of your arms when walking, and other \"unconscious movements\" are also slowed down. Some people may have problems with their urination and others may also feel depressed.  [employer] last reviewed this educational content on 2/1/2018 2000-2021 [employer]. All rights reserved. This information is not intended as a substitute for professional medical care. Always follow your healthcare professional's instructions.         Patient Education     Parkinson Disease: Coping with Your Emotions  Losing control over your body can be frustrating. You may not feel like doing activities you used to. You may even feel embarrassed and stop seeing friends. These feelings are normal. But it s important to find ways around them. Staying active and involved can help. Friends, family, and community groups can also offer support.     Spending time with friends can ease stress and help you feel more like " yourself.   Stay involved with others  You may not always feel like being social. But it s important to stay engaged with others. Family and friends can keep you company, offer advice, or simply make you laugh. Community and [employer]-based organizations can also offer ways to stay involved. Joining a Parkinson support group is another option. This lets you share thoughts and feelings with others who are going through the same things as you. Meeting with others who have the same condition can also help you find out about other ways to cope.  If you feel depressed  It s normal to have days when you feel down. Not being able to do all the things you used to do can be discouraging. Often, these feelings come and go over time. But Parkinson disease may also cause changes in the brain that lead to depression. So if you find yourself feeling hopeless, tired, or sad most of the time, talk with your healthcare provider. These feelings may be signs of depression, which can be treated with medicines and therapy. If you feel suicidal, seek medical care right away.  [employer] last reviewed this educational content on 3/1/2018    9270-6328 [employer]. All rights reserved. This information is not intended as a substitute for professional medical care. Always follow your healthcare professional's instructions.

## 2021-04-12 NOTE — TELEPHONE ENCOUNTER
Called patient and gave her message to hold off on medication and to get her covid shot   Left message with [facility] to get npotes from her last visit with them      Aga Amato

## 2021-04-12 NOTE — TELEPHONE ENCOUNTER
Recommend she hold off from starting medication so that neurology can fully evaluate her current symptoms. They can then advise her on starting it if they agree with recent assessment. I would also recommend she follow through with getting second COVID-19 shot.    Can we also contact [facility] and see if they will send their most recent office note to us so we can get it scanned into media before her upcoming neurology appointment?    Surya Dyer,   4/11/2021 11:48 PM

## 2021-04-14 ENCOUNTER — TELEPHONE (OUTPATIENT)
Dept: PHYSICAL THERAPY | Facility: CLINIC | Age: 81
End: 2021-04-14

## 2021-04-19 NOTE — PROGRESS NOTES
"INITIAL NEUROLOGY CONSULTATION    DATE OF VISIT: 4/20/2021  CLINIC LOCATION: [facility]  MRN: [id number]  PATIENT NAME: Salome Saeed  YOB: 1940    PRIMARY CARE PROVIDER: Surya Dyer DO     REASON FOR VISIT:   Chief Complaint   Patient presents with     Parkinson     new patient      HISTORY OF PRESENT ILLNESS:                                                    Ms. Salome Saeed is 81 year old right handed female patient with past medical history of hypertension, coronary artery disease, hyperlipidemia, lumbar spinal stenosis, hypothyroidism, and left cerebellar stroke (2013, followed at the [facility]), who was seen in consultation today requested by Surya Dyer DO for second opinion regarding suspected Parkinson's disease.  She was seen by Dr. Calixto at [facility] in the beginning of this month (records were requested).    Per patient's report, on 2/14/2021 she \"jumped out of bed\" while seen nightmare hitting her head on the door frame.  Got concerned about possibility of significant head injury because she is on Coumadin and presented to the emergency room on the same day.    Head CT from 2/14/2021 was negative for acute intracranial processes.  Brain atrophy and presumed chronic microvascular ischemic changes were noted.  Images were personally reviewed and independently interpreted.    Since that time, has intermittent sharp migratory headaches of 5-7/10 intensity that might occur up to several times per day lasting up to 1 hour without additional associated symptoms.  She tried regular Tylenol and Advil.  Advil seems to work better and she takes it approximately 2 times per week.    With these complaints she presented to [facility] and felt to have mild Parkinson's disease.  Sinemet was prescribed, but not started by the patient.  She requested a second opinion through her primary care " provider.    The patient reports that her sense of smell was never good and denies any recent changes or worsening.  She admits that she is somewhat slowed down compared to her baseline.  Her writing is smaller than it used to be.  She also feels that her balance is poor since her cerebellar stroke in 2013, but denies any additional falls.  She walks slower, but denies sara shuffling.  She is forgetful and has word finding difficulties.  Her voice is softer than it used to be.  She notices occasional postural hand tremor, but no resting tremor.  She reports that in the past she pounded on her  in her sleep a couple times and occasionally walked in sleep in the last 6 to 12 months.  No episodes since February 2021 denies any additional focal neurological symptoms.    Cervical spine MRI from 6/7/2019, according to [facility] report, demonstrated multilevel degenerative changes, most prominent at C5-6 where severe disc degeneration is causing left cord deformity by prominent osteophyte in addition to severe left C5-6 foraminal stenosis, mild central stenosis with cord contact at C6-7 along with severe right and moderate left neuroforaminal stenosis.    No additional useful information is available in Care Everywhere, which was reviewed.    Review of Systems - the patient endorses varicose veins, tinnitus, thyroid problems, hay fever, heartburn, anxiety, chronic diplopia (previously evaluated), arthritis, headache, and memory problems.  All of them have been discussed with other medical providers. Otherwise, she denies any other complaints on 14-point comprehensive review of systems.  PAST MEDICAL/SURGICAL HISTORY:                                                    I personally reviewed patient's past medical and surgical history with the patient at today's visit.  MEDICATIONS:                                                    I personally reviewed patient's medications and allergies with the patient at today's  visit.  amLODIPine (NORVASC) 5 MG tablet, Take 1 tablet (5 mg) by mouth daily  ascorbic acid (VITAMIN C) 500 MG tablet, Take 1,000 mg by mouth daily   aspirin EC 81 MG tablet, Take 1 tablet (81 mg) by mouth daily  atenolol (TENORMIN) 25 MG tablet, Take 1 tablet (25 mg) by mouth 2 times daily  Calcium Carb-Cholecalciferol (CALCIUM 600 + D PO), Take 1,200 mg by mouth daily   Cholecalciferol (VITAMIN D) 2000 UNITS tablet, Take 2,000 Units by mouth daily  clonazePAM (KLONOPIN) 0.5 MG tablet, Take 0.5 tablets (0.25 mg) by mouth 2 times daily as needed for anxiety  Coenzyme Q10 (COQ-10) 200 MG CAPS, Take 400 mg by mouth daily   diclofenac (VOLTAREN) 1 % topical gel, Place 4 g onto the skin 4 times daily  levothyroxine (SYNTHROID/LEVOTHROID) 100 MCG tablet, Take 1 tablet (100 mcg) by mouth daily  lisinopril (ZESTRIL) 20 MG tablet, Take 1 tablet (20 mg) by mouth daily  Multiple Vitamin (DAILY MULTIVITAMIN PO), Take by mouth daily  nitroGLYcerin (NITROSTAT) 0.4 MG sublingual tablet, Place 1 tablet (0.4 mg) under the tongue every 5 minutes as needed for chest pain  prednisoLONE acetate (PRED FORTE) 1 % ophthalmic suspension,   rosuvastatin (CRESTOR) 40 MG tablet, Take 1 tablet (40 mg) by mouth daily  senna-docusate (SENOKOT-S/PERICOLACE) 8.6-50 MG tablet, Take 1 tablet by mouth 2 times daily as needed for constipation  vitamin B complex with vitamin C (VITAMIN  B COMPLEX) TABS tablet, Take 1 tablet by mouth daily  warfarin ANTICOAGULANT (COUMADIN) 5 MG tablet, Current dose (2/5/21): 5 mg every Mon, Wed, Fri; 2.5 mg all other days or as directed.    No current facility-administered medications on file prior to visit.     ALLERGIES:                                                      Allergies   Allergen Reactions     Atorvastatin      Leg cramps     Cats Itching     Gluten      Sinuses affected by gluten     Oat      Shellfish Allergy      hives     Wheat      FAMILY/SOCIAL HISTORY:                                              "       Family and social history was reviewed with the patient at today's visit.  Family history is positive for dementia (mother) and migraine (cousin).  Former smoker, quit in 1965.  1 glass of wine every couple months.  Denies recreational drug use.  .  Retired  of 15+ years.  REVIEW OF SYSTEMS:                                                    Patient has completed a [facility] Patient Health History, including a 14-system review, which was personally reviewed, and pertinent positives are listed in HPI. She denies any additional problems on the further questioning.  EXAM:                                                    VITAL SIGNS:   /78   Pulse 64   Ht 1.6 m (5' 3\")   Wt 69.4 kg (153 lb)   LMP  (LMP Unknown)   SpO2 99%   BMI 27.10 kg/m    Mini-Cog Assessment:  Mini Cog Assessment  Clock Draw Score: 2 Normal  3 Item Recall: 3 objects recalled  Mini Cog Total Score: 5  Administered by: : Clark Segundo MA    General: pt is in NAD, cooperative.  Skin: normal turgor, moist mucous membranes, no lesions/rashes noticed.  HEENT: ATNC, EOMI, PERRL, white sclera, normal conjunctiva, no nystagmus or ptosis. No carotid bruits bilaterally.  Respiratory: lung sounds clear to auscultation bilaterally, no crackles, wheezes, rhonchi. Symmetric lung excursion, no accessory respiratory muscle use.  Cardiovascular: normal S1/S2, no murmurs/rubs/gallops.   Abdomen: Not distended.  : deferred.    Neurological:  Mental: alert, follows commands, Mini Cog Total Score: 5/5 with 3/3 on memory recall, no aphasia or dysarthria. Fund of knowledge is appropriate for age.  Cranial Nerves:  CN II: visual acuity - able to accurately count fingers with each eye. Visual fields intact, fundi: discs sharp, no papilledema and normal vessels bilaterally.  CN III, IV, VI: EOM intact, pupils equal and reactive  CN V: facial sensation nl  CN VII: face symmetric, no facial droop.  Mild hypomimia.  CN " VIII: hearing   CN IX: palate elevation symmetric, uvula at midline.  Mild hypophonia.  CN XI SCM normal, shoulder shrug nl  CN XII: tongue midline  Motor: Strength: 5/5 in all major groups of all extremities. Normal tone at rest, but slightly increases with provoking maneuvers in the right upper extremity.  Very mild postural bilateral symmetric hand tremor.  No resting tremor.  No other abnormal movements. No pronator drift b/l.  Reflexes: Triceps, biceps, brachioradialis, patellar, and achilles reflexes normal and symmetric. No clonus noted. Toes are down-going b/l.   Sensory: light touch, pinprick, and vibration intact. Romberg: negative.  Coordination: FNF and heel-shin tests intact b/l.   Gait: Slightly stooped forward posture, preserved arm swings, slightly reduced stride length.  Pull test is negative.  DATA:   LABS/IMAGING/OTHER STUDIES: I reviewed pertinent medical records, including personal review of head CT images, prior neurology notes, and Care Everywhere, as detailed in the history of present illness.  ASSESSMENT AND PLAN:      ASSESSMENT: Salome Saeed is a 81 year old female patient with listed above past medical history, who presents for second opinion regarding suspected Parkinson's disease.    We had a detailed discussion with the patient regarding her presenting complaints.  The neurological exam today demonstrates mild parkinsonian features, that combined with acting out in sleep and other reported symptoms, would be consistent with mild case of Parkinson's disease or Parkinson plus condition.  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  I am not quite sure if it is time to start Sinemet yet because her current symptoms are quite mild, but certainly would continue regular follow-up with interval neurological examinations and consider starting Sinemet slightly later in her condition.  Big and  Loud Program might also be considered in the future.    Regarding her headaches, the patient reports that they are improving compared to the time of her injury.  She is on warfarin and occasionally takes Advil, which increases her risk of bleeding.  I advised her to switch to extra strength Tylenol 500 to 1000 mg to see if it helps her headaches.  Preventive medications might be considered if her headaches persist.    DIAGNOSES:    ICD-10-CM    1. Parkinsonism, unspecified Parkinsonism type (H)  G20    2. Post-traumatic headache, not intractable, unspecified chronicity pattern  G44.309      PLAN: At today's visit we thoroughly discussed current symptoms, reviewed your head CT, available treatment options, and the plan.  I agree with the assessment of her prior neurologist that she has mild parkinsonian features that need to be observed over time for interval progression.  I do not think that she needs to start Sinemet at the present moment, but this medication could be considered later.     Regarding headaches, she reports interval improvement, and I advised her to use extra strength Tylenol instead of Advil for acute therapy because she is on warfarin.  If headaches persist, after 1 to 2 months, we could start preventive medicine.  She was advised to come back sooner in such case.    Next follow-up appointment is in the next 6 months or earlier if needed.    Total Time: 65 minutes spent on the date of the encounter doing chart review, history and exam, documentation and further activities per the note.    Pj Keene MD  [facility]  (Chart documentation was completed in part with Dragon voice-recognition software. Even though reviewed, some grammatical, spelling, and word errors may remain.)

## 2021-04-20 ENCOUNTER — OFFICE VISIT (OUTPATIENT)
Dept: NEUROLOGY | Facility: CLINIC | Age: 81
End: 2021-04-20
Attending: FAMILY MEDICINE
Payer: COMMERCIAL

## 2021-04-20 VITALS
DIASTOLIC BLOOD PRESSURE: 78 MMHG | HEIGHT: 63 IN | OXYGEN SATURATION: 97 % | HEART RATE: 61 BPM | SYSTOLIC BLOOD PRESSURE: 125 MMHG | WEIGHT: 153 LBS | BODY MASS INDEX: 27.11 KG/M2

## 2021-04-20 DIAGNOSIS — G20.C PARKINSONISM, UNSPECIFIED PARKINSONISM TYPE (H): Primary | ICD-10-CM

## 2021-04-20 DIAGNOSIS — G44.309 POST-TRAUMATIC HEADACHE, NOT INTRACTABLE, UNSPECIFIED CHRONICITY PATTERN: ICD-10-CM

## 2021-04-20 PROCEDURE — G0463 HOSPITAL OUTPT CLINIC VISIT: HCPCS

## 2021-04-20 PROCEDURE — 99205 OFFICE O/P NEW HI 60 MIN: CPT | Performed by: PSYCHIATRY & NEUROLOGY

## 2021-04-20 ASSESSMENT — MIFFLIN-ST. JEOR
SCORE: 1138.13
SCORE: 1138.13

## 2021-04-20 NOTE — NURSING NOTE
"April 20, 2021 12:58 PM   Salome Saeed is a 81 year old female who presents for:    Chief Complaint   Patient presents with     Parkinson     new patient      Initial Vitals: /78   Pulse 64   Ht 1.6 m (5' 3\")   Wt 69.4 kg (153 lb)   LMP  (LMP Unknown)   SpO2 99%   BMI 27.10 kg/m   Estimated body mass index is 27.1 kg/m  as calculated from the following:    Height as of this encounter: 1.6 m (5' 3\").    Weight as of this encounter: 69.4 kg (153 lb). Body surface area is 1.76 meters squared.  Data Unavailable Comment: Data Unavailable       Clinical concerns: Salome Saeed is here today for Parkinsons.    Clark Segundo MA  "

## 2021-04-20 NOTE — LETTER
"    4/20/2021         RE: Salome Saeed  [address]  [place] [id number]        Dear Colleague,    Thank you for referring your patient, Salome Saeed, to the [facility] [place]. Please see a copy of my visit note below.    INITIAL NEUROLOGY CONSULTATION    DATE OF VISIT: 4/20/2021  CLINIC LOCATION: [facility]  MRN: [id number]  PATIENT NAME: Salome Saeed  YOB: 1940    PRIMARY CARE PROVIDER: Surya Dyer DO     REASON FOR VISIT:   Chief Complaint   Patient presents with     Parkinson     new patient      HISTORY OF PRESENT ILLNESS:                                                    Ms. Salome Saeed is 81 year old right handed female patient with past medical history of hypertension, coronary artery disease, hyperlipidemia, lumbar spinal stenosis, hypothyroidism, and left cerebellar stroke (2013, followed at the [facility]), who was seen in consultation today requested by Surya Dyer DO for second opinion regarding suspected Parkinson's disease.  She was seen by Dr. Calixto at [facility] in the beginning of this month (records were requested).    Per patient's report, on 2/14/2021 she \"jumped out of bed\" while seen nightmare hitting her head on the door frame.  Got concerned about possibility of significant head injury because she is on Coumadin and presented to the emergency room on the same day.    Head CT from 2/14/2021 was negative for acute intracranial processes.  Brain atrophy and presumed chronic microvascular ischemic changes were noted.  Images were personally reviewed and independently interpreted.    Since that time, has intermittent sharp migratory headaches of 5-7/10 intensity that might occur up to several times per day lasting up to 1 hour without additional associated symptoms.  She tried regular Tylenol and Advil.  Advil seems to work better and she " takes it approximately 2 times per week.    With these complaints she presented to [facility] and felt to have mild Parkinson's disease.  Sinemet was prescribed, but not started by the patient.  She requested a second opinion through her primary care provider.    The patient reports that her sense of smell was never good and denies any recent changes or worsening.  She admits that she is somewhat slowed down compared to her baseline.  Her writing is smaller than it used to be.  She also feels that her balance is poor since her cerebellar stroke in 2013, but denies any additional falls.  She walks slower, but denies sara shuffling.  She is forgetful and has word finding difficulties.  Her voice is softer than it used to be.  She notices occasional postural hand tremor, but no resting tremor.  She reports that in the past she pounded on her  in her sleep a couple times and occasionally walked in sleep in the last 6 to 12 months.  No episodes since February 2021 denies any additional focal neurological symptoms.    Cervical spine MRI from 6/7/2019, according to [facility] report, demonstrated multilevel degenerative changes, most prominent at C5-6 where severe disc degeneration is causing left cord deformity by prominent osteophyte in addition to severe left C5-6 foraminal stenosis, mild central stenosis with cord contact at C6-7 along with severe right and moderate left neuroforaminal stenosis.    No additional useful information is available in Care Everywhere, which was reviewed.    Review of Systems - the patient endorses varicose veins, tinnitus, thyroid problems, hay fever, heartburn, anxiety, chronic diplopia (previously evaluated), arthritis, headache, and memory problems.  All of them have been discussed with other medical providers. Otherwise, she denies any other complaints on 14-point comprehensive review of systems.  PAST MEDICAL/SURGICAL HISTORY:                                                     I personally reviewed patient's past medical and surgical history with the patient at today's visit.  MEDICATIONS:                                                    I personally reviewed patient's medications and allergies with the patient at today's visit.  amLODIPine (NORVASC) 5 MG tablet, Take 1 tablet (5 mg) by mouth daily  ascorbic acid (VITAMIN C) 500 MG tablet, Take 1,000 mg by mouth daily   aspirin EC 81 MG tablet, Take 1 tablet (81 mg) by mouth daily  atenolol (TENORMIN) 25 MG tablet, Take 1 tablet (25 mg) by mouth 2 times daily  Calcium Carb-Cholecalciferol (CALCIUM 600 + D PO), Take 1,200 mg by mouth daily   Cholecalciferol (VITAMIN D) 2000 UNITS tablet, Take 2,000 Units by mouth daily  clonazePAM (KLONOPIN) 0.5 MG tablet, Take 0.5 tablets (0.25 mg) by mouth 2 times daily as needed for anxiety  Coenzyme Q10 (COQ-10) 200 MG CAPS, Take 400 mg by mouth daily   diclofenac (VOLTAREN) 1 % topical gel, Place 4 g onto the skin 4 times daily  levothyroxine (SYNTHROID/LEVOTHROID) 100 MCG tablet, Take 1 tablet (100 mcg) by mouth daily  lisinopril (ZESTRIL) 20 MG tablet, Take 1 tablet (20 mg) by mouth daily  Multiple Vitamin (DAILY MULTIVITAMIN PO), Take by mouth daily  nitroGLYcerin (NITROSTAT) 0.4 MG sublingual tablet, Place 1 tablet (0.4 mg) under the tongue every 5 minutes as needed for chest pain  prednisoLONE acetate (PRED FORTE) 1 % ophthalmic suspension,   rosuvastatin (CRESTOR) 40 MG tablet, Take 1 tablet (40 mg) by mouth daily  senna-docusate (SENOKOT-S/PERICOLACE) 8.6-50 MG tablet, Take 1 tablet by mouth 2 times daily as needed for constipation  vitamin B complex with vitamin C (VITAMIN  B COMPLEX) TABS tablet, Take 1 tablet by mouth daily  warfarin ANTICOAGULANT (COUMADIN) 5 MG tablet, Current dose (2/5/21): 5 mg every Mon, Wed, Fri; 2.5 mg all other days or as directed.    No current facility-administered medications on file prior to visit.     ALLERGIES:                                      "                 Allergies   Allergen Reactions     Atorvastatin      Leg cramps     Cats Itching     Gluten      Sinuses affected by gluten     Oat      Shellfish Allergy      hives     Wheat      FAMILY/SOCIAL HISTORY:                                                    Family and social history was reviewed with the patient at today's visit.  Family history is positive for dementia (mother) and migraine (cousin).  Former smoker, quit in 1965.  1 glass of wine every couple months.  Denies recreational drug use.  .  Retired  of 15+ years.  REVIEW OF SYSTEMS:                                                    Patient has completed a [facility] Patient Health History, including a 14-system review, which was personally reviewed, and pertinent positives are listed in HPI. She denies any additional problems on the further questioning.  EXAM:                                                    VITAL SIGNS:   /78   Pulse 64   Ht 1.6 m (5' 3\")   Wt 69.4 kg (153 lb)   LMP  (LMP Unknown)   SpO2 99%   BMI 27.10 kg/m    Mini-Cog Assessment:  Mini Cog Assessment  Clock Draw Score: 2 Normal  3 Item Recall: 3 objects recalled  Mini Cog Total Score: 5  Administered by: : Clark Segundo MA    General: pt is in NAD, cooperative.  Skin: normal turgor, moist mucous membranes, no lesions/rashes noticed.  HEENT: ATNC, EOMI, PERRL, white sclera, normal conjunctiva, no nystagmus or ptosis. No carotid bruits bilaterally.  Respiratory: lung sounds clear to auscultation bilaterally, no crackles, wheezes, rhonchi. Symmetric lung excursion, no accessory respiratory muscle use.  Cardiovascular: normal S1/S2, no murmurs/rubs/gallops.   Abdomen: Not distended.  : deferred.    Neurological:  Mental: alert, follows commands, Mini Cog Total Score: 5/5 with 3/3 on memory recall, no aphasia or dysarthria. Fund of knowledge is appropriate for age.  Cranial Nerves:  CN II: visual acuity - able to accurately " count fingers with each eye. Visual fields intact, fundi: discs sharp, no papilledema and normal vessels bilaterally.  CN III, IV, VI: EOM intact, pupils equal and reactive  CN V: facial sensation nl  CN VII: face symmetric, no facial droop.  Mild hypomimia.  CN VIII: hearing   CN IX: palate elevation symmetric, uvula at midline.  Mild hypophonia.  CN XI SCM normal, shoulder shrug nl  CN XII: tongue midline  Motor: Strength: 5/5 in all major groups of all extremities. Normal tone at rest, but slightly increases with provoking maneuvers in the right upper extremity.  Very mild postural bilateral symmetric hand tremor.  No resting tremor.  No other abnormal movements. No pronator drift b/l.  Reflexes: Triceps, biceps, brachioradialis, patellar, and achilles reflexes normal and symmetric. No clonus noted. Toes are down-going b/l.   Sensory: light touch, pinprick, and vibration intact. Romberg: negative.  Coordination: FNF and heel-shin tests intact b/l.   Gait: Slightly stooped forward posture, preserved arm swings, slightly reduced stride length.  Pull test is negative.  DATA:   LABS/IMAGING/OTHER STUDIES: I reviewed pertinent medical records, including personal review of head CT images, prior neurology notes, and Care Everywhere, as detailed in the history of present illness.  ASSESSMENT AND PLAN:      ASSESSMENT: Salome Saeed is a 81 year old female patient with listed above past medical history, who presents for second opinion regarding suspected Parkinson's disease.    We had a detailed discussion with the patient regarding her presenting complaints.  The neurological exam today demonstrates mild parkinsonian features, that combined with acting out in sleep and other reported symptoms, would be consistent with mild case of Parkinson's disease or Parkinson plus condition.  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  I am not quite sure if it is time to start Sinemet yet because her current symptoms are quite mild, but certainly would continue regular follow-up with interval neurological examinations and consider starting Sinemet slightly later in her condition.  Big and Loud Program might also be considered in the future.    Regarding her headaches, the patient reports that they are improving compared to the time of her injury.  She is on warfarin and occasionally takes Advil, which increases her risk of bleeding.  I advised her to switch to extra strength Tylenol 500 to 1000 mg to see if it helps her headaches.  Preventive medications might be considered if her headaches persist.    DIAGNOSES:    ICD-10-CM    1. Parkinsonism, unspecified Parkinsonism type (H)  G20    2. Post-traumatic headache, not intractable, unspecified chronicity pattern  G44.309      PLAN: At today's visit we thoroughly discussed current symptoms, reviewed your head CT, available treatment options, and the plan.  I agree with the assessment of her prior neurologist that she has mild parkinsonian features that need to be observed over time for interval progression.  I do not think that she needs to start Sinemet at the present moment, but this medication could be considered later.     Regarding headaches, she reports interval improvement, and I advised her to use extra strength Tylenol instead of Advil for acute therapy because she is on warfarin.  If headaches persist, after 1 to 2 months, we could start preventive medicine.  She was advised to come back sooner in such case.    Next follow-up appointment is in the next 6 months or earlier if needed.    Total Time: 65 minutes spent on the date of the encounter doing chart review, history and exam, documentation and further activities per the note.    Pj Keene MD  [facility]  (Chart documentation was completed in part with Dragon voice-recognition software. Even  though reviewed, some grammatical, spelling, and word errors may remain.)              Again, thank you for allowing me to participate in the care of your patient.        Sincerely,        Pj Keene MD

## 2021-04-20 NOTE — PATIENT INSTRUCTIONS
AFTER VISIT SUMMARY (AVS):    At today's visit we thoroughly discussed current symptoms, reviewed your head CT, available treatment options, and the plan.  I agree with the assessment of your prior neurologist that you have mild parkinsonian features that need to be observed over time for interval progression.  I do not think that you need to start Sinemet at the present moment, but this medication could be started later.     Regarding your headaches, you might consider extra strength Tylenol instead of Advil because you are on warfarin.  If headaches persist, we could start preventive medicine to help your symptoms.    Next follow-up appointment is in the next 6 months or earlier if needed.    Please do not hesitate to call me with any questions or concerns.    Thanks.

## 2021-04-26 ENCOUNTER — ANTICOAGULATION THERAPY VISIT (OUTPATIENT)
Dept: FAMILY MEDICINE | Facility: CLINIC | Age: 81
End: 2021-04-26

## 2021-04-26 DIAGNOSIS — I63.9 CEREBROVASCULAR ACCIDENT INVOLVING CEREBELLUM (H): ICD-10-CM

## 2021-04-26 DIAGNOSIS — Z79.01 LONG TERM CURRENT USE OF ANTICOAGULANT THERAPY: ICD-10-CM

## 2021-04-26 LAB
CAPILLARY BLOOD COLLECTION: NORMAL
INR BLD: 3.1 (ref 0.86–1.14)

## 2021-04-26 PROCEDURE — 36416 COLLJ CAPILLARY BLOOD SPEC: CPT | Performed by: FAMILY MEDICINE

## 2021-04-26 PROCEDURE — 85610 PROTHROMBIN TIME: CPT | Performed by: FAMILY MEDICINE

## 2021-04-26 NOTE — PROGRESS NOTES
Anticoagulation Management    Unable to reach Loreta today.    Today's INR result of 3.1 is supratherapeutic (goal INR of 2.0-3.0).  Result received from: Clinic Lab    Follow up required to discuss out of range INR     Left message to continue current dose of warfarin 5 mg tonight. Call [place], transfer to [phone].      Anticoagulation clinic to follow up    Neel Winn RN

## 2021-04-27 NOTE — PROGRESS NOTES
ANTICOAGULATION FOLLOW-UP CLINIC VISIT    Patient Name:  Salome Saeed  Date:  4/27/2021  Contact Type:  Telephone    SUBJECTIVE:  Patient Findings     Comments:  Called patient to discuss yesterday's INR results: Patient denies any identifiable changes that caused the supratherapeutic INR. The patient was assessed for diet, medication, and activity level changes, missed or extra doses, bruising or bleeding, with no problem findings. Reviewed maintenance warfarin dosing with patient. Loreta will remain on the same dose until next INR check and feels she can increase her green veggies to 4x per week consistently long term. No other questions or concerns. Scheduled next lab-only INR in 2 weeks.  Fela CANO RN  Anticoagulation Team                Clinical Outcomes     Negatives:  Major bleeding event, Thromboembolic event, Anticoagulation-related hospital admission, Anticoagulation-related ED visit, Anticoagulation-related fatality    Comments:  Called patient to discuss yesterday's INR results: Patient denies any identifiable changes that caused the supratherapeutic INR. The patient was assessed for diet, medication, and activity level changes, missed or extra doses, bruising or bleeding, with no problem findings. Reviewed maintenance warfarin dosing with patient. Loreta will remain on the same dose until next INR check and feels she can increase her green veggies to 4x per week consistently long term. No other questions or concerns. Scheduled next lab-only INR in 2 weeks.  Fela CANO RN  Anticoagulation Team                   OBJECTIVE    Recent labs: (last 7 days)     04/26/21  1408   INR 3.1*       ASSESSMENT / PLAN  INR assessment SUPRA    Recheck INR In: 2 WEEKS    INR Location Clinic      Anticoagulation Summary  As of 4/26/2021    INR goal:  2.0-3.0   TTR:  80.9 % (1 y)   INR used for dosing:  3.1 (4/26/2021)   Warfarin maintenance plan:  5 mg (5 mg x 1) every Mon, Wed, Fri; 2.5 mg (5 mg x 0.5) all other  days   Full warfarin instructions:  5 mg every Mon, Wed, Fri; 2.5 mg all other days   Weekly warfarin total:  25 mg   No change documented:  Fela Jensen RN   Plan last modified:  Rosanne Rico RN (6/29/2020)   Next INR check:  5/10/2021   Priority:  Maintenance   Target end date:  Indefinite    Indications    Long term current use of anticoagulant therapy [Z79.01]  CVA (cerebral vascular accident) (Resolved) [I63.9]  Cerebrovascular accident involving cerebellum (H) [I63.9]             Anticoagulation Episode Summary     INR check location:      Preferred lab:  [facility]    [facility] [place]    Comments:  Mail AVS to patient while managing telephonically.      Anticoagulation Care Providers     Provider Role Specialty Phone number    Surya Dyer,  Referring Family Medicine [phone]            See the Encounter Report to view Anticoagulation Flowsheet and Dosing Calendar (Go to Encounters tab in chart review, and find the Anticoagulation Therapy Visit)    Fela Jensen, RN

## 2021-04-28 ENCOUNTER — THERAPY VISIT (OUTPATIENT)
Dept: PHYSICAL THERAPY | Facility: CLINIC | Age: 81
End: 2021-04-28
Payer: COMMERCIAL

## 2021-04-28 DIAGNOSIS — M54.2 CERVICALGIA: ICD-10-CM

## 2021-04-28 DIAGNOSIS — M48.02 SPINAL STENOSIS IN CERVICAL REGION: Primary | ICD-10-CM

## 2021-04-28 PROCEDURE — 97530 THERAPEUTIC ACTIVITIES: CPT | Mod: GP | Performed by: PHYSICAL THERAPIST

## 2021-04-28 PROCEDURE — 97012 MECHANICAL TRACTION THERAPY: CPT | Mod: GP | Performed by: PHYSICAL THERAPIST

## 2021-05-05 ENCOUNTER — THERAPY VISIT (OUTPATIENT)
Dept: PHYSICAL THERAPY | Facility: CLINIC | Age: 81
End: 2021-05-05
Payer: COMMERCIAL

## 2021-05-05 DIAGNOSIS — M48.02 SPINAL STENOSIS IN CERVICAL REGION: Primary | ICD-10-CM

## 2021-05-05 PROCEDURE — 97110 THERAPEUTIC EXERCISES: CPT | Mod: GP | Performed by: PHYSICAL THERAPIST

## 2021-05-05 PROCEDURE — 97112 NEUROMUSCULAR REEDUCATION: CPT | Mod: GP | Performed by: PHYSICAL THERAPIST

## 2021-05-05 NOTE — PROGRESS NOTES
DISCHARGE REPORT    Progress reporting period is from 3-1-21 to 5-5-21.       SUBJECTIVE  Subjective changes noted by patient:  .  Subjective: Overall a little better, less painful, not waking up in am with pain. Noticed walking into the building not having as much arm weakness.     Current pain level is 1/10 .     Previous pain level was 5/10.   Changes in function:  Yes (See Goal flowsheet attached for changes in current functional level)  Adverse reaction to treatment or activity: None    OBJECTIVE  Changes noted in objective findings:  The objective findings below are from DOS 5-5-21.  Objective: NDI= deep neck flexors=3+/5, posterior neck extensors=3+/5;      ASSESSMENT/PLAN  Updated problem list and treatment plan: Diagnosis 1:  Cervical stenosis  Pain -  manual therapy, self management, education, directional preference exercise and home program  Decreased ROM/flexibility - manual therapy and therapeutic exercise  Decreased joint mobility - manual therapy and therapeutic exercise  Decreased strength - therapeutic exercise and therapeutic activities  Decreased function - therapeutic activities  Impaired posture - neuro re-education  STG/LTGs have been met or progress has been made towards goals:  Yes (See Goal flow sheet completed today.)  Assessment of Progress: The patient's condition is improving.  Patient is meeting short term goals and is progressing towards long term goals.  Self Management Plans:  Patient is independent in a home treatment program.  Patient is independent in self management of symptoms.  I have re-evaluated this patient and find that the nature, scope, duration and intensity of the therapy is appropriate for the medical condition of the patient.  Salome continues to require the following intervention to meet STG and LTG's:  PT intervention is no longer required to meet STG/LTG.    Recommendations:  This patient is ready to be discharged from therapy and continue their home treatment  program.    Please refer to the daily flowsheet for treatment today, total treatment time and time spent performing 1:1 timed codes.

## 2021-05-10 ENCOUNTER — ANTICOAGULATION THERAPY VISIT (OUTPATIENT)
Dept: FAMILY MEDICINE | Facility: CLINIC | Age: 81
End: 2021-05-10

## 2021-05-10 DIAGNOSIS — Z79.01 LONG TERM CURRENT USE OF ANTICOAGULANT THERAPY: ICD-10-CM

## 2021-05-10 DIAGNOSIS — I63.9 CEREBROVASCULAR ACCIDENT INVOLVING CEREBELLUM (H): ICD-10-CM

## 2021-05-10 LAB — INR PPP: 2.8 (ref 0.86–1.14)

## 2021-05-10 PROCEDURE — 85610 PROTHROMBIN TIME: CPT | Performed by: FAMILY MEDICINE

## 2021-05-10 PROCEDURE — 36416 COLLJ CAPILLARY BLOOD SPEC: CPT | Performed by: FAMILY MEDICINE

## 2021-05-10 NOTE — PROGRESS NOTES
ANTICOAGULATION FOLLOW-UP CLINIC VISIT    Patient Name:  Salome Saeed  Date:  5/10/2021  Contact Type:  Telephone/ [place]    SUBJECTIVE:  Patient Findings     Comments:  The patient was assessed for diet, medication, and activity level changes, missed or extra doses, bruising or bleeding, with no problem findings.          Clinical Outcomes     Negatives:  Major bleeding event, Thromboembolic event, Anticoagulation-related hospital admission, Anticoagulation-related ED visit, Anticoagulation-related fatality    Comments:  The patient was assessed for diet, medication, and activity level changes, missed or extra doses, bruising or bleeding, with no problem findings.             OBJECTIVE    Recent labs: (last 7 days)     05/10/21  1131   INR 2.80*       ASSESSMENT / PLAN  INR assessment THER    Recheck INR In: 4 WEEKS    INR Location Clinic      Anticoagulation Summary  As of 5/10/2021    INR goal:  2.0-3.0   TTR:  79.7 % (1 y)   INR used for dosin.80 (5/10/2021)   Warfarin maintenance plan:  5 mg (5 mg x 1) every Mon, Wed, Fri; 2.5 mg (5 mg x 0.5) all other days   Full warfarin instructions:  5 mg every Mon, Wed, Fri; 2.5 mg all other days   Weekly warfarin total:  25 mg   No change documented:  Neel Winn RN   Plan last modified:  Rosanne Rico, RN (2020)   Next INR check:  2021   Priority:  Maintenance   Target end date:  Indefinite    Indications    Long term current use of anticoagulant therapy [Z79.01]  CVA (cerebral vascular accident) (Resolved) [I63.9]  Cerebrovascular accident involving cerebellum (H) [I63.9]             Anticoagulation Episode Summary     INR check location:      Preferred lab:  [facility] [place]    Send INR reminders to:  [facility] [place]    Comments:  Mail AVS to patient while managing telephonically.      Anticoagulation Care Providers     Provider Role Specialty Phone number    Surya Dyer DO Referring Family Medicine [phone]             See the Encounter Report to view Anticoagulation Flowsheet and Dosing Calendar (Go to Encounters tab in chart review, and find the Anticoagulation Therapy Visit)    Patient INR is therapeutic.  Patient will continue to take 25 mg weekly dosing and follow up in 4 weeks or sooner for any problems or concerns.        Neel Winn RN

## 2021-06-07 ENCOUNTER — ANTICOAGULATION THERAPY VISIT (OUTPATIENT)
Dept: FAMILY MEDICINE | Facility: CLINIC | Age: 81
End: 2021-06-07

## 2021-06-07 DIAGNOSIS — I63.9 CEREBROVASCULAR ACCIDENT INVOLVING CEREBELLUM (H): ICD-10-CM

## 2021-06-07 DIAGNOSIS — Z79.01 LONG TERM CURRENT USE OF ANTICOAGULANT THERAPY: ICD-10-CM

## 2021-06-07 LAB
CAPILLARY BLOOD COLLECTION: NORMAL
INR BLD: 4.5 (ref 0.86–1.14)

## 2021-06-07 PROCEDURE — 85610 PROTHROMBIN TIME: CPT | Performed by: FAMILY MEDICINE

## 2021-06-07 PROCEDURE — 36416 COLLJ CAPILLARY BLOOD SPEC: CPT | Performed by: FAMILY MEDICINE

## 2021-06-07 PROCEDURE — 99207 PR NO CHARGE NURSE ONLY: CPT | Performed by: FAMILY MEDICINE

## 2021-06-07 NOTE — PROGRESS NOTES
ANTICOAGULATION FOLLOW-UP CLINIC VISIT    Patient Name:  Salome Saeed  Date:  2021  Contact Type:  Telephone/ [place]    SUBJECTIVE:  Patient Findings     Positives:  Change in medications    Comments:  Has been taking more tylenol for increased back pain. Has been going on the last 3-4 weeks. Has been seeing a chiropractor.         Clinical Outcomes     Negatives:  Major bleeding event, Thromboembolic event, Anticoagulation-related hospital admission, Anticoagulation-related ED visit, Anticoagulation-related fatality    Comments:  Has been taking more tylenol for increased back pain. Has been going on the last 3-4 weeks. Has been seeing a chiropractor.            OBJECTIVE    Recent labs: (last 7 days)     21  1345   INR 4.5*       ASSESSMENT / PLAN  INR assessment SUPRA    Recheck INR In: 10 DAYS    INR Location Clinic      Anticoagulation Summary  As of 2021    INR goal:  2.0-3.0   TTR:  80.0 % (1 y)   INR used for dosin.5 (2021)   Warfarin maintenance plan:  5 mg (5 mg x 1) every Mon, Wed, Fri; 2.5 mg (5 mg x 0.5) all other days   Full warfarin instructions:  : Hold; : Hold; Otherwise 5 mg every Mon, Wed, Fri; 2.5 mg all other days   Weekly warfarin total:  25 mg   Plan last modified:  Rosanne Rico RN (2020)   Next INR check:  2021   Priority:  Maintenance   Target end date:  Indefinite    Indications    Long term current use of anticoagulant therapy [Z79.01]  CVA (cerebral vascular accident) (Resolved) [I63.9]  Cerebrovascular accident involving cerebellum (H) [I63.9]             Anticoagulation Episode Summary     INR check location:      Preferred lab:  [facility] [place]    Send INR reminders to:  [facility] [place]    Comments:  Mail AVS to patient while managing telephonically.      Anticoagulation Care Providers     Provider Role Specialty Phone number    Surya Dyer DO Referring Family Medicine [phone]            See the Encounter Report to  view Anticoagulation Flowsheet and Dosing Calendar (Go to Encounters tab in chart review, and find the Anticoagulation Therapy Visit)    Patient INR is supra therapeutic today.  Patient will adjust dosing by holding today and tomorrow and then resume maintenance dosing of 25 mg and follow up in 10 days or sooner if there are any concerns or problems.    Signs of bleeding and when appropriate to seek care for symptoms reviewed.    Adjustment Rational: Dosage adjustment made based on physician directed care plan.      Neel Wnin RN

## 2021-06-17 ENCOUNTER — ANTICOAGULATION THERAPY VISIT (OUTPATIENT)
Dept: FAMILY MEDICINE | Facility: CLINIC | Age: 81
End: 2021-06-17

## 2021-06-17 DIAGNOSIS — I63.9 CEREBROVASCULAR ACCIDENT INVOLVING CEREBELLUM (H): ICD-10-CM

## 2021-06-17 DIAGNOSIS — Z79.01 LONG TERM CURRENT USE OF ANTICOAGULANT THERAPY: ICD-10-CM

## 2021-06-17 LAB
CAPILLARY BLOOD COLLECTION: NORMAL
INR BLD: 3.3 (ref 0.86–1.14)

## 2021-06-17 PROCEDURE — 85610 PROTHROMBIN TIME: CPT | Performed by: FAMILY MEDICINE

## 2021-06-17 PROCEDURE — 36416 COLLJ CAPILLARY BLOOD SPEC: CPT | Performed by: FAMILY MEDICINE

## 2021-06-17 PROCEDURE — 99207 PR NO CHARGE NURSE ONLY: CPT | Performed by: FAMILY MEDICINE

## 2021-06-17 NOTE — PROGRESS NOTES
ANTICOAGULATION FOLLOW-UP CLINIC VISIT    Patient Name:  Salome Saeed  Date:  6/17/2021  Contact Type:  Telephone/ L[place]    SUBJECTIVE:  Patient Findings     Positives:  Change in health, Change in medications    Comments:  Has tried to cut back on tylenol use but is having increased pain as a result of it. INR still elevated despite only using tylenol a couple of times in the past 10 days.         Clinical Outcomes     Negatives:  Major bleeding event, Thromboembolic event, Anticoagulation-related hospital admission, Anticoagulation-related ED visit, Anticoagulation-related fatality    Comments:  Has tried to cut back on tylenol use but is having increased pain as a result of it. INR still elevated despite only using tylenol a couple of times in the past 10 days.            OBJECTIVE    Recent labs: (last 7 days)     06/17/21  1407   INR 3.3*       ASSESSMENT / PLAN  INR assessment SUPRA    Recheck INR In: 2 WEEKS    INR Location Clinic      Anticoagulation Summary  As of 6/17/2021    INR goal:  2.0-3.0   TTR:  80.0 % (1 y)   INR used for dosing:  3.3 (6/17/2021)   Warfarin maintenance plan:  5 mg (5 mg x 1) every Mon, Fri; 2.5 mg (5 mg x 0.5) all other days   Full warfarin instructions:  5 mg every Mon, Fri; 2.5 mg all other days   Weekly warfarin total:  22.5 mg   Plan last modified:  Neel Winn RN (6/17/2021)   Next INR check:  7/1/2021   Priority:  Maintenance   Target end date:  Indefinite    Indications    Long term current use of anticoagulant therapy [Z79.01]  CVA (cerebral vascular accident) (Resolved) [I63.9]  Cerebrovascular accident involving cerebellum (H) [I63.9]             Anticoagulation Episode Summary     INR check location:      Preferred lab:  [facility] [place]    Send INR reminders to:  [facility] [place]    Comments:  Mail AVS to patient while managing telephonically.      Anticoagulation Care Providers     Provider Role Specialty Phone number    Surya Dyer  E, DO Referring Family Medicine [phone]            See the Encounter Report to view Anticoagulation Flowsheet and Dosing Calendar (Go to Encounters tab in chart review, and find the Anticoagulation Therapy Visit)    Patient INR is supra therapeutic today.  Patient will decrease maintenance dosing to 22.5 mg and follow up in 2 weeks or sooner if there are any concerns or problems.    Signs of bleeding and when appropriate to seek care for symptoms reviewed.    Adjustment Rational: 10%  Dosage adjustment made based on physician directed care plan.      Neel Winn RN

## 2021-07-01 ENCOUNTER — ANTICOAGULATION THERAPY VISIT (OUTPATIENT)
Dept: FAMILY MEDICINE | Facility: CLINIC | Age: 81
End: 2021-07-01

## 2021-07-01 DIAGNOSIS — I63.9 CEREBROVASCULAR ACCIDENT INVOLVING CEREBELLUM (H): ICD-10-CM

## 2021-07-01 DIAGNOSIS — Z79.01 LONG TERM CURRENT USE OF ANTICOAGULANT THERAPY: ICD-10-CM

## 2021-07-01 LAB — INR PPP: 3.1 (ref 0.86–1.14)

## 2021-07-01 PROCEDURE — 36416 COLLJ CAPILLARY BLOOD SPEC: CPT | Performed by: FAMILY MEDICINE

## 2021-07-01 PROCEDURE — 85610 PROTHROMBIN TIME: CPT | Performed by: FAMILY MEDICINE

## 2021-07-01 NOTE — PROGRESS NOTES
ANTICOAGULATION MANAGEMENT     Salome Saeed 81 year old female is on warfarin with supratherapeutic INR result. (Goal INR 2.0-3.0)    Recent labs: (last 7 days)     07/01/21  1357   INR 3.10*       ASSESSMENT     Source(s): Patient/Caregiver Call       Warfarin doses taken: Warfarin taken as instructed    Diet: No new diet changes identified    New illness, injury, or hospitalization: No    Medication/supplement changes: None noted    Signs or symptoms of bleeding or clotting: No    Previous INR: Supratherapeutic    Additional findings: None     PLAN     Recommended plan for ongoing change(s) affecting INR     Dosing Instructions:  Decrease your warfarin dose (11.1% change) with next INR in 2 weeks       Summary  As of 7/1/2021    Full warfarin instructions:  5 mg every Mon; 2.5 mg all other days   Next INR check:  7/15/2021             Telephone call with Salome whom verbalizes understanding and agrees to plan    Lab visit scheduled    Education provided: Please call back if any changes to your diet, medications or how you've been taking warfarin    Plan made per [facility] anticoagulation protocol    Neel Winn RN  Anticoagulation Clinic  7/1/2021    _______________________________________________________________________     Anticoagulation Episode Summary     Current INR goal:  2.0-3.0   TTR:  80.0 % (1 y)   Target end date:  Indefinite   Send INR reminders to:  [facility] [place]    Indications    Long term current use of anticoagulant therapy [Z79.01]  CVA (cerebral vascular accident) (Resolved) [I63.9]  Cerebrovascular accident involving cerebellum (H) [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         Anticoagulation Care Providers     Provider Role Specialty Phone number    Surya Dyer DO Referring Family Medicine [phone]

## 2021-07-07 DIAGNOSIS — E03.9 HYPOTHYROIDISM, UNSPECIFIED TYPE: ICD-10-CM

## 2021-07-09 ENCOUNTER — OFFICE VISIT (OUTPATIENT)
Dept: NEUROLOGY | Facility: CLINIC | Age: 81
End: 2021-07-09
Attending: PSYCHIATRY & NEUROLOGY
Payer: COMMERCIAL

## 2021-07-09 VITALS
WEIGHT: 153 LBS | HEART RATE: 68 BPM | HEIGHT: 63 IN | BODY MASS INDEX: 27.11 KG/M2 | OXYGEN SATURATION: 99 % | DIASTOLIC BLOOD PRESSURE: 62 MMHG | SYSTOLIC BLOOD PRESSURE: 133 MMHG

## 2021-07-09 DIAGNOSIS — M48.02 CERVICAL STENOSIS OF SPINAL CANAL: ICD-10-CM

## 2021-07-09 DIAGNOSIS — G20.C PARKINSONISM, UNSPECIFIED PARKINSONISM TYPE (H): Primary | ICD-10-CM

## 2021-07-09 PROCEDURE — 99215 OFFICE O/P EST HI 40 MIN: CPT | Performed by: PSYCHIATRY & NEUROLOGY

## 2021-07-09 PROCEDURE — G0463 HOSPITAL OUTPT CLINIC VISIT: HCPCS

## 2021-07-09 ASSESSMENT — MIFFLIN-ST. JEOR: SCORE: 1138.13

## 2021-07-09 NOTE — PROGRESS NOTES
ESTABLISHED PATIENT NEUROLOGY NOTE    DATE OF VISIT: 7/9/2021  CLINIC LOCATION: [facility]  MRN: [id number]  PATIENT NAME: Salome Saeed  YOB: 1940    PCP: Surya Dyer DO    REASON FOR VISIT:   Chief Complaint   Patient presents with     RECHECK     parkinson's f/u     SUBJECTIVE:                                                      HISTORY OF PRESENT ILLNESS: Patient is here to follow up regarding parkinsonism. Please refer to my initial note from 4/20/2021 for further information.    Since the last visit, the patient reports no significant changes in her symptoms.  However, she feels that her cervical spinal stenosis is progressing due to perceived increased neck pain radiating down her spine, worsening difficulty with balance (has mild baseline difficulty following her cerebellar stroke in 2013) and also intermittent bilateral upper extremity weakness while walking.  Her symptoms improve after she rests, but recur when she starts walking again.  Needs a cane to walk.  She denies any sensory symptoms.  She denies interval development of new focal neurological symptoms.  Her headaches improved.    Her prior cervical spine MRI from 2019 demonstrated multilevel degenerative changes with most prominent findings at C5-6 where there is severe disc degeneration cause left cord deformity by prominent osteophyte in addition to severe left C5-6 foraminal stenosis.  Mild central stenosis with cord contact at C6-C7 along with severe right and moderate left neuroforaminal stenosis were also noted at that time.    On review of systems, patient endorses no additional active complaints. Medications, allergies, family and social history were also reviewed. There are no changes reported by patient.  REVIEW OF SYSTEMS:                                                    10-system review was completed. Pertinent positives are included in HPI. The remainder of ROS is negative.  EXAM:    "                                                 Physical Exam:   Vitals: /62   Pulse 68   Ht 1.6 m (5' 3\")   Wt 69.4 kg (153 lb)   LMP  (LMP Unknown)   SpO2 99%   BMI 27.10 kg/m      General: pt is in NAD, cooperative.  Skin: normal turgor, moist mucous membranes, no lesions/rashes noticed.  HEENT: ATNC, white sclera, normal conjunctiva.  Respiratory: Symmetric lung excursion, no accessory respiratory muscle use.  Abdomen: Non distended.  Neurological: awake, cooperative, follows commands, no significant changes compared to the initial visit.  ASSESSMENT AND PLAN:                                                    Assessment: 81-year-old female patient with mild parkinsonian features due to suspected Parkinson's disease versus Parkinson's-plus condition presents for follow-up.  I do not see any interval worsening of her mild parkinsonian symptoms.  At the same time I am concerned regarding progression of cervical spinal stenosis based on her worsening symptoms.  I think we should repeat her cervical spine MRI to evaluate for interval worsening and consider neurosurgery evaluation based on results.    Diagnoses:    ICD-10-CM    1. Parkinsonism, unspecified Parkinsonism type (H)  G20    2. Cervical stenosis of spinal canal  M48.02 MR Cervical Spine w/o Contrast     Plan: At today's visit we thoroughly discussed current symptoms, available treatment options, and the plan.  Her parkinsonian symptoms remain very mild, but we decided to repeat her cervical spine MRI due to worsening of her neck pain and intermittent arm weakness.  We also discussed that we might consider referral to neurosurgery based on results.    Next follow-up appointment is in the next 2-4 weeks or earlier if needed.    Total Time: 40 minutes spent on the date of the encounter doing chart review, history and exam, documentation and further activities per the note.    Pj Keene MD  [facility]  (Chart " documentation was completed in part with Dragon voice-recognition software. Even though reviewed, some grammatical, spelling, and word errors may remain.)

## 2021-07-09 NOTE — PATIENT INSTRUCTIONS
AFTER VISIT SUMMARY (AVS):    At today's visit we thoroughly discussed current symptoms, available treatment options, and the plan.  Your parkinsonian symptoms remain very mild, but we decided to repeat your cervical spine MRI due to worsening of your neck pain and intermittent arm weakness.  We also discussed that we might consider referral to neurosurgery based on results.    Next follow-up appointment is in the next 2-4 weeks or earlier if needed.    Please do not hesitate to call me with any questions or concerns.    Thanks.

## 2021-07-09 NOTE — LETTER
7/9/2021         RE: Salome Saeed  [address]  [place] [address]        Dear Colleague,    Thank you for referring your patient, Salome Saeed, to the [facility] NEUROLOGY CLINIC [place]. Please see a copy of my visit note below.    ESTABLISHED PATIENT NEUROLOGY NOTE    DATE OF VISIT: 7/9/2021  CLINIC LOCATION: [facility]  MRN: [id number]  PATIENT NAME: Salome Saeed  YOB: 1940    PCP: Surya Dyer DO    REASON FOR VISIT:   Chief Complaint   Patient presents with     RECHECK     parkinson's f/u     SUBJECTIVE:                                                      HISTORY OF PRESENT ILLNESS: Patient is here to follow up regarding parkinsonism. Please refer to my initial note from 4/20/2021 for further information.    Since the last visit, the patient reports no significant changes in her symptoms.  However, she feels that her cervical spinal stenosis is progressing due to perceived increased neck pain radiating down her spine, worsening difficulty with balance (has mild baseline difficulty following her cerebellar stroke in 2013) and also intermittent bilateral upper extremity weakness while walking.  Her symptoms improve after she rests, but recur when she starts walking again.  Needs a cane to walk.  She denies any sensory symptoms.  She denies interval development of new focal neurological symptoms.  Her headaches improved.    Her prior cervical spine MRI from 2019 demonstrated multilevel degenerative changes with most prominent findings at C5-6 where there is severe disc degeneration cause left cord deformity by prominent osteophyte in addition to severe left C5-6 foraminal stenosis.  Mild central stenosis with cord contact at C6-C7 along with severe right and moderate left neuroforaminal stenosis were also noted at that time.    On review of systems, patient endorses no additional active complaints. Medications, allergies,  "family and social history were also reviewed. There are no changes reported by patient.  REVIEW OF SYSTEMS:                                                    10-system review was completed. Pertinent positives are included in HPI. The remainder of ROS is negative.  EXAM:                                                    Physical Exam:   Vitals: /62   Pulse 68   Ht 1.6 m (5' 3\")   Wt 69.4 kg (153 lb)   LMP  (LMP Unknown)   SpO2 99%   BMI 27.10 kg/m      General: pt is in NAD, cooperative.  Skin: normal turgor, moist mucous membranes, no lesions/rashes noticed.  HEENT: ATNC, white sclera, normal conjunctiva.  Respiratory: Symmetric lung excursion, no accessory respiratory muscle use.  Abdomen: Non distended.  Neurological: awake, cooperative, follows commands, no significant changes compared to the initial visit.  ASSESSMENT AND PLAN:                                                    Assessment: 81-year-old female patient with mild parkinsonian features due to suspected Parkinson's disease versus Parkinson's-plus condition presents for follow-up.  I do not see any interval worsening of her mild parkinsonian symptoms.  At the same time I am concerned regarding progression of cervical spinal stenosis based on her worsening symptoms.  I think we should repeat her cervical spine MRI to evaluate for interval worsening and consider neurosurgery evaluation based on results.    Diagnoses:    ICD-10-CM    1. Parkinsonism, unspecified Parkinsonism type (H)  G20    2. Cervical stenosis of spinal canal  M48.02 MR Cervical Spine w/o Contrast     Plan: At today's visit we thoroughly discussed current symptoms, available treatment options, and the plan.  Her parkinsonian symptoms remain very mild, but we decided to repeat her cervical spine MRI due to worsening of her neck pain and intermittent arm weakness.  We also discussed that we might consider referral to neurosurgery based on results.    Next follow-up " appointment is in the next 2-4 weeks or earlier if needed.    Total Time: 40 minutes spent on the date of the encounter doing chart review, history and exam, documentation and further activities per the note.    Pj Keene MD  [facility] Neurology  (Chart documentation was completed in part with Dragon voice-recognition software. Even though reviewed, some grammatical, spelling, and word errors may remain.)      Again, thank you for allowing me to participate in the care of your patient.        Sincerely,        Pj Keene MD

## 2021-07-12 RX ORDER — LEVOTHYROXINE SODIUM 100 UG/1
TABLET ORAL
Qty: 90 TABLET | Refills: 1 | Status: SHIPPED | OUTPATIENT
Start: 2021-07-12 | End: 2021-12-23

## 2021-07-15 ENCOUNTER — ANTICOAGULATION THERAPY VISIT (OUTPATIENT)
Dept: ANTICOAGULATION | Facility: CLINIC | Age: 81
End: 2021-07-15

## 2021-07-15 ENCOUNTER — LAB (OUTPATIENT)
Dept: LAB | Facility: CLINIC | Age: 81
End: 2021-07-15
Payer: COMMERCIAL

## 2021-07-15 DIAGNOSIS — Z79.01 LONG TERM CURRENT USE OF ANTICOAGULANT THERAPY: Primary | ICD-10-CM

## 2021-07-15 DIAGNOSIS — I63.9 CEREBROVASCULAR ACCIDENT INVOLVING CEREBELLUM (H): ICD-10-CM

## 2021-07-15 DIAGNOSIS — Z79.01 LONG TERM CURRENT USE OF ANTICOAGULANT THERAPY: ICD-10-CM

## 2021-07-15 LAB — INR BLD: 2.1 (ref 0.9–1.1)

## 2021-07-15 PROCEDURE — 85610 PROTHROMBIN TIME: CPT

## 2021-07-15 PROCEDURE — 36416 COLLJ CAPILLARY BLOOD SPEC: CPT

## 2021-07-15 NOTE — PROGRESS NOTES
ANTICOAGULATION MANAGEMENT     Salome Saeed 81 year old female is on warfarin with therapeutic INR result. (Goal INR 2.0-3.0)    Recent labs: (last 7 days)     07/15/21  1420   INR 2.1*       ASSESSMENT     Source(s): Patient/Caregiver Call       Warfarin doses taken: Warfarin taken as instructed    Diet: No new diet changes identified    New illness, injury, or hospitalization: No    Medication/supplement changes: None noted    Signs or symptoms of bleeding or clotting: No    Previous INR: Supratherapeutic    Additional findings: None     PLAN     Recommended plan for no diet, medication or health factor changes affecting INR     Dosing Instructions: Continue your current warfarin dose with next INR in 3 weeks       Summary  As of 7/15/2021    Full warfarin instructions:  5 mg every Mon; 2.5 mg all other days   Next INR check:  8/5/2021             Telephone call with Salome who verbalizes understanding and agrees to plan    Lab visit scheduled    Education provided: Please call back if any changes to your diet, medications or how you've been taking warfarin    Plan made per [facility] anticoagulation protocol    Neel Winn RN  Anticoagulation Clinic  7/15/2021    _______________________________________________________________________     Anticoagulation Episode Summary     Current INR goal:  2.0-3.0   TTR:  79.7 % (1 y)   Target end date:  Indefinite   Send INR reminders to:  [facility]    Indications    Long term current use of anticoagulant therapy [Z79.01]  CVA (cerebral vascular accident) (Resolved) [I63.9]  Cerebrovascular accident involving cerebellum (H) [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         Anticoagulation Care Providers     Provider Role Specialty Phone number    Surya Dyer DO Referring Family Medicine [phone]

## 2021-07-20 ENCOUNTER — TRANSFERRED RECORDS (OUTPATIENT)
Dept: HEALTH INFORMATION MANAGEMENT | Facility: CLINIC | Age: 81
End: 2021-07-20

## 2021-07-30 ENCOUNTER — HOSPITAL ENCOUNTER (OUTPATIENT)
Dept: MRI IMAGING | Facility: CLINIC | Age: 81
Discharge: HOME | End: 2021-07-30
Attending: PSYCHIATRY & NEUROLOGY | Admitting: PSYCHIATRY & NEUROLOGY
Payer: COMMERCIAL

## 2021-07-30 DIAGNOSIS — M48.02 CERVICAL STENOSIS OF SPINAL CANAL: ICD-10-CM

## 2021-07-30 PROCEDURE — 72141 MRI NECK SPINE W/O DYE: CPT

## 2021-08-03 ENCOUNTER — OFFICE VISIT (OUTPATIENT)
Dept: NEUROLOGY | Facility: CLINIC | Age: 81
End: 2021-08-03
Attending: PSYCHIATRY & NEUROLOGY
Payer: COMMERCIAL

## 2021-08-03 VITALS — SYSTOLIC BLOOD PRESSURE: 112 MMHG | OXYGEN SATURATION: 99 % | HEART RATE: 60 BPM | DIASTOLIC BLOOD PRESSURE: 63 MMHG

## 2021-08-03 DIAGNOSIS — M54.2 NECK PAIN: ICD-10-CM

## 2021-08-03 DIAGNOSIS — G20.C PARKINSONISM, UNSPECIFIED PARKINSONISM TYPE (H): ICD-10-CM

## 2021-08-03 DIAGNOSIS — M48.02 CERVICAL STENOSIS OF SPINAL CANAL: Primary | ICD-10-CM

## 2021-08-03 PROCEDURE — 99214 OFFICE O/P EST MOD 30 MIN: CPT | Performed by: PSYCHIATRY & NEUROLOGY

## 2021-08-03 PROCEDURE — G0463 HOSPITAL OUTPT CLINIC VISIT: HCPCS

## 2021-08-03 NOTE — PATIENT INSTRUCTIONS
AFTER VISIT SUMMARY (AVS):    At today's visit we thoroughly discussed current symptoms, cervical spine MRI results, available treatment options, and the plan.    We decided to try physical therapy. Please let me know if you need a referral to massage therapist.    Next follow-up appointment is in the next 3 months (already scheduled on 10/20.2021 at 01:30 PM) or earlier if needed.    Please do not hesitate to call me with any questions or concerns.    Thanks.

## 2021-08-03 NOTE — LETTER
8/3/2021         RE: Salome Saeed  [address]  [place] [address]        Dear Colleague,    Thank you for referring your patient, Salome Saeed, to the [facility] NEUROLOGY CLINIC [place]. Please see a copy of my visit note below.    ESTABLISHED PATIENT NEUROLOGY NOTE    DATE OF VISIT: 8/3/2021  CLINIC LOCATION: [facility]  MRN: [id number]  PATIENT NAME: Salome Saeed  YOB: 1940    PCP: Surya Dyer DO    REASON FOR VISIT:   Chief Complaint   Patient presents with     Results     cervical mri     SUBJECTIVE:                                                      HISTORY OF PRESENT ILLNESS: Patient who is followed for parkinsonism and cervical spinal stenosis is here to follow up regarding cervical spine MRI.  She was last seen on 7/9/2021, when this test was ordered.  Please refer to my initial/other prior notes for further information.  The patient is accompanied by her , who participates in the interview today.    Since the last visit, the patient reports no significant changes in her symptoms.  She denies interval development of new focal neurological symptoms.    Her cervical spine MRI without contrast from 7/30/2021 demonstrated multilevel degenerative changes, most prominent at C5-6 where there is moderate spinal canal stenosis with flattening of the left anterior cervical cord.  In addition, various degree of foraminal stenosis were seen throughout cervical spine.  Images were personally reviewed and independently interpreted.    On review of systems, patient endorses no additional active complaints. Medications, allergies, family and social history were also reviewed. There are no changes reported by patient.  REVIEW OF SYSTEMS:                                                    10-system review was completed. Pertinent positives are included in HPI. The remainder of ROS is negative.  EXAM:                                                     Physical Exam:   Vitals: /63   Pulse 60   LMP  (LMP Unknown)   SpO2 99%     General: pt is in NAD, cooperative.  Skin: normal turgor, moist mucous membranes, no lesions/rashes noticed.  HEENT: ATNC, white sclera, normal conjunctiva.  Respiratory: Symmetric lung excursion, no accessory respiratory muscle use.  Abdomen: Non distended.  Neurological: awake, cooperative, mild hypomimia and hypophonia, mild rigidity with provoking maneuvers, but tone is normal at rest, mild postural bilateral symmetric hand tremor, no resting tremor, slightly stooped forward posture with preserved arm swings but slightly reduced stride length.  ASSESSMENT AND PLAN:                                                    Assessment: 81-year-old female patient with mild parkinsonism and cervical spinal stenosis presents for follow-up of cervical spine MRI for complaints of perceived increased neck pain, worsening difficulty with balance, and intermittent bilateral upper extremity weakness while walking.  We reviewed cervical spine MRI images together.  I do not believe that degree of spinal stenosis at C5-6 level is warranting neurosurgery referral at this time, but she would benefit from physical therapy for upper extremity strengthening and balance training.  She also asked me about massage therapy.  In my opinion, it would be beneficial.  She will check with the massage therapist she wants to see if they require referral from me, which I would be happy to provide if necessary.    Regarding her mild parkinsonism, I will see her back in October, as previously planned.    Diagnoses:    ICD-10-CM    1. Cervical stenosis of spinal canal  M48.02    2. Parkinsonism, unspecified Parkinsonism type (H)  G20      Plan: At today's visit we thoroughly discussed current symptoms, cervical spine MRI results, available treatment options, and the plan.    We decided to try physical therapy.  The patient will let me know if she  needs a referral to massage therapist.    Next follow-up appointment is in the next 3 months (already scheduled on 10/20.2021 at 01:30 PM) or earlier if needed.    Total Time: 32 minutes spent on the date of the encounter doing chart review, history and exam, documentation and further activities per the note.    Pj Keene MD  [facility] Neurology  (Chart documentation was completed in part with Dragon voice-recognition software. Even though reviewed, some grammatical, spelling, and word errors may remain.)      Again, thank you for allowing me to participate in the care of your patient.        Sincerely,        Pj Keene MD

## 2021-08-03 NOTE — PROGRESS NOTES
ESTABLISHED PATIENT NEUROLOGY NOTE    DATE OF VISIT: 8/3/2021  CLINIC LOCATION: [facility]  MRN: [id number]  PATIENT NAME: Salome Saeed  YOB: 1940    PCP: Surya Dyer DO    REASON FOR VISIT:   Chief Complaint   Patient presents with     Results     cervical mri     SUBJECTIVE:                                                      HISTORY OF PRESENT ILLNESS: Patient who is followed for parkinsonism and cervical spinal stenosis is here to follow up regarding cervical spine MRI.  She was last seen on 7/9/2021, when this test was ordered.  Please refer to my initial/other prior notes for further information.  The patient is accompanied by her , who participates in the interview today.    Since the last visit, the patient reports no significant changes in her symptoms.  She denies interval development of new focal neurological symptoms.    Her cervical spine MRI without contrast from 7/30/2021 demonstrated multilevel degenerative changes, most prominent at C5-6 where there is moderate spinal canal stenosis with flattening of the left anterior cervical cord.  In addition, various degree of foraminal stenosis were seen throughout cervical spine.  Images were personally reviewed and independently interpreted.    On review of systems, patient endorses no additional active complaints. Medications, allergies, family and social history were also reviewed. There are no changes reported by patient.  REVIEW OF SYSTEMS:                                                    10-system review was completed. Pertinent positives are included in HPI. The remainder of ROS is negative.  EXAM:                                                    Physical Exam:   Vitals: /63   Pulse 60   LMP  (LMP Unknown)   SpO2 99%     General: pt is in NAD, cooperative.  Skin: normal turgor, moist mucous membranes, no lesions/rashes noticed.  HEENT: ATNC, white sclera, normal  conjunctiva.  Respiratory: Symmetric lung excursion, no accessory respiratory muscle use.  Abdomen: Non distended.  Neurological: awake, cooperative, mild hypomimia and hypophonia, mild rigidity with provoking maneuvers, but tone is normal at rest, mild postural bilateral symmetric hand tremor, no resting tremor, slightly stooped forward posture with preserved arm swings but slightly reduced stride length.  ASSESSMENT AND PLAN:                                                    Assessment: 81-year-old female patient with mild parkinsonism and cervical spinal stenosis presents for follow-up of cervical spine MRI for complaints of perceived increased neck pain, worsening difficulty with balance, and intermittent bilateral upper extremity weakness while walking.  We reviewed cervical spine MRI images together.  I do not believe that degree of spinal stenosis at C5-6 level is warranting neurosurgery referral at this time, but she would benefit from physical therapy for upper extremity strengthening and balance training.  She also asked me about massage therapy.  In my opinion, it would be beneficial.  She will check with the massage therapist she wants to see if they require referral from me, which I would be happy to provide if necessary.    Regarding her mild parkinsonism, I will see her back in October, as previously planned.    Diagnoses:    ICD-10-CM    1. Cervical stenosis of spinal canal  M48.02    2. Parkinsonism, unspecified Parkinsonism type (H)  G20      Plan: At today's visit we thoroughly discussed current symptoms, cervical spine MRI results, available treatment options, and the plan.    We decided to try physical therapy.  The patient will let me know if she needs a referral to massage therapist.    Next follow-up appointment is in the next 3 months (already scheduled on 10/20.2021 at 01:30 PM) or earlier if needed.    Total Time: 32 minutes spent on the date of the encounter doing chart review, history  and exam, documentation and further activities per the note.    Pj Keene MD  [facility] Neurology  (Chart documentation was completed in part with Dragon voice-recognition software. Even though reviewed, some grammatical, spelling, and word errors may remain.)

## 2021-08-05 ENCOUNTER — TELEPHONE (OUTPATIENT)
Dept: FAMILY MEDICINE | Facility: CLINIC | Age: 81
End: 2021-08-05

## 2021-08-05 ENCOUNTER — ANTICOAGULATION THERAPY VISIT (OUTPATIENT)
Dept: ANTICOAGULATION | Facility: CLINIC | Age: 81
End: 2021-08-05

## 2021-08-05 ENCOUNTER — LAB (OUTPATIENT)
Dept: LAB | Facility: CLINIC | Age: 81
End: 2021-08-05
Payer: COMMERCIAL

## 2021-08-05 DIAGNOSIS — I63.9 CEREBROVASCULAR ACCIDENT INVOLVING CEREBELLUM (H): ICD-10-CM

## 2021-08-05 DIAGNOSIS — Z79.01 LONG TERM CURRENT USE OF ANTICOAGULANT THERAPY: Primary | ICD-10-CM

## 2021-08-05 DIAGNOSIS — Z79.01 LONG TERM CURRENT USE OF ANTICOAGULANT THERAPY: ICD-10-CM

## 2021-08-05 LAB — INR BLD: 2 (ref 0.9–1.1)

## 2021-08-05 PROCEDURE — 85610 PROTHROMBIN TIME: CPT

## 2021-08-05 PROCEDURE — 36416 COLLJ CAPILLARY BLOOD SPEC: CPT

## 2021-08-05 NOTE — TELEPHONE ENCOUNTER
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 Phone number [phone]    Please advise on hold/ bridge     Claudine Winn RN   [facility]  [place], [place], [place]

## 2021-08-05 NOTE — LETTER
August 16, 2021      Salome Saeed  [address]  [place] [address]        Last warfarin dose: 8/18/21 8/19/21, NO warfarin    8/20/21, NO warfarin    8/21/21, NO warfarin, begin enoxaparin injections into the abdomen every 12 hours (AM and PM).    8/22/21, NO warfarin, enoxaparin injection into the abdomen every 12 hours (AM and PM).    8/23/21, NO warfarin, enoxaparin injection into the abdomen AM only (no enoxaparin 24 hours prior to surgery)    8/24/21, DAY OF PROCEDURE, NO enoxaparin. Restart warfarin 5mg (1 tabs) in the evening, unless instructed otherwise by the physician.    8/25/21, No enoxaparin, 2.5mg (0.5 tabs) warfarin in the evening.    8/26/21, Restart enoxaparin injection into the abdomen every 12 hours (AM and PM) , unless instructed otherwise by the physician, and 2.5mg (0.5 tabs) warfarin in the evening.     8/27/21, Enoxaparin injection into the abdomen every 12 hours (AM and PM) and 2.5mg (0.5 tabs) warfarin in the evening.    8/28/21, Enoxaparin injection into the abdomen every 12 hours (AM and PM) and 2.5mg (0.5 tabs) warfarin in the evening.    8/29/21, Enoxaparin injection into the abdomen every 12 hours (AM and PM) and 2.5mg (0.5 tabs) warfarin in the evening.    8/30/21, Enoxaparin injection into the abdomen every 12 hours (AM and PM) and 5mg (1 tabs) warfarin in the evening.      8/31/21, Enoxaparin injection into the abdomen every 12 hours (AM and PM) and 2.5mg (0.5 tabs) warfarin in the evening.    9/1/21, Enoxaparin injection into the abdomen every 12 hours (AM and PM) and 2.5mg (0.5 tabs) warfarin in the evening.    9/2/21, Enoxaparin injection into the abdomen in the AM. Recheck INR at the [place] [facility] 2:15 pm for further dosing instructions.       If you have any questions please call the Anticoagulation Clinic at [phone].    Claudine Winn RN   [facility]agulation [facility]  [place], [place], [place]

## 2021-08-05 NOTE — PROGRESS NOTES
ANTICOAGULATION MANAGEMENT     Salome SPRAGUE Darlin 81 year old female is on warfarin with therapeutic INR result. 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 - [facility] [place]     PLAN     Recommended plan for no diet, medication or health factor changes affecting INR     Dosing Instructions: Continue your current warfarin dose with next INR in 4 weeks       Summary  As of 8/5/2021    Full warfarin instructions:  5 mg every Mon; 2.5 mg all other days   Next INR check:  9/2/2021             Telephone call with Salome who verbalizes understanding and agrees to plan    Lab visit scheduled    Education provided: Please call back if any changes to your diet, medications or how you've been taking warfarin    Plan made per [facility] anticoagulation protocol    Neel Winn RN  Anticoagulation Clinic  8/5/2021    _______________________________________________________________________     Anticoagulation Episode Summary     Current INR goal:  2.0-3.0   TTR:  79.7 % (1 y)   Target end date:  Indefinite   Send INR reminders to:  [facility]    Indications    Long term current use of anticoagulant therapy [Z79.01]  CVA (cerebral vascular accident) (Resolved) [I63.9]  Cerebrovascular accident involving cerebellum (H) [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         Anticoagulation Care Providers     Provider Role Specialty Phone number    Surya Dyer DO Referring Family Medicine [phone]

## 2021-08-05 NOTE — Clinical Note
Please print this letter and place at the  for patient to  in clinic today or tomorrow.    Claudine Winn RN   [facility]ticoagulation [facility]  [place], [place], [place]

## 2021-08-09 NOTE — TELEPHONE ENCOUNTER
"RIKI-PROCEDURAL ANTICOAGULATION  MANAGEMENT    ASSESSMENT     Warfarin interruption plan for partial cornea transplant on 8/24.    Indication for Anticoagulation: Stroke (2013)    Risk stratification for thromboembolism: unknown   o CVA felt to be likely an embolic event from the web-like stenosis within the vertebral artery    No current guideline addresses the riki-procedural risk associated with warfarin interruption for the indication of CVA likely due to an embolic source. The decision is risk-benefit assessment. Dose-adjusted bridging for reduced CrCl along with the timing of when to initiate is outline if this is desired.      PLAN       Pre-Procedure:  o Hold warfarin for 5 days, until after procedure starting: Thursday, August 19   o Enoxaparin (Lovenox) 50 mg subq Q 12 hrs (0.75 mg/kg Q 12 hrs for CrCl 30-60 ml/min)   - Start enoxaparin: Saturday, August 21 in AM  - Last dose of enoxaparin prior to procedure: Monday, August 23 in AM (~24 hours prior to procedure)      Post-Procedure:  o Resume warfarin dose if okay with provider doing procedure on night of procedure, Tuesday, August 24 PM: take 5 mg x 1, then resume home dose  o Resume  enoxaparin (Lovenox) ~ 48 hrs post procedure when okay with provider doing procedure. Continue until INR >= 2  o Recheck INR as scheduled on 9/2   ?   Plan routed to referring provider for decision on bridging.     ?   Jazmyn El [facility]    SUBJECTIVE/OBJECTIVE     Salome Saeed, a 81 year old female    Goal INR Range: 2.0-3.0     Patient bridged in past: Yes: 2017 for spine fusion; 2020 for left TKA;       Wt Readings from Last 3 Encounters:   07/09/21 69.4 kg (153 lb)   04/20/21 69.4 kg (153 lb)   04/09/21 70.8 kg (156 lb)      Ideal body weight: 52.4 kg (115 lb 8.3 oz)  Adjusted ideal body weight: 59.2 kg (130 lb 8.2 oz)     Estimated body mass index is 27.1 kg/m  as calculated from the following:    Height as of 7/9/21: 1.6 m (5' 3\").    Weight as of 7/9/21: 69.4 " kg (153 lb).    Lab Results   Component Value Date    INR 2.0 (H) 08/05/2021    INR 2.1 (H) 07/15/2021    INR 3.10 (H) 07/01/2021     Lab Results   Component Value Date    HGB 13.4 02/15/2021    HCT 40.1 02/15/2021     02/15/2021     Lab Results   Component Value Date    CR 0.77 10/14/2020    CR 0.69 05/27/2020    CR 0.66 10/04/2019     CrCl = 55 ml/min

## 2021-08-11 NOTE — TELEPHONE ENCOUNTER
Plan approved with enoxaparin bridge.    Surya Dyer, DO  You 5 minutes ago (9:51 AM)   TONY Velez Jazmyn,     Let's bridge her just to be on the safe side. Thanks!     Surya    Message text    You routed conversation to You 2 days ago   Surya Guy DO 2 days ago   TATIANA Dyer Loreta is scheduled for a partial cornea transplant on 8/24. There is no guideline that addresses the karyn-procedural risk and the decision to bridge. Would you like Loreta to bridge with enoxaparin during the 5-day warfarin interruption?   Jazmyn El, [facility]      Routing comment

## 2021-08-12 ENCOUNTER — OFFICE VISIT (OUTPATIENT)
Dept: FAMILY MEDICINE | Facility: CLINIC | Age: 81
End: 2021-08-12
Payer: COMMERCIAL

## 2021-08-12 VITALS
WEIGHT: 154 LBS | TEMPERATURE: 97.2 F | BODY MASS INDEX: 27.29 KG/M2 | HEART RATE: 58 BPM | DIASTOLIC BLOOD PRESSURE: 60 MMHG | SYSTOLIC BLOOD PRESSURE: 118 MMHG | OXYGEN SATURATION: 98 % | RESPIRATION RATE: 14 BRPM | HEIGHT: 63 IN

## 2021-08-12 DIAGNOSIS — Z01.818 PREOPERATIVE EXAMINATION: Primary | ICD-10-CM

## 2021-08-12 DIAGNOSIS — I25.110 CORONARY ARTERY DISEASE INVOLVING NATIVE CORONARY ARTERY OF NATIVE HEART WITH UNSTABLE ANGINA PECTORIS (H): ICD-10-CM

## 2021-08-12 DIAGNOSIS — I63.9 CEREBROVASCULAR ACCIDENT INVOLVING CEREBELLUM (H): ICD-10-CM

## 2021-08-12 DIAGNOSIS — H18.519 FUCHS' CORNEAL DYSTROPHY: ICD-10-CM

## 2021-08-12 DIAGNOSIS — H18.529 CORNEAL EPITHELIAL AND BASEMENT MEMBRANE DYSTROPHY: ICD-10-CM

## 2021-08-12 DIAGNOSIS — M17.12 OSTEOARTHRITIS OF LEFT KNEE, UNSPECIFIED OSTEOARTHRITIS TYPE: ICD-10-CM

## 2021-08-12 PROCEDURE — 99214 OFFICE O/P EST MOD 30 MIN: CPT | Performed by: FAMILY MEDICINE

## 2021-08-12 PROCEDURE — 93000 ELECTROCARDIOGRAM COMPLETE: CPT | Performed by: FAMILY MEDICINE

## 2021-08-12 ASSESSMENT — MIFFLIN-ST. JEOR: SCORE: 1142.67

## 2021-08-12 NOTE — PROGRESS NOTES
[address] [address]  Phone: [phone]  Primary Provider: Surya Dyer  Pre-op Performing Provider: SURYA DYER      PREOPERATIVE EVALUATION:  Today's date: 8/12/2021    Salome Saeed is a 81 year old female who presents for a preoperative evaluation.    Surgical Information:  Surgery/Procedure: Rt Eye partial cornea transplant   Surgery Location: [facility]    Surgeon: Ashley   Surgery Date: 8/24/21  Time of Surgery: TBD  Where patient plans to recover: At home with family  Fax number for surgical facility: [phone]    Type of Anesthesia Anticipated: to be determined    Assessment & Plan     The proposed surgical procedure is considered INTERMEDIATE risk.    Preoperative examination  - EKG 12-lead complete w/read - Clinics    Fuchs' corneal dystrophy    Corneal epithelial and basement membrane dystrophy    Coronary artery disease involving native coronary artery of native heart with unstable angina pectoris (H)    Cerebrovascular accident involving cerebellum (H)    Osteoarthritis of left knee, unspecified osteoarthritis type  - diclofenac (VOLTAREN) 1 % topical gel; Apply 4 g topically 4 times daily       Risks and Recommendations:  The patient has the following additional risks and recommendations for perioperative complications:   - No identified additional risk factors other than previously addressed    Medication Instructions:   - warfarin: Bridging therapy will be coordinated by Anticoagulation RN and pharmacist    No ibuprofen or ASA 10 days before procedure  - Continue other medications day of surgery.     RECOMMENDATION:  APPROVAL GIVEN to proceed with proposed procedure, without further diagnostic evaluation.        Subjective     HPI related to upcoming procedure: History of visually significant fuchs dystrophy with corneal edema of right eye -- has problems with signs and reading. Planning for partial corneal  transplant    Preop Questions 8/12/2021   1. Have you ever had a heart attack or stroke? YES - CVA, stents   2. Have you ever had surgery on your heart or blood vessels, such as a stent placement, a coronary artery bypass, or surgery on an artery in your head, neck, heart, or legs? YES - stents   3. Do you have chest pain with activity? No   4. Do you have a history of  heart failure? No   5. Do you currently have a cold, bronchitis or symptoms of other infection? No   6. Do you have a cough, shortness of breath, or wheezing? No   7. Do you or anyone in your family have previous history of blood clots? No   8. Do you or does anyone in your family have a serious bleeding problem such as prolonged bleeding following surgeries or cuts? No   9. Have you ever had problems with anemia or been told to take iron pills? No   10. Have you had any abnormal blood loss such as black, tarry or bloody stools, or abnormal vaginal bleeding? No   11. Have you ever had a blood transfusion? No   12. Are you willing to have a blood transfusion if it is medically needed before, during, or after your surgery? Yes   13. Have you or any of your relatives ever had problems with anesthesia? No   14. Do you have sleep apnea, excessive snoring or daytime drowsiness? No   15. Do you have any artifical heart valves or other implanted medical devices like a pacemaker, defibrillator, or continuous glucose monitor? No   16. Do you have artificial joints? YES - left knee   17. Are you allergic to latex? No       Health Care Directive:  Patient does not have a Health Care Directive or Living Will: Advance Directive received and scanned. Click on Code in the patient header to view.    Preoperative Review of :   reviewed - no record of controlled substances prescribed.      Status of Chronic Conditions:  See problem list for active medical problems.  Problems all longstanding and stable, except as noted/documented.  See ROS for pertinent symptoms  related to these conditions.      Review of Systems  CONSTITUTIONAL: NEGATIVE for fever, chills, change in weight  INTEGUMENTARY/SKIN: NEGATIVE for worrisome rashes, moles or lesions  EYES: NEGATIVE for vision changes or irritation  ENT/MOUTH: NEGATIVE for ear, mouth and throat problems  RESP: NEGATIVE for significant cough or SOB  CV: NEGATIVE for chest pain, palpitations or peripheral edema  GI: NEGATIVE for nausea, abdominal pain, heartburn, or change in bowel habits  : NEGATIVE for frequency, dysuria, or hematuria  MUSCULOSKELETAL: NEGATIVE for significant arthralgias or myalgia  NEURO: NEGATIVE for weakness, dizziness or paresthesias  ENDOCRINE: NEGATIVE for temperature intolerance, skin/hair changes  HEME: NEGATIVE for bleeding problems  PSYCHIATRIC: NEGATIVE for changes in mood or affect    Patient Active Problem List    Diagnosis Date Noted     Hyperlipidemia LDL goal <70 04/01/2010     Priority: High     Coronary artery disease involving native coronary artery of native heart with unstable angina pectoris (H) 04/09/2009     Priority: High     5/28/2015 - PTCA and 2.25x8mm Promus PREMIER NAILA to ostium of small second OM  12/2012 cath - 40-50% stenosis in mid PDA, 80% on D1- medically treat  4/8/2009: PTCA and two 2.5x15mm Xience stents to mid LAD lesion        Benign essential hypertension      Priority: High     Primary osteoarthritis of left knee 05/22/2020     Priority: Medium     Added automatically from request for surgery [id number]       WILSON (dyspnea on exertion) 10/09/2018     Priority: Medium     Chronic bilateral low back pain without sciatica 06/21/2017     Priority: Medium     Status post arthrodesis 06/21/2017     Priority: Medium     Cerebrovascular accident involving cerebellum (H) 10/26/2016     Priority: Medium     Vertebral artery stenosis, right      Priority: Medium     Hypothyroidism, unspecified type 05/18/2016     Priority: Medium     Long term current use of anticoagulant therapy  01/11/2016     Priority: Medium     GERD (gastroesophageal reflux disease)      Priority: Medium     Mitral regurgitation      Priority: Medium     1+ per 7/2013 Echo       Anxiety 08/08/2013     Priority: Medium     Health Care Home 07/23/2013     Priority: Medium     EMERGENCY CARE PLAN  Presenting Problem Signs and Symptoms Treatment Plan    Questions or conerns during clinic hours    I will call the clinic directly     Questions or conerns outside clinic hours    I will call the 24 hour nurse line at [phone]    Patient needs to schedule an appointment    I will call the 24 hour scheduling team at [phone] or clinic directly    Same day treatment     I will call the clinic first, nurse line if after hours, urgent care and express care if needed                          Clinic Care Coordinator:Apolinar Green, [phone]  **Pt not in active care coordination at this time.       Chest discomfort 07/19/2013     Priority: Medium     Imo Update utility       Spinal stenosis, lumbar region, with neurogenic claudication 05/08/2013     Priority: Medium     Lumbago 09/07/2012     Priority: Medium     DDD (degenerative disc disease), lumbar 07/09/2012     Priority: Medium     Lumbar spinal stenosis 07/09/2012     Priority: Medium     Advanced directives, counseling/discussion 06/18/2012     Priority: Medium     Discussed advance care planning with patient; however, patient declined at this time. 6/18/2012           Past Medical History:   Diagnosis Date     CAD (coronary artery disease) 04/09/2009 4/8/2009: PTCA and two 2.5x15mm Xience stents to mid LAD lesion; Cath 12/2012- 40-50% stenosis in mid PDA, 80% on D1- medically treat , 5/28/2015 - PTCA and 2.25x8mm Promus PREMIIER NAILA to ostium of 2nd OM     Cerebral artery occlusion with cerebral infarction 2012     CVA (cerebral infarction)      DDD (degenerative disc disease)      Essential hypertension, benign      GERD (gastroesophageal reflux disease)       Hyperlipidemia      Hypothyroidism      Lumbar spinal stenosis      Mitral regurgitation     1+ per 7/2013 Echo     Vertebral artery stenosis, right      Past Surgical History:   Procedure Laterality Date     ARTHROPLASTY KNEE Left 6/1/2020    Procedure: Left total knee arthroplasty (Ward and Nephew #5 narrow femur; #3 tibia; 9 mm tibial poly and 35 mm patella);  Surgeon: Jorge Luis Cheatham MD;  Location: [facility] OR     CORONARY ANGIOGRAPHY ADULT ORDER  12/6/2012    40-50% stenosis to mid PDA, 80% in D1- medically treat     CORONARY ANGIOGRAPHY ADULT ORDER  5/28/2015    PTCA and 2.25x8mm Promus PREMIER NAILA to ostium of 2nd OM     DECOMPRESSION, FUSION LUMBAR POSTERIOR THREE + LEVELS, COMBINED  5/8/2013    Procedure: COMBINED DECOMPRESSION, FUSION LUMBAR POSTERIOR THREE + LEVELS;  Posterior Lumbar Decompression L3-S1, Fusion L4-S1;  Surgeon: Daniel Harris MD;  Location: [facility] OR     ENDOSCOPIC STRIPPING VEIN(S)       HEART CATH, ANGIOPLASTY  4/8/2009    PTCA and two 2.5x15mm Xience stents to mid LAD lesion     HYSTERECTOMY, RICKIE      Fibroids, and Menorrhagia.. Bilateral Oophrectomy     ORTHOPEDIC SURGERY       Stenting of LAD, Angioplasty  4/8/09     TONSILLECTOMY      as a child     Current Outpatient Medications   Medication Sig Dispense Refill     amLODIPine (NORVASC) 5 MG tablet Take 1 tablet (5 mg) by mouth daily 90 tablet 2     ascorbic acid (VITAMIN C) 500 MG tablet Take 1,000 mg by mouth daily        aspirin EC 81 MG tablet Take 1 tablet (81 mg) by mouth daily       atenolol (TENORMIN) 25 MG tablet Take 1 tablet (25 mg) by mouth 2 times daily 180 tablet 2     Calcium Carb-Cholecalciferol (CALCIUM 600 + D PO) Take 1,200 mg by mouth daily        Cholecalciferol (VITAMIN D) 2000 UNITS tablet Take 2,000 Units by mouth daily       clonazePAM (KLONOPIN) 0.5 MG tablet Take 0.5 tablets (0.25 mg) by mouth 2 times daily as needed for anxiety 30 tablet 0     Coenzyme Q10 (COQ-10) 200 MG CAPS Take 400 mg by mouth daily         diclofenac (VOLTAREN) 1 % topical gel Place 4 g onto the skin 4 times daily 1 Tube 4     levothyroxine (SYNTHROID/LEVOTHROID) 100 MCG tablet TAKE 1 TABLET DAILY 90 tablet 1     lisinopril (ZESTRIL) 20 MG tablet Take 1 tablet (20 mg) by mouth daily 90 tablet 2     Multiple Vitamin (DAILY MULTIVITAMIN PO) Take by mouth daily       nitroGLYcerin (NITROSTAT) 0.4 MG sublingual tablet Place 1 tablet (0.4 mg) under the tongue every 5 minutes as needed for chest pain 25 tablet 2     prednisoLONE acetate (PRED FORTE) 1 % ophthalmic suspension        rosuvastatin (CRESTOR) 40 MG tablet Take 1 tablet (40 mg) by mouth daily 90 tablet 2     senna-docusate (SENOKOT-S/PERICOLACE) 8.6-50 MG tablet Take 1 tablet by mouth 2 times daily as needed for constipation 20 tablet 0     vitamin B complex with vitamin C (VITAMIN  B COMPLEX) TABS tablet Take 1 tablet by mouth daily       warfarin ANTICOAGULANT (COUMADIN) 5 MG tablet Current dose (21): 5 mg every Mon, Wed, Fri; 2.5 mg all other days or as directed. 65 tablet 1       Allergies   Allergen Reactions     Atorvastatin      Leg cramps     Cats Itching     Gluten      Sinuses affected by gluten     Oat      Shellfish Allergy      hives     Wheat         Social History     Tobacco Use     Smoking status: Former Smoker     Packs/day: 0.10     Quit date: 1965     Years since quittin.6     Smokeless tobacco: Never Used   Substance Use Topics     Alcohol use: Yes     Alcohol/week: 0.0 standard drinks     Comment: 2 glasses wine per month, maybe     Family History   Problem Relation Age of Onset     Neurologic Disorder Mother         Dementia, Still living     Ovarian Cancer Mother      Thyroid Disease Mother      C.A.D. Father              Diabetes Father      Coronary Artery Disease Father      Alcohol/Drug Brother      Thyroid Disease Son      Diabetes Son      History   Drug Use No         Objective     LMP  (LMP Unknown)     Physical Exam    GENERAL APPEARANCE:  healthy, alert and no distress     EYES: EOMI, PERRL     HENT: ear canals and TM's normal and nose and mouth without ulcers or lesions     NECK: no adenopathy, no asymmetry, masses, or scars and thyroid normal to palpation     RESP: lungs clear to auscultation - no rales, rhonchi or wheezes     CV: regular rates and rhythm, normal S1 S2, no S3 or S4 and no murmur, click or rub     MS: extremities normal- no gross deformities noted, no evidence of inflammation in joints, FROM in all extremities.     SKIN: no suspicious lesions or rashes     NEURO: Normal strength and tone, sensory exam grossly normal, mentation intact and speech normal     PSYCH: mentation appears normal. and affect normal/bright     LYMPHATICS: No cervical adenopathy    Recent Labs   Lab Test 08/05/21  1411 07/15/21  1420 03/08/21  1137 02/15/21  1208 11/02/20  1332 10/14/20  0926 06/12/20  1255 06/09/20  1236 06/02/20  1150 06/01/20  1147 05/27/20  1534   HGB  --   --   --  13.4  --   --   --  9.5*  --    < > 12.2   PLT  --   --   --  212  --   --   --   --  182  --  227   INR 2.0* 2.1*   < >  --    < >  --    < >  --   --    < >  --    NA  --   --   --   --   --  133  --   --   --   --  132*   POTASSIUM  --   --   --   --   --  4.0  --   --   --   --  4.2   CR  --   --   --   --   --  0.77  --   --   --   --  0.69    < > = values in this interval not displayed.        Diagnostics:  No results found for this or any previous visit (from the past 168 hour(s)).   EKG: sinus bradycardia, normal axis, normal intervals, no acute ST/T changes c/w ischemia, no LVH by voltage criteria    Revised Cardiac Risk Index (RCRI):  The patient has the following serious cardiovascular risks for perioperative complications:   - Coronary Artery Disease (MI, positive stress test, angina, Qs on EKG) = 1 point   - Cerebrovascular Disease (TIA or CVA) = 1 point     RCRI Interpretation: 2 points: Class III (moderate risk - 6.6% complication rate)     Estimated Functional  Capacity: Performs 4 METS exercise without symptoms (e.g., light housework, stairs, 4 mph walk, 7 mph bike, slow step dance)           Signed Electronically by: Surya Dyer DO  Copy of this evaluation report is provided to requesting physician.

## 2021-08-13 ENCOUNTER — TELEPHONE (OUTPATIENT)
Dept: FAMILY MEDICINE | Facility: CLINIC | Age: 81
End: 2021-08-13
Payer: COMMERCIAL

## 2021-08-13 NOTE — TELEPHONE ENCOUNTER
Patient unclear if she should go off the warafin prior to her surgery ( eye surgery on 8/24).    Please call patient and let her know if she should go off and if so on what date.      Aga Amato

## 2021-08-13 NOTE — TELEPHONE ENCOUNTER
She will be going off warfarin prior to her procedure. Should stop 5 days beforehand (on 8/19). Donna RN should be calling her with instructions. If she doesn't hear by Monday or Tuesday, let us know.     Surya Dyer, DO  8/13/2021 2:53 PM

## 2021-08-13 NOTE — TELEPHONE ENCOUNTER
Will call patient to review below on Monday. Lovenox rx pended, will send to pharmacy of choice.     Last warfarin dose: 8/18/21 8/19/21, NO warfarin  8/20/21, NO warfarin  8/21/21, NO warfarin, begin enoxaparin injections into the abdomen every 12 hours (AM and PM).  8/22/21, NO warfarin, enoxaparin injection into the abdomen every 12 hours (AM and PM).  8/23/21, NO warfarin, enoxaparin injection into the abdomen AM only (no enoxaparin 24 hours prior to surgery)  8/24/21, DAY OF PROCEDURE, NO enoxaparin. Restart warfarin 5mg (1 tabs) in the evening, unless instructed otherwise by the physician.  8/25/21, No enoxaparin, 2.5mg (0.5 tabs) warfarin in the evening.  8/26/21, Restart enoxaparin injection into the abdomen every 12 hours (AM and PM) , unless instructed otherwise by the physician, and 2.5mg (0.5 tabs) warfarin in the evening.   8/27/21, Enoxaparin injection into the abdomen every 12 hours (AM and PM) and 2.5mg (0.5 tabs) warfarin in the evening.  8/28/21, Enoxaparin injection into the abdomen every 12 hours (AM and PM) and 2.5mg (0.5 tabs) warfarin in the evening.  8/29/21, Enoxaparin injection into the abdomen every 12 hours (AM and PM) and 2.5mg (0.5 tabs) warfarin in the evening.  8/30/21, Enoxaparin injection into the abdomen every 12 hours (AM and PM) and 5mg (1 tabs) warfarin in the evening.  8/31/21, Enoxaparin injection into the abdomen every 12 hours (AM and PM) and 2.5mg (0.5 tabs) warfarin in the evening.  9/1/21, Enoxaparin injection into the abdomen every 12 hours (AM and PM) and 2.5mg (0.5 tabs) warfarin in the evening.  9/2/21, Enoxaparin injection into the abdomen in the AM. Recheck INR at the [place] [facility] 2:15 pm for further dosing instructions.   If you have any questions please call the Anticoagulation Clinic at [phone].    Claudine Winn RN   [facility]  [place], [place], [place]

## 2021-08-16 NOTE — TELEPHONE ENCOUNTER
Spoke with patient. Reviewed instructions and ordered lovenox to pharmacy. I have typed a letter with below instructions and she will  from [facility] clinic today or tomorrow. I have sent it to  to print and place at  for her.     Claudine Winn RN   [facility]  [place], [place], [place]

## 2021-08-16 NOTE — TELEPHONE ENCOUNTER
Per INR nurse - printed bridging sheet and walked up to  for      Rossy Herring RN, BSN  [facility]

## 2021-08-16 NOTE — TELEPHONE ENCOUNTER
See 8/5/21 TE. Bridge plan already discussed with patient.     Claudine Winn RN   [facility]  [place], [place], [place]

## 2021-08-26 ENCOUNTER — TRANSFERRED RECORDS (OUTPATIENT)
Dept: HEALTH INFORMATION MANAGEMENT | Facility: CLINIC | Age: 81
End: 2021-08-26

## 2021-08-27 ENCOUNTER — NURSE TRIAGE (OUTPATIENT)
Dept: NURSING | Facility: CLINIC | Age: 81
End: 2021-08-27

## 2021-08-27 NOTE — TELEPHONE ENCOUNTER
Taking lovenox and has more questions.   Eye surgery 8/24/21 and was taking prior to surgery.   Patient has specific questions about taking Lovenox.  Patient is followed by [facility]  [phone] per clinic RN not the correct number.   [phone] was tried and on hold with them now to speak to a nurse.   Messaged anticoagulation Neel DELACRUZ and transferred the caller to the [facility] team to assist.     Qi Smith RN on 8/27/2021 at 12:01 PM            Reason for Disposition    [1] Caller requesting NON-URGENT health information AND [2] PCP's office is the best resource    Protocols used: INFORMATION ONLY CALL - NO TRIAGE-A-

## 2021-08-27 NOTE — TELEPHONE ENCOUNTER
Spoke with Loreta and reviewed bridge instructions and rationale. She did not understand why she needed to bridge so long. Explained that warfarin takes a week to build back up in system which is why she needs lovenox during this period. Patient/ parent verbalized understanding and agrees with plan.      Claudine Winn RN   [facility]  [place], [place], [place], [facility]

## 2021-09-01 ENCOUNTER — TELEPHONE (OUTPATIENT)
Dept: FAMILY MEDICINE | Facility: CLINIC | Age: 81
End: 2021-09-01

## 2021-09-01 ENCOUNTER — LAB (OUTPATIENT)
Dept: LAB | Facility: CLINIC | Age: 81
End: 2021-09-01

## 2021-09-01 ENCOUNTER — VIRTUAL VISIT (OUTPATIENT)
Dept: FAMILY MEDICINE | Facility: CLINIC | Age: 81
End: 2021-09-01
Payer: COMMERCIAL

## 2021-09-01 DIAGNOSIS — R35.0 URINARY FREQUENCY: ICD-10-CM

## 2021-09-01 DIAGNOSIS — R35.0 URINARY FREQUENCY: Primary | ICD-10-CM

## 2021-09-01 LAB
ALBUMIN UR-MCNC: ABNORMAL MG/DL
AMORPH CRY #/AREA URNS HPF: ABNORMAL /HPF
APPEARANCE UR: CLEAR
BACTERIA #/AREA URNS HPF: ABNORMAL /HPF
BILIRUB UR QL STRIP: NEGATIVE
COLOR UR AUTO: YELLOW
GLUCOSE UR STRIP-MCNC: NEGATIVE MG/DL
HGB UR QL STRIP: NEGATIVE
KETONES UR STRIP-MCNC: ABNORMAL MG/DL
LEUKOCYTE ESTERASE UR QL STRIP: ABNORMAL
NITRATE UR QL: NEGATIVE
PH UR STRIP: 6.5 [PH] (ref 5–7)
RBC #/AREA URNS AUTO: ABNORMAL /HPF
SP GR UR STRIP: 1.02 (ref 1–1.03)
SQUAMOUS #/AREA URNS AUTO: ABNORMAL /LPF
UROBILINOGEN UR STRIP-ACNC: 0.2 E.U./DL
WBC #/AREA URNS AUTO: ABNORMAL /HPF

## 2021-09-01 PROCEDURE — 81001 URINALYSIS AUTO W/SCOPE: CPT

## 2021-09-01 PROCEDURE — 87086 URINE CULTURE/COLONY COUNT: CPT

## 2021-09-01 PROCEDURE — 99441 PR PHYSICIAN TELEPHONE EVALUATION 5-10 MIN: CPT | Mod: 95 | Performed by: NURSE PRACTITIONER

## 2021-09-01 RX ORDER — CEFDINIR 300 MG/1
300 CAPSULE ORAL 2 TIMES DAILY
Qty: 14 CAPSULE | Refills: 0 | Status: SHIPPED | OUTPATIENT
Start: 2021-09-01 | End: 2021-09-08

## 2021-09-01 NOTE — TELEPHONE ENCOUNTER
Called # below     Advised on the information below     appt made     Rossy Herring RN, BSN  [facility]

## 2021-09-01 NOTE — TELEPHONE ENCOUNTER
Pt transferred to  triage for possible UTI. She would like to come in for an urine test today.     She reports frequency, nausea, and vaginal irritation. Pt has had maybe 1-2 UTI's in the past.    Adv that we would have to have some kind of appointment.     I scheduled her a VIRT telephone visit at 5 pm w/ Bella Srinivasan, but wondering if provider would order a urine to have her come in before the appointment.     We would need to call the patient to let her know to come in if approved by provider.   #[phone]    preston'd up urine    Lisa MEJIA RN

## 2021-09-01 NOTE — TELEPHONE ENCOUNTER
Order for urine.     Please schedule lab urine appt today asap virtual with me today at 5.       Bella Srinivasan, FNP-BC

## 2021-09-01 NOTE — PROGRESS NOTES
"Loreta is a 81 year old who is being evaluated via a billable telephone visit.      What phone number would you like to be contacted at? [phone]    How would you like to obtain your AVS? Mail a copy    Assessment & Plan     Urinary frequency  Urine and symptoms consistent with UTI.   Antibiotics-good probiotics encouraged while on antibiotics.   If symptoms worsen on antibiotics I would encourage her to be seen for further assessment and care.   Antibiotics can increase INR close watch.   Urine culture pending.   Written education provided.   Salome verbalizes understanding of plan of care and is in agreement.    - cefdinir (OMNICEF) 300 MG capsule  Dispense: 14 capsule; Refill: 0      BMI:   Estimated body mass index is 27.28 kg/m  as calculated from the following:    Height as of 8/12/21: 1.6 m (5' 3\").    Weight as of 8/12/21: 69.9 kg (154 lb).       Return in about 1 week (around 9/8/2021), or if symptoms worsen or fail to improve, for Recheck.    PEDRO [facility]   Loreta is a 81 year old who presents for the following health issues     HPI     Genitourinary - Female  Onset/Duration: few weeks off and on  Description:   Painful urination (Dysuria): YES- irritation           Frequency: YES- a lot  Blood in urine (Hematuria): no  Delay in urine (Hesitency): no  Intensity: mild  Progression of Symptoms:  waxing and waning  Accompanying Signs & Symptoms:  Fever/chills: no  Flank pain: YES- back always hurts cannot tell if worse  Nausea and vomiting: YES- nausea - is getting Lovenox injections - 2 left  Vaginal symptoms: irritated  Abdominal/Pelvic Pain: YES- was a few days ago - no longer  History:   History of frequent UTI s: no  History of kidney stones: no  Sexually Active: no  Possibility of pregnancy: No  Precipitating or alleviating factors: None  Therapies tried and outcome:  none       Review of Systems   Constitutional, HEENT, cardiovascular, " pulmonary, GI, , musculoskeletal, neuro, skin, endocrine and psych systems are negative, except as otherwise noted in the HPI.      Objective         Vitals:  No vitals were obtained today due to virtual visit.    Physical Exam   healthy, alert and no distress  PSYCH: Alert and oriented times 3; coherent speech, normal   rate and volume, able to articulate logical thoughts, able   to abstract reason, no tangential thoughts, no hallucinations   or delusions  Her affect is normal and pleasant  RESP: No cough, no audible wheezing, able to talk in full sentences  Remainder of exam unable to be completed due to telephone visits    Results for orders placed or performed in visit on 09/01/21   UA with Microscopic reflex to Culture - lab collect     Status: Abnormal    Specimen: Urine, Midstream   Result Value Ref Range    Color Urine Yellow Colorless, Straw, Light Yellow, Yellow    Appearance Urine Clear Clear    Glucose Urine Negative Negative mg/dL    Bilirubin Urine Negative Negative    Ketones Urine Trace (A) Negative mg/dL    Specific Gravity Urine 1.020 1.003 - 1.035    Blood Urine Negative Negative    pH Urine 6.5 5.0 - 7.0    Protein Albumin Urine Trace (A) Negative mg/dL    Urobilinogen Urine 0.2 0.2, 1.0 E.U./dL    Nitrite Urine Negative Negative    Leukocyte Esterase Urine Trace (A) Negative   Urine Microscopic     Status: Abnormal   Result Value Ref Range    Bacteria Urine Few (A) None Seen /HPF    RBC Urine 0-2 0-2 /HPF /HPF    WBC Urine 5-10 (A) 0-5 /HPF /HPF    Squamous Epithelials Urine Moderate (A) None Seen /LPF    Amorphous Crystals Urine Few (A) None Seen /HPF    Narrative    Urine Culture not indicated   Urine Culture Aerobic Bacterial - lab collect     Status: None    Specimen: Urine, Midstream   Result Value Ref Range    Culture 10,000-50,000 CFU/mL Mixture of urogenital anthony            Phone call duration: 5 minutes 57 seconds

## 2021-09-01 NOTE — RESULT ENCOUNTER NOTE
Results discussed directly with patient while patient was present. Any further details documented in the note.   KIANA Zhou CNP

## 2021-09-02 ENCOUNTER — ANTICOAGULATION THERAPY VISIT (OUTPATIENT)
Dept: ANTICOAGULATION | Facility: CLINIC | Age: 81
End: 2021-09-02

## 2021-09-02 ENCOUNTER — TRANSFERRED RECORDS (OUTPATIENT)
Dept: HEALTH INFORMATION MANAGEMENT | Facility: CLINIC | Age: 81
End: 2021-09-02

## 2021-09-02 ENCOUNTER — LAB (OUTPATIENT)
Dept: LAB | Facility: CLINIC | Age: 81
End: 2021-09-02
Payer: COMMERCIAL

## 2021-09-02 DIAGNOSIS — Z79.01 LONG TERM CURRENT USE OF ANTICOAGULANT THERAPY: ICD-10-CM

## 2021-09-02 DIAGNOSIS — I63.9 CEREBROVASCULAR ACCIDENT INVOLVING CEREBELLUM (H): ICD-10-CM

## 2021-09-02 DIAGNOSIS — Z79.01 LONG TERM CURRENT USE OF ANTICOAGULANT THERAPY: Primary | ICD-10-CM

## 2021-09-02 LAB
BACTERIA UR CULT: NORMAL
INR BLD: 1.4 (ref 0.9–1.1)

## 2021-09-02 PROCEDURE — 36416 COLLJ CAPILLARY BLOOD SPEC: CPT

## 2021-09-02 PROCEDURE — 85610 PROTHROMBIN TIME: CPT

## 2021-09-02 NOTE — PROGRESS NOTES
ANTICOAGULATION MANAGEMENT     Salome Saeed 81 year old female is on warfarin with subtherapeutic INR result. (Goal INR 2.0-3.0)    Recent labs: (last 7 days)     09/02/21  1408   INR 1.4*       ASSESSMENT     Source(s): Chart Review and Patient/Caregiver Call       Warfarin doses taken: Warfarin recently held for eye procedure which may be affecting INR. She did restart warfarin day of procedure and has been taking as advised.     Diet: No new diet changes identified    New illness, injury, or hospitalization: No    Medication/supplement changes: UTI and started cefdinir yesterday    Signs or symptoms of bleeding or clotting: No    Previous INR: Therapeutic last 2(+) visits    Additional findings: Bridging with Enoxaparin until INR >= 2.0 per bridge plan. Needs a refill of lovenox today.     PLAN     Recommended plan for temporary change(s) affecting INR     Dosing Instructions: Booster dose then Increase your warfarin dose (12.5% change)  Continue bridging with Enoxaparin with next INR in 5 days       Summary  As of 9/2/2021    Full warfarin instructions:  9/2: 5 mg; Otherwise 5 mg every Mon, Fri; 2.5 mg all other days   Next INR check:  9/7/2021             Telephone call with Salome who verbalizes understanding and agrees to plan    Lab visit scheduled    Education provided: Please call back if any changes to your diet, medications or how you've been taking warfarin    Plan made per [facility] anticoagulation protocol    Neel Winn RN  Anticoagulation Clinic  9/2/2021    _______________________________________________________________________     Anticoagulation Episode Summary     Current INR goal:  2.0-3.0   TTR:  72.1 % (1 y)   Target end date:  Indefinite   Send INR reminders to:  [facility]    Indications    Long term current use of anticoagulant therapy [Z79.01]  Cerebrovascular accident involving cerebellum (H) [I63.9]           Comments:  Mail AVS to patient while managing  telephonically.         Anticoagulation Care Providers     Provider Role Specialty Phone number    Surya Dyer DO Referring Family Medicine [phone]

## 2021-09-03 NOTE — RESULT ENCOUNTER NOTE
Note to Staff: please call the patient to explain results and to check on current symptoms.  Also send a result note if they would like that.     -Urine culture is abnormal but it looks like a contaminated, non clean-catch sample.  Please have her finish antibiotics at this point.  If new, worsening, or persistent symptoms occur then you should call or return for a recheck.      Bella Srinivasan, FNP-BC

## 2021-09-07 ENCOUNTER — LAB (OUTPATIENT)
Dept: LAB | Facility: CLINIC | Age: 81
End: 2021-09-07
Payer: COMMERCIAL

## 2021-09-07 ENCOUNTER — ANTICOAGULATION THERAPY VISIT (OUTPATIENT)
Dept: ANTICOAGULATION | Facility: CLINIC | Age: 81
End: 2021-09-07

## 2021-09-07 DIAGNOSIS — Z79.01 LONG TERM CURRENT USE OF ANTICOAGULANT THERAPY: Primary | ICD-10-CM

## 2021-09-07 DIAGNOSIS — Z79.01 LONG TERM CURRENT USE OF ANTICOAGULANT THERAPY: ICD-10-CM

## 2021-09-07 DIAGNOSIS — I63.9 CEREBROVASCULAR ACCIDENT INVOLVING CEREBELLUM (H): ICD-10-CM

## 2021-09-07 LAB — INR BLD: 2 (ref 0.9–1.1)

## 2021-09-07 PROCEDURE — 85610 PROTHROMBIN TIME: CPT

## 2021-09-07 NOTE — PROGRESS NOTES
ANTICOAGULATION MANAGEMENT     Salome Saeed 81 year old female is on warfarin with therapeutic INR result. 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 May increase risk of bleeding, but not expected to affect INR    Signs or symptoms of bleeding or clotting: Yes: bruising and two small hematomas noted on abdomen    Previous INR: Subtherapeutic    Additional findings: Bridging with Enoxaparin until INR >= 2.0 per Jazmyn El RPH procedural plan (8/5/21)     PLAN     Recommended plan for temporary change(s) affecting INR     Dosing Instructions: Continue your current warfarin dose and stop Lovenox today with next INR in 1 week       Summary  As of 9/7/2021    Full warfarin instructions:  5 mg every Mon, Fri; 2.5 mg all other days   Next INR check:  9/14/2021             Telephone call with Salome who verbalizes understanding and agrees to plan    Lab visit scheduled    Education provided: Please call back if any changes to your diet, medications or how you've been taking warfarin, Monitoring for bleeding signs and symptoms, Monitoring for clotting signs and symptoms and Importance of notifying clinic for changes in medications; a sooner lab recheck maybe needed.    Plan made per [facility] anticoagulation protocol    Aysha Tilley RN  Anticoagulation Clinic  9/7/2021    _______________________________________________________________________     Anticoagulation Episode Summary     Current INR goal:  2.0-3.0   TTR:  70.7 % (1 y)   Target end date:  Indefinite   Send INR reminders to:  [facility]     Indications    Long term current use of anticoagulant therapy [Z79.01]  Cerebrovascular accident involving cerebellum (H) [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         Anticoagulation Care Providers     Provider Role Specialty Phone number    Surya Dyer DO Referring Family Medicine [phone]

## 2021-09-09 RX ORDER — WARFARIN SODIUM 5 MG/1
TABLET ORAL
Qty: 60 TABLET | Refills: 1 | Status: SHIPPED | OUTPATIENT
Start: 2021-09-09 | End: 2022-02-01

## 2021-09-09 NOTE — CONFIDENTIAL NOTE
Warfarin refill approved per [facility] protocol. Previous INR stable at 2.0 on 9/7/21. Aysha Tilley, NICKYN, RN

## 2021-09-14 ENCOUNTER — LAB (OUTPATIENT)
Dept: LAB | Facility: CLINIC | Age: 81
End: 2021-09-14
Payer: COMMERCIAL

## 2021-09-14 ENCOUNTER — TELEPHONE (OUTPATIENT)
Dept: FAMILY MEDICINE | Facility: CLINIC | Age: 81
End: 2021-09-14

## 2021-09-14 ENCOUNTER — OFFICE VISIT (OUTPATIENT)
Dept: FAMILY MEDICINE | Facility: CLINIC | Age: 81
End: 2021-09-14
Payer: COMMERCIAL

## 2021-09-14 ENCOUNTER — ANTICOAGULATION THERAPY VISIT (OUTPATIENT)
Dept: ANTICOAGULATION | Facility: CLINIC | Age: 81
End: 2021-09-14

## 2021-09-14 VITALS
OXYGEN SATURATION: 98 % | SYSTOLIC BLOOD PRESSURE: 128 MMHG | TEMPERATURE: 97.2 F | WEIGHT: 152.25 LBS | HEIGHT: 63 IN | RESPIRATION RATE: 16 BRPM | BODY MASS INDEX: 26.98 KG/M2 | DIASTOLIC BLOOD PRESSURE: 80 MMHG | HEART RATE: 69 BPM

## 2021-09-14 DIAGNOSIS — I63.9 CEREBROVASCULAR ACCIDENT INVOLVING CEREBELLUM (H): ICD-10-CM

## 2021-09-14 DIAGNOSIS — R42 LIGHTHEADEDNESS: ICD-10-CM

## 2021-09-14 DIAGNOSIS — E03.9 HYPOTHYROIDISM, UNSPECIFIED TYPE: ICD-10-CM

## 2021-09-14 DIAGNOSIS — F41.9 ANXIETY: ICD-10-CM

## 2021-09-14 DIAGNOSIS — Z79.01 LONG TERM CURRENT USE OF ANTICOAGULANT THERAPY: Primary | ICD-10-CM

## 2021-09-14 DIAGNOSIS — Z79.01 LONG TERM CURRENT USE OF ANTICOAGULANT THERAPY: ICD-10-CM

## 2021-09-14 DIAGNOSIS — R11.0 NAUSEA: Primary | ICD-10-CM

## 2021-09-14 LAB
ERYTHROCYTE [DISTWIDTH] IN BLOOD BY AUTOMATED COUNT: 13.3 % (ref 10–15)
HCT VFR BLD AUTO: 40.1 % (ref 35–47)
HGB BLD-MCNC: 13.5 G/DL (ref 11.7–15.7)
INR BLD: 3.5 (ref 0.9–1.1)
MCH RBC QN AUTO: 28.8 PG (ref 26.5–33)
MCHC RBC AUTO-ENTMCNC: 33.7 G/DL (ref 31.5–36.5)
MCV RBC AUTO: 86 FL (ref 78–100)
PLATELET # BLD AUTO: 239 10E3/UL (ref 150–450)
RBC # BLD AUTO: 4.68 10E6/UL (ref 3.8–5.2)
WBC # BLD AUTO: 5.4 10E3/UL (ref 4–11)

## 2021-09-14 PROCEDURE — 85027 COMPLETE CBC AUTOMATED: CPT | Performed by: FAMILY MEDICINE

## 2021-09-14 PROCEDURE — 84443 ASSAY THYROID STIM HORMONE: CPT | Performed by: FAMILY MEDICINE

## 2021-09-14 PROCEDURE — 99214 OFFICE O/P EST MOD 30 MIN: CPT | Performed by: FAMILY MEDICINE

## 2021-09-14 PROCEDURE — 36416 COLLJ CAPILLARY BLOOD SPEC: CPT

## 2021-09-14 PROCEDURE — 36415 COLL VENOUS BLD VENIPUNCTURE: CPT | Performed by: FAMILY MEDICINE

## 2021-09-14 PROCEDURE — 85610 PROTHROMBIN TIME: CPT

## 2021-09-14 PROCEDURE — 80053 COMPREHEN METABOLIC PANEL: CPT | Performed by: FAMILY MEDICINE

## 2021-09-14 RX ORDER — ESCITALOPRAM OXALATE 10 MG/1
10 TABLET ORAL DAILY
Qty: 30 TABLET | Refills: 0 | Status: SHIPPED | OUTPATIENT
Start: 2021-09-14 | End: 2021-10-14

## 2021-09-14 RX ORDER — CLONAZEPAM 0.5 MG/1
0.25 TABLET ORAL 2 TIMES DAILY PRN
Qty: 30 TABLET | Refills: 0 | Status: SHIPPED | OUTPATIENT
Start: 2021-09-14 | End: 2021-12-14

## 2021-09-14 RX ORDER — ONDANSETRON 4 MG/1
4 TABLET, ORALLY DISINTEGRATING ORAL EVERY 8 HOURS PRN
Qty: 20 TABLET | Refills: 0 | Status: SHIPPED | OUTPATIENT
Start: 2021-09-14 | End: 2021-12-14

## 2021-09-14 ASSESSMENT — MIFFLIN-ST. JEOR: SCORE: 1126.79

## 2021-09-14 NOTE — PROGRESS NOTES
"    Assessment & Plan     Anxiety: Anxiety has been worse.  I do not know if this is contributing to her nausea, however we discussed treating anyways.  She was previously on Lexapro for quite a long time but this past spring it was discontinued as she thought it might be contributing to nightmares.  She states the nightmares have gotten much much better since starting melatonin to help her sleep.  We will try restarting Lexapro with as needed clonazepam.  I will follow-up with her in 4 weeks to see how she is doing.  - clonazePAM (KLONOPIN) 0.5 MG tablet; Take 0.5 tablets (0.25 mg) by mouth 2 times daily as needed for anxiety  - escitalopram (LEXAPRO) 10 MG tablet; Take 1 tablet (10 mg) by mouth daily    Nausea: Unclear etiology for intermittent nausea.  Okay to treat with Zofran.  Continue to push fluids.  We discussed multiple possible causes including anxiety, gastroesophageal reflux.  We could consider trial of PPI to see if this helps but will start with just treating underlying anxiety.   - ondansetron (ZOFRAN-ODT) 4 MG ODT tab; Take 1 tablet (4 mg) by mouth every 8 hours as needed for nausea    Lightheadedness: Ongoing issue.  Will check labs to rule out anemia, metabolic abnormalities, and abnormal thyroid function.  - Comprehensive metabolic panel (BMP + Alb, Alk Phos, ALT, AST, Total. Bili, TP); Future  - CBC with platelets; Future  - Comprehensive metabolic panel (BMP + Alb, Alk Phos, ALT, AST, Total. Bili, TP)  - CBC with platelets    Hypothyroidism, unspecified type  - TSH with free T4 reflex; Future  - TSH with free T4 reflex             BMI:   Estimated body mass index is 27.4 kg/m  as calculated from the following:    Height as of this encounter: 1.588 m (5' 2.5\").    Weight as of this encounter: 69.1 kg (152 lb 4 oz).           Return in about 4 weeks (around 10/12/2021) for follow up lightheadedness/nausea/anxiety.    Surya Dyer DO  [facility]    Efe Kan " "is a 81 year old who presents for the following health issues     HPI     Dizziness  Onset/Duration: weeks  Description:   Do you feel faint: no- just light headed   Does it feel like the surroundings (bed, room) are moving: no  Unsteady/off balance: YES- balance is bad - neurologist scheduled her for Physical therapy for it.   Have you passed out or fallen: no  Intensity: mild, moderate  Progression of Symptoms: same  Accompanying Signs & Symptoms:  Heart palpitations or chest pain: no  Nausea, vomiting: YES- nausea- no vomiting yet   Weakness or lack of coordination in arms or legs: YES- feels wobbly   Vision or speech changes: no  Numbness or tingling: no  Ringing in ears (Tinnitus): YES- once in a while   Hearing Loss: no  History:   Head trauma/concussion history: yes fell months ago  Previous similar symptoms: no  Recent bleeding history: no  Any new medications (BP?): no  Precipitating factors:   Worse with activity: no  Worse with head movement: YES  Alleviating factors:   Does staying in a fixed position give relief: YES  Therapies tried and outcome: None    She states that initially started when she started Lovenox.  However she has been off Lovenox for the past week and is back on Coumadin.  She is feeling a lot of anxiety and feels like there is a \"shakiness on the inside\".  Her appetite has not been very good and nausea comes and goes.  She denies any abdominal pain.  She does have some acid reflux which has been going on for longer than the nausea.  Typically she will treat this with Tums.  She states she is forcing herself to drink lots of fluids; she is drinking 4-5 tall glasses of water per day plus tea as well.  She has not noticed any weakness or numbness or tingling in her extremities.  She does feel off balance but is planning to schedule physical therapy for this.    Had virtual visit for urinary symptoms -- treated with Omnicef and symtoms have resolved.       Review of Systems " "  Constitutional, HEENT, cardiovascular, pulmonary, gi and gu systems are negative, except as otherwise noted.      Objective    /80   Pulse 69   Temp 97.2  F (36.2  C)   Resp 16   Ht 1.588 m (5' 2.5\")   Wt 69.1 kg (152 lb 4 oz)   LMP  (LMP Unknown)   SpO2 98%   BMI 27.40 kg/m    Body mass index is 27.4 kg/m .  Physical Exam   GENERAL: healthy, alert and no distress  NECK: no adenopathy, no asymmetry, masses, or scars and thyroid normal to palpation  RESP: lungs clear to auscultation - no rales, rhonchi or wheezes  CV: regular rate and rhythm, normal S1 S2, no S3 or S4, no murmur, click or rub, no peripheral edema and peripheral pulses strong  MS: no gross musculoskeletal defects noted, no edema  NEURO: Normal strength and tone, mentation intact and speech normal          "

## 2021-09-14 NOTE — PATIENT INSTRUCTIONS
For physical therapy, call [phone] to schedule [facility] at all locations

## 2021-09-14 NOTE — TELEPHONE ENCOUNTER
Pt calling asking to be seen today as she is having ongoing lightheadedness and nausea - since her eye surgery in august - she would like to be seen around the time of her INR     Pt stated that since she started the Lovenox she has had these symptoms but she did stop the med last week and the symptoms are still present       RN advised that MD TONY has a 3 pm today     Patient stated an understanding and agreed with plan.    Rossy Herring RN, BSN  [facility]

## 2021-09-14 NOTE — PROGRESS NOTES
ANTICOAGULATION MANAGEMENT     Salome Saeed 81 year old female is on warfarin with supratherapeutic INR result. (Goal INR 2.0-3.0)    Recent labs: (last 7 days)     09/14/21  1500   INR 3.5*       ASSESSMENT     Source(s): Chart Review and Patient/Caregiver Call       Warfarin doses taken: Warfarin taken as instructed    Diet: No new diet changes identified    New illness, injury, or hospitalization: No    Medication/supplement changes: Has been using tylenol quite a bit recently for headaches    Signs or symptoms of bleeding or clotting: No    Previous INR: Therapeutic last visit; previously outside of goal range    Additional findings: None     PLAN     Recommended plan for temporary change(s) affecting INR     Dosing Instructions: Hold dose then continue your current warfarin dose with next INR in 2 weeks       Summary  As of 9/14/2021    Full warfarin instructions:  9/14: Hold; Otherwise 5 mg every Mon, Fri; 2.5 mg all other days   Next INR check:  9/28/2021             Telephone call with Salome who verbalizes understanding and agrees to plan    Lab visit scheduled    Education provided: Please call back if any changes to your diet, medications or how you've been taking warfarin and Potential interaction between warfarin and Tylenol    Plan made per [facility] anticoagulation protocol    Neel Winn RN  Anticoagulation Clinic  9/14/2021    _______________________________________________________________________     Anticoagulation Episode Summary     Current INR goal:  2.0-3.0   TTR:  70.1 % (1 y)   Target end date:  Indefinite   Send INR reminders to:  [facility]    Indications    Long term current use of anticoagulant therapy [Z79.01]  Cerebrovascular accident involving cerebellum (H) [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         Anticoagulation Care Providers     Provider Role Specialty Phone number    Surya Dyer DO Referring Family  Medicine [phone]

## 2021-09-14 NOTE — LETTER
September 17, 2021      Salome I Darlin  [address]  [place] [address]        Dear ,    We are writing to inform you of your test results.       -Normal red blood cell (hgb) levels, normal white blood cell count and normal platelet levels.  -Liver and gallbladder tests (ALT,AST, Alk phos,bilirubin) are normal.  -Kidney function (GFR) is normal.  -Sodium is decreased.  ADVISE: rechecking this in 1 month.  -Potassium is normal.  -Calcium is normal.  -Glucose (diabetic screening test) is normal.  -TSH (thyroid stimulating hormone) level is normal which indicates appropriate thyroid replacement dosing.  ADVISE: continuing same replacement dose and rechecking this in 12 months.  For additional lab test information, labtestsonline.org is an excellent reference.     Please contact me if you have any questions.     Thank you very much for choosing [facility].            Resulted Orders   Comprehensive metabolic panel (BMP + Alb, Alk Phos, ALT, AST, Total. Bili, TP)   Result Value Ref Range    Sodium 128 (L) 133 - 144 mmol/L    Potassium 4.7 3.4 - 5.3 mmol/L    Chloride 96 94 - 109 mmol/L    Carbon Dioxide (CO2) 28 20 - 32 mmol/L    Anion Gap 4 3 - 14 mmol/L    Urea Nitrogen 13 7 - 30 mg/dL    Creatinine 0.75 0.52 - 1.04 mg/dL    Calcium 9.1 8.5 - 10.1 mg/dL    Glucose 101 (H) 70 - 99 mg/dL    Alkaline Phosphatase 72 40 - 150 U/L    AST 35 0 - 45 U/L    ALT 46 0 - 50 U/L    Protein Total 7.5 6.8 - 8.8 g/dL    Albumin 3.6 3.4 - 5.0 g/dL    Bilirubin Total 0.3 0.2 - 1.3 mg/dL    GFR Estimate 76 >60 mL/min/1.73m2      Comment:      As of July 11, 2021, eGFR is calculated by the CKD-EPI creatinine equation, without race adjustment. eGFR can be influenced by muscle mass, exercise, and diet. The reported eGFR is an estimation only and is only applicable if the renal function is stable.   CBC with platelets   Result Value Ref Range    WBC Count 5.4 4.0 - 11.0 10e3/uL    RBC  Count 4.68 3.80 - 5.20 10e6/uL    Hemoglobin 13.5 11.7 - 15.7 g/dL    Hematocrit 40.1 35.0 - 47.0 %    MCV 86 78 - 100 fL    MCH 28.8 26.5 - 33.0 pg    MCHC 33.7 31.5 - 36.5 g/dL    RDW 13.3 10.0 - 15.0 %    Platelet Count 239 150 - 450 10e3/uL   TSH with free T4 reflex   Result Value Ref Range    TSH 2.28 0.40 - 4.00 mU/L       If you have any questions or concerns, please call the clinic at the number listed above.       Sincerely,      Surya Dyer, DO

## 2021-09-15 LAB
ALBUMIN SERPL-MCNC: 3.6 G/DL (ref 3.4–5)
ALP SERPL-CCNC: 72 U/L (ref 40–150)
ALT SERPL W P-5'-P-CCNC: 46 U/L (ref 0–50)
ANION GAP SERPL CALCULATED.3IONS-SCNC: 4 MMOL/L (ref 3–14)
AST SERPL W P-5'-P-CCNC: 35 U/L (ref 0–45)
BILIRUB SERPL-MCNC: 0.3 MG/DL (ref 0.2–1.3)
BUN SERPL-MCNC: 13 MG/DL (ref 7–30)
CALCIUM SERPL-MCNC: 9.1 MG/DL (ref 8.5–10.1)
CHLORIDE BLD-SCNC: 96 MMOL/L (ref 94–109)
CO2 SERPL-SCNC: 28 MMOL/L (ref 20–32)
CREAT SERPL-MCNC: 0.75 MG/DL (ref 0.52–1.04)
GFR SERPL CREATININE-BSD FRML MDRD: 76 ML/MIN/1.73M2
GLUCOSE BLD-MCNC: 101 MG/DL (ref 70–99)
POTASSIUM BLD-SCNC: 4.7 MMOL/L (ref 3.4–5.3)
PROT SERPL-MCNC: 7.5 G/DL (ref 6.8–8.8)
SODIUM SERPL-SCNC: 128 MMOL/L (ref 133–144)
TSH SERPL DL<=0.005 MIU/L-ACNC: 2.28 MU/L (ref 0.4–4)

## 2021-09-17 DIAGNOSIS — E87.1 HYPONATREMIA: Primary | ICD-10-CM

## 2021-09-28 ENCOUNTER — LAB (OUTPATIENT)
Dept: LAB | Facility: CLINIC | Age: 81
End: 2021-09-28
Payer: COMMERCIAL

## 2021-09-28 ENCOUNTER — ANTICOAGULATION THERAPY VISIT (OUTPATIENT)
Dept: ANTICOAGULATION | Facility: CLINIC | Age: 81
End: 2021-09-28

## 2021-09-28 DIAGNOSIS — I63.9 CEREBROVASCULAR ACCIDENT INVOLVING CEREBELLUM (H): ICD-10-CM

## 2021-09-28 DIAGNOSIS — Z79.01 LONG TERM CURRENT USE OF ANTICOAGULANT THERAPY: Primary | ICD-10-CM

## 2021-09-28 DIAGNOSIS — Z79.01 LONG TERM CURRENT USE OF ANTICOAGULANT THERAPY: ICD-10-CM

## 2021-09-28 LAB — INR BLD: 3.8 (ref 0.9–1.1)

## 2021-09-28 PROCEDURE — 85610 PROTHROMBIN TIME: CPT

## 2021-09-28 PROCEDURE — 36416 COLLJ CAPILLARY BLOOD SPEC: CPT

## 2021-09-28 NOTE — PROGRESS NOTES
ANTICOAGULATION MANAGEMENT     Salome Saeed 81 year old female is on warfarin with supratherapeutic INR result. (Goal INR 2.0-3.0)    Recent labs: (last 7 days)     09/28/21  1322   INR 3.8*       ASSESSMENT     Source(s): Chart Review and Patient/Caregiver Call       Warfarin doses taken: Warfarin taken as instructed    Diet: No new diet changes identified    New illness, injury, or hospitalization: No    Medication/supplement changes: Escitalopram started on 9/14/21 which has potential for interaction; increasing INR - Concurrent use of ESCITALOPRAM and ANTICOAGULANTS may result in an increased risk of bleeding.    Signs or symptoms of bleeding or clotting: No    Previous INR: Supratherapeutic    Additional findings: None     PLAN     Recommended plan for ongoing change(s) affecting INR     Dosing Instructions: Hold dose then Decrease your warfarin dose (11.1% change) with next INR in 2 weeks       Summary  As of 9/28/2021    Full warfarin instructions:  9/28: Hold; Otherwise 5 mg every Mon; 2.5 mg all other days   Next INR check:  10/12/2021             Telephone call with Salome who verbalizes understanding and agrees to plan    Lab visit scheduled    Education provided: Please call back if any changes to your diet, medications or how you've been taking warfarin    Plan made per [facility] anticoagulation protocol    Neel Winn RN  Anticoagulation Clinic  9/28/2021    _______________________________________________________________________     Anticoagulation Episode Summary     Current INR goal:  2.0-3.0   TTR:  66.3 % (1 y)   Target end date:  Indefinite   Send INR reminders to:  [facility]    Indications    Long term current use of anticoagulant therapy [Z79.01]  Cerebrovascular accident involving cerebellum (H) [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         Anticoagulation Care Providers     Provider Role Specialty Phone number    Surya Dyer  E, DO Referring [facility] [place] [phone]

## 2021-09-29 NOTE — TELEPHONE ENCOUNTER
[facility] Call Center    Phone Message    May a detailed message be left on voicemail: yes     Reason for Call: Other: patient would like a call back, has some questions for Rachel Murphy. Patient did not specify regarding the questions.       Action Taken: Message routed to:  Other: KELSEY Larson     Travel Screening: Not Applicable                                                                       Quality 431: Preventive Care And Screening: Unhealthy Alcohol Use - Screening: Patient not identified as an unhealthy alcohol user when screened for unhealthy alcohol use using a systematic screening method Quality 226: Preventive Care And Screening: Tobacco Use: Screening And Cessation Intervention: Patient screened for tobacco use and is an ex/non-smoker Quality 130: Documentation Of Current Medications In The Medical Record: Current Medications Documented Detail Level: Detailed

## 2021-09-30 ENCOUNTER — TRANSFERRED RECORDS (OUTPATIENT)
Dept: HEALTH INFORMATION MANAGEMENT | Facility: CLINIC | Age: 81
End: 2021-09-30

## 2021-10-12 ENCOUNTER — LAB (OUTPATIENT)
Dept: LAB | Facility: CLINIC | Age: 81
End: 2021-10-12
Payer: COMMERCIAL

## 2021-10-12 ENCOUNTER — ANTICOAGULATION THERAPY VISIT (OUTPATIENT)
Dept: ANTICOAGULATION | Facility: CLINIC | Age: 81
End: 2021-10-12

## 2021-10-12 DIAGNOSIS — Z79.01 LONG TERM CURRENT USE OF ANTICOAGULANT THERAPY: ICD-10-CM

## 2021-10-12 DIAGNOSIS — Z79.01 LONG TERM CURRENT USE OF ANTICOAGULANT THERAPY: Primary | ICD-10-CM

## 2021-10-12 DIAGNOSIS — I63.9 CEREBROVASCULAR ACCIDENT INVOLVING CEREBELLUM (H): ICD-10-CM

## 2021-10-12 LAB — INR BLD: 3.2 (ref 0.9–1.1)

## 2021-10-12 PROCEDURE — 36416 COLLJ CAPILLARY BLOOD SPEC: CPT

## 2021-10-12 PROCEDURE — 85610 PROTHROMBIN TIME: CPT

## 2021-10-12 NOTE — PROGRESS NOTES
ANTICOAGULATION MANAGEMENT     Salome Saeed 81 year old female is on warfarin with supratherapeutic INR result. (Goal INR 2.0-3.0)    Recent labs: (last 7 days)     10/12/21  1315   INR 3.2*       ASSESSMENT     Source(s): Patient/Caregiver Call       Warfarin doses taken: Warfarin taken as instructed    Diet: No new diet changes identified    New illness, injury, or hospitalization: No    Medication/supplement changes: None noted    Signs or symptoms of bleeding or clotting: No    Previous INR: Supratherapeutic    Additional findings: None     PLAN     Recommended plan for no diet, medication or health factor changes affecting INR     Dosing Instructions:  Decrease your warfarin dose (10% change) with next INR in 2 weeks       Summary  As of 10/12/2021    Full warfarin instructions:  2.5 mg every day   Next INR check:  10/26/2021             Telephone call with Salome who verbalizes understanding and agrees to plan    Lab visit scheduled    Education provided: Goal range and significance of current result, Monitoring for bleeding signs and symptoms and Monitoring for clotting signs and symptoms    Plan made per [facility] anticoagulation protocol    Amy Singh RN  Anticoagulation Clinic  10/12/2021    _______________________________________________________________________     Anticoagulation Episode Summary     Current INR goal:  2.0-3.0   TTR:  62.4 % (1 y)   Target end date:  Indefinite   Send INR reminders to:  [facility]    Indications    Long term current use of anticoagulant therapy [Z79.01]  Cerebrovascular accident involving cerebellum (H) [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         Anticoagulation Care Providers     Provider Role Specialty Phone number    Surya Dyer DO Referring Family Medicine [phone]

## 2021-10-12 NOTE — PROGRESS NOTES
Anticoagulation Management    Unable to reach Gerru today.    Today's INR result of 3.2 is supratherapeutic (goal INR of 2.0-3.0).  Result received from: Clinic Lab    Follow up required to confirm warfarin dose taken and assess for changes    No instructions provided. Unable to leave voicemail.      Anticoagulation clinic to follow up    Amy Singh RN

## 2021-10-14 ENCOUNTER — OFFICE VISIT (OUTPATIENT)
Dept: FAMILY MEDICINE | Facility: CLINIC | Age: 81
End: 2021-10-14
Payer: COMMERCIAL

## 2021-10-14 VITALS
RESPIRATION RATE: 16 BRPM | HEIGHT: 61 IN | DIASTOLIC BLOOD PRESSURE: 58 MMHG | BODY MASS INDEX: 27.94 KG/M2 | HEART RATE: 71 BPM | OXYGEN SATURATION: 99 % | SYSTOLIC BLOOD PRESSURE: 98 MMHG | WEIGHT: 148 LBS | TEMPERATURE: 97.1 F

## 2021-10-14 DIAGNOSIS — F41.9 ANXIETY: ICD-10-CM

## 2021-10-14 PROCEDURE — 99213 OFFICE O/P EST LOW 20 MIN: CPT | Performed by: FAMILY MEDICINE

## 2021-10-14 RX ORDER — ESCITALOPRAM OXALATE 10 MG/1
5 TABLET ORAL DAILY
Qty: 30 TABLET | Refills: 0 | COMMUNITY
Start: 2021-10-14 | End: 2022-04-01

## 2021-10-14 RX ORDER — GATIFLOXACIN 5 MG/ML
1 SOLUTION/ DROPS OPHTHALMIC DAILY
COMMUNITY
Start: 2021-09-15 | End: 2024-05-30

## 2021-10-14 RX ORDER — KETOROLAC TROMETHAMINE 5 MG/ML
1 SOLUTION OPHTHALMIC DAILY
COMMUNITY
Start: 2021-07-23 | End: 2024-05-30

## 2021-10-14 ASSESSMENT — MIFFLIN-ST. JEOR: SCORE: 1075.76

## 2021-10-14 NOTE — PROGRESS NOTES
"    Assessment & Plan     Anxiety: Explained that the Lexapro is the medication that should be taken daily.  She had previous prescription for 5 mg tablets and she would prefer to start with a lower dose which I think is reasonable.  We will start with 5 mg daily for the next month.  Okay to use clonazepam as needed.  Follow-up in 1 month to see how she is doing.  - escitalopram (LEXAPRO) 10 MG tablet; Take 0.5 tablets (5 mg) by mouth daily       BMI:   Estimated body mass index is 28.43 kg/m  as calculated from the following:    Height as of this encounter: 1.537 m (5' 0.5\").    Weight as of this encounter: 67.1 kg (148 lb).           Return in about 1 month (around 11/14/2021) for follow up anxiety.    Surya Dyer [facility] SAMIRA Kan is a 81 year old who presents for the following health issues     HPI     Concern - Follow up visit : Since her last visit feels like anxiety is under better control.  She did have episode of panic attack when realizing family was going to be going out of town which made her nervous she has not been taking the Lexapro on a regular basis but instead has been using clonazepam as a controller medication.  Episodes of lightheadedness still occur occasionally but not as frequently and same as nausea.          Review of Systems   Constitutional, HEENT, cardiovascular, pulmonary, gi and gu systems are negative, except as otherwise noted.      Objective    BP 98/58   Pulse 71   Temp 97.1  F (36.2  C) (Tympanic)   Resp 16   Ht 1.537 m (5' 0.5\")   Wt 67.1 kg (148 lb)   LMP  (LMP Unknown)   SpO2 99%   BMI 28.43 kg/m    Body mass index is 28.43 kg/m .  Physical Exam   GENERAL: healthy, alert and no distress  PSYCH: mentation appears normal, affect normal/bright            "

## 2021-10-14 NOTE — PATIENT INSTRUCTIONS
For anxiety:    Lexapro (escitalopram) -- this it the controller medication to help keep anxiety down. Start taking 5 mg by mouth every day. You can take one of the 5 mg tablets or 1/2 of the 10 mg tablets.    Clonazepam -- this is the ONLY AS NEEDED medication. If you have worsening episode of anxiety or a panic attack, take 1/2 tablet (0.25 mg) by mouth.

## 2021-10-20 ENCOUNTER — OFFICE VISIT (OUTPATIENT)
Dept: NEUROLOGY | Facility: CLINIC | Age: 81
End: 2021-10-20
Attending: PSYCHIATRY & NEUROLOGY
Payer: COMMERCIAL

## 2021-10-20 VITALS
WEIGHT: 148.6 LBS | OXYGEN SATURATION: 97 % | HEIGHT: 63 IN | SYSTOLIC BLOOD PRESSURE: 108 MMHG | HEART RATE: 59 BPM | BODY MASS INDEX: 26.33 KG/M2 | DIASTOLIC BLOOD PRESSURE: 64 MMHG

## 2021-10-20 DIAGNOSIS — M48.02 CERVICAL STENOSIS OF SPINAL CANAL: ICD-10-CM

## 2021-10-20 DIAGNOSIS — G20.C PARKINSONISM, UNSPECIFIED PARKINSONISM TYPE (H): Primary | ICD-10-CM

## 2021-10-20 PROCEDURE — G0463 HOSPITAL OUTPT CLINIC VISIT: HCPCS

## 2021-10-20 PROCEDURE — 99214 OFFICE O/P EST MOD 30 MIN: CPT | Performed by: PSYCHIATRY & NEUROLOGY

## 2021-10-20 ASSESSMENT — MIFFLIN-ST. JEOR: SCORE: 1118.18

## 2021-10-20 NOTE — PROGRESS NOTES
"TABLISHED PATIENT NEUROLOGY NOTE    DATE OF VISIT: 10/20/2021  CLINIC LOCATION: [facility]  MRN: [id number]  PATIENT NAME: Salome Saeed  YOB: 1940    PCP: Surya Dyer DO    REASON FOR VISIT:   Chief Complaint   Patient presents with     Parkinson     6 month follow up      SUBJECTIVE:                                                      HISTORY OF PRESENT ILLNESS: Patient is here to follow up regarding parkinsonism and cervical spinal stenosis.  The last visit was on 8/3/2021.  At that time physical therapy referral was made. Please refer to my initial/other prior notes for further information.      Since the last visit, the patient reports that her symptoms are stable.  She did not do physical therapy because she had eye surgery, but now is ready for it.  She started 5 mg of melatonin at bedtime and does not act out in sleep anymore.  She is pretty pleased with the effect.  She also does not see any nightmares any longer.  She denies interval development of new focal neurological symptoms.    On review of systems, patient endorses increased anxiety, no additional active complaints. Medications, allergies, family and social history were also reviewed. There are no changes reported by patient.  REVIEW OF SYSTEMS:                                                    10-system review was completed. Pertinent positives are included in HPI. The remainder of ROS is negative.  EXAM:                                                    Physical Exam:   Vitals: /64 (BP Location: Left arm, Patient Position: Sitting, Cuff Size: Adult Regular)   Pulse 59   Ht 1.6 m (5' 3\")   Wt 67.4 kg (148 lb 9.6 oz)   LMP  (LMP Unknown)   SpO2 97%   BMI 26.32 kg/m      General: pt is in NAD, cooperative.  Skin: normal turgor, moist mucous membranes, no lesions/rashes noticed.  HEENT: ATNC, white sclera, normal conjunctiva.  Respiratory: Symmetric lung excursion, no accessory " respiratory muscle use.  Abdomen: Non distended.  Neurological: awake, cooperative, follows commands, no exam changes compared to the last visit.  ASSESSMENT AND PLAN:                                                    Assessment: 81-year-old female patient with mild parkinsonism and cervical spinal stenosis presents for follow-up.  Her parkinsonian features remains mild with differential including Parkinson's disease versus Parkinson plus condition.  We discussed trial of Sinemet and Big and Loud program if symptoms worsen in the future.  I advised the patient to contact physical therapy to start her treatment for neck pain (we will help her with the phone number).    Diagnoses:    ICD-10-CM    1. Parkinsonism, unspecified Parkinsonism type (H)  G20    2. Cervical stenosis of spinal canal  M48.02      Plan: At today's visit we thoroughly discussed current symptoms, available treatment options, and the plan.  I am pleased to see that her parkinsonian symptoms remain stable.    We decided not to start any new medications.  Advised the patient to call PT office to start physical therapy for her neck symptoms.    Next follow-up appointment is in the next 6 months or earlier if needed.    Total Time: 31 minutes spent on the date of the encounter doing chart review, history and exam, documentation and further activities per the note.    Pj Keene MD  [facility]  (Chart documentation was completed in part with Dragon voice-recognition software. Even though reviewed, some grammatical, spelling, and word errors may remain.)

## 2021-10-20 NOTE — LETTER
"    10/20/2021         RE: Salome Saeed  [address]  [place] [address]        Dear Colleague,    Thank you for referring your patient, Salome Saeed, to the [facility] [place]. Please see a copy of my visit note below.    TABLISHED PATIENT NEUROLOGY NOTE    DATE OF VISIT: 10/20/2021  CLINIC LOCATION: [facility]  MRN: [id number]  PATIENT NAME: Slaome Saeed  YOB: 1940    PCP: Surya Dyer DO    REASON FOR VISIT:   Chief Complaint   Patient presents with     Parkinson     6 month follow up      SUBJECTIVE:                                                      HISTORY OF PRESENT ILLNESS: Patient is here to follow up regarding parkinsonism and cervical spinal stenosis.  The last visit was on 8/3/2021.  At that time physical therapy referral was made. Please refer to my initial/other prior notes for further information.      Since the last visit, the patient reports that her symptoms are stable.  She did not do physical therapy because she had eye surgery, but now is ready for it.  She started 5 mg of melatonin at bedtime and does not act out in sleep anymore.  She is pretty pleased with the effect.  She also does not see any nightmares any longer.  She denies interval development of new focal neurological symptoms.    On review of systems, patient endorses increased anxiety, no additional active complaints. Medications, allergies, family and social history were also reviewed. There are no changes reported by patient.  REVIEW OF SYSTEMS:                                                    10-system review was completed. Pertinent positives are included in HPI. The remainder of ROS is negative.  EXAM:                                                    Physical Exam:   Vitals: /64 (BP Location: Left arm, Patient Position: Sitting, Cuff Size: Adult Regular)   Pulse 59   Ht 1.6 m (5' 3\")   Wt 67.4 kg (148 lb 9.6 oz)   " LMP  (LMP Unknown)   SpO2 97%   BMI 26.32 kg/m      General: pt is in NAD, cooperative.  Skin: normal turgor, moist mucous membranes, no lesions/rashes noticed.  HEENT: ATNC, white sclera, normal conjunctiva.  Respiratory: Symmetric lung excursion, no accessory respiratory muscle use.  Abdomen: Non distended.  Neurological: awake, cooperative, follows commands, no exam changes compared to the last visit.  ASSESSMENT AND PLAN:                                                    Assessment: 81-year-old female patient with mild parkinsonism and cervical spinal stenosis presents for follow-up.  Her parkinsonian features remains mild with differential including Parkinson's disease versus Parkinson plus condition.  We discussed trial of Sinemet and Big and Loud program if symptoms worsen in the future.  I advised the patient to contact physical therapy to start her treatment for neck pain (we will help her with the phone number).    Diagnoses:    ICD-10-CM    1. Parkinsonism, unspecified Parkinsonism type (H)  G20    2. Cervical stenosis of spinal canal  M48.02      Plan: At today's visit we thoroughly discussed current symptoms, available treatment options, and the plan.  I am pleased to see that her parkinsonian symptoms remain stable.    We decided not to start any new medications.  Advised the patient to call PT office to start physical therapy for her neck symptoms.    Next follow-up appointment is in the next 6 months or earlier if needed.    Total Time: 31 minutes spent on the date of the encounter doing chart review, history and exam, documentation and further activities per the note.    Pj Keene MD  [facility]  (Chart documentation was completed in part with Dragon voice-recognition software. Even though reviewed, some grammatical, spelling, and word errors may remain.)      Again, thank you for allowing me to participate in the care of your patient.         Sincerely,        Pj Keene MD

## 2021-10-20 NOTE — PATIENT INSTRUCTIONS
AFTER VISIT SUMMARY (AVS):    At today's visit we thoroughly discussed current symptoms, available treatment options, and the plan.  I am pleased to see that your symptoms remain stable.    We decided not to start any new medication.  Please call physical therapy as previously advised.    Next follow-up appointment is in the next 6 months or earlier if needed.    Please do not hesitate to call me with any questions or concerns.    Thanks.      Phone Number for [facility]  [phone].

## 2021-10-26 ENCOUNTER — LAB (OUTPATIENT)
Dept: LAB | Facility: CLINIC | Age: 81
End: 2021-10-26
Payer: COMMERCIAL

## 2021-10-26 ENCOUNTER — ANTICOAGULATION THERAPY VISIT (OUTPATIENT)
Dept: ANTICOAGULATION | Facility: CLINIC | Age: 81
End: 2021-10-26

## 2021-10-26 DIAGNOSIS — I63.9 CEREBROVASCULAR ACCIDENT INVOLVING CEREBELLUM (H): ICD-10-CM

## 2021-10-26 DIAGNOSIS — Z79.01 LONG TERM CURRENT USE OF ANTICOAGULANT THERAPY: Primary | ICD-10-CM

## 2021-10-26 DIAGNOSIS — Z79.01 LONG TERM CURRENT USE OF ANTICOAGULANT THERAPY: ICD-10-CM

## 2021-10-26 LAB — INR BLD: 2 (ref 0.9–1.1)

## 2021-10-26 PROCEDURE — 85610 PROTHROMBIN TIME: CPT

## 2021-10-26 PROCEDURE — 36416 COLLJ CAPILLARY BLOOD SPEC: CPT

## 2021-10-26 NOTE — PROGRESS NOTES
ANTICOAGULATION MANAGEMENT     Salome SPRAGUE Maximilianosiomara 81 year old female is on warfarin with therapeutic INR result. (Goal INR 2.0-3.0)    Recent labs: (last 7 days)     10/26/21  1327   INR 2.0*       ASSESSMENT     Source(s): Chart Review and Patient/Caregiver Call       Warfarin doses taken: Warfarin taken as instructed    Diet: No new diet changes identified    New illness, injury, or hospitalization: No    Medication/supplement changes: None noted    Signs or symptoms of bleeding or clotting: No    Previous INR: Supratherapeutic    Additional findings: None     PLAN     Recommended plan for no diet, medication or health factor changes affecting INR     Dosing Instructions: Continue your current warfarin dose with next INR in 3 weeks       Summary  As of 10/26/2021    Full warfarin instructions:  2.5 mg every day   Next INR check:               Telephone call with Salome who verbalizes understanding and agrees to plan    Lab visit scheduled    Education provided: Please call back if any changes to your diet, medications or how you've been taking warfarin    Plan made per [facility] anticoagulation protocol    Neel Winn RN  Anticoagulation Clinic  10/26/2021    _______________________________________________________________________     Anticoagulation Episode Summary     Current INR goal:  2.0-3.0   TTR:  61.8 % (1 y)   Target end date:  Indefinite   Send INR reminders to:  [facility]    Indications    Long term current use of anticoagulant therapy [Z79.01]  Cerebrovascular accident involving cerebellum (H) [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         Anticoagulation Care Providers     Provider Role Specialty Phone number    Surya Dyer DO Referring Family Medicine [phone]

## 2021-11-17 ENCOUNTER — ANTICOAGULATION THERAPY VISIT (OUTPATIENT)
Dept: ANTICOAGULATION | Facility: CLINIC | Age: 81
End: 2021-11-17

## 2021-11-17 ENCOUNTER — PRE VISIT (OUTPATIENT)
Dept: CARDIOLOGY | Facility: CLINIC | Age: 81
End: 2021-11-17

## 2021-11-17 ENCOUNTER — LAB (OUTPATIENT)
Dept: LAB | Facility: CLINIC | Age: 81
End: 2021-11-17
Payer: COMMERCIAL

## 2021-11-17 DIAGNOSIS — I63.9 CEREBROVASCULAR ACCIDENT INVOLVING CEREBELLUM (H): ICD-10-CM

## 2021-11-17 DIAGNOSIS — Z79.01 LONG TERM CURRENT USE OF ANTICOAGULANT THERAPY: Primary | ICD-10-CM

## 2021-11-17 DIAGNOSIS — I10 BENIGN ESSENTIAL HYPERTENSION: ICD-10-CM

## 2021-11-17 DIAGNOSIS — Z79.01 LONG TERM CURRENT USE OF ANTICOAGULANT THERAPY: ICD-10-CM

## 2021-11-17 DIAGNOSIS — E78.5 HYPERLIPIDEMIA LDL GOAL <70: ICD-10-CM

## 2021-11-17 DIAGNOSIS — I25.110 CORONARY ARTERY DISEASE INVOLVING NATIVE CORONARY ARTERY OF NATIVE HEART WITH UNSTABLE ANGINA PECTORIS (H): Primary | ICD-10-CM

## 2021-11-17 LAB — INR BLD: 1.7 (ref 0.9–1.1)

## 2021-11-17 PROCEDURE — 85610 PROTHROMBIN TIME: CPT

## 2021-11-17 PROCEDURE — 36416 COLLJ CAPILLARY BLOOD SPEC: CPT

## 2021-11-17 NOTE — PROGRESS NOTES
ANTICOAGULATION MANAGEMENT     Salome Saeed 81 year old female is on warfarin with subtherapeutic INR result. (Goal INR 2.0-3.0)    Recent labs: (last 7 days)     11/17/21  1346   INR 1.7*       ASSESSMENT     Source(s): Chart Review and Patient/Caregiver Call       Warfarin doses taken: Warfarin taken as instructed    Diet: No new diet changes identified    New illness, injury, or hospitalization: No    Medication/supplement changes: None noted    Signs or symptoms of bleeding or clotting: No    Previous INR: Therapeutic last visit; previously outside of goal range    Additional findings: Unsure what could be causing low INR. To make a smallest maintenance dose increase would be 15% which is out of protocol. Given this, will continue maintenance dose for now and if low next week will change tablet size to make a smaller dose adjustment.     PLAN     Recommended plan for temporary change(s) affecting INR     Dosing Instructions: Continue your current warfarin dose with next INR in 1 week       Summary  As of 11/17/2021    Full warfarin instructions:  2.5 mg every day   Next INR check:  11/23/2021             Telephone call with Salome who verbalizes understanding and agrees to plan    Lab visit scheduled    Education provided: Please call back if any changes to your diet, medications or how you've been taking warfarin    Plan made per [facility] anticoagulation protocol    Neel Winn RN  Anticoagulation Clinic  11/17/2021    _______________________________________________________________________     Anticoagulation Episode Summary     Current INR goal:  2.0-3.0   TTR:  55.8 % (1 y)   Target end date:  Indefinite   Send INR reminders to:  [facility]    Indications    Long term current use of anticoagulant therapy [Z79.01]  Cerebrovascular accident involving cerebellum (H) [I63.9]           Comments:           Anticoagulation Care Providers     Provider Role Specialty Phone number    [place],  Surya CANO,  Referring Family Medicine [phone]

## 2021-11-18 DIAGNOSIS — I10 ESSENTIAL HYPERTENSION, BENIGN: ICD-10-CM

## 2021-11-18 DIAGNOSIS — E78.5 HYPERLIPIDEMIA LDL GOAL <70: ICD-10-CM

## 2021-11-18 RX ORDER — LISINOPRIL 20 MG/1
20 TABLET ORAL DAILY
Qty: 90 TABLET | Refills: 0 | Status: SHIPPED | OUTPATIENT
Start: 2021-11-18 | End: 2022-01-31

## 2021-11-18 RX ORDER — ROSUVASTATIN CALCIUM 40 MG/1
40 TABLET, COATED ORAL DAILY
Qty: 90 TABLET | Refills: 0 | Status: SHIPPED | OUTPATIENT
Start: 2021-11-18 | End: 2022-01-31

## 2021-11-22 ENCOUNTER — HOSPITAL ENCOUNTER (OUTPATIENT)
Dept: PHYSICAL THERAPY | Facility: CLINIC | Age: 81
Setting detail: THERAPIES SERIES
End: 2021-11-22
Attending: PSYCHIATRY & NEUROLOGY
Payer: COMMERCIAL

## 2021-11-22 PROCEDURE — 97112 NEUROMUSCULAR REEDUCATION: CPT | Mod: GP

## 2021-11-22 PROCEDURE — 97162 PT EVAL MOD COMPLEX 30 MIN: CPT | Mod: GP

## 2021-11-23 ENCOUNTER — LAB (OUTPATIENT)
Dept: LAB | Facility: CLINIC | Age: 81
End: 2021-11-23
Payer: COMMERCIAL

## 2021-11-23 ENCOUNTER — ANTICOAGULATION THERAPY VISIT (OUTPATIENT)
Dept: ANTICOAGULATION | Facility: CLINIC | Age: 81
End: 2021-11-23

## 2021-11-23 DIAGNOSIS — Z79.01 LONG TERM CURRENT USE OF ANTICOAGULANT THERAPY: ICD-10-CM

## 2021-11-23 DIAGNOSIS — I63.9 CEREBROVASCULAR ACCIDENT INVOLVING CEREBELLUM (H): ICD-10-CM

## 2021-11-23 DIAGNOSIS — Z79.01 LONG TERM CURRENT USE OF ANTICOAGULANT THERAPY: Primary | ICD-10-CM

## 2021-11-23 LAB — INR BLD: 1.7 (ref 0.9–1.1)

## 2021-11-23 PROCEDURE — 85610 PROTHROMBIN TIME: CPT

## 2021-11-23 NOTE — PLAN OF CARE
[facility]                                                                                   OUTPATIENT PHYSICAL THERAPY FUNCTIONAL EVALUATION  PLAN OF TREATMENT FOR OUTPATIENT REHABILITATION  (COMPLETE FOR INITIAL CLAIMS ONLY)  Patient's Last Name, First Name, M.I.  YOB: 1940  Salome Saeed     Provider's Name   [facility]   Medical Record No.  [id number]     Start of Care Date:  11/22/21   Onset Date:  08/03/21   Type:     _X__PT   ____OT  ____SLP Medical Diagnosis:  Cervical stenosis of spinal canal, neck pain     PT Diagnosis:  neck pain with mobility deficit and balance dysfunction Visits from SOC:  1                              __________________________________________________________________________________  Plan of Treatment/Functional Goals:  balance training,gait training,neuromuscular re-education,ROM,strengthening,stretching           GOALS  Pain  Patient will 15 minutes of ambulation with reports of neck pain <5/10 in order to return to regular walking exercise program.  01/24/22    Posture  Patient will display less than 2 inches bilaterally on apley's scratch test and less than 8 cm on wall to occiput distance in order to maintain greater upright posture and tolerance of cleaning around home.   01/24/22    Balance  Patient will complete 30 seconds on all conditions of mCTSIB with minimal ankle and hip motion in order to display improved balance on unstable surfaces for outdoor ambulation.  01/24/22    HEP  Patient will be independent in HEP displaying correct mechanics in order to discharge to independent management of impairments  01/24/22                                                Therapy Frequency:  2 times/Week (for 4 weeks, 1x/week for 5 weeks)   Predicted Duration of Therapy Intervention:  9 weeks    Chelsea Sotomayor, PT                                     I CERTIFY THE NEED FOR THESE SERVICES FURNISHED UNDER        THIS PLAN OF TREATMENT AND WHILE UNDER MY CARE     (Physician co-signature of this document indicates review and certification of the therapy plan).              Certification Date From:  11/22/21   Certification Date To:  01/24/22    Referring Provider:  Pj Keene MD    Initial Assessment  See Epic Evaluation- Start of Care Date: 11/22/21

## 2021-11-23 NOTE — PROGRESS NOTES
ANTICOAGULATION MANAGEMENT     Salome Saeed 81 year old female is on warfarin with supratherapeutic INR result. (Goal INR 2.0-3.0)    Recent labs: (last 7 days)     11/23/21  1331   INR 1.7*       ASSESSMENT     Source(s): Chart Review and Patient/Caregiver Call       Warfarin doses taken: Warfarin taken as instructed    Diet: No new diet changes identified    New illness, injury, or hospitalization: No    Medication/supplement changes: None noted    Signs or symptoms of bleeding or clotting: No    Previous INR: Subtherapeutic    Additional findings: Patient frustrated over INR and warfarin dosing. I wanted to order a different tablet size as i am not able to make an adjustment within protocol. Patient is frustrated as to why she cant go back to 22.5 mg per week. I advised her this is a 30% dose increase and would not be safe or indicated. The smallest change i can make is 14.3% percent which I would like to avoid as well. She is adamant about wanted to stick with the tablet size she has now, so will increase the smallest allowable amount. I told her I am worried she will run high at next check. She did advise that she hasnt been taking her multivitamin lately, she will restart that to help avoid supra INR.     PLAN     Recommended plan for ongoing change(s) affecting INR     Dosing Instructions:  Increase your warfarin dose (14.3% change) with next INR in 10 days       Summary  As of 11/23/2021    Full warfarin instructions:  5 mg every Fri; 2.5 mg all other days   Next INR check:  12/3/2021             Telephone call with Salome who verbalizes understanding and agrees to plan    Lab visit scheduled    Education provided: Please call back if any changes to your diet, medications or how you've been taking warfarin    Plan made per [facility] anticoagulation protocol    Neel Winn RN  Anticoagulation Clinic  11/23/2021    _______________________________________________________________________      Anticoagulation Episode Summary     Current INR goal:  2.0-3.0   TTR:  54.1 % (1 y)   Target end date:  Indefinite   Send INR reminders to:  [facility]    Indications    Long term current use of anticoagulant therapy [Z79.01]  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

## 2021-12-03 ENCOUNTER — HOSPITAL ENCOUNTER (OUTPATIENT)
Dept: PHYSICAL THERAPY | Facility: CLINIC | Age: 81
Setting detail: THERAPIES SERIES
End: 2021-12-03
Attending: PSYCHIATRY & NEUROLOGY
Payer: COMMERCIAL

## 2021-12-03 ENCOUNTER — LAB (OUTPATIENT)
Dept: LAB | Facility: CLINIC | Age: 81
End: 2021-12-03
Payer: COMMERCIAL

## 2021-12-03 ENCOUNTER — ANTICOAGULATION THERAPY VISIT (OUTPATIENT)
Dept: ANTICOAGULATION | Facility: CLINIC | Age: 81
End: 2021-12-03

## 2021-12-03 DIAGNOSIS — Z79.01 LONG TERM CURRENT USE OF ANTICOAGULANT THERAPY: ICD-10-CM

## 2021-12-03 DIAGNOSIS — I10 BENIGN ESSENTIAL HYPERTENSION: ICD-10-CM

## 2021-12-03 DIAGNOSIS — I25.110 CORONARY ARTERY DISEASE INVOLVING NATIVE CORONARY ARTERY OF NATIVE HEART WITH UNSTABLE ANGINA PECTORIS (H): ICD-10-CM

## 2021-12-03 DIAGNOSIS — I63.9 CEREBROVASCULAR ACCIDENT INVOLVING CEREBELLUM (H): ICD-10-CM

## 2021-12-03 DIAGNOSIS — E78.5 HYPERLIPIDEMIA LDL GOAL <70: ICD-10-CM

## 2021-12-03 DIAGNOSIS — Z79.01 LONG TERM CURRENT USE OF ANTICOAGULANT THERAPY: Primary | ICD-10-CM

## 2021-12-03 LAB — INR BLD: 1.9 (ref 0.9–1.1)

## 2021-12-03 PROCEDURE — 97110 THERAPEUTIC EXERCISES: CPT | Mod: GP | Performed by: PHYSICAL THERAPIST

## 2021-12-03 PROCEDURE — 85610 PROTHROMBIN TIME: CPT

## 2021-12-03 PROCEDURE — 36416 COLLJ CAPILLARY BLOOD SPEC: CPT

## 2021-12-03 NOTE — PROGRESS NOTES
ANTICOAGULATION MANAGEMENT     Salome Saeed 81 year old female is on warfarin with subtherapeutic INR result. 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     PLAN     Recommended plan for temporary change(s) and ongoing change(s) affecting INR     Dosing Instructions:  Increase your warfarin dose (12.5% change) with next INR in 10 days       *Somewhat of an aggressive dose change for today's subtherapeutic INR (also d/t to tablet size), but patient requested to try increase as it is her old MD. This is reasonable given her fairly stable levels on 22.5 or higher doses in the last 6 months.     Summary  As of 12/3/2021    Full warfarin instructions:  5 mg every Mon, Fri; 2.5 mg all other days   Next INR check:  12/14/2021             Telephone call with Loreta who verbalizes understanding and agrees to plan    Lab visit scheduled    Education provided: Please call back if any changes to your diet, medications or how you've been taking warfarin, Monitoring for bleeding signs and symptoms, Monitoring for clotting signs and symptoms and Importance of notifying clinic for changes in medications; a sooner lab recheck maybe needed.    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

## 2021-12-06 ENCOUNTER — TRANSFERRED RECORDS (OUTPATIENT)
Dept: HEALTH INFORMATION MANAGEMENT | Facility: CLINIC | Age: 81
End: 2021-12-06
Payer: COMMERCIAL

## 2021-12-09 ENCOUNTER — HOSPITAL ENCOUNTER (OUTPATIENT)
Dept: PHYSICAL THERAPY | Facility: CLINIC | Age: 81
Setting detail: THERAPIES SERIES
End: 2021-12-09
Attending: PSYCHIATRY & NEUROLOGY
Payer: COMMERCIAL

## 2021-12-09 PROCEDURE — 97110 THERAPEUTIC EXERCISES: CPT | Mod: GP | Performed by: PHYSICAL THERAPIST

## 2021-12-10 ENCOUNTER — LAB (OUTPATIENT)
Dept: LAB | Facility: CLINIC | Age: 81
End: 2021-12-10
Payer: COMMERCIAL

## 2021-12-10 ENCOUNTER — PRE VISIT (OUTPATIENT)
Dept: CARDIOLOGY | Facility: CLINIC | Age: 81
End: 2021-12-10

## 2021-12-10 DIAGNOSIS — E78.5 HYPERLIPIDEMIA LDL GOAL <70: Primary | ICD-10-CM

## 2021-12-10 DIAGNOSIS — I10 ESSENTIAL HYPERTENSION, BENIGN: ICD-10-CM

## 2021-12-10 DIAGNOSIS — E78.5 HYPERLIPIDEMIA LDL GOAL <70: ICD-10-CM

## 2021-12-10 LAB
ALT SERPL W P-5'-P-CCNC: 33 U/L (ref 0–50)
ANION GAP SERPL CALCULATED.3IONS-SCNC: 6 MMOL/L (ref 3–14)
BUN SERPL-MCNC: 14 MG/DL (ref 7–30)
CALCIUM SERPL-MCNC: 9.3 MG/DL (ref 8.5–10.1)
CHLORIDE BLD-SCNC: 102 MMOL/L (ref 94–109)
CHOLEST SERPL-MCNC: 144 MG/DL
CO2 SERPL-SCNC: 25 MMOL/L (ref 20–32)
CREAT SERPL-MCNC: 0.73 MG/DL (ref 0.52–1.04)
FASTING STATUS PATIENT QL REPORTED: YES
GFR SERPL CREATININE-BSD FRML MDRD: 79 ML/MIN/1.73M2
GLUCOSE BLD-MCNC: 107 MG/DL (ref 70–99)
HDLC SERPL-MCNC: 70 MG/DL
LDLC SERPL CALC-MCNC: 64 MG/DL
NONHDLC SERPL-MCNC: 74 MG/DL
POTASSIUM BLD-SCNC: 4.2 MMOL/L (ref 3.4–5.3)
SODIUM SERPL-SCNC: 133 MMOL/L (ref 133–144)
TRIGL SERPL-MCNC: 52 MG/DL

## 2021-12-10 PROCEDURE — 80048 BASIC METABOLIC PNL TOTAL CA: CPT

## 2021-12-10 PROCEDURE — 80061 LIPID PANEL: CPT

## 2021-12-10 PROCEDURE — 36415 COLL VENOUS BLD VENIPUNCTURE: CPT

## 2021-12-10 PROCEDURE — 84460 ALANINE AMINO (ALT) (SGPT): CPT

## 2021-12-13 ENCOUNTER — HOSPITAL ENCOUNTER (OUTPATIENT)
Dept: PHYSICAL THERAPY | Facility: CLINIC | Age: 81
Setting detail: THERAPIES SERIES
End: 2021-12-13
Attending: PSYCHIATRY & NEUROLOGY
Payer: COMMERCIAL

## 2021-12-13 PROCEDURE — 97112 NEUROMUSCULAR REEDUCATION: CPT | Mod: GP

## 2021-12-14 ENCOUNTER — OFFICE VISIT (OUTPATIENT)
Dept: CARDIOLOGY | Facility: CLINIC | Age: 81
End: 2021-12-14
Payer: COMMERCIAL

## 2021-12-14 VITALS
SYSTOLIC BLOOD PRESSURE: 102 MMHG | HEIGHT: 63 IN | BODY MASS INDEX: 26.08 KG/M2 | OXYGEN SATURATION: 99 % | WEIGHT: 147.2 LBS | HEART RATE: 57 BPM | DIASTOLIC BLOOD PRESSURE: 50 MMHG

## 2021-12-14 DIAGNOSIS — R73.01 IMPAIRED FASTING GLUCOSE: ICD-10-CM

## 2021-12-14 DIAGNOSIS — Z79.01 LONG TERM CURRENT USE OF ANTICOAGULANT THERAPY: ICD-10-CM

## 2021-12-14 DIAGNOSIS — I10 BENIGN ESSENTIAL HYPERTENSION: Chronic | ICD-10-CM

## 2021-12-14 DIAGNOSIS — E78.5 HYPERLIPIDEMIA LDL GOAL <70: ICD-10-CM

## 2021-12-14 DIAGNOSIS — I25.10 CORONARY ARTERY DISEASE INVOLVING NATIVE CORONARY ARTERY OF NATIVE HEART WITHOUT ANGINA PECTORIS: Primary | Chronic | ICD-10-CM

## 2021-12-14 DIAGNOSIS — R07.89 CHEST DISCOMFORT: ICD-10-CM

## 2021-12-14 PROCEDURE — 99215 OFFICE O/P EST HI 40 MIN: CPT | Performed by: INTERNAL MEDICINE

## 2021-12-14 RX ORDER — ATENOLOL 25 MG/1
25 TABLET ORAL DAILY
Qty: 90 TABLET | Refills: 3
Start: 2021-12-14 | End: 2022-04-06

## 2021-12-14 ASSESSMENT — MIFFLIN-ST. JEOR: SCORE: 1111.82

## 2021-12-14 NOTE — PROGRESS NOTES
HPI and Plan:   See dictation    Today's clinic visit entailed:  Review of the result(s) of each unique test - Lexiscan, lab work  Ordering of each unique test  Prescription drug management  35 minutes spent on the date of the encounter doing chart review, history and exam, documentation and further activities per the note  Provider  Link to [facility] Help Grid     The level of medical decision making during this visit was of moderate complexity.      Orders Placed This Encounter   Procedures     Basic metabolic panel     Lipid Profile     Follow-Up with Cardiologist     Follow-Up with Cardiac Advanced Practice Provider       No orders of the defined types were placed in this encounter.      Medications Discontinued During This Encounter   Medication Reason     clonazePAM (KLONOPIN) 0.5 MG tablet      ondansetron (ZOFRAN-ODT) 4 MG ODT tab      senna-docusate (SENOKOT-S/PERICOLACE) 8.6-50 MG tablet          Encounter Diagnoses   Name Primary?     Coronary artery disease involving native coronary artery of native heart without angina pectoris Yes     Chest discomfort      Hyperlipidemia LDL goal <70      Benign essential hypertension      Long term current use of anticoagulant therapy      Impaired fasting glucose        CURRENT MEDICATIONS:  Current Outpatient Medications   Medication Sig Dispense Refill     amLODIPine (NORVASC) 5 MG tablet Take 1 tablet (5 mg) by mouth daily 90 tablet 2     ascorbic acid (VITAMIN C) 500 MG tablet Take 1,000 mg by mouth daily        aspirin EC 81 MG tablet Take 1 tablet (81 mg) by mouth daily       atenolol (TENORMIN) 25 MG tablet Take 1 tablet (25 mg) by mouth 2 times daily 180 tablet 2     Calcium Carb-Cholecalciferol (CALCIUM 600 + D PO) Take 1,200 mg by mouth daily        Coenzyme Q10 (COQ-10) 200 MG CAPS Take 400 mg by mouth daily        diclofenac (VOLTAREN) 1 % topical gel Apply 4 g topically 4 times daily 350 g 3     escitalopram (LEXAPRO) 10 MG tablet Take 5 mg by mouth daily  30  tablet 0     gatifloxacin (ZYMAXID) 0.5 % ophthalmic solution        ketorolac (ACULAR) 0.5 % ophthalmic solution instill one drop into right eye twice a day for one week then one drop once a day for 7 more weeks; begin drops 3 days before surgery       levothyroxine (SYNTHROID/LEVOTHROID) 100 MCG tablet TAKE 1 TABLET DAILY 90 tablet 1     lisinopril (ZESTRIL) 20 MG tablet Take 1 tablet (20 mg) by mouth daily 90 tablet 0     Multiple Vitamin (DAILY MULTIVITAMIN PO) Take by mouth daily       nitroGLYcerin (NITROSTAT) 0.4 MG sublingual tablet Place 1 tablet (0.4 mg) under the tongue every 5 minutes as needed for chest pain 25 tablet 2     prednisoLONE acetate (PRED FORTE) 1 % ophthalmic suspension        rosuvastatin (CRESTOR) 40 MG tablet Take 1 tablet (40 mg) by mouth daily 90 tablet 0     warfarin ANTICOAGULANT (COUMADIN) 5 MG tablet Current dose (9/9/21): 5 mg every Mon, Fri + 2.5 mg all other days or as directed by ACC team. 60 tablet 1     Cholecalciferol (VITAMIN D) 2000 UNITS tablet Take 2,000 Units by mouth daily (Patient not taking: Reported on 10/20/2021)       vitamin B complex with vitamin C (VITAMIN  B COMPLEX) TABS tablet Take 1 tablet by mouth daily (Patient not taking: Reported on 10/20/2021)         ALLERGIES     Allergies   Allergen Reactions     Atorvastatin      Leg cramps     Cats Itching     Gluten      Sinuses affected by gluten     Oat      Shellfish Allergy      hives     Wheat        PAST MEDICAL HISTORY:  Past Medical History:   Diagnosis Date     CAD (coronary artery disease) 04/09/2009 4/8/2009: PTCA and two 2.5x15mm Xience stents to mid LAD lesion; Cath 12/2012- 40-50% stenosis in mid PDA, 80% on D1- medically treat , 5/28/2015 - PTCA and 2.25x8mm Promus PREMIIER NAILA to ostium of 2nd OM     Cerebral artery occlusion with cerebral infarction 2012     CVA (cerebral infarction)      DDD (degenerative disc disease)      Essential hypertension, benign      GERD (gastroesophageal reflux  disease)      Hyperlipidemia      Hypothyroidism      Lumbar spinal stenosis      Mitral regurgitation     1+ per 2013 Echo     Vertebral artery stenosis, right        PAST SURGICAL HISTORY:  Past Surgical History:   Procedure Laterality Date     ARTHROPLASTY KNEE Left 2020    Procedure: Left total knee arthroplasty (Ward and Nephew #5 narrow femur; #3 tibia; 9 mm tibial poly and 35 mm patella);  Surgeon: Jorge Luis Cheatham MD;  Location: [facility] OR     CORONARY ANGIOGRAPHY ADULT ORDER  2012    40-50% stenosis to mid PDA, 80% in D1- medically treat     CORONARY ANGIOGRAPHY ADULT ORDER  2015    PTCA and 2.25x8mm Promus PREMIER NAILA to ostium of 2nd OM     DECOMPRESSION, FUSION LUMBAR POSTERIOR THREE + LEVELS, COMBINED  2013    Procedure: COMBINED DECOMPRESSION, FUSION LUMBAR POSTERIOR THREE + LEVELS;  Posterior Lumbar Decompression L3-S1, Fusion L4-S1;  Surgeon: Daniel Harris MD;  Location: [facility] OR     ENDOSCOPIC STRIPPING VEIN(S)       HEART CATH, ANGIOPLASTY  2009    PTCA and two 2.5x15mm Xience stents to mid LAD lesion     HYSTERECTOMY, RICKIE      Fibroids, and Menorrhagia.. Bilateral Oophrectomy     ORTHOPEDIC SURGERY       Stenting of LAD, Angioplasty  09     TONSILLECTOMY      as a child       FAMILY HISTORY:  Family History   Problem Relation Age of Onset     Neurologic Disorder Mother         Dementia, Still living     Ovarian Cancer Mother      Thyroid Disease Mother      C.A.D. Father              Diabetes Father      Coronary Artery Disease Father      Alcohol/Drug Brother      Thyroid Disease Son      Diabetes Son        SOCIAL HISTORY:  Social History     Socioeconomic History     Marital status:      Spouse name: Not on file     Number of children: Not on file     Years of education: Not on file     Highest education level: Not on file   Occupational History     Not on file   Tobacco Use     Smoking status: Former Smoker     Packs/day: 0.10     Quit date:  "1965     Years since quittin.9     Smokeless tobacco: Never Used   Substance and Sexual Activity     Alcohol use: Yes     Alcohol/week: 0.0 standard drinks     Comment: 2 glasses wine per month, maybe     Drug use: No     Sexual activity: Not Currently   Other Topics Concern      Service Not Asked     Blood Transfusions No     Caffeine Concern Yes     Comment: 5 cups caffeine per day     Occupational Exposure Not Asked     Hobby Hazards Not Asked     Sleep Concern No     Stress Concern No     Weight Concern No     Comment: weight stable     Special Diet No     Comment: trying to eat healthier     Back Care Not Asked     Exercise Yes     Comment: stretching     Bike Helmet Not Asked     Seat Belt Yes     Self-Exams Yes     Parent/sibling w/ CABG, MI or angioplasty before 65F 55M? Yes   Social History Narrative    Works in an office     Social Determinants of Health     Financial Resource Strain: Not on file   Food Insecurity: Not on file   Transportation Needs: Not on file   Physical Activity: Not on file   Stress: Not on file   Social Connections: Not on file   Intimate Partner Violence: Not on file   Housing Stability: Not on file       Review of Systems:  Skin:  not assessed       Eyes:  not assessed      ENT:  not assessed      Respiratory:  Positive for shortness of breath     Cardiovascular:    Positive for;lightheadedness;dizziness;fatigue chest pressure  Gastroenterology: not assessed      Genitourinary:  not assessed      Musculoskeletal:  Negative      Neurologic:  Negative      Psychiatric:  Negative      Heme/Lymph/Imm:  not assessed      Endocrine:  Positive for thyroid disorder      Physical Exam:  Vitals: /50 (BP Location: Right arm, Patient Position: Sitting, Cuff Size: Adult Regular)   Pulse 57   Ht 1.6 m (5' 3\")   Wt 66.8 kg (147 lb 3.2 oz)   LMP  (LMP Unknown)   SpO2 99%   BMI 26.08 kg/m      Constitutional:  cooperative, alert and oriented, well developed, well " nourished, in no acute distress        Skin:  warm and dry to the touch, no apparent skin lesions or masses noted          Head:  normocephalic, no masses or lesions        Eyes:  pupils equal and round, conjunctivae and lids unremarkable, sclera white, no xanthalasma, EOMS intact, no nystagmus        Lymph:      ENT:  no pallor or cyanosis, dentition good        Neck:  carotid pulses are full and equal bilaterally;no carotid bruit        Respiratory:  normal breath sounds, clear to auscultation, normal A-P diameter, normal symmetry, normal respiratory excursion, no use of accessory muscles         Cardiac: regular rhythm;normal S1 and S2       grade 1;LUSB;systolic murmur        pulses full and equal                                   left wrist    GI:  abdomen soft, non-tender, BS normoactive, no mass, no HSM, no bruits        Extremities and Muscular Skeletal:  no edema;no spinal abnormalities noted;normal muscle strength and tone         left wrist and forearm ecchymotic and mildy swollen without hum or bruit    Neurological:  no gross motor deficits        Psych:  affect appropriate, oriented to time, person and place        CC  Nikolai Amaya MD  [address]  [place] [id number]

## 2021-12-14 NOTE — LETTER
12/14/2021    Surya Dyer, DO  [address] [facility] [address]    RE: Salome SPRAGUE Darlin     Dear Colleague,     I had the pleasure of seeing Salome SPRAGUE Darlin in the [facility].  HPI and Plan:   See dictation    Today's clinic visit entailed:  Review of the result(s) of each unique test - Lexiscan, lab work  Ordering of each unique test  Prescription drug management  35 minutes spent on the date of the encounter doing chart review, history and exam, documentation and further activities per the note  Provider  Link to [facility] Help Grid     The level of medical decision making during this visit was of moderate complexity.      Orders Placed This Encounter   Procedures     Basic metabolic panel     Lipid Profile     Follow-Up with Cardiologist     Follow-Up with Cardiac Advanced Practice Provider       No orders of the defined types were placed in this encounter.      Medications Discontinued During This Encounter   Medication Reason     clonazePAM (KLONOPIN) 0.5 MG tablet      ondansetron (ZOFRAN-ODT) 4 MG ODT tab      senna-docusate (SENOKOT-S/PERICOLACE) 8.6-50 MG tablet          Encounter Diagnoses   Name Primary?     Coronary artery disease involving native coronary artery of native heart without angina pectoris Yes     Chest discomfort      Hyperlipidemia LDL goal <70      Benign essential hypertension      Long term current use of anticoagulant therapy      Impaired fasting glucose        CURRENT MEDICATIONS:  Current Outpatient Medications   Medication Sig Dispense Refill     amLODIPine (NORVASC) 5 MG tablet Take 1 tablet (5 mg) by mouth daily 90 tablet 2     ascorbic acid (VITAMIN C) 500 MG tablet Take 1,000 mg by mouth daily        aspirin EC 81 MG tablet Take 1 tablet (81 mg) by mouth daily       atenolol (TENORMIN) 25 MG tablet Take 1 tablet (25 mg) by mouth 2 times daily 180 tablet 2     Calcium Carb-Cholecalciferol (CALCIUM 600 + D PO) Take 1,200 mg by mouth daily         Coenzyme Q10 (COQ-10) 200 MG CAPS Take 400 mg by mouth daily        diclofenac (VOLTAREN) 1 % topical gel Apply 4 g topically 4 times daily 350 g 3     escitalopram (LEXAPRO) 10 MG tablet Take 5 mg by mouth daily  30 tablet 0     gatifloxacin (ZYMAXID) 0.5 % ophthalmic solution        ketorolac (ACULAR) 0.5 % ophthalmic solution instill one drop into right eye twice a day for one week then one drop once a day for 7 more weeks; begin drops 3 days before surgery       levothyroxine (SYNTHROID/LEVOTHROID) 100 MCG tablet TAKE 1 TABLET DAILY 90 tablet 1     lisinopril (ZESTRIL) 20 MG tablet Take 1 tablet (20 mg) by mouth daily 90 tablet 0     Multiple Vitamin (DAILY MULTIVITAMIN PO) Take by mouth daily       nitroGLYcerin (NITROSTAT) 0.4 MG sublingual tablet Place 1 tablet (0.4 mg) under the tongue every 5 minutes as needed for chest pain 25 tablet 2     prednisoLONE acetate (PRED FORTE) 1 % ophthalmic suspension        rosuvastatin (CRESTOR) 40 MG tablet Take 1 tablet (40 mg) by mouth daily 90 tablet 0     warfarin ANTICOAGULANT (COUMADIN) 5 MG tablet Current dose (9/9/21): 5 mg every Mon, Fri + 2.5 mg all other days or as directed by ACC team. 60 tablet 1     Cholecalciferol (VITAMIN D) 2000 UNITS tablet Take 2,000 Units by mouth daily (Patient not taking: Reported on 10/20/2021)       vitamin B complex with vitamin C (VITAMIN  B COMPLEX) TABS tablet Take 1 tablet by mouth daily (Patient not taking: Reported on 10/20/2021)         ALLERGIES     Allergies   Allergen Reactions     Atorvastatin      Leg cramps     Cats Itching     Gluten      Sinuses affected by gluten     Oat      Shellfish Allergy      hives     Wheat        PAST MEDICAL HISTORY:  Past Medical History:   Diagnosis Date     CAD (coronary artery disease) 04/09/2009 4/8/2009: PTCA and two 2.5x15mm Xience stents to mid LAD lesion; Cath 12/2012- 40-50% stenosis in mid PDA, 80% on D1- medically treat , 5/28/2015 - PTCA and 2.25x8mm Promus PREMIIER NAILA to  ostium of 2nd OM     Cerebral artery occlusion with cerebral infarction      CVA (cerebral infarction)      DDD (degenerative disc disease)      Essential hypertension, benign      GERD (gastroesophageal reflux disease)      Hyperlipidemia      Hypothyroidism      Lumbar spinal stenosis      Mitral regurgitation     1+ per 2013 Echo     Vertebral artery stenosis, right        PAST SURGICAL HISTORY:  Past Surgical History:   Procedure Laterality Date     ARTHROPLASTY KNEE Left 2020    Procedure: Left total knee arthroplasty (Ward and Nephew #5 narrow femur; #3 tibia; 9 mm tibial poly and 35 mm patella);  Surgeon: Jorge Luis Cheatham MD;  Location: [facility] OR     CORONARY ANGIOGRAPHY ADULT ORDER  2012    40-50% stenosis to mid PDA, 80% in D1- medically treat     CORONARY ANGIOGRAPHY ADULT ORDER  2015    PTCA and 2.25x8mm Promus PREMIER NAILA to ostium of 2nd OM     DECOMPRESSION, FUSION LUMBAR POSTERIOR THREE + LEVELS, COMBINED  2013    Procedure: COMBINED DECOMPRESSION, FUSION LUMBAR POSTERIOR THREE + LEVELS;  Posterior Lumbar Decompression L3-S1, Fusion L4-S1;  Surgeon: Daniel Harris MD;  Location: [facility] OR     ENDOSCOPIC STRIPPING VEIN(S)       HEART CATH, ANGIOPLASTY  2009    PTCA and two 2.5x15mm Xience stents to mid LAD lesion     HYSTERECTOMY, RICKIE      Fibroids, and Menorrhagia.. Bilateral Oophrectomy     ORTHOPEDIC SURGERY       Stenting of LAD, Angioplasty  09     TONSILLECTOMY      as a child       FAMILY HISTORY:  Family History   Problem Relation Age of Onset     Neurologic Disorder Mother         Dementia, Still living     Ovarian Cancer Mother      Thyroid Disease Mother      C.A.D. Father              Diabetes Father      Coronary Artery Disease Father      Alcohol/Drug Brother      Thyroid Disease Son      Diabetes Son        SOCIAL HISTORY:  Social History     Socioeconomic History     Marital status:      Spouse name: Not on file     Number of children: Not  on file     Years of education: Not on file     Highest education level: Not on file   Occupational History     Not on file   Tobacco Use     Smoking status: Former Smoker     Packs/day: 0.10     Quit date: 1965     Years since quittin.9     Smokeless tobacco: Never Used   Substance and Sexual Activity     Alcohol use: Yes     Alcohol/week: 0.0 standard drinks     Comment: 2 glasses wine per month, maybe     Drug use: No     Sexual activity: Not Currently   Other Topics Concern      Service Not Asked     Blood Transfusions No     Caffeine Concern Yes     Comment: 5 cups caffeine per day     Occupational Exposure Not Asked     Hobby Hazards Not Asked     Sleep Concern No     Stress Concern No     Weight Concern No     Comment: weight stable     Special Diet No     Comment: trying to eat healthier     Back Care Not Asked     Exercise Yes     Comment: stretching     Bike Helmet Not Asked     Seat Belt Yes     Self-Exams Yes     Parent/sibling w/ CABG, MI or angioplasty before 65F 55M? Yes   Social History Narrative    Works in an office     Social Determinants of Health     Financial Resource Strain: Not on file   Food Insecurity: Not on file   Transportation Needs: Not on file   Physical Activity: Not on file   Stress: Not on file   Social Connections: Not on file   Intimate Partner Violence: Not on file   Housing Stability: Not on file       Review of Systems:  Skin:  not assessed       Eyes:  not assessed      ENT:  not assessed      Respiratory:  Positive for shortness of breath     Cardiovascular:    Positive for;lightheadedness;dizziness;fatigue chest pressure  Gastroenterology: not assessed      Genitourinary:  not assessed      Musculoskeletal:  Negative      Neurologic:  Negative      Psychiatric:  Negative      Heme/Lymph/Imm:  not assessed      Endocrine:  Positive for thyroid disorder      Physical Exam:  Vitals: /50 (BP Location: Right arm, Patient Position: Sitting, Cuff Size: Adult  "Regular)   Pulse 57   Ht 1.6 m (5' 3\")   Wt 66.8 kg (147 lb 3.2 oz)   LMP  (LMP Unknown)   SpO2 99%   BMI 26.08 kg/m      Constitutional:  cooperative, alert and oriented, well developed, well nourished, in no acute distress        Skin:  warm and dry to the touch, no apparent skin lesions or masses noted          Head:  normocephalic, no masses or lesions        Eyes:  pupils equal and round, conjunctivae and lids unremarkable, sclera white, no xanthalasma, EOMS intact, no nystagmus        Lymph:      ENT:  no pallor or cyanosis, dentition good        Neck:  carotid pulses are full and equal bilaterally;no carotid bruit        Respiratory:  normal breath sounds, clear to auscultation, normal A-P diameter, normal symmetry, normal respiratory excursion, no use of accessory muscles         Cardiac: regular rhythm;normal S1 and S2       grade 1;LUSB;systolic murmur        pulses full and equal                                   left wrist    GI:  abdomen soft, non-tender, BS normoactive, no mass, no HSM, no bruits        Extremities and Muscular Skeletal:  no edema;no spinal abnormalities noted;normal muscle strength and tone         left wrist and forearm ecchymotic and mildy swollen without hum or bruit    Neurological:  no gross motor deficits        Psych:  affect appropriate, oriented to time, person and place      CC  Nikolai Amaya MD  [address] [address]    Thank you for allowing me to participate in the care of your patient.  Sincerely, Nikolai Amaya MD   [facility]  "

## 2021-12-14 NOTE — PATIENT INSTRUCTIONS
It was a pleasure seeing you today and thank you for allowing me to be a part of your health care team.  Should   you have any questions regarding your visit or future needs please feel free to reach out to my care team for assistance.      Thank you, Dr. Nikolai Amaya        **Nursing: [phone]       **Scheduling: [phone]

## 2021-12-14 NOTE — PROGRESS NOTES
Service Date: 12/14/2021    Louise is a very nice 81-year-old woman with past medical history significant for unstable angina, leading to 2 Xience drug-eluting stents being placed in her left anterior descending artery in 2009.  She has hypercholesterolemia, hypertension, hypothyroidism, chronic back pain with multiple surgeries with Daniel Harris, strong family history of coronary artery disease, recent diagnosis of Parkinson disease, a cerebrovascular accident in 2013 manifesting as balance issues, thought to be an embolic event from her vertebral artery for which she has been on warfarin therapy ever since.    Coronary history continues with chest discomfort in 2012, leading to an angiogram demonstrating no change in her anatomy.  She had moderate disease in the posterior descending artery and ostial pinch in her first diagonal and 80% stenosis in the branch of an obtuse marginal that I recommended medical therapy.    Again, in 2015 with classic anginal symptoms, angiography demonstrated no change in her anatomy.  We continued medical management.  One month later, she was back in the emergency room.  We took her back to the Cath Lab, and stented a small obtuse marginal, placing a 2.25 x 8 mm Promus drug-eluting stent.  She initially had some problems with her wrist after the radial approach.  This did resolve, but also resulted in resolution of her anginal symptoms.    In 2017, after several back surgeries by Dr. Daniel Harris, she was having some chest discomfort for which a stress nuclear scan was performed demonstrating no evidence of ischemia.  When I saw her last year, she was again having chest discomfort, which had typical and atypical features, and again, stress test had to be discontinued as she only achieved a heart rate of 100 and subsequent Lexiscan appeared to be normal.    Louise returns to clinic at this time and is a bit disappointed that she has been diagnosed with Parkinson.  She continues to be quite  limited by back pain.  She describes an upper back and across the back of her shoulder pain with activity.  She has no anterior chest symptoms, no anterior neck symptoms.  She also states occasionally when she lies down she will get some chest discomfort and left arm discomfort, but gets none of this with activity.  She is doing some physical therapy and rehabilitation as prescribed by Neurology.  She is having no bleeding problems with her warfarin and aspirin therapy.  She does have occasional lightheadedness and dizziness.  She also describes nightmares where she gets quite active and has actually fallen out of bed a couple times due to her active nightmares, which she has attributed to her Parkinson.    ASSESSMENT AND PLAN: Louise has no symptoms at this time that I am concerned are angina.  I think all of her chest pains are atypical and I do not think we need any further testing.    She has no symptoms to suggest heart failure or significant arrhythmia.    Blood pressure is well controlled and maybe too well controlled at 102/50 with a pulse of 57.  As stated, she could not achieve an adequate heart rate on her attempt at a stress test last year and she occasionally has orthostasis and notes her blood pressure sometimes is below 100.  Also, given the fact that she has nightmares, which have been associated with beta blockers, I have asked her to decrease her atenolol to 25 mg once a day.  I will have her follow up with my RASHAD in 1 month to see if her dreams are better and see how orthostasis, blood pressure and heart rate are doing.  If they do remain low, I may consider decreasing her amlodipine from 5 to 2.5 mg daily.    Her weight is down to 147, giving a body mass index of 26.  She states she is trying to watch the sweets in her diet.  She states she notes she is a sweet-aholic.  Looking at her fasting glucose, she does have some glucose impairment with her fasting glucose 107.    Fasting lipid profile is  still excellent.  Total cholesterol 144, HDL 70, LDL 64, triglycerides are 52.    Basic metabolic profile shows a creatinine of 0.73 and normal electrolytes.  She has at times had hyponatremia. This is not an issue at this time at 133.    I have encouraged her to continue to try to exercise regularly despite her Parkinson and her back problems.    At this time, I will continue both aspirin and warfarin given her history of coronary disease and cerebrovascular issues.  She does not appear to be having any bleeding problems.  We will follow up in 1 year.  If she should have any problems, I would be glad to see her sooner.    Over 40 minutes were spent with Louise today.    Nikolai Amaya MD, [facility]        D: 2021   T: 2021   MT: corinne    Name:     JESUS AVELAR  MRN:      [id number]        Account:      [id number]   :      1940           Service Date: 2021       Document: [id number]

## 2021-12-20 ENCOUNTER — HOSPITAL ENCOUNTER (OUTPATIENT)
Dept: PHYSICAL THERAPY | Facility: CLINIC | Age: 81
Setting detail: THERAPIES SERIES
End: 2021-12-20
Attending: PSYCHIATRY & NEUROLOGY
Payer: COMMERCIAL

## 2021-12-20 DIAGNOSIS — E03.9 HYPOTHYROIDISM, UNSPECIFIED TYPE: ICD-10-CM

## 2021-12-20 PROCEDURE — 97140 MANUAL THERAPY 1/> REGIONS: CPT | Mod: GP

## 2021-12-20 PROCEDURE — 97112 NEUROMUSCULAR REEDUCATION: CPT | Mod: GP

## 2021-12-20 PROCEDURE — 97110 THERAPEUTIC EXERCISES: CPT | Mod: GP

## 2021-12-21 ENCOUNTER — ANTICOAGULATION THERAPY VISIT (OUTPATIENT)
Dept: ANTICOAGULATION | Facility: CLINIC | Age: 81
End: 2021-12-21

## 2021-12-21 ENCOUNTER — LAB (OUTPATIENT)
Dept: LAB | Facility: CLINIC | Age: 81
End: 2021-12-21
Payer: COMMERCIAL

## 2021-12-21 DIAGNOSIS — I63.9 CEREBROVASCULAR ACCIDENT INVOLVING CEREBELLUM (H): ICD-10-CM

## 2021-12-21 DIAGNOSIS — Z79.01 LONG TERM CURRENT USE OF ANTICOAGULANT THERAPY: Primary | ICD-10-CM

## 2021-12-21 DIAGNOSIS — Z79.01 LONG TERM CURRENT USE OF ANTICOAGULANT THERAPY: ICD-10-CM

## 2021-12-21 LAB — INR BLD: 2.2 (ref 0.9–1.1)

## 2021-12-21 PROCEDURE — 36416 COLLJ CAPILLARY BLOOD SPEC: CPT

## 2021-12-21 PROCEDURE — 85610 PROTHROMBIN TIME: CPT

## 2021-12-21 NOTE — PROGRESS NOTES
12/14/21 Cardiology decreased atenolol. Per [employer],  Concurrent use of ATENOLOL and WARFARIN may result in risk of increased prothrombin time or INR. So decreasing dose may lower INR.    ANTICOAGULATION MANAGEMENT     Salome Saeed 81 year old female is on warfarin with therapeutic INR result. (Goal INR 2.0-3.0)    Recent labs: (last 7 days)     12/21/21  1348   INR 2.2*       ASSESSMENT     Source(s): Chart Review and Patient/Caregiver Call       Warfarin doses taken: Warfarin taken as instructed    Diet: No new diet changes identified    New illness, injury, or hospitalization: No    Medication/supplement changes: atenolol dose decreased on 12/14/21 which has potential for interaction; increasing INR, so by decreasing dose there is a possibility INR would decrease.    Signs or symptoms of bleeding or clotting: No    Previous INR: Subtherapeutic    Additional findings: None     PLAN     Recommended plan for ongoing change(s) affecting INR     Dosing Instructions: Continue your current warfarin dose with next INR in 4 weeks       Summary  As of 12/21/2021    Full warfarin instructions:  5 mg every Mon, Fri; 2.5 mg all other days   Next INR check:  1/18/2022             Telephone call with Salome who agrees to plan and repeated back plan correctly    Lab visit scheduled    Education provided: Please call back if any changes to your diet, medications or how you've been taking warfarin, Goal range and significance of current result, Potential interaction between warfarin and reducing the atenolol dosage and Contact [phone]  with any changes, questions or concerns.     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

## 2021-12-23 RX ORDER — LEVOTHYROXINE SODIUM 100 UG/1
TABLET ORAL
Qty: 90 TABLET | Refills: 0 | Status: SHIPPED | OUTPATIENT
Start: 2021-12-23 | End: 2022-03-23

## 2021-12-23 NOTE — TELEPHONE ENCOUNTER
Routing refill request to provider for review/approval because:  Passes protocol, but overdue for follow up    Pending Prescriptions:                       Disp   Refills    levothyroxine (SYNTHROID/LEVOTHROID) 100 M*90 tab*3        Sig: TAKE 1 TABLET DAILY

## 2021-12-23 NOTE — TELEPHONE ENCOUNTER
Due for Medicare Wellness after 2/15/22.    3 month supply sent to pharmacy for Dr. Dyer while he is out of the clinic.     - [email or web address], CNP

## 2021-12-29 ENCOUNTER — HOSPITAL ENCOUNTER (OUTPATIENT)
Dept: PHYSICAL THERAPY | Facility: CLINIC | Age: 81
Setting detail: THERAPIES SERIES
End: 2021-12-29
Attending: PSYCHIATRY & NEUROLOGY
Payer: COMMERCIAL

## 2021-12-29 PROCEDURE — 97112 NEUROMUSCULAR REEDUCATION: CPT | Mod: GP | Performed by: PHYSICAL THERAPIST

## 2021-12-29 PROCEDURE — 97110 THERAPEUTIC EXERCISES: CPT | Mod: GP | Performed by: PHYSICAL THERAPIST

## 2022-01-18 ENCOUNTER — LAB (OUTPATIENT)
Dept: LAB | Facility: CLINIC | Age: 82
End: 2022-01-18
Payer: COMMERCIAL

## 2022-01-18 ENCOUNTER — ANTICOAGULATION THERAPY VISIT (OUTPATIENT)
Dept: ANTICOAGULATION | Facility: CLINIC | Age: 82
End: 2022-01-18

## 2022-01-18 DIAGNOSIS — Z79.01 LONG TERM CURRENT USE OF ANTICOAGULANT THERAPY: ICD-10-CM

## 2022-01-18 DIAGNOSIS — I63.9 CEREBROVASCULAR ACCIDENT INVOLVING CEREBELLUM (H): ICD-10-CM

## 2022-01-18 DIAGNOSIS — Z79.01 LONG TERM CURRENT USE OF ANTICOAGULANT THERAPY: Primary | ICD-10-CM

## 2022-01-18 LAB — INR BLD: 2.3 (ref 0.9–1.1)

## 2022-01-18 PROCEDURE — 36416 COLLJ CAPILLARY BLOOD SPEC: CPT

## 2022-01-18 PROCEDURE — 85610 PROTHROMBIN TIME: CPT

## 2022-01-18 NOTE — PROGRESS NOTES
ANTICOAGULATION MANAGEMENT     Salome Saeed 81 year old female is on warfarin with therapeutic INR result. (Goal INR 2.0-3.0)    Recent labs: (last 7 days)     01/18/22  1344   INR 2.3*       ASSESSMENT     Source(s): Chart Review and Patient/Caregiver Call       Warfarin doses taken: Warfarin taken as instructed    Diet: No new diet changes identified    New illness, injury, or hospitalization: No    Medication/supplement changes: None noted    Signs or symptoms of bleeding or clotting: No    Previous INR: Therapeutic last 2(+) visits    Additional findings: None     PLAN     Recommended plan for no diet, medication or health factor changes affecting INR     Dosing Instructions: Continue your current warfarin dose with next INR in 5 weeks       Summary  As of 1/18/2022    Full warfarin instructions:  5 mg every Mon, Fri; 2.5 mg all other days   Next INR check:  2/22/2022             Telephone call with Salome who agrees to plan and repeated back plan correctly    Lab visit scheduled    Education provided: Please call back if any changes to your diet, medications or how you've been taking warfarin    Plan made per [facility] anticoagulation protocol    Sima Bonilla RN  Anticoagulation Clinic  1/18/2022    _______________________________________________________________________     Anticoagulation Episode Summary     Current INR goal:  2.0-3.0   TTR:  49.8 % (1 y)   Target end date:  Indefinite   Send INR reminders to:  [facility]    Indications    Long term current use of anticoagulant therapy [Z79.01]  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

## 2022-01-28 ENCOUNTER — HOSPITAL ENCOUNTER (OUTPATIENT)
Dept: PHYSICAL THERAPY | Facility: CLINIC | Age: 82
Setting detail: THERAPIES SERIES
End: 2022-01-28
Attending: PSYCHIATRY & NEUROLOGY
Payer: COMMERCIAL

## 2022-01-28 PROCEDURE — 97110 THERAPEUTIC EXERCISES: CPT | Mod: GP | Performed by: PHYSICAL THERAPIST

## 2022-01-28 PROCEDURE — 97112 NEUROMUSCULAR REEDUCATION: CPT | Mod: GP | Performed by: PHYSICAL THERAPIST

## 2022-01-31 DIAGNOSIS — E78.5 HYPERLIPIDEMIA LDL GOAL <70: ICD-10-CM

## 2022-01-31 DIAGNOSIS — I63.9 CEREBROVASCULAR ACCIDENT INVOLVING CEREBELLUM (H): ICD-10-CM

## 2022-01-31 DIAGNOSIS — I10 ESSENTIAL HYPERTENSION, BENIGN: ICD-10-CM

## 2022-01-31 RX ORDER — ROSUVASTATIN CALCIUM 40 MG/1
40 TABLET, COATED ORAL DAILY
Qty: 90 TABLET | Refills: 3 | Status: SHIPPED | OUTPATIENT
Start: 2022-01-31 | End: 2023-01-27

## 2022-01-31 RX ORDER — LISINOPRIL 20 MG/1
20 TABLET ORAL DAILY
Qty: 90 TABLET | Refills: 0 | Status: SHIPPED | OUTPATIENT
Start: 2022-01-31 | End: 2022-05-03

## 2022-02-01 RX ORDER — WARFARIN SODIUM 5 MG/1
TABLET ORAL
Qty: 60 TABLET | Refills: 1 | Status: SHIPPED | OUTPATIENT
Start: 2022-02-01 | End: 2022-08-01

## 2022-02-01 NOTE — TELEPHONE ENCOUNTER
"Requested Prescriptions   Pending Prescriptions Disp Refills     warfarin ANTICOAGULANT (COUMADIN) 5 MG tablet [Pharmacy Med Name: WARFARIN TABS 5MG] 60 tablet 3     Sig: TAKE AS INSTRUCTED BY YOUR PRESCRIBER       Vitamin K Antagonists Failed - 2/1/2022 11:15 AM        Failed - INR is within goal in the past 6 weeks     Confirm INR is within goal in the past 6 weeks.     Recent Labs   Lab Test 01/18/22  1344   INR 2.3*                       Passed - Recent (12 mo) or future (30 days) visit within the authorizing provider's specialty     Patient has had an office visit with the authorizing provider or a provider within the authorizing providers department within the previous 12 mos or has a future within next 30 days. See \"Patient Info\" tab in inbasket, or \"Choose Columns\" in Meds & Orders section of the refill encounter.              Passed - Medication is active on med list        Passed - Patient is 18 years of age or older        Passed - Patient is not pregnant        Passed - No positive pregnancy on file in past 12 months           Last office visit 10/14/21. Warfarin dosing is managed by INR Clinic.  Prescription approved per [facility] Refill Protocol.    "

## 2022-02-03 ENCOUNTER — HOSPITAL ENCOUNTER (OUTPATIENT)
Dept: PHYSICAL THERAPY | Facility: CLINIC | Age: 82
Setting detail: THERAPIES SERIES
End: 2022-02-03
Attending: PSYCHIATRY & NEUROLOGY
Payer: COMMERCIAL

## 2022-02-03 PROCEDURE — 97116 GAIT TRAINING THERAPY: CPT | Mod: GP | Performed by: PHYSICAL THERAPIST

## 2022-02-03 PROCEDURE — 97110 THERAPEUTIC EXERCISES: CPT | Mod: GP | Performed by: PHYSICAL THERAPIST

## 2022-02-03 PROCEDURE — 97112 NEUROMUSCULAR REEDUCATION: CPT | Mod: GP | Performed by: PHYSICAL THERAPIST

## 2022-02-04 ENCOUNTER — OFFICE VISIT (OUTPATIENT)
Dept: CARDIOLOGY | Facility: CLINIC | Age: 82
End: 2022-02-04
Payer: COMMERCIAL

## 2022-02-04 VITALS
DIASTOLIC BLOOD PRESSURE: 60 MMHG | SYSTOLIC BLOOD PRESSURE: 120 MMHG | HEIGHT: 63 IN | HEART RATE: 60 BPM | BODY MASS INDEX: 26.01 KG/M2 | WEIGHT: 146.8 LBS | OXYGEN SATURATION: 98 %

## 2022-02-04 DIAGNOSIS — I95.9 SYMPTOMATIC HYPOTENSION: Primary | ICD-10-CM

## 2022-02-04 DIAGNOSIS — I10 ESSENTIAL HYPERTENSION: ICD-10-CM

## 2022-02-04 DIAGNOSIS — I25.10 CORONARY ARTERY DISEASE INVOLVING NATIVE CORONARY ARTERY OF NATIVE HEART WITHOUT ANGINA PECTORIS: ICD-10-CM

## 2022-02-04 DIAGNOSIS — I63.9 CEREBROVASCULAR ACCIDENT INVOLVING CEREBELLUM (H): ICD-10-CM

## 2022-02-04 PROCEDURE — 99215 OFFICE O/P EST HI 40 MIN: CPT | Performed by: NURSE PRACTITIONER

## 2022-02-04 RX ORDER — AMLODIPINE BESYLATE 2.5 MG/1
2.5 TABLET ORAL DAILY
Qty: 90 TABLET | Refills: 1 | Status: SHIPPED | OUTPATIENT
Start: 2022-02-04 | End: 2022-04-18

## 2022-02-04 RX ORDER — MULTIVIT WITH MINERALS/LUTEIN
2000 TABLET ORAL DAILY
COMMUNITY
End: 2024-09-13

## 2022-02-04 ASSESSMENT — MIFFLIN-ST. JEOR: SCORE: 1110.01

## 2022-02-04 NOTE — PROGRESS NOTES
Cardiology Clinic Progress Note  Salome Saeed MRN# [id number]   YOB: 1940 Age: 81 year old   Primary Cardiologist: Dr. Amaya  Reason for visit: Cardiology follow up             Assessment and Plan:   Salome Saeed is a very pleasant 81 year old female with a history of coronary artery disease, hypercholesterolemia, hypertension, hypothyroidism, chronic back pain with multiple surgeries, Parkinson's disease, CVA in 2013 manifesting as balance issues and strong family history of coronary artery disease.     1. Coronary artery disease s/p NAILA x 2 to LAD in 2009, s/p NAILA to OM 2015   - Nuclear stress test 10/2020 appeared normal  2. Symptomatic hypotension with diagnosis of hypertension  3. Hypercholesterolemia - lipid panel 12/2021 showed total cholesterol 144, HDL 74, LDL 64 and triglycerides 52   - Continue rosuvastatin   4. Hypothyroidism  5. 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     I saw Salome today for cardiology follow up after her atenolol was decreased to 25mg daily. She has noted resolution of her nightmares, also started taking melatonin at night and also noted improvement in her lightheadedness. She is still having episodes of lightheadedness during which she will check her blood pressure, recalling yesterday blood pressure 103/60. Given some continued symptomatic hypotension recommend decreasing amlodipine to 2.5mg daily. Advised to continue monitoring blood pressure and bring readings to clinic visit. Follow up in 1 month to evaluate after decrease in amlodipine. Encouraged to call with any questions/concerns.       Changes today: DECREASE amlodipine to 2.5mg daily     Follow up plan:    Follow up with me in 1 month         History of Presenting Illness:    Salome Saeed is a very pleasant 81 year old female with a history of coronary artery disease,  hypercholesterolemia, hypertension, hypothyroidism, chronic back pain with multiple surgeries, Parkinson's disease, CVA in 2013 manifesting as balance issues and strong family history of coronary artery disease.     Primary cardiologist Dr. Amaya. In 2009 she underwent NAILA x 2 to her LAD. In 2012 she had another coronary angiogram due to chest pain which showed no change in her anatomy. She had moderate disease in the PAD, ostial pinch in her first diagonal and 80% stenosis of the OM, medical management was recommended. In 2015 had chest pain, underwent coronary angiogram which again demonstrated no change in anatomy and medical management was recommended. One month later in October 2015 she was back in the ED with chest pain, she underwent coronary angiogram and stenting of her small OM with Promus NAILA (2.25 x 8mm). In 2017 she had some chest pain, nuclear stress test showed no evidence of ischemia. Most recently October 2020 she was again having chest pain and underwent nuclear stress test which appeared to be normal.     She saw Dr. Amaya in December 2021 at which time she was having some atypical symptoms but none concerning for angina. She had recently been diagnosed with Parkinsons and having nightmares. Given some association of nightmares with BB, her atenolol was decreased to 25mg daily.     Patient is here today for cardiology follow up.     Patient reports feeling good. Has noted improvement in her lightheadedness and nightmares. Denies shortness of breath at rest. Some occasional exertional dyspnea with more strenuous activity. Sleeping well. Denies orthopnea or PND. No nightmares. Denies chest pain or chest tightness. Continues to have back pain for which she is going to physical therapy. Has noted some improvement in lightheadedness, notes yesterday she had an episodes of lightheadedness her blood pressure was 103/60. States this has been occurring significantly less. Denies syncope. Denies  "tachycardia or palpitations. Taking medications daily.     Labs from 12/10/21 showed stable kidney function and electrolytes, total cholesterol 144, HDL 70, LDL 64 and triglycerides 52. Blood pressure 120/60 and HR 60 in clinic today. Notes she goes in \"spurts\" of checking her blood pressure. Notes typically 100-110 systolically at home.     Appetite good. Eating meals at home. No set exercise routine, tries to do some of the exercises physical therapy has recommended. Rare alcohol use. Denies tobacco use.         Social History      Social History     Socioeconomic History     Marital status:      Spouse name: Not on file     Number of children: Not on file     Years of education: Not on file     Highest education level: Not on file   Occupational History     Not on file   Tobacco Use     Smoking status: Former Smoker     Packs/day: 0.10     Quit date: 1965     Years since quittin.1     Smokeless tobacco: Never Used   Substance and Sexual Activity     Alcohol use: Yes     Alcohol/week: 0.0 standard drinks     Comment: 2 glasses wine per month, maybe     Drug use: No     Sexual activity: Not Currently   Other Topics Concern      Service Not Asked     Blood Transfusions No     Caffeine Concern Yes     Comment: 5 cups caffeine per day     Occupational Exposure Not Asked     Hobby Hazards Not Asked     Sleep Concern No     Stress Concern No     Weight Concern No     Comment: weight stable     Special Diet No     Comment: trying to eat healthier     Back Care Not Asked     Exercise Yes     Comment: stretching     Bike Helmet Not Asked     Seat Belt Yes     Self-Exams Yes     Parent/sibling w/ CABG, MI or angioplasty before 65F 55M? Yes   Social History Narrative    Works in an office     Social Determinants of Health     Financial Resource Strain: Not on file   Food Insecurity: Not on file   Transportation Needs: Not on file   Physical Activity: Not on file   Stress: Not on file   Social " "Connections: Not on file   Intimate Partner Violence: Not on file   Housing Stability: Not on file            Review of Systems:   Skin:  Negative     Eyes:  Negative    ENT:  Negative    Respiratory:  Positive for dyspnea on exertion;cough  Cardiovascular:    Positive for;chest pain  Gastroenterology: Positive for heartburn;constipation;diarrhea  Genitourinary:  Positive for urinary frequency  Musculoskeletal:  Positive for back pain;neck pain  Neurologic:  Negative    Psychiatric:  Negative    Heme/Lymph/Imm:  Negative    Endocrine:  Positive for thyroid disorder         Physical Exam:   Vitals: /60   Pulse 60   Ht 1.6 m (5' 3\")   Wt 66.6 kg (146 lb 12.8 oz)   LMP  (LMP Unknown)   SpO2 98%   BMI 26.00 kg/m     Wt Readings from Last 4 Encounters:   02/04/22 66.6 kg (146 lb 12.8 oz)   12/14/21 66.8 kg (147 lb 3.2 oz)   10/20/21 67.4 kg (148 lb 9.6 oz)   10/14/21 67.1 kg (148 lb)     GEN: well nourished, in no acute distress.  HEENT:  Pupils equal, round. Sclerae nonicteric.   NECK: Supple, no masses appreciated.   C/V:  Regular rate and rhythm, no murmur, rub or gallop.    RESP: Respirations are unlabored. Clear to auscultation bilaterally without wheezing, rales, or rhonchi.  GI: Abdomen soft, nontender.  EXTREM: No LE edema.  NEURO: Alert and oriented, cooperative.  SKIN: Warm and dry.       Data:     LIPID RESULTS:  Lab Results   Component Value Date    CHOL 144 12/10/2021    CHOL 149 10/14/2020    HDL 70 12/10/2021    HDL 78 10/14/2020    LDL 64 12/10/2021    LDL 57 10/14/2020    TRIG 52 12/10/2021    TRIG 70 10/14/2020    CHOLHDLRATIO 2.2 08/17/2015     LIVER ENZYME RESULTS:  Lab Results   Component Value Date    AST 35 09/14/2021    AST 21 05/24/2017    ALT 33 12/10/2021    ALT 28 10/14/2020     CBC RESULTS:  Lab Results   Component Value Date    WBC 5.4 09/14/2021    WBC 6.1 02/15/2021    RBC 4.68 09/14/2021    RBC 4.62 02/15/2021    HGB 13.5 09/14/2021    HGB 13.4 02/15/2021    HCT 40.1 " 09/14/2021    HCT 40.1 02/15/2021    MCV 86 09/14/2021    MCV 87 02/15/2021    MCH 28.8 09/14/2021    MCH 29.0 02/15/2021    MCHC 33.7 09/14/2021    MCHC 33.4 02/15/2021    RDW 13.3 09/14/2021    RDW 13.8 02/15/2021     09/14/2021     02/15/2021     BMP RESULTS:  Lab Results   Component Value Date     12/10/2021     10/14/2020    POTASSIUM 4.2 12/10/2021    POTASSIUM 4.0 10/14/2020    CHLORIDE 102 12/10/2021    CHLORIDE 98 10/14/2020    CO2 25 12/10/2021    CO2 28 10/14/2020    ANIONGAP 6 12/10/2021    ANIONGAP 7 10/14/2020     (H) 12/10/2021     (H) 10/14/2020    BUN 14 12/10/2021    BUN 14 10/14/2020    CR 0.73 12/10/2021    CR 0.77 10/14/2020    GFRESTIMATED 79 12/10/2021    GFRESTIMATED 73 10/14/2020    GFRESTBLACK 85 10/14/2020    ERIC 9.3 12/10/2021    ERIC 8.5 10/14/2020      INR RESULTS:  Lab Results   Component Value Date    INR 2.3 (H) 01/18/2022    INR 2.2 (H) 12/21/2021    INR 3.10 (H) 07/01/2021    INR 3.3 (H) 06/17/2021    INR 4.5 (H) 06/07/2021    INR 2.80 (H) 05/10/2021            Medications     Current Outpatient Medications   Medication Sig Dispense Refill     amLODIPine (NORVASC) 2.5 MG tablet Take 1 tablet (2.5 mg) by mouth daily 90 tablet 1     aspirin EC 81 MG tablet Take 1 tablet (81 mg) by mouth daily       atenolol (TENORMIN) 25 MG tablet Take 1 tablet (25 mg) by mouth daily (Patient taking differently: Take 25 mg by mouth every morning ) 90 tablet 3     Calcium Carb-Cholecalciferol (CALCIUM 600 + D PO) Take 1,200 mg by mouth daily        Cholecalciferol (VITAMIN D) 2000 UNITS tablet Take 2,000 Units by mouth daily        Coenzyme Q10 (COQ-10) 200 MG CAPS Take 400 mg by mouth daily        diclofenac (VOLTAREN) 1 % topical gel Apply 4 g topically 4 times daily 350 g 3     escitalopram (LEXAPRO) 10 MG tablet Take 5 mg by mouth daily  30 tablet 0     gatifloxacin (ZYMAXID) 0.5 % ophthalmic solution daily as needed        ketorolac (ACULAR) 0.5 % ophthalmic  solution instill one drop into right eye twice a day for one week then one drop once a day for 7 more weeks; begin drops 3 days before surgery       levothyroxine (SYNTHROID/LEVOTHROID) 100 MCG tablet TAKE 1 TABLET DAILY 90 tablet 0     lisinopril (ZESTRIL) 20 MG tablet Take 1 tablet (20 mg) by mouth daily 90 tablet 0     Multiple Vitamin (DAILY MULTIVITAMIN PO) Take by mouth daily       nitroGLYcerin (NITROSTAT) 0.4 MG sublingual tablet Place 1 tablet (0.4 mg) under the tongue every 5 minutes as needed for chest pain 25 tablet 2     prednisoLONE acetate (PRED FORTE) 1 % ophthalmic suspension daily        rosuvastatin (CRESTOR) 40 MG tablet Take 1 tablet (40 mg) by mouth daily 90 tablet 3     vitamin B complex with vitamin C (VITAMIN  B COMPLEX) TABS tablet Take 1 tablet by mouth daily        vitamin C (ASCORBIC ACID) 1000 MG TABS Take 2,000 mg by mouth daily       warfarin ANTICOAGULANT (COUMADIN) 5 MG tablet Take a half tablet (2.5 mg) by mouth daily except take one tablet (5 mg) Mon, Fri or as directed by INR Clinic. 60 tablet 1          Past Medical History     Past Medical History:   Diagnosis Date     CAD (coronary artery disease) 04/09/2009 4/8/2009: PTCA and two 2.5x15mm Xience stents to mid LAD lesion; Cath 12/2012- 40-50% stenosis in mid PDA, 80% on D1- medically treat , 5/28/2015 - PTCA and 2.25x8mm Promus PREMIIER NAILA to ostium of 2nd OM     Cerebral artery occlusion with cerebral infarction 2012     CVA (cerebral infarction)      DDD (degenerative disc disease)      Essential hypertension, benign      GERD (gastroesophageal reflux disease)      Hyperlipidemia      Hypothyroidism      Lumbar spinal stenosis      Mitral regurgitation     1+ per 7/2013 Echo     Vertebral artery stenosis, right      Past Surgical History:   Procedure Laterality Date     ARTHROPLASTY KNEE Left 6/1/2020    Procedure: Left total knee arthroplasty (Ward and Nephew #5 narrow femur; #3 tibia; 9 mm tibial poly and 35 mm  patella);  Surgeon: Jorge Luis Cheatham MD;  Location: [facility] OR     CORONARY ANGIOGRAPHY ADULT ORDER  2012    40-50% stenosis to mid PDA, 80% in D1- medically treat     CORONARY ANGIOGRAPHY ADULT ORDER  2015    PTCA and 2.25x8mm Promus PREMIER NAILA to ostium of 2nd OM     DECOMPRESSION, FUSION LUMBAR POSTERIOR THREE + LEVELS, COMBINED  2013    Procedure: COMBINED DECOMPRESSION, FUSION LUMBAR POSTERIOR THREE + LEVELS;  Posterior Lumbar Decompression L3-S1, Fusion L4-S1;  Surgeon: Daniel Harris MD;  Location: [facility] OR     ENDOSCOPIC STRIPPING VEIN(S)       HEART CATH, ANGIOPLASTY  2009    PTCA and two 2.5x15mm Xience stents to mid LAD lesion     HYSTERECTOMY, RICKIE      Fibroids, and Menorrhagia.. Bilateral Oophrectomy     ORTHOPEDIC SURGERY       Stenting of LAD, Angioplasty  09     TONSILLECTOMY      as a child     Family History   Problem Relation Age of Onset     Neurologic Disorder Mother         Dementia, Still living     Ovarian Cancer Mother      Thyroid Disease Mother      C.A.D. Father              Diabetes Father      Coronary Artery Disease Father      Alcohol/Drug Brother      Thyroid Disease Son      Diabetes Son             Allergies   Atorvastatin, Cats, Gluten, Oat, Shellfish allergy, and Wheat    40 minutes spent on the date of the encounter doing chart review, history and exam, documentation and further activities as noted above      KIANA [facility] C[facility]  Pager: [phone]

## 2022-02-04 NOTE — LETTER
2/4/2022    Surya Dyer, DO  [address] [facility] [address]    RE: Salome Saeed       Dear Colleague,     I had the pleasure of seeing Salome Saeed in the [facility].  Cardiology Clinic Progress Note  Salome Saeed MRN# [id number]   YOB: 1940 Age: 81 year old   Primary Cardiologist: Dr. Amaya  Reason for visit: Cardiology follow up             Assessment and Plan:   Salome Saeed is a very pleasant 81 year old female with a history of coronary artery disease, hypercholesterolemia, hypertension, hypothyroidism, chronic back pain with multiple surgeries, Parkinson's disease, CVA in 2013 manifesting as balance issues and strong family history of coronary artery disease.     1. Coronary artery disease s/p NAILA x 2 to LAD in 2009, s/p NAILA to OM 2015   - Nuclear stress test 10/2020 appeared normal  2. Symptomatic hypotension with diagnosis of hypertension  3. Hypercholesterolemia - lipid panel 12/2021 showed total cholesterol 144, HDL 74, LDL 64 and triglycerides 52   - Continue rosuvastatin   4. Hypothyroidism  5. 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     I saw Salome today for cardiology follow up after her atenolol was decreased to 25mg daily. She has noted resolution of her nightmares, also started taking melatonin at night and also noted improvement in her lightheadedness. She is still having episodes of lightheadedness during which she will check her blood pressure, recalling yesterday blood pressure 103/60. Given some continued symptomatic hypotension recommend decreasing amlodipine to 2.5mg daily. Advised to continue monitoring blood pressure and bring readings to clinic visit. Follow up in 1 month to evaluate after decrease in amlodipine. Encouraged to call with any questions/concerns.       Changes today: DECREASE  amlodipine to 2.5mg daily     Follow up plan:    Follow up with me in 1 month         History of Presenting Illness:    Salome Saeed is a very pleasant 81 year old female with a history of coronary artery disease, hypercholesterolemia, hypertension, hypothyroidism, chronic back pain with multiple surgeries, Parkinson's disease, CVA in 2013 manifesting as balance issues and strong family history of coronary artery disease.     Primary cardiologist Dr. Amaya. In 2009 she underwent NAILA x 2 to her LAD. In 2012 she had another coronary angiogram due to chest pain which showed no change in her anatomy. She had moderate disease in the PAD, ostial pinch in her first diagonal and 80% stenosis of the OM, medical management was recommended. In 2015 had chest pain, underwent coronary angiogram which again demonstrated no change in anatomy and medical management was recommended. One month later in October 2015 she was back in the ED with chest pain, she underwent coronary angiogram and stenting of her small OM with Promus NAILA (2.25 x 8mm). In 2017 she had some chest pain, nuclear stress test showed no evidence of ischemia. Most recently October 2020 she was again having chest pain and underwent nuclear stress test which appeared to be normal.     She saw Dr. Amaya in December 2021 at which time she was having some atypical symptoms but none concerning for angina. She had recently been diagnosed with Parkinsons and having nightmares. Given some association of nightmares with BB, her atenolol was decreased to 25mg daily.     Patient is here today for cardiology follow up.     Patient reports feeling good. Has noted improvement in her lightheadedness and nightmares. Denies shortness of breath at rest. Some occasional exertional dyspnea with more strenuous activity. Sleeping well. Denies orthopnea or PND. No nightmares. Denies chest pain or chest tightness. Continues to have back pain for which she is going to physical  "therapy. Has noted some improvement in lightheadedness, notes yesterday she had an episodes of lightheadedness her blood pressure was 103/60. States this has been occurring significantly less. Denies syncope. Denies tachycardia or palpitations. Taking medications daily.     Labs from 12/10/21 showed stable kidney function and electrolytes, total cholesterol 144, HDL 70, LDL 64 and triglycerides 52. Blood pressure 120/60 and HR 60 in clinic today. Notes she goes in \"spurts\" of checking her blood pressure. Notes typically 100-110 systolically at home.     Appetite good. Eating meals at home. No set exercise routine, tries to do some of the exercises physical therapy has recommended. Rare alcohol use. Denies tobacco use.         Social History      Social History     Socioeconomic History     Marital status:      Spouse name: Not on file     Number of children: Not on file     Years of education: Not on file     Highest education level: Not on file   Occupational History     Not on file   Tobacco Use     Smoking status: Former Smoker     Packs/day: 0.10     Quit date: 1965     Years since quittin.1     Smokeless tobacco: Never Used   Substance and Sexual Activity     Alcohol use: Yes     Alcohol/week: 0.0 standard drinks     Comment: 2 glasses wine per month, maybe     Drug use: No     Sexual activity: Not Currently   Other Topics Concern      Service Not Asked     Blood Transfusions No     Caffeine Concern Yes     Comment: 5 cups caffeine per day     Occupational Exposure Not Asked     Hobby Hazards Not Asked     Sleep Concern No     Stress Concern No     Weight Concern No     Comment: weight stable     Special Diet No     Comment: trying to eat healthier     Back Care Not Asked     Exercise Yes     Comment: stretching     Bike Helmet Not Asked     Seat Belt Yes     Self-Exams Yes     Parent/sibling w/ CABG, MI or angioplasty before 65F 55M? Yes   Social History Narrative    Works in an office " "    Social Determinants of Health     Financial Resource Strain: Not on file   Food Insecurity: Not on file   Transportation Needs: Not on file   Physical Activity: Not on file   Stress: Not on file   Social Connections: Not on file   Intimate Partner Violence: Not on file   Housing Stability: Not on file            Review of Systems:   Skin:  Negative     Eyes:  Negative    ENT:  Negative    Respiratory:  Positive for dyspnea on exertion;cough  Cardiovascular:    Positive for;chest pain  Gastroenterology: Positive for heartburn;constipation;diarrhea  Genitourinary:  Positive for urinary frequency  Musculoskeletal:  Positive for back pain;neck pain  Neurologic:  Negative    Psychiatric:  Negative    Heme/Lymph/Imm:  Negative    Endocrine:  Positive for thyroid disorder         Physical Exam:   Vitals: /60   Pulse 60   Ht 1.6 m (5' 3\")   Wt 66.6 kg (146 lb 12.8 oz)   LMP  (LMP Unknown)   SpO2 98%   BMI 26.00 kg/m     Wt Readings from Last 4 Encounters:   02/04/22 66.6 kg (146 lb 12.8 oz)   12/14/21 66.8 kg (147 lb 3.2 oz)   10/20/21 67.4 kg (148 lb 9.6 oz)   10/14/21 67.1 kg (148 lb)     GEN: well nourished, in no acute distress.  HEENT:  Pupils equal, round. Sclerae nonicteric.   NECK: Supple, no masses appreciated.   C/V:  Regular rate and rhythm, no murmur, rub or gallop.    RESP: Respirations are unlabored. Clear to auscultation bilaterally without wheezing, rales, or rhonchi.  GI: Abdomen soft, nontender.  EXTREM: No LE edema.  NEURO: Alert and oriented, cooperative.  SKIN: Warm and dry.       Data:     LIPID RESULTS:  Lab Results   Component Value Date    CHOL 144 12/10/2021    CHOL 149 10/14/2020    HDL 70 12/10/2021    HDL 78 10/14/2020    LDL 64 12/10/2021    LDL 57 10/14/2020    TRIG 52 12/10/2021    TRIG 70 10/14/2020    CHOLHDLRATIO 2.2 08/17/2015     LIVER ENZYME RESULTS:  Lab Results   Component Value Date    AST 35 09/14/2021    AST 21 05/24/2017    ALT 33 12/10/2021    ALT 28 10/14/2020 "     CBC RESULTS:  Lab Results   Component Value Date    WBC 5.4 09/14/2021    WBC 6.1 02/15/2021    RBC 4.68 09/14/2021    RBC 4.62 02/15/2021    HGB 13.5 09/14/2021    HGB 13.4 02/15/2021    HCT 40.1 09/14/2021    HCT 40.1 02/15/2021    MCV 86 09/14/2021    MCV 87 02/15/2021    MCH 28.8 09/14/2021    MCH 29.0 02/15/2021    MCHC 33.7 09/14/2021    MCHC 33.4 02/15/2021    RDW 13.3 09/14/2021    RDW 13.8 02/15/2021     09/14/2021     02/15/2021     BMP RESULTS:  Lab Results   Component Value Date     12/10/2021     10/14/2020    POTASSIUM 4.2 12/10/2021    POTASSIUM 4.0 10/14/2020    CHLORIDE 102 12/10/2021    CHLORIDE 98 10/14/2020    CO2 25 12/10/2021    CO2 28 10/14/2020    ANIONGAP 6 12/10/2021    ANIONGAP 7 10/14/2020     (H) 12/10/2021     (H) 10/14/2020    BUN 14 12/10/2021    BUN 14 10/14/2020    CR 0.73 12/10/2021    CR 0.77 10/14/2020    GFRESTIMATED 79 12/10/2021    GFRESTIMATED 73 10/14/2020    GFRESTBLACK 85 10/14/2020    ERIC 9.3 12/10/2021    ERIC 8.5 10/14/2020      INR RESULTS:  Lab Results   Component Value Date    INR 2.3 (H) 01/18/2022    INR 2.2 (H) 12/21/2021    INR 3.10 (H) 07/01/2021    INR 3.3 (H) 06/17/2021    INR 4.5 (H) 06/07/2021    INR 2.80 (H) 05/10/2021            Medications     Current Outpatient Medications   Medication Sig Dispense Refill     amLODIPine (NORVASC) 2.5 MG tablet Take 1 tablet (2.5 mg) by mouth daily 90 tablet 1     aspirin EC 81 MG tablet Take 1 tablet (81 mg) by mouth daily       atenolol (TENORMIN) 25 MG tablet Take 1 tablet (25 mg) by mouth daily (Patient taking differently: Take 25 mg by mouth every morning ) 90 tablet 3     Calcium Carb-Cholecalciferol (CALCIUM 600 + D PO) Take 1,200 mg by mouth daily        Cholecalciferol (VITAMIN D) 2000 UNITS tablet Take 2,000 Units by mouth daily        Coenzyme Q10 (COQ-10) 200 MG CAPS Take 400 mg by mouth daily        diclofenac (VOLTAREN) 1 % topical gel Apply 4 g topically 4 times  daily 350 g 3     escitalopram (LEXAPRO) 10 MG tablet Take 5 mg by mouth daily  30 tablet 0     gatifloxacin (ZYMAXID) 0.5 % ophthalmic solution daily as needed        ketorolac (ACULAR) 0.5 % ophthalmic solution instill one drop into right eye twice a day for one week then one drop once a day for 7 more weeks; begin drops 3 days before surgery       levothyroxine (SYNTHROID/LEVOTHROID) 100 MCG tablet TAKE 1 TABLET DAILY 90 tablet 0     lisinopril (ZESTRIL) 20 MG tablet Take 1 tablet (20 mg) by mouth daily 90 tablet 0     Multiple Vitamin (DAILY MULTIVITAMIN PO) Take by mouth daily       nitroGLYcerin (NITROSTAT) 0.4 MG sublingual tablet Place 1 tablet (0.4 mg) under the tongue every 5 minutes as needed for chest pain 25 tablet 2     prednisoLONE acetate (PRED FORTE) 1 % ophthalmic suspension daily        rosuvastatin (CRESTOR) 40 MG tablet Take 1 tablet (40 mg) by mouth daily 90 tablet 3     vitamin B complex with vitamin C (VITAMIN  B COMPLEX) TABS tablet Take 1 tablet by mouth daily        vitamin C (ASCORBIC ACID) 1000 MG TABS Take 2,000 mg by mouth daily       warfarin ANTICOAGULANT (COUMADIN) 5 MG tablet Take a half tablet (2.5 mg) by mouth daily except take one tablet (5 mg) Mon, Fri or as directed by INR Clinic. 60 tablet 1          Past Medical History     Past Medical History:   Diagnosis Date     CAD (coronary artery disease) 04/09/2009 4/8/2009: PTCA and two 2.5x15mm Xience stents to mid LAD lesion; Cath 12/2012- 40-50% stenosis in mid PDA, 80% on D1- medically treat , 5/28/2015 - PTCA and 2.25x8mm Promus PREMIIER NAILA to ostium of 2nd OM     Cerebral artery occlusion with cerebral infarction 2012     CVA (cerebral infarction)      DDD (degenerative disc disease)      Essential hypertension, benign      GERD (gastroesophageal reflux disease)      Hyperlipidemia      Hypothyroidism      Lumbar spinal stenosis      Mitral regurgitation     1+ per 7/2013 Echo     Vertebral artery stenosis, right      Past  Surgical History:   Procedure Laterality Date     ARTHROPLASTY KNEE Left 2020    Procedure: Left total knee arthroplasty (Ward and Nephew #5 narrow femur; #3 tibia; 9 mm tibial poly and 35 mm patella);  Surgeon: Jorge Luis Cheatham MD;  Location: [facility] OR     CORONARY ANGIOGRAPHY ADULT ORDER  2012    40-50% stenosis to mid PDA, 80% in D1- medically treat     CORONARY ANGIOGRAPHY ADULT ORDER  2015    PTCA and 2.25x8mm Promus PREMIER NAILA to ostium of 2nd OM     DECOMPRESSION, FUSION LUMBAR POSTERIOR THREE + LEVELS, COMBINED  2013    Procedure: COMBINED DECOMPRESSION, FUSION LUMBAR POSTERIOR THREE + LEVELS;  Posterior Lumbar Decompression L3-S1, Fusion L4-S1;  Surgeon: Daniel Harris MD;  Location: [facility] OR     ENDOSCOPIC STRIPPING VEIN(S)       HEART CATH, ANGIOPLASTY  2009    PTCA and two 2.5x15mm Xience stents to mid LAD lesion     HYSTERECTOMY, RICKIE      Fibroids, and Menorrhagia.. Bilateral Oophrectomy     ORTHOPEDIC SURGERY       Stenting of LAD, Angioplasty  09     TONSILLECTOMY      as a child     Family History   Problem Relation Age of Onset     Neurologic Disorder Mother         Dementia, Still living     Ovarian Cancer Mother      Thyroid Disease Mother      C.A.D. Father              Diabetes Father      Coronary Artery Disease Father      Alcohol/Drug Brother      Thyroid Disease Son      Diabetes Son             Allergies   Atorvastatin, Cats, Gluten, Oat, Shellfish allergy, and Wheat    40 minutes spent on the date of the encounter doing chart review, history and exam, documentation and further activities as noted above      KIANA Jorge CNP  [facility]  Pager: [phone]        Thank you for allowing me to participate in the care of your patient.      Sincerely,     KIANA Jorge CNP     [facility]  cc:   Referred Self  No address on file

## 2022-02-04 NOTE — PATIENT INSTRUCTIONS
1. Decrease amlodipine to 2.5mg daily   - Current prescription 2.5mg = 1/2 tablet daily   - New prescription 2.5mg = 1 tablet daily  2. Continue to monitor blood pressure at home, bring log to clinic  3. Please call with any questions/concerns [phone]  4. Follow up with Astrid in 1 month

## 2022-02-18 ENCOUNTER — HOSPITAL ENCOUNTER (OUTPATIENT)
Dept: PHYSICAL THERAPY | Facility: CLINIC | Age: 82
Setting detail: THERAPIES SERIES
End: 2022-02-18
Attending: PSYCHIATRY & NEUROLOGY
Payer: COMMERCIAL

## 2022-02-18 PROCEDURE — 97110 THERAPEUTIC EXERCISES: CPT | Mod: GP | Performed by: PHYSICAL THERAPIST

## 2022-02-28 ENCOUNTER — ANTICOAGULATION THERAPY VISIT (OUTPATIENT)
Dept: ANTICOAGULATION | Facility: CLINIC | Age: 82
End: 2022-02-28

## 2022-02-28 ENCOUNTER — LAB (OUTPATIENT)
Dept: LAB | Facility: CLINIC | Age: 82
End: 2022-02-28
Payer: COMMERCIAL

## 2022-02-28 DIAGNOSIS — I63.9 CEREBROVASCULAR ACCIDENT INVOLVING CEREBELLUM (H): ICD-10-CM

## 2022-02-28 DIAGNOSIS — Z79.01 LONG TERM CURRENT USE OF ANTICOAGULANT THERAPY: Primary | ICD-10-CM

## 2022-02-28 DIAGNOSIS — Z79.01 LONG TERM CURRENT USE OF ANTICOAGULANT THERAPY: ICD-10-CM

## 2022-02-28 LAB — INR BLD: 1.9 (ref 0.9–1.1)

## 2022-02-28 PROCEDURE — 36416 COLLJ CAPILLARY BLOOD SPEC: CPT

## 2022-02-28 PROCEDURE — 85610 PROTHROMBIN TIME: CPT

## 2022-02-28 NOTE — PROGRESS NOTES
ANTICOAGULATION MANAGEMENT     Salome Saeed 81 year old female is on warfarin with subtherapeutic INR result. (Goal INR 2.0-3.0)    Recent labs: (last 7 days)     02/28/22  1255   INR 1.9*       ASSESSMENT       Source(s): Chart Review and Patient/Caregiver Call       Warfarin doses taken: Warfarin taken as instructed    Diet: Increased greens/vitamin K in diet; plans to resume previous intake. Patient reports she had been eating a lot of broccoli    New illness, injury, or hospitalization: No    Medication/supplement changes: None noted    Signs or symptoms of bleeding or clotting: No    Previous INR: Therapeutic last 2 visits    Additional findings: None       PLAN     Recommended plan for temporary change(s) affecting INR     Dosing Instructions: Continue your current warfarin dose with next INR in 2 weeks       Summary  As of 2/28/2022    Full warfarin instructions:  5 mg every Mon, Fri; 2.5 mg all other days   Next INR check:  3/14/2022             Telephone call with Salome who agrees to plan and repeated back plan correctly    Lab visit scheduled    Education provided: Please call back if any changes to your diet, medications or how you've been taking warfarin and Contact [phone]  with any changes, questions or concerns.     Plan made per [facility] anticoagulation protocol    Renee Martinez RN  Anticoagulation Clinic  2/28/2022    _______________________________________________________________________     Anticoagulation Episode Summary     Current INR goal:  2.0-3.0   TTR:  46.9 % (1 y)   Target end date:  Indefinite   Send INR reminders to:  [facility]    Indications    Long term current use of anticoagulant therapy [Z79.01]  Cerebrovascular accident involving cerebellum (H) [I63.9]           Comments:           Anticoagulation Care Providers     Provider Role Specialty Phone number    Surya Dyer DO Referring Family Medicine [phone]

## 2022-03-03 ENCOUNTER — HOSPITAL ENCOUNTER (OUTPATIENT)
Dept: PHYSICAL THERAPY | Facility: CLINIC | Age: 82
Setting detail: THERAPIES SERIES
End: 2022-03-03
Attending: PSYCHIATRY & NEUROLOGY
Payer: COMMERCIAL

## 2022-03-03 PROCEDURE — 97110 THERAPEUTIC EXERCISES: CPT | Mod: GP | Performed by: PHYSICAL THERAPIST

## 2022-03-17 ENCOUNTER — ANTICOAGULATION THERAPY VISIT (OUTPATIENT)
Dept: ANTICOAGULATION | Facility: CLINIC | Age: 82
End: 2022-03-17

## 2022-03-17 ENCOUNTER — LAB (OUTPATIENT)
Dept: LAB | Facility: CLINIC | Age: 82
End: 2022-03-17
Payer: COMMERCIAL

## 2022-03-17 DIAGNOSIS — Z79.01 LONG TERM CURRENT USE OF ANTICOAGULANT THERAPY: Primary | ICD-10-CM

## 2022-03-17 DIAGNOSIS — Z79.01 LONG TERM CURRENT USE OF ANTICOAGULANT THERAPY: ICD-10-CM

## 2022-03-17 DIAGNOSIS — I63.9 CEREBROVASCULAR ACCIDENT INVOLVING CEREBELLUM (H): ICD-10-CM

## 2022-03-17 LAB — INR BLD: 2.4 (ref 0.9–1.1)

## 2022-03-17 PROCEDURE — 36416 COLLJ CAPILLARY BLOOD SPEC: CPT

## 2022-03-17 PROCEDURE — 85610 PROTHROMBIN TIME: CPT

## 2022-03-17 NOTE — PROGRESS NOTES
ANTICOAGULATION MANAGEMENT     Salome Saeed 81 year old female is on warfarin with therapeutic INR result. (Goal INR 2.0-3.0)    Recent labs: (last 7 days)     03/17/22  1329   INR 2.4*       ASSESSMENT       Source(s): Chart Review and Patient/Caregiver Call       Warfarin doses taken: Warfarin taken as instructed    Diet: No new diet changes identified--patient states she is staying consistent with her greens, she reported that she ate increased amounts of broccoli prior to her INR on 2/28/22.    New illness, injury, or hospitalization: No    Medication/supplement changes: None noted    Signs or symptoms of bleeding or clotting: No    Previous INR: Subtherapeutic    Additional findings: None       PLAN     Recommended plan for no diet, medication or health factor changes affecting INR     Dosing Instructions: Continue your current warfarin dose with next INR in 4 weeks       Summary  As of 3/17/2022    Full warfarin instructions:  5 mg every Mon, Fri; 2.5 mg all other days   Next INR check:  4/14/2022             Telephone call with Salome who verbalizes understanding and agrees to plan    Lab visit scheduled    Education provided: Importance of consistent vitamin K intake and Contact [phone]  with any changes, questions or concerns.     Plan made per [facility] anticoagulation protocol    Madina Brannon, RN  Anticoagulation Clinic  3/17/2022    _______________________________________________________________________     Anticoagulation Episode Summary     Current INR goal:  2.0-3.0   TTR:  47.5 % (1 y)   Target end date:  Indefinite   Send INR reminders to:  [facility]    Indications    Long term current use of anticoagulant therapy [Z79.01]  Cerebrovascular accident involving cerebellum (H) [I63.9]           Comments:           Anticoagulation Care Providers     Provider Role Specialty Phone number    Surya Dyer DO Referring Family [facility] [phone]            '

## 2022-03-30 NOTE — MR AVS SNAPSHOT
After Visit Summary   6/12/2017    Salome Saeed    MRN: [id number]           Patient Information     Date Of Birth          1940        Visit Information        Provider Department      6/12/2017 11:40 AM Weiler, Karen, MD [facility]        Today's Diagnoses     Abdominal pain, epigastric    -  1    Anxiety           Follow-ups after your visit        Your next 10 appointments already scheduled     Jun 28, 2017  1:20 PM CDT   KELSEY Extremity with Elijah Mcdaniels PT   [facility] [place] ([facility])    [address]  [place] [address]   [phone]            Jun 29, 2017  2:30 PM CDT   Anticoagulation Visit with [facility] ANTICOAGULATION CLINIC   [facility] ([facility])    [address] [place] [id number] [phone]            Sep 01, 2017  9:15 AM CDT   LAB with RU LAB   [facility] ([facility])    [address] [facility] [address]  [facility] [address] [phone]           Patient must bring picture ID.  Patient should be prepared to give a urine specimen  Please do not eat 10-12 hours before your appointment if you are coming in fasting for labs on lipids, cholesterol, or glucose (sugar).  Pregnant women should follow their Care Team instructions. Water with medications is okay. Do not drink coffee or other fluids.   If you have concerns about taking  your medications, please ask at office or if scheduling via [employer], send a message by clicking on Secure Messaging, Message Your Care Team.            Sep 01, 2017 10:10 AM CDT   Return Visit with KIANA Carreno CNP   [facility] ([facility])    [address]  [place] [id number] [phone]              Who to contact     If you have questions or need follow up information about today's clinic visit or your schedule please contact  [facility] 4 17 19 - [facility] directly at [phone].  Normal or non-critical lab and imaging results will be communicated to you by MyChart, letter or phone within 4 business days after the clinic has received the results. If you do not hear from us within 7 days, please contact the clinic through [employer] or phone. If you have a critical or abnormal lab result, we will notify you by phone as soon as possible.  Submit refill requests through Let's Talk or call your pharmacy and they will forward the refill request to us. Please allow 3 business days for your refill to be completed.          Additional Information About Your Visit        [employer] Information     Let's Talk lets you send messages to your doctor, view your test results, renew your prescriptions, schedule appointments and more. To sign up, go to [email or web address] . Click on \"Log in\" on the left side of the screen, which will take you to the Welcome page. Then click on \"Sign up Now\" on the right side of the page.     You will be asked to enter the access code listed below, as well as some personal information. Please follow the directions to create your username and password.     Your access code is: [email or web address]  Expires: 2017  7:41 PM     Your access code will  in 90 days. If you need help or a new code, please call your [facility] clinic or [phone].        Care EveryWhere ID     This is your Care EveryWhere ID. This could be used by other organizations to access your [facility] medical records  [id number]        Your Vitals Were     Pulse Temperature Height Pulse Oximetry BMI (Body Mass Index)       62 97.8  F (36.6  C) (Oral) 5' 4\" (1.626 m) 97% 25.51 kg/m2        Blood Pressure from Last 3 Encounters:   17 130/62   17 118/60   04/10/17 119/66    Weight from Last 3 Encounters:   17 148 lb 9.6 oz (67.4 kg)   17 151 lb (68.5 kg)   04/10/17 153 lb 12.8 oz (69.8 kg)              Today, you had the following     No orders " found for display         Today's Medication Changes          These changes are accurate as of: 6/12/17 11:59 PM.  If you have any questions, ask your nurse or doctor.               These medicines have changed or have updated prescriptions.        Dose/Directions    escitalopram 5 MG tablet   Commonly known as:  LEXAPRO   This may have changed:  how much to take   Used for:  Anxiety        Dose:  7.5 mg   Take 1.5 tablets (7.5 mg) by mouth daily   Quantity:  90 tablet   Refills:  3            Where to get your medicines      Some of these will need a paper prescription and others can be bought over the counter.  Ask your nurse if you have questions.     You don't need a prescription for these medications     escitalopram 5 MG tablet                Primary Care Provider Office Phone # Fax #    Karen Weiler, [phone]       [facility] [address] [facility] [address]        Equal Access to Services     BRICE OLIVARES : Hadii solange ty hadasho Soomaali, waaxda luqadaha, qaybta kaalmada adebrendayada, sonia pan . So [facility] [phone].    ATENCIÓN: Si habla español, tiene a smith disposición servicios gratuitos de asistencia lingüística. Llame al [phone].    We comply with applicable federal civil rights laws and [place] laws. We do not discriminate on the basis of race, color, national origin, age, disability sex, sexual orientation or gender identity.            Thank you!     Thank you for choosing [facility]  for your care. Our goal is always to provide you with excellent care. Hearing back from our patients is one way we can continue to improve our services. Please take a few minutes to complete the written survey that you may receive in the mail after your visit with us. Thank you!             Your Updated Medication List - Protect others around you: Learn how to safely use, store and throw away your medicines at www.disposemymeds.org.           This list is accurate as of: 6/12/17 11:59 PM.  Always use your most recent med list.                   Brand Name Dispense Instructions for use Diagnosis    acetaminophen 325 MG tablet    TYLENOL     Take 2 tablets by mouth every 6 hours as needed for pain.        amLODIPine 5 MG tablet    NORVASC    90 tablet    Take 1 tablet (5 mg) by mouth daily    HTN (hypertension)       ascorbic acid 500 MG tablet    VITAMIN C     Take 1,000 mg by mouth daily        aspirin EC 81 MG EC tablet      Take 1 tablet (81 mg) by mouth daily    Coronary artery disease involving native coronary artery of native heart without angina pectoris       atenolol 25 MG tablet    TENORMIN    180 tablet    Take 1 tablet (25 mg) by mouth 2 times daily    CAD (coronary artery disease)       CALCIUM 600 + D PO      Take 1,200 mg by mouth daily        CoQ-10 200 MG Caps      Take 400 mg by mouth daily        DAILY MULTIVITAMIN PO      Take by mouth daily        enoxaparin 60 MG/0.6ML injection    LOVENOX    1.2 mL    Inject 0.6 mLs (60 mg) Subcutaneous daily    Long-term (current) use of anticoagulants       escitalopram 5 MG tablet    LEXAPRO    90 tablet    Take 1.5 tablets (7.5 mg) by mouth daily    Anxiety       levothyroxine 100 MCG tablet    SYNTHROID/LEVOTHROID    30 tablet    TAKE 1 TABLET EVERY DAY    Hypothyroidism, unspecified type       lisinopril 20 MG tablet    PRINIVIL/ZESTRIL    90 tablet    Take 1 tablet (20 mg) by mouth daily    Essential hypertension, benign       nitroglycerin 0.4 MG sublingual tablet    NITROSTAT    25 tablet    Place 1 tablet (0.4 mg) under the tongue every 5 minutes as needed for chest pain    CAD (coronary artery disease)       oxyCODONE 5 MG IR tablet    ROXICODONE          ranitidine 150 MG tablet    ZANTAC    180 tablet    Take 1 tablet (150 mg) by mouth 2 times daily    Gastroesophageal reflux disease with esophagitis       rosuvastatin 10 MG tablet    CRESTOR    90 tablet    Take 1 tablet (10 mg) by  mouth daily    Hyperlipidemia LDL goal <70       vitamin D 2000 UNITS tablet      Take 2,000 Units by mouth daily        warfarin 5 MG tablet    COUMADIN    90 tablet    Take 0.5-1 tab daily as directed by INR nurse based on INR reading.    Cerebrovascular accident involving cerebellum (H)

## 2022-04-01 ENCOUNTER — OFFICE VISIT (OUTPATIENT)
Dept: CARDIOLOGY | Facility: CLINIC | Age: 82
End: 2022-04-01
Attending: NURSE PRACTITIONER
Payer: COMMERCIAL

## 2022-04-01 VITALS
WEIGHT: 146.7 LBS | HEART RATE: 63 BPM | SYSTOLIC BLOOD PRESSURE: 140 MMHG | BODY MASS INDEX: 25.99 KG/M2 | HEIGHT: 63 IN | DIASTOLIC BLOOD PRESSURE: 68 MMHG | OXYGEN SATURATION: 99 %

## 2022-04-01 DIAGNOSIS — I10 BENIGN ESSENTIAL HYPERTENSION: Chronic | ICD-10-CM

## 2022-04-01 DIAGNOSIS — I10 ESSENTIAL HYPERTENSION: Primary | ICD-10-CM

## 2022-04-01 DIAGNOSIS — I25.10 CORONARY ARTERY DISEASE INVOLVING NATIVE CORONARY ARTERY OF NATIVE HEART WITHOUT ANGINA PECTORIS: ICD-10-CM

## 2022-04-01 DIAGNOSIS — E78.5 HYPERLIPIDEMIA LDL GOAL <70: ICD-10-CM

## 2022-04-01 DIAGNOSIS — I63.9 CEREBROVASCULAR ACCIDENT INVOLVING CEREBELLUM (H): ICD-10-CM

## 2022-04-01 PROCEDURE — 99214 OFFICE O/P EST MOD 30 MIN: CPT | Performed by: NURSE PRACTITIONER

## 2022-04-01 RX ORDER — ESCITALOPRAM OXALATE 5 MG/1
5 TABLET ORAL DAILY PRN
COMMUNITY
End: 2023-05-22

## 2022-04-01 NOTE — PATIENT INSTRUCTIONS
1. No medication changes  2. Follow up with Dr. Amaya in 6 months  3. Please call with any questions/concerns [phone]

## 2022-04-01 NOTE — LETTER
4/1/2022    Surya Dyer, DO  [address] [facility] [address]    RE: Salome Saeed       Dear Colleague,     I had the pleasure of seeing Salome Saeed in the [facility].  Cardiology Clinic Progress Note  Salome Saeed MRN# [id number]   YOB: 1940 Age: 81 year old   Primary Cardiologist: Dr. Amaya  Reason for visit: Cardiology follow up             Assessment and Plan:   Salome Saeed is a very pleasant 81 year old female with a history of coronary artery disease, hypercholesterolemia, hypertension, hypothyroidism, chronic back pain with multiple surgeries, Parkinson's disease, CVA in 2013 manifesting as balance issues and strong family history of coronary artery disease.      1. Coronary artery disease s/p NAILA x 2 to LAD in 2009, s/p NAILA to OM 2015              - Nuclear stress test 10/2020 appeared normal  2. Symptomatic hypotension with diagnosis of hypertension - Resolved with adjustments in antihypertensive agents  3. Hypercholesterolemia - lipid panel 12/2021 showed total cholesterol 144, HDL 74, LDL 64 and triglycerides 52              - Continue rosuvastatin   4. Hypothyroidism  5. 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    I saw Loreta today for cardiology follow up. During our last office visit she reported still having episodes of lightheadedness during which she will check her blood pressure, recalling blood pressure 103/60. Given some continued symptomatic hypotension recommend decreasing amlodipine to 2.5mg daily. This has since resolved lightheadedness and no significantly low BP readings at home. She brought her home BP results for review today which showed stable controlled BP readings 100-130/60-70. We also checked her home BP monitor which was accurate when compared to ours. Recommend continuing current  medications. Support given today. All questions answered. Encouraged to call with any questions/concerns.        Changes today: none    Follow up plan:     Follow up with Dr. Amaya in 6-8 months         History of Presenting Illness:    Salome Saeed is a very pleasant 81 year old female with a history of coronary artery disease, hypercholesterolemia, hypertension, hypothyroidism, chronic back pain with multiple surgeries, Parkinson's disease, CVA in 2013 manifesting as balance issues and strong family history of coronary artery disease.      Primary cardiologist Dr. Amaya. In 2009 she underwent NAILA x 2 to her LAD. In 2012 she had another coronary angiogram due to chest pain which showed no change in her anatomy. She had moderate disease in the PAD, ostial pinch in her first diagonal and 80% stenosis of the OM, medical management was recommended. In 2015 had chest pain, underwent coronary angiogram which again demonstrated no change in anatomy and medical management was recommended. One month later in October 2015 she was back in the ED with chest pain, she underwent coronary angiogram and stenting of her small OM with Promus NAILA (2.25 x 8mm). In 2017 she had some chest pain, nuclear stress test showed no evidence of ischemia. Most recently October 2020 she was again having chest pain and underwent nuclear stress test which appeared to be normal.      She saw Dr. Amaya in December 2021 at which time she was having some atypical symptoms but none concerning for angina. She had recently been diagnosed with Parkinsons and having nightmares. Given some association of nightmares with BB, her atenolol was decreased to 25mg daily.     I saw her in February at which time she noted resolution of her nightmares on decreased atenolol dose. She reported still having episodes of lightheadedness during which she will check her blood pressure, recalling blood pressure 103/60. Given some continued symptomatic  hypotension recommend decreasing amlodipine to 2.5mg daily.    Patient is here today for cardiology follow up.     Patient reports feeling good. Denies chest pain or chest tightness. Denies shortness of breath at rest. Exertional dyspnea with more strenuous activity. Denies LE edema. Denies dizziness, lightheadedness or other presyncopal symptoms. Denies tachycardia or palpitations. Taking medications daily.      Labs from 12/10/21 showed stable kidney function and electrolytes, total cholesterol 144, HDL 70, LDL 64 and triglycerides 52. Blood pressure 160/72, 153/78 and HR 63 in clinic today. Home BP readings 100-130/60-70.     Appetite good. Eating meals at home. No set exercise routine, attending PT for her Parkinsons. Rare alcohol use. Denies tobacco use.         Social History      Social History     Socioeconomic History     Marital status:      Spouse name: Not on file     Number of children: Not on file     Years of education: Not on file     Highest education level: Not on file   Occupational History     Not on file   Tobacco Use     Smoking status: Former Smoker     Packs/day: 0.10     Quit date: 1965     Years since quittin.2     Smokeless tobacco: Never Used   Substance and Sexual Activity     Alcohol use: Yes     Alcohol/week: 0.0 standard drinks     Comment: 2 glasses wine per month, maybe     Drug use: No     Sexual activity: Not Currently   Other Topics Concern      Service Not Asked     Blood Transfusions No     Caffeine Concern Yes     Comment: 5 cups caffeine per day     Occupational Exposure Not Asked     Hobby Hazards Not Asked     Sleep Concern No     Stress Concern No     Weight Concern No     Comment: weight stable     Special Diet No     Comment: trying to eat healthier     Back Care Not Asked     Exercise Yes     Comment: stretching     Bike Helmet Not Asked     Seat Belt Yes     Self-Exams Yes     Parent/sibling w/ CABG, MI or angioplasty before 65F 55M? Yes  "  Social History Narrative    Works in an office     Social Determinants of Health     Financial Resource Strain: Not on file   Food Insecurity: Not on file   Transportation Needs: Not on file   Physical Activity: Not on file   Stress: Not on file   Social Connections: Not on file   Intimate Partner Violence: Not on file   Housing Stability: Not on file            Review of Systems:   Skin:  Negative     Eyes:  Negative    ENT:  Negative    Respiratory:  Positive for dyspnea on exertion;cough  Cardiovascular:    Positive for;chest pain;dizziness;lightheadedness  Gastroenterology: Positive for heartburn;constipation;diarrhea  Genitourinary:  Positive for urinary frequency  Musculoskeletal:  Positive for back pain;neck pain  Neurologic:  Negative    Psychiatric:  Negative    Heme/Lymph/Imm:  Negative    Endocrine:  Positive for thyroid disorder         Physical Exam:   Vitals: BP (!) 160/72 (BP Location: Right arm, Cuff Size: Adult Regular)   Pulse 63   Ht 1.6 m (5' 3\")   Wt 66.5 kg (146 lb 11.2 oz)   LMP  (LMP Unknown)   SpO2 99%   BMI 25.99 kg/m     Wt Readings from Last 4 Encounters:   04/01/22 66.5 kg (146 lb 11.2 oz)   02/04/22 66.6 kg (146 lb 12.8 oz)   12/14/21 66.8 kg (147 lb 3.2 oz)   10/20/21 67.4 kg (148 lb 9.6 oz)     GEN: well nourished, in no acute distress.  HEENT:  Pupils equal, round. Sclerae nonicteric.   NECK: Supple, no masses appreciated. JVP appears normal.   C/V:  Regular rate and rhythm, no murmur, rub or gallop.    RESP: Respirations are unlabored. Clear to auscultation bilaterally without wheezing, rales, or rhonchi.  GI: Abdomen soft, nontender.  EXTREM: No LE edema.  NEURO: Alert and oriented, cooperative.  SKIN: Warm and dry.        Data:     LIPID RESULTS:  Lab Results   Component Value Date    CHOL 144 12/10/2021    CHOL 149 10/14/2020    HDL 70 12/10/2021    HDL 78 10/14/2020    LDL 64 12/10/2021    LDL 57 10/14/2020    TRIG 52 12/10/2021    TRIG 70 10/14/2020    CHOLHDLRATIO 2.2 " 08/17/2015     LIVER ENZYME RESULTS:  Lab Results   Component Value Date    AST 35 09/14/2021    AST 21 05/24/2017    ALT 33 12/10/2021    ALT 28 10/14/2020     CBC RESULTS:  Lab Results   Component Value Date    WBC 5.4 09/14/2021    WBC 6.1 02/15/2021    RBC 4.68 09/14/2021    RBC 4.62 02/15/2021    HGB 13.5 09/14/2021    HGB 13.4 02/15/2021    HCT 40.1 09/14/2021    HCT 40.1 02/15/2021    MCV 86 09/14/2021    MCV 87 02/15/2021    MCH 28.8 09/14/2021    MCH 29.0 02/15/2021    MCHC 33.7 09/14/2021    MCHC 33.4 02/15/2021    RDW 13.3 09/14/2021    RDW 13.8 02/15/2021     09/14/2021     02/15/2021     BMP RESULTS:  Lab Results   Component Value Date     12/10/2021     10/14/2020    POTASSIUM 4.2 12/10/2021    POTASSIUM 4.0 10/14/2020    CHLORIDE 102 12/10/2021    CHLORIDE 98 10/14/2020    CO2 25 12/10/2021    CO2 28 10/14/2020    ANIONGAP 6 12/10/2021    ANIONGAP 7 10/14/2020     (H) 12/10/2021     (H) 10/14/2020    BUN 14 12/10/2021    BUN 14 10/14/2020    CR 0.73 12/10/2021    CR 0.77 10/14/2020    GFRESTIMATED 79 12/10/2021    GFRESTIMATED 73 10/14/2020    GFRESTBLACK 85 10/14/2020    ERIC 9.3 12/10/2021    ERIC 8.5 10/14/2020      A1C RESULTS:  No results found for: A1C  INR RESULTS:  Lab Results   Component Value Date    INR 2.4 (H) 03/17/2022    INR 1.9 (H) 02/28/2022    INR 3.10 (H) 07/01/2021    INR 3.3 (H) 06/17/2021    INR 4.5 (H) 06/07/2021    INR 2.80 (H) 05/10/2021            Medications     Current Outpatient Medications   Medication Sig Dispense Refill     amLODIPine (NORVASC) 2.5 MG tablet Take 1 tablet (2.5 mg) by mouth daily (Patient taking differently: Take 2.5 mg by mouth every evening ) 90 tablet 1     aspirin EC 81 MG tablet Take 1 tablet (81 mg) by mouth daily       atenolol (TENORMIN) 25 MG tablet Take 1 tablet (25 mg) by mouth daily (Patient taking differently: Take 25 mg by mouth every morning ) 90 tablet 3     Calcium Carb-Cholecalciferol (CALCIUM 600 +  D PO) Take 1,200 mg by mouth daily        Cholecalciferol (VITAMIN D) 2000 UNITS tablet Take 2,000 Units by mouth daily        Coenzyme Q10 (COQ-10) 200 MG CAPS Take 400 mg by mouth daily        diclofenac (VOLTAREN) 1 % topical gel Apply 4 g topically 4 times daily 350 g 3     escitalopram (LEXAPRO) 5 MG tablet Take 5 mg by mouth daily       gatifloxacin (ZYMAXID) 0.5 % ophthalmic solution daily as needed        ketorolac (ACULAR) 0.5 % ophthalmic solution instill one drop into right eye twice a day for one week then one drop once a day for 7 more weeks; begin drops 3 days before surgery       levothyroxine (SYNTHROID/LEVOTHROID) 100 MCG tablet TAKE 1 TABLET DAILY 90 tablet 3     lisinopril (ZESTRIL) 20 MG tablet Take 1 tablet (20 mg) by mouth daily (Patient taking differently: Take 20 mg by mouth every morning ) 90 tablet 0     Multiple Vitamin (DAILY MULTIVITAMIN PO) Take by mouth daily       nitroGLYcerin (NITROSTAT) 0.4 MG sublingual tablet Place 1 tablet (0.4 mg) under the tongue every 5 minutes as needed for chest pain 25 tablet 2     prednisoLONE acetate (PRED FORTE) 1 % ophthalmic suspension daily        rosuvastatin (CRESTOR) 40 MG tablet Take 1 tablet (40 mg) by mouth daily 90 tablet 3     vitamin B complex with vitamin C (VITAMIN  B COMPLEX) TABS tablet Take 1 tablet by mouth daily        vitamin C (ASCORBIC ACID) 1000 MG TABS Take 2,000 mg by mouth daily       warfarin ANTICOAGULANT (COUMADIN) 5 MG tablet Take a half tablet (2.5 mg) by mouth daily except take one tablet (5 mg) Mon, Fri or as directed by INR Clinic. 60 tablet 1          Past Medical History     Past Medical History:   Diagnosis Date     CAD (coronary artery disease) 04/09/2009 4/8/2009: PTCA and two 2.5x15mm Xience stents to mid LAD lesion; Cath 12/2012- 40-50% stenosis in mid PDA, 80% on D1- medically treat , 5/28/2015 - PTCA and 2.25x8mm Promus PREMIIER NAILA to ostium of 2nd OM     Cerebral artery occlusion with cerebral infarction       CVA (cerebral infarction)      DDD (degenerative disc disease)      Essential hypertension, benign      GERD (gastroesophageal reflux disease)      Hyperlipidemia      Hypothyroidism      Lumbar spinal stenosis      Mitral regurgitation     1+ per 2013 Echo     Vertebral artery stenosis, right      Past Surgical History:   Procedure Laterality Date     ARTHROPLASTY KNEE Left 2020    Procedure: Left total knee arthroplasty (Ward and Nephew #5 narrow femur; #3 tibia; 9 mm tibial poly and 35 mm patella);  Surgeon: Jorge Luis Cheatham MD;  Location: [facility] OR     CORONARY ANGIOGRAPHY ADULT ORDER  2012    40-50% stenosis to mid PDA, 80% in D1- medically treat     CORONARY ANGIOGRAPHY ADULT ORDER  2015    PTCA and 2.25x8mm Promus PREMIER NAILA to ostium of 2nd OM     DECOMPRESSION, FUSION LUMBAR POSTERIOR THREE + LEVELS, COMBINED  2013    Procedure: COMBINED DECOMPRESSION, FUSION LUMBAR POSTERIOR THREE + LEVELS;  Posterior Lumbar Decompression L3-S1, Fusion L4-S1;  Surgeon: Daniel Harris MD;  Location: [facility] OR     ENDOSCOPIC STRIPPING VEIN(S)       HEART CATH, ANGIOPLASTY  2009    PTCA and two 2.5x15mm Xience stents to mid LAD lesion     HYSTERECTOMY, RICKIE      Fibroids, and Menorrhagia.. Bilateral Oophrectomy     ORTHOPEDIC SURGERY       Stenting of LAD, Angioplasty  09     TONSILLECTOMY      as a child     Family History   Problem Relation Age of Onset     Neurologic Disorder Mother         Dementia, Still living     Ovarian Cancer Mother      Thyroid Disease Mother      C.A.D. Father              Diabetes Father      Coronary Artery Disease Father      Alcohol/Drug Brother      Thyroid Disease Son      Diabetes Son             Allergies   Atorvastatin, Cats, Gluten, Oat, Shellfish allergy, and Wheat    30 minutes spent on the date of the encounter doing chart review, history and exam, documentation and further activities as noted above      Astrid Winston, KIANA [facility]  [facility]  Pager: [phone]    Thank you for allowing me to participate in the care of your patient.      Sincerely,     KIANA Jorge CNP     [facility]  cc:   KIANA Donahue CNP  [address]  [place] [address]

## 2022-04-01 NOTE — PROGRESS NOTES
Cardiology Clinic Progress Note  Salome Saeed MRN# [id number]   YOB: 1940 Age: 81 year old   Primary Cardiologist: Dr. Amaya  Reason for visit: Cardiology follow up             Assessment and Plan:   Salome Saeed is a very pleasant 81 year old female with a history of coronary artery disease, hypercholesterolemia, hypertension, hypothyroidism, chronic back pain with multiple surgeries, Parkinson's disease, CVA in 2013 manifesting as balance issues and strong family history of coronary artery disease.      1. Coronary artery disease s/p NAILA x 2 to LAD in 2009, s/p NAILA to OM 2015              - Nuclear stress test 10/2020 appeared normal  2. Symptomatic hypotension with diagnosis of hypertension - Resolved with adjustments in antihypertensive agents  3. Hypercholesterolemia - lipid panel 12/2021 showed total cholesterol 144, HDL 74, LDL 64 and triglycerides 52              - Continue rosuvastatin   4. Hypothyroidism  5. 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    I saw Loreta today for cardiology follow up. During our last office visit she reported still having episodes of lightheadedness during which she will check her blood pressure, recalling blood pressure 103/60. Given some continued symptomatic hypotension recommend decreasing amlodipine to 2.5mg daily. This has since resolved lightheadedness and no significantly low BP readings at home. She brought her home BP results for review today which showed stable controlled BP readings 100-130/60-70. We also checked her home BP monitor which was accurate when compared to ours. Recommend continuing current medications. Support given today. All questions answered. Encouraged to call with any questions/concerns.        Changes today: none    Follow up plan:     Follow up with Dr. Amaya in 6-8 months         History of  Presenting Illness:    Salome Saeed is a very pleasant 81 year old female with a history of coronary artery disease, hypercholesterolemia, hypertension, hypothyroidism, chronic back pain with multiple surgeries, Parkinson's disease, CVA in 2013 manifesting as balance issues and strong family history of coronary artery disease.      Primary cardiologist Dr. Amaya. In 2009 she underwent NAILA x 2 to her LAD. In 2012 she had another coronary angiogram due to chest pain which showed no change in her anatomy. She had moderate disease in the PAD, ostial pinch in her first diagonal and 80% stenosis of the OM, medical management was recommended. In 2015 had chest pain, underwent coronary angiogram which again demonstrated no change in anatomy and medical management was recommended. One month later in October 2015 she was back in the ED with chest pain, she underwent coronary angiogram and stenting of her small OM with Promus NAILA (2.25 x 8mm). In 2017 she had some chest pain, nuclear stress test showed no evidence of ischemia. Most recently October 2020 she was again having chest pain and underwent nuclear stress test which appeared to be normal.      She saw Dr. Amaya in December 2021 at which time she was having some atypical symptoms but none concerning for angina. She had recently been diagnosed with Parkinsons and having nightmares. Given some association of nightmares with BB, her atenolol was decreased to 25mg daily.     I saw her in February at which time she noted resolution of her nightmares on decreased atenolol dose. She reported still having episodes of lightheadedness during which she will check her blood pressure, recalling blood pressure 103/60. Given some continued symptomatic hypotension recommend decreasing amlodipine to 2.5mg daily.    Patient is here today for cardiology follow up.     Patient reports feeling good. Denies chest pain or chest tightness. Denies shortness of breath at rest.  Exertional dyspnea with more strenuous activity. Denies LE edema. Denies dizziness, lightheadedness or other presyncopal symptoms. Denies tachycardia or palpitations. Taking medications daily.      Labs from 12/10/21 showed stable kidney function and electrolytes, total cholesterol 144, HDL 70, LDL 64 and triglycerides 52. Blood pressure 160/72, 153/78 and HR 63 in clinic today. Home BP readings 100-130/60-70.     Appetite good. Eating meals at home. No set exercise routine, attending PT for her Parkinsons. Rare alcohol use. Denies tobacco use.         Social History      Social History     Socioeconomic History     Marital status:      Spouse name: Not on file     Number of children: Not on file     Years of education: Not on file     Highest education level: Not on file   Occupational History     Not on file   Tobacco Use     Smoking status: Former Smoker     Packs/day: 0.10     Quit date: 1965     Years since quittin.2     Smokeless tobacco: Never Used   Substance and Sexual Activity     Alcohol use: Yes     Alcohol/week: 0.0 standard drinks     Comment: 2 glasses wine per month, maybe     Drug use: No     Sexual activity: Not Currently   Other Topics Concern      Service Not Asked     Blood Transfusions No     Caffeine Concern Yes     Comment: 5 cups caffeine per day     Occupational Exposure Not Asked     Hobby Hazards Not Asked     Sleep Concern No     Stress Concern No     Weight Concern No     Comment: weight stable     Special Diet No     Comment: trying to eat healthier     Back Care Not Asked     Exercise Yes     Comment: stretching     Bike Helmet Not Asked     Seat Belt Yes     Self-Exams Yes     Parent/sibling w/ CABG, MI or angioplasty before 65F 55M? Yes   Social History Narrative    Works in an office     Social Determinants of Health     Financial Resource Strain: Not on file   Food Insecurity: Not on file   Transportation Needs: Not on file   Physical Activity: Not on file  "  Stress: Not on file   Social Connections: Not on file   Intimate Partner Violence: Not on file   Housing Stability: Not on file            Review of Systems:   Skin:  Negative     Eyes:  Negative    ENT:  Negative    Respiratory:  Positive for dyspnea on exertion;cough  Cardiovascular:    Positive for;chest pain;dizziness;lightheadedness  Gastroenterology: Positive for heartburn;constipation;diarrhea  Genitourinary:  Positive for urinary frequency  Musculoskeletal:  Positive for back pain;neck pain  Neurologic:  Negative    Psychiatric:  Negative    Heme/Lymph/Imm:  Negative    Endocrine:  Positive for thyroid disorder         Physical Exam:   Vitals: BP (!) 160/72 (BP Location: Right arm, Cuff Size: Adult Regular)   Pulse 63   Ht 1.6 m (5' 3\")   Wt 66.5 kg (146 lb 11.2 oz)   LMP  (LMP Unknown)   SpO2 99%   BMI 25.99 kg/m     Wt Readings from Last 4 Encounters:   04/01/22 66.5 kg (146 lb 11.2 oz)   02/04/22 66.6 kg (146 lb 12.8 oz)   12/14/21 66.8 kg (147 lb 3.2 oz)   10/20/21 67.4 kg (148 lb 9.6 oz)     GEN: well nourished, in no acute distress.  HEENT:  Pupils equal, round. Sclerae nonicteric.   NECK: Supple, no masses appreciated. JVP appears normal.   C/V:  Regular rate and rhythm, no murmur, rub or gallop.    RESP: Respirations are unlabored. Clear to auscultation bilaterally without wheezing, rales, or rhonchi.  GI: Abdomen soft, nontender.  EXTREM: No LE edema.  NEURO: Alert and oriented, cooperative.  SKIN: Warm and dry.        Data:     LIPID RESULTS:  Lab Results   Component Value Date    CHOL 144 12/10/2021    CHOL 149 10/14/2020    HDL 70 12/10/2021    HDL 78 10/14/2020    LDL 64 12/10/2021    LDL 57 10/14/2020    TRIG 52 12/10/2021    TRIG 70 10/14/2020    CHOLHDLRATIO 2.2 08/17/2015     LIVER ENZYME RESULTS:  Lab Results   Component Value Date    AST 35 09/14/2021    AST 21 05/24/2017    ALT 33 12/10/2021    ALT 28 10/14/2020     CBC RESULTS:  Lab Results   Component Value Date    WBC 5.4 " 09/14/2021    WBC 6.1 02/15/2021    RBC 4.68 09/14/2021    RBC 4.62 02/15/2021    HGB 13.5 09/14/2021    HGB 13.4 02/15/2021    HCT 40.1 09/14/2021    HCT 40.1 02/15/2021    MCV 86 09/14/2021    MCV 87 02/15/2021    MCH 28.8 09/14/2021    MCH 29.0 02/15/2021    MCHC 33.7 09/14/2021    MCHC 33.4 02/15/2021    RDW 13.3 09/14/2021    RDW 13.8 02/15/2021     09/14/2021     02/15/2021     BMP RESULTS:  Lab Results   Component Value Date     12/10/2021     10/14/2020    POTASSIUM 4.2 12/10/2021    POTASSIUM 4.0 10/14/2020    CHLORIDE 102 12/10/2021    CHLORIDE 98 10/14/2020    CO2 25 12/10/2021    CO2 28 10/14/2020    ANIONGAP 6 12/10/2021    ANIONGAP 7 10/14/2020     (H) 12/10/2021     (H) 10/14/2020    BUN 14 12/10/2021    BUN 14 10/14/2020    CR 0.73 12/10/2021    CR 0.77 10/14/2020    GFRESTIMATED 79 12/10/2021    GFRESTIMATED 73 10/14/2020    GFRESTBLACK 85 10/14/2020    ERIC 9.3 12/10/2021    ERIC 8.5 10/14/2020      A1C RESULTS:  No results found for: A1C  INR RESULTS:  Lab Results   Component Value Date    INR 2.4 (H) 03/17/2022    INR 1.9 (H) 02/28/2022    INR 3.10 (H) 07/01/2021    INR 3.3 (H) 06/17/2021    INR 4.5 (H) 06/07/2021    INR 2.80 (H) 05/10/2021            Medications     Current Outpatient Medications   Medication Sig Dispense Refill     amLODIPine (NORVASC) 2.5 MG tablet Take 1 tablet (2.5 mg) by mouth daily (Patient taking differently: Take 2.5 mg by mouth every evening ) 90 tablet 1     aspirin EC 81 MG tablet Take 1 tablet (81 mg) by mouth daily       atenolol (TENORMIN) 25 MG tablet Take 1 tablet (25 mg) by mouth daily (Patient taking differently: Take 25 mg by mouth every morning ) 90 tablet 3     Calcium Carb-Cholecalciferol (CALCIUM 600 + D PO) Take 1,200 mg by mouth daily        Cholecalciferol (VITAMIN D) 2000 UNITS tablet Take 2,000 Units by mouth daily        Coenzyme Q10 (COQ-10) 200 MG CAPS Take 400 mg by mouth daily        diclofenac (VOLTAREN) 1 %  topical gel Apply 4 g topically 4 times daily 350 g 3     escitalopram (LEXAPRO) 5 MG tablet Take 5 mg by mouth daily       gatifloxacin (ZYMAXID) 0.5 % ophthalmic solution daily as needed        ketorolac (ACULAR) 0.5 % ophthalmic solution instill one drop into right eye twice a day for one week then one drop once a day for 7 more weeks; begin drops 3 days before surgery       levothyroxine (SYNTHROID/LEVOTHROID) 100 MCG tablet TAKE 1 TABLET DAILY 90 tablet 3     lisinopril (ZESTRIL) 20 MG tablet Take 1 tablet (20 mg) by mouth daily (Patient taking differently: Take 20 mg by mouth every morning ) 90 tablet 0     Multiple Vitamin (DAILY MULTIVITAMIN PO) Take by mouth daily       nitroGLYcerin (NITROSTAT) 0.4 MG sublingual tablet Place 1 tablet (0.4 mg) under the tongue every 5 minutes as needed for chest pain 25 tablet 2     prednisoLONE acetate (PRED FORTE) 1 % ophthalmic suspension daily        rosuvastatin (CRESTOR) 40 MG tablet Take 1 tablet (40 mg) by mouth daily 90 tablet 3     vitamin B complex with vitamin C (VITAMIN  B COMPLEX) TABS tablet Take 1 tablet by mouth daily        vitamin C (ASCORBIC ACID) 1000 MG TABS Take 2,000 mg by mouth daily       warfarin ANTICOAGULANT (COUMADIN) 5 MG tablet Take a half tablet (2.5 mg) by mouth daily except take one tablet (5 mg) Mon, Fri or as directed by INR Clinic. 60 tablet 1          Past Medical History     Past Medical History:   Diagnosis Date     CAD (coronary artery disease) 04/09/2009 4/8/2009: PTCA and two 2.5x15mm Xience stents to mid LAD lesion; Cath 12/2012- 40-50% stenosis in mid PDA, 80% on D1- medically treat , 5/28/2015 - PTCA and 2.25x8mm Promus PREMIIER NAILA to ostium of 2nd OM     Cerebral artery occlusion with cerebral infarction 2012     CVA (cerebral infarction)      DDD (degenerative disc disease)      Essential hypertension, benign      GERD (gastroesophageal reflux disease)      Hyperlipidemia      Hypothyroidism      Lumbar spinal stenosis       Mitral regurgitation     1+ per 2013 Echo     Vertebral artery stenosis, right      Past Surgical History:   Procedure Laterality Date     ARTHROPLASTY KNEE Left 2020    Procedure: Left total knee arthroplasty (Ward [employer] Nephew #5 narrow femur; #3 tibia; 9 mm tibial poly and 35 mm patella);  Surgeon: Jorge Luis Cheatham MD;  Location: [facility] OR     CORONARY ANGIOGRAPHY ADULT ORDER  2012    40-50% stenosis to mid PDA, 80% in D1- medically treat     CORONARY ANGIOGRAPHY ADULT ORDER  2015    PTCA and 2.25x8mm Promus PREMIER NAILA to ostium of 2nd OM     DECOMPRESSION, FUSION LUMBAR POSTERIOR THREE + LEVELS, COMBINED  2013    Procedure: COMBINED DECOMPRESSION, FUSION LUMBAR POSTERIOR THREE + LEVELS;  Posterior Lumbar Decompression L3-S1, Fusion L4-S1;  Surgeon: Daneil Harris MD;  Location: [facility] OR     ENDOSCOPIC STRIPPING VEIN(S)       HEART CATH, ANGIOPLASTY  2009    PTCA and two 2.5x15mm Xience stents to mid LAD lesion     HYSTERECTOMY, RICKIE      Fibroids, and Menorrhagia.. Bilateral Oophrectomy     ORTHOPEDIC SURGERY       Stenting of LAD, Angioplasty  09     TONSILLECTOMY      as a child     Family History   Problem Relation Age of Onset     Neurologic Disorder Mother         Dementia, Still living     Ovarian Cancer Mother      Thyroid Disease Mother      C.A.D. Father              Diabetes Father      Coronary Artery Disease Father      Alcohol/Drug Brother      Thyroid Disease Son      Diabetes Son             Allergies   Atorvastatin, Cats, Gluten, Oat, Shellfish allergy, and Wheat    30 minutes spent on the date of the encounter doing chart review, history and exam, documentation and further activities as noted above      KIANA Jorge [facility]  Pager: [phone]

## 2022-04-06 DIAGNOSIS — I25.10 CORONARY ARTERY DISEASE INVOLVING NATIVE CORONARY ARTERY OF NATIVE HEART WITHOUT ANGINA PECTORIS: Chronic | ICD-10-CM

## 2022-04-06 RX ORDER — ATENOLOL 25 MG/1
25 TABLET ORAL DAILY
Qty: 90 TABLET | Refills: 3 | Status: SHIPPED | OUTPATIENT
Start: 2022-04-06 | End: 2023-04-21

## 2022-04-12 ENCOUNTER — HOSPITAL ENCOUNTER (OUTPATIENT)
Dept: PHYSICAL THERAPY | Facility: CLINIC | Age: 82
Setting detail: THERAPIES SERIES
Discharge: HOME | End: 2022-04-12
Attending: PSYCHIATRY & NEUROLOGY
Payer: COMMERCIAL

## 2022-04-12 PROCEDURE — 97110 THERAPEUTIC EXERCISES: CPT | Mod: GP | Performed by: PHYSICAL THERAPIST

## 2022-04-14 ENCOUNTER — LAB (OUTPATIENT)
Dept: LAB | Facility: CLINIC | Age: 82
End: 2022-04-14
Payer: COMMERCIAL

## 2022-04-14 ENCOUNTER — ANTICOAGULATION THERAPY VISIT (OUTPATIENT)
Dept: ANTICOAGULATION | Facility: CLINIC | Age: 82
End: 2022-04-14

## 2022-04-14 DIAGNOSIS — Z79.01 LONG TERM CURRENT USE OF ANTICOAGULANT THERAPY: ICD-10-CM

## 2022-04-14 DIAGNOSIS — I63.9 CEREBROVASCULAR ACCIDENT INVOLVING CEREBELLUM (H): ICD-10-CM

## 2022-04-14 DIAGNOSIS — Z79.01 LONG TERM CURRENT USE OF ANTICOAGULANT THERAPY: Primary | ICD-10-CM

## 2022-04-14 LAB — INR BLD: 2.5 (ref 0.9–1.1)

## 2022-04-14 PROCEDURE — 36416 COLLJ CAPILLARY BLOOD SPEC: CPT

## 2022-04-14 PROCEDURE — 85610 PROTHROMBIN TIME: CPT

## 2022-04-14 NOTE — PROGRESS NOTES
ANTICOAGULATION MANAGEMENT     Salome Saeed 82 year old female is on warfarin with therapeutic INR result. (Goal INR 2.0-3.0)    Recent labs: (last 7 days)     04/14/22  1240   INR 2.5*       ASSESSMENT       Source(s): Chart Review and Patient/Caregiver Call       Warfarin doses taken: Warfarin taken as instructed    Diet: No new diet changes identified    New illness, injury, or hospitalization: No    Medication/supplement changes: None noted    Signs or symptoms of bleeding or clotting: No    Previous INR: Therapeutic last visit; previously outside of goal range    Additional findings: None       PLAN     Recommended plan for no diet, medication or health factor changes affecting INR     Dosing Instructions: continue your current warfarin dose with next INR in 5 weeks       Summary  As of 4/14/2022    Full warfarin instructions:  5 mg every Mon, Fri; 2.5 mg all other days   Next INR check:  5/19/2022             Telephone call with Loreta who verbalizes understanding and agrees to plan    Lab visit scheduled    Education provided: Contact [phone]  with any changes, questions or concerns.     Plan made per [facility] anticoagulation protocol    Madina Brannon RN  Anticoagulation Clinic  4/14/2022    _______________________________________________________________________     Anticoagulation Episode Summary     Current INR goal:  2.0-3.0   TTR:  50.5 % (1 y)   Target end date:  Indefinite   Send INR reminders to:  [facility]    Indications    Long term current use of anticoagulant therapy [Z79.01]  Cerebrovascular accident involving cerebellum (H) [I63.9]           Comments:           Anticoagulation Care Providers     Provider Role Specialty Phone number    Surya Dyer DO Referring Family Medicine [phone]

## 2022-04-18 DIAGNOSIS — I10 ESSENTIAL HYPERTENSION: ICD-10-CM

## 2022-04-18 RX ORDER — AMLODIPINE BESYLATE 2.5 MG/1
2.5 TABLET ORAL DAILY
Qty: 90 TABLET | Refills: 1 | Status: SHIPPED | OUTPATIENT
Start: 2022-04-18 | End: 2023-08-22

## 2022-04-26 ENCOUNTER — OFFICE VISIT (OUTPATIENT)
Dept: NEUROLOGY | Facility: CLINIC | Age: 82
End: 2022-04-26
Payer: COMMERCIAL

## 2022-04-26 VITALS
WEIGHT: 142.2 LBS | DIASTOLIC BLOOD PRESSURE: 65 MMHG | HEIGHT: 62 IN | SYSTOLIC BLOOD PRESSURE: 113 MMHG | OXYGEN SATURATION: 96 % | BODY MASS INDEX: 26.17 KG/M2 | HEART RATE: 62 BPM

## 2022-04-26 DIAGNOSIS — M54.50 CHRONIC MIDLINE LOW BACK PAIN WITHOUT SCIATICA: ICD-10-CM

## 2022-04-26 DIAGNOSIS — G89.29 CHRONIC MIDLINE LOW BACK PAIN WITHOUT SCIATICA: ICD-10-CM

## 2022-04-26 DIAGNOSIS — M48.02 CERVICAL STENOSIS OF SPINAL CANAL: ICD-10-CM

## 2022-04-26 DIAGNOSIS — G20.C PARKINSONISM, UNSPECIFIED PARKINSONISM TYPE (H): Primary | ICD-10-CM

## 2022-04-26 PROCEDURE — 99214 OFFICE O/P EST MOD 30 MIN: CPT | Performed by: PSYCHIATRY & NEUROLOGY

## 2022-04-26 NOTE — PATIENT INSTRUCTIONS
AFTER VISIT SUMMARY (AVS):    At today's visit we thoroughly discussed current symptoms, available treatment options, and the plan.  I am pleased to see that your symptoms are stable.    We decided not to make any medication changes.    Regarding your low back pain, please continue physical therapy.  If pain persists, we might consider doing lumbar spine MRI and possibly epidural steroid injection, although it would require holding your warfarin.    Next follow-up appointment is in the next 6 months or earlier if needed.    Please do not hesitate to call me with any questions or concerns.    Thanks.

## 2022-04-26 NOTE — PROGRESS NOTES
"ESTABLISHED PATIENT NEUROLOGY NOTE    DATE OF VISIT: 4/26/2022  CLINIC LOCATION: [facility]  MRN: [id number]  PATIENT NAME: Salome Saeed  YOB: 1940    REASON FOR VISIT:   Chief Complaint   Patient presents with     Parkinson     Patient is noticing more tremor in left hand in am's right hand is still minimal. Patient said she like the physical therapy and it is helping     SUBJECTIVE:                                                      HISTORY OF PRESENT ILLNESS: Patient is here to follow up regarding parkinsonism and cervical spinal stenosis.  The last visit was on 10/20/2021. Please refer to my initial/other prior notes for further information.    Since the last visit, the patient reports mildly increased tremor of the left hand, but tolerable.  No pronounced bradykinesia or gait changes.  The physical therapy was beneficial for her neck symptoms that resolved, but now struggling with low back pain and continues physical therapy for that reason.  Feels that it is helpful.  She denies interval development of new focal neurological symptoms.  EXAM:                                                    Physical Exam:   Vitals: /65 (BP Location: Right arm, Patient Position: Sitting, Cuff Size: Adult Regular)   Pulse 62   Ht 1.575 m (5' 2\")   Wt 64.5 kg (142 lb 3.2 oz)   LMP  (LMP Unknown)   SpO2 96%   BMI 26.01 kg/m      General: pt is in NAD, cooperative.  Skin: normal turgor, moist mucous membranes, no lesions/rashes noticed.  HEENT: ATNC, white sclera, normal conjunctiva.  Respiratory: Symmetric lung excursion, no accessory respiratory muscle use.  Abdomen: Non distended.  Neurological: awake, cooperative, follows commands, no aphasia or dysarthria noted, mild hypomimia, mild hypophonia, mild rigidity with provoking maneuvers, but not at rest, has postural bilateral symmetric hand tremor, but no resting tremor, no dysmetria, slightly stooped forward posture was " preserved arm swings, but slightly reduced stride length.  ASSESSMENT AND PLAN:                                                    Assessment: 82 year old female patient presents for follow-up of parkinsonism and cervical spinal stenosis.  Her extrapyramidal features remain stable.  The differential includes Parkinson's disease versus Parkinson plus condition.  Previously we reviewed options of [facility] [employer] versus trial of Sinemet.  We will consider them in the future if symptoms worsen.    Regarding her cervical spinal canal stenosis, she found physical therapy helpful.  She reports that her symptoms resolved.    Regarding her low back pain, it looks like her lumbar spine MRI was done in 2016.  She feels that physical therapy is helpful at the present moment, but we discussed that we might consider repeating her lumbar spine MRI (if pain persists) to see if epidural steroid injection will be helpful.    Diagnoses:    ICD-10-CM    1. Parkinsonism, unspecified Parkinsonism type (H)  G20    2. Cervical stenosis of spinal canal  M48.02    3. Chronic midline low back pain without sciatica  M54.50     G89.29      Plan: At today's visit we thoroughly discussed current symptoms, available treatment options, and the plan.  I am pleased to see that her symptoms are stable.    We decided not to make any medication changes.    Regarding her low back pain, she will continue physical therapy.  If pain persists, we might consider doing lumbar spine MRI and possibly epidural steroid injection, although it would require holding her warfarin.    Next follow-up appointment is in the next 6 months or earlier if needed.    Total Time: 33 minutes spent on the date of the encounter doing chart review, history and exam, documentation and further activities per the note.    Pj Keene MD  [facility] Neurology  (Chart documentation was completed in part with Dragon voice-recognition software. Even though  reviewed, some grammatical, spelling, and word errors may remain.)

## 2022-04-26 NOTE — LETTER
"    4/26/2022         RE: Salome Saeed  [address]  [place] [address]        Dear Colleague,    Thank you for referring your patient, Salome Saeed, to the [facility] NEUROLOGY [facility]IN[facility]. Please see a copy of my visit note below.    ESTABLISHED PATIENT NEUROLOGY NOTE    DATE OF VISIT: 4/26/2022  CLINIC LOCATION: [facility]  MRN: [id number]  PATIENT NAME: Salome Saeed  YOB: 1940    REASON FOR VISIT:   Chief Complaint   Patient presents with     Parkinson     Patient is noticing more tremor in left hand in am's right hand is still minimal. Patient said she like the physical therapy and it is helping     SUBJECTIVE:                                                      HISTORY OF PRESENT ILLNESS: Patient is here to follow up regarding parkinsonism and cervical spinal stenosis.  The last visit was on 10/20/2021. Please refer to my initial/other prior notes for further information.    Since the last visit, the patient reports mildly increased tremor of the left hand, but tolerable.  No pronounced bradykinesia or gait changes.  The physical therapy was beneficial for her neck symptoms that resolved, but now struggling with low back pain and continues physical therapy for that reason.  Feels that it is helpful.  She denies interval development of new focal neurological symptoms.  EXAM:                                                    Physical Exam:   Vitals: /65 (BP Location: Right arm, Patient Position: Sitting, Cuff Size: Adult Regular)   Pulse 62   Ht 1.575 m (5' 2\")   Wt 64.5 kg (142 lb 3.2 oz)   LMP  (LMP Unknown)   SpO2 96%   BMI 26.01 kg/m      General: pt is in NAD, cooperative.  Skin: normal turgor, moist mucous membranes, no lesions/rashes noticed.  HEENT: ATNC, white sclera, normal conjunctiva.  Respiratory: Symmetric lung excursion, no accessory respiratory muscle use.  Abdomen: Non distended.  Neurological: awake, " cooperative, follows commands, no aphasia or dysarthria noted, mild hypomimia, mild hypophonia, mild rigidity with provoking maneuvers, but not at rest, has postural bilateral symmetric hand tremor, but no resting tremor, no dysmetria, slightly stooped forward posture was preserved arm swings, but slightly reduced stride length.  ASSESSMENT AND PLAN:                                                    Assessment: 82 year old female patient presents for follow-up of parkinsonism and cervical spinal stenosis.  Her extrapyramidal features remain stable.  The differential includes Parkinson's disease versus Parkinson plus condition.  Previously we reviewed options of [employer] versus trial of Sinemet.  We will consider them in the future if symptoms worsen.    Regarding her cervical spinal canal stenosis, she found physical therapy helpful.  She reports that her symptoms resolved.    Regarding her low back pain, it looks like her lumbar spine MRI was done in 2016.  She feels that physical therapy is helpful at the present moment, but we discussed that we might consider repeating her lumbar spine MRI (if pain persists) to see if epidural steroid injection will be helpful.    Diagnoses:    ICD-10-CM    1. Parkinsonism, unspecified Parkinsonism type (H)  G20    2. Cervical stenosis of spinal canal  M48.02    3. Chronic midline low back pain without sciatica  M54.50     G89.29      Plan: At today's visit we thoroughly discussed current symptoms, available treatment options, and the plan.  I am pleased to see that her symptoms are stable.    We decided not to make any medication changes.    Regarding her low back pain, she will continue physical therapy.  If pain persists, we might consider doing lumbar spine MRI and possibly epidural steroid injection, although it would require holding her warfarin.    Next follow-up appointment is in the next 6 months or earlier if needed.    Total Time: 33 minutes spent on the  "date of the encounter doing chart review, history and exam, documentation and further activities per the note.    Pj Keene MD  [facility] Neurology  (Chart documentation was completed in part with Dragon voice-recognition software. Even though reviewed, some grammatical, spelling, and word errors may remain.)      Salome Saeed is a 82 year old female who presents for:  Chief Complaint   Patient presents with     Parkinson     Patient is noticing more tremor in left hand in am's right hand is still minimal. Patient said she like the physical therapy and it is helping        Initial Vitals:  /65 (BP Location: Right arm, Patient Position: Sitting, Cuff Size: Adult Regular)   Pulse 62   Ht 1.575 m (5' 2\")   Wt 64.5 kg (142 lb 3.2 oz)   LMP  (LMP Unknown)   SpO2 96%   BMI 26.01 kg/m   Estimated body mass index is 26.01 kg/m  as calculated from the following:    Height as of this encounter: 1.575 m (5' 2\").    Weight as of this encounter: 64.5 kg (142 lb 3.2 oz).. Body surface area is 1.68 meters squared. BP completed using cuff size: regular    Nursing Comments:     Margo Bingham      Again, thank you for allowing me to participate in the care of your patient.        Sincerely,        Pj Keene MD    "

## 2022-04-26 NOTE — PROGRESS NOTES
"Salome Saeed is a 82 year old female who presents for:  Chief Complaint   Patient presents with     Parkinson     Patient is noticing more tremor in left hand in am's right hand is still minimal. Patient said she like the physical therapy and it is helping        Initial Vitals:  /65 (BP Location: Right arm, Patient Position: Sitting, Cuff Size: Adult Regular)   Pulse 62   Ht 1.575 m (5' 2\")   Wt 64.5 kg (142 lb 3.2 oz)   LMP  (LMP Unknown)   SpO2 96%   BMI 26.01 kg/m   Estimated body mass index is 26.01 kg/m  as calculated from the following:    Height as of this encounter: 1.575 m (5' 2\").    Weight as of this encounter: 64.5 kg (142 lb 3.2 oz).. Body surface area is 1.68 meters squared. BP completed using cuff size: regular    Nursing Comments:     Margo Bingham  "

## 2022-04-27 ENCOUNTER — HOSPITAL ENCOUNTER (OUTPATIENT)
Dept: PHYSICAL THERAPY | Facility: CLINIC | Age: 82
Setting detail: THERAPIES SERIES
Discharge: HOME | End: 2022-04-27
Attending: PSYCHIATRY & NEUROLOGY
Payer: COMMERCIAL

## 2022-04-27 PROCEDURE — 97110 THERAPEUTIC EXERCISES: CPT | Mod: GP | Performed by: PHYSICAL THERAPIST

## 2022-05-03 DIAGNOSIS — I10 ESSENTIAL HYPERTENSION, BENIGN: ICD-10-CM

## 2022-05-03 RX ORDER — LISINOPRIL 20 MG/1
20 TABLET ORAL DAILY
Qty: 90 TABLET | Refills: 3 | Status: SHIPPED | OUTPATIENT
Start: 2022-05-03 | End: 2023-04-28

## 2022-05-05 ENCOUNTER — HOSPITAL ENCOUNTER (OUTPATIENT)
Dept: PHYSICAL THERAPY | Facility: CLINIC | Age: 82
Setting detail: THERAPIES SERIES
Discharge: HOME | End: 2022-05-05
Attending: PSYCHIATRY & NEUROLOGY
Payer: COMMERCIAL

## 2022-05-05 PROCEDURE — 97110 THERAPEUTIC EXERCISES: CPT | Mod: GP | Performed by: PHYSICAL THERAPIST

## 2022-05-19 ENCOUNTER — ANTICOAGULATION THERAPY VISIT (OUTPATIENT)
Dept: ANTICOAGULATION | Facility: CLINIC | Age: 82
End: 2022-05-19

## 2022-05-19 ENCOUNTER — LAB (OUTPATIENT)
Dept: LAB | Facility: CLINIC | Age: 82
End: 2022-05-19
Payer: COMMERCIAL

## 2022-05-19 DIAGNOSIS — I63.9 CEREBROVASCULAR ACCIDENT INVOLVING CEREBELLUM (H): ICD-10-CM

## 2022-05-19 DIAGNOSIS — Z79.01 LONG TERM CURRENT USE OF ANTICOAGULANT THERAPY: Primary | ICD-10-CM

## 2022-05-19 DIAGNOSIS — Z79.01 LONG TERM CURRENT USE OF ANTICOAGULANT THERAPY: ICD-10-CM

## 2022-05-19 LAB — INR BLD: 3.7 (ref 0.9–1.1)

## 2022-05-19 PROCEDURE — 85610 PROTHROMBIN TIME: CPT

## 2022-05-19 PROCEDURE — 36416 COLLJ CAPILLARY BLOOD SPEC: CPT

## 2022-05-19 NOTE — PROGRESS NOTES
ANTICOAGULATION MANAGEMENT     Salome Saeed 82 year old female is on warfarin with supratherapeutic INR result. (Goal INR 2.0-3.0)    Recent labs: (last 7 days)     05/19/22  1304   INR 3.7*       ASSESSMENT       Source(s): Chart Review and Patient/Caregiver Call       Warfarin doses taken: Warfarin taken as instructed    Diet: Decreased greens/vitamin K in diet; plans to resume previous intake. Patient states she ran out of greens and has not been to the store to replenish yet.     New illness, injury, or hospitalization: No    Medication/supplement changes: None noted    Signs or symptoms of bleeding or clotting: No    Previous INR: Therapeutic last 2(+) visits    Additional findings: Patient reports had more stress while spouse was in the hospital had to call an ambulance 5/17/22       PLAN     Recommended plan for temporary change(s) affecting INR     Dosing Instructions: hold dose then continue your current warfarin dose with next INR in 2 weeks       Summary  As of 5/19/2022    Full warfarin instructions:  5/19: Hold; Otherwise 5 mg every Mon, Fri; 2.5 mg all other days   Next INR check:  6/2/2022             Telephone call with Loreta who verbalizes understanding and agrees to plan    Lab visit scheduled    Education provided: Please call back if any changes to your diet, medications or how you've been taking warfarin and Contact [phone]  with any changes, questions or concerns.     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

## 2022-06-08 ENCOUNTER — HOSPITAL ENCOUNTER (OUTPATIENT)
Dept: PHYSICAL THERAPY | Facility: CLINIC | Age: 82
Setting detail: THERAPIES SERIES
Discharge: HOME | End: 2022-06-08
Attending: FAMILY MEDICINE
Payer: COMMERCIAL

## 2022-06-08 PROCEDURE — 97110 THERAPEUTIC EXERCISES: CPT | Mod: GP | Performed by: PHYSICAL THERAPIST

## 2022-06-10 ENCOUNTER — ANTICOAGULATION THERAPY VISIT (OUTPATIENT)
Dept: ANTICOAGULATION | Facility: CLINIC | Age: 82
End: 2022-06-10

## 2022-06-10 ENCOUNTER — TELEPHONE (OUTPATIENT)
Dept: FAMILY MEDICINE | Facility: CLINIC | Age: 82
End: 2022-06-10

## 2022-06-10 ENCOUNTER — LAB (OUTPATIENT)
Dept: LAB | Facility: CLINIC | Age: 82
End: 2022-06-10
Payer: COMMERCIAL

## 2022-06-10 DIAGNOSIS — Z79.01 LONG TERM CURRENT USE OF ANTICOAGULANT THERAPY: Primary | ICD-10-CM

## 2022-06-10 DIAGNOSIS — I63.9 CEREBROVASCULAR ACCIDENT INVOLVING CEREBELLUM (H): ICD-10-CM

## 2022-06-10 DIAGNOSIS — Z79.01 LONG TERM CURRENT USE OF ANTICOAGULANT THERAPY: ICD-10-CM

## 2022-06-10 LAB — INR BLD: 1.9 (ref 0.9–1.1)

## 2022-06-10 PROCEDURE — 36416 COLLJ CAPILLARY BLOOD SPEC: CPT

## 2022-06-10 PROCEDURE — 85610 PROTHROMBIN TIME: CPT

## 2022-06-10 NOTE — PROGRESS NOTES
ANTICOAGULATION MANAGEMENT     Salome Saeed 82 year old female is on warfarin with subtherapeutic INR result. (Goal INR 2.0-3.0)    Recent labs: (last 7 days)     06/10/22  1405   INR 1.9*       ASSESSMENT       Source(s): Chart Review and Patient/Caregiver Call       Warfarin doses taken: Warfarin taken as instructed - ?? Possible missed dose, but patient is not sure    Diet: No new diet changes identified    New illness, injury, or hospitalization: Yes, ongoing parkinson sx causing significant back pain - working with PT    Medication/supplement changes: Increase in Tylenol d/t worsening pain    Signs or symptoms of bleeding or clotting: No    Previous INR: Supratherapeutic    Additional findings: None       PLAN     Recommended plan for temporary change(s) and/vs. ongoing change(s) affecting INR     Dosing Instructions: continue your current warfarin dose with next INR in 2 weeks d/t fluctuating levels     Summary  As of 6/10/2022    Full warfarin instructions:  5 mg every Mon, Fri; 2.5 mg all other days   Next INR check:  6/24/2022             Telephone call with Loreta who verbalizes understanding and agrees to plan    Lab visit scheduled    Education provided: Please call back if any changes to your diet, medications or how you've been taking warfarin, Monitoring for bleeding signs and symptoms, Monitoring for clotting signs and symptoms and Importance of notifying clinic for changes in medications; a sooner lab recheck maybe needed.    Plan made per [facility] anticoagulation protocol    Aysha Tilley, RN  Anticoagulation Clinic  6/10/2022    _______________________________________________________________________     Anticoagulation Episode Summary     Current INR goal:  2.0-3.0   TTR:  51.8 % (1 y)   Target end date:  Indefinite   Send INR reminders to:  [facility]    Indications    Long term current use of anticoagulant therapy [Z79.01]  Cerebrovascular accident involving cerebellum (H)  [I63.9]           Comments:           Anticoagulation Care Providers     Provider Role Specialty Phone number    Surya Dyer,  Referring Family Medicine [phone]

## 2022-06-10 NOTE — PROGRESS NOTES
ANTICOAGULATION MANAGEMENT     Salome Saeed 82 year old female is on warfarin with subtherapeutic INR result. (Goal INR 2.0-3.0)    Recent labs: (last 7 days)     06/10/22  1405   INR 1.9*       ASSESSMENT       Source(s): Chart Review    Previous INR was Supratherapeutic    Medication, diet, health changes since last INR chart reviewed; none identified           PLAN     Unable to reach Loreta today.    Left detailed message to continue maintenance over weekend and to call Monday.    Follow up required to confirm warfarin dose taken and assess for changes    Jeannie Liao, RN  Anticoagulation Clinic  6/10/2022

## 2022-06-24 ENCOUNTER — ANTICOAGULATION THERAPY VISIT (OUTPATIENT)
Dept: ANTICOAGULATION | Facility: CLINIC | Age: 82
End: 2022-06-24

## 2022-06-24 ENCOUNTER — LAB (OUTPATIENT)
Dept: LAB | Facility: CLINIC | Age: 82
End: 2022-06-24
Payer: COMMERCIAL

## 2022-06-24 ENCOUNTER — DOCUMENTATION ONLY (OUTPATIENT)
Dept: ANTICOAGULATION | Facility: CLINIC | Age: 82
End: 2022-06-24

## 2022-06-24 DIAGNOSIS — I63.9 CEREBROVASCULAR ACCIDENT INVOLVING CEREBELLUM (H): ICD-10-CM

## 2022-06-24 DIAGNOSIS — Z79.01 LONG TERM CURRENT USE OF ANTICOAGULANT THERAPY: ICD-10-CM

## 2022-06-24 DIAGNOSIS — I63.9 CEREBROVASCULAR ACCIDENT INVOLVING CEREBELLUM (H): Primary | ICD-10-CM

## 2022-06-24 DIAGNOSIS — Z79.01 LONG TERM CURRENT USE OF ANTICOAGULANT THERAPY: Primary | ICD-10-CM

## 2022-06-24 LAB — INR BLD: 3.1 (ref 0.9–1.1)

## 2022-06-24 PROCEDURE — 85610 PROTHROMBIN TIME: CPT

## 2022-06-24 PROCEDURE — 36416 COLLJ CAPILLARY BLOOD SPEC: CPT

## 2022-06-24 NOTE — PROGRESS NOTES
ANTICOAGULATION MANAGEMENT     Salome Saeed 82 year old female is on warfarin with supratherapeutic INR result. (Goal INR 2.0-3.0)    Recent labs: (last 7 days)     06/24/22  1334   INR 3.1*       ASSESSMENT       Source(s): Chart Review and Patient/Caregiver Call       Warfarin doses taken: Warfarin taken as instructed    Diet: Decreased greens/vitamin K in diet; plans to resume previous intake    New illness, injury, or hospitalization: No    Medication/supplement changes: None noted    Signs or symptoms of bleeding or clotting: No    Previous INR: Subtherapeutic    Additional findings: None       PLAN     Recommended plan for temporary change(s) affecting INR     Dosing Instructions: continue your current warfarin dose with next INR in 2 weeks       Summary  As of 6/24/2022    Full warfarin instructions:  5 mg every Mon, Fri; 2.5 mg all other days   Next INR check:  7/11/2022             Telephone call with Loreta who verbalizes understanding and agrees to plan    Lab visit scheduled    Education provided: Importance of consistent vitamin K intake and Impact of vitamin K foods on INR    Plan made per [facility] anticoagulation protocol    Madina Brannon, RN  Anticoagulation Clinic  6/24/2022    _______________________________________________________________________     Anticoagulation Episode Summary     Current INR goal:  2.0-3.0   TTR:  55.0 % (1 y)   Target end date:  Indefinite   Send INR reminders to:  [facility]    Indications    Long term current use of anticoagulant therapy [Z79.01]  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

## 2022-06-24 NOTE — PROGRESS NOTES
ANTICOAGULATION CLINIC REFERRAL RENEWAL REQUEST       An annual renewal order is required for all patients referred to [facility] Anticoagulation Clinic.?  Please review and sign the pended referral order for Salome Saeed.       ANTICOAGULATION SUMMARY      Warfarin indication(s)   CVA involving cerubellum    Mechanical heart valve present?  NO       Current goal range   INR: 2.0-3.0     Goal appropriate for indication? Goal INR 2-3, standard for indication(s) above     Time in Therapeutic Range (TTR)  (Goal > 60%) 55%       Office visit with referring provider's group within last year yes on 10/14/2021       Madina Brannon, RN  [facility] Anticoagulation Clinic

## 2022-07-11 ENCOUNTER — ANTICOAGULATION THERAPY VISIT (OUTPATIENT)
Dept: ANTICOAGULATION | Facility: CLINIC | Age: 82
End: 2022-07-11

## 2022-07-11 ENCOUNTER — LAB (OUTPATIENT)
Dept: LAB | Facility: CLINIC | Age: 82
End: 2022-07-11
Payer: COMMERCIAL

## 2022-07-11 DIAGNOSIS — Z79.01 LONG TERM CURRENT USE OF ANTICOAGULANT THERAPY: Primary | ICD-10-CM

## 2022-07-11 DIAGNOSIS — I63.9 CEREBROVASCULAR ACCIDENT INVOLVING CEREBELLUM (H): ICD-10-CM

## 2022-07-11 LAB — INR BLD: 1.9 (ref 0.9–1.1)

## 2022-07-11 PROCEDURE — 36416 COLLJ CAPILLARY BLOOD SPEC: CPT

## 2022-07-11 PROCEDURE — 85610 PROTHROMBIN TIME: CPT

## 2022-07-11 NOTE — PROGRESS NOTES
ANTICOAGULATION MANAGEMENT     Salome Saeed 82 year old female is on warfarin with subtherapeutic INR result. (Goal INR 2.0-3.0)    Recent labs: (last 7 days)     07/11/22  1319   INR 1.9*       ASSESSMENT       Source(s): Chart Review    Previous INR was Supratherapeutic    Medication, diet, health changes since last INR chart reviewed; none identified           PLAN     Unable to reach Loreta today.    Left message to continue current dose of warfarin 5 mg tonight. Request call back for assessment. Left message to call [phone].       Follow up required to confirm warfarin dose taken and assess for changes and discuss out of range result     Renee Martinez RN  Anticoagulation Clinic  7/11/2022

## 2022-07-12 NOTE — PROGRESS NOTES
ANTICOAGULATION MANAGEMENT     Salome Saeed 82 year old female is on warfarin with subtherapeutic INR result. (Goal INR 2.0-3.0)    Recent labs: (last 7 days)     07/11/22  1319   INR 1.9*       ASSESSMENT       Source(s): Chart Review and Patient/Caregiver Call       Warfarin doses taken: Warfarin taken as instructed, however patient thinks she did miss a warfarin dose but unsure of the date    Diet: Decreased greens/vitamin K in diet; plans to resume previous intake    New illness, injury, or hospitalization: No    Medication/supplement changes: None noted    Signs or symptoms of bleeding or clotting: No    Previous INR: Supratherapeutic    Additional findings: None       PLAN     Recommended plan for temporary change(s) affecting INR     Dosing Instructions: continue your current warfarin dose with next INR in 2 weeks       Summary  As of 7/11/2022    Full warfarin instructions:  5 mg every Mon, Fri; 2.5 mg all other days   Next INR check:  7/25/2022             Telephone call with Loreta who verbalizes understanding and agrees to plan    Lab visit scheduled    Education provided: Please call back if any changes to your diet, medications or how you've been taking warfarin and Contact [phone]  with any changes, questions or concerns.     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

## 2022-07-25 ENCOUNTER — ANTICOAGULATION THERAPY VISIT (OUTPATIENT)
Dept: ANTICOAGULATION | Facility: CLINIC | Age: 82
End: 2022-07-25

## 2022-07-25 ENCOUNTER — LAB (OUTPATIENT)
Dept: LAB | Facility: CLINIC | Age: 82
End: 2022-07-25
Payer: COMMERCIAL

## 2022-07-25 DIAGNOSIS — I63.9 CEREBROVASCULAR ACCIDENT INVOLVING CEREBELLUM (H): ICD-10-CM

## 2022-07-25 DIAGNOSIS — Z79.01 LONG TERM CURRENT USE OF ANTICOAGULANT THERAPY: Primary | ICD-10-CM

## 2022-07-25 LAB — INR BLD: 3.5 (ref 0.9–1.1)

## 2022-07-25 PROCEDURE — 85610 PROTHROMBIN TIME: CPT

## 2022-07-25 PROCEDURE — 36415 COLL VENOUS BLD VENIPUNCTURE: CPT

## 2022-07-25 NOTE — PROGRESS NOTES
ANTICOAGULATION MANAGEMENT     Salome Saeed 82 year old female is on warfarin with supratherapeutic INR result. (Goal INR 2.0-3.0)    Recent labs: (last 7 days)     07/25/22  1309   INR 3.5*       ASSESSMENT       Source(s): Chart Review    Previous INR was Subtherapeutic    Medication, diet, health changes since last INR chart reviewed; none identified           PLAN     Unable to reach Loreta today.    No instructions provided. Unable to leave voicemail.  Will try back again in about 1 hour since patient's  stated she should be home by then.    Follow up required to confirm warfarin dose taken and assess for changes    Sima Bonilla RN  Anticoagulation Clinic  7/25/2022

## 2022-07-25 NOTE — PROGRESS NOTES
ANTICOAGULATION MANAGEMENT     Salome Saeed 82 year old female is on warfarin with supratherapeutic INR result. (Goal INR 2.0-3.0)    Recent labs: (last 7 days)     07/25/22  1309   INR 3.5*       ASSESSMENT       Source(s): Patient/Caregiver Call       Warfarin doses taken: Warfarin taken as instructed    Diet: Increased greens/vitamin K in diet; ongoing change    New illness, injury, or hospitalization: No    Medication/supplement changes: None noted    Signs or symptoms of bleeding or clotting: No    Previous INR: Subtherapeutic    Additional findings: None       PLAN     Recommended plan for no diet, medication or health factor changes affecting INR     Dosing Instructions: decrease your warfarin dose (10% change) with next INR in 2 weeks       Summary  As of 7/25/2022    Full warfarin instructions:  5 mg every Fri; 2.5 mg all other days   Next INR check:  8/8/2022             Telephone call with Loreta who agrees to plan and repeated back plan correctly    Lab visit scheduled    Education provided: Please call back if any changes to your diet, medications or how you've been taking warfarin    Plan made per [facility] anticoagulation protocol    Sima Bonilla RN  Anticoagulation Clinic  7/25/2022    _______________________________________________________________________     Anticoagulation Episode Summary     Current INR goal:  2.0-3.0   TTR:  55.2 % (1 y)   Target end date:  Indefinite   Send INR reminders to:  [facility]    Indications    Long term current use of anticoagulant therapy [Z79.01]  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

## 2022-07-31 DIAGNOSIS — I63.9 CEREBROVASCULAR ACCIDENT INVOLVING CEREBELLUM (H): ICD-10-CM

## 2022-08-01 RX ORDER — WARFARIN SODIUM 5 MG/1
TABLET ORAL
Qty: 60 TABLET | Refills: 1 | Status: SHIPPED | OUTPATIENT
Start: 2022-08-01 | End: 2022-11-14

## 2022-08-08 ENCOUNTER — ANTICOAGULATION THERAPY VISIT (OUTPATIENT)
Dept: ANTICOAGULATION | Facility: CLINIC | Age: 82
End: 2022-08-08

## 2022-08-08 ENCOUNTER — LAB (OUTPATIENT)
Dept: LAB | Facility: CLINIC | Age: 82
End: 2022-08-08
Payer: COMMERCIAL

## 2022-08-08 DIAGNOSIS — I63.9 CEREBROVASCULAR ACCIDENT INVOLVING CEREBELLUM (H): ICD-10-CM

## 2022-08-08 DIAGNOSIS — Z79.01 LONG TERM CURRENT USE OF ANTICOAGULANT THERAPY: Primary | ICD-10-CM

## 2022-08-08 LAB — INR BLD: 2.4 (ref 0.9–1.1)

## 2022-08-08 PROCEDURE — 85610 PROTHROMBIN TIME: CPT

## 2022-08-08 PROCEDURE — 36415 COLL VENOUS BLD VENIPUNCTURE: CPT

## 2022-08-08 NOTE — PROGRESS NOTES
ANTICOAGULATION MANAGEMENT     Salome Saeed 82 year old female is on warfarin with therapeutic INR result. (Goal INR 2.0-3.0)    Recent labs: (last 7 days)     08/08/22  1303   INR 2.4*       ASSESSMENT       Source(s): Chart Review and Patient/Caregiver Call       Warfarin doses taken: More warfarin taken than planned which may be affecting INR - patient did not realize her Monday dose should be continued at 2.5mg after previous INR check. Returned to 5mg/day. INR within range so previous maintenance dose continued.    Diet: No new diet changes identified    New illness, injury, or hospitalization: No    Medication/supplement changes: None noted    Signs or symptoms of bleeding or clotting: No    Previous INR: Supratherapeutic    Additional findings: None       PLAN     Recommended plan for temporary change(s) affecting INR     Dosing Instructions: Continue your current warfarin dose with next INR in 2-3 weeks       Summary  As of 8/8/2022    Full warfarin instructions:  5 mg every Mon, Fri; 2.5 mg all other days   Next INR check:  8/29/2022             Telephone call with Loreta who verbalizes understanding and agrees to plan    Lab visit scheduled    Education provided: Please call back if any changes to your diet, medications or how you've been taking warfarin, Impact of vitamin K foods on INR and Importance of taking warfarin as instructed    Plan made per [facility] anticoagulation protocol + training with Madina Brannon, RN    Neeru Martinez RN  Anticoagulation Clinic  8/8/2022    _______________________________________________________________________     Anticoagulation Episode Summary     Current INR goal:  2.0-3.0   TTR:  54.1 % (1 y)   Target end date:  Indefinite   Send INR reminders to:  [facility]    Indications    Long term current use of anticoagulant therapy [Z79.01]  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

## 2022-08-29 ENCOUNTER — ANTICOAGULATION THERAPY VISIT (OUTPATIENT)
Dept: ANTICOAGULATION | Facility: CLINIC | Age: 82
End: 2022-08-29

## 2022-08-29 ENCOUNTER — LAB (OUTPATIENT)
Dept: LAB | Facility: CLINIC | Age: 82
End: 2022-08-29
Payer: COMMERCIAL

## 2022-08-29 DIAGNOSIS — I63.9 CEREBROVASCULAR ACCIDENT INVOLVING CEREBELLUM (H): ICD-10-CM

## 2022-08-29 DIAGNOSIS — Z79.01 LONG TERM CURRENT USE OF ANTICOAGULANT THERAPY: Primary | ICD-10-CM

## 2022-08-29 LAB — INR BLD: 4.2 (ref 0.9–1.1)

## 2022-08-29 PROCEDURE — 36416 COLLJ CAPILLARY BLOOD SPEC: CPT

## 2022-08-29 PROCEDURE — 85610 PROTHROMBIN TIME: CPT

## 2022-08-29 NOTE — PROGRESS NOTES
ANTICOAGULATION MANAGEMENT     Salome Saeed 82 year old female is on warfarin with supratherapeutic INR result. (Goal INR 2.0-3.0)    Recent labs: (last 7 days)     08/29/22  1256   INR 4.2*       ASSESSMENT       Source(s): Chart Review and Patient/Caregiver Call       Warfarin doses taken: Warfarin taken as instructed    Diet: No new diet changes identified    New illness, injury, or hospitalization: Yes: cold symptoms (have mostly resolved). Back pain    Medication/supplement changes: Tylenol as needed use which may be increasing INR today    Signs or symptoms of bleeding or clotting: No    Previous INR: Therapeutic last visit; previously outside of goal range    Additional findings: None       PLAN     Recommended plan for temporary change(s) and ongoing change(s) affecting INR     Dosing Instructions: hold dose then decrease your warfarin dose (11% change) with next INR in 7-10 days       Summary  As of 8/29/2022    Full warfarin instructions:  8/29: Hold; Otherwise 5 mg every Mon; 2.5 mg all other days   Next INR check:  9/8/2022             Telephone call with Loreta who agrees to plan and repeated back plan correctly    Lab visit scheduled    Education provided: Importance of consistent vitamin K intake, Impact of vitamin K foods on INR, Goal range and significance of current result, Importance of therapeutic range, Importance of taking warfarin as instructed, Potential interaction between warfarin and Tylenol and Monitoring for bleeding signs and symptoms    Plan made per [facility] anticoagulation protocol    Steffanie Montenegro RN  Anticoagulation Clinic  8/29/2022    _______________________________________________________________________     Anticoagulation Episode Summary     Current INR goal:  2.0-3.0   TTR:  56.0 % (1 y)   Target end date:  Indefinite   Send INR reminders to:  [facility]    Indications    Long term current use of anticoagulant therapy [Z79.01]  Cerebrovascular accident involving  cerebellum (H) [I63.9]           Comments:           Anticoagulation Care Providers     Provider Role Specialty Phone number    Surya Dyer DO Referring Family Medicine [phone]

## 2022-09-07 ENCOUNTER — ANTICOAGULATION THERAPY VISIT (OUTPATIENT)
Dept: ANTICOAGULATION | Facility: CLINIC | Age: 82
End: 2022-09-07

## 2022-09-07 ENCOUNTER — LAB (OUTPATIENT)
Dept: LAB | Facility: CLINIC | Age: 82
End: 2022-09-07
Payer: COMMERCIAL

## 2022-09-07 DIAGNOSIS — Z79.01 LONG TERM CURRENT USE OF ANTICOAGULANT THERAPY: Primary | ICD-10-CM

## 2022-09-07 DIAGNOSIS — I63.9 CEREBROVASCULAR ACCIDENT INVOLVING CEREBELLUM (H): ICD-10-CM

## 2022-09-07 LAB — INR BLD: 2.8 (ref 0.9–1.1)

## 2022-09-07 PROCEDURE — 36416 COLLJ CAPILLARY BLOOD SPEC: CPT

## 2022-09-07 PROCEDURE — 85610 PROTHROMBIN TIME: CPT

## 2022-09-07 NOTE — PROGRESS NOTES
ANTICOAGULATION MANAGEMENT     Salome Saeed 82 year old female is on warfarin with therapeutic INR result. (Goal INR 2.0-3.0)    Recent labs: (last 7 days)     09/07/22  1310   INR 2.8*       ASSESSMENT       Source(s): Chart Review and Patient/Caregiver Call       Warfarin doses taken: Warfarin taken as instructed    Diet: No new diet changes identified    New illness, injury, or hospitalization: No - patient reports she always has back pain    Medication/supplement changes: None noted    Signs or symptoms of bleeding or clotting: No    Previous INR: Supratherapeutic    Additional findings: None       PLAN     Recommended plan for no diet, medication or health factor changes affecting INR     Dosing Instructions: Continue your current warfarin dose with next INR in 2 weeks       Summary  As of 9/7/2022    Full warfarin instructions:  5 mg every Mon; 2.5 mg all other days   Next INR check:  9/21/2022             Telephone call with Loreta who agrees to plan and repeated back plan correctly    Lab visit scheduled    Education provided: Please call back if any changes to your diet, medications or how you've been taking warfarin and Contact [phone]  with any changes, questions or concerns.     Plan made per [facility] anticoagulation protocol    Renee Martinez RN  Anticoagulation Clinic  9/7/2022    _______________________________________________________________________     Anticoagulation Episode Summary     Current INR goal:  2.0-3.0   TTR:  56.4 % (1 y)   Target end date:  Indefinite   Send INR reminders to:  [facility]    Indications    Long term current use of anticoagulant therapy [Z79.01]  Cerebrovascular accident involving cerebellum (H) [I63.9]           Comments:           Anticoagulation Care Providers     Provider Role Specialty Phone number    Surya Dyer DO Referring Family Medicine [phone]

## 2022-09-26 ENCOUNTER — LAB (OUTPATIENT)
Dept: LAB | Facility: CLINIC | Age: 82
End: 2022-09-26
Payer: COMMERCIAL

## 2022-09-26 ENCOUNTER — ANTICOAGULATION THERAPY VISIT (OUTPATIENT)
Dept: ANTICOAGULATION | Facility: CLINIC | Age: 82
End: 2022-09-26

## 2022-09-26 DIAGNOSIS — I63.9 CEREBROVASCULAR ACCIDENT INVOLVING CEREBELLUM (H): ICD-10-CM

## 2022-09-26 DIAGNOSIS — Z79.01 LONG TERM CURRENT USE OF ANTICOAGULANT THERAPY: Primary | ICD-10-CM

## 2022-09-26 LAB — INR BLD: 2.5 (ref 0.9–1.1)

## 2022-09-26 PROCEDURE — 85610 PROTHROMBIN TIME: CPT

## 2022-09-26 PROCEDURE — 36416 COLLJ CAPILLARY BLOOD SPEC: CPT

## 2022-09-26 NOTE — PROGRESS NOTES
ANTICOAGULATION MANAGEMENT     Salome Saeed 82 year old female is on warfarin with therapeutic INR result. (Goal INR 2.0-3.0)    Recent labs: (last 7 days)     09/26/22  1105   INR 2.5*       ASSESSMENT       Source(s): Chart Review and Patient/Caregiver Call       Warfarin doses taken: Warfarin taken as instructed    Diet: No new diet changes identified    New illness, injury, or hospitalization: No    Medication/supplement changes: None noted    Signs or symptoms of bleeding or clotting: No    Previous INR: Therapeutic last visit; previously outside of goal range    Additional findings: None       PLAN     Recommended plan for no diet, medication or health factor changes affecting INR     Dosing Instructions: Continue your current warfarin dose with next INR in 3 weeks       Summary  As of 9/26/2022    Full warfarin instructions:  5 mg every Mon; 2.5 mg all other days   Next INR check:  10/17/2022             Telephone call with Loreta who verbalizes understanding and agrees to plan    Lab visit scheduled    Education provided: Please call back if any changes to your diet, medications or how you've been taking warfarin and Contact [phone]  with any changes, questions or concerns.     Plan made per [facility] anticoagulation protocol    Renee Martinez RN  Anticoagulation Clinic  9/26/2022    _______________________________________________________________________     Anticoagulation Episode Summary     Current INR goal:  2.0-3.0   TTR:  60.3 % (1 y)   Target end date:  Indefinite   Send INR reminders to:  [facility]    Indications    Long term current use of anticoagulant therapy [Z79.01]  Cerebrovascular accident involving cerebellum (H) [I63.9]           Comments:           Anticoagulation Care Providers     Provider Role Specialty Phone number    Surya Dyer DO Referring [facility] Medicine [phone]

## 2022-10-11 ENCOUNTER — LAB (OUTPATIENT)
Dept: LAB | Facility: CLINIC | Age: 82
End: 2022-10-11
Payer: COMMERCIAL

## 2022-10-11 DIAGNOSIS — I25.10 CORONARY ARTERY DISEASE INVOLVING NATIVE CORONARY ARTERY OF NATIVE HEART WITHOUT ANGINA PECTORIS: Chronic | ICD-10-CM

## 2022-10-11 LAB
ANION GAP SERPL CALCULATED.3IONS-SCNC: 11 MMOL/L (ref 7–15)
BUN SERPL-MCNC: 12.4 MG/DL (ref 8–23)
CALCIUM SERPL-MCNC: 9.8 MG/DL (ref 8.8–10.2)
CHLORIDE SERPL-SCNC: 98 MMOL/L (ref 98–107)
CHOLEST SERPL-MCNC: 145 MG/DL
CREAT SERPL-MCNC: 0.58 MG/DL (ref 0.51–0.95)
DEPRECATED HCO3 PLAS-SCNC: 25 MMOL/L (ref 22–29)
GFR SERPL CREATININE-BSD FRML MDRD: >90 ML/MIN/1.73M2
GLUCOSE SERPL-MCNC: 115 MG/DL (ref 70–99)
HDLC SERPL-MCNC: 66 MG/DL
LDLC SERPL CALC-MCNC: 67 MG/DL
NONHDLC SERPL-MCNC: 79 MG/DL
POTASSIUM SERPL-SCNC: 4.1 MMOL/L (ref 3.4–5.3)
SODIUM SERPL-SCNC: 134 MMOL/L (ref 136–145)
TRIGL SERPL-MCNC: 61 MG/DL

## 2022-10-11 PROCEDURE — 36415 COLL VENOUS BLD VENIPUNCTURE: CPT | Performed by: INTERNAL MEDICINE

## 2022-10-11 PROCEDURE — 80048 BASIC METABOLIC PNL TOTAL CA: CPT | Performed by: INTERNAL MEDICINE

## 2022-10-11 PROCEDURE — 80061 LIPID PANEL: CPT | Performed by: INTERNAL MEDICINE

## 2022-10-11 NOTE — PROGRESS NOTES
[facility]    OUTPATIENT PHYSICAL THERAPY  PLAN OF TREATMENT FOR OUTPATIENT REHABILITATION AND PROGRESS NOTE           Patient's Last Name, First Name, Salome Fang Date of Birth  1940   Provider's Name  [facility] Medical Record No.  [id number]    Onset Date  08/03/21 Start of Care Date  11/22/21   Type:     _X_PT   ___OT   ___SLP Medical Diagnosis  Cervical stenosis of spinal canal, neck pain   PT Diagnosis  08/03/21 Plan of Treatment  Frequency/Duration: 1 x per week x 90 days.   Certification date from 1/25/2022 to 4/25/2022     Goals:  Goal Identifier Pain   Goal Description Patient will 15 minutes of ambulation with reports of neck pain <5/10 in order to return to regular walking exercise program.   Target Date 01/24/22   Date Met   in progress   Progress (detail required for progress note): 1/28/22 - needing to rest after 3 minutes.  Limited by neck and back pain/fatigue.      Goal Identifier Posture   Goal Description Patient will display less than 2 inches bilaterally on apley's scratch test and less than 8 cm on wall to occiput distance in order to maintain greater upright posture and tolerance of cleaning around home.    Target Date 01/24/22   Date Met   in progress   Progress (detail required for progress note): 1/28/22 - no change     Goal Identifier Balance   Goal Description Patient will complete 30 seconds on all conditions of mCTSIB with minimal ankle and hip motion in order to display improved balance on unstable surfaces for outdoor ambulation.   Target Date 01/24/22   Date Met   1/28/22   Progress (detail required for progress note): 1/28/22 - performed well today     Goal Identifier HEP   Goal Description Patient will be independent in HEP displaying correct mechanics in order to discharge to independent management of impairments  "  Target Date 01/24/22   Date Met   in progress   Progress (detail required for progress note): HEP continues to be modified and progressed as appropriate.        Beginning/End Dates of Progress Note Reporting Period:  11/22/21 to 1/28/2022    Progress Toward Goals:   Progress this reporting period: see above  Progress limited due to 4 week lapse in PT sessions and inconsistent compliance with HEP    Client Self (Subjective) Report for Progress Note Reporting Period: feels neck pain is somewhat better but when it bothers her it is really painful.  Cant think of anything that makes it come on worse.  Uses ice and heat to relieve.  Has been doing the ex \"a little bit\".      Objective Measurements:     Objective Measure: wall to occiput (12/20/21)  Details: 10 cm    Objective Measure: 6 MWT  Details: 420 feet, 2:55 min then needed to rest/stop.  c/o fatigue/discomfort in mid back.  cues for upright posture during gait.                       I CERTIFY THE NEED FOR THESE SERVICES FURNISHED UNDER        THIS PLAN OF TREATMENT AND WHILE UNDER MY CARE     (Physician co-signature of this document indicates review and certification of the therapy plan).                Referring Provider: Pj Keene MD Jenni T. Sullivan, PT    "

## 2022-10-13 ENCOUNTER — OFFICE VISIT (OUTPATIENT)
Dept: CARDIOLOGY | Facility: CLINIC | Age: 82
End: 2022-10-13
Payer: COMMERCIAL

## 2022-10-13 VITALS
SYSTOLIC BLOOD PRESSURE: 117 MMHG | HEART RATE: 67 BPM | HEIGHT: 63 IN | DIASTOLIC BLOOD PRESSURE: 64 MMHG | BODY MASS INDEX: 24.45 KG/M2 | OXYGEN SATURATION: 93 % | WEIGHT: 138 LBS

## 2022-10-13 DIAGNOSIS — E78.5 HYPERLIPIDEMIA LDL GOAL <70: ICD-10-CM

## 2022-10-13 DIAGNOSIS — I25.118 CORONARY ARTERY DISEASE OF NATIVE ARTERY OF NATIVE HEART WITH STABLE ANGINA PECTORIS (H): Primary | ICD-10-CM

## 2022-10-13 DIAGNOSIS — R06.09 DOE (DYSPNEA ON EXERTION): ICD-10-CM

## 2022-10-13 DIAGNOSIS — R07.89 CHEST DISCOMFORT: ICD-10-CM

## 2022-10-13 DIAGNOSIS — Z79.01 LONG TERM CURRENT USE OF ANTICOAGULANT THERAPY: ICD-10-CM

## 2022-10-13 DIAGNOSIS — R73.01 IMPAIRED FASTING GLUCOSE: ICD-10-CM

## 2022-10-13 DIAGNOSIS — I10 BENIGN ESSENTIAL HYPERTENSION: Chronic | ICD-10-CM

## 2022-10-13 PROCEDURE — 99214 OFFICE O/P EST MOD 30 MIN: CPT | Performed by: INTERNAL MEDICINE

## 2022-10-13 NOTE — LETTER
10/13/2022    Surya Dyer,   [address] [facility] [address]    RE: Salome Saeed       Dear Colleague,     I had the pleasure of seeing Salomeduane Saeed in the [facility].  HPI and Plan:   See dictation    Today's clinic visit entailed:  Review of the result(s) of each unique test - Lab work  Ordering of each unique test  Prescription drug management  30 minutes spent on the date of the encounter doing chart review, history and exam, documentation and further activities per the note  Provider  Link to MDM Help Grid     The level of medical decision making during this visit was of moderate complexity.      Orders Placed This Encounter   Procedures     Basic metabolic panel     Lipid Profile     Follow-Up with Cardiology RASHAD     Follow-Up with Cardiologist       Orders Placed This Encounter   Medications     Magnesium 400 MG CAPS     Sig: Take 400 mg by mouth daily       There are no discontinued medications.      Encounter Diagnoses   Name Primary?     Coronary artery disease of native artery of native heart with stable angina pectoris (H) Yes     Chest discomfort      Hyperlipidemia LDL goal <70      Benign essential hypertension      WILSON (dyspnea on exertion)      Long term current use of anticoagulant therapy      Impaired fasting glucose        CURRENT MEDICATIONS:  Current Outpatient Medications   Medication Sig Dispense Refill     amLODIPine (NORVASC) 2.5 MG tablet Take 1 tablet (2.5 mg) by mouth daily 90 tablet 1     aspirin EC 81 MG tablet Take 1 tablet (81 mg) by mouth daily       atenolol (TENORMIN) 25 MG tablet Take 1 tablet (25 mg) by mouth daily 90 tablet 3     Calcium Carb-Cholecalciferol (CALCIUM 600 + D PO) Take 1,200 mg by mouth daily        Cholecalciferol (VITAMIN D) 2000 UNITS tablet Take 2,000 Units by mouth daily        Coenzyme Q10 (COQ-10) 200 MG CAPS Take 400 mg by mouth daily        diclofenac (VOLTAREN) 1 % topical gel Apply 4 g topically 4  times daily 350 g 3     escitalopram (LEXAPRO) 5 MG tablet Take 5 mg by mouth daily       gatifloxacin (ZYMAXID) 0.5 % ophthalmic solution daily as needed        levothyroxine (SYNTHROID/LEVOTHROID) 100 MCG tablet TAKE 1 TABLET DAILY 90 tablet 3     lisinopril (ZESTRIL) 20 MG tablet Take 1 tablet (20 mg) by mouth daily 90 tablet 3     Magnesium 400 MG CAPS Take 400 mg by mouth daily       Multiple Vitamin (DAILY MULTIVITAMIN PO) Take by mouth daily       nitroGLYcerin (NITROSTAT) 0.4 MG sublingual tablet Place 1 tablet (0.4 mg) under the tongue every 5 minutes as needed for chest pain 25 tablet 2     prednisoLONE acetate (PRED FORTE) 1 % ophthalmic suspension daily        rosuvastatin (CRESTOR) 40 MG tablet Take 1 tablet (40 mg) by mouth daily 90 tablet 3     vitamin B complex with vitamin C (STRESS TAB) tablet Take 1 tablet by mouth daily        vitamin C (ASCORBIC ACID) 1000 MG TABS Take 2,000 mg by mouth daily       warfarin ANTICOAGULANT (COUMADIN) 5 MG tablet TAKE ONE-HALF (1/2) TABLET DAILY EXCEPT TAKE ONE TABLET ON  FRIDAY OR AS DIRECTED BY INR CLINIC (Patient taking differently: TAKE ONE-HALF (1/2) TABLET DAILY EXCEPT TAKE ONE TABLET ON  FRIDAY OR AS DIRECTED BY INR CLINIC    5mg on Monday, 2.5 on all other days) 60 tablet 1     ketorolac (ACULAR) 0.5 % ophthalmic solution instill one drop into right eye twice a day for one week then one drop once a day for 7 more weeks; begin drops 3 days before surgery (Patient not taking: No sig reported)         ALLERGIES     Allergies   Allergen Reactions     Atorvastatin      Leg cramps     Cats Itching     Gluten      Sinuses affected by gluten     Oat      Shellfish Allergy      hives     Wheat        PAST MEDICAL HISTORY:  Past Medical History:   Diagnosis Date     CAD (coronary artery disease) 04/09/2009 4/8/2009: PTCA and two 2.5x15mm Xience stents to mid LAD lesion; Cath 12/2012- 40-50% stenosis in mid PDA, 80% on D1- medically treat , 5/28/2015 - PTCA and  2.25x8mm Promus PREMIIER NAILA to ostium of 2nd OM     Cerebral artery occlusion with cerebral infarction      CVA (cerebral infarction)      DDD (degenerative disc disease)      Essential hypertension, benign      GERD (gastroesophageal reflux disease)      Hyperlipidemia      Hypothyroidism      Lumbar spinal stenosis      Mitral regurgitation     1+ per 2013 Echo     Vertebral artery stenosis, right        PAST SURGICAL HISTORY:  Past Surgical History:   Procedure Laterality Date     ARTHROPLASTY KNEE Left 2020    Procedure: Left total knee arthroplasty (Ward and Nephew #5 narrow femur; #3 tibia; 9 mm tibial poly and 35 mm patella);  Surgeon: Jorge Luis Cheatham MD;  Location: [facility] OR     CORONARY ANGIOGRAPHY ADULT ORDER  2012    40-50% stenosis to mid PDA, 80% in D1- medically treat     CORONARY ANGIOGRAPHY ADULT ORDER  2015    PTCA and 2.25x8mm Promus PREMIER NAILA to ostium of 2nd OM     DECOMPRESSION, FUSION LUMBAR POSTERIOR THREE + LEVELS, COMBINED  2013    Procedure: COMBINED DECOMPRESSION, FUSION LUMBAR POSTERIOR THREE + LEVELS;  Posterior Lumbar Decompression L3-S1, Fusion L4-S1;  Surgeon: Daniel Harris MD;  Location: [facility] OR     ENDOSCOPIC STRIPPING VEIN(S)       HEART CATH, ANGIOPLASTY  2009    PTCA and two 2.5x15mm Xience stents to mid LAD lesion     HYSTERECTOMY, RICKIE      Fibroids, and Menorrhagia.. Bilateral Oophrectomy     ORTHOPEDIC SURGERY       Stenting of LAD, Angioplasty  09     TONSILLECTOMY      as a child       FAMILY HISTORY:  Family History   Problem Relation Age of Onset     Neurologic Disorder Mother         Dementia, Still living     Ovarian Cancer Mother      Thyroid Disease Mother      C.A.D. Father              Diabetes Father      Coronary Artery Disease Father      Alcohol/Drug Brother      Thyroid Disease Son      Diabetes Son        SOCIAL HISTORY:  Social History     Socioeconomic History     Marital status:      Spouse name: None  "    Number of children: None     Years of education: None     Highest education level: None   Tobacco Use     Smoking status: Former     Packs/day: 0.10     Types: Cigarettes     Quit date: 1965     Years since quittin.8     Smokeless tobacco: Never   Substance and Sexual Activity     Alcohol use: Yes     Alcohol/week: 0.0 standard drinks     Comment: 2 glasses wine per month, maybe     Drug use: No     Sexual activity: Not Currently   Other Topics Concern     Blood Transfusions No     Caffeine Concern Yes     Comment: 5 cups caffeine per day     Sleep Concern No     Stress Concern No     Weight Concern No     Comment: weight stable     Special Diet No     Comment: trying to eat healthier     Exercise Yes     Comment: stretching     Seat Belt Yes     Self-Exams Yes     Parent/sibling w/ CABG, MI or angioplasty before 65F 55M? 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  ENT:  Negative      Respiratory:  Positive for shortness of breath;dyspnea on exertion     Cardiovascular:    chest pain;Positive for;fatigue;lightheadedness cp 2 days ago (day of blood draw) - no handicap spots, so she has to walk further, was not a true pain, more like a tightness from exertion. Lightheaded if she moves too fast.  Gastroenterology: Negative      Genitourinary:  Negative      Musculoskeletal:  Positive for back pain    Neurologic:  Positive for headaches gets a sharp pain occ, no consistant spot on head, varies on how long the sharp pain lasts.  Psychiatric:  Positive for excessive stress worries about her  \"he's got a bad heart, [facility]\" - he has a blockage that they don't want to fix because it could cause other issues/damage  Heme/Lymph/Imm:  Negative      Endocrine:  Positive for thyroid disorder      Physical Exam:  Vitals: /64 (BP Location: " "Left arm, Patient Position: Sitting)   Pulse 67   Ht 1.6 m (5' 3\")   Wt 62.6 kg (138 lb)   LMP  (LMP Unknown)   SpO2 93%   BMI 24.45 kg/m      Constitutional:  cooperative, alert and oriented, well developed, well nourished, in no acute distress        Skin:  warm and dry to the touch, no apparent skin lesions or masses noted          Head:  normocephalic, no masses or lesions        Eyes:  pupils equal and round, conjunctivae and lids unremarkable, sclera white, no xanthalasma, EOMS intact, no nystagmus        Lymph:      ENT:  no pallor or cyanosis, dentition good        Neck:  no carotid bruit        Respiratory:  normal breath sounds, clear to auscultation, normal A-P diameter, normal symmetry, normal respiratory excursion, no use of accessory muscles         Cardiac: regular rhythm;normal S1 and S2       LUSB;systolic murmur;grade 2        pulses full and equal                                   left wrist    GI:           Extremities and Muscular Skeletal:  no edema;no spinal abnormalities noted;normal muscle strength and tone              Neurological:  no gross motor deficits        Psych:  affect appropriate, oriented to time, person and place        CC  Nikolai Amaya MD  [address]  [place] [address]                Service Date: 10/13/2022    CLINIC VISIT    HISTORY OF PRESENT ILLNESS:  Louise is a very nice 82-year-old woman with past medical history significant for unstable angina leading to 2 Xience drug-eluting stents placed in her left anterior descending or in 2009.  She has hypercholesterolemia, hypertension, hypothyroidism, back pain with multiple surgeries by Dr. Daniel Harris, a strong family history of coronary disease, Parkinson disease, cerebrovascular accident in 2013 manifesting as balance issues.  This was thought to be an embolic event from her vertebral artery for which she has been on warfarin ever since.    Additional coronary history includes chest discomfort in 2012 " with angiogram demonstrating no change in her anatomy.  She had moderate disease in posterior descending artery, ostial pinch in her diagonal and obtuse marginal that I recommended medical management.  Repeat angiography in 2015 with classic anginal symptoms showed no change in her anatomy and we continued medical management.  One month later, she was back in the emergency room.  I took her back to the Cath Lab where I stented a small obtuse marginal branch, placing a 2.25 x 8 mm Promus drug-eluting stent in the ostium of the obtuse marginal.  She had problems with the radial approach with ecchymoses and pain but this resolved over time.  In 2017, after several back surgeries by Dr. Daniel Harris, she was having chest discomfort with a stress nuclear scan that demonstrated no evidence of ischemia.  She also had followup Lexiscans in 2019 and 2020 for similar symptoms.    More recently, Louise has had problems with hypotension and orthostasis for which we had to back off on her medications, decreasing her atenolol from 25 b.i.d. to 25 once a day and decreasing her amlodipine to 2.5 mg daily.    Louise returns to clinic stating that her lightheadedness and dizziness are doing better.  She still occasionally has episodes but she is more concerned about dyspnea on exertion that occurs intermittently and accompanied by chest tightness and chest heaviness.  This occurs with higher levels of activity, does not last long and has had no rest episodes.    I reviewed her last angiogram which she appeared to be very well revascularized with only branch vessel disease.    ASSESSMENT AND PLAN:  Louise appears to be having angina.  Unclear if this is stable exertional angina or if it is unstable.  At this time, I will proceed with a Lexiscan to evaluate this further.  We can compare it with her previous Lexiscans.  If there is no significant area of ischemia, I would continue with medical management.  If there does appear to be a  significant area, we can go back to the Cath Lab for a relook and potential intervention.    Blood pressure today is 117/64 with a pulse of 67.  We talked about whether to decrease her medications.  At this time, we will keep them as is, as she thinks she is doing fairly well.    Weight is 138 pounds.  She is down about 4 pounds from last spring and about 9 pounds from a year ago.  We may need to keep adjusting her blood pressure medicines downward given her weight loss and her Parkinson disease.    Her current weight gives her a body mass index of 24.5 with clothes on.    Fasting lipid profile is outstanding.  Total cholesterol is 145, HDL is 66, LDL is 67, triglycerides are 61.    Sodium is a bit low at 134.  Creatinine is normal at 0.58.    We will follow up on her Lexiscan as appropriate.  Thank you for allowing me to participate in her care.    Nikolai Amaya MD, [facility]        D: 10/13/2022   T: 10/13/2022   MT: xi    Name:     JESUS AVELAR  MRN:      [id number]        Account:      [id number]   :      1940           Service Date: 10/13/2022       Document: [id number]      Thank you for allowing me to participate in the care of your patient.      Sincerely,     Nikolai Amaya MD     [facility]  cc:   Nikolai Amaya MD  [address]  [place] [address]

## 2022-10-13 NOTE — PROGRESS NOTES
Service Date: 10/13/2022    CLINIC VISIT    HISTORY OF PRESENT ILLNESS:  Louise is a very nice 82-year-old woman with past medical history significant for unstable angina leading to 2 Xience drug-eluting stents placed in her left anterior descending or in 2009.  She has hypercholesterolemia, hypertension, hypothyroidism, back pain with multiple surgeries by Dr. Daniel Harris, a strong family history of coronary disease, Parkinson disease, cerebrovascular accident in 2013 manifesting as balance issues.  This was thought to be an embolic event from her vertebral artery for which she has been on warfarin ever since.    Additional coronary history includes chest discomfort in 2012 with angiogram demonstrating no change in her anatomy.  She had moderate disease in posterior descending artery, ostial pinch in her diagonal and obtuse marginal that I recommended medical management.  Repeat angiography in 2015 with classic anginal symptoms showed no change in her anatomy and we continued medical management.  One month later, she was back in the emergency room.  I took her back to the Cath Lab where I stented a small obtuse marginal branch, placing a 2.25 x 8 mm Promus drug-eluting stent in the ostium of the obtuse marginal.  She had problems with the radial approach with ecchymoses and pain but this resolved over time.  In 2017, after several back surgeries by Dr. Daniel Harris, she was having chest discomfort with a stress nuclear scan that demonstrated no evidence of ischemia.  She also had followup Lexiscans in 2019 and 2020 for similar symptoms.    More recently, Louise has had problems with hypotension and orthostasis for which we had to back off on her medications, decreasing her atenolol from 25 b.i.d. to 25 once a day and decreasing her amlodipine to 2.5 mg daily.    Louise returns to clinic stating that her lightheadedness and dizziness are doing better.  She still occasionally has episodes but she is more concerned about  dyspnea on exertion that occurs intermittently and accompanied by chest tightness and chest heaviness.  This occurs with higher levels of activity, does not last long and has had no rest episodes.    I reviewed her last angiogram which she appeared to be very well revascularized with only branch vessel disease.    ASSESSMENT AND PLAN:  Louise appears to be having angina.  Unclear if this is stable exertional angina or if it is unstable.  At this time, I will proceed with a Lexiscan to evaluate this further.  We can compare it with her previous Lexiscans.  If there is no significant area of ischemia, I would continue with medical management.  If there does appear to be a significant area, we can go back to the Cath Lab for a relook and potential intervention.    Blood pressure today is 117/64 with a pulse of 67.  We talked about whether to decrease her medications.  At this time, we will keep them as is, as she thinks she is doing fairly well.    Weight is 138 pounds.  She is down about 4 pounds from last spring and about 9 pounds from a year ago.  We may need to keep adjusting her blood pressure medicines downward given her weight loss and her Parkinson disease.    Her current weight gives her a body mass index of 24.5 with clothes on.    Fasting lipid profile is outstanding.  Total cholesterol is 145, HDL is 66, LDL is 67, triglycerides are 61.    Sodium is a bit low at 134.  Creatinine is normal at 0.58.    We will follow up on her Lexiscan as appropriate.  Thank you for allowing me to participate in her care.    Nikolai Amaya MD, [facility]        D: 10/13/2022   T: 10/13/2022   MT: xi    Name:     JESUS AVELAR  MRN:      [id number]        Account:      [id number]   :      1940           Service Date: 10/13/2022       Document: [id number]

## 2022-10-17 ENCOUNTER — ANTICOAGULATION THERAPY VISIT (OUTPATIENT)
Dept: ANTICOAGULATION | Facility: CLINIC | Age: 82
End: 2022-10-17

## 2022-10-17 ENCOUNTER — LAB (OUTPATIENT)
Dept: LAB | Facility: CLINIC | Age: 82
End: 2022-10-17
Payer: COMMERCIAL

## 2022-10-17 DIAGNOSIS — I63.9 CEREBROVASCULAR ACCIDENT INVOLVING CEREBELLUM (H): ICD-10-CM

## 2022-10-17 DIAGNOSIS — Z79.01 LONG TERM CURRENT USE OF ANTICOAGULANT THERAPY: Primary | ICD-10-CM

## 2022-10-17 LAB — INR BLD: 2 (ref 0.9–1.1)

## 2022-10-17 PROCEDURE — 85610 PROTHROMBIN TIME: CPT

## 2022-10-17 PROCEDURE — 36416 COLLJ CAPILLARY BLOOD SPEC: CPT

## 2022-10-17 NOTE — PROGRESS NOTES
ANTICOAGULATION MANAGEMENT     Salome Saeed 82 year old female is on warfarin with therapeutic INR result. (Goal INR 2.0-3.0)    Recent labs: (last 7 days)     10/17/22  1114   INR 2.0*       ASSESSMENT       Source(s): Chart Review and Patient/Caregiver Call       Warfarin doses taken: Missed dose(s) may be affecting INR, pt reports she missed a dose last week, unsure of the specific day.     Diet: No new diet changes identified    New illness, injury, or hospitalization: No    Medication/supplement changes: None noted    Signs or symptoms of bleeding or clotting: No    Previous INR: Therapeutic last 2(+) visits    Additional findings: None       PLAN     Recommended plan for temporary change(s) affecting INR     Dosing Instructions: Continue your current warfarin dose with next INR in 3-4 weeks       Summary  As of 10/17/2022    Full warfarin instructions:  5 mg every Mon; 2.5 mg all other days   Next INR check:  11/14/2022             Telephone call with Loreta who verbalizes understanding and agrees to plan    Lab visit scheduled    Education provided: Importance of consistent vitamin K intake, Goal range and significance of current result and Importance of therapeutic range    Plan made per [facility] anticoagulation protocol    Raymond Espinal RN  Anticoagulation Clinic  10/17/2022    _______________________________________________________________________     Anticoagulation Episode Summary     Current INR goal:  2.0-3.0   TTR:  65.4 % (1 y)   Target end date:  Indefinite   Send INR reminders to:  [facility]    Indications    Long term current use of anticoagulant therapy [Z79.01]  Cerebrovascular accident involving cerebellum (H) [I63.9]           Comments:           Anticoagulation Care Providers     Provider Role Specialty Phone number    Surya Dyer DO [facility] Medicine [phone]

## 2022-10-20 ENCOUNTER — HOSPITAL ENCOUNTER (OUTPATIENT)
Dept: NUCLEAR MEDICINE | Facility: CLINIC | Age: 82
Setting detail: NUCLEAR MEDICINE
Discharge: HOME | End: 2022-10-20
Attending: INTERNAL MEDICINE
Payer: COMMERCIAL

## 2022-10-20 ENCOUNTER — TELEPHONE (OUTPATIENT)
Dept: CARDIOLOGY | Facility: CLINIC | Age: 82
End: 2022-10-20

## 2022-10-20 ENCOUNTER — HOSPITAL ENCOUNTER (OUTPATIENT)
Dept: CARDIOLOGY | Facility: CLINIC | Age: 82
Discharge: HOME | End: 2022-10-20
Attending: INTERNAL MEDICINE
Payer: COMMERCIAL

## 2022-10-20 DIAGNOSIS — R07.89 CHEST DISCOMFORT: ICD-10-CM

## 2022-10-20 LAB
CV STRESS MAX HR HE: 81
NUC STRESS EJECTION FRACTION: 73 %
RATE PRESSURE PRODUCT: NORMAL
STRESS ECHO BASELINE DIASTOLIC HE: 77
STRESS ECHO BASELINE HR: 63 BPM
STRESS ECHO BASELINE SYSTOLIC BP: 166
STRESS ECHO CALCULATED PERCENT HR: 58 %
STRESS ECHO LAST STRESS DIASTOLIC BP: 69
STRESS ECHO LAST STRESS SYSTOLIC BP: 148
STRESS ECHO TARGET HR: 140

## 2022-10-20 PROCEDURE — 343N000001 HC RX 343: Performed by: INTERNAL MEDICINE

## 2022-10-20 PROCEDURE — 78452 HT MUSCLE IMAGE SPECT MULT: CPT | Mod: 26 | Performed by: INTERNAL MEDICINE

## 2022-10-20 PROCEDURE — 93016 CV STRESS TEST SUPVJ ONLY: CPT | Performed by: INTERNAL MEDICINE

## 2022-10-20 PROCEDURE — 93018 CV STRESS TEST I&R ONLY: CPT | Performed by: INTERNAL MEDICINE

## 2022-10-20 PROCEDURE — 93017 CV STRESS TEST TRACING ONLY: CPT

## 2022-10-20 PROCEDURE — 250N000011 HC RX IP 250 OP 636

## 2022-10-20 PROCEDURE — 78452 HT MUSCLE IMAGE SPECT MULT: CPT

## 2022-10-20 PROCEDURE — A9502 TC99M TETROFOSMIN: HCPCS | Performed by: INTERNAL MEDICINE

## 2022-10-20 RX ORDER — AMINOPHYLLINE 25 MG/ML
50-100 INJECTION, SOLUTION INTRAVENOUS
Status: DISCONTINUED | OUTPATIENT
Start: 2022-10-20 | End: 2022-10-21 | Stop reason: HOSPADM

## 2022-10-20 RX ORDER — REGADENOSON 0.08 MG/ML
0.4 INJECTION, SOLUTION INTRAVENOUS ONCE
Status: COMPLETED | OUTPATIENT
Start: 2022-10-20 | End: 2022-10-20

## 2022-10-20 RX ORDER — ALBUTEROL SULFATE 90 UG/1
2 AEROSOL, METERED RESPIRATORY (INHALATION) EVERY 5 MIN PRN
Status: DISCONTINUED | OUTPATIENT
Start: 2022-10-20 | End: 2022-10-21 | Stop reason: HOSPADM

## 2022-10-20 RX ORDER — ACYCLOVIR 200 MG/1
0-1 CAPSULE ORAL
Status: DISCONTINUED | OUTPATIENT
Start: 2022-10-20 | End: 2022-10-21 | Stop reason: HOSPADM

## 2022-10-20 RX ORDER — REGADENOSON 0.08 MG/ML
INJECTION, SOLUTION INTRAVENOUS
Status: COMPLETED
Start: 2022-10-20 | End: 2022-10-20

## 2022-10-20 RX ADMIN — TETROFOSMIN 32 MCI.: 1.38 INJECTION, POWDER, LYOPHILIZED, FOR SOLUTION INTRAVENOUS at 10:23

## 2022-10-20 RX ADMIN — TETROFOSMIN 10.3 MCI.: 1.38 INJECTION, POWDER, LYOPHILIZED, FOR SOLUTION INTRAVENOUS at 09:04

## 2022-10-20 RX ADMIN — REGADENOSON 0.4 MG: 0.08 INJECTION, SOLUTION INTRAVENOUS at 10:21

## 2022-10-20 NOTE — TELEPHONE ENCOUNTER
Reply from Dr. Amaya:  Reassure patient that her Lexiscan looks good without any significant ischemia.  Continue with medical management.        Called patient to review lexiscan results as showing no change and Dr. Amaya's recommendation for continued medical therapy. Patient verbalized understanding and agreed with plan.

## 2022-10-20 NOTE — TELEPHONE ENCOUNTER
"Lexsican completed today as below, routed to Dr Amaya. Ordered at OV 10/13/22 for patient reports of chest pain.     OV note by Dr Amaya-  \"Louise appears to be having angina.  Unclear if this is stable exertional angina or if it is unstable.  At this time, I will proceed with a Lexiscan to evaluate this further.  We can compare it with her previous Lexiscans.  If there is no significant area of ischemia, I would continue with medical management.  If there does appear to be a significant area, we can go back to the Cath Lab for a relook and potential intervention.\"     Lexiscan     The nuclear stress test is probably negative for inducible myocardial ischemia or infarction.     Left ventricular function is normal.     The left ventricular ejection fraction at stress is 73%.     A prior study was conducted on 10/21/2020.  This study has no change when compared with the prior study.  [place]: Dr Donaldson   "

## 2022-10-25 ENCOUNTER — TELEPHONE (OUTPATIENT)
Dept: NEUROLOGY | Facility: CLINIC | Age: 82
End: 2022-10-25

## 2022-10-25 NOTE — TELEPHONE ENCOUNTER
Outbound call to Loreta  To reminder her of 10/26/22 at 1pm appointment. S/W patient and confirmed they will attend appointment

## 2022-10-26 ENCOUNTER — OFFICE VISIT (OUTPATIENT)
Dept: NEUROLOGY | Facility: CLINIC | Age: 82
End: 2022-10-26
Payer: COMMERCIAL

## 2022-10-26 VITALS
HEIGHT: 63 IN | BODY MASS INDEX: 24.45 KG/M2 | OXYGEN SATURATION: 99 % | DIASTOLIC BLOOD PRESSURE: 73 MMHG | WEIGHT: 138.01 LBS | SYSTOLIC BLOOD PRESSURE: 130 MMHG | HEART RATE: 63 BPM

## 2022-10-26 DIAGNOSIS — G89.29 CHRONIC MIDLINE THORACIC BACK PAIN: ICD-10-CM

## 2022-10-26 DIAGNOSIS — M54.6 CHRONIC MIDLINE THORACIC BACK PAIN: ICD-10-CM

## 2022-10-26 DIAGNOSIS — G89.29 CHRONIC MIDLINE LOW BACK PAIN WITHOUT SCIATICA: ICD-10-CM

## 2022-10-26 DIAGNOSIS — M48.02 CERVICAL STENOSIS OF SPINAL CANAL: ICD-10-CM

## 2022-10-26 DIAGNOSIS — M54.50 CHRONIC MIDLINE LOW BACK PAIN WITHOUT SCIATICA: ICD-10-CM

## 2022-10-26 DIAGNOSIS — G20.C PARKINSONISM, UNSPECIFIED PARKINSONISM TYPE (H): Primary | ICD-10-CM

## 2022-10-26 PROCEDURE — 99214 OFFICE O/P EST MOD 30 MIN: CPT | Performed by: PSYCHIATRY & NEUROLOGY

## 2022-10-26 NOTE — LETTER
"    10/26/2022         RE: Salome Saeed  [address]  [place] [address]        Dear Colleague,    Thank you for referring your patient, Salome Saeed, to the [facility] NEUROLOGY [facility]. Please see a copy of my visit note below.    ESTABLISHED PATIENT NEUROLOGY NOTE    DATE OF VISIT: 10/26/2022  CLINIC LOCATION: [facility]  MRN: [id number]  PATIENT NAME: Salome Saeed  YOB: 1940    REASON FOR VISIT:   Chief Complaint   Patient presents with     Parkinson     Patient reports they feel like they are having more tremors and her back has been bothering her more lately      SUBJECTIVE:                                                      HISTORY OF PRESENT ILLNESS: Patient is here to follow up regarding parkinsonism, low back pain and cervical spinal stenosis.  She was last seen on 4/26/2022.  Please refer to my initial/other prior notes for further information.    Since the last visit, the patient reports worsening of the tremor and lower back pain that spreads up the spine.  She had previous low back surgery in 2013.  Her latest MRI is from 2016, but she feels that symptoms are getting worse.  She reports a fall approximately 2 to 3 weeks ago.  Denies interval development of new neurological symptoms.  EXAM:                                                    Physical Exam:   Vitals: /73 (BP Location: Left arm, Patient Position: Sitting, Cuff Size: Adult Regular)   Pulse 63   Ht 1.6 m (5' 3\")   Wt 62.6 kg (138 lb 0.1 oz)   LMP  (LMP Unknown)   SpO2 99%   BMI 24.45 kg/m      General: pt is in NAD, cooperative.  Skin: normal turgor, moist mucous membranes, no lesions/rashes noticed.  HEENT: ATNC, white sclera, normal conjunctiva.  Respiratory: Symmetric lung excursion, no accessory respiratory muscle use.  Abdomen: Non distended.  Neurological: awake, cooperative, follows commands, no exam changes compared to the last " visit.  ASSESSMENT AND PLAN:                                                    Assessment: 82 year old female patient presents for follow-up of parkinsonism with differential including Parkinson's disease versus Parkinson plus conditions.  She reports worsening of tremor and low back pain.  Previously we discussed option of Big and Loud Program and trial of Sinemet.  Today we reviewed these options again.  I reviewed with the patient that I did not appreciate any significant degree of resting tremor to recommend Sinemet, though she continues to have mild positional and kinetic bilateral hand tremor that might respond to propranolol.  She is on atenolol, prescribed by her cardiologist, and I recommended her to reach out to prescribing provider or primary care provider to ask about switching to nonselective beta-blocker.    Regarding her cervical spinal stenosis, symptoms are stable.  No changes in management.    Regarding her low back pain, she reports worsening with radiation of the pain up her thoracic spine.  I would recommend obtaining thoracic and lumbar spine MRI to evaluate for interval worsening.    Diagnoses:    ICD-10-CM    1. Parkinsonism, unspecified Parkinsonism type (H)  G20       2. Cervical stenosis of spinal canal  M48.02       3. Chronic midline low back pain without sciatica  M54.50     G89.29         Plan: At today's visit we thoroughly discussed current symptoms, necessary evaluation, and the plan, which includes:  Orders Placed This Encounter   Procedures     MR Thoracic Spine w/o Contrast     MR Lumbar Spine w/o Contrast     No new medications, but we discussed option of propranolol.  I advised the patient to discuss with her primary care provider/cardiologist whether atenolol could be switched to propranolol to see if it helps her tremor.    Next follow-up appointment is in the next 4-6 weeks or earlier if needed.    Total Time: 32 minutes spent on the date of the encounter doing chart  review, history and exam, documentation and further activities per the note.    Pj Keene MD  [facility] Neurology  (Chart documentation was completed in part with Dragon voice-recognition software. Even though reviewed, some grammatical, spelling, and word errors may remain.)        Again, thank you for allowing me to participate in the care of your patient.        Sincerely,        Pj Keene MD

## 2022-10-26 NOTE — PROGRESS NOTES
"ESTABLISHED PATIENT NEUROLOGY NOTE    DATE OF VISIT: 10/26/2022  CLINIC LOCATION: [facility]  MRN: [id number]  PATIENT NAME: Salome Saeed  YOB: 1940    REASON FOR VISIT:   Chief Complaint   Patient presents with     Parkinson     Patient reports they feel like they are having more tremors and her back has been bothering her more lately      SUBJECTIVE:                                                      HISTORY OF PRESENT ILLNESS: Patient is here to follow up regarding parkinsonism, low back pain and cervical spinal stenosis.  She was last seen on 4/26/2022.  Please refer to my initial/other prior notes for further information.    Since the last visit, the patient reports worsening of the tremor and lower back pain that spreads up the spine.  She had previous low back surgery in 2013.  Her latest MRI is from 2016, but she feels that symptoms are getting worse.  She reports a fall approximately 2 to 3 weeks ago.  Denies interval development of new neurological symptoms.  EXAM:                                                    Physical Exam:   Vitals: /73 (BP Location: Left arm, Patient Position: Sitting, Cuff Size: Adult Regular)   Pulse 63   Ht 1.6 m (5' 3\")   Wt 62.6 kg (138 lb 0.1 oz)   LMP  (LMP Unknown)   SpO2 99%   BMI 24.45 kg/m      General: pt is in NAD, cooperative.  Skin: normal turgor, moist mucous membranes, no lesions/rashes noticed.  HEENT: ATNC, white sclera, normal conjunctiva.  Respiratory: Symmetric lung excursion, no accessory respiratory muscle use.  Abdomen: Non distended.  Neurological: awake, cooperative, follows commands, no exam changes compared to the last visit.  ASSESSMENT AND PLAN:                                                    Assessment: 82 year old female patient presents for follow-up of parkinsonism with differential including Parkinson's disease versus Parkinson plus conditions.  She reports worsening of tremor and low back " pain.  Previously we discussed option of Big and Loud Program and trial of Sinemet.  Today we reviewed these options again.  I reviewed with the patient that I did not appreciate any significant degree of resting tremor to recommend Sinemet, though she continues to have mild positional and kinetic bilateral hand tremor that might respond to propranolol.  She is on atenolol, prescribed by her cardiologist, and I recommended her to reach out to prescribing provider or primary care provider to ask about switching to nonselective beta-blocker.    Regarding her cervical spinal stenosis, symptoms are stable.  No changes in management.    Regarding her low back pain, she reports worsening with radiation of the pain up her thoracic spine.  I would recommend obtaining thoracic and lumbar spine MRI to evaluate for interval worsening.    Diagnoses:    ICD-10-CM    1. Parkinsonism, unspecified Parkinsonism type (H)  G20       2. Cervical stenosis of spinal canal  M48.02       3. Chronic midline low back pain without sciatica  M54.50     G89.29         Plan: At today's visit we thoroughly discussed current symptoms, necessary evaluation, and the plan, which includes:  Orders Placed This Encounter   Procedures     MR Thoracic Spine w/o Contrast     MR Lumbar Spine w/o Contrast     No new medications, but we discussed option of propranolol.  I advised the patient to discuss with her primary care provider/cardiologist whether atenolol could be switched to propranolol to see if it helps her tremor.    Next follow-up appointment is in the next 4-6 weeks or earlier if needed.    Total Time: 32 minutes spent on the date of the encounter doing chart review, history and exam, documentation and further activities per the note.    Pj Keene MD  [facility] Neurology  (Chart documentation was completed in part with Dragon voice-recognition software. Even though reviewed, some grammatical, spelling, and word errors may  remain.)

## 2022-10-26 NOTE — PATIENT INSTRUCTIONS
AFTER VISIT SUMMARY (AVS):    At today's visit we thoroughly discussed current symptoms, necessary evaluation, and the plan, which includes:  Orders Placed This Encounter   Procedures    MR Thoracic Spine w/o Contrast    MR Lumbar Spine w/o Contrast     No new medications, but we discussed option of propranolol.  Please discuss with your primary care provider/cardiologist whether atenolol could be switched to propranolol to see if it helps your tremor.    Next follow-up appointment is in the next 4-6 weeks or earlier if needed.    Please do not hesitate to call me with any questions or concerns.    Thanks.

## 2022-11-01 NOTE — PROGRESS NOTES
[facility]    OUTPATIENT PHYSICAL THERAPY  PLAN OF TREATMENT FOR OUTPATIENT REHABILITATION AND PROGRESS NOTE           Patient's Last Name, First Name, Salome Fang Date of Birth  1940   Provider's Name  [facility] Medical Record No.  [id number]    Onset Date  08/03/21 Start of Care Date  11/22/21   Type:     _X_PT   ___OT   ___SLP Medical Diagnosis  Cervical stenosis of spinal canal, neck pain   PT Diagnosis  neck pain with mobility deficit and balance dysfunction Plan of Treatment  Frequency/Duration: 3 visits over 45 days  Certification date from 4/26/2022 to 6/10/2022     Goals:  Goal Identifier Pain   Goal Description Patient will 15 minutes of ambulation with reports of neck pain <5/10 in order to return to regular walking exercise program.   Target Date 04/25/22   Date Met   in progress   Progress (detail required for progress note): 4/27/22: needing to stop at 3 min. 4/12: needing to stop at 2:50 sec due to fatigue/neck pain.      Goal Identifier Posture   Goal Description Patient will display less than 2 inches bilaterally on apley's scratch test and less than 8 cm on wall to occiput distance in order to maintain greater upright posture and tolerance of cleaning around home.    Target Date 04/25/22   Date Met   in progress - partially met   Progress (detail required for progress note): 4/27/2022 wall to occiput 7 cm.  apley scratch test not tested.  1/28/22 - no change     Goal Identifier Balance   Goal Description Patient will complete 30 seconds on all conditions of mCTSIB with minimal ankle and hip motion in order to display improved balance on unstable surfaces for outdoor ambulation.   Target Date 04/25/22   Date Met  04/12/22   Progress (detail required for progress note): 4/12 - met today with minimal sway/movement     Goal Identifier  HEP   Goal Description Patient will be independent in HEP displaying correct mechanics in order to discharge to independent management of impairments   Target Date 04/25/22   Date Met   in progress   Progress (detail required for progress note): 4/27/22 - struggles with consistency but working on it. 4/12 - working on being more consistent.     Beginning/End Dates of Progress Note Reporting Period:  1/25/2022 to 4/25/2022    Progress Toward Goals:   Progress this reporting period: see goals  Progress limited due to inconsistent compliance/consistentcy with HEP, postural impairments    Client Self (Subjective) Report for Progress Note Reporting Period: Saw neurologist yesterday for follow up.  Neurologist stated to her that her Parkinson's is stable.  Will follow up in about 6 more months.  Has a hard time following through with HEP at home.    Objective Measurements:     Objective Measure: wall to occiput  Details: 7 cm    Objective Measure: 6 MWT  Details: needs to stop at 3:00 min, 450 feet                                    I CERTIFY THE NEED FOR THESE SERVICES FURNISHED UNDER        THIS PLAN OF TREATMENT AND WHILE UNDER MY CARE     (Physician co-signature of this document indicates review and certification of the therapy plan).                Referring Provider: Pj Keene MD [facility] [place], PT

## 2022-11-10 DIAGNOSIS — I63.9 CEREBROVASCULAR ACCIDENT INVOLVING CEREBELLUM (H): ICD-10-CM

## 2022-11-14 RX ORDER — WARFARIN SODIUM 5 MG/1
TABLET ORAL
Qty: 50 TABLET | Refills: 0 | Status: SHIPPED | OUTPATIENT
Start: 2022-11-14 | End: 2023-02-08

## 2022-11-14 NOTE — TELEPHONE ENCOUNTER
ANTICOAGULATION MANAGEMENT:  Medication Refill    Anticoagulation Summary  As of 10/17/2022    Warfarin maintenance plan:  5 mg (5 mg x 1) every Mon; 2.5 mg (5 mg x 0.5) all other days; Starting 10/17/2022   Next INR check:  11/14/2022   Target end date:  Indefinite    Indications    Long term current use of anticoagulant therapy [Z79.01]  Cerebrovascular accident involving cerebellum (H) [I63.9]             Anticoagulation Care Providers     Provider Role Specialty Phone number    Surya Dyer DO Referring Family Medicine [phone]          Visit with referring provider/group within last year: No, last visit date: 10/14/21    [facility] referral signed within last year: Yes    Salome does NOT meet all criteria for refill: Visit with referring provider's group was >= 1 year ago. 90 day ton fill approved; patient notified to schedule with referring provider per [facility] protocol    Gloria Prince RN  Anticoagulation Clinic

## 2022-11-16 ENCOUNTER — LAB (OUTPATIENT)
Dept: LAB | Facility: CLINIC | Age: 82
End: 2022-11-16
Payer: COMMERCIAL

## 2022-11-16 ENCOUNTER — ANTICOAGULATION THERAPY VISIT (OUTPATIENT)
Dept: ANTICOAGULATION | Facility: CLINIC | Age: 82
End: 2022-11-16

## 2022-11-16 DIAGNOSIS — Z79.01 LONG TERM CURRENT USE OF ANTICOAGULANT THERAPY: Primary | ICD-10-CM

## 2022-11-16 DIAGNOSIS — I63.9 CEREBROVASCULAR ACCIDENT INVOLVING CEREBELLUM (H): ICD-10-CM

## 2022-11-16 LAB — INR BLD: 2.3 (ref 0.9–1.1)

## 2022-11-16 PROCEDURE — 36415 COLL VENOUS BLD VENIPUNCTURE: CPT

## 2022-11-16 PROCEDURE — 85610 PROTHROMBIN TIME: CPT

## 2022-11-16 NOTE — PROGRESS NOTES
ANTICOAGULATION MANAGEMENT     Salome Saeed 82 year old female is on warfarin with therapeutic INR result. (Goal INR 2.0-3.0)    Recent labs: (last 7 days)     11/16/22  1314   INR 2.3*       ASSESSMENT       Source(s): Chart Review and Patient/Caregiver Call       Warfarin doses taken: Warfarin taken as instructed    Diet: No new diet changes identified    New illness, injury, or hospitalization: No    Medication/supplement changes: None noted    Signs or symptoms of bleeding or clotting: No    Previous INR: Therapeutic last 2(+) visits    Additional findings: None       PLAN     Recommended plan for no diet, medication or health factor changes affecting INR     Dosing Instructions: Continue your current warfarin dose with next INR in 4 weeks       Summary  As of 11/16/2022    Full warfarin instructions:  5 mg every Mon; 2.5 mg all other days; Starting 11/16/2022   Next INR check:  12/14/2022             Telephone call with Loreta who verbalizes understanding and agrees to plan    Lab visit scheduled    Education provided:     Please call back if any changes to your diet, medications or how you've been taking warfarin    Plan made per [facility] anticoagulation protocol    Sima Bonilla RN  Anticoagulation Clinic  11/16/2022    _______________________________________________________________________     Anticoagulation Episode Summary     Current INR goal:  2.0-3.0   TTR:  71.1 % (1 y)   Target end date:  Indefinite   Send INR reminders to:  [facility]    Indications    Long term current use of anticoagulant therapy [Z79.01]  Cerebrovascular accident involving cerebellum (H) [I63.9]           Comments:           Anticoagulation Care Providers     Provider Role Specialty Phone number    Surya Dyer DO Referring Family Medicine [phone]

## 2022-11-16 NOTE — PROGRESS NOTES
[facility] Rehabilitation Service    Outpatient Physical Therapy Discharge Note  Patient: Salome Saeed  : 1940    Beginning/End Dates of Reporting Period:  2022 to 2022    Referring Provider: Pj Keene MD    Therapy Diagnosis: neck pain with mobility deficit and balance dysfunction     Client Self Report: Trying to be more aware of HEP and states she will have good weeks and not so good weeks.  Thinks she is doing HEP about an average of 1-2 x per week.  Is walking a lot at [employer] so does not walk much for exercise outside of that ( goes to games about 3 x per week).  with cane lowered last time felt she was stooping more so brought cane to taller height again.    Objective Measurements:    Objective Measure: wall to occiput  Details: no change (7 cm)    Objective Measure: 6 MWT  Details: needs to stop at 3 minutes. 350 feet. looking down, flexed posture.  cues to look up.               Goals:  Goal Identifier Pain   Goal Description Patient will 15 minutes of ambulation with reports of neck pain <5/10 in order to return to regular walking exercise program.   Target Date 06/10/22   Date Met   not met   Progress (detail required for progress note): : needing to stop at 2:50 sec.  22:  needed to stop at 3:00 min.  Pain 4/10.     Goal Identifier Posture   Goal Description Patient will display less than 2 inches bilaterally on apley's scratch test and less than 8 cm on wall to occiput distance in order to maintain greater upright posture and tolerance of cleaning around home.    Target Date 06/10/22   Date Met   not met   Progress (detail required for progress note): 22 - no change.  22: no change       Goal Identifier HEP   Goal Description Patient will be independent in HEP displaying correct mechanics in order to discharge to independent management of  impairments   Target Date 06/10/22   Date Met   partially met   Progress (detail required for progress note): 4/12 - working on being more consistent. 6/8/22:  Reports doing HEP about 1-2 x per week on average.     Plan:  Discharge from therapy.    Discharge:    Reason for Discharge: No further expectation of progress.    Equipment Issued: none    Discharge Plan: Patient to continue home program.

## 2022-11-17 ENCOUNTER — HOSPITAL ENCOUNTER (OUTPATIENT)
Dept: MRI IMAGING | Facility: CLINIC | Age: 82
Discharge: HOME | End: 2022-11-17
Attending: PSYCHIATRY & NEUROLOGY
Payer: COMMERCIAL

## 2022-11-17 DIAGNOSIS — M54.6 CHRONIC MIDLINE THORACIC BACK PAIN: ICD-10-CM

## 2022-11-17 DIAGNOSIS — M54.50 CHRONIC MIDLINE LOW BACK PAIN WITHOUT SCIATICA: ICD-10-CM

## 2022-11-17 DIAGNOSIS — G89.29 CHRONIC MIDLINE LOW BACK PAIN WITHOUT SCIATICA: ICD-10-CM

## 2022-11-17 DIAGNOSIS — G89.29 CHRONIC MIDLINE THORACIC BACK PAIN: ICD-10-CM

## 2022-11-17 PROCEDURE — 72148 MRI LUMBAR SPINE W/O DYE: CPT

## 2022-11-17 PROCEDURE — 72146 MRI CHEST SPINE W/O DYE: CPT

## 2022-11-28 ENCOUNTER — OFFICE VISIT (OUTPATIENT)
Dept: NEUROLOGY | Facility: CLINIC | Age: 82
End: 2022-11-28
Payer: COMMERCIAL

## 2022-11-28 VITALS
HEART RATE: 57 BPM | OXYGEN SATURATION: 97 % | BODY MASS INDEX: 23.74 KG/M2 | DIASTOLIC BLOOD PRESSURE: 81 MMHG | SYSTOLIC BLOOD PRESSURE: 138 MMHG | HEIGHT: 63 IN | WEIGHT: 134 LBS

## 2022-11-28 DIAGNOSIS — G89.29 CHRONIC MIDLINE LOW BACK PAIN WITHOUT SCIATICA: ICD-10-CM

## 2022-11-28 DIAGNOSIS — G20.C PARKINSONISM, UNSPECIFIED PARKINSONISM TYPE (H): Primary | ICD-10-CM

## 2022-11-28 DIAGNOSIS — M89.9 LESION OF THORACIC VERTEBRA: ICD-10-CM

## 2022-11-28 DIAGNOSIS — M48.02 CERVICAL STENOSIS OF SPINAL CANAL: ICD-10-CM

## 2022-11-28 DIAGNOSIS — M54.50 CHRONIC MIDLINE LOW BACK PAIN WITHOUT SCIATICA: ICD-10-CM

## 2022-11-28 PROCEDURE — 99213 OFFICE O/P EST LOW 20 MIN: CPT | Performed by: PSYCHIATRY & NEUROLOGY

## 2022-11-28 NOTE — LETTER
11/28/2022         RE: Salome Saeed  [address]  [place] [address]        Dear Colleague,    Thank you for referring your patient, Salome Saeed, to the [facility]. Please see a copy of my visit note below.    ESTABLISHED PATIENT NEUROLOGY NOTE    DATE OF VISIT: 11/28/2022  CLINIC LOCATION: [facility]  MRN: [id number]  PATIENT NAME: Salome Saeed  YOB: 1940    REASON FOR VISIT:   Chief Complaint   Patient presents with     Follow Up     Review Images      SUBJECTIVE:                                                      HISTORY OF PRESENT ILLNESS: Patient is here to follow up regarding parkinsonism, cervical spinal stenosis and low back pain.  The last visit was on 10/26/2022.  At that time additional imaging was ordered.  Please refer to my initial/other prior notes for further information.    Since the last visit, the patient reports continued low back pain.  Feels that her tremor is slightly worse.  No other changes.  She denies interval development of new neurological symptoms.    Thoracic spine MRI from 11/17/2022 demonstrated multilevel degenerative changes without significant thoracic spinal canal stenosis, though nonspecific T2-hyperintense lesion of the anterior aspect of T8 vertebral body was seen, felt most likely benign, though metastasis was not totally excluded.  Follow-up in 3 months was advised.  Lumbar spine MRI from the same date demonstrated interval revision of posterior fusion instrumentation with susceptibility artifact and presumed postoperative seroma without high-grade spinal canal stenosis or severe degree of neural foraminal stenosis.  All images were personally reviewed and independently interpreted.  EXAM:                                                    Physical Exam:   Vitals: /81 (BP Location: Right arm, Patient Position: Sitting, Cuff Size: Adult Regular)   Pulse 57   LMP   (LMP Unknown)   SpO2 97%     General: pt is in NAD, cooperative.  Skin: normal turgor, moist mucous membranes, no lesions/rashes noticed.  HEENT: ATNC, white sclera, normal conjunctiva.  Respiratory: Symmetric lung excursion, no accessory respiratory muscle use.  Abdomen: Non distended.  Neurological: awake, cooperative, follows commands, the rest of exam was deferred today.  ASSESSMENT AND PLAN:                                                    Assessment: 82 year old female patient presents for follow-up of parkinsonism, differential including Parkinson's disease versus other Parkinson plus conditions in addition to cervical spinal stenosis low back pain.      The patient reports continued low back pain.  We discussed that physical therapy might be helpful as well as epidural steroid injection.  Decided to start with physical therapy.    Regarding cervical spinal stenosis, no symptoms' changes.  No changes in management.    Regarding vertebral lesion of T8, we discussed recommendation of radiologist to repeat thoracic spine with and without contrast in 3 months.  The patient was in agreement, and I placed the order.    For her parkinsonism, I will repeat clinical exam at next visit.  No other changes.    Diagnoses:    ICD-10-CM    1. Parkinsonism, unspecified Parkinsonism type (H)  G20       2. Cervical stenosis of spinal canal  M48.02       3. Chronic midline low back pain without sciatica  M54.50     G89.29         Plan: At today's visit we thoroughly discussed current symptoms, available treatment options, and the plan.    We decided to repeat thoracic spine MRI on advice of the radiologist.  Meanwhile, we will try physical therapy.  Referral is placed.  If not effective, might consider epidural steroid injection.    Next follow-up appointment is in the next 3 months or earlier if needed.    Total Time: 21 minutes spent on the date of the encounter doing chart review, history and exam, documentation and  "further activities per the note.    Pj Keene MD  [facility]  (Chart documentation was completed in part with Dragon voice-recognition software. Even though reviewed, some grammatical, spelling, and word errors may remain.)      Salome Saeed is a 82 year old female who presents for:  Chief Complaint   Patient presents with     Follow Up     Review Images         Initial Vitals:  /81 (BP Location: Right arm, Patient Position: Sitting, Cuff Size: Adult Regular)   Pulse 57   Ht 1.6 m (5' 3\")   Wt 60.8 kg (134 lb)   LMP  (LMP Unknown)   SpO2 97%   BMI 23.74 kg/m   Estimated body mass index is 23.74 kg/m  as calculated from the following:    Height as of this encounter: 1.6 m (5' 3\").    Weight as of this encounter: 60.8 kg (134 lb).. Body surface area is 1.64 meters squared. BP completed using cuff size: regular    Margo Bingham      Again, thank you for allowing me to participate in the care of your patient.        Sincerely,        Pj Keene MD    "

## 2022-11-28 NOTE — PROGRESS NOTES
"Salome Saeed is a 82 year old female who presents for:  Chief Complaint   Patient presents with     Follow Up     Review Images         Initial Vitals:  /81 (BP Location: Right arm, Patient Position: Sitting, Cuff Size: Adult Regular)   Pulse 57   Ht 1.6 m (5' 3\")   Wt 60.8 kg (134 lb)   LMP  (LMP Unknown)   SpO2 97%   BMI 23.74 kg/m   Estimated body mass index is 23.74 kg/m  as calculated from the following:    Height as of this encounter: 1.6 m (5' 3\").    Weight as of this encounter: 60.8 kg (134 lb).. Body surface area is 1.64 meters squared. BP completed using cuff size: regular    Margo Bingham  "

## 2022-11-28 NOTE — PROGRESS NOTES
ESTABLISHED PATIENT NEUROLOGY NOTE    DATE OF VISIT: 11/28/2022  CLINIC LOCATION: [facility]  MRN: [id number]  PATIENT NAME: Salome Saeed  YOB: 1940    REASON FOR VISIT:   Chief Complaint   Patient presents with     Follow Up     Review Images      SUBJECTIVE:                                                      HISTORY OF PRESENT ILLNESS: Patient is here to follow up regarding parkinsonism, cervical spinal stenosis and low back pain.  The last visit was on 10/26/2022.  At that time additional imaging was ordered.  Please refer to my initial/other prior notes for further information.    Since the last visit, the patient reports continued low back pain.  Feels that her tremor is slightly worse.  No other changes.  She denies interval development of new neurological symptoms.    Thoracic spine MRI from 11/17/2022 demonstrated multilevel degenerative changes without significant thoracic spinal canal stenosis, though nonspecific T2-hyperintense lesion of the anterior aspect of T8 vertebral body was seen, felt most likely benign, though metastasis was not totally excluded.  Follow-up in 3 months was advised.  Lumbar spine MRI from the same date demonstrated interval revision of posterior fusion instrumentation with susceptibility artifact and presumed postoperative seroma without high-grade spinal canal stenosis or severe degree of neural foraminal stenosis.  All images were personally reviewed and independently interpreted.  EXAM:                                                    Physical Exam:   Vitals: /81 (BP Location: Right arm, Patient Position: Sitting, Cuff Size: Adult Regular)   Pulse 57   LMP  (LMP Unknown)   SpO2 97%     General: pt is in NAD, cooperative.  Skin: normal turgor, moist mucous membranes, no lesions/rashes noticed.  HEENT: ATNC, white sclera, normal conjunctiva.  Respiratory: Symmetric lung excursion, no accessory respiratory muscle use.  Abdomen:  Non distended.  Neurological: awake, cooperative, follows commands, the rest of exam was deferred today.  ASSESSMENT AND PLAN:                                                    Assessment: 82 year old female patient presents for follow-up of parkinsonism, differential including Parkinson's disease versus other Parkinson plus conditions in addition to cervical spinal stenosis low back pain.      The patient reports continued low back pain.  We discussed that physical therapy might be helpful as well as epidural steroid injection.  Decided to start with physical therapy.    Regarding cervical spinal stenosis, no symptoms' changes.  No changes in management.    Regarding vertebral lesion of T8, we discussed recommendation of radiologist to repeat thoracic spine with and without contrast in 3 months.  The patient was in agreement, and I placed the order.    For her parkinsonism, I will repeat clinical exam at next visit.  No other changes.    Diagnoses:    ICD-10-CM    1. Parkinsonism, unspecified Parkinsonism type (H)  G20       2. Cervical stenosis of spinal canal  M48.02       3. Chronic midline low back pain without sciatica  M54.50     G89.29         Plan: At today's visit we thoroughly discussed current symptoms, available treatment options, and the plan.    We decided to repeat thoracic spine MRI on advice of the radiologist.  Meanwhile, we will try physical therapy.  Referral is placed.  If not effective, might consider epidural steroid injection.    Next follow-up appointment is in the next 3 months or earlier if needed.    Total Time: 21 minutes spent on the date of the encounter doing chart review, history and exam, documentation and further activities per the note.    Pj Keene MD  [facility]  (Chart documentation was completed in part with Dragon voice-recognition software. Even though reviewed, some grammatical, spelling, and word errors may remain.)

## 2022-11-28 NOTE — PATIENT INSTRUCTIONS
AFTER VISIT SUMMARY (AVS):    At today's visit we thoroughly discussed current symptoms, available treatment options, and the plan.    We decided to repeat thoracic spine MRI on advice of the radiologist.  Meanwhile, we will try physical therapy.  Referral is placed.  If not effective, might consider epidural steroid injection.    Next follow-up appointment is in the next 3 months or earlier if needed.    Please do not hesitate to call me with any questions or concerns.    Thanks.

## 2022-12-15 ENCOUNTER — OFFICE VISIT (OUTPATIENT)
Dept: FAMILY MEDICINE | Facility: CLINIC | Age: 82
End: 2022-12-15
Payer: COMMERCIAL

## 2022-12-15 ENCOUNTER — ANTICOAGULATION THERAPY VISIT (OUTPATIENT)
Dept: ANTICOAGULATION | Facility: CLINIC | Age: 82
End: 2022-12-15

## 2022-12-15 ENCOUNTER — LAB (OUTPATIENT)
Dept: LAB | Facility: CLINIC | Age: 82
End: 2022-12-15
Payer: COMMERCIAL

## 2022-12-15 VITALS
WEIGHT: 136 LBS | DIASTOLIC BLOOD PRESSURE: 72 MMHG | RESPIRATION RATE: 12 BRPM | OXYGEN SATURATION: 98 % | HEART RATE: 74 BPM | HEIGHT: 63 IN | BODY MASS INDEX: 24.1 KG/M2 | SYSTOLIC BLOOD PRESSURE: 130 MMHG | TEMPERATURE: 98.2 F

## 2022-12-15 DIAGNOSIS — I63.9 CEREBROVASCULAR ACCIDENT INVOLVING CEREBELLUM (H): ICD-10-CM

## 2022-12-15 DIAGNOSIS — Z01.818 PREOPERATIVE EXAMINATION: Primary | ICD-10-CM

## 2022-12-15 DIAGNOSIS — F41.9 ANXIETY: ICD-10-CM

## 2022-12-15 DIAGNOSIS — H18.512 FUCHS' CORNEAL DYSTROPHY OF LEFT EYE: ICD-10-CM

## 2022-12-15 DIAGNOSIS — Z79.01 LONG TERM CURRENT USE OF ANTICOAGULANT THERAPY: Primary | ICD-10-CM

## 2022-12-15 DIAGNOSIS — I25.10 CORONARY ARTERY DISEASE INVOLVING NATIVE CORONARY ARTERY OF NATIVE HEART WITHOUT ANGINA PECTORIS: ICD-10-CM

## 2022-12-15 DIAGNOSIS — E03.9 HYPOTHYROIDISM, UNSPECIFIED TYPE: ICD-10-CM

## 2022-12-15 LAB — INR BLD: 3.3 (ref 0.9–1.1)

## 2022-12-15 PROCEDURE — 84439 ASSAY OF FREE THYROXINE: CPT | Performed by: FAMILY MEDICINE

## 2022-12-15 PROCEDURE — 36415 COLL VENOUS BLD VENIPUNCTURE: CPT | Performed by: FAMILY MEDICINE

## 2022-12-15 PROCEDURE — 36416 COLLJ CAPILLARY BLOOD SPEC: CPT

## 2022-12-15 PROCEDURE — 80048 BASIC METABOLIC PNL TOTAL CA: CPT | Performed by: FAMILY MEDICINE

## 2022-12-15 PROCEDURE — 84443 ASSAY THYROID STIM HORMONE: CPT | Performed by: FAMILY MEDICINE

## 2022-12-15 PROCEDURE — 85610 PROTHROMBIN TIME: CPT

## 2022-12-15 PROCEDURE — 99214 OFFICE O/P EST MOD 30 MIN: CPT | Performed by: FAMILY MEDICINE

## 2022-12-15 PROCEDURE — 93000 ELECTROCARDIOGRAM COMPLETE: CPT | Performed by: FAMILY MEDICINE

## 2022-12-15 ASSESSMENT — PAIN SCALES - GENERAL: PAINLEVEL: NO PAIN (0)

## 2022-12-15 NOTE — PROGRESS NOTES
ANTICOAGULATION MANAGEMENT     Salome Saeed 82 year old female is on warfarin with supratherapeutic INR result. 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 Last one 8/21    Medication/supplement changes: taking 3-4 500 mg tylenol a day    Signs or symptoms of bleeding or clotting: No    Previous INR: Therapeutic last 2(+) visits    Additional findings: None       PLAN     Recommended plan for ongoing change(s) affecting INR     Dosing Instructions: hold dose then continue your current warfarin dose with next INR in 2 weeks       Summary  As of 12/15/2022    Full warfarin instructions:  12/15: Hold; Otherwise 5 mg every Mon; 2.5 mg all other days; Starting 12/15/2022   Next INR check:  12/29/2022             Telephone call with Loreta who verbalizes understanding and agrees to plan    Lab visit scheduled    Education provided:     Please call back if any changes to your diet, medications or how you've been taking warfarin    Plan made per [facility] anticoagulation protocol    Linette Sequeira RN  Anticoagulation Clinic  12/15/2022    _______________________________________________________________________     Anticoagulation Episode Summary     Current INR goal:  2.0-3.0   TTR:  75.1 % (1 y)   Target end date:  Indefinite   Send INR reminders to:  [facility]    Indications    Long term current use of anticoagulant therapy [Z79.01]  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

## 2022-12-15 NOTE — PROGRESS NOTES
[address] [address]  Phone: [phone]  Primary Provider: Surya Dyer  Pre-op Performing Provider: SURYA DYER      PREOPERATIVE EVALUATION:  Today's date: 12/15/2022    Salome Saeed is a 82 year old female who presents for a preoperative evaluation.    Surgical Information:  Surgery/Procedure: Left Eye corneal transplant   Surgery Location: [facility]   Surgeon: Henny  Surgery Date: 1/10/23  Time of Surgery: TBD  Where patient plans to recover: At home with family  Fax number for surgical facility: [phone] and [phone]    Type of Anesthesia Anticipated: to be determined    Assessment & Plan     The proposed surgical procedure is considered INTERMEDIATE risk.    Preoperative examination  - EKG 12-lead complete w/read - Clinics  - Basic metabolic panel  (Ca, Cl, CO2, Creat, Gluc, K, Na, BUN); Future  - Basic metabolic panel  (Ca, Cl, CO2, Creat, Gluc, K, Na, BUN)    Hypothyroidism, unspecified type  - TSH with free T4 reflex; Future  - TSH with free T4 reflex  - T4 free    Fuchs' corneal dystrophy of left eye    Cerebrovascular accident involving cerebellum (H)    Anxiety    Coronary artery disease involving native coronary artery of native heart with unstable angina pectoris (H)        Risks and Recommendations:  The patient has the following additional risks and recommendations for perioperative complications:     - No identified additional risk factors other than previously addressed    Medication Instructions:  -Levothyroxine - continue taking on the day of surgery   - aspirin: Discontinue aspirin 7-10 days prior to procedure to reduce bleeding risk. It should be resumed postoperatively.    - warfarin: Bridging therapy will be coordinated by anticoagulation pharmacist and RN   - lisinopril: Continue taking on the day of surgery.    - atenolol: Continue taking on the day of surgery.   -  Amlodipine: May be continued on the day of surgery.   - rosuvastatin: Continue taking on the day of surgery.    - Lexapro: Continue without modification.     RECOMMENDATION:  APPROVAL GIVEN to proceed with proposed procedure, without further diagnostic evaluation.      Subjective     HPI related to upcoming procedure: History of visually significant fuchs dystrophy with corneal edema of eft eye -- has problems with signs and reading. Planning for partial corneal transplant    Preop Questions 12/15/2022   1. Have you ever had a heart attack or stroke? YES   2. Have you ever had surgery on your heart or blood vessels, such as a stent placement, a coronary artery bypass, or surgery on an artery in your head, neck, heart, or legs? YES   3. Do you have chest pain with activity? No   4. Do you have a history of  heart failure? No   5. Do you currently have a cold, bronchitis or symptoms of other infection? No   6. Do you have a cough, shortness of breath, or wheezing? No   7. Do you or anyone in your family have previous history of blood clots? No   8. Do you or does anyone in your family have a serious bleeding problem such as prolonged bleeding following surgeries or cuts? No   9. Have you ever had problems with anemia or been told to take iron pills? No   10. Have you had any abnormal blood loss such as black, tarry or bloody stools, or abnormal vaginal bleeding? No   11. Have you ever had a blood transfusion? No   12. Are you willing to have a blood transfusion if it is medically needed before, during, or after your surgery? Yes   13. Have you or any of your relatives ever had problems with anesthesia? No   14. Do you have sleep apnea, excessive snoring or daytime drowsiness? No   15. Do you have any artifical heart valves or other implanted medical devices like a pacemaker, defibrillator, or continuous glucose monitor? No   16. Do you have artificial joints? YES   17. Are you allergic to latex? No       Health Care  Directive:  Patient does not have a Health Care Directive or Living Will: Advance Directive received and scanned. Click on Code in the patient header to view.    Preoperative Review of :   reviewed - no record of controlled substances prescribed.      Status of Chronic Conditions:  See problem list for active medical problems.  Problems all longstanding and stable, except as noted/documented.  See ROS for pertinent symptoms related to these conditions.      Review of Systems  CONSTITUTIONAL: NEGATIVE for fever, chills, change in weight  ENT/MOUTH: NEGATIVE for ear, mouth and throat problems  RESP: NEGATIVE for significant cough or SOB  CV: NEGATIVE for chest pain, palpitations or peripheral edema    Patient Active Problem List    Diagnosis Date Noted     Hyperlipidemia LDL goal <70 04/01/2010     Priority: High     Coronary artery disease of native artery of native heart with stable angina pectoris (H) 04/09/2009     Priority: High     5/28/2015 - PTCA and 2.25x8mm Promus PREMIER NAILA to ostium of small second OM  12/2012 cath - 40-50% stenosis in mid PDA, 80% on D1- medically treat  4/8/2009: PTCA and two 2.5x15mm Xience stents to mid LAD lesion        Benign essential hypertension      Priority: High     Impaired fasting glucose 12/14/2021     Priority: Medium     Primary osteoarthritis of left knee 05/22/2020     Priority: Medium     Added automatically from request for surgery [id number]       WILSON (dyspnea on exertion) 10/09/2018     Priority: Medium     Chronic bilateral low back pain without sciatica 06/21/2017     Priority: Medium     Status post arthrodesis 06/21/2017     Priority: Medium     Cerebrovascular accident involving cerebellum (H) 10/26/2016     Priority: Medium     Cerebellar infarction (H) 10/26/2016     Priority: Medium     Formatting of this note might be different from the original.  2013- continue to have balance issue and nausea  Coumadin       Vertebral artery stenosis, right       Priority: Medium     Hypothyroidism, unspecified type 05/18/2016     Priority: Medium     Long term current use of anticoagulant therapy 01/11/2016     Priority: Medium     GERD (gastroesophageal reflux disease)      Priority: Medium     Mitral regurgitation      Priority: Medium     1+ per 7/2013 Echo       Anxiety 08/08/2013     Priority: Medium     Health Care Home 07/23/2013     Priority: Medium     EMERGENCY CARE PLAN  Presenting Problem Signs and Symptoms Treatment Plan    Questions or conerns during clinic hours    I will call the clinic directly     Questions or conerns outside clinic hours    I will call the 24 hour nurse line at [phone]    Patient needs to schedule an appointment    I will call the 24 hour scheduling team at [phone] or clinic directly    Same day treatment     I will call the clinic first, nurse line if after hours, urgent care and express care if needed                          Clinic Care Coordinator:Apolinar Green -[phone]  **Pt not in active care coordination at this time.       Chest discomfort 07/19/2013     Priority: Medium     Imo Update utility       Spinal stenosis, lumbar region, with neurogenic claudication 05/08/2013     Priority: Medium     Lumbago 09/07/2012     Priority: Medium     DDD (degenerative disc disease), lumbar 07/09/2012     Priority: Medium     Lumbar spinal stenosis 07/09/2012     Priority: Medium     Advanced directives, counseling/discussion 06/18/2012     Priority: Medium     Discussed advance care planning with patient; however, patient declined at this time. 6/18/2012           Past Medical History:   Diagnosis Date     CAD (coronary artery disease) 04/09/2009 4/8/2009: PTCA and two 2.5x15mm Xience stents to mid LAD lesion; Cath 12/2012- 40-50% stenosis in mid PDA, 80% on D1- medically treat , 5/28/2015 - PTCA and 2.25x8mm Promus PREMIIER NAILA to ostium of 2nd OM     Cerebral artery occlusion with cerebral infarction 2012     CVA (cerebral  infarction)      DDD (degenerative disc disease)      Essential hypertension, benign      GERD (gastroesophageal reflux disease)      Hyperlipidemia      Hypothyroidism      Lumbar spinal stenosis      Mitral regurgitation     1+ per 7/2013 Echo     Vertebral artery stenosis, right      Past Surgical History:   Procedure Laterality Date     ARTHROPLASTY KNEE Left 6/1/2020    Procedure: Left total knee arthroplasty (Ward and Nephew #5 narrow femur; #3 tibia; 9 mm tibial poly and 35 mm patella);  Surgeon: Jorge Luis Cheatham MD;  Location: [facility] OR     CORONARY ANGIOGRAPHY ADULT ORDER  12/6/2012    40-50% stenosis to mid PDA, 80% in D1- medically treat     CORONARY ANGIOGRAPHY ADULT ORDER  5/28/2015    PTCA and 2.25x8mm Promus PREMIER NAILA to ostium of 2nd OM     DECOMPRESSION, FUSION LUMBAR POSTERIOR THREE + LEVELS, COMBINED  5/8/2013    Procedure: COMBINED DECOMPRESSION, FUSION LUMBAR POSTERIOR THREE + LEVELS;  Posterior Lumbar Decompression L3-S1, Fusion L4-S1;  Surgeon: Daniel Harris MD;  Location: [facility] OR     ENDOSCOPIC STRIPPING VEIN(S)       HEART CATH, ANGIOPLASTY  4/8/2009    PTCA and two 2.5x15mm Xience stents to mid LAD lesion     HYSTERECTOMY, RICKIE      Fibroids, and Menorrhagia.. Bilateral Oophrectomy     ORTHOPEDIC SURGERY       Stenting of LAD, Angioplasty  4/8/09     TONSILLECTOMY      as a child     Current Outpatient Medications   Medication Sig Dispense Refill     amLODIPine (NORVASC) 2.5 MG tablet Take 1 tablet (2.5 mg) by mouth daily 90 tablet 1     aspirin EC 81 MG tablet Take 1 tablet (81 mg) by mouth daily       atenolol (TENORMIN) 25 MG tablet Take 1 tablet (25 mg) by mouth daily 90 tablet 3     Calcium Carb-Cholecalciferol (CALCIUM 600 + D PO) Take 1,200 mg by mouth daily        Cholecalciferol (VITAMIN D) 2000 UNITS tablet Take 2,000 Units by mouth daily        Coenzyme Q10 (COQ-10) 200 MG CAPS Take 400 mg by mouth daily        diclofenac (VOLTAREN) 1 % topical gel Apply 4 g topically 4  times daily 350 g 3     escitalopram (LEXAPRO) 5 MG tablet Take 5 mg by mouth daily       gatifloxacin (ZYMAXID) 0.5 % ophthalmic solution daily as needed        lisinopril (ZESTRIL) 20 MG tablet Take 1 tablet (20 mg) by mouth daily 90 tablet 3     Magnesium 400 MG CAPS Take 400 mg by mouth daily       Multiple Vitamin (DAILY MULTIVITAMIN PO) Take by mouth daily       nitroGLYcerin (NITROSTAT) 0.4 MG sublingual tablet Place 1 tablet (0.4 mg) under the tongue every 5 minutes as needed for chest pain 25 tablet 2     prednisoLONE acetate (PRED FORTE) 1 % ophthalmic suspension daily        rosuvastatin (CRESTOR) 40 MG tablet Take 1 tablet (40 mg) by mouth daily 90 tablet 3     vitamin B complex with vitamin C (STRESS TAB) tablet Take 1 tablet by mouth daily        vitamin C (ASCORBIC ACID) 1000 MG TABS Take 2,000 mg by mouth daily       warfarin ANTICOAGULANT (COUMADIN) 5 MG tablet Take 2.5 to 5mg (1/2 to 1 tabs) by mouth daily OR AS DIRECTED.  Adjust dose based on INR. 50 tablet 0     ketorolac (ACULAR) 0.5 % ophthalmic solution instill one drop into right eye twice a day for one week then one drop once a day for 7 more weeks; begin drops 3 days before surgery (Patient not taking: Reported on 2022)       levothyroxine (SYNTHROID/LEVOTHROID) 88 MCG tablet Take 1 tablet (88 mcg) by mouth daily 60 tablet 0       Allergies   Allergen Reactions     Atorvastatin      Leg cramps     Cats Itching     Gluten      Sinuses affected by gluten     Oat      Shellfish Allergy      hives     Wheat         Social History     Tobacco Use     Smoking status: Former     Packs/day: 0.10     Types: Cigarettes     Quit date: 1965     Years since quittin.0     Smokeless tobacco: Never   Substance Use Topics     Alcohol use: Yes     Alcohol/week: 0.0 standard drinks     Comment: 2 glasses wine per month, maybe     Family History   Problem Relation Age of Onset     Neurologic Disorder Mother         Dementia, Still living      "Ovarian Cancer Mother      Thyroid Disease Mother      C.A.D. Father              Diabetes Father      Coronary Artery Disease Father      Alcohol/Drug Brother      Thyroid Disease Son      Diabetes Son      History   Drug Use No         Objective     /72   Pulse 74   Temp 98.2  F (36.8  C) (Tympanic)   Resp 12   Ht 1.6 m (5' 3\")   Wt 61.7 kg (136 lb)   LMP  (LMP Unknown)   SpO2 98%   BMI 24.09 kg/m      Physical Exam  GENERAL APPEARANCE: healthy, alert and no distress  HENT: ear canals and TM's normal and nose and mouth without ulcers or lesions  RESP: lungs clear to auscultation - no rales, rhonchi or wheezes  CV: regular rate and rhythm, normal S1 S2, no S3 or S4 and no murmur, click or rub   ABDOMEN: soft, nontender, no HSM or masses and bowel sounds normal  NEURO: Normal strength and tone, sensory exam grossly normal, mentation intact and speech normal    Recent Labs   Lab Test 12/15/22  1047 22  1314 10/17/22  1114 10/11/22  0927 21  1348 12/10/21  0952 21  1322 21  1604 21  1137 02/15/21  1208   HGB  --   --   --   --   --   --   --  13.5  --  13.4   PLT  --   --   --   --   --   --   --  239  --  212   INR 3.3* 2.3*   < >  --    < >  --    < >  --    < >  --    NA  --   --   --  134*  --  133  --   --    < >  --    POTASSIUM  --   --   --  4.1  --  4.2  --   --    < >  --    CR  --   --   --  0.58  --  0.73  --   --    < >  --     < > = values in this interval not displayed.        Diagnostics:  No labs were ordered during this visit.  Recent Results (from the past 168 hour(s))   INR point of care    Collection Time: 12/15/22 10:47 AM   Result Value Ref Range    INR 3.3 (H) 0.9 - 1.1   Basic metabolic panel  (Ca, Cl, CO2, Creat, Gluc, K, Na, BUN)    Collection Time: 12/15/22 12:31 PM   Result Value Ref Range    Sodium 137 136 - 145 mmol/L    Potassium 4.7 3.4 - 5.3 mmol/L    Chloride 98 98 - 107 mmol/L    Carbon Dioxide (CO2) 24 22 - 29 mmol/L    Anion Gap " 15 7 - 15 mmol/L    Urea Nitrogen 13.2 8.0 - 23.0 mg/dL    Creatinine 0.62 0.51 - 0.95 mg/dL    Calcium 9.7 8.8 - 10.2 mg/dL    Glucose 95 70 - 99 mg/dL    GFR Estimate 90 >60 mL/min/1.73m2   TSH with free T4 reflex    Collection Time: 12/15/22 12:31 PM   Result Value Ref Range    TSH 0.01 (L) 0.30 - 4.20 uIU/mL   T4 free    Collection Time: 12/15/22 12:31 PM   Result Value Ref Range    Free T4 1.80 (H) 0.90 - 1.70 ng/dL      EKG: appears normal, NSR, normal axis, normal intervals, no acute ST/T changes c/w ischemia, no LVH by voltage criteria, unchanged from previous tracings    Revised Cardiac Risk Index (RCRI):  The patient has the following serious cardiovascular risks for perioperative complications:   - Coronary Artery Disease (MI, positive stress test, angina, Qs on EKG) = 1 point   - Congestive Heart Failure (pulmonary edema, PND, s3 clem, CXR with pulmonary congestion, basilar rales) = 1 point     RCRI Interpretation: 2 points: Class III (moderate risk - 6.6% complication rate)     Estimated Functional Capacity: Performs 4 METS exercise without symptoms (e.g., light housework, stairs, 4 mph walk, 7 mph bike, slow step dance)           Signed Electronically by: Surya Dyer DO  Copy of this evaluation report is provided to requesting physician.

## 2022-12-16 LAB
ANION GAP SERPL CALCULATED.3IONS-SCNC: 15 MMOL/L (ref 7–15)
BUN SERPL-MCNC: 13.2 MG/DL (ref 8–23)
CALCIUM SERPL-MCNC: 9.7 MG/DL (ref 8.8–10.2)
CHLORIDE SERPL-SCNC: 98 MMOL/L (ref 98–107)
CREAT SERPL-MCNC: 0.62 MG/DL (ref 0.51–0.95)
DEPRECATED HCO3 PLAS-SCNC: 24 MMOL/L (ref 22–29)
GFR SERPL CREATININE-BSD FRML MDRD: 90 ML/MIN/1.73M2
GLUCOSE SERPL-MCNC: 95 MG/DL (ref 70–99)
POTASSIUM SERPL-SCNC: 4.7 MMOL/L (ref 3.4–5.3)
SODIUM SERPL-SCNC: 137 MMOL/L (ref 136–145)
T4 FREE SERPL-MCNC: 1.8 NG/DL (ref 0.9–1.7)
TSH SERPL DL<=0.005 MIU/L-ACNC: 0.01 UIU/ML (ref 0.3–4.2)

## 2022-12-21 DIAGNOSIS — E03.9 HYPOTHYROIDISM, UNSPECIFIED TYPE: ICD-10-CM

## 2022-12-21 RX ORDER — LEVOTHYROXINE SODIUM 88 UG/1
88 TABLET ORAL DAILY
Qty: 60 TABLET | Refills: 0 | Status: SHIPPED | OUTPATIENT
Start: 2022-12-21 | End: 2023-02-12

## 2022-12-29 ENCOUNTER — TELEPHONE (OUTPATIENT)
Dept: FAMILY MEDICINE | Facility: CLINIC | Age: 82
End: 2022-12-29

## 2022-12-29 ENCOUNTER — ANTICOAGULATION THERAPY VISIT (OUTPATIENT)
Dept: ANTICOAGULATION | Facility: CLINIC | Age: 82
End: 2022-12-29

## 2022-12-29 ENCOUNTER — LAB (OUTPATIENT)
Dept: LAB | Facility: CLINIC | Age: 82
End: 2022-12-29
Payer: COMMERCIAL

## 2022-12-29 DIAGNOSIS — I63.9 CEREBROVASCULAR ACCIDENT INVOLVING CEREBELLUM (H): ICD-10-CM

## 2022-12-29 DIAGNOSIS — Z79.01 LONG TERM CURRENT USE OF ANTICOAGULANT THERAPY: Primary | ICD-10-CM

## 2022-12-29 LAB — INR BLD: 1.8 (ref 0.9–1.1)

## 2022-12-29 PROCEDURE — 85610 PROTHROMBIN TIME: CPT

## 2022-12-29 PROCEDURE — 36416 COLLJ CAPILLARY BLOOD SPEC: CPT

## 2022-12-29 NOTE — TELEPHONE ENCOUNTER
Loreta has a partial corneal transplant scheduled for 1/10. Please advise hold/bridge plan. Loreta also wanted to pass along that she has 7 lovenox syringes left.

## 2022-12-30 NOTE — TELEPHONE ENCOUNTER
Attempted to contact [facility] x 2 today. Fist attempt was transferred x 3 then hung up on. Second attempt - office had closed early due to holiday.     Will seek to clarify INR target for day of procedure.     Last partial cornea transplant we did a full 5-day hold with bridge.     Jazmyn El, LoraineD, BCPS

## 2023-01-04 RX ORDER — ENOXAPARIN SODIUM 100 MG/ML
60 INJECTION SUBCUTANEOUS EVERY 12 HOURS
Qty: 16.8 ML | Refills: 1 | Status: CANCELLED | OUTPATIENT
Start: 2023-01-04

## 2023-01-04 NOTE — TELEPHONE ENCOUNTER
EMILEE-PROCEDURAL ANTICOAGULATION  MANAGEMENT    ASSESSMENT     Warfarin interruption plan for partial cornea transplant on 1/10/23.    Indication for Anticoagulation: Stroke (2013)      CVA felt to be likely an embolic event from the web-like stenosis within the vertebral artery      Emilee-Procedure Risk stratification for thromboembolism: unknown     There are currently no guidelines addressing emilee-procedural bridging in this indication. The decision to bridging is based on the risk of bleeding weighed against the risk of clotting.       RECOMMENDATION       Pre-Procedure:  o Hold warfarin for 4 days, until after procedure starting: Friday, January 6  - Target INR for day of procedure is 1.5. Will evaluate 1/5 INR and adjust 4-day hold if necessary   o Enoxaparin (Lovenox) 60 mg subq Q 12 hrs (1 mg/kg Q 12 hrs for CrCl >= 60 ml/min and BMI <= 40 kg/m2)   - Start enoxaparin: Sunday, January 8 in AM  - Last dose of enoxaparin prior to procedure: Monday, January 9 in AM  (~24 hours prior to procedure)      Post-Procedure:  o Resume warfarin dose if okay with provider doing procedure on night of procedure, Tuesday, January 10 PM: take 5 mg x 2 days, then resume home dose  - Time to return to therapeutic with August 2021 hold reviewed - second day of booster dosing advised for prolonged return to therapeutic with that hold  o Resume enoxaparin (Lovenox) ~ 24 hrs post procedure when okay with provider doing procedure. Continue until INR >= 2.0 x 1  o Recheck INR 5-7 days after resuming warfarin   ?     Plan routed to referring provider for approval  ?   Jazmyn El RP    SUBJECTIVE/OBJECTIVE     Salome I Darlin, a 82 year old female    Goal INR Range: 2.0-3.0     Patient bridged in past: Yes: last in August 2021      Wt Readings from Last 3 Encounters:   12/15/22 61.7 kg (136 lb)   11/28/22 60.8 kg (134 lb)   10/26/22 62.6 kg (138 lb 0.1 oz)      Ideal body weight: 52.4 kg (115 lb 8.3 oz)  Adjusted ideal body  "weight: 56.1 kg (123 lb 11.4 oz)     Estimated body mass index is 24.09 kg/m  as calculated from the following:    Height as of 12/15/22: 1.6 m (5' 3\").    Weight as of 12/15/22: 61.7 kg (136 lb).    Lab Results   Component Value Date    INR 1.8 (H) 12/29/2022    INR 3.3 (H) 12/15/2022    INR 2.3 (H) 11/16/2022     Lab Results   Component Value Date    HGB 13.5 09/14/2021    HGB 13.4 02/15/2021    HCT 40.1 09/14/2021    HCT 40.1 02/15/2021     09/14/2021     02/15/2021     Lab Results   Component Value Date    CR 0.62 12/15/2022    CR 0.58 10/11/2022    CR 0.73 12/10/2021     Estimated Creatinine Clearance: 70.5 mL/min (based on SCr of 0.62 mg/dL).    "

## 2023-01-04 NOTE — TELEPHONE ENCOUNTER
Plan approved as written. Will evaluate 1/5 INR to finalize pre-procedure warfarin hold duration.   Surya Dyer, DO  [facility]tico[facility] [place] 16 minutes ago (2:53 PM)     SW  Yes, that sounds good. Thank you!     Surya Dyer DO   1/4/2023 2:53 PM        Jazmyn El, LoraineD, BCPS

## 2023-01-04 NOTE — TELEPHONE ENCOUNTER
Mirta called back and left message to call them to obtain the information requested.    Jeannie Liao RN    [facility] Anticoagulation [facility]

## 2023-01-05 ENCOUNTER — LAB (OUTPATIENT)
Dept: LAB | Facility: CLINIC | Age: 83
End: 2023-01-05
Payer: COMMERCIAL

## 2023-01-05 ENCOUNTER — ANTICOAGULATION THERAPY VISIT (OUTPATIENT)
Dept: ANTICOAGULATION | Facility: CLINIC | Age: 83
End: 2023-01-05

## 2023-01-05 DIAGNOSIS — I63.9 CEREBROVASCULAR ACCIDENT INVOLVING CEREBELLUM (H): ICD-10-CM

## 2023-01-05 DIAGNOSIS — Z79.01 LONG TERM CURRENT USE OF ANTICOAGULANT THERAPY: Primary | ICD-10-CM

## 2023-01-05 LAB — INR BLD: 2.2 (ref 0.9–1.1)

## 2023-01-05 PROCEDURE — 85610 PROTHROMBIN TIME: CPT

## 2023-01-05 PROCEDURE — 36416 COLLJ CAPILLARY BLOOD SPEC: CPT

## 2023-01-05 NOTE — TELEPHONE ENCOUNTER
INR 2.2 (1/5/23). Okay for 4-day warfarin hold as planned. Patient has 7 days of syringes left from previous bridge and doesn't want us to send any until after her INR is checked.     Jazmyn El, LoraineD, BCPS

## 2023-01-05 NOTE — PROGRESS NOTES
ANTICOAGULATION MANAGEMENT     Salome Saeed 82 year old female is on warfarin with therapeutic INR result. (Goal INR 2.0-3.0)    Recent labs: (last 7 days)     01/05/23  1253   INR 2.2*       ASSESSMENT       Source(s): Chart Review and Patient/Caregiver Call       Warfarin doses taken: Warfarin taken as instructed    Diet: No new diet changes identified    New illness, injury, or hospitalization: No    Medication/supplement changes: Levothyroxine dose decreased on 12/21/22 Per Micromedex, Concurrent use of ORAL ANTICOAGULANTS and THYROID HORMONES may result in an increased risk of bleeding.    Signs or symptoms of bleeding or clotting: No    Previous INR: Subtherapeutic    Additional findings: Upcoming surgery/procedure corneal transplant on 1/10/23       PLAN     Recommended plan for temporary change(s) affecting INR     Dosing Instructions: Continue current warfarin dose 1/5/23 then begin warfarin hold plan as discussed (see below) with next INR in 2 weeks       Pre-Procedure:  ? Hold warfarin for 4 days, until after procedure starting: Friday, January 6  - Target INR for day of procedure is 1.5. Will evaluate 1/5 INR and adjust 4-day hold if necessary   ? Enoxaparin (Lovenox) 60 mg subq Q 12 hrs (1 mg/kg Q 12 hrs for CrCl >= 60 ml/min and BMI 2.5<= 40 kg/m2)   - Start enoxaparin: Sunday, January 8 in AM  - Last dose of enoxaparin prior to procedure: Monday, January 9 in AM  (~24 hours prior to procedure)       Post-Procedure:  ? Resume warfarin dose if okay with provider doing procedure on night of procedure, Tuesday, January 10 PM: take 5 mg x 2 days, then resume home dose  - Time to return to therapeutic with August 2021 hold reviewed - second day of booster dosing advised for prolonged return to therapeutic with that hold  ? Resume enoxaparin (Lovenox) ~ 24 hrs post procedure when okay with provider doing procedure. Continue until INR >= 2.0 x 1  ? Recheck INR 5-7 days after resuming warfarin   Summary   As of 1/5/2023    Full warfarin instructions:  1/6: Hold; 1/7: Hold; 1/8: Hold; 1/9: Hold; 1/10: 5 mg; 1/11: 5 mg; Otherwise 5 mg every Mon; 2.5 mg all other days   Next INR check:  1/16/2023             Telephone call with Loreta who agrees to plan and repeated back plan correctly. Patient requesting hold orders to be faxed to her son, Mariana Saeed at [phone], and also mailed to patient    Lab visit scheduled    Education provided:     Please call back if any changes to your diet, medications or how you've been taking warfarin    Lovenox/Heparin education provided: prescribed dose and frequency     Contact [phone]  with any changes, questions or concerns.     Plan made per [facility] anticoagulation protocol    Renee Martinez RN  Anticoagulation Clinic  1/5/2023    _______________________________________________________________________     Anticoagulation Episode Summary     Current INR goal:  2.0-3.0   TTR:  72.8 % (1 y)   Target end date:  Indefinite   Send INR reminders to:  [facility]    Indications    Long term current use of anticoagulant therapy [Z79.01]  Cerebrovascular accident involving cerebellum (H) [I63.9]           Comments:           Anticoagulation Care Providers     Provider Role Specialty Phone number    Surya Dyer DO Referring [facility] [phone]

## 2023-01-05 NOTE — PATIENT INSTRUCTIONS
1/5/23, Take last dose of warfarin  1/6/23, NO warfarin  1/7/23, NO warfarin  1/8/23, NO warfarin, enoxaparin every 12 hours (AM and PM)  1/9/23, NO warfarin, enoxaparin AM only (no enoxaparin 24 hours prior to surgery)    1/10/23, DAY OF PROCEDURE, NO enoxaparin. Restart warfarin 5 mg in the evening, if okay with provider doing the procedure.    Make sure to ask the provider doing your procedure if it is okay to begin your warfarin and enoxaparin as planned     1/11/23, Restart enoxaparin every 12 hours (AM and PM), unless instructed otherwise by the physician, and 5 mg warfarin in the evening.   1/12/23, Enoxaparin every 12 hours (AM and PM) and 2.5 mg warfarin in the evening.  1/13/23, Enoxaparin every 12 hours (AM and PM) and 2.5 mg warfarin in the evening.  1/14/23, Enoxaparin every 12 hours (AM and PM) and 2.5 mg warfarin in the evening.  1/15/23, Enoxaparin every 12 hours (AM and PM) and 2.5 mg warfarin in the evening.  1/16/23, Enoxaparin in the AM. Recheck INR. Anticoagulation clinic to call with further dosing instructions.

## 2023-01-16 ENCOUNTER — LAB (OUTPATIENT)
Dept: LAB | Facility: CLINIC | Age: 83
End: 2023-01-16
Payer: COMMERCIAL

## 2023-01-16 ENCOUNTER — ANTICOAGULATION THERAPY VISIT (OUTPATIENT)
Dept: ANTICOAGULATION | Facility: CLINIC | Age: 83
End: 2023-01-16

## 2023-01-16 DIAGNOSIS — I63.9 CEREBROVASCULAR ACCIDENT INVOLVING CEREBELLUM (H): ICD-10-CM

## 2023-01-16 DIAGNOSIS — Z79.01 LONG TERM CURRENT USE OF ANTICOAGULANT THERAPY: Primary | ICD-10-CM

## 2023-01-16 LAB — INR BLD: 1.5 (ref 0.9–1.1)

## 2023-01-16 PROCEDURE — 85610 PROTHROMBIN TIME: CPT

## 2023-01-16 PROCEDURE — 36416 COLLJ CAPILLARY BLOOD SPEC: CPT

## 2023-01-16 NOTE — PROGRESS NOTES
ANTICOAGULATION MANAGEMENT     Salome Saeed 82 year old female is on warfarin with subtherapeutic INR result. (Goal INR 2.0-3.0)    Recent labs: (last 7 days)     01/16/23  1327   INR 1.5*       ASSESSMENT       Source(s): Chart Review and Patient/Caregiver Call       Warfarin doses taken: Warfarin taken as instructed    Diet: No new diet changes identified    New illness, injury, or hospitalization: No    Medication/supplement changes: None noted    Signs or symptoms of bleeding or clotting: No    Previous INR: Therapeutic last visit; previously outside of goal range    Additional findings: pt has been bridging with Lovenox, but declines doing anymore despite INR is 1.5 todya. She is aware of the risks with declining to continue this.        PLAN     Recommended plan for temporary change(s) affecting INR     Dosing Instructions: booster dose then continue your current warfarin dose with next INR in 2 days       Summary  As of 1/16/2023    Full warfarin instructions:  1/16: 10 mg; Otherwise 5 mg every Mon; 2.5 mg all other days   Next INR check:  1/18/2023             Telephone call with Loreta who verbalizes understanding and agrees to plan    Lab visit scheduled    Education provided:     Please call back if any changes to your diet, medications or how you've been taking warfarin    Goal range and lab monitoring: goal range and significance of current result, Importance of therapeutic range and Importance of following up at instructed interval    Symptom monitoring: monitoring for clotting signs and symptoms, monitoring for stroke signs and symptoms and when to seek medical attention/emergency care    Plan made per [facility] anticoagulation protocol    Alecia Cifuentes, RN  Anticoagulation Clinic  1/16/2023    _______________________________________________________________________     Anticoagulation Episode Summary     Current INR goal:  2.0-3.0   TTR:  70.7 % (1 y)   Target end date:  Indefinite   Send INR  reminders to:  [facility]    Indications    Long term current use of anticoagulant therapy [Z79.01]  Cerebrovascular accident involving cerebellum (H) [I63.9]           Comments:           Anticoagulation Care Providers     Provider Role Specialty Phone number    Surya Dyer DO [facility] Medicine [phone]

## 2023-01-18 ENCOUNTER — LAB (OUTPATIENT)
Dept: LAB | Facility: CLINIC | Age: 83
End: 2023-01-18
Payer: COMMERCIAL

## 2023-01-18 ENCOUNTER — ANTICOAGULATION THERAPY VISIT (OUTPATIENT)
Dept: ANTICOAGULATION | Facility: CLINIC | Age: 83
End: 2023-01-18

## 2023-01-18 DIAGNOSIS — I63.9 CEREBROVASCULAR ACCIDENT INVOLVING CEREBELLUM (H): ICD-10-CM

## 2023-01-18 DIAGNOSIS — Z79.01 LONG TERM CURRENT USE OF ANTICOAGULANT THERAPY: Primary | ICD-10-CM

## 2023-01-18 LAB — INR BLD: 2.1 (ref 0.9–1.1)

## 2023-01-18 PROCEDURE — 85610 PROTHROMBIN TIME: CPT

## 2023-01-18 PROCEDURE — 36416 COLLJ CAPILLARY BLOOD SPEC: CPT

## 2023-01-18 NOTE — PROGRESS NOTES
ANTICOAGULATION MANAGEMENT     Salome Saeed 82 year old female is on warfarin with therapeutic INR result. (Goal INR 2.0-3.0)    Recent labs: (last 7 days)     01/18/23  1331   INR 2.1*       ASSESSMENT       Source(s): Chart Review and Patient/Caregiver Call       Warfarin doses taken: Booster dose(s) recently taken which may be affecting INR    Diet: No new diet changes identified    New illness, injury, or hospitalization: No    Medication/supplement changes: None noted    Signs or symptoms of bleeding or clotting: No    Previous INR: Subtherapeutic    Additional findings: None       PLAN     Recommended plan for temporary change(s) affecting INR     Dosing Instructions: Continue your current warfarin dose with next INR in 7-10 days       Summary  As of 1/18/2023    Full warfarin instructions:  5 mg every Mon; 2.5 mg all other days   Next INR check:  1/27/2023             Telephone call with Loreta who verbalizes understanding and agrees to plan    Patient elected to schedule next visit 2/1    Education provided:     Please call back if any changes to your diet, medications or how you've been taking warfarin    Goal range and lab monitoring: goal range and significance of current result and Importance of therapeutic range    Plan made per [facility] anticoagulation protocol    Rachel Wood RN  Anticoagulation Clinic  1/18/2023    _______________________________________________________________________     Anticoagulation Episode Summary     Current INR goal:  2.0-3.0   TTR:  70.2 % (1 y)   Target end date:  Indefinite   Send INR reminders to:  [facility]    Indications    Long term current use of anticoagulant therapy [Z79.01]  Cerebrovascular accident involving cerebellum (H) [I63.9]           Comments:           Anticoagulation Care Providers     Provider Role Specialty Phone number    Surya Dyer DO Referring Family Medicine [phone]

## 2023-01-27 DIAGNOSIS — E78.5 HYPERLIPIDEMIA LDL GOAL <70: ICD-10-CM

## 2023-01-27 RX ORDER — ROSUVASTATIN CALCIUM 40 MG/1
40 TABLET, COATED ORAL DAILY
Qty: 90 TABLET | Refills: 4 | Status: SHIPPED | OUTPATIENT
Start: 2023-01-27 | End: 2023-11-03

## 2023-01-27 NOTE — TELEPHONE ENCOUNTER
[facility] Refill Guideline reviewed.  Medication meets criteria for refill. Rosuvastatin 40mg sent to [facility].

## 2023-02-06 DIAGNOSIS — I63.9 CEREBROVASCULAR ACCIDENT INVOLVING CEREBELLUM (H): ICD-10-CM

## 2023-02-08 RX ORDER — WARFARIN SODIUM 5 MG/1
TABLET ORAL
Qty: 50 TABLET | Refills: 1 | Status: SHIPPED | OUTPATIENT
Start: 2023-02-08 | End: 2023-08-01

## 2023-02-08 NOTE — TELEPHONE ENCOUNTER
ANTICOAGULATION MANAGEMENT:  Medication Refill    Anticoagulation Summary  As of 1/18/2023    Warfarin maintenance plan:  5 mg (5 mg x 1) every Mon; 2.5 mg (5 mg x 0.5) all other days   Next INR check:  1/27/2023   Target end date:  Indefinite    Indications    Long term current use of anticoagulant therapy [Z79.01]  Cerebrovascular accident involving cerebellum (H) [I63.9]             Anticoagulation Care Providers     Provider Role Specialty Phone number    Surya Dyer DO Referring Family Medicine [phone]          Visit with referring provider/group within last year: Yes    [facility] referral signed within last year: Yes    Salome meets all criteria for refill (current [facility] referral, office visit with referring provider/group in last year, lab monitoring up to date or not exceeding 2 weeks overdue). Rx instructions and quantity supplied updated to match patient's current dosing plan. Warfarin 90 day supply with 1 refill granted per [facility] protocol     Madina Brannon RN  Anticoagulation Clinic

## 2023-02-09 ENCOUNTER — LAB (OUTPATIENT)
Dept: LAB | Facility: CLINIC | Age: 83
End: 2023-02-09
Payer: COMMERCIAL

## 2023-02-09 ENCOUNTER — APPOINTMENT (OUTPATIENT)
Dept: LAB | Facility: CLINIC | Age: 83
End: 2023-02-09
Payer: COMMERCIAL

## 2023-02-09 ENCOUNTER — ANTICOAGULATION THERAPY VISIT (OUTPATIENT)
Dept: ANTICOAGULATION | Facility: CLINIC | Age: 83
End: 2023-02-09

## 2023-02-09 DIAGNOSIS — Z79.01 LONG TERM CURRENT USE OF ANTICOAGULANT THERAPY: Primary | ICD-10-CM

## 2023-02-09 DIAGNOSIS — I63.9 CEREBROVASCULAR ACCIDENT INVOLVING CEREBELLUM (H): ICD-10-CM

## 2023-02-09 DIAGNOSIS — E03.9 HYPOTHYROIDISM, UNSPECIFIED TYPE: ICD-10-CM

## 2023-02-09 LAB
INR BLD: 1.9 (ref 0.9–1.1)
T4 FREE SERPL-MCNC: 2.07 NG/DL (ref 0.9–1.7)
TSH SERPL DL<=0.005 MIU/L-ACNC: 0.02 UIU/ML (ref 0.3–4.2)

## 2023-02-09 PROCEDURE — 85610 PROTHROMBIN TIME: CPT

## 2023-02-09 PROCEDURE — 36415 COLL VENOUS BLD VENIPUNCTURE: CPT

## 2023-02-09 PROCEDURE — 84443 ASSAY THYROID STIM HORMONE: CPT

## 2023-02-09 PROCEDURE — 84439 ASSAY OF FREE THYROXINE: CPT

## 2023-02-09 NOTE — PROGRESS NOTES
ANTICOAGULATION MANAGEMENT     Salome Saeed 82 year old female is on warfarin with subtherapeutic INR result. 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 and is able to see better as it is healing    Medication/supplement changes: None noted    Signs or symptoms of bleeding or clotting: No    Previous INR: Therapeutic last visit; previously outside of goal range    Additional findings: None       PLAN     Recommended plan for no diet, medication or health factor changes affecting INR     Dosing Instructions: Continue your current warfarin dose with next INR in 2 weeks       Summary  As of 2/9/2023    Full warfarin instructions:  5 mg every Mon; 2.5 mg all other days   Next INR check:  2/24/2023             Telephone call with Loreta who verbalizes understanding and agrees to plan    Lab visit scheduled    Education provided:     Please call back if any changes to your diet, medications or how you've been taking warfarin    Contact [phone]  with any changes, questions or concerns.     Plan made per [facility] anticoagulation protocol    Renee Martinez RN  Anticoagulation Clinic  2/9/2023    _______________________________________________________________________     Anticoagulation Episode Summary     Current INR goal:  2.0-3.0   TTR:  67.2 % (1 y)   Target end date:  Indefinite   Send INR reminders to:  [facility]    Indications    Long term current use of anticoagulant therapy [Z79.01]  Cerebrovascular accident involving cerebellum (H) [I63.9]           Comments:           Anticoagulation Care Providers     Provider Role Specialty Phone number    Surya Dyer DO Referring Family Medicine [phone]

## 2023-02-12 DIAGNOSIS — E03.9 HYPOTHYROIDISM, UNSPECIFIED TYPE: ICD-10-CM

## 2023-02-12 RX ORDER — LEVOTHYROXINE SODIUM 75 UG/1
75 TABLET ORAL DAILY
Qty: 60 TABLET | Refills: 0 | Status: SHIPPED | OUTPATIENT
Start: 2023-02-12 | End: 2023-03-17

## 2023-02-21 ENCOUNTER — HOSPITAL ENCOUNTER (OUTPATIENT)
Dept: MRI IMAGING | Facility: CLINIC | Age: 83
Discharge: HOME | End: 2023-02-21
Attending: PSYCHIATRY & NEUROLOGY | Admitting: PSYCHIATRY & NEUROLOGY
Payer: COMMERCIAL

## 2023-02-21 ENCOUNTER — TELEPHONE (OUTPATIENT)
Dept: NEUROLOGY | Facility: CLINIC | Age: 83
End: 2023-02-21
Payer: COMMERCIAL

## 2023-02-21 DIAGNOSIS — M89.9 LESION OF THORACIC VERTEBRA: ICD-10-CM

## 2023-02-21 PROCEDURE — A9585 GADOBUTROL INJECTION: HCPCS | Performed by: PSYCHIATRY & NEUROLOGY

## 2023-02-21 PROCEDURE — 255N000002 HC RX 255 OP 636: Performed by: PSYCHIATRY & NEUROLOGY

## 2023-02-21 PROCEDURE — 72157 MRI CHEST SPINE W/O & W/DYE: CPT

## 2023-02-21 RX ORDER — GADOBUTROL 604.72 MG/ML
6 INJECTION INTRAVENOUS ONCE
Status: COMPLETED | OUTPATIENT
Start: 2023-02-21 | End: 2023-02-21

## 2023-02-21 RX ADMIN — GADOBUTROL 6 ML: 604.72 INJECTION INTRAVENOUS at 10:42

## 2023-02-21 NOTE — TELEPHONE ENCOUNTER
Patient has appointment scheduled Thursday February 23rd at 1:30 pm.     Spoke with Loreta and was able to help her rescheduled due to projected snow fall of 18-24 inches overnight. I was able to move her to Tuesday March 7th at 11:30 am and put her on my list to call if able to fit her in sooner

## 2023-02-28 ENCOUNTER — ANTICOAGULATION THERAPY VISIT (OUTPATIENT)
Dept: ANTICOAGULATION | Facility: CLINIC | Age: 83
End: 2023-02-28

## 2023-02-28 ENCOUNTER — LAB (OUTPATIENT)
Dept: LAB | Facility: CLINIC | Age: 83
End: 2023-02-28
Payer: COMMERCIAL

## 2023-02-28 DIAGNOSIS — I63.9 CEREBROVASCULAR ACCIDENT INVOLVING CEREBELLUM (H): ICD-10-CM

## 2023-02-28 DIAGNOSIS — Z79.01 LONG TERM CURRENT USE OF ANTICOAGULANT THERAPY: Primary | ICD-10-CM

## 2023-02-28 DIAGNOSIS — E03.9 HYPOTHYROIDISM, UNSPECIFIED TYPE: ICD-10-CM

## 2023-02-28 LAB — INR BLD: 1.6 (ref 0.9–1.1)

## 2023-02-28 PROCEDURE — 85610 PROTHROMBIN TIME: CPT

## 2023-02-28 PROCEDURE — 36416 COLLJ CAPILLARY BLOOD SPEC: CPT

## 2023-02-28 NOTE — PROGRESS NOTES
ANTICOAGULATION MANAGEMENT     Salome Saeed 82 year old female is on warfarin with subtherapeutic INR result. (Goal INR 2.0-3.0)    Recent labs: (last 7 days)     02/28/23  1437   INR 1.6*       ASSESSMENT     Warfarin Lab Questionnaire    Dose in Tablet or Mg 2/28/2023   TAB or MG? tablet (tab)-verified with patient that responses given were in mg, not tablets.     Pt Rptd Dose SUNDAY MONDAY TUESDAY WEDNESDAY THURSDAY FRIDAY SATURDAY 2/28/2023 2.5 5 2.5 2.5 2.5 2.5 2.5     Warfarin Lab Questionnaire 2/28/2023   Missed doses? No   Medication changes? No   Abnormal bleeding? No   Shortness of breath? No   Injuries or illness since last INR? No   Changes in diet or alcohol? No   Upcoming surgery, procedure? No   Best number to call with results? [phone]       Additional findings: Continues to recover from eye surgery.  Still has some difficulty with vision, but this is improving.       PLAN     Recommended plan for no diet, medication or health factor changes affecting INR.  INR has been consistently sub therapeutic, so maintenance dose was increased today.     Dosing Instructions: booster dose then Increase your warfarin dose (12% change) with next INR in 10 days       Summary  As of 2/28/2023    Full warfarin instructions:  2/28: 5 mg; Otherwise 5 mg every Mon, Fri; 2.5 mg all other days   Next INR check:  3/9/2023             Telephone call with Loreta who agrees to plan and repeated back plan correctly    Lab visit scheduled    Education provided:     Please call back if any changes to your diet, medications or how you've been taking warfarin    Plan made per [facility] anticoagulation protocol    Sima Bonilla, RN  Anticoagulation Clinic  2/28/2023    _______________________________________________________________________     Anticoagulation Episode Summary     Current INR goal:  2.0-3.0   TTR:  64.8 % (1 y)   Target end date:  Indefinite   Send INR reminders to:  [facility]    Indications    Long  term current use of anticoagulant therapy [Z79.01]  Cerebrovascular accident involving cerebellum (H) [I63.9]           Comments:           Anticoagulation Care Providers     Provider Role Specialty Phone number    Surya Dyer DO [facility] Medicine [phone]

## 2023-03-07 ENCOUNTER — OFFICE VISIT (OUTPATIENT)
Dept: NEUROLOGY | Facility: CLINIC | Age: 83
End: 2023-03-07
Payer: COMMERCIAL

## 2023-03-07 VITALS
WEIGHT: 136 LBS | HEIGHT: 63 IN | OXYGEN SATURATION: 99 % | SYSTOLIC BLOOD PRESSURE: 118 MMHG | DIASTOLIC BLOOD PRESSURE: 70 MMHG | HEART RATE: 64 BPM | BODY MASS INDEX: 24.1 KG/M2

## 2023-03-07 DIAGNOSIS — M48.02 CERVICAL STENOSIS OF SPINAL CANAL: ICD-10-CM

## 2023-03-07 DIAGNOSIS — M54.50 CHRONIC MIDLINE LOW BACK PAIN WITHOUT SCIATICA: ICD-10-CM

## 2023-03-07 DIAGNOSIS — M89.9 LESION OF THORACIC VERTEBRA: ICD-10-CM

## 2023-03-07 DIAGNOSIS — G20.C PARKINSONISM, UNSPECIFIED PARKINSONISM TYPE (H): Primary | ICD-10-CM

## 2023-03-07 DIAGNOSIS — G89.29 CHRONIC MIDLINE LOW BACK PAIN WITHOUT SCIATICA: ICD-10-CM

## 2023-03-07 PROCEDURE — 99214 OFFICE O/P EST MOD 30 MIN: CPT | Performed by: PSYCHIATRY & NEUROLOGY

## 2023-03-07 NOTE — NURSING NOTE
"Salome Saeed is a 82 year old female who presents for:  Chief Complaint   Patient presents with     Back Pain     Back apin        Initial Vitals:  BP (!) 142/83   Pulse 64   Ht 1.6 m (5' 3\")   Wt 61.7 kg (136 lb)   LMP  (LMP Unknown)   SpO2 99%   BMI 24.09 kg/m   Estimated body mass index is 24.09 kg/m  as calculated from the following:    Height as of this encounter: 1.6 m (5' 3\").    Weight as of this encounter: 61.7 kg (136 lb).. Body surface area is 1.66 meters squared. BP completed using cuff size: small regular      Dennise Rathai  "

## 2023-03-07 NOTE — LETTER
3/7/2023         RE: Salome Saeed  [address]  [place] [address]        Dear Colleague,    Thank you for referring your patient, Salome Saeed, to the [facility]. Please see a copy of my visit note below.    ESTABLISHED PATIENT NEUROLOGY NOTE    DATE OF VISIT: 3/7/2023  CLINIC LOCATION: [facility]  MRN: [id number]  PATIENT NAME: Salome Saeed  YOB: 1940    REASON FOR VISIT:   Chief Complaint   Patient presents with     Back Pain     Back apin     SUBJECTIVE:                                                      HISTORY OF PRESENT ILLNESS: Patient is here to follow up regarding parkinsonism, cervical spinal stenosis, and low back pain.  During her last visit on 11/28/2022 I ordered a repeat thoracic spine MRI to monitor T8 vertebral lesion.  Please refer to my initial/other prior notes for further information.    Since the last visit, the patient reports tremor worsening.  Upon further questioning, she states that it is present in certain positions and with movements, but not at rest.  She did not start physical therapy for low back pain, but was doing exercises at home.  She feels that they would be more effective if she would do them regularly.  She is planning to start physical therapy soon.  She denies interval development of new neurological symptoms.    Thoracic spine MRI with and without contrast from 2/21/2023 demonstrated unchanged small T8 vertebral body lesion with associated contrast-enhancement suspected to reflect an atypical oral lipid poor hemangioma or other benign on the, though metastasis is not completely excluded.  Follow-up with CT through this area or MRI to further document continued stability could be considered.  Images were personally reviewed and independently interpreted.  EXAM:                                                    Physical Exam:   Vitals: BP (!) 142/83   Pulse 64   Ht  "1.6 m (5' 3\")   Wt 61.7 kg (136 lb)   LMP  (LMP Unknown)   SpO2 99%   BMI 24.09 kg/m      General: pt is in NAD, cooperative.  Skin: normal turgor, moist mucous membranes, no lesions/rashes noticed.  HEENT: ATNC, white sclera, normal conjunctiva.  Respiratory: Symmetric lung excursion, no accessory respiratory muscle use.  Abdomen: Non distended.  Neurological: awake, cooperative, follows commands, no aphasia or dysarthria noted, mild hypomimia, mild hypophonia, mild rigidity with provoking maneuvers, but not at rest, has mild intermittent postural bilateral symmetric hand tremor, no resting tremor, no dysmetria, slightly stooped forward posture with reduced arm swings and stride length.  ASSESSMENT AND PLAN:                                                    Assessment: 82 year old female patient presents for follow-up of parkinsonism (Parkinson's disease versus Parkinson plus conditions), cervical spinal stenosis, low back pain, and thoracic vertebrae lesion.    Regarding her continued low back pain, she is planning to set up physical therapy.  Previously we discussed an option of epidural steroid injection, which could be still considered later if needed.    Cervical spinal stenosis currently is stable.  No changes in management.    Vertebral lesion of T8 appears to be stable on repeat thoracic spine MRI.  We discussed that we might consider repeating it in 6 to 12 months to assure stability.  The patient prefers to repeat it in 6 months, which I think is reasonable.  The test was ordered.    Regarding her parkinsonism, it remains mild, but she also has positional and kinetic tremor that would be more consistent with enhanced physiologic versus essential tremor.  Previously we discussed a trial of Sinemet versus trial of propranolol (instead of her current atenolol).  We could also consider DaTSCAN to clarify her diagnosis.  However, at the present time, her symptoms are stable to warrant treatment or " additional evaluation.    Loreta to follow up with Primary Care provider regarding elevated blood pressure.     Diagnoses:    ICD-10-CM    1. Parkinsonism, unspecified Parkinsonism type (H)  G20       2. Cervical stenosis of spinal canal  M48.02       3. Chronic midline low back pain without sciatica  M54.50     G89.29       4. Lesion of thoracic vertebra  M89.9         Plan: At today's visit we thoroughly discussed current symptoms, evaluation results, available treatment options, and the plan.    We decided to repeat thoracic spine MRI in 6 months.    No medication changes.    I advised the patient to start physical therapy, as previously recommended.    Next follow-up appointment is in the next 6 months or earlier if needed.    Total Time: 31 minutes spent on the date of the encounter doing chart review, history and exam, documentation and further activities per the note.    Pj Keene MD  [facility] Neurology  (Chart documentation was completed in part with Dragon voice-recognition software. Even though reviewed, some grammatical, spelling, and word errors may remain.)        Again, thank you for allowing me to participate in the care of your patient.        Sincerely,        Pj Keene MD

## 2023-03-07 NOTE — PROGRESS NOTES
"ESTABLISHED PATIENT NEUROLOGY NOTE    DATE OF VISIT: 3/7/2023  CLINIC LOCATION: [facility]  MRN: [id number]  PATIENT NAME: Salome Saeed  YOB: 1940    REASON FOR VISIT:   Chief Complaint   Patient presents with    Back Pain     Back apin     SUBJECTIVE:                                                      HISTORY OF PRESENT ILLNESS: Patient is here to follow up regarding parkinsonism, cervical spinal stenosis, and low back pain.  During her last visit on 11/28/2022 I ordered a repeat thoracic spine MRI to monitor T8 vertebral lesion.  Please refer to my initial/other prior notes for further information.    Since the last visit, the patient reports tremor worsening.  Upon further questioning, she states that it is present in certain positions and with movements, but not at rest.  She did not start physical therapy for low back pain, but was doing exercises at home.  She feels that they would be more effective if she would do them regularly.  She is planning to start physical therapy soon.  She denies interval development of new neurological symptoms.    Thoracic spine MRI with and without contrast from 2/21/2023 demonstrated unchanged small T8 vertebral body lesion with associated contrast-enhancement suspected to reflect an atypical or lipid poor hemangioma or other benign abnormality, though metastasis is not completely excluded.  Follow-up with CT through this area or MRI to further document continued stability could be considered.  Images were personally reviewed and independently interpreted.  EXAM:                                                    Physical Exam:   Vitals: BP (!) 142/83   Pulse 64   Ht 1.6 m (5' 3\")   Wt 61.7 kg (136 lb)   LMP  (LMP Unknown)   SpO2 99%   BMI 24.09 kg/m      General: pt is in NAD, cooperative.  Skin: normal turgor, moist mucous membranes, no lesions/rashes noticed.  HEENT: ATNC, white sclera, normal conjunctiva.  Respiratory: " Symmetric lung excursion, no accessory respiratory muscle use.  Abdomen: Non distended.  Neurological: awake, cooperative, follows commands, no aphasia or dysarthria noted, mild hypomimia, mild hypophonia, mild rigidity with provoking maneuvers, but not at rest, has mild intermittent postural bilateral symmetric hand tremor, no resting tremor, no dysmetria, slightly stooped forward posture with reduced arm swings and stride length.  ASSESSMENT AND PLAN:                                                    Assessment: 82 year old female patient presents for follow-up of parkinsonism (Parkinson's disease versus Parkinson plus conditions), cervical spinal stenosis, low back pain, and thoracic vertebrae lesion.    Regarding her continued low back pain, she is planning to set up physical therapy.  Previously we discussed an option of epidural steroid injection, which could be still considered later if needed.    Cervical spinal stenosis currently is stable.  No changes in management.    Vertebral lesion of T8 appears to be stable on repeat thoracic spine MRI.  We discussed that we might consider repeating it in 6 to 12 months to assure stability.  The patient prefers to repeat it in 6 months, which I think is reasonable.  The test was ordered.    Regarding her parkinsonism, it remains mild, but she also has positional and kinetic tremor that would be more consistent with enhanced physiologic versus essential tremor.  Previously we discussed a trial of Sinemet versus trial of propranolol (instead of her current atenolol).  We could also consider DaTSCAN to clarify her diagnosis.  However, at the present time, her symptoms are stable to warrant treatment or additional evaluation.    Loreta to follow up with Primary Care provider regarding elevated blood pressure.     Diagnoses:    ICD-10-CM    1. Parkinsonism, unspecified Parkinsonism type (H)  G20       2. Cervical stenosis of spinal canal  M48.02       3. Chronic midline  low back pain without sciatica  M54.50     G89.29       4. Lesion of thoracic vertebra  M89.9         Plan: At today's visit we thoroughly discussed current symptoms, evaluation results, available treatment options, and the plan.    We decided to repeat thoracic spine MRI in 6 months.    No medication changes.    I advised the patient to start physical therapy, as previously recommended.    Next follow-up appointment is in the next 6 months or earlier if needed.    Total Time: 31 minutes spent on the date of the encounter doing chart review, history and exam, documentation and further activities per the note.    Pj Keene MD  [facility] Neurology  (Chart documentation was completed in part with Dragon voice-recognition software. Even though reviewed, some grammatical, spelling, and word errors may remain.)

## 2023-03-07 NOTE — PROGRESS NOTES
ESTABLISHED PATIENT NEUROLOGY NOTE    DATE OF VISIT: 3/7/2023  CLINIC LOCATION: [facility]  MRN: [id number]  PATIENT NAME: Salome Saeed  YOB: 1940    REASON FOR VISIT: No chief complaint on file.    SUBJECTIVE:                                                      HISTORY OF PRESENT ILLNESS: Patient is here to follow up regarding parkinsonism, cervical spinal stenosis, and low back pain.  During her last visit on 11/28/2023 posture.  Please refer to my initial/other prior notes for further information.    Since the last visit, the patient reports ***.  Physical therapy was *** for low back pain.  she denies interval development of new neurological symptoms.    Thoracic spine MRI with and without contrast from 2/21/2023 demonstrated unchanged small T8 vertebral body lesion with associated contrast-enhancement suspected to reflect an atypical oral lipid poor hemangioma or other benign on the, though metastasis is not completely excluded.  Follow-up with CT through this area or MRI to further document continued stability could be considered.  Images were personally reviewed and independently interpreted.  EXAM:                                                    Physical Exam:   Vitals: LMP  (LMP Unknown)     General: pt is in NAD, cooperative.  Skin: normal turgor, moist mucous membranes, no lesions/rashes noticed.  HEENT: ATNC, white sclera, normal conjunctiva.  Respiratory: Symmetric lung excursion, no accessory respiratory muscle use.  Abdomen: Non distended.  Neurological: awake, cooperative, follows commands, no aphasia or dysarthria noted, mild hypomimia, mild hypophonia, mild rigidity with provoking maneuvers, but not at rest, has postural bilateral symmetric hand tremor, no resting tremor, no dysmetria, slightly stooped forward posture with preserved arm swings, but slightly reduced stride length.  ASSESSMENT AND PLAN:                                                     Assessment: 82 year old female patient presents for follow-up of parkinsonism (Parkinson's disease versus Parkinson plus conditions), cervical spinal stenosis, low back pain, and thoracic vertebrae lesion.    Regarding her continued low back pain, she felt that physical therapy ***.  Previously we discussed an option of epidural steroid injection.    Cervical spinal stenosis currently is stable.  No changes in management.    Vertebral lesion of T8 appears to be stable on repeat thoracic spine MRI.  We discussed that we might consider repeating it in 6 to 12 months to assure stability.    Regarding her parkinsonism, ***.  Previously we discussed a trial of Sinemet versus trial of propranolol.  We could also consider DaTscan to clarify her diagnosis.    Diagnoses:    ICD-10-CM    1. Parkinsonism, unspecified Parkinsonism type (H)  G20       2. Cervical stenosis of spinal canal  M48.02       3. Chronic midline low back pain without sciatica  M54.50     G89.29       4. Lesion of thoracic vertebra  M89.9         Plan:  There are no Patient Instructions on file for this visit.    Total Time: *** minutes spent on the date of the encounter doing chart review, history and exam, documentation and further activities per the note.    Pj Keene MD  [facility] Neurology  (Chart documentation was completed in part with Dragon voice-recognition software. Even though reviewed, some grammatical, spelling, and word errors may remain.)

## 2023-03-07 NOTE — PATIENT INSTRUCTIONS
AFTER VISIT SUMMARY (AVS):    At today's visit we thoroughly discussed current symptoms, evaluation results, available treatment options, and the plan.    We decided to repeat thoracic spine MRI in 6 months.    No medication changes.    Please start physical therapy, as previously recommended.    Next follow-up appointment is in the next 6 months or earlier if needed.    Please do not hesitate to call me with any questions or concerns.    Thanks.   
parent(s)

## 2023-03-09 ENCOUNTER — ANTICOAGULATION THERAPY VISIT (OUTPATIENT)
Dept: ANTICOAGULATION | Facility: CLINIC | Age: 83
End: 2023-03-09

## 2023-03-09 ENCOUNTER — LAB (OUTPATIENT)
Dept: LAB | Facility: CLINIC | Age: 83
End: 2023-03-09
Payer: COMMERCIAL

## 2023-03-09 DIAGNOSIS — Z79.01 LONG TERM CURRENT USE OF ANTICOAGULANT THERAPY: Primary | ICD-10-CM

## 2023-03-09 DIAGNOSIS — E03.9 HYPOTHYROIDISM, UNSPECIFIED TYPE: ICD-10-CM

## 2023-03-09 DIAGNOSIS — I63.9 CEREBROVASCULAR ACCIDENT INVOLVING CEREBELLUM (H): ICD-10-CM

## 2023-03-09 LAB
INR BLD: 2.1 (ref 0.9–1.1)
T4 FREE SERPL-MCNC: 1.84 NG/DL (ref 0.9–1.7)
TSH SERPL DL<=0.005 MIU/L-ACNC: 0.03 UIU/ML (ref 0.3–4.2)

## 2023-03-09 PROCEDURE — 84439 ASSAY OF FREE THYROXINE: CPT

## 2023-03-09 PROCEDURE — 85610 PROTHROMBIN TIME: CPT

## 2023-03-09 PROCEDURE — 84443 ASSAY THYROID STIM HORMONE: CPT

## 2023-03-09 PROCEDURE — 36415 COLL VENOUS BLD VENIPUNCTURE: CPT

## 2023-03-09 NOTE — PROGRESS NOTES
ANTICOAGULATION MANAGEMENT     Salome Saeed 82 year old female is on warfarin with therapeutic INR result. (Goal INR 2.0-3.0)    Recent labs: (last 7 days)     03/09/23  1313   INR 2.1*       ASSESSMENT     Warfarin Lab Questionnaire    Dose in Tablet or Mg 3/9/2023   TAB or MG? milligram (mg)     No flowsheet data found.  Warfarin Lab Questionnaire 3/9/2023   Missed doses? No   Medication changes? No   Abnormal bleeding? No   Shortness of breath? No   Injuries or illness since last INR? No   Changes in diet or alcohol? No - upon further discussion with patient, she reports she had less green veggies this past week.   Upcoming surgery, procedure? No   Best number to call with results? -       Additional findings: Previous INR subtherapeutic,   Warfarin maintenance dose was increased by 12% 2/28/23       PLAN     Recommended plan for no diet, medication or health factor changes affecting INR     Dosing Instructions: Continue your current warfarin dose with next INR in 2 weeks       Summary  As of 3/9/2023    Full warfarin instructions:  5 mg every Mon, Fri; 2.5 mg all other days   Next INR check:  3/23/2023             Telephone call with Loreta who agrees to plan and repeated back plan correctly    Lab visit scheduled    Education provided:     Please call back if any changes to your diet, medications or how you've been taking warfarin    Contact [phone]  with any changes, questions or concerns.     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

## 2023-03-17 DIAGNOSIS — E03.9 HYPOTHYROIDISM, UNSPECIFIED TYPE: ICD-10-CM

## 2023-03-17 RX ORDER — LEVOTHYROXINE SODIUM 50 UG/1
75 TABLET ORAL DAILY
Qty: 90 TABLET | Refills: 0 | Status: SHIPPED | OUTPATIENT
Start: 2023-03-17 | End: 2023-03-17

## 2023-03-17 RX ORDER — LEVOTHYROXINE SODIUM 50 UG/1
50 TABLET ORAL DAILY
Qty: 90 TABLET | Refills: 0 | Status: SHIPPED | OUTPATIENT
Start: 2023-03-17 | End: 2023-05-16

## 2023-03-20 DIAGNOSIS — E03.9 HYPOTHYROIDISM, UNSPECIFIED TYPE: ICD-10-CM

## 2023-03-22 RX ORDER — LEVOTHYROXINE SODIUM 100 UG/1
TABLET ORAL
Qty: 90 TABLET | Refills: 3 | OUTPATIENT
Start: 2023-03-22

## 2023-03-22 NOTE — TELEPHONE ENCOUNTER
Pt dose was adjusted and script was sent to local pharmacy. See result note  Gisel HERNANDEZ RN, BSN

## 2023-03-27 ENCOUNTER — ANTICOAGULATION THERAPY VISIT (OUTPATIENT)
Dept: ANTICOAGULATION | Facility: CLINIC | Age: 83
End: 2023-03-27

## 2023-03-27 ENCOUNTER — LAB (OUTPATIENT)
Dept: LAB | Facility: CLINIC | Age: 83
End: 2023-03-27
Payer: COMMERCIAL

## 2023-03-27 DIAGNOSIS — Z79.01 LONG TERM CURRENT USE OF ANTICOAGULANT THERAPY: Primary | ICD-10-CM

## 2023-03-27 DIAGNOSIS — I63.9 CEREBROVASCULAR ACCIDENT INVOLVING CEREBELLUM (H): ICD-10-CM

## 2023-03-27 DIAGNOSIS — E03.9 HYPOTHYROIDISM, UNSPECIFIED TYPE: ICD-10-CM

## 2023-03-27 LAB — INR BLD: 1.7 (ref 0.9–1.1)

## 2023-03-27 PROCEDURE — 36416 COLLJ CAPILLARY BLOOD SPEC: CPT

## 2023-03-27 PROCEDURE — 85610 PROTHROMBIN TIME: CPT

## 2023-03-27 NOTE — PROGRESS NOTES
ANTICOAGULATION MANAGEMENT     Salome Saeed 82 year old female is on warfarin with subtherapeutic INR result. (Goal INR 2.0-3.0)    Recent labs: (last 7 days)     03/27/23  1248   INR 1.7*       ASSESSMENT       Source(s): Chart Review and Patient/Caregiver Call       Warfarin doses taken: Warfarin taken as instructed    Diet: Increased greens/vitamin K in diet; ongoing change    New illness, injury, or hospitalization: No    Medication/supplement changes: Patient reports she has been taking more Tylenol 500 mg, 2-3 tablets every day    Signs or symptoms of bleeding or clotting: No    Previous INR: Therapeutic last visit; previously outside of goal range    Additional findings: None         PLAN     Recommended plan for ongoing change(s) affecting INR     Dosing Instructions: booster dose then Increase your warfarin dose (11.1% change) (smallest adjustment possible with current warfarin tablets) with next INR in 1-2 weeks       Summary  As of 3/27/2023    Full warfarin instructions:  3/27: 7.5 mg; Otherwise 5 mg every Mon, Wed, Fri; 2.5 mg all other days   Next INR check:  4/4/2023             Telephone call with Loreta who verbalizes understanding and agrees to plan    Lab visit scheduled    Education provided:     Please call back if any changes to your diet, medications or how you've been taking warfarin    Contact [phone]  with any changes, questions or concerns.     Discussed changing warfarin to 2.5 mg so smaller adjustments can be made if necessary    Plan made per [facility] anticoagulation protocol    Renee Martinez RN  Anticoagulation Clinic  3/27/2023    _______________________________________________________________________     Anticoagulation Episode Summary     Current INR goal:  2.0-3.0   TTR:  60.1 % (1 y)   Target end date:  Indefinite   Send INR reminders to:  [facility]    Indications    Long term current use of anticoagulant therapy [Z79.01]  Cerebrovascular accident involving  cerebellum (H) [I63.9]           Comments:           Anticoagulation Care Providers     Provider Role Specialty Phone number    Surya Dyer DO Referring Family Medicine [phone]

## 2023-04-07 ENCOUNTER — TELEPHONE (OUTPATIENT)
Dept: CARDIOLOGY | Facility: CLINIC | Age: 83
End: 2023-04-07
Payer: COMMERCIAL

## 2023-04-07 DIAGNOSIS — I25.118 CORONARY ARTERY DISEASE OF NATIVE ARTERY OF NATIVE HEART WITH STABLE ANGINA PECTORIS (H): Primary | ICD-10-CM

## 2023-04-07 DIAGNOSIS — I10 BENIGN ESSENTIAL HYPERTENSION: Chronic | ICD-10-CM

## 2023-04-07 NOTE — TELEPHONE ENCOUNTER
"Spoke to patient, says she \"hasn't been feeling good lately.\" She says she went to [employer] for an hour the other day and she came back feeling exhausted. She says this has been going on \"for a while but [she] likes to ignore it.\" She has chest pressure with radiation to her left arm at times. The chest pressure comes sometimes with activity ie stairs but the arm pain can occur with rest. She took a few tablets of aspirin when she had discomfort the other day and it seemed to help per patient. She is short of breath with activity, not at rest. She says she has actually lost about 20lbs over the last 6mos, not intentional, poor appetite. No edema. She does get lightheaded if she moves too quickly. Her BP has been running high recently per patient, yesterday was 189 systolically. She felt very shaky when she checked it but she does have Parkinsons. She is taking her medications as prescribed. She has no symptoms at time of the call. Discussed that if her chest pressure or left arm pain recur, she has resting dyspnea, etc, that she should go to the ED. Dr Amaya messaged.       Addendum- Dr Amaya's reply,     Nikolai Amaya MD Hiljus, Deja CUETO, RN  Caller: Unspecified (Today, 12:40 PM)    What a constellation of symptoms.  With her Parkinson's her blood pressure is good to be quite labile going up and down.  She needs to make sure she stays very well-hydrated drink lots of fluids she may want to even increase the salt in her diet so her blood pressure does not drop too low.  We did a stress test on her just last fall that looks okay.  It does not sound like angina given the lack of a relationship with activity.  She will probably need to be seen to sort out.  See if we can get her in to be seen by myself or RASHAD or partner.    Spoke to patient and reviewed message from Dr Amaya. She verbalized understanding. Scheduling messaged to contact patient.     "

## 2023-04-07 NOTE — TELEPHONE ENCOUNTER
[facility] Call Center    Phone Message    May a detailed message be left on voicemail: yes     Reason for Call: Other: Patient calling and is having some intermittent shortness of breath and some higher blood pressure readings . Please call the paitent to discuss.      Action Taken: Other: cardiology    Travel Screening: Not Applicable   Thank you!  [facility]

## 2023-04-11 ENCOUNTER — LAB (OUTPATIENT)
Dept: LAB | Facility: CLINIC | Age: 83
End: 2023-04-11
Payer: COMMERCIAL

## 2023-04-11 ENCOUNTER — ANTICOAGULATION THERAPY VISIT (OUTPATIENT)
Dept: ANTICOAGULATION | Facility: CLINIC | Age: 83
End: 2023-04-11

## 2023-04-11 DIAGNOSIS — I63.9 CEREBROVASCULAR ACCIDENT INVOLVING CEREBELLUM (H): ICD-10-CM

## 2023-04-11 DIAGNOSIS — Z79.01 LONG TERM CURRENT USE OF ANTICOAGULANT THERAPY: Primary | ICD-10-CM

## 2023-04-11 LAB — INR BLD: 1.6 (ref 0.9–1.1)

## 2023-04-11 PROCEDURE — 85610 PROTHROMBIN TIME: CPT

## 2023-04-11 PROCEDURE — 36416 COLLJ CAPILLARY BLOOD SPEC: CPT

## 2023-04-11 NOTE — PROGRESS NOTES
ANTICOAGULATION MANAGEMENT     Salome Saeed 83 year old female is on warfarin with subtherapeutic INR result. (Goal INR 2.0-3.0)    Recent labs: (last 7 days)     04/11/23  1309   INR 1.6*       ASSESSMENT     Warfarin Lab Questionnaire        3/9/2023     1:03 PM   Dose in Tablet or Mg   TAB or MG? milligram (mg)          View : No data to display.                  4/11/2023     1:04 PM   Warfarin Lab Questionnaire   Missed doses? No   Medication changes? No   Abnormal bleeding? No   Shortness of breath? Yes   Injuries or illness since last INR? No   Changes in diet or alcohol? No   Upcoming surgery, procedure? No       Additional findings: Louise took less than maintenance dose - reverted to previous dosing structure and did not increase as advised last visit. Will boost today and return Loreta to proper dosing.  SOB continues - OV scheduled for this upcoming Friday to discuss. Also advised to watch for other signs of clotting.       PLAN     Recommended plan for temporary change(s) affecting INR     Dosing Instructions: booster dose then continue your current warfarin dose with next INR in 10 days       Summary  As of 4/11/2023    Full warfarin instructions:  4/11: 5 mg; Otherwise 5 mg every Mon, Wed, Fri; 2.5 mg all other days   Next INR check:  4/21/2023             Telephone call with Loreta who verbalizes understanding and agrees to plan    Lab visit scheduled    Education provided:     Please call back if any changes to your diet, medications or how you've been taking warfarin    Taking warfarin: Importance of taking warfarin as instructed    Symptom monitoring: monitoring for clotting signs and symptoms, monitoring for stroke signs and symptoms and when to seek medical attention/emergency care    Plan made per [facility] anticoagulation protocol    Neeru Martinez RN  Anticoagulation Clinic  4/11/2023    _______________________________________________________________________     Anticoagulation Episode  Summary     Current INR goal:  2.0-3.0   TTR:  56.0 % (1 y)   Target end date:  Indefinite   Send INR reminders to:  [facility]    Indications    Long term current use of anticoagulant therapy [Z79.01]  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

## 2023-04-14 ENCOUNTER — APPOINTMENT (OUTPATIENT)
Dept: CT IMAGING | Facility: CLINIC | Age: 83
End: 2023-04-14
Attending: PHYSICIAN ASSISTANT
Payer: COMMERCIAL

## 2023-04-14 ENCOUNTER — HOSPITAL ENCOUNTER (EMERGENCY)
Facility: CLINIC | Age: 83
Discharge: HOME | End: 2023-04-14
Attending: PHYSICIAN ASSISTANT | Admitting: PHYSICIAN ASSISTANT
Payer: COMMERCIAL

## 2023-04-14 ENCOUNTER — OFFICE VISIT (OUTPATIENT)
Dept: CARDIOLOGY | Facility: CLINIC | Age: 83
End: 2023-04-14
Payer: COMMERCIAL

## 2023-04-14 VITALS
HEART RATE: 61 BPM | TEMPERATURE: 97.3 F | RESPIRATION RATE: 18 BRPM | DIASTOLIC BLOOD PRESSURE: 71 MMHG | OXYGEN SATURATION: 97 % | SYSTOLIC BLOOD PRESSURE: 167 MMHG

## 2023-04-14 VITALS
HEIGHT: 63 IN | BODY MASS INDEX: 23.85 KG/M2 | WEIGHT: 134.6 LBS | DIASTOLIC BLOOD PRESSURE: 71 MMHG | HEART RATE: 64 BPM | SYSTOLIC BLOOD PRESSURE: 155 MMHG | OXYGEN SATURATION: 98 %

## 2023-04-14 DIAGNOSIS — R73.01 IMPAIRED FASTING GLUCOSE: ICD-10-CM

## 2023-04-14 DIAGNOSIS — E78.5 HYPERLIPIDEMIA LDL GOAL <70: ICD-10-CM

## 2023-04-14 DIAGNOSIS — Z86.79 HX OF CORONARY ARTERY DISEASE: ICD-10-CM

## 2023-04-14 DIAGNOSIS — R07.9 CHEST PAIN: ICD-10-CM

## 2023-04-14 DIAGNOSIS — R06.00 DYSPNEA: ICD-10-CM

## 2023-04-14 DIAGNOSIS — I25.118 CORONARY ARTERY DISEASE OF NATIVE ARTERY OF NATIVE HEART WITH STABLE ANGINA PECTORIS (H): Primary | ICD-10-CM

## 2023-04-14 DIAGNOSIS — I10 BENIGN ESSENTIAL HYPERTENSION: Chronic | ICD-10-CM

## 2023-04-14 DIAGNOSIS — R07.89 CHEST DISCOMFORT: ICD-10-CM

## 2023-04-14 DIAGNOSIS — R06.09 DOE (DYSPNEA ON EXERTION): ICD-10-CM

## 2023-04-14 DIAGNOSIS — Z79.01 LONG TERM CURRENT USE OF ANTICOAGULANT THERAPY: ICD-10-CM

## 2023-04-14 LAB
ANION GAP SERPL CALCULATED.3IONS-SCNC: 11 MMOL/L (ref 7–15)
BASOPHILS # BLD AUTO: 0.1 10E3/UL (ref 0–0.2)
BASOPHILS NFR BLD AUTO: 1 %
BUN SERPL-MCNC: 15.5 MG/DL (ref 8–23)
CALCIUM SERPL-MCNC: 9.4 MG/DL (ref 8.8–10.2)
CHLORIDE SERPL-SCNC: 93 MMOL/L (ref 98–107)
CREAT SERPL-MCNC: 0.63 MG/DL (ref 0.51–0.95)
D DIMER PPP FEU-MCNC: 0.82 UG/ML FEU (ref 0–0.5)
DEPRECATED HCO3 PLAS-SCNC: 26 MMOL/L (ref 22–29)
EOSINOPHIL # BLD AUTO: 0.1 10E3/UL (ref 0–0.7)
EOSINOPHIL NFR BLD AUTO: 1 %
ERYTHROCYTE [DISTWIDTH] IN BLOOD BY AUTOMATED COUNT: 14.7 % (ref 10–15)
GFR SERPL CREATININE-BSD FRML MDRD: 89 ML/MIN/1.73M2
GLUCOSE SERPL-MCNC: 140 MG/DL (ref 70–99)
HCT VFR BLD AUTO: 37.7 % (ref 35–47)
HGB BLD-MCNC: 12.4 G/DL (ref 11.7–15.7)
HOLD SPECIMEN: NORMAL
IMM GRANULOCYTES # BLD: 0 10E3/UL
IMM GRANULOCYTES NFR BLD: 0 %
INR PPP: 2.02 (ref 0.85–1.15)
LYMPHOCYTES # BLD AUTO: 1.8 10E3/UL (ref 0.8–5.3)
LYMPHOCYTES NFR BLD AUTO: 27 %
MCH RBC QN AUTO: 29.1 PG (ref 26.5–33)
MCHC RBC AUTO-ENTMCNC: 32.9 G/DL (ref 31.5–36.5)
MCV RBC AUTO: 89 FL (ref 78–100)
MONOCYTES # BLD AUTO: 0.6 10E3/UL (ref 0–1.3)
MONOCYTES NFR BLD AUTO: 9 %
NEUTROPHILS # BLD AUTO: 4.1 10E3/UL (ref 1.6–8.3)
NEUTROPHILS NFR BLD AUTO: 62 %
NRBC # BLD AUTO: 0 10E3/UL
NRBC BLD AUTO-RTO: 0 /100
PLATELET # BLD AUTO: 210 10E3/UL (ref 150–450)
POTASSIUM SERPL-SCNC: 4.3 MMOL/L (ref 3.4–5.3)
RBC # BLD AUTO: 4.26 10E6/UL (ref 3.8–5.2)
SODIUM SERPL-SCNC: 130 MMOL/L (ref 136–145)
TROPONIN T SERPL HS-MCNC: 10 NG/L
TROPONIN T SERPL HS-MCNC: 9 NG/L
TSH SERPL DL<=0.005 MIU/L-ACNC: 1.72 UIU/ML (ref 0.3–4.2)
WBC # BLD AUTO: 6.6 10E3/UL (ref 4–11)

## 2023-04-14 PROCEDURE — 250N000013 HC RX MED GY IP 250 OP 250 PS 637: Performed by: PHYSICIAN ASSISTANT

## 2023-04-14 PROCEDURE — 36415 COLL VENOUS BLD VENIPUNCTURE: CPT | Performed by: EMERGENCY MEDICINE

## 2023-04-14 PROCEDURE — 85379 FIBRIN DEGRADATION QUANT: CPT | Performed by: EMERGENCY MEDICINE

## 2023-04-14 PROCEDURE — 71275 CT ANGIOGRAPHY CHEST: CPT

## 2023-04-14 PROCEDURE — 250N000009 HC RX 250: Performed by: PHYSICIAN ASSISTANT

## 2023-04-14 PROCEDURE — 85025 COMPLETE CBC W/AUTO DIFF WBC: CPT | Performed by: PHYSICIAN ASSISTANT

## 2023-04-14 PROCEDURE — 250N000011 HC RX IP 250 OP 636: Performed by: PHYSICIAN ASSISTANT

## 2023-04-14 PROCEDURE — 85610 PROTHROMBIN TIME: CPT | Performed by: PHYSICIAN ASSISTANT

## 2023-04-14 PROCEDURE — 99285 EMERGENCY DEPT VISIT HI MDM: CPT | Mod: 25

## 2023-04-14 PROCEDURE — 84443 ASSAY THYROID STIM HORMONE: CPT | Performed by: EMERGENCY MEDICINE

## 2023-04-14 PROCEDURE — 36415 COLL VENOUS BLD VENIPUNCTURE: CPT | Performed by: PHYSICIAN ASSISTANT

## 2023-04-14 PROCEDURE — 84484 ASSAY OF TROPONIN QUANT: CPT | Performed by: PHYSICIAN ASSISTANT

## 2023-04-14 PROCEDURE — 99213 OFFICE O/P EST LOW 20 MIN: CPT | Performed by: INTERNAL MEDICINE

## 2023-04-14 PROCEDURE — 93005 ELECTROCARDIOGRAM TRACING: CPT

## 2023-04-14 PROCEDURE — 85025 COMPLETE CBC W/AUTO DIFF WBC: CPT | Performed by: EMERGENCY MEDICINE

## 2023-04-14 PROCEDURE — 93000 ELECTROCARDIOGRAM COMPLETE: CPT | Performed by: INTERNAL MEDICINE

## 2023-04-14 PROCEDURE — 80048 BASIC METABOLIC PNL TOTAL CA: CPT | Performed by: PHYSICIAN ASSISTANT

## 2023-04-14 RX ORDER — NITROGLYCERIN 0.4 MG/1
0.4 TABLET SUBLINGUAL EVERY 5 MIN PRN
Status: DISCONTINUED | OUTPATIENT
Start: 2023-04-14 | End: 2023-04-14 | Stop reason: HOSPADM

## 2023-04-14 RX ORDER — IOPAMIDOL 755 MG/ML
500 INJECTION, SOLUTION INTRAVASCULAR ONCE
Status: COMPLETED | OUTPATIENT
Start: 2023-04-14 | End: 2023-04-14

## 2023-04-14 RX ADMIN — NITROGLYCERIN 0.4 MG: 0.4 TABLET SUBLINGUAL at 16:40

## 2023-04-14 RX ADMIN — SODIUM CHLORIDE 74 ML: 9 INJECTION, SOLUTION INTRAVENOUS at 16:31

## 2023-04-14 RX ADMIN — IOPAMIDOL 53 ML: 755 INJECTION, SOLUTION INTRAVENOUS at 16:31

## 2023-04-14 ASSESSMENT — ACTIVITIES OF DAILY LIVING (ADL)
ADLS_ACUITY_SCORE: 37
ADLS_ACUITY_SCORE: 37

## 2023-04-14 ASSESSMENT — ENCOUNTER SYMPTOMS
FEVER: 0
SHORTNESS OF BREATH: 1
COUGH: 0
CHILLS: 0
NAUSEA: 0
VOMITING: 0

## 2023-04-14 NOTE — PATIENT INSTRUCTIONS
Your blood pressure was elevated at your appointment today.  Elevated blood pressure can increase your risk of a heart attack, stroke and heart failure.  For this reason, we feel it is important to monitor your blood pressure closely.  If you have access to a home blood pressure monitor or are able to check your blood pressure at a local pharmacy in the next week we would like you to do so and call our clinic with those readings. Please call [phone] ([place]) and leave a message with your name, date of birth, blood pressure reading, date and location it was completed. If your blood pressure remains elevated your care team will be notified.  We appreciate being a part of your healthcare team and look forward to hearing from you soon.

## 2023-04-14 NOTE — ED PROVIDER NOTES
History     Chief Complaint:  Chest Pain     The history is provided by the patient.      Salome Saeed is a 83 year old female on Warfarin with a history of Parkinson's and extensive cardiac history who presents with chest pain The patient reports she has been experiencing worsening chest pain intermittently for the past few weeks. She was at her cardiologist today who sent her to the ED with concern of patient's chest pain. She states 5 days ago she took 2 nitroglycerine because of the chest pain and notes her pain improved after. However, the rest of the week the pain has been intermittent. She states today the pain has been constant and has not taken nitroglycerin since when she took it 5 days ago. She adds the pain is there with or without exertion. She notes the pain in her chest but it will have pain radiating down her left arm intermittently. She rates the chest pain 7 out of 10. Denies fever, chills, nausea, vomiting, and leg swelling. She adds once in awhile she will have a slight cough but denies coughing up any blood. She notes some shortness of breath but is not sure if it is in relation to the chest pain.     Independent Historian:   None - Patient Only    Review of External Notes:   Cardiology visit today:  HPI and Plan:      Louise is a very nice 83-year-old woman with past medical history significant for unstable angina leading to 2 Xience drug-eluting stents placed in her left anterior descending in 2009.  She has hypercholesterolemia, hypertension, hypothyroidism, back pain with multiple surgeries by Dr. Daniel Harris, a strong family history of coronary disease, Parkinson disease, cerebrovascular accident in 2013 manifesting as balance issues.  This was thought to be an embolic event from her vertebral artery for which she has been on warfarin ever since.     Additional coronary history includes chest discomfort in 2012 with angiogram demonstrating no change in her anatomy.  She had moderate  disease in posterior descending artery, ostial pinch in her diagonal and obtuse marginal that I recommended medical management.  Repeat angiography in 2015 with classic anginal symptoms showed no change in her anatomy and we continued medical management.  One month later, she was back in the emergency room.  I took her back to the Cath Lab where I stented a small obtuse marginal branch, placing a 2.25 x 8 mm Promus drug-eluting stent in the ostium of the obtuse marginal.  She had problems with the radial approach with ecchymoses and pain but this resolved over time.  In 2017, after several back surgeries by Dr. Daniel Harris, she was having chest discomfort with a stress nuclear scan that demonstrated no evidence of ischemia.  She also had followup Lexiscans in 2019 and 2020 and most recently in October 2022 for similar symptoms.     Louise has had problems with hypotension and orthostasis for which we had to back off on her medications, decreasing her atenolol from 25 b.i.d. to 25 once a day and decreasing her amlodipine to 2.5 mg daily.     Kiko returns to clinic today stating she has had chest pressure and chest tightness since Sunday.  It does radiate to her left arm it leaves her feeling quite weak it also radiates to her neck.  She took 2 nitro on Sunday with some improvement.  She states it worsens with exercise and activity.  She tried to go to the ER earlier this week but states the parking lot was too full and too busy and she decided not to go and wait for today's appointment.  She comes in today and states she still has discomfort that gets worse with activity.     Assessment and plan.  Kiko has a chest discomfort that could be angina.  She has had this multiple times in the past, occasionally it has required intervention but most the time it has resulted in negative stress tests or nonobstructive coronary disease by angiography.  I will check an EKG today.  No matter what it shows I will recommend going  to the ER for further evaluation as it is already 230 on a Friday and I cannot check troponins in a timely fashion.     Blood pressure is high today although she is having chest discomfort.     Last fasting lipid profile was from October with a cholesterol of 145, HDL 66, LDL 67 and triglycerides of 61.     Her most recent INRs are subtherapeutic at 1.6.     Further evaluation and treatment will depend upon ER evaluation.     Thank you for allow me to participate in this patient's care.  Sincerely,                                Nikolai Amaya MD [facility]        Nuclear stress test on 10/13/22:     The nuclear stress test is probably negative for inducible myocardial ischemia or infarction.     Left ventricular function is normal.     The left ventricular ejection fraction at stress is 73%.     A prior study was conducted on 10/21/2020.  This study has no change when compared with the prior study.    ROS:  Review of Systems   Constitutional: Negative for chills and fever.   Respiratory: Positive for shortness of breath. Negative for cough.    Cardiovascular: Positive for chest pain. Negative for leg swelling.   Gastrointestinal: Negative for nausea and vomiting.   All other systems reviewed and are negative.    Allergies:  Atorvastatin  Cats  Gluten  Oat  Shellfish Allergy  Wheat     Medications:    Norvasc   ASA 81 mg   Tenormin   Voltaren   Synthroid/Levothroid   Zestril   Nitrostat   Crestor   Coumadin   Lexapro     Past Medical History:    CAD (coronary artery disease)   Cerebral artery occlusion with cerebral infarction   CVA (cerebral infarction)   DDD (degenerative disc disease)   Essential hypertension, benign   GERD (gastroesophageal reflux disease)  Hyperlipidemia  Hypothyroidism   Lumbar spinal stenosis   Mitral regurgitation  Vertebral artery stenosis, right     Past Surgical History:    Left total knee arthroplasty   Coronary angiography adult order  Decompression fusion lumbar posterior three+ levels,  combined  Endoscopic stripping vein (s)   Angioplasty  Hysterectomy   Tonsillectomy      Family History:    family history includes Alcohol/Drug in her brother; C.A.D. in her father; Coronary Artery Disease in her father; Diabetes in her father and son; Neurologic Disorder in her mother; Ovarian Cancer in her mother; Thyroid Disease in her mother and son.    Social History:  reports that she quit smoking about 58 years ago. Her smoking use included cigarettes. She smoked an average of .1 packs per day. She has never used smokeless tobacco. She reports current alcohol use. She reports that she does not use drugs.  Patient presents to the ED with spouse via private vehicle.   PCP: Surya Dyer     Physical Exam     Patient Vitals for the past 24 hrs:   BP Temp Temp src Pulse Resp SpO2   04/14/23 1907 -- -- -- 61 -- 97 %   04/14/23 1906 -- -- -- -- -- 98 %   04/14/23 1905 -- -- -- 60 -- 99 %   04/14/23 1904 -- -- -- 59 -- 98 %   04/14/23 1903 -- -- -- 59 -- 98 %   04/14/23 1902 -- -- -- 60 -- 98 %   04/14/23 1901 -- -- -- 61 -- --   04/14/23 1900 (!) 167/71 -- -- 60 -- 99 %   04/14/23 1830 (!) 154/76 -- -- 62 -- 99 %   04/14/23 1815 (!) 165/66 -- -- 77 -- 95 %   04/14/23 1800 (!) 152/65 -- -- 64 -- 99 %   04/14/23 1745 (!) 162/72 -- -- 63 -- --   04/14/23 1730 (!) 144/65 -- -- 60 -- --   04/14/23 1715 (!) 144/62 -- -- 59 -- --   04/14/23 1700 (!) 156/70 -- -- -- -- --   04/14/23 1646 -- -- -- 66 -- 99 %   04/14/23 1645 (!) 141/71 -- -- 68 -- 100 %   04/14/23 1600 (!) 168/77 -- -- 64 -- 100 %   04/14/23 1515 (!) 165/86 97.3  F (36.3  C) Temporal 89 18 100 %      Physical Exam  Constitutional: Pleasant. Cooperative.   Eyes: Pupils equally round and reactive  HENT: Head is normal in appearance. Oropharynx is normal with moist mucus membranes.  Cardiovascular: Regular rate and rhythm and without murmurs.  Respiratory: Normal respiratory effort, lungs are clear bilaterally.  GI: Abdomen is soft, non-tender,  non-distended. No guarding, rebound, or rigidity.  Musculoskeletal: No asymmetry of the lower extremities, no tenderness to palpation.   Skin: Normal, without rash.  Neurologic: Cranial nerves grossly intact, normal cognition, no focal deficits. Alert and oriented x 3.   Psychiatric: Normal affect.  Nursing notes and vital signs reviewed.    Emergency Department Course     ECG  ECG taken at 1522, ECG read at 1624   Sinus rhythm with premature atrial complexes   No significant changes as compared to prior, dated 12/15/2022.  Rate 72 bpm. MT interval 176 ms. QRS duration 96 ms. QT/QTc 402/440 ms. P-R-T axes 79 -14 36.     Imaging:  CT Chest Pulmonary Embolism w Contrast   Final Result   IMPRESSION:   No pulmonary embolism or other acute finding in the chest.         Report per radiology    Laboratory:  Labs Ordered and Resulted from Time of ED Arrival to Time of ED Departure   BASIC METABOLIC PANEL - Abnormal       Result Value    Sodium 130 (*)     Potassium 4.3      Chloride 93 (*)     Carbon Dioxide (CO2) 26      Anion Gap 11      Urea Nitrogen 15.5      Creatinine 0.63      Calcium 9.4      Glucose 140 (*)     GFR Estimate 89     D DIMER QUANTITATIVE - Abnormal    D-Dimer Quantitative 0.82 (*)    INR - Abnormal    INR 2.02 (*)    TROPONIN T, HIGH SENSITIVITY - Normal    Troponin T, High Sensitivity 10     TSH WITH FREE T4 REFLEX - Normal    TSH 1.72     TROPONIN T, HIGH SENSITIVITY - Normal    Troponin T, High Sensitivity 9     CBC WITH PLATELETS AND DIFFERENTIAL    WBC Count 6.6      RBC Count 4.26      Hemoglobin 12.4      Hematocrit 37.7      MCV 89      MCH 29.1      MCHC 32.9      RDW 14.7      Platelet Count 210      % Neutrophils 62      % Lymphocytes 27      % Monocytes 9      % Eosinophils 1      % Basophils 1      % Immature Granulocytes 0      NRBCs per 100 WBC 0      Absolute Neutrophils 4.1      Absolute Lymphocytes 1.8      Absolute Monocytes 0.6      Absolute Eosinophils 0.1      Absolute Basophils  0.1      Absolute Immature Granulocytes 0.0      Absolute NRBCs 0.0        Emergency Department Course & Assessments:     Interventions:  Medications   nitroGLYcerin (NITROSTAT) sublingual tablet 0.4 mg (0.4 mg Sublingual $Given 4/14/23 1640)   iopamidol (ISOVUE-370) solution 500 mL (53 mLs Intravenous $Given 4/14/23 1631)   CT Scan Flush (74 mLs Intravenous $Given 4/14/23 1631)        Assessments:  1614 I obtained patient's history and examined patient as noted above.   1650 I rechecked patient. She states pain nearly completely improved.     Independent Interpretation (X-rays, CTs, rhythm strip):  None    Consultations/Discussion of Management or Tests:  1713 I spoke with Dr. Faulkner regarding the patient  1820 I spoke with cardiology regarding the patient.    Social Determinants of Health affecting care:   None    Disposition:  The patient was discharged to home.     Impression & Plan      Medical Decision Making:  Salome Saeed is a 83 year old female with a history of CAD, stents x2, who presents to the ED for evaluation of chest pain.  Pain has been ongoing for the past few weeks, however worsening in terms of duration, wearing it has become almost constant today.  Patient seen in cardiology clinic earlier today and sent to the ED for further evaluation.  See HPI as above for additional details.  Vitals and physical exam as above.  Differential is broad and included ACS, dissection, PE, pneumothorax, GERD, MSK, pneumonia, pericarditis.  Greatest concern at this time for angina, possibly development of unstable angina given description of symptoms worsening.  Initial troponin is negative.  CT of the chest is negative as well.  Lower suspicion for remainder of differential at this time based upon above work-up.  Discussed with patient my concerns and plan for admission for further monitoring and evaluation by cardiology.  While page was out for cardiology, patient told nursing staff that she would not be  staying in the hospital.  I went to discuss this with the patient, who again reiterated that she would not be staying in the hospital.  We discussed my concern for discharge, and patient noted understanding of my concerns and possible risks associated with this.  While awaiting delta troponin with plan for discharge to home, cardiology did return page. Patient's intention of discharge to home was discussed and patient to call Dr. Amaya on Monday. Delta troponin returns negative. Strong concern for possible unstable angina remains. Discussed very close return precautions with any concerns. All questions answered. Patient discharged to home.    Diagnosis:    ICD-10-CM    1. Chest pain  R07.9       2. Dyspnea  R06.00       3. Hx of coronary artery disease  Z86.79          Scribe Disclosure:  I, Rubia Grullon, am serving as a scribe at 4:05 PM on 4/14/2023 to document services personally performed by Joni Cerda PA-C based on my observations and the provider's statements to me.     4/14/2023   Joni Cerda PA-C     This record was created at least in part using electronic voice recognition software, so please excuse any typographical errors.       Joni Cerda PA-C  04/14/23 2285

## 2023-04-14 NOTE — ED TRIAGE NOTES
Midsternal CP x 1 week. Constant. C/o SOB. Sent from Cardiology for blood work. H/o stents. Does report taking nitroglycerin this weekend due to chest pain, with relief. VSS. ABCs intact. A/Ox4.

## 2023-04-14 NOTE — PHARMACY-ADMISSION MEDICATION HISTORY
Pharmacist Admission Medication History    Admission medication history is complete. The information provided in this note is only as accurate as the sources available at the time of the update.    Medication reconciliation/reorder completed by provider prior to medication history? No    Information Source(s): Patient via in-person    Pertinent Information: none    Changes made to PTA medication list:    Added: None    Deleted: None    Changed: voltaren, eye gtts, amlodipine, warfarin    Medication Affordability: no issues       Allergies reviewed with patient and updates made in EHR: yes    Medication History Completed By: Barney Browning [facility] 4/14/2023 5:55 PM    PTA Med List   Medication Sig Last Dose     amLODIPine (NORVASC) 2.5 MG tablet Take 1 tablet (2.5 mg) by mouth daily (Patient taking differently: Take 5 mg by mouth daily) 4/14/2023 at am     aspirin EC 81 MG tablet Take 1 tablet (81 mg) by mouth daily 4/14/2023 at am     atenolol (TENORMIN) 25 MG tablet Take 1 tablet (25 mg) by mouth daily 4/14/2023 at am     Calcium Carb-Cholecalciferol (CALCIUM 600 + D PO) Take 1,200 mg by mouth daily  Past Week     Cholecalciferol (VITAMIN D) 2000 UNITS tablet Take 2,000 Units by mouth daily  Past Week     Coenzyme Q10 (COQ-10) 200 MG CAPS Take 400 mg by mouth daily  Past Week     diclofenac (VOLTAREN) 1 % topical gel Apply 4 g topically 4 times daily (Patient taking differently: Apply 4 g topically 4 times daily as needed for moderate pain)      gatifloxacin (ZYMAXID) 0.5 % ophthalmic solution Place 1 drop into both eyes daily 4/14/2023     levothyroxine (SYNTHROID/LEVOTHROID) 50 MCG tablet Take 1 tablet (50 mcg) by mouth daily 4/13/2023 at pm     lisinopril (ZESTRIL) 20 MG tablet Take 1 tablet (20 mg) by mouth daily 4/14/2023     Magnesium 400 MG CAPS Take 400 mg by mouth daily Past Week     Multiple Vitamin (DAILY MULTIVITAMIN PO) Take by mouth daily Past Week     nitroGLYcerin (NITROSTAT) 0.4 MG sublingual tablet  Place 1 tablet (0.4 mg) under the tongue every 5 minutes as needed for chest pain      prednisoLONE acetate (PRED FORTE) 1 % ophthalmic suspension Place 1 drop into both eyes daily 4/14/2023     rosuvastatin (CRESTOR) 40 MG tablet Take 1 tablet (40 mg) by mouth daily 4/13/2023 at pm     vitamin B complex with vitamin C (STRESS TAB) tablet Take 1 tablet by mouth daily  Past Week     vitamin C (ASCORBIC ACID) 1000 MG TABS Take 2,000 mg by mouth daily Past Week     warfarin ANTICOAGULANT (COUMADIN) 5 MG tablet Take 5mg every Monday / Take 2.5mg all other days OR per INR clinic (Patient taking differently: Take 5mg every M, W,F and 2.5mg all other days OR per INR clinic) 4/13/2023 at pm

## 2023-04-14 NOTE — LETTER
4/14/2023    Surya Dyer, DO  [address] [facility] [address]    RE: Salome Vierasiomara       Dear Colleague,     I had the pleasure of seeing Salome Saeed in the [facility].  HPI and Plan:     Louise is a very nice 83-year-old woman with past medical history significant for unstable angina leading to 2 Xience drug-eluting stents placed in her left anterior descending in 2009.  She has hypercholesterolemia, hypertension, hypothyroidism, back pain with multiple surgeries by Dr. Daniel Harris, a strong family history of coronary disease, Parkinson disease, cerebrovascular accident in 2013 manifesting as balance issues.  This was thought to be an embolic event from her vertebral artery for which she has been on warfarin ever since.     Additional coronary history includes chest discomfort in 2012 with angiogram demonstrating no change in her anatomy.  She had moderate disease in posterior descending artery, ostial pinch in her diagonal and obtuse marginal that I recommended medical management.  Repeat angiography in 2015 with classic anginal symptoms showed no change in her anatomy and we continued medical management.  One month later, she was back in the emergency room.  I took her back to the Cath Lab where I stented a small obtuse marginal branch, placing a 2.25 x 8 mm Promus drug-eluting stent in the ostium of the obtuse marginal.  She had problems with the radial approach with ecchymoses and pain but this resolved over time.  In 2017, after several back surgeries by Dr. Daniel Harris, she was having chest discomfort with a stress nuclear scan that demonstrated no evidence of ischemia.  She also had followup Lexiscans in 2019 and 2020 and most recently in October 2022 for similar symptoms.     Louise has had problems with hypotension and orthostasis for which we had to back off on her medications, decreasing her atenolol from 25 b.i.d. to 25 once a day and decreasing her  amlodipine to 2.5 mg daily.     Kiko returns to clinic today stating she has had chest pressure and chest tightness since Sunday.  It does radiate to her left arm it leaves her feeling quite weak it also radiates to her neck.  She took 2 nitro on Sunday with some improvement.  She states it worsens with exercise and activity.  She tried to go to the ER earlier this week but states the parking lot was too full and too busy and she decided not to go and wait for today's appointment.  She comes in today and states she still has discomfort that gets worse with activity.     Assessment and plan.  Kiko has a chest discomfort that could be angina.  She has had this multiple times in the past, occasionally it has required intervention but most the time it has resulted in negative stress tests or nonobstructive coronary disease by angiography.  I will check an EKG today.  No matter what it shows I will recommend going to the ER for further evaluation as it is already 230 on a Friday and I cannot check troponins in a timely fashion.    Blood pressure is high today although she is having chest discomfort.    Last fasting lipid profile was from October with a cholesterol of 145, HDL 66, LDL 67 and triglycerides of 61.    Her most recent INRs are subtherapeutic at 1.6.    Further evaluation and treatment will depend upon ER evaluation.    Thank you for allow me to participate in this patient's care.  Sincerely,                               Nikolai Amaya MD [facility]    Addendum.  EKG is normal sinus rhythm and a normal EKG.  I have discussed with [facility] ER and will send her over.    Today's clinic visit entailed:  Review of the result(s) of each unique test - EKG, lab work  The following tests were independently interpreted by me as noted in my documentation: EKG  Ordering of each unique test  Prescription drug management  20 minutes spent by me on the date of the encounter doing chart review, history and exam, documentation  and further activities per the note  Provider  Link to MDM Help Grid     The level of medical decision making during this visit was of high complexity.      No orders of the defined types were placed in this encounter.      No orders of the defined types were placed in this encounter.      There are no discontinued medications.      Encounter Diagnoses   Name Primary?    Coronary artery disease of native artery of native heart with stable angina pectoris (H) Yes    Benign essential hypertension     Hyperlipidemia LDL goal <70     Chest discomfort     WILSON (dyspnea on exertion)     Long term current use of anticoagulant therapy     Impaired fasting glucose        CURRENT MEDICATIONS:  Current Outpatient Medications   Medication Sig Dispense Refill    amLODIPine (NORVASC) 2.5 MG tablet Take 1 tablet (2.5 mg) by mouth daily 90 tablet 1    aspirin EC 81 MG tablet Take 1 tablet (81 mg) by mouth daily      atenolol (TENORMIN) 25 MG tablet Take 1 tablet (25 mg) by mouth daily 90 tablet 3    Calcium Carb-Cholecalciferol (CALCIUM 600 + D PO) Take 1,200 mg by mouth daily       Cholecalciferol (VITAMIN D) 2000 UNITS tablet Take 2,000 Units by mouth daily       Coenzyme Q10 (COQ-10) 200 MG CAPS Take 400 mg by mouth daily       diclofenac (VOLTAREN) 1 % topical gel Apply 4 g topically 4 times daily 350 g 3    escitalopram (LEXAPRO) 5 MG tablet Take 5 mg by mouth daily      gatifloxacin (ZYMAXID) 0.5 % ophthalmic solution daily as needed       ketorolac (ACULAR) 0.5 % ophthalmic solution       levothyroxine (SYNTHROID/LEVOTHROID) 50 MCG tablet Take 1 tablet (50 mcg) by mouth daily 90 tablet 0    lisinopril (ZESTRIL) 20 MG tablet Take 1 tablet (20 mg) by mouth daily 90 tablet 3    Magnesium 400 MG CAPS Take 400 mg by mouth daily      Multiple Vitamin (DAILY MULTIVITAMIN PO) Take by mouth daily      nitroGLYcerin (NITROSTAT) 0.4 MG sublingual tablet Place 1 tablet (0.4 mg) under the tongue every 5 minutes as needed for chest pain  25 tablet 2    prednisoLONE acetate (PRED FORTE) 1 % ophthalmic suspension daily       rosuvastatin (CRESTOR) 40 MG tablet Take 1 tablet (40 mg) by mouth daily 90 tablet 4    vitamin B complex with vitamin C (STRESS TAB) tablet Take 1 tablet by mouth daily       vitamin C (ASCORBIC ACID) 1000 MG TABS Take 2,000 mg by mouth daily      warfarin ANTICOAGULANT (COUMADIN) 5 MG tablet Take 5mg every Monday / Take 2.5mg all other days OR per INR clinic 50 tablet 1       ALLERGIES     Allergies   Allergen Reactions    Atorvastatin      Leg cramps    Cats Itching    Gluten      Sinuses affected by gluten    Oat     Shellfish Allergy      hives    Wheat        PAST MEDICAL HISTORY:  Past Medical History:   Diagnosis Date    CAD (coronary artery disease) 04/09/2009 4/8/2009: PTCA and two 2.5x15mm Xience stents to mid LAD lesion; Cath 12/2012- 40-50% stenosis in mid PDA, 80% on D1- medically treat , 5/28/2015 - PTCA and 2.25x8mm Promus PREMIIER NAILA to ostium of 2nd OM    Cerebral artery occlusion with cerebral infarction 2012    CVA (cerebral infarction)     DDD (degenerative disc disease)     Essential hypertension, benign     GERD (gastroesophageal reflux disease)     Hyperlipidemia     Hypothyroidism     Lumbar spinal stenosis     Mitral regurgitation     1+ per 7/2013 Echo    Vertebral artery stenosis, right        PAST SURGICAL HISTORY:  Past Surgical History:   Procedure Laterality Date    ARTHROPLASTY KNEE Left 6/1/2020    Procedure: Left total knee arthroplasty (Ward and Nephew #5 narrow femur; #3 tibia; 9 mm tibial poly and 35 mm patella);  Surgeon: Jorge Luis Cheatham MD;  Location: [facility] OR    CORONARY ANGIOGRAPHY ADULT ORDER  12/6/2012    40-50% stenosis to mid PDA, 80% in D1- medically treat    CORONARY ANGIOGRAPHY ADULT ORDER  5/28/2015    PTCA and 2.25x8mm Promus PREMIER NAILA to ostium of 2nd OM    DECOMPRESSION, FUSION LUMBAR POSTERIOR THREE + LEVELS, COMBINED  5/8/2013    Procedure: COMBINED DECOMPRESSION, FUSION  LUMBAR POSTERIOR THREE + LEVELS;  Posterior Lumbar Decompression L3-S1, Fusion L4-S1;  Surgeon: Daniel Harris MD;  Location: [facility] OR    ENDOSCOPIC STRIPPING VEIN(S)      HEART CATH, ANGIOPLASTY  2009    PTCA and two 2.5x15mm Xience stents to mid LAD lesion    HYSTERECTOMY, RICKIE      Fibroids, and Menorrhagia.. Bilateral Oophrectomy    ORTHOPEDIC SURGERY      Stenting of LAD, Angioplasty  09    TONSILLECTOMY      as a child       FAMILY HISTORY:  Family History   Problem Relation Age of Onset    Neurologic Disorder Mother         Dementia, Still living    Ovarian Cancer Mother     Thyroid Disease Mother     C.A.D. Father             Diabetes Father     Coronary Artery Disease Father     Alcohol/Drug Brother     Thyroid Disease Son     Diabetes Son        SOCIAL HISTORY:  Social History     Socioeconomic History    Marital status:      Spouse name: None    Number of children: None    Years of education: None    Highest education level: None   Tobacco Use    Smoking status: Former     Packs/day: 0.10     Types: Cigarettes     Quit date: 1965     Years since quittin.3    Smokeless tobacco: Never   Substance and Sexual Activity    Alcohol use: Yes     Alcohol/week: 0.0 standard drinks of alcohol     Comment: 2 glasses wine per month, maybe    Drug use: No    Sexual activity: Not Currently   Other Topics Concern    Blood Transfusions No    Caffeine Concern Yes     Comment: 5 cups caffeine per day    Sleep Concern No    Stress Concern No    Weight Concern No     Comment: weight stable    Special Diet No     Comment: trying to eat healthier    Exercise Yes     Comment: stretching    Seat Belt Yes    Self-Exams Yes    Parent/sibling w/ CABG, MI or angioplasty before 65F 55M? Yes   Social History Narrative    Works in an office       Review of Systems:  Skin:  not assessed       Eyes:  not assessed      ENT:  not assessed      Respiratory:  Positive for shortness of breath;dyspnea on  "exertion;cough \"occ tickle\"   Cardiovascular:    chest pain;Positive for;lightheadedness;dizziness;syncope or near-syncope;fatigue chest pain last sunday (rates 7 in pain)\"near syncope and vertigo occ:gets sleepy more  Gastroenterology: not assessed      Genitourinary:  not assessed      Musculoskeletal:  not assessed      Neurologic:  not assessed      Psychiatric:  not assessed      Heme/Lymph/Imm:  not assessed      Endocrine:  not assessed        Physical Exam:  Vitals: BP (!) 155/71 (BP Location: Right arm, Patient Position: Sitting, Cuff Size: Adult Regular)   Pulse 64   Ht 1.6 m (5' 3\")   Wt 61.1 kg (134 lb 9.6 oz)   LMP  (LMP Unknown)   SpO2 98%   BMI 23.84 kg/m      Constitutional:           Skin:             Head:           Eyes:           Lymph:      ENT:           Neck:           Respiratory:            Cardiac:                                                           GI:           Extremities and Muscular Skeletal:                 Neurological:           Psych:           CC  Nikolai Amaya MD  [address]  [place] [address]    Thank you for allowing me to participate in the care of your patient.      Sincerely,     Nikolai Amaya MD     [facility]  "

## 2023-04-14 NOTE — PROGRESS NOTES
HPI and Plan:     Louise is a very nice 83-year-old woman with past medical history significant for unstable angina leading to 2 Xience drug-eluting stents placed in her left anterior descending in 2009.  She has hypercholesterolemia, hypertension, hypothyroidism, back pain with multiple surgeries by Dr. Daniel Harris, a strong family history of coronary disease, Parkinson disease, cerebrovascular accident in 2013 manifesting as balance issues.  This was thought to be an embolic event from her vertebral artery for which she has been on warfarin ever since.     Additional coronary history includes chest discomfort in 2012 with angiogram demonstrating no change in her anatomy.  She had moderate disease in posterior descending artery, ostial pinch in her diagonal and obtuse marginal that I recommended medical management.  Repeat angiography in 2015 with classic anginal symptoms showed no change in her anatomy and we continued medical management.  One month later, she was back in the emergency room.  I took her back to the Cath Lab where I stented a small obtuse marginal branch, placing a 2.25 x 8 mm Promus drug-eluting stent in the ostium of the obtuse marginal.  She had problems with the radial approach with ecchymoses and pain but this resolved over time.  In 2017, after several back surgeries by Dr. Daniel Harris, she was having chest discomfort with a stress nuclear scan that demonstrated no evidence of ischemia.  She also had followup Lexiscans in 2019 and 2020 and most recently in October 2022 for similar symptoms.     Louise has had problems with hypotension and orthostasis for which we had to back off on her medications, decreasing her atenolol from 25 b.i.d. to 25 once a day and decreasing her amlodipine to 2.5 mg daily.     Kiko returns to clinic today stating she has had chest pressure and chest tightness since Sunday.  It does radiate to her left arm it leaves her feeling quite weak it also radiates to her neck.   She took 2 nitro on Sunday with some improvement.  She states it worsens with exercise and activity.  She tried to go to the ER earlier this week but states the parking lot was too full and too busy and she decided not to go and wait for today's appointment.  She comes in today and states she still has discomfort that gets worse with activity.     Assessment and plan.  Kiko has a chest discomfort that could be angina.  She has had this multiple times in the past, occasionally it has required intervention but most the time it has resulted in negative stress tests or nonobstructive coronary disease by angiography.  I will check an EKG today.  No matter what it shows I will recommend going to the ER for further evaluation as it is already 230 on a Friday and I cannot check troponins in a timely fashion.    Blood pressure is high today although she is having chest discomfort.    Last fasting lipid profile was from October with a cholesterol of 145, HDL 66, LDL 67 and triglycerides of 61.    Her most recent INRs are subtherapeutic at 1.6.    Further evaluation and treatment will depend upon ER evaluation.    Thank you for allow me to participate in this patient's care.  Sincerely,                               Nikolai Amaya MD FAC    Addendum.  EKG is normal sinus rhythm and a normal EKG.  I have discussed with [facility] and will send her over.    Today's clinic visit entailed:  Review of the result(s) of each unique test - EKG, lab work  The following tests were independently interpreted by me as noted in my documentation: EKG  Ordering of each unique test  Prescription drug management  20 minutes spent by me on the date of the encounter doing chart review, history and exam, documentation and further activities per the note  Provider  Link to [facility] Help Grid     The level of medical decision making during this visit was of high complexity.      No orders of the defined types were placed in this encounter.      No  orders of the defined types were placed in this encounter.      There are no discontinued medications.      Encounter Diagnoses   Name Primary?     Coronary artery disease of native artery of native heart with stable angina pectoris (H) Yes     Benign essential hypertension      Hyperlipidemia LDL goal <70      Chest discomfort      WILSON (dyspnea on exertion)      Long term current use of anticoagulant therapy      Impaired fasting glucose        CURRENT MEDICATIONS:  Current Outpatient Medications   Medication Sig Dispense Refill     amLODIPine (NORVASC) 2.5 MG tablet Take 1 tablet (2.5 mg) by mouth daily 90 tablet 1     aspirin EC 81 MG tablet Take 1 tablet (81 mg) by mouth daily       atenolol (TENORMIN) 25 MG tablet Take 1 tablet (25 mg) by mouth daily 90 tablet 3     Calcium Carb-Cholecalciferol (CALCIUM 600 + D PO) Take 1,200 mg by mouth daily        Cholecalciferol (VITAMIN D) 2000 UNITS tablet Take 2,000 Units by mouth daily        Coenzyme Q10 (COQ-10) 200 MG CAPS Take 400 mg by mouth daily        diclofenac (VOLTAREN) 1 % topical gel Apply 4 g topically 4 times daily 350 g 3     escitalopram (LEXAPRO) 5 MG tablet Take 5 mg by mouth daily       gatifloxacin (ZYMAXID) 0.5 % ophthalmic solution daily as needed        ketorolac (ACULAR) 0.5 % ophthalmic solution        levothyroxine (SYNTHROID/LEVOTHROID) 50 MCG tablet Take 1 tablet (50 mcg) by mouth daily 90 tablet 0     lisinopril (ZESTRIL) 20 MG tablet Take 1 tablet (20 mg) by mouth daily 90 tablet 3     Magnesium 400 MG CAPS Take 400 mg by mouth daily       Multiple Vitamin (DAILY MULTIVITAMIN PO) Take by mouth daily       nitroGLYcerin (NITROSTAT) 0.4 MG sublingual tablet Place 1 tablet (0.4 mg) under the tongue every 5 minutes as needed for chest pain 25 tablet 2     prednisoLONE acetate (PRED FORTE) 1 % ophthalmic suspension daily        rosuvastatin (CRESTOR) 40 MG tablet Take 1 tablet (40 mg) by mouth daily 90 tablet 4     vitamin B complex with  vitamin C (STRESS TAB) tablet Take 1 tablet by mouth daily        vitamin C (ASCORBIC ACID) 1000 MG TABS Take 2,000 mg by mouth daily       warfarin ANTICOAGULANT (COUMADIN) 5 MG tablet Take 5mg every Monday / Take 2.5mg all other days OR per INR clinic 50 tablet 1       ALLERGIES     Allergies   Allergen Reactions     Atorvastatin      Leg cramps     Cats Itching     Gluten      Sinuses affected by gluten     Oat      Shellfish Allergy      hives     Wheat        PAST MEDICAL HISTORY:  Past Medical History:   Diagnosis Date     CAD (coronary artery disease) 04/09/2009 4/8/2009: PTCA and two 2.5x15mm Xience stents to mid LAD lesion; Cath 12/2012- 40-50% stenosis in mid PDA, 80% on D1- medically treat , 5/28/2015 - PTCA and 2.25x8mm Promus PREMIIER NAILA to ostium of 2nd OM     Cerebral artery occlusion with cerebral infarction 2012     CVA (cerebral infarction)      DDD (degenerative disc disease)      Essential hypertension, benign      GERD (gastroesophageal reflux disease)      Hyperlipidemia      Hypothyroidism      Lumbar spinal stenosis      Mitral regurgitation     1+ per 7/2013 Echo     Vertebral artery stenosis, right        PAST SURGICAL HISTORY:  Past Surgical History:   Procedure Laterality Date     ARTHROPLASTY KNEE Left 6/1/2020    Procedure: Left total knee arthroplasty (Ward and NepheSNF #5 narrow femur; #3 tibia; 9 mm tibial poly and 35 mm patella);  Surgeon: Jorge Luis Cheatham MD;  Location: [facility] OR     CORONARY ANGIOGRAPHY ADULT ORDER  12/6/2012    40-50% stenosis to mid PDA, 80% in D1- medically treat     CORONARY ANGIOGRAPHY ADULT ORDER  5/28/2015    PTCA and 2.25x8mm Promus PREMIER NAILA to ostium of 2nd OM     DECOMPRESSION, FUSION LUMBAR POSTERIOR THREE + LEVELS, COMBINED  5/8/2013    Procedure: COMBINED DECOMPRESSION, FUSION LUMBAR POSTERIOR THREE + LEVELS;  Posterior Lumbar Decompression L3-S1, Fusion L4-S1;  Surgeon: Daniel Harris MD;  Location: [facility] OR     ENDOSCOPIC STRIPPING VEIN(S)    "    HEART CATH, ANGIOPLASTY  2009    PTCA and two 2.5x15mm Xience stents to mid LAD lesion     HYSTERECTOMY, RICKIE      Fibroids, and Menorrhagia.. Bilateral Oophrectomy     ORTHOPEDIC SURGERY       Stenting of LAD, Angioplasty  09     TONSILLECTOMY      as a child       FAMILY HISTORY:  Family History   Problem Relation Age of Onset     Neurologic Disorder Mother         Dementia, Still living     Ovarian Cancer Mother      Thyroid Disease Mother      C.A.D. Father              Diabetes Father      Coronary Artery Disease Father      Alcohol/Drug Brother      Thyroid Disease Son      Diabetes Son        SOCIAL HISTORY:  Social History     Socioeconomic History     Marital status:      Spouse name: None     Number of children: None     Years of education: None     Highest education level: None   Tobacco Use     Smoking status: Former     Packs/day: 0.10     Types: Cigarettes     Quit date: 1965     Years since quittin.3     Smokeless tobacco: Never   Substance and Sexual Activity     Alcohol use: Yes     Alcohol/week: 0.0 standard drinks of alcohol     Comment: 2 glasses wine per month, maybe     Drug use: No     Sexual activity: Not Currently   Other Topics Concern     Blood Transfusions No     Caffeine Concern Yes     Comment: 5 cups caffeine per day     Sleep Concern No     Stress Concern No     Weight Concern No     Comment: weight stable     Special Diet No     Comment: trying to eat healthier     Exercise Yes     Comment: stretching     Seat Belt Yes     Self-Exams Yes     Parent/sibling w/ CABG, MI or angioplasty before 65F 55M? Yes   Social History Narrative    Works in an office       Review of Systems:  Skin:  not assessed       Eyes:  not assessed      ENT:  not assessed      Respiratory:  Positive for shortness of breath;dyspnea on exertion;cough \"occ tickle\"   Cardiovascular:    chest pain;Positive for;lightheadedness;dizziness;syncope or near-syncope;fatigue chest pain " "last georges (rates 7 in pain)\"near syncope and vertigo occ:gets sleepy more  Gastroenterology: not assessed      Genitourinary:  not assessed      Musculoskeletal:  not assessed      Neurologic:  not assessed      Psychiatric:  not assessed      Heme/Lymph/Imm:  not assessed      Endocrine:  not assessed        Physical Exam:  Vitals: BP (!) 155/71 (BP Location: Right arm, Patient Position: Sitting, Cuff Size: Adult Regular)   Pulse 64   Ht 1.6 m (5' 3\")   Wt 61.1 kg (134 lb 9.6 oz)   LMP  (LMP Unknown)   SpO2 98%   BMI 23.84 kg/m      Constitutional:           Skin:             Head:           Eyes:           Lymph:      ENT:           Neck:           Respiratory:            Cardiac:                                                           GI:           Extremities and Muscular Skeletal:                 Neurological:           Psych:           CC  Nikolai Amaya MD  [address]  [place] [id number]              "

## 2023-04-15 NOTE — DISCHARGE INSTRUCTIONS
You have elected to go home. Continue to use nitroglycerin as prescribed when experiencing the chest pain. Call cardiology on Monday to discuss visit to the emergency department. Have a VERY low threshold to return to the emergency department with any worsening symptoms.

## 2023-04-17 ENCOUNTER — DOCUMENTATION ONLY (OUTPATIENT)
Dept: FAMILY MEDICINE | Facility: CLINIC | Age: 83
End: 2023-04-17
Payer: COMMERCIAL

## 2023-04-17 ENCOUNTER — TELEPHONE (OUTPATIENT)
Dept: CARDIOLOGY | Facility: CLINIC | Age: 83
End: 2023-04-17
Payer: COMMERCIAL

## 2023-04-17 DIAGNOSIS — I25.118 CORONARY ARTERY DISEASE OF NATIVE ARTERY OF NATIVE HEART WITH STABLE ANGINA PECTORIS (H): Primary | ICD-10-CM

## 2023-04-17 LAB
ATRIAL RATE - MUSE: 72 BPM
DIASTOLIC BLOOD PRESSURE - MUSE: NORMAL MMHG
INTERPRETATION ECG - MUSE: NORMAL
P AXIS - MUSE: 79 DEGREES
PR INTERVAL - MUSE: 176 MS
QRS DURATION - MUSE: 96 MS
QT - MUSE: 402 MS
QTC - MUSE: 440 MS
R AXIS - MUSE: -14 DEGREES
SYSTOLIC BLOOD PRESSURE - MUSE: NORMAL MMHG
T AXIS - MUSE: 36 DEGREES
VENTRICULAR RATE- MUSE: 72 BPM

## 2023-04-17 RX ORDER — ISOSORBIDE MONONITRATE 30 MG/1
TABLET, EXTENDED RELEASE ORAL
Qty: 45 TABLET | Refills: 4 | Status: SHIPPED | OUTPATIENT
Start: 2023-04-17 | End: 2023-05-18 | Stop reason: SINTOL

## 2023-04-17 NOTE — TELEPHONE ENCOUNTER
ED note 4/14/2023:  Greatest concern at this time for angina, possibly development of unstable angina given description of symptoms worsening.  Initial troponin is negative.  CT of the chest is negative as well.  Lower suspicion for remainder of differential at this time based upon above work-up.  Discussed with patient my concerns and plan for admission for further monitoring and evaluation by cardiology.  While page was out for cardiology, patient told nursing staff that she would not be staying in the hospital.  I went to discuss this with the patient, who again reiterated that she would not be staying in the hospital.  We discussed my concern for discharge, and patient noted understanding of my concerns and possible risks associated with this.  While awaiting delta troponin with plan for discharge to home, cardiology did return page. Patient's intention of discharge to home was discussed and patient to call Dr. Amaya on Monday. Delta troponin returns negative. Strong concern for possible unstable angina remains. Discussed very close return precautions with any concerns    D dimer=0.82  Yq=300    Dr. Amaya sent the patient to the ED on Friday for c/o unstable angina.    1300 called patient, she states she did not feel she would rest well in the hospital so decided to go home. She has not had any chest discomfort but overall does not feel week, fatigued and some SOB. She went out today to an eye appointment for a couple of hours. She is noticing some lightheadedness when standing.    She asks what she should do next.    Will message Dr. Amaya to review

## 2023-04-17 NOTE — TELEPHONE ENCOUNTER
Reviewed the order for imdur 15mg daily with pharmacy team. They will be monitoring the script for future titration as they do not recommend cutting the tablet.

## 2023-04-17 NOTE — TELEPHONE ENCOUNTER
Health Call Center    Phone Message    May a detailed message be left on voicemail: yes     Reason for Call: Medication Question or concern regarding medication   Prescription Clarification  Name of Medication: isosorbide mononitrate (IMDUR) 30 MG 24 hr tablet  Prescribing Provider: Dr. Amaya   Pharmacy:    [id number] [place] [address]       What on the order needs clarification? Charlene from [facility] called with questions on isosorbide mononitrate (IMDUR) 30 MG 24 hr tablet that was prescribed new to patient. Charlene stated this medication is on the Do Not Crush/Chew List and that it doesn't come in 15 MG. Wanting clarifications on if dosage needs to be changed or what could be done to be able to prescribe medication to patient. Please call back at [phone].           Action Taken: Other: Cardiology    Travel Screening: Not Applicable     Thank you!  [facility]

## 2023-04-17 NOTE — PROGRESS NOTES
ANTICOAGULATION  MANAGEMENT: Discharge Review    Salome Saeed chart reviewed for anticoagulation continuity of care    Emergency room visit on 4/14/23 for chest pain.    Discharge disposition: Home - advised to be admitted but patient electing to return home    Results:    Recent labs: (last 7 days)     04/11/23  1309 04/14/23  1540   INR 1.6* 2.02*     Anticoagulation inpatient management:     not applicable     Anticoagulation discharge instructions:     Warfarin dosing: home regimen continued   Bridging: No   INR goal change: No      Medication changes affecting anticoagulation: No    Additional factors affecting anticoagulation: No     PLAN     No adjustment to anticoagulation plan needed    Recommended follow up is scheduled  Patient not contacted    No adjustment to Anticoagulation Calendar was required    Neeru Martinez RN

## 2023-04-17 NOTE — TELEPHONE ENCOUNTER
[facility] Call Center    Phone Message    May a detailed message be left on voicemail: yes     Reason for Call: Other: Please call pt to discuss her plan of care going forward. She went to the ER on the 14th and is home currently. She doesn't know what steps Dr. Amaya wants to take      Action Taken: Message routed to:  Other: Cardiology    Travel Screening: Not Applicable     Thank you!  [facility]

## 2023-04-17 NOTE — TELEPHONE ENCOUNTER
Reply from Dr. Amaya:  Add imdur 15 mgs/day. We can offer Angiogram but will need RASHAD apt. Back to ER for prolonged chest pain.    1440 spoke with patient to review starting imdur and to plan RASHAD visit to consider angiogram. Patient verbalized understanding and agreed with plan. Rx escripted for Imdur. Order placed for RASHAD visit.

## 2023-04-21 ENCOUNTER — ANTICOAGULATION THERAPY VISIT (OUTPATIENT)
Dept: ANTICOAGULATION | Facility: CLINIC | Age: 83
End: 2023-04-21

## 2023-04-21 ENCOUNTER — LAB (OUTPATIENT)
Dept: LAB | Facility: CLINIC | Age: 83
End: 2023-04-21
Payer: COMMERCIAL

## 2023-04-21 DIAGNOSIS — I63.9 CEREBROVASCULAR ACCIDENT INVOLVING CEREBELLUM (H): ICD-10-CM

## 2023-04-21 DIAGNOSIS — Z79.01 LONG TERM CURRENT USE OF ANTICOAGULANT THERAPY: Primary | ICD-10-CM

## 2023-04-21 DIAGNOSIS — I25.10 CORONARY ARTERY DISEASE INVOLVING NATIVE CORONARY ARTERY OF NATIVE HEART WITHOUT ANGINA PECTORIS: Chronic | ICD-10-CM

## 2023-04-21 LAB — INR BLD: 2.2 (ref 0.9–1.1)

## 2023-04-21 PROCEDURE — 85610 PROTHROMBIN TIME: CPT

## 2023-04-21 PROCEDURE — 36416 COLLJ CAPILLARY BLOOD SPEC: CPT

## 2023-04-21 RX ORDER — ATENOLOL 25 MG/1
25 TABLET ORAL DAILY
Qty: 90 TABLET | Refills: 1 | Status: SHIPPED | OUTPATIENT
Start: 2023-04-21 | End: 2023-05-22
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
## 2023-04-26 ENCOUNTER — TELEPHONE (OUTPATIENT)
Dept: ANTICOAGULATION | Facility: CLINIC | Age: 83
End: 2023-04-26

## 2023-04-26 ENCOUNTER — OFFICE VISIT (OUTPATIENT)
Dept: CARDIOLOGY | Facility: CLINIC | Age: 83
End: 2023-04-26
Payer: COMMERCIAL

## 2023-04-26 VITALS
DIASTOLIC BLOOD PRESSURE: 65 MMHG | HEART RATE: 71 BPM | RESPIRATION RATE: 17 BRPM | BODY MASS INDEX: 23.39 KG/M2 | HEIGHT: 63 IN | SYSTOLIC BLOOD PRESSURE: 135 MMHG | OXYGEN SATURATION: 100 % | WEIGHT: 132 LBS

## 2023-04-26 DIAGNOSIS — I10 BENIGN ESSENTIAL HYPERTENSION: Chronic | ICD-10-CM

## 2023-04-26 DIAGNOSIS — Z79.01 LONG TERM CURRENT USE OF ANTICOAGULANT THERAPY: Primary | ICD-10-CM

## 2023-04-26 DIAGNOSIS — I63.9 CEREBROVASCULAR ACCIDENT INVOLVING CEREBELLUM (H): ICD-10-CM

## 2023-04-26 DIAGNOSIS — R73.01 IMPAIRED FASTING GLUCOSE: ICD-10-CM

## 2023-04-26 DIAGNOSIS — E78.5 HYPERLIPIDEMIA LDL GOAL <70: ICD-10-CM

## 2023-04-26 DIAGNOSIS — I25.110 CORONARY ARTERY DISEASE INVOLVING NATIVE CORONARY ARTERY OF NATIVE HEART WITH UNSTABLE ANGINA PECTORIS (H): Primary | ICD-10-CM

## 2023-04-26 DIAGNOSIS — Z79.01 LONG TERM CURRENT USE OF ANTICOAGULANT THERAPY: ICD-10-CM

## 2023-04-26 PROCEDURE — 99214 OFFICE O/P EST MOD 30 MIN: CPT | Performed by: INTERNAL MEDICINE

## 2023-04-26 NOTE — TELEPHONE ENCOUNTER
----- Message from Gordy Mcginnis RN sent at 4/26/2023  1:11 PM CDT -----  Regarding: FW: FYI- Angiogram scheduled 5/5/23, pt to hold warfarin x5 days prior    ----- Message -----  From: Deja Werner RN  Sent: 4/26/2023   1:10 PM CDT  To: Donna [place]  Subject: FYI- Angiogram scheduled 5/5/23, pt to hold #    Please forward this to the correct pool if needed. Thank you!

## 2023-04-26 NOTE — LETTER
4/26/2023    Surya Dyer, DO  [address] [facility] [address]    RE: Salome SPRAGUE Darlin       Dear Colleague,     I had the pleasure of seeing Salome Saeed in the [facility].  HPI and Plan:   Loreta is a very nice 83-year-old woman with past medical history significant for unstable angina leading to 2 Xience drug-eluting stents placed in her left anterior descending in 2009.  She has hypercholesterolemia, hypertension, hypothyroidism, back pain with multiple surgeries by Dr. Daniel Harris, a strong family history of coronary disease, Parkinson disease, cerebrovascular accident in 2013 manifesting as balance issues.  This was thought to be an embolic event from her vertebral artery for which she has been on warfarin ever since.     Additional coronary history includes chest discomfort in 2012 with angiogram demonstrating no change in her anatomy.  She had moderate disease in posterior descending artery, ostial pinch in her diagonal and obtuse marginal that I recommended medical management.      Repeat angiography in 2015 with classic anginal symptoms showed no change in her anatomy and we continued medical management.  One month later, she was back in the emergency room.  I took her back to the Cath Lab where I stented a small obtuse marginal branch, placing a 2.25 x 8 mm Promus drug-eluting stent in the ostium of the obtuse marginal.  She had problems with the radial approach with ecchymoses and pain but this resolved over time.      In 2017, after several back surgeries by Dr. Daniel Harris, she was having chest discomfort with a stress nuclear scan that demonstrated no evidence of ischemia.  She also had followup Lexiscans in 2019 and 2020 and  in October 2022 for similar symptoms.     Loreta has had problems with hypotension and orthostasis for which we had to back off on her medications, decreasing her atenolol from 25 b.i.d. to 25 once a day and decreasing her amlodipine to  2.5 mg daily.     I saw Kiko a little over a week ago and she has had chest pressure and chest tightness lasting several days.  It does radiate to her left arm it leaves her feeling quite weak it also radiates to her neck.  She took 2 nitro with some improvement.  She states it worsens with exercise and activity.  She tried to go to the ER but stated the parking lot was too full and too busy and she decided not to go and wait for today's appointment.  When I saw her on the 14th I sent her to the ER where her troponins were negative, CT benign.  She was offered admission but refused and preferred an outpatient evaluation    Loreta returns to clinic and continues to have symptom as described above she states they are worse with activity.  I had started Imdur 15 mg daily and she does not think this is helped.     Assessment and plan.  Loreta has a chest discomfort that could be angina.  She has had this multiple times in the past, occasionally it has required intervention but most the time it has resulted in negative stress tests or nonobstructive coronary disease by angiography.  Recent ER evaluation was negative.  Last stress nuclear scan October appeared to be benign.  We discussed options at this time we will pursue coronary angiography and possible interventions.  I discussed risk, benefits and alternatives with the patient.  She appears understand desires to proceed.  She is on warfarin which we will have to hold for 5 days.  She is also on aspirin which we will continue.  Further evaluation treatment will depend upon the above results.    Blood pressure is 135/65 with a pulse of 71.     Last fasting lipid profile was from October with a cholesterol of 145, HDL 66, LDL 67 and triglycerides of 61.     Her most recent INRs i 2.02.     Thank you for allow me to participate in this patient's care.  Sincerely,                                Nikolai Amaya MD [facility]    Today's clinic visit entailed:  Review of external  notes as documented elsewhere in note  Review of the result(s) of each unique test - Lab work, ER records  Ordering of each unique test  Prescription drug management  37 minutes spent by me on the date of the encounter doing chart review, history and exam, documentation and further activities per the note  Provider  Link to MDM Help Grid     The level of medical decision making during this visit was of moderate complexity.      Orders Placed This Encounter   Procedures    Case Request Cath Lab: Coronary Angiogram, Left Heart Catheterization, Percutaneous Coronary Intervention Stent       No orders of the defined types were placed in this encounter.      There are no discontinued medications.      Encounter Diagnoses   Name Primary?    Coronary artery disease involving native coronary artery of native heart with unstable angina pectoris (H) Yes    Hyperlipidemia LDL goal <70     Benign essential hypertension     Long term current use of anticoagulant therapy     Impaired fasting glucose        CURRENT MEDICATIONS:  Current Outpatient Medications   Medication Sig Dispense Refill    amLODIPine (NORVASC) 2.5 MG tablet Take 1 tablet (2.5 mg) by mouth daily (Patient taking differently: Take 5 mg by mouth daily) 90 tablet 1    aspirin EC 81 MG tablet Take 1 tablet (81 mg) by mouth daily      atenolol (TENORMIN) 25 MG tablet Take 1 tablet (25 mg) by mouth daily 90 tablet 1    Calcium Carb-Cholecalciferol (CALCIUM 600 + D PO) Take 1,200 mg by mouth daily       Cholecalciferol (VITAMIN D) 2000 UNITS tablet Take 2,000 Units by mouth daily       Coenzyme Q10 (COQ-10) 200 MG CAPS Take 400 mg by mouth daily       diclofenac (VOLTAREN) 1 % topical gel Apply 4 g topically 4 times daily (Patient taking differently: Apply 4 g topically 4 times daily as needed for moderate pain) 350 g 3    escitalopram (LEXAPRO) 5 MG tablet Take 5 mg by mouth daily as needed      gatifloxacin (ZYMAXID) 0.5 % ophthalmic solution Place 1 drop into both  eyes daily      isosorbide mononitrate (IMDUR) 30 MG 24 hr tablet Take 15mg (1/2 tab) every morning 45 tablet 4    ketorolac (ACULAR) 0.5 % ophthalmic solution Place 1 drop into both eyes daily      levothyroxine (SYNTHROID/LEVOTHROID) 50 MCG tablet Take 1 tablet (50 mcg) by mouth daily 90 tablet 0    lisinopril (ZESTRIL) 20 MG tablet Take 1 tablet (20 mg) by mouth daily 90 tablet 3    Magnesium 400 MG CAPS Take 400 mg by mouth daily      Multiple Vitamin (DAILY MULTIVITAMIN PO) Take by mouth daily      nitroGLYcerin (NITROSTAT) 0.4 MG sublingual tablet Place 1 tablet (0.4 mg) under the tongue every 5 minutes as needed for chest pain 25 tablet 2    prednisoLONE acetate (PRED FORTE) 1 % ophthalmic suspension Place 1 drop into both eyes daily      rosuvastatin (CRESTOR) 40 MG tablet Take 1 tablet (40 mg) by mouth daily 90 tablet 4    vitamin B complex with vitamin C (STRESS TAB) tablet Take 1 tablet by mouth daily       vitamin C (ASCORBIC ACID) 1000 MG TABS Take 2,000 mg by mouth daily      warfarin ANTICOAGULANT (COUMADIN) 5 MG tablet Take 5mg every Monday / Take 2.5mg all other days OR per INR clinic (Patient taking differently: Take 5mg every M, W,F and 2.5mg all other days OR per INR clinic) 50 tablet 1       ALLERGIES     Allergies   Allergen Reactions    Atorvastatin      Leg cramps    Cats Itching    Gluten      Sinuses affected by gluten    Shellfish Allergy      hives       PAST MEDICAL HISTORY:  Past Medical History:   Diagnosis Date    CAD (coronary artery disease) 04/09/2009 4/8/2009: PTCA and two 2.5x15mm Xience stents to mid LAD lesion; Cath 12/2012- 40-50% stenosis in mid PDA, 80% on D1- medically treat , 5/28/2015 - PTCA and 2.25x8mm Promus PREMIIER NAILA to ostium of 2nd OM    Cerebral artery occlusion with cerebral infarction 2012    CVA (cerebral infarction)     DDD (degenerative disc disease)     Essential hypertension, benign     GERD (gastroesophageal reflux disease)     Hyperlipidemia      Hypothyroidism     Lumbar spinal stenosis     Mitral regurgitation     1+ per 2013 Echo    Vertebral artery stenosis, right        PAST SURGICAL HISTORY:  Past Surgical History:   Procedure Laterality Date    ARTHROPLASTY KNEE Left 2020    Procedure: Left total knee arthroplasty (Ward and Nephew #5 narrow femur; #3 tibia; 9 mm tibial poly and 35 mm patella);  Surgeon: Jorge Luis Cheatham MD;  Location: [facility] OR    CORONARY ANGIOGRAPHY ADULT ORDER  2012    40-50% stenosis to mid PDA, 80% in D1- medically treat    CORONARY ANGIOGRAPHY ADULT ORDER  2015    PTCA and 2.25x8mm Promus PREMIER NAILA to ostium of 2nd OM    DECOMPRESSION, FUSION LUMBAR POSTERIOR THREE + LEVELS, COMBINED  2013    Procedure: COMBINED DECOMPRESSION, FUSION LUMBAR POSTERIOR THREE + LEVELS;  Posterior Lumbar Decompression L3-S1, Fusion L4-S1;  Surgeon: Daniel Harris MD;  Location: [facility] OR    ENDOSCOPIC STRIPPING VEIN(S)      HEART CATH, ANGIOPLASTY  2009    PTCA and two 2.5x15mm Xience stents to mid LAD lesion    HYSTERECTOMY, RICKIE      Fibroids, and Menorrhagia.. Bilateral Oophrectomy    ORTHOPEDIC SURGERY      Stenting of LAD, Angioplasty  09    TONSILLECTOMY      as a child       FAMILY HISTORY:  Family History   Problem Relation Age of Onset    Neurologic Disorder Mother         Dementia, Still living    Ovarian Cancer Mother     Thyroid Disease Mother     C.A.D. Father             Diabetes Father     Coronary Artery Disease Father     Alcohol/Drug Brother     Thyroid Disease Son     Diabetes Son        SOCIAL HISTORY:  Social History     Socioeconomic History    Marital status:      Spouse name: None    Number of children: None    Years of education: None    Highest education level: None   Tobacco Use    Smoking status: Former     Packs/day: 0.10     Types: Cigarettes     Quit date: 1965     Years since quittin.3    Smokeless tobacco: Never   Substance and Sexual Activity    Alcohol use: Yes  "    Alcohol/week: 0.0 standard drinks of alcohol     Comment: 2 glasses wine per month, maybe    Drug use: No    Sexual activity: Not Currently   Other Topics Concern    Blood Transfusions No    Caffeine Concern Yes     Comment: 5 cups caffeine per day    Sleep Concern No    Stress Concern No    Weight Concern No     Comment: weight stable    Special Diet No     Comment: trying to eat healthier    Exercise Yes     Comment: stretching    Seat Belt Yes    Self-Exams Yes    Parent/sibling w/ CABG, MI or angioplasty before 65F 55M? Yes   Social History Narrative    Works in an office       Review of Systems:  Skin:  not assessed       Eyes:  not assessed      ENT:  Positive for   infrequent dry cough  Respiratory:  Positive for shortness of breath;dyspnea on exertion     Cardiovascular:    Positive for;heaviness;palpitations;dizziness;lightheadedness    Gastroenterology: Negative      Genitourinary:  not assessed      Musculoskeletal:  Positive for neck pain;back pain    Neurologic:  Positive for tremors;headaches    Psychiatric:  not assessed      Heme/Lymph/Imm:  not assessed      Endocrine:  Positive for thyroid disorder Hypothyroidism    Physical Exam:  Vitals: /65   Pulse 71   Resp 17   Ht 1.6 m (5' 3\")   Wt 59.9 kg (132 lb)   LMP  (LMP Unknown)   SpO2 100%   BMI 23.38 kg/m      Constitutional:  cooperative, alert and oriented, well developed, well nourished, in no acute distress        Skin:  warm and dry to the touch, no apparent skin lesions or masses noted          Head:  normocephalic, no masses or lesions        Eyes:  pupils equal and round, conjunctivae and lids unremarkable, sclera white, no xanthalasma, EOMS intact, no nystagmus        Lymph:      ENT:  no pallor or cyanosis, dentition good        Neck:  no carotid bruit        Respiratory:  normal breath sounds, clear to auscultation, normal A-P diameter, normal symmetry, normal respiratory excursion, no use of accessory muscles     "     Cardiac: regular rhythm;normal S1 and S2       LUSB;systolic murmur;grade 2        pulses full and equal                                   left wrist    GI:           Extremities and Muscular Skeletal:  no edema;no spinal abnormalities noted;normal muscle strength and tone              Neurological:  no gross motor deficits        Psych:  affect appropriate, oriented to time, person and place        CC  Nikolai Amaya MD  [address]  [place] [address]      Thank you for allowing me to participate in the care of your patient.      Sincerely,     Nikolai Amaya MD     [facility]

## 2023-04-26 NOTE — TELEPHONE ENCOUNTER
Loreta is scheduled for angiogram on 5/5/23, cardiology notes of today, 4/26/23, state patient will hold warfarin 5 days prior to procedure.     Patient is currently on Warfarin for CVA     Procedure is scheduled with cardiologist at [facility]    I spoke to patient, hold instructions given and follow up INR scheduled for 05/12/23.    Thank you,  Madina Brannon RN

## 2023-04-26 NOTE — PROGRESS NOTES
HPI and Plan:   Loreta is a very nice 83-year-old woman with past medical history significant for unstable angina leading to 2 Xience drug-eluting stents placed in her left anterior descending in 2009.  She has hypercholesterolemia, hypertension, hypothyroidism, back pain with multiple surgeries by Dr. Daniel Harris, a strong family history of coronary disease, Parkinson disease, cerebrovascular accident in 2013 manifesting as balance issues.  This was thought to be an embolic event from her vertebral artery for which she has been on warfarin ever since.     Additional coronary history includes chest discomfort in 2012 with angiogram demonstrating no change in her anatomy.  She had moderate disease in posterior descending artery, ostial pinch in her diagonal and obtuse marginal that I recommended medical management.      Repeat angiography in 2015 with classic anginal symptoms showed no change in her anatomy and we continued medical management.  One month later, she was back in the emergency room.  I took her back to the Cath Lab where I stented a small obtuse marginal branch, placing a 2.25 x 8 mm Promus drug-eluting stent in the ostium of the obtuse marginal.  She had problems with the radial approach with ecchymoses and pain but this resolved over time.      In 2017, after several back surgeries by Dr. Daniel Harris, she was having chest discomfort with a stress nuclear scan that demonstrated no evidence of ischemia.  She also had followup Lexiscans in 2019 and 2020 and  in October 2022 for similar symptoms.     Loreta has had problems with hypotension and orthostasis for which we had to back off on her medications, decreasing her atenolol from 25 b.i.d. to 25 once a day and decreasing her amlodipine to 2.5 mg daily.     I saw Kiko a little over a week ago and she has had chest pressure and chest tightness lasting several days.  It does radiate to her left arm it leaves her feeling quite weak it also radiates to her  neck.  She took 2 nitro with some improvement.  She states it worsens with exercise and activity.  She tried to go to the ER but stated the parking lot was too full and too busy and she decided not to go and wait for today's appointment.  When I saw her on the 14th I sent her to the ER where her troponins were negative, CT benign.  She was offered admission but refused and preferred an outpatient evaluation    Loreta returns to clinic and continues to have symptom as described above she states they are worse with activity.  I had started Imdur 15 mg daily and she does not think this is helped.     Assessment and plan.  Loreta has a chest discomfort that could be angina.  She has had this multiple times in the past, occasionally it has required intervention but most the time it has resulted in negative stress tests or nonobstructive coronary disease by angiography.  Recent ER evaluation was negative.  Last stress nuclear scan October appeared to be benign.  We discussed options at this time we will pursue coronary angiography and possible interventions.  I discussed risk, benefits and alternatives with the patient.  She appears understand desires to proceed.  She is on warfarin which we will have to hold for 5 days.  She is also on aspirin which we will continue.  Further evaluation treatment will depend upon the above results.    Blood pressure is 135/65 with a pulse of 71.     Last fasting lipid profile was from October with a cholesterol of 145, HDL 66, LDL 67 and triglycerides of 61.     Her most recent INRs i 2.02.     Thank you for allow me to participate in this patient's care.  Sincerely,                                Nikolai Amaya MD [facility]    Today's clinic visit entailed:  Review of external notes as documented elsewhere in note  Review of the result(s) of each unique test - Lab work, ER records  Ordering of each unique test  Prescription drug management  37 minutes spent by me on the date of the encounter  doing chart review, history and exam, documentation and further activities per the note  Provider  Link to MDM Help Grid     The level of medical decision making during this visit was of moderate complexity.      Orders Placed This Encounter   Procedures     Case Request Cath Lab: Coronary Angiogram, Left Heart Catheterization, Percutaneous Coronary Intervention Stent       No orders of the defined types were placed in this encounter.      There are no discontinued medications.      Encounter Diagnoses   Name Primary?     Coronary artery disease involving native coronary artery of native heart with unstable angina pectoris (H) Yes     Hyperlipidemia LDL goal <70      Benign essential hypertension      Long term current use of anticoagulant therapy      Impaired fasting glucose        CURRENT MEDICATIONS:  Current Outpatient Medications   Medication Sig Dispense Refill     amLODIPine (NORVASC) 2.5 MG tablet Take 1 tablet (2.5 mg) by mouth daily (Patient taking differently: Take 5 mg by mouth daily) 90 tablet 1     aspirin EC 81 MG tablet Take 1 tablet (81 mg) by mouth daily       atenolol (TENORMIN) 25 MG tablet Take 1 tablet (25 mg) by mouth daily 90 tablet 1     Calcium Carb-Cholecalciferol (CALCIUM 600 + D PO) Take 1,200 mg by mouth daily        Cholecalciferol (VITAMIN D) 2000 UNITS tablet Take 2,000 Units by mouth daily        Coenzyme Q10 (COQ-10) 200 MG CAPS Take 400 mg by mouth daily        diclofenac (VOLTAREN) 1 % topical gel Apply 4 g topically 4 times daily (Patient taking differently: Apply 4 g topically 4 times daily as needed for moderate pain) 350 g 3     escitalopram (LEXAPRO) 5 MG tablet Take 5 mg by mouth daily as needed       gatifloxacin (ZYMAXID) 0.5 % ophthalmic solution Place 1 drop into both eyes daily       isosorbide mononitrate (IMDUR) 30 MG 24 hr tablet Take 15mg (1/2 tab) every morning 45 tablet 4     ketorolac (ACULAR) 0.5 % ophthalmic solution Place 1 drop into both eyes daily        levothyroxine (SYNTHROID/LEVOTHROID) 50 MCG tablet Take 1 tablet (50 mcg) by mouth daily 90 tablet 0     lisinopril (ZESTRIL) 20 MG tablet Take 1 tablet (20 mg) by mouth daily 90 tablet 3     Magnesium 400 MG CAPS Take 400 mg by mouth daily       Multiple Vitamin (DAILY MULTIVITAMIN PO) Take by mouth daily       nitroGLYcerin (NITROSTAT) 0.4 MG sublingual tablet Place 1 tablet (0.4 mg) under the tongue every 5 minutes as needed for chest pain 25 tablet 2     prednisoLONE acetate (PRED FORTE) 1 % ophthalmic suspension Place 1 drop into both eyes daily       rosuvastatin (CRESTOR) 40 MG tablet Take 1 tablet (40 mg) by mouth daily 90 tablet 4     vitamin B complex with vitamin C (STRESS TAB) tablet Take 1 tablet by mouth daily        vitamin C (ASCORBIC ACID) 1000 MG TABS Take 2,000 mg by mouth daily       warfarin ANTICOAGULANT (COUMADIN) 5 MG tablet Take 5mg every Monday / Take 2.5mg all other days OR per INR clinic (Patient taking differently: Take 5mg every M, W,F and 2.5mg all other days OR per INR clinic) 50 tablet 1       ALLERGIES     Allergies   Allergen Reactions     Atorvastatin      Leg cramps     Cats Itching     Gluten      Sinuses affected by gluten     Shellfish Allergy      hives       PAST MEDICAL HISTORY:  Past Medical History:   Diagnosis Date     CAD (coronary artery disease) 04/09/2009 4/8/2009: PTCA and two 2.5x15mm Xience stents to mid LAD lesion; Cath 12/2012- 40-50% stenosis in mid PDA, 80% on D1- medically treat , 5/28/2015 - PTCA and 2.25x8mm Promus PREMIIER NAILA to ostium of 2nd OM     Cerebral artery occlusion with cerebral infarction 2012     CVA (cerebral infarction)      DDD (degenerative disc disease)      Essential hypertension, benign      GERD (gastroesophageal reflux disease)      Hyperlipidemia      Hypothyroidism      Lumbar spinal stenosis      Mitral regurgitation     1+ per 7/2013 Echo     Vertebral artery stenosis, right        PAST SURGICAL HISTORY:  Past Surgical History:    Procedure Laterality Date     ARTHROPLASTY KNEE Left 2020    Procedure: Left total knee arthroplasty (Ward and Nephew #5 narrow femur; #3 tibia; 9 mm tibial poly and 35 mm patella);  Surgeon: Jorge Luis Cheatham MD;  Location: [facility] OR     CORONARY ANGIOGRAPHY ADULT ORDER  2012    40-50% stenosis to mid PDA, 80% in D1- medically treat     CORONARY ANGIOGRAPHY ADULT ORDER  2015    PTCA and 2.25x8mm Promus PREMIER NAILA to ostium of 2nd OM     DECOMPRESSION, FUSION LUMBAR POSTERIOR THREE + LEVELS, COMBINED  2013    Procedure: COMBINED DECOMPRESSION, FUSION LUMBAR POSTERIOR THREE + LEVELS;  Posterior Lumbar Decompression L3-S1, Fusion L4-S1;  Surgeon: Daniel Harris MD;  Location: [facility] OR     ENDOSCOPIC STRIPPING VEIN(S)       HEART CATH, ANGIOPLASTY  2009    PTCA and two 2.5x15mm Xience stents to mid LAD lesion     HYSTERECTOMY, RICKIE      Fibroids, and Menorrhagia.. Bilateral Oophrectomy     ORTHOPEDIC SURGERY       Stenting of LAD, Angioplasty  09     TONSILLECTOMY      as a child       FAMILY HISTORY:  Family History   Problem Relation Age of Onset     Neurologic Disorder Mother         Dementia, Still living     Ovarian Cancer Mother      Thyroid Disease Mother      C.A.D. Father              Diabetes Father      Coronary Artery Disease Father      Alcohol/Drug Brother      Thyroid Disease Son      Diabetes Son        SOCIAL HISTORY:  Social History     Socioeconomic History     Marital status:      Spouse name: None     Number of children: None     Years of education: None     Highest education level: None   Tobacco Use     Smoking status: Former     Packs/day: 0.10     Types: Cigarettes     Quit date: 1965     Years since quittin.3     Smokeless tobacco: Never   Substance and Sexual Activity     Alcohol use: Yes     Alcohol/week: 0.0 standard drinks of alcohol     Comment: 2 glasses wine per month, maybe     Drug use: No     Sexual activity: Not Currently   Other  "Topics Concern     Blood Transfusions No     Caffeine Concern Yes     Comment: 5 cups caffeine per day     Sleep Concern No     Stress Concern No     Weight Concern No     Comment: weight stable     Special Diet No     Comment: trying to eat healthier     Exercise Yes     Comment: stretching     Seat Belt Yes     Self-Exams Yes     Parent/sibling w/ CABG, MI or angioplasty before 65F 55M? Yes   Social History Narrative    Works in an office       Review of Systems:  Skin:  not assessed       Eyes:  not assessed      ENT:  Positive for   infrequent dry cough  Respiratory:  Positive for shortness of breath;dyspnea on exertion     Cardiovascular:    Positive for;heaviness;palpitations;dizziness;lightheadedness    Gastroenterology: Negative      Genitourinary:  not assessed      Musculoskeletal:  Positive for neck pain;back pain    Neurologic:  Positive for tremors;headaches    Psychiatric:  not assessed      Heme/Lymph/Imm:  not assessed      Endocrine:  Positive for thyroid disorder Hypothyroidism    Physical Exam:  Vitals: /65   Pulse 71   Resp 17   Ht 1.6 m (5' 3\")   Wt 59.9 kg (132 lb)   LMP  (LMP Unknown)   SpO2 100%   BMI 23.38 kg/m      Constitutional:  cooperative, alert and oriented, well developed, well nourished, in no acute distress        Skin:  warm and dry to the touch, no apparent skin lesions or masses noted          Head:  normocephalic, no masses or lesions        Eyes:  pupils equal and round, conjunctivae and lids unremarkable, sclera white, no xanthalasma, EOMS intact, no nystagmus        Lymph:      ENT:  no pallor or cyanosis, dentition good        Neck:  no carotid bruit        Respiratory:  normal breath sounds, clear to auscultation, normal A-P diameter, normal symmetry, normal respiratory excursion, no use of accessory muscles         Cardiac: regular rhythm;normal S1 and S2       LUSB;systolic murmur;grade 2        pulses full and equal                                   left " wrist    GI:           Extremities and Muscular Skeletal:  no edema;no spinal abnormalities noted;normal muscle strength and tone              Neurological:  no gross motor deficits        Psych:  affect appropriate, oriented to time, person and place        CC  Nikolai Amaya MD  [address]  [place] [id number]

## 2023-04-28 ENCOUNTER — TELEPHONE (OUTPATIENT)
Dept: CARDIOLOGY | Facility: CLINIC | Age: 83
End: 2023-04-28
Payer: COMMERCIAL

## 2023-04-28 DIAGNOSIS — R07.9 CHEST PAIN: ICD-10-CM

## 2023-04-28 DIAGNOSIS — I25.110 CORONARY ARTERY DISEASE INVOLVING NATIVE CORONARY ARTERY OF NATIVE HEART WITH UNSTABLE ANGINA PECTORIS (H): Primary | ICD-10-CM

## 2023-04-28 DIAGNOSIS — I10 ESSENTIAL HYPERTENSION, BENIGN: ICD-10-CM

## 2023-04-28 RX ORDER — ASPIRIN 325 MG
325 TABLET ORAL ONCE
Status: CANCELLED | OUTPATIENT
Start: 2023-04-28 | End: 2023-04-28

## 2023-04-28 RX ORDER — LIDOCAINE 40 MG/G
CREAM TOPICAL
Status: CANCELLED | OUTPATIENT
Start: 2023-04-28

## 2023-04-28 RX ORDER — ASPIRIN 81 MG/1
243 TABLET, CHEWABLE ORAL ONCE
Status: CANCELLED | OUTPATIENT
Start: 2023-04-28

## 2023-04-28 RX ORDER — SODIUM CHLORIDE 9 MG/ML
INJECTION, SOLUTION INTRAVENOUS CONTINUOUS
Status: CANCELLED | OUTPATIENT
Start: 2023-04-28

## 2023-04-28 RX ORDER — POTASSIUM CHLORIDE 1500 MG/1
20 TABLET, EXTENDED RELEASE ORAL
Status: CANCELLED | OUTPATIENT
Start: 2023-04-28

## 2023-04-28 RX ORDER — LISINOPRIL 20 MG/1
20 TABLET ORAL DAILY
Qty: 90 TABLET | Refills: 4 | Status: SHIPPED | OUTPATIENT
Start: 2023-04-28 | End: 2024-01-04

## 2023-04-28 NOTE — TELEPHONE ENCOUNTER
Spoke to patient and reviewed prep below. Insurance is approved for procedure. Orders placed for procedure.     Coronary angiogram/PCI/Right Heart Cath prep instructions.     Patient is scheduled for a Coronary Angio w/possible PCI at [facility] - [address] [place] [address] - Main Entrance of the Hospital on Fri 5/5/23.  Check in time is at 1000 and procedure to follow.    Patient instructed to remain NPO for solid foods 8 hours prior to arrival and may have clear liquids up to 2 hours prior to arrival.    Patient does not require extra fluids prior to procedure.    Patient is not diabetic.    Patient is on warfarin and has been advised to hold x5 days prior to procedure, starting 5/30/23    Patient is not on diuretics.     Patient is taking ASA 81mg daily and will take 4 tabs (324mg) the morning of the procedure.    Pt is not on a SGLT2 inhibitor.    Patient advised to take their other daily medications the morning of the procedure with small sips of water.     Verified patient does not have a contrast allergy. Verified with Dr Amaya that patient does not need contrast pretreatment given shellfish allergy.     Verified patient has someone available to drive them home from the hospital and can stay with them for 24 hours after the procedure.     Patient advised to notify care team with any new COVID like symptoms prior to procedure.    Patient will check their temperature the morning of procedure and call [facility] at [phone] if temp is >100.0.    Patient is aware of visitor policy.    Patient expresses understanding of above instructions and denies further questions at this time.

## 2023-05-04 ENCOUNTER — LAB (OUTPATIENT)
Dept: LAB | Facility: CLINIC | Age: 83
End: 2023-05-04
Payer: COMMERCIAL

## 2023-05-04 DIAGNOSIS — E03.9 HYPOTHYROIDISM, UNSPECIFIED TYPE: ICD-10-CM

## 2023-05-04 LAB
T4 FREE SERPL-MCNC: 1.52 NG/DL (ref 0.9–1.7)
TSH SERPL DL<=0.005 MIU/L-ACNC: 4.39 UIU/ML (ref 0.3–4.2)

## 2023-05-04 PROCEDURE — 84443 ASSAY THYROID STIM HORMONE: CPT

## 2023-05-04 PROCEDURE — 84439 ASSAY OF FREE THYROXINE: CPT

## 2023-05-04 PROCEDURE — 36415 COLL VENOUS BLD VENIPUNCTURE: CPT

## 2023-05-05 ENCOUNTER — TELEPHONE (OUTPATIENT)
Dept: ANTICOAGULATION | Facility: CLINIC | Age: 83
End: 2023-05-05

## 2023-05-05 ENCOUNTER — HOSPITAL ENCOUNTER (OUTPATIENT)
Facility: CLINIC | Age: 83
Discharge: HOME | End: 2023-05-05
Admitting: INTERNAL MEDICINE
Payer: COMMERCIAL

## 2023-05-05 VITALS
DIASTOLIC BLOOD PRESSURE: 54 MMHG | RESPIRATION RATE: 16 BRPM | SYSTOLIC BLOOD PRESSURE: 115 MMHG | OXYGEN SATURATION: 99 % | TEMPERATURE: 97.2 F | HEART RATE: 58 BPM

## 2023-05-05 DIAGNOSIS — I25.110 CORONARY ARTERY DISEASE INVOLVING NATIVE CORONARY ARTERY OF NATIVE HEART WITH UNSTABLE ANGINA PECTORIS (H): ICD-10-CM

## 2023-05-05 DIAGNOSIS — R07.9 CHEST PAIN: ICD-10-CM

## 2023-05-05 DIAGNOSIS — I25.10 ATHEROSCLEROSIS OF NATIVE CORONARY ARTERY OF NATIVE HEART, UNSPECIFIED WHETHER ANGINA PRESENT: ICD-10-CM

## 2023-05-05 DIAGNOSIS — I25.118 CORONARY ARTERY DISEASE OF NATIVE ARTERY OF NATIVE HEART WITH STABLE ANGINA PECTORIS (H): ICD-10-CM

## 2023-05-05 PROBLEM — Z98.890 STATUS POST CORONARY ANGIOGRAM: Status: ACTIVE | Noted: 2023-05-05

## 2023-05-05 LAB
ANION GAP SERPL CALCULATED.3IONS-SCNC: 9 MMOL/L (ref 7–15)
APTT PPP: 36 SECONDS (ref 22–38)
BUN SERPL-MCNC: 12.2 MG/DL (ref 8–23)
CALCIUM SERPL-MCNC: 9.5 MG/DL (ref 8.8–10.2)
CHLORIDE SERPL-SCNC: 97 MMOL/L (ref 98–107)
CREAT SERPL-MCNC: 0.68 MG/DL (ref 0.51–0.95)
DEPRECATED HCO3 PLAS-SCNC: 25 MMOL/L (ref 22–29)
ERYTHROCYTE [DISTWIDTH] IN BLOOD BY AUTOMATED COUNT: 14.8 % (ref 10–15)
GFR SERPL CREATININE-BSD FRML MDRD: 87 ML/MIN/1.73M2
GLUCOSE SERPL-MCNC: 116 MG/DL (ref 70–99)
HCT VFR BLD AUTO: 37.4 % (ref 35–47)
HGB BLD-MCNC: 12.2 G/DL (ref 11.7–15.7)
INR PPP: 1.09 (ref 0.85–1.15)
MCH RBC QN AUTO: 29.2 PG (ref 26.5–33)
MCHC RBC AUTO-ENTMCNC: 32.6 G/DL (ref 31.5–36.5)
MCV RBC AUTO: 90 FL (ref 78–100)
PLATELET # BLD AUTO: 212 10E3/UL (ref 150–450)
POTASSIUM SERPL-SCNC: 4.2 MMOL/L (ref 3.4–5.3)
RBC # BLD AUTO: 4.18 10E6/UL (ref 3.8–5.2)
SODIUM SERPL-SCNC: 131 MMOL/L (ref 136–145)
WBC # BLD AUTO: 4.6 10E3/UL (ref 4–11)

## 2023-05-05 PROCEDURE — 250N000009 HC RX 250: Performed by: INTERNAL MEDICINE

## 2023-05-05 PROCEDURE — 36415 COLL VENOUS BLD VENIPUNCTURE: CPT | Performed by: INTERNAL MEDICINE

## 2023-05-05 PROCEDURE — 250N000011 HC RX IP 250 OP 636: Performed by: INTERNAL MEDICINE

## 2023-05-05 PROCEDURE — 85027 COMPLETE CBC AUTOMATED: CPT | Performed by: INTERNAL MEDICINE

## 2023-05-05 PROCEDURE — 99152 MOD SED SAME PHYS/QHP 5/>YRS: CPT | Performed by: INTERNAL MEDICINE

## 2023-05-05 PROCEDURE — 258N000003 HC RX IP 258 OP 636: Performed by: INTERNAL MEDICINE

## 2023-05-05 PROCEDURE — 272N000001 HC OR GENERAL SUPPLY STERILE: Performed by: INTERNAL MEDICINE

## 2023-05-05 PROCEDURE — 85610 PROTHROMBIN TIME: CPT | Performed by: INTERNAL MEDICINE

## 2023-05-05 PROCEDURE — 93454 CORONARY ARTERY ANGIO S&I: CPT | Performed by: INTERNAL MEDICINE

## 2023-05-05 PROCEDURE — C1887 CATHETER, GUIDING: HCPCS | Performed by: INTERNAL MEDICINE

## 2023-05-05 PROCEDURE — 250N000013 HC RX MED GY IP 250 OP 250 PS 637: Performed by: INTERNAL MEDICINE

## 2023-05-05 PROCEDURE — 85730 THROMBOPLASTIN TIME PARTIAL: CPT | Performed by: INTERNAL MEDICINE

## 2023-05-05 PROCEDURE — C1769 GUIDE WIRE: HCPCS | Performed by: INTERNAL MEDICINE

## 2023-05-05 PROCEDURE — 80048 BASIC METABOLIC PNL TOTAL CA: CPT | Performed by: INTERNAL MEDICINE

## 2023-05-05 PROCEDURE — 93454 CORONARY ARTERY ANGIO S&I: CPT | Mod: 26 | Performed by: INTERNAL MEDICINE

## 2023-05-05 PROCEDURE — C1894 INTRO/SHEATH, NON-LASER: HCPCS | Performed by: INTERNAL MEDICINE

## 2023-05-05 RX ORDER — IOPAMIDOL 755 MG/ML
INJECTION, SOLUTION INTRAVASCULAR
Status: COMPLETED | OUTPATIENT
Start: 2023-05-05 | End: 2023-05-05

## 2023-05-05 RX ORDER — OXYCODONE HYDROCHLORIDE 10 MG/1
10 TABLET ORAL EVERY 4 HOURS PRN
Status: DISCONTINUED | OUTPATIENT
Start: 2023-05-05 | End: 2023-05-05 | Stop reason: HOSPADM

## 2023-05-05 RX ORDER — NALOXONE HYDROCHLORIDE 0.4 MG/ML
0.2 INJECTION, SOLUTION INTRAMUSCULAR; INTRAVENOUS; SUBCUTANEOUS
Status: DISCONTINUED | OUTPATIENT
Start: 2023-05-05 | End: 2023-05-05 | Stop reason: HOSPADM

## 2023-05-05 RX ORDER — SODIUM CHLORIDE 9 MG/ML
INJECTION, SOLUTION INTRAVENOUS CONTINUOUS
Status: DISCONTINUED | OUTPATIENT
Start: 2023-05-05 | End: 2023-05-05 | Stop reason: HOSPADM

## 2023-05-05 RX ORDER — LIDOCAINE HYDROCHLORIDE 10 MG/ML
INJECTION, SOLUTION EPIDURAL; INFILTRATION; INTRACAUDAL; PERINEURAL
Status: DISCONTINUED
Start: 2023-05-05 | End: 2023-05-05 | Stop reason: HOSPADM

## 2023-05-05 RX ORDER — NITROGLYCERIN 5 MG/ML
VIAL (ML) INTRAVENOUS
Status: COMPLETED | OUTPATIENT
Start: 2023-05-05 | End: 2023-05-05

## 2023-05-05 RX ORDER — FENTANYL CITRATE 50 UG/ML
INJECTION, SOLUTION INTRAMUSCULAR; INTRAVENOUS
Status: COMPLETED | OUTPATIENT
Start: 2023-05-05 | End: 2023-05-05

## 2023-05-05 RX ORDER — FENTANYL CITRATE 50 UG/ML
INJECTION, SOLUTION INTRAMUSCULAR; INTRAVENOUS
Status: DISCONTINUED
Start: 2023-05-05 | End: 2023-05-05 | Stop reason: HOSPADM

## 2023-05-05 RX ORDER — VERAPAMIL HYDROCHLORIDE 2.5 MG/ML
INJECTION, SOLUTION INTRAVENOUS
Status: DISCONTINUED
Start: 2023-05-05 | End: 2023-05-05 | Stop reason: HOSPADM

## 2023-05-05 RX ORDER — NITROGLYCERIN 5 MG/ML
VIAL (ML) INTRAVENOUS
Status: DISCONTINUED
Start: 2023-05-05 | End: 2023-05-05 | Stop reason: HOSPADM

## 2023-05-05 RX ORDER — NALOXONE HYDROCHLORIDE 0.4 MG/ML
0.4 INJECTION, SOLUTION INTRAMUSCULAR; INTRAVENOUS; SUBCUTANEOUS
Status: DISCONTINUED | OUTPATIENT
Start: 2023-05-05 | End: 2023-05-05 | Stop reason: HOSPADM

## 2023-05-05 RX ORDER — OXYCODONE HYDROCHLORIDE 5 MG/1
5 TABLET ORAL EVERY 4 HOURS PRN
Status: DISCONTINUED | OUTPATIENT
Start: 2023-05-05 | End: 2023-05-05 | Stop reason: HOSPADM

## 2023-05-05 RX ORDER — POTASSIUM CHLORIDE 1500 MG/1
20 TABLET, EXTENDED RELEASE ORAL
Status: DISCONTINUED | OUTPATIENT
Start: 2023-05-05 | End: 2023-05-05 | Stop reason: HOSPADM

## 2023-05-05 RX ORDER — ATROPINE SULFATE 0.1 MG/ML
0.5 INJECTION INTRAVENOUS
Status: DISCONTINUED | OUTPATIENT
Start: 2023-05-05 | End: 2023-05-05 | Stop reason: HOSPADM

## 2023-05-05 RX ORDER — FENTANYL CITRATE 50 UG/ML
25 INJECTION, SOLUTION INTRAMUSCULAR; INTRAVENOUS
Status: DISCONTINUED | OUTPATIENT
Start: 2023-05-05 | End: 2023-05-05 | Stop reason: HOSPADM

## 2023-05-05 RX ORDER — ASPIRIN 81 MG/1
243 TABLET, CHEWABLE ORAL ONCE
Status: COMPLETED | OUTPATIENT
Start: 2023-05-05 | End: 2023-05-05

## 2023-05-05 RX ORDER — HEPARIN SODIUM 1000 [USP'U]/ML
INJECTION, SOLUTION INTRAVENOUS; SUBCUTANEOUS
Status: DISCONTINUED
Start: 2023-05-05 | End: 2023-05-05 | Stop reason: HOSPADM

## 2023-05-05 RX ORDER — VERAPAMIL HYDROCHLORIDE 2.5 MG/ML
INJECTION, SOLUTION INTRAVENOUS
Status: DISCONTINUED
Start: 2023-05-05 | End: 2023-05-05 | Stop reason: WASHOUT

## 2023-05-05 RX ORDER — ACETAMINOPHEN 325 MG/1
650 TABLET ORAL EVERY 4 HOURS PRN
Status: DISCONTINUED | OUTPATIENT
Start: 2023-05-05 | End: 2023-05-05 | Stop reason: HOSPADM

## 2023-05-05 RX ORDER — VERAPAMIL HYDROCHLORIDE 2.5 MG/ML
INJECTION, SOLUTION INTRAVENOUS
Status: COMPLETED | OUTPATIENT
Start: 2023-05-05 | End: 2023-05-05

## 2023-05-05 RX ORDER — ASPIRIN 81 MG/1
325 TABLET, CHEWABLE ORAL ONCE
Status: COMPLETED | OUTPATIENT
Start: 2023-05-05 | End: 2023-05-05

## 2023-05-05 RX ORDER — FLUMAZENIL 0.1 MG/ML
0.2 INJECTION, SOLUTION INTRAVENOUS
Status: DISCONTINUED | OUTPATIENT
Start: 2023-05-05 | End: 2023-05-05 | Stop reason: HOSPADM

## 2023-05-05 RX ORDER — LIDOCAINE 40 MG/G
CREAM TOPICAL
Status: DISCONTINUED | OUTPATIENT
Start: 2023-05-05 | End: 2023-05-05 | Stop reason: HOSPADM

## 2023-05-05 RX ORDER — LIDOCAINE 40 MG/G
CREAM TOPICAL
Status: DISCONTINUED | OUTPATIENT
Start: 2023-05-05 | End: 2023-05-05

## 2023-05-05 RX ADMIN — ASPIRIN 81 MG CHEWABLE TABLET 243 MG: 81 TABLET CHEWABLE at 10:04

## 2023-05-05 RX ADMIN — SODIUM CHLORIDE: 9 INJECTION, SOLUTION INTRAVENOUS at 10:02

## 2023-05-05 ASSESSMENT — ACTIVITIES OF DAILY LIVING (ADL)
ADLS_ACUITY_SCORE: 37
ADLS_ACUITY_SCORE: 37

## 2023-05-05 NOTE — TELEPHONE ENCOUNTER
ANTICOAGULATION  MANAGEMENT: Discharge Review    Salome Saeed chart reviewed for anticoagulation continuity of care    Outpatient surgery/procedure on 5/5/23 for angiogram.    Discharge disposition: Home    Results:    Recent labs: (last 7 days)     05/05/23  1001   INR 1.09     Anticoagulation inpatient management:     not applicable     Anticoagulation discharge instructions:     Warfarin dosing: home regimen continued   Bridging: No   INR goal change: No      Medication changes affecting anticoagulation: No    Additional factors affecting anticoagulation: No     PLAN     No adjustment to anticoagulation plan needed    Recommended follow up is scheduled    No adjustment to Anticoagulation Calendar was required    Neel Winn RN

## 2023-05-05 NOTE — Clinical Note
The right DP pulse is 2+. The right PT pulse is 2+. The right radial pulse is 2+. The right ulnar pulse is 2+. The right ulnar pulse is  2+.

## 2023-05-05 NOTE — PRE-PROCEDURE
GENERAL PRE-PROCEDURE:   Procedure:  Coronary angiogram possible percutaneous coronary intervention  Date/Time:  5/5/2023 11:10 AM    Verbal consent obtained?: Yes    Written consent obtained?: Yes    Risks and benefits: Risks, benefits and alternatives were discussed    Consent given by:  Patient  Patient states understanding of procedure being performed: Yes    Patient's understanding of procedure matches consent: Yes    Procedure consent matches procedure scheduled: Yes    Expected level of sedation:  Minimal  Appropriately NPO:  Yes  ASA Class:  2  Mallampati  :  Grade 2- soft palate, base of uvula, tonsillar pillars, and portion of posterior pharyngeal wall visible  Lungs:  Lungs clear with good breath sounds bilaterally  Heart:  Normal heart sounds and rate  History & Physical reviewed:  History and physical reviewed and no updates needed  Statement of review:  I have reviewed the lab findings, diagnostic data, medications, and the plan for sedation    I have examined the patient, reviewed the history, medications and pre procedural tests. She describes episodic chest pressure with no clear relationship to physical exertion despite medical therapy. She previously underwent PCI of a very small secondary branch of M1 2015 for similar symptoms.  I have explained to the patient the risks of death, MI, stroke, hematoma, possible urgent bypass surgery for failed PCI, use of stents, thienopyridine agents, possible peripheral vascular complications, arrhythmia, the use of FFR in clinical decision-making and alternative of medical therapy alone in regards to left heart catheterization, left ventriculography, coronary angiography, and possible percutaneous coronary intervention. The patient voiced understanding and wishes to proceed. The patient has a good right radial pulse, normal ulnar pulse and a normal Justin's sign.

## 2023-05-05 NOTE — PROGRESS NOTES
Patient alert and oriented post procedure. Denies pain or nausea. Tolerating oral intake. Right radial dressing clean, dry, intact. Patient and  received and verbalized understanding of discharge instructions. All belongings returned to patient. Patient discharged via wheelchair; family providing transport home.

## 2023-05-05 NOTE — DISCHARGE INSTRUCTIONS
Discharge Instructions for Cardiac Catheterization   Cardiac catheterization is an invasive procedure to look for certain heart problems. These problems may affect the heart's chambers, valves, and blood vessels. A thin, flexible tube (catheter) is put in a blood vessel in your groin or arm. The catheter is moved to the heart. The healthcare provider can look at the blood flow, blood pressure, and oxygen. They can inject contrast fluid into your blood. This flows to your heart. The provider can then take X-rays pictures  of your heart.   Coronary angiography is often done as part of a cardiac cath. This looks for blocked areas in the arteries that send blood to the heart. If a blockage is found, your provider may try to open up the artery. They may put a stent in place. Your provider will talk with you about the results of your procedure . Ask any questions you have before you leave. This sheet will help you take care of yourself at home.   Home care  Have a responsible adult drive you home after your procedure.  Don't drive or make any important decisions for at least 24 hours after getting any type of sedation or anesthesia.   Drink  6 to 8 glasses of water over the next 24 hours. This is to help flush the contrast dye out of your body. Call your healthcare team if your urine has any change in color.  Take your temperature each day for 3 to 5 days. If you feel cold and clammy or start sweating, take your temperature right away. Call your healthcare team.  Do only light and easy activities for the next  2 to 3 days. Ask for help with chores and errands while you recover. Have someone drive you to your appointments.  Don't lift anything heavy until your healthcare team says it's safe.  Ask your healthcare team when you can expect to return to work. Unless your job involves lifting, you may be able to return to your normal activities within 2 days.  Take your medicines as directed. Don't skip doses.  Check your  incisions every day for signs of infection. These include redness, swelling, and fluid leaking. It's normal to have a small bruise or bump where the catheter was put in. A bruise that's getting larger is not normal. Tell your healthcare team about this. Call your healthcare team if you see blood forming in the incision. Go to the emergency room if you have uncontrolled bleeding from the artery site. This is even more important if you take medicines that make it hard for your blood to clot. These include aspirin, clopidogrel, warfarin, apixaban, and rivaroxaban.  Eat a healthy diet. Make sure it's low in fat, salt, and cholesterol. Ask your healthcare team for diet information.  Stop smoking. Sign up for a quit-smoking program. Or ask your healthcare team for help.  Exercise as your healthcare team tells you to. Your healthcare team may advise you to start a cardiac rehab program. Cardiac rehab is an exercise program where trained healthcare staff watch your progress and stress on your heart while you exercise. Ask your team how to enroll.  Don't swim or take baths until your healthcare team says it s OK. You can shower the day after the procedure. Keep the site clean and dry. This keeps the incision from getting wet and infected until the skin and artery can heal.  Follow all other after-care instructions from your team.     Follow-up care  Make a follow-up appointment as advised. It's common to have a follow-up appointment 2 to 4 weeks after an angioplasty or coronary stent procedure.  Make a yearly appointment. This is to make sure you're still doing well and not having any new symptoms.  Don't wait for a follow-up appointment if your medicines aren't working or you're having heart-related symptoms. Call your healthcare provider.    When to get medical care  Call your healthcare provider right away if you have any of these:   Severe or increasing pain, numbness, coldness, or a bluish color in the leg or arm that  held the catheter  Fever of 100.4  F ( 38 C) or higher, or as advised by your healthcare provider  Signs of infection at the incision site. These include redness, swelling, drainage, or warmth.  Bleeding, bruising, or a lot of swelling where the catheter was inserted  Blood in your urine  Black or tarry stools  Any unusual bleeding  Irregular, very slow, or fast heartbeat  Dizziness  Call 911  Call 911 if you have any of these:     Chest pain  Shortness of breath  Sudden numbness or weakness in arms, legs, or face, or trouble speaking  The puncture site swells up very fast  Bleeding from the puncture site that doesn't slow down with firm pressure    [employer] last reviewed this educational content on 3/1/2022    3620-3078 [employer]. All rights reserved. This information is not intended as a substitute for professional medical care. Always follow your healthcare professional's instructions.    Going Home After an Angiogram    For the first 24 hours after your procedure:  Have an adult stay with you.  Relax and take it easy  Drink plenty of fluids.  Do NOT smoke.  Do NOT make any important or legal decisions.  Do NOT drive or operate machines at home or at work.  Do NOT drink alcohol.    Do NOT have sex or do any heavy exercise for 2 days.  Do NOT take a bath, or use a hot tub or pool for at least 3 days. You may shower.    Remove the Band-Aid after 24 hours. If there is minor oozing, apply another Band-Aid and remove it after 12 hours.    Care of the wrist site:  It is normal to have soreness at the puncture site and mild tingling in your hand for up to 3 days.  For 2 days, do not use your hand or arm to support your weight (such as rising from a chair) or bend your wrist (such as lifting a garage door).  For 2 days, do not lift more than 5 pounds or exercise your arm (tennis, golf, bowling etc.)    If you start bleeding from the site in your wrist:  Sit down and press firmly on the site with your  thumb against the puncture site and finger against the back of the wrist. Call 911 for immediate help.  If the bleeding stops, sit still and keep your wrist straight for 2 hours.    Medicines:  If you have starting taking Plavix or Effient, DO NOT STOP TAKING IT until you talk to your heart doctor (Cardiologist).  If you are on Metformin (Glucophage), DO NOT RESTART it until you have blood tests (within 2-3 days after discharge). When your doctor tells you it's safe, you may restart the Metformin.  If you have stopped any other medications, check with your nurse or provider about when to restart them.    Call 911 right away if you have bleeding that is heavy or does not stop.    Call your doctor if:  You have a large or growing hard lump around the site.  The site is red, swollen, hot or tender.  Blood or fluid is draining from the site.  You have chills or a fever greater than 101 F (38 C)  Your arm feels numb or cool.  You have hives, a rash or unusual itching.    [facility] [place]:  [phone] (7 days a week)

## 2023-05-05 NOTE — PROCEDURES
[facility]    Procedure: *Cath without PCI    Date/Time: 5/5/2023 11:45 AM    Performed by: Wilfrido Robert MD  Authorized by: Wilfrido Robert MD      UNIVERSAL PROTOCOL   Site Marked: Yes  Prior Images Obtained and Reviewed:  Yes  Required items: Required blood products, implants, devices and special equipment available    Patient identity confirmed:  Verbally with patient  Patient was reevaluated immediately before administering moderate or deep sedation or anesthesia  Confirmation Checklist:  Patient's identity using two indicators  Time out: Immediately prior to the procedure a time out was called    Universal Protocol: the [facility] Universal Protocol was followed      SEDATION  Patient Sedated: Yes    Sedation:  Fentanyl and midazolam  Vital signs: Vital signs monitored during sedation      PROCEDURE  Describe Procedure: Procedure  1) CAG  Approach RTR  Complications none  Indication recurrent chest pain, known CAD unrevealing stress tests    Findindgs  LMCA normal  RCA 20% proximal 10% mid origin of PDA 40 to 50%  CX mild atheromatous changes no focal stenosis  LAD origin small D1 20%  30% diffuse in stent restenosis    No indication for ad hoc PCI  Patient Tolerance:  Patient tolerated the procedure well with no immediate complications  Length of time physician/provider present for 1:1 monitoring during sedation: 10

## 2023-05-08 LAB
ATRIAL RATE - MUSE: 57 BPM
DIASTOLIC BLOOD PRESSURE - MUSE: NORMAL MMHG
INTERPRETATION ECG - MUSE: NORMAL
P AXIS - MUSE: 74 DEGREES
PR INTERVAL - MUSE: 206 MS
QRS DURATION - MUSE: 94 MS
QT - MUSE: 452 MS
QTC - MUSE: 439 MS
R AXIS - MUSE: -16 DEGREES
SYSTOLIC BLOOD PRESSURE - MUSE: NORMAL MMHG
T AXIS - MUSE: 1 DEGREES
VENTRICULAR RATE- MUSE: 57 BPM

## 2023-05-12 ENCOUNTER — ANTICOAGULATION THERAPY VISIT (OUTPATIENT)
Dept: ANTICOAGULATION | Facility: CLINIC | Age: 83
End: 2023-05-12

## 2023-05-12 ENCOUNTER — LAB (OUTPATIENT)
Dept: LAB | Facility: CLINIC | Age: 83
End: 2023-05-12
Payer: COMMERCIAL

## 2023-05-12 ENCOUNTER — TELEPHONE (OUTPATIENT)
Dept: FAMILY MEDICINE | Facility: CLINIC | Age: 83
End: 2023-05-12

## 2023-05-12 DIAGNOSIS — I63.9 CEREBROVASCULAR ACCIDENT INVOLVING CEREBELLUM (H): ICD-10-CM

## 2023-05-12 DIAGNOSIS — Z79.01 LONG TERM CURRENT USE OF ANTICOAGULANT THERAPY: Primary | ICD-10-CM

## 2023-05-12 LAB — INR BLD: 1.4 (ref 0.9–1.1)

## 2023-05-12 PROCEDURE — 36416 COLLJ CAPILLARY BLOOD SPEC: CPT

## 2023-05-12 PROCEDURE — 85610 PROTHROMBIN TIME: CPT

## 2023-05-12 NOTE — PROGRESS NOTES
ANTICOAGULATION MANAGEMENT     Salome Saeed 83 year old female is on warfarin with subtherapeutic INR result. (Goal INR 2.0-3.0)    Recent labs: (last 7 days)     05/12/23  1313   INR 1.4*       ASSESSMENT     Warfarin Lab Questionnaire    Warfarin Doses Last 7 Days          5/12/2023   Warfarin Lab Questionnaire   Missed doses within past 14 days? No - Intentional 5 day hold per Cardiology, doesn't believe she missed any further doses this past week.   Changes in diet or alcohol within past 14 days? No - Decreased greens/vitamin K in diet; plans to resume previous intake    Medication changes since last result? No   Injuries or illness since last result? No    New shortness of breath, severe headaches or sudden changes in vision since last result? No   Abnormal bleeding since last result? No   Upcoming surgery, procedure? No - Angiogram 5/5/23 went well        Previous result: Subtherapeutic  Additional findings: None        PLAN     Recommended plan for temporary change(s) affecting INR     Dosing Instructions: booster dose then continue your current warfarin dose with next INR in 1 week       Summary  As of 5/12/2023    Full warfarin instructions:  5/12: 10 mg; Otherwise 5 mg every Mon, Wed, Fri; 2.5 mg all other days   Next INR check:  5/19/2023             Telephone call with Loreta who verbalizes understanding and agrees to plan    Lab visit scheduled    Education provided:     Please call back if any changes to your diet, medications or how you've been taking warfarin    Symptom monitoring: monitoring for bleeding signs and symptoms and monitoring for clotting signs and symptoms    Plan made per [facility] anticoagulation protocol    Aysha Tilley RN  Anticoagulation Clinic  5/12/2023    _______________________________________________________________________     Anticoagulation Episode Summary     Current INR goal:  2.0-3.0   TTR:  53.6 % (1 y)   Target end date:  Indefinite   Send INR reminders to:   [facility] [place]    Indications    Long term current use of anticoagulant therapy [Z79.01]  Cerebrovascular accident involving cerebellum (H) [I63.9]           Comments:           Anticoagulation Care Providers     Provider Role Specialty Phone number    Surya Dyer DO Referring [facility] Medicine [phone]

## 2023-05-12 NOTE — PROGRESS NOTES
ANTICOAGULATION MANAGEMENT     Salome Saeed 83 year old female is on warfarin with subtherapeutic INR result. (Goal INR 2.0-3.0)    Recent labs: (last 7 days)     05/12/23  1313   INR 1.4*       ASSESSMENT     Warfarin Lab Questionnaire    Warfarin Doses Last 7 Days          5/12/2023   Warfarin Lab Questionnaire   Missed doses within past 14 days? No   Changes in diet or alcohol within past 14 days? No   Medication changes since last result? No   Injuries or illness since last result? No   New shortness of breath, severe headaches or sudden changes in vision since last result? No   Abnormal bleeding since last result? No   Upcoming surgery, procedure? No        Previous result: Subtherapeutic  Additional findings: Pt had an Angiogram on 5/5/23 ~ held warfarin 5 days prior per Cardiology instructions ~ depending on today's findings, ACRN would suggest a boost dose of 7.5-10 mg x 1 today       PLAN     Unable to reach Loreta today.    Left message to return call to [facility] for assessment    Follow up required to assess for changes , discuss out of range result  and discuss dosing instructions and confirm understanding of instructions    Aysha Tilley RN  Anticoagulation Clinic  5/12/2023

## 2023-05-12 NOTE — TELEPHONE ENCOUNTER
Patient Returning Call    Reason for call:  INR     Information relayed to patient:  N/A    Patient has additional questions:  No      Okay to leave a detailed message?: Yes at Home number on file [phone] (home)

## 2023-05-12 NOTE — TELEPHONE ENCOUNTER
Writer returned call to patient and reviewed today's INR and dosing plan. See acc encounter. NICKY OsegueraN, RN

## 2023-05-16 ENCOUNTER — TELEPHONE (OUTPATIENT)
Dept: FAMILY MEDICINE | Facility: CLINIC | Age: 83
End: 2023-05-16
Payer: COMMERCIAL

## 2023-05-16 DIAGNOSIS — E03.9 HYPOTHYROIDISM, UNSPECIFIED TYPE: ICD-10-CM

## 2023-05-16 RX ORDER — LEVOTHYROXINE SODIUM 50 UG/1
50 TABLET ORAL DAILY
Qty: 90 TABLET | Refills: 3 | Status: SHIPPED | OUTPATIENT
Start: 2023-05-16 | End: 2024-01-04

## 2023-05-16 NOTE — TELEPHONE ENCOUNTER
Pt calling asking her [facility] medication [facility]     Rossy Herring RN, BSN  [facility]

## 2023-05-17 ENCOUNTER — DOCUMENTATION ONLY (OUTPATIENT)
Dept: ANTICOAGULATION | Facility: CLINIC | Age: 83
End: 2023-05-17
Payer: COMMERCIAL

## 2023-05-17 DIAGNOSIS — Z79.01 LONG TERM CURRENT USE OF ANTICOAGULANT THERAPY: ICD-10-CM

## 2023-05-17 DIAGNOSIS — I63.9 CEREBROVASCULAR ACCIDENT INVOLVING CEREBELLUM (H): Primary | ICD-10-CM

## 2023-05-17 NOTE — PROGRESS NOTES
ANTICOAGULATION CLINIC REFERRAL RENEWAL REQUEST       An annual renewal order is required for all patients referred to [facility] Anticoagulation [facility].?  Please review and sign the pended referral order for Salome Saeed.       ANTICOAGULATION SUMMARY      Warfarin indication(s)   Stroke    Mechanical heart valve present?  NO       Current goal range   INR: 2.0-3.0     Goal appropriate for indication? Goal INR 2-3, standard for indication(s) above     Time in Therapeutic Range (TTR)  (Goal > 60%) 53.6%       Office visit with referring provider's group within last year yes on 12/15/22       Aysha Tilley RN  [facility] Anticoagulation [facility]

## 2023-05-18 ENCOUNTER — OFFICE VISIT (OUTPATIENT)
Dept: CARDIOLOGY | Facility: CLINIC | Age: 83
End: 2023-05-18
Payer: COMMERCIAL

## 2023-05-18 VITALS
HEART RATE: 63 BPM | SYSTOLIC BLOOD PRESSURE: 138 MMHG | BODY MASS INDEX: 23.42 KG/M2 | HEIGHT: 63 IN | OXYGEN SATURATION: 98 % | DIASTOLIC BLOOD PRESSURE: 64 MMHG | WEIGHT: 132.2 LBS

## 2023-05-18 DIAGNOSIS — I25.118 CORONARY ARTERY DISEASE OF NATIVE ARTERY OF NATIVE HEART WITH STABLE ANGINA PECTORIS (H): ICD-10-CM

## 2023-05-18 DIAGNOSIS — R07.89 CHEST DISCOMFORT: Primary | ICD-10-CM

## 2023-05-18 DIAGNOSIS — I10 BENIGN ESSENTIAL HYPERTENSION: Chronic | ICD-10-CM

## 2023-05-18 PROCEDURE — 99214 OFFICE O/P EST MOD 30 MIN: CPT | Performed by: NURSE PRACTITIONER

## 2023-05-18 NOTE — PROGRESS NOTES
Cardiology Clinic Progress Note  Salome Saeed MRN# [id number]   YOB: 1940 Age: 83 year old   Primary Cardiologist: Dr. Amaya Reason for visit: Follow-up after angiogram            Assessment and Plan:     1.  Moderate nonobstructive coronary artery disease  -10/2022 Lexiscan nuclear stress test negative for ischemia  -5/2023 coronary angiogram without flow-limiting stenosis continued medical management recommended  -She stopped her imdur due to side effects, will keep amlodipine at 5mg daily given anti-anginal effect   -Continue aspirin, statin  -Discussed adding ranexa as an alternative anti-anginal therapy however patient declined as she did not want to take more medication     2. Hypertension, controlled  -Continue atenolol and amlodipine  -Patient has continued to take amlodipine at 5mg daily     3. Hyperlipidemia, controlled  -LDL 67 (10/2022) continue rosuvastatin    Suspect her memory issues the day of her angiogram was secondary to residual sedation.     Changes today: No medication changes made today, Encouraged patient to follow up with her PCP to review non-cardiac causes of chest pain     Follow up plan: Follow up with Astrid Winston NP in October with a lipid panel and BMP prior         History of Presenting Illness:    Salome Saeed is a very pleasant 83 year old female with a history of coronary artery disease s/p NAILA x 2 to LAD, hypercholesterolemia, hypertension, hypothyroidism, back pain with multiple surgeries, Parkinson's disease, CVA in 2013 (manifested as balance issues, thought to be an embolic event from her vertebral artery for which she has been on warfarin ever since), and strong family history of coronary disease.     Historically this patient's had issues with hypotension and orthostasis which led to reducing her atenolol to 25 mg daily and decreasing her amlodipine to 2.5 mg daily.    Patient saw Dr. Amaya in mid April after experiencing chest pressure and  chest tightness that lasted several days.  She attempted to go to the ER however parking lot was too full and too busy and she decided not to go.  He saw her on 4/14/2023 and he sent her to the emergency room where her troponins were negative, as well as her CT scan.  She was offered admission, but refused and preferred outpatient evaluation.    He saw her in follow-up 4/28/2023 continue to have chest discomfort that worsened with activity.  He had started her on Imdur 15 mg and did not feel it was helpful at that point.  He recommended coronary angiography which revealed normal left main, 20% ostial LAD narrowing, 30% mid LAD in-stent restenosis, 20% proximal RCA narrowing, 20% mRCA, 40 to 50% stenosis at the ostium of RPDA.  Continued medical management was recommended.    Patient is here today for follow-up after her angiogram. She continues to get chest heaviness that radiates into her left arm that occurs sporadically at rest and with exertion. She relaxes and it resolves within 5 minutes. She is no longer taking the isosorbide as it made her feel generally unwell. In the afternoon the day of her angiogram she had difficulty remembering how to make a pizza or her remote vs her cell phone. This has not occurred since.     She continues to have some residual orthostasis. Occasionally feels dizzy when she turns her head too quickly.      Denies lightheadedness or other presyncopal symptoms. Denies tachycardia or palpitations.  Denies orthopnea or PND.    Labs from 5/5/23 showed sodium 131, potassium 4.2, BUN 12.2, Creatinine 0.68, GFR 87, hemoglobin 12.2, Plt 212.    Blood pressure 138/64 and HR 63 in clinic today. She has been compliant with her medications.         Recent Hospitalizations   4/2023 ER visit for chest pain         Social History      Social History     Socioeconomic History     Marital status:      Spouse name: Not on file     Number of children: Not on file     Years of education: Not on  file     Highest education level: Not on file   Occupational History     Not on file   Tobacco Use     Smoking status: Former     Packs/day: 0.10     Types: Cigarettes     Quit date: 1965     Years since quittin.4     Smokeless tobacco: Never   Vaping Use     Vaping status: Not on file   Substance and Sexual Activity     Alcohol use: Yes     Alcohol/week: 0.0 standard drinks of alcohol     Comment: 2 glasses wine per month, maybe     Drug use: No     Sexual activity: Not Currently   Other Topics Concern      Service Not Asked     Blood Transfusions No     Caffeine Concern Yes     Comment: 5 cups caffeine per day     Occupational Exposure Not Asked     Hobby Hazards Not Asked     Sleep Concern No     Stress Concern No     Weight Concern No     Comment: weight stable     Special Diet No     Comment: trying to eat healthier     Back Care Not Asked     Exercise Yes     Comment: stretching     Bike Helmet Not Asked     Seat Belt Yes     Self-Exams Yes     Parent/sibling w/ CABG, MI or angioplasty before 65F 55M? Yes   Social History Narrative    Works in an office     Social Determinants of Health     Financial Resource Strain: Not on file   Food Insecurity: Not on file   Transportation Needs: Not on file   Physical Activity: Not on file   Stress: Not on file   Social Connections: Not on file   Intimate Partner Violence: Not on file   Housing Stability: Not on file            Review of Systems:   Skin:  not assessed     Eyes:  not assessed    ENT:  not assessed    Respiratory:  Positive for shortness of breath  Cardiovascular:    chest pain;Positive for;lightheadedness;dizziness  Gastroenterology: not assessed    Genitourinary:  not assessed    Musculoskeletal:  not assessed    Neurologic:  not assessed    Psychiatric:  not assessed    Heme/Lymph/Imm:  not assessed    Endocrine:  not assessed           Physical Exam:   Vitals: /64 (BP Location: Right arm, Patient Position: Sitting, Cuff Size: Adult  "Regular)   Pulse 63   Ht 1.6 m (5' 3\")   Wt 60 kg (132 lb 3.2 oz)   LMP  (LMP Unknown)   SpO2 98%   BMI 23.42 kg/m     Wt Readings from Last 4 Encounters:   05/18/23 60 kg (132 lb 3.2 oz)   04/26/23 59.9 kg (132 lb)   04/14/23 61.1 kg (134 lb 9.6 oz)   03/07/23 61.7 kg (136 lb)     GEN: well nourished, in no acute distress.  HEENT:  Pupils equal, round. Sclerae nonicteric.   NECK: Supple, no masses appreciated. No JVD with patient supine.  C/V:  Regular rate and rhythm, no murmur, rub or gallop.    RESP: Respirations are unlabored. Clear to auscultation bilaterally without wheezing, rales, or rhonchi.  GI: Abdomen soft, nontender.  EXTREM: no LE edema.  NEURO: Alert and oriented, cooperative.  SKIN: Warm and dry. + right radial pulse, incision site healed, no bruit       Data:     LIPID RESULTS:  Lab Results   Component Value Date    CHOL 145 10/11/2022    CHOL 149 10/14/2020    HDL 66 10/11/2022    HDL 78 10/14/2020    LDL 67 10/11/2022    LDL 57 10/14/2020    TRIG 61 10/11/2022    TRIG 70 10/14/2020    CHOLHDLRATIO 2.2 08/17/2015     LIVER ENZYME RESULTS:  Lab Results   Component Value Date    AST 35 09/14/2021    AST 21 05/24/2017    ALT 33 12/10/2021    ALT 28 10/14/2020     CBC RESULTS:  Lab Results   Component Value Date    WBC 4.6 05/05/2023    WBC 6.1 02/15/2021    RBC 4.18 05/05/2023    RBC 4.62 02/15/2021    HGB 12.2 05/05/2023    HGB 13.4 02/15/2021    HCT 37.4 05/05/2023    HCT 40.1 02/15/2021    MCV 90 05/05/2023    MCV 87 02/15/2021    MCH 29.2 05/05/2023    MCH 29.0 02/15/2021    MCHC 32.6 05/05/2023    MCHC 33.4 02/15/2021    RDW 14.8 05/05/2023    RDW 13.8 02/15/2021     05/05/2023     02/15/2021     BMP RESULTS:  Lab Results   Component Value Date     (L) 05/05/2023     10/14/2020    POTASSIUM 4.2 05/05/2023    POTASSIUM 4.2 12/10/2021    POTASSIUM 4.0 10/14/2020    CHLORIDE 97 (L) 05/05/2023    CHLORIDE 102 12/10/2021    CHLORIDE 98 10/14/2020    CO2 25 05/05/2023 "    CO2 25 12/10/2021    CO2 28 10/14/2020    ANIONGAP 9 05/05/2023    ANIONGAP 6 12/10/2021    ANIONGAP 7 10/14/2020     (H) 05/05/2023     (H) 12/10/2021     (H) 10/14/2020    BUN 12.2 05/05/2023    BUN 14 12/10/2021    BUN 14 10/14/2020    CR 0.68 05/05/2023    CR 0.77 10/14/2020    GFRESTIMATED 87 05/05/2023    GFRESTIMATED 73 10/14/2020    GFRESTBLACK 85 10/14/2020    ERIC 9.5 05/05/2023    ERIC 8.5 10/14/2020      A1C RESULTS:  No results found for: A1C  INR RESULTS:  Lab Results   Component Value Date    INR 1.4 (H) 05/12/2023    INR 1.09 05/05/2023    INR 2.2 (H) 04/21/2023    INR 2.02 (H) 04/14/2023    INR 3.10 (H) 07/01/2021    INR 3.3 (H) 06/17/2021    INR 4.5 (H) 06/07/2021    INR 2.80 (H) 05/10/2021            Medications     Current Outpatient Medications   Medication Sig Dispense Refill     amLODIPine (NORVASC) 2.5 MG tablet Take 1 tablet (2.5 mg) by mouth daily (Patient taking differently: Take 5 mg by mouth daily) 90 tablet 1     aspirin EC 81 MG tablet Take 1 tablet (81 mg) by mouth daily       atenolol (TENORMIN) 25 MG tablet Take 1 tablet (25 mg) by mouth daily 90 tablet 1     Calcium Carb-Cholecalciferol (CALCIUM 600 + D PO) Take 1,200 mg by mouth daily        Cholecalciferol (VITAMIN D) 2000 UNITS tablet Take 2,000 Units by mouth daily        Coenzyme Q10 (COQ-10) 200 MG CAPS Take 400 mg by mouth daily        diclofenac (VOLTAREN) 1 % topical gel Apply 4 g topically 4 times daily (Patient taking differently: Apply 4 g topically 4 times daily as needed for moderate pain) 350 g 3     escitalopram (LEXAPRO) 5 MG tablet Take 5 mg by mouth daily as needed       gatifloxacin (ZYMAXID) 0.5 % ophthalmic solution Place 1 drop into both eyes daily       ketorolac (ACULAR) 0.5 % ophthalmic solution Place 1 drop into both eyes daily       levothyroxine (SYNTHROID/LEVOTHROID) 50 MCG tablet Take 1 tablet (50 mcg) by mouth daily 90 tablet 3     lisinopril (ZESTRIL) 20 MG tablet Take 1 tablet  (20 mg) by mouth daily 90 tablet 4     Magnesium 400 MG CAPS Take 400 mg by mouth daily       Multiple Vitamin (DAILY MULTIVITAMIN PO) Take by mouth daily       nitroGLYcerin (NITROSTAT) 0.4 MG sublingual tablet Place 1 tablet (0.4 mg) under the tongue every 5 minutes as needed for chest pain 25 tablet 2     prednisoLONE acetate (PRED FORTE) 1 % ophthalmic suspension Place 1 drop into both eyes daily       rosuvastatin (CRESTOR) 40 MG tablet Take 1 tablet (40 mg) by mouth daily 90 tablet 4     vitamin B complex with vitamin C (STRESS TAB) tablet Take 1 tablet by mouth daily        vitamin C (ASCORBIC ACID) 1000 MG TABS Take 2,000 mg by mouth daily       warfarin ANTICOAGULANT (COUMADIN) 5 MG tablet Take 5mg every Monday / Take 2.5mg all other days OR per INR clinic (Patient taking differently: Take 5mg every M, W,F and 2.5mg all other days OR per INR clinic) 50 tablet 1          Past Medical History     Past Medical History:   Diagnosis Date     CAD (coronary artery disease) 04/09/2009 4/8/2009: PTCA and two 2.5x15mm Xience stents to mid LAD lesion; Cath 12/2012- 40-50% stenosis in mid PDA, 80% on D1- medically treat , 5/28/2015 - PTCA and 2.25x8mm Promus PREMIIER NAILA to ostium of 2nd OM     Cerebral artery occlusion with cerebral infarction 2012     CVA (cerebral infarction)      DDD (degenerative disc disease)      Essential hypertension, benign      GERD (gastroesophageal reflux disease)      Hyperlipidemia      Hypothyroidism      Lumbar spinal stenosis      Mitral regurgitation     1+ per 7/2013 Echo     Vertebral artery stenosis, right      Past Surgical History:   Procedure Laterality Date     ARTHROPLASTY KNEE Left 6/1/2020    Procedure: Left total knee arthroplasty (Ward and Nephew #5 narrow femur; #3 tibia; 9 mm tibial poly and 35 mm patella);  Surgeon: Jorge Luis Cheatham MD;  Location: [facility] OR     CORONARY ANGIOGRAPHY ADULT ORDER  12/6/2012    40-50% stenosis to mid PDA, 80% in D1- medically treat      CORONARY ANGIOGRAPHY ADULT ORDER  2015    PTCA and 2.25x8mm Promus PREMIER NAILA to ostium of 2nd OM     CV CORONARY ANGIOGRAM N/A 2023    Procedure: Coronary Angiogram;  Surgeon: Wilfrido Robert MD;  Location:  HE[facility] CARDIAC CATH LAB     DECOMPRESSION, FUSION LUMBAR POSTERIOR THREE + LEVELS, COMBINED  2013    Procedure: COMBINED DECOMPRESSION, FUSION LUMBAR POSTERIOR THREE + LEVELS;  Posterior Lumbar Decompression L3-S1, Fusion L4-S1;  Surgeon: Daniel Harris MD;  Location: [facility] OR     ENDOSCOPIC STRIPPING VEIN(S)       HEART CATH, ANGIOPLASTY  2009    PTCA and two 2.5x15mm Xience stents to mid LAD lesion     HYSTERECTOMY, RICKIE      Fibroids, and Menorrhagia.. Bilateral Oophrectomy     ORTHOPEDIC SURGERY       Stenting of LAD, Angioplasty  09     TONSILLECTOMY      as a child     Family History   Problem Relation Age of Onset     Neurologic Disorder Mother         Dementia, Still living     Ovarian Cancer Mother      Thyroid Disease Mother      C.A.D. Father              Diabetes Father      Coronary Artery Disease Father      Alcohol/Drug Brother      Thyroid Disease Son      Diabetes Son             Allergies   Atorvastatin, Cats, Gluten, and Shellfish allergy        Lisa Mcgraw NP  [facility]  Pager: [phone]

## 2023-05-18 NOTE — LETTER
5/18/2023    Surya Dyer, DO  [address] [facility] [address]    RE: Salome Saeed       Dear Colleague,     I had the pleasure of seeing Salome Saeed in the [facility].  Cardiology Clinic Progress Note  Salome Saeed MRN# [id number]   YOB: 1940 Age: 83 year old   Primary Cardiologist: Dr. Amaya Reason for visit: Follow-up after angiogram            Assessment and Plan:     1.  Moderate nonobstructive coronary artery disease  -10/2022 Lexiscan nuclear stress test negative for ischemia  -5/2023 coronary angiogram without flow-limiting stenosis continued medical management recommended  -She stopped her imdur due to side effects, will keep amlodipine at 5mg daily given anti-anginal effect   -Continue aspirin, statin  -Discussed adding ranexa as an alternative anti-anginal therapy however patient declined as she did not want to take more medication     2. Hypertension, controlled  -Continue atenolol and amlodipine  -Patient has continued to take amlodipine at 5mg daily     3. Hyperlipidemia, controlled  -LDL 67 (10/2022) continue rosuvastatin    Suspect her memory issues the day of her angiogram was secondary to residual sedation.     Changes today: No medication changes made today, Encouraged patient to follow up with her PCP to review non-cardiac causes of chest pain     Follow up plan: Follow up with Astrid Winston NP in October with a lipid panel and BMP prior         History of Presenting Illness:    Salome Saeed is a very pleasant 83 year old female with a history of coronary artery disease s/p NAILA x 2 to LAD, hypercholesterolemia, hypertension, hypothyroidism, back pain with multiple surgeries, Parkinson's disease, CVA in 2013 (manifested as balance issues, thought to be an embolic event from her vertebral artery for which she has been on warfarin ever since), and strong family history of coronary disease.     Historically this patient's had  issues with hypotension and orthostasis which led to reducing her atenolol to 25 mg daily and decreasing her amlodipine to 2.5 mg daily.    Patient saw Dr. Amaya in mid April after experiencing chest pressure and chest tightness that lasted several days.  She attempted to go to the ER however parking lot was too full and too busy and she decided not to go.  He saw her on 4/14/2023 and he sent her to the emergency room where her troponins were negative, as well as her CT scan.  She was offered admission, but refused and preferred outpatient evaluation.    He saw her in follow-up 4/28/2023 continue to have chest discomfort that worsened with activity.  He had started her on Imdur 15 mg and did not feel it was helpful at that point.  He recommended coronary angiography which revealed normal left main, 20% ostial LAD narrowing, 30% mid LAD in-stent restenosis, 20% proximal RCA narrowing, 20% mRCA, 40 to 50% stenosis at the ostium of RPDA.  Continued medical management was recommended.    Patient is here today for follow-up after her angiogram. She continues to get chest heaviness that radiates into her left arm that occurs sporadically at rest and with exertion. She relaxes and it resolves within 5 minutes. She is no longer taking the isosorbide as it made her feel generally unwell. In the afternoon the day of her angiogram she had difficulty remembering how to make a pizza or her remote vs her cell phone. This has not occurred since.     She continues to have some residual orthostasis. Occasionally feels dizzy when she turns her head too quickly.      Denies lightheadedness or other presyncopal symptoms. Denies tachycardia or palpitations.  Denies orthopnea or PND.    Labs from 5/5/23 showed sodium 131, potassium 4.2, BUN 12.2, Creatinine 0.68, GFR 87, hemoglobin 12.2, Plt 212.    Blood pressure 138/64 and HR 63 in clinic today. She has been compliant with her medications.         Recent Hospitalizations   4/2023 ER  visit for chest pain         Social History      Social History     Socioeconomic History    Marital status:      Spouse name: Not on file    Number of children: Not on file    Years of education: Not on file    Highest education level: Not on file   Occupational History    Not on file   Tobacco Use    Smoking status: Former     Packs/day: 0.10     Types: Cigarettes     Quit date: 1965     Years since quittin.4    Smokeless tobacco: Never   Vaping Use    Vaping status: Not on file   Substance and Sexual Activity    Alcohol use: Yes     Alcohol/week: 0.0 standard drinks of alcohol     Comment: 2 glasses wine per month, maybe    Drug use: No    Sexual activity: Not Currently   Other Topics Concern     Service Not Asked    Blood Transfusions No    Caffeine Concern Yes     Comment: 5 cups caffeine per day    Occupational Exposure Not Asked    Hobby Hazards Not Asked    Sleep Concern No    Stress Concern No    Weight Concern No     Comment: weight stable    Special Diet No     Comment: trying to eat healthier    Back Care Not Asked    Exercise Yes     Comment: stretching    Bike Helmet Not Asked    Seat Belt Yes    Self-Exams Yes    Parent/sibling w/ CABG, MI or angioplasty before 65F 55M? Yes   Social History Narrative    Works in an office     Social Determinants of Health     Financial Resource Strain: Not on file   Food Insecurity: Not on file   Transportation Needs: Not on file   Physical Activity: Not on file   Stress: Not on file   Social Connections: Not on file   Intimate Partner Violence: Not on file   Housing Stability: Not on file            Review of Systems:   Skin:  not assessed     Eyes:  not assessed    ENT:  not assessed    Respiratory:  Positive for shortness of breath  Cardiovascular:    chest pain;Positive for;lightheadedness;dizziness  Gastroenterology: not assessed    Genitourinary:  not assessed    Musculoskeletal:  not assessed    Neurologic:  not assessed    Psychiatric:   "not assessed    Heme/Lymph/Imm:  not assessed    Endocrine:  not assessed           Physical Exam:   Vitals: /64 (BP Location: Right arm, Patient Position: Sitting, Cuff Size: Adult Regular)   Pulse 63   Ht 1.6 m (5' 3\")   Wt 60 kg (132 lb 3.2 oz)   LMP  (LMP Unknown)   SpO2 98%   BMI 23.42 kg/m     Wt Readings from Last 4 Encounters:   05/18/23 60 kg (132 lb 3.2 oz)   04/26/23 59.9 kg (132 lb)   04/14/23 61.1 kg (134 lb 9.6 oz)   03/07/23 61.7 kg (136 lb)     GEN: well nourished, in no acute distress.  HEENT:  Pupils equal, round. Sclerae nonicteric.   NECK: Supple, no masses appreciated. No JVD with patient supine.  C/V:  Regular rate and rhythm, no murmur, rub or gallop.    RESP: Respirations are unlabored. Clear to auscultation bilaterally without wheezing, rales, or rhonchi.  GI: Abdomen soft, nontender.  EXTREM: no LE edema.  NEURO: Alert and oriented, cooperative.  SKIN: Warm and dry. + right radial pulse, incision site healed, no bruit       Data:     LIPID RESULTS:  Lab Results   Component Value Date    CHOL 145 10/11/2022    CHOL 149 10/14/2020    HDL 66 10/11/2022    HDL 78 10/14/2020    LDL 67 10/11/2022    LDL 57 10/14/2020    TRIG 61 10/11/2022    TRIG 70 10/14/2020    CHOLHDLRATIO 2.2 08/17/2015     LIVER ENZYME RESULTS:  Lab Results   Component Value Date    AST 35 09/14/2021    AST 21 05/24/2017    ALT 33 12/10/2021    ALT 28 10/14/2020     CBC RESULTS:  Lab Results   Component Value Date    WBC 4.6 05/05/2023    WBC 6.1 02/15/2021    RBC 4.18 05/05/2023    RBC 4.62 02/15/2021    HGB 12.2 05/05/2023    HGB 13.4 02/15/2021    HCT 37.4 05/05/2023    HCT 40.1 02/15/2021    MCV 90 05/05/2023    MCV 87 02/15/2021    MCH 29.2 05/05/2023    MCH 29.0 02/15/2021    MCHC 32.6 05/05/2023    MCHC 33.4 02/15/2021    RDW 14.8 05/05/2023    RDW 13.8 02/15/2021     05/05/2023     02/15/2021     BMP RESULTS:  Lab Results   Component Value Date     (L) 05/05/2023     10/14/2020 "    POTASSIUM 4.2 05/05/2023    POTASSIUM 4.2 12/10/2021    POTASSIUM 4.0 10/14/2020    CHLORIDE 97 (L) 05/05/2023    CHLORIDE 102 12/10/2021    CHLORIDE 98 10/14/2020    CO2 25 05/05/2023    CO2 25 12/10/2021    CO2 28 10/14/2020    ANIONGAP 9 05/05/2023    ANIONGAP 6 12/10/2021    ANIONGAP 7 10/14/2020     (H) 05/05/2023     (H) 12/10/2021     (H) 10/14/2020    BUN 12.2 05/05/2023    BUN 14 12/10/2021    BUN 14 10/14/2020    CR 0.68 05/05/2023    CR 0.77 10/14/2020    GFRESTIMATED 87 05/05/2023    GFRESTIMATED 73 10/14/2020    GFRESTBLACK 85 10/14/2020    ERIC 9.5 05/05/2023    ERIC 8.5 10/14/2020      A1C RESULTS:  No results found for: A1C  INR RESULTS:  Lab Results   Component Value Date    INR 1.4 (H) 05/12/2023    INR 1.09 05/05/2023    INR 2.2 (H) 04/21/2023    INR 2.02 (H) 04/14/2023    INR 3.10 (H) 07/01/2021    INR 3.3 (H) 06/17/2021    INR 4.5 (H) 06/07/2021    INR 2.80 (H) 05/10/2021            Medications     Current Outpatient Medications   Medication Sig Dispense Refill    amLODIPine (NORVASC) 2.5 MG tablet Take 1 tablet (2.5 mg) by mouth daily (Patient taking differently: Take 5 mg by mouth daily) 90 tablet 1    aspirin EC 81 MG tablet Take 1 tablet (81 mg) by mouth daily      atenolol (TENORMIN) 25 MG tablet Take 1 tablet (25 mg) by mouth daily 90 tablet 1    Calcium Carb-Cholecalciferol (CALCIUM 600 + D PO) Take 1,200 mg by mouth daily       Cholecalciferol (VITAMIN D) 2000 UNITS tablet Take 2,000 Units by mouth daily       Coenzyme Q10 (COQ-10) 200 MG CAPS Take 400 mg by mouth daily       diclofenac (VOLTAREN) 1 % topical gel Apply 4 g topically 4 times daily (Patient taking differently: Apply 4 g topically 4 times daily as needed for moderate pain) 350 g 3    escitalopram (LEXAPRO) 5 MG tablet Take 5 mg by mouth daily as needed      gatifloxacin (ZYMAXID) 0.5 % ophthalmic solution Place 1 drop into both eyes daily      ketorolac (ACULAR) 0.5 % ophthalmic solution Place 1 drop  into both eyes daily      levothyroxine (SYNTHROID/LEVOTHROID) 50 MCG tablet Take 1 tablet (50 mcg) by mouth daily 90 tablet 3    lisinopril (ZESTRIL) 20 MG tablet Take 1 tablet (20 mg) by mouth daily 90 tablet 4    Magnesium 400 MG CAPS Take 400 mg by mouth daily      Multiple Vitamin (DAILY MULTIVITAMIN PO) Take by mouth daily      nitroGLYcerin (NITROSTAT) 0.4 MG sublingual tablet Place 1 tablet (0.4 mg) under the tongue every 5 minutes as needed for chest pain 25 tablet 2    prednisoLONE acetate (PRED FORTE) 1 % ophthalmic suspension Place 1 drop into both eyes daily      rosuvastatin (CRESTOR) 40 MG tablet Take 1 tablet (40 mg) by mouth daily 90 tablet 4    vitamin B complex with vitamin C (STRESS TAB) tablet Take 1 tablet by mouth daily       vitamin C (ASCORBIC ACID) 1000 MG TABS Take 2,000 mg by mouth daily      warfarin ANTICOAGULANT (COUMADIN) 5 MG tablet Take 5mg every Monday / Take 2.5mg all other days OR per INR clinic (Patient taking differently: Take 5mg every M, W,F and 2.5mg all other days OR per INR clinic) 50 tablet 1          Past Medical History     Past Medical History:   Diagnosis Date    CAD (coronary artery disease) 04/09/2009 4/8/2009: PTCA and two 2.5x15mm Xience stents to mid LAD lesion; Cath 12/2012- 40-50% stenosis in mid PDA, 80% on D1- medically treat , 5/28/2015 - PTCA and 2.25x8mm Promus PREMIIER NAILA to ostium of 2nd OM    Cerebral artery occlusion with cerebral infarction 2012    CVA (cerebral infarction)     DDD (degenerative disc disease)     Essential hypertension, benign     GERD (gastroesophageal reflux disease)     Hyperlipidemia     Hypothyroidism     Lumbar spinal stenosis     Mitral regurgitation     1+ per 7/2013 Echo    Vertebral artery stenosis, right      Past Surgical History:   Procedure Laterality Date    ARTHROPLASTY KNEE Left 6/1/2020    Procedure: Left total knee arthroplasty (Ward and Nephew #5 narrow femur; #3 tibia; 9 mm tibial poly and 35 mm patella);   Surgeon: Jorge Luis Cheatham MD;  Location: [facility] OR    CORONARY ANGIOGRAPHY ADULT ORDER  2012    40-50% stenosis to mid PDA, 80% in D1- medically treat    CORONARY ANGIOGRAPHY ADULT ORDER  2015    PTCA and 2.25x8mm Promus PREMIER NAILA to ostium of 2nd OM    CV CORONARY ANGIOGRAM N/A 2023    Procedure: Coronary Angiogram;  Surgeon: Wilfrido Robert MD;  Location:  [facility] C[facility] LAB    DECOMPRESSION, FUSION LUMBAR POSTERIOR THREE + LEVELS, COMBINED  2013    Procedure: COMBINED DECOMPRESSION, FUSION LUMBAR POSTERIOR THREE + LEVELS;  Posterior Lumbar Decompression L3-S1, Fusion L4-S1;  Surgeon: Daniel Harris MD;  Location: [facility] OR    ENDOSCOPIC STRIPPING VEIN(S)      HEART CATH, ANGIOPLASTY  2009    PTCA and two 2.5x15mm Xience stents to mid LAD lesion    HYSTERECTOMY, RICKIE      Fibroids, and Menorrhagia.. Bilateral Oophrectomy    ORTHOPEDIC SURGERY      Stenting of LAD, Angioplasty  09    TONSILLECTOMY      as a child     Family History   Problem Relation Age of Onset    Neurologic Disorder Mother         Dementia, Still living    Ovarian Cancer Mother     Thyroid Disease Mother     C.A.D. Father             Diabetes Father     Coronary Artery Disease Father     Alcohol/Drug Brother     Thyroid Disease Son     Diabetes Son             Allergies   Atorvastatin, Cats, Gluten, and Shellfish allergy        Lisa Mcgraw NP  [facility]  Pager: [phone]        Thank you for allowing me to participate in the care of your patient.      Sincerely,     Lisa Mcgraw NP     [facility]  cc:   No referring provider defined for this encounter.         no chest pain, no cough, and no shortness of breath. no shortness of breath.

## 2023-05-18 NOTE — PATIENT INSTRUCTIONS
Today's Recommendations    Your angiogram did not show any concerning narrowings in your heart arteries  I recommend you follow up with your primary doctor to review for any non-cardiac causes of your chest discomfort   Continue all medications without changes.  Please follow up with Astrid Winston NP in October as planned with labs before.    Please send a [employer] message or call [phone] to the RN team with questions or concerns.     Scheduling number [phone]  KIANA Mcclellan, CNP

## 2023-05-19 ENCOUNTER — ANTICOAGULATION THERAPY VISIT (OUTPATIENT)
Dept: ANTICOAGULATION | Facility: CLINIC | Age: 83
End: 2023-05-19

## 2023-05-19 ENCOUNTER — LAB (OUTPATIENT)
Dept: LAB | Facility: CLINIC | Age: 83
End: 2023-05-19
Payer: COMMERCIAL

## 2023-05-19 DIAGNOSIS — I63.9 CEREBROVASCULAR ACCIDENT INVOLVING CEREBELLUM (H): ICD-10-CM

## 2023-05-19 DIAGNOSIS — Z79.01 LONG TERM CURRENT USE OF ANTICOAGULANT THERAPY: ICD-10-CM

## 2023-05-19 DIAGNOSIS — Z79.01 LONG TERM CURRENT USE OF ANTICOAGULANT THERAPY: Primary | ICD-10-CM

## 2023-05-19 LAB — INR BLD: 2.8 (ref 0.9–1.1)

## 2023-05-19 PROCEDURE — 85610 PROTHROMBIN TIME: CPT

## 2023-05-19 PROCEDURE — 36416 COLLJ CAPILLARY BLOOD SPEC: CPT

## 2023-05-19 NOTE — PROGRESS NOTES
ANTICOAGULATION MANAGEMENT     Salome Saeed 83 year old female is on warfarin with therapeutic INR result. (Goal INR 2.0-3.0)    Recent labs: (last 7 days)     05/19/23  1305   INR 2.8*       ASSESSMENT     Warfarin Lab Questionnaire    Warfarin Doses Last 7 Days          5/19/2023   Warfarin Lab Questionnaire   Missed doses within past 14 days? No   Changes in diet or alcohol within past 14 days? No   Medication changes since last result? No   Injuries or illness since last result? No   New shortness of breath, severe headaches or sudden changes in vision since last result? No   Abnormal bleeding since last result? No   Upcoming surgery, procedure? No        Previous result: Subtherapeutic.  Warfarin was previously held for a procedure.  Additional findings: None       PLAN     Recommended plan for no diet, medication or health factor changes affecting INR     Dosing Instructions: Continue your current warfarin dose with next INR in 2 weeks       Summary  As of 5/19/2023    Full warfarin instructions:  5 mg every Mon, Wed, Fri; 2.5 mg all other days   Next INR check:  6/2/2023             Detailed voice message left for Loreta with dosing instructions and follow up date.     Contact [phone]  to schedule and with any changes, questions or concerns.     Education provided:     None required    Plan made per [facility] anticoagulation protocol    Sima Bonilla RN  Anticoagulation Clinic  5/19/2023    _______________________________________________________________________     Anticoagulation Episode Summary     Current INR goal:  2.0-3.0   TTR:  54.9 % (1 y)   Target end date:  Indefinite   Send INR reminders to:  [facility]    Indications    Long term current use of anticoagulant therapy [Z79.01]  Cerebrovascular accident involving cerebellum (H) [I63.9]           Comments:           Anticoagulation Care Providers     Provider Role Specialty Phone number    Surya Dyer DO Referring Family Medicine  [phone]

## 2023-05-22 ENCOUNTER — OFFICE VISIT (OUTPATIENT)
Dept: FAMILY MEDICINE | Facility: CLINIC | Age: 83
End: 2023-05-22
Payer: COMMERCIAL

## 2023-05-22 VITALS
HEART RATE: 66 BPM | SYSTOLIC BLOOD PRESSURE: 132 MMHG | HEIGHT: 63 IN | RESPIRATION RATE: 16 BRPM | TEMPERATURE: 96.8 F | DIASTOLIC BLOOD PRESSURE: 60 MMHG | BODY MASS INDEX: 23.32 KG/M2 | OXYGEN SATURATION: 98 % | WEIGHT: 131.6 LBS

## 2023-05-22 DIAGNOSIS — I10 BENIGN ESSENTIAL HYPERTENSION: Chronic | ICD-10-CM

## 2023-05-22 DIAGNOSIS — F41.9 ANXIETY: Primary | ICD-10-CM

## 2023-05-22 DIAGNOSIS — G20.C PARKINSONISM, UNSPECIFIED PARKINSONISM TYPE (H): ICD-10-CM

## 2023-05-22 DIAGNOSIS — M17.12 OSTEOARTHRITIS OF LEFT KNEE, UNSPECIFIED OSTEOARTHRITIS TYPE: ICD-10-CM

## 2023-05-22 PROCEDURE — 99214 OFFICE O/P EST MOD 30 MIN: CPT | Performed by: FAMILY MEDICINE

## 2023-05-22 RX ORDER — CLONAZEPAM 0.5 MG/1
0.25 TABLET ORAL DAILY PRN
Qty: 30 TABLET | Refills: 0 | Status: ON HOLD | OUTPATIENT
Start: 2023-05-22 | End: 2024-06-21

## 2023-05-22 RX ORDER — PROPRANOLOL HCL 60 MG
60 CAPSULE, EXTENDED RELEASE 24HR ORAL DAILY
Qty: 90 CAPSULE | Refills: 1 | Status: SHIPPED | OUTPATIENT
Start: 2023-05-22 | End: 2023-11-03

## 2023-05-22 RX ORDER — ESCITALOPRAM OXALATE 5 MG/1
5 TABLET ORAL DAILY
Qty: 90 TABLET | Refills: 1 | Status: SHIPPED | OUTPATIENT
Start: 2023-05-22 | End: 2023-11-20

## 2023-05-22 ASSESSMENT — ANXIETY QUESTIONNAIRES
8. IF YOU CHECKED OFF ANY PROBLEMS, HOW DIFFICULT HAVE THESE MADE IT FOR YOU TO DO YOUR WORK, TAKE CARE OF THINGS AT HOME, OR GET ALONG WITH OTHER PEOPLE?: NOT DIFFICULT AT ALL
1. FEELING NERVOUS, ANXIOUS, OR ON EDGE: SEVERAL DAYS
GAD7 TOTAL SCORE: 2
2. NOT BEING ABLE TO STOP OR CONTROL WORRYING: NOT AT ALL
GAD7 TOTAL SCORE: 2
6. BECOMING EASILY ANNOYED OR IRRITABLE: NOT AT ALL
GAD7 TOTAL SCORE: 2
4. TROUBLE RELAXING: NOT AT ALL
7. FEELING AFRAID AS IF SOMETHING AWFUL MIGHT HAPPEN: NOT AT ALL
5. BEING SO RESTLESS THAT IT IS HARD TO SIT STILL: NOT AT ALL
7. FEELING AFRAID AS IF SOMETHING AWFUL MIGHT HAPPEN: NOT AT ALL
3. WORRYING TOO MUCH ABOUT DIFFERENT THINGS: SEVERAL DAYS
IF YOU CHECKED OFF ANY PROBLEMS ON THIS QUESTIONNAIRE, HOW DIFFICULT HAVE THESE PROBLEMS MADE IT FOR YOU TO DO YOUR WORK, TAKE CARE OF THINGS AT HOME, OR GET ALONG WITH OTHER PEOPLE: NOT DIFFICULT AT ALL

## 2023-05-22 ASSESSMENT — PATIENT HEALTH QUESTIONNAIRE - PHQ9
SUM OF ALL RESPONSES TO PHQ QUESTIONS 1-9: 4
10. IF YOU CHECKED OFF ANY PROBLEMS, HOW DIFFICULT HAVE THESE PROBLEMS MADE IT FOR YOU TO DO YOUR WORK, TAKE CARE OF THINGS AT HOME, OR GET ALONG WITH OTHER PEOPLE: NOT DIFFICULT AT ALL
SUM OF ALL RESPONSES TO PHQ QUESTIONS 1-9: 4

## 2023-05-22 NOTE — PROGRESS NOTES
Assessment & Plan     Anxiety  Appears to be contributing to chest pressure. Continue current regimen. Refill of clonzepam given. Last Rx lasted 2 years.  - clonazePAM (KLONOPIN) 0.5 MG tablet; Take 0.5 tablets (0.25 mg) by mouth daily as needed for anxiety  - escitalopram (LEXAPRO) 5 MG tablet; Take 1 tablet (5 mg) by mouth daily    Osteoarthritis of left knee, unspecified osteoarthritis type  Stable, continue to use PRN.  - diclofenac (VOLTAREN) 1 % topical gel; Apply 4 g topically 4 times daily as needed for moderate pain    Benign essential hypertension  Well controlled. Will try changing atenolol to propranolol to help with tremors related to Parkinsons. If not beneficial or BP not well controlled, can go back to atenolol. Follow up in 2 weeks to recheck BP.  - propranolol ER (INDERAL LA) 60 MG 24 hr capsule; Take 1 capsule (60 mg) by mouth daily    Parkinsonism, unspecified Parkinsonism type (H)  Has been following with Dr. Keene but she worries about being able to drive to [place]. Will place referral to [facility] in [place] -Dr. Mock.  - Adult Neurology  Referral; Future          Surya Dyer [facility]    Efe Kan is a 83 year old, presenting for the following health issues:  Recheck Medication        5/22/2023    12:29 PM   Additional Questions   Roomed by Peggy Casillas CMA   Accompanied by Self     History of Present Illness       Mental Health Follow-up:  Patient presents to follow-up on Anxiety.    Patient's anxiety since last visit has been:  No change  The patient is not having other symptoms associated with anxiety.  Any significant life events: No  Patient is feeling anxious or having panic attacks.  Patient has no concerns about alcohol or drug use.    She eats 2-3 servings of fruits and vegetables daily.She consumes 0 sweetened beverage(s) daily.She exercises with enough effort to increase her heart rate 9 or less  minutes per day.  She exercises with enough effort to increase her heart rate 3 or less days per week.   She is taking medications regularly.    Today's PHQ-9         PHQ-9 Total Score: 4    PHQ-9 Q9 Thoughts of better off dead/self-harm past 2 weeks :   Not at all    How difficult have these problems made it for you to do your work, take care of things at home, or get along with other people: Not difficult at all  Today's VALERIA-7 Score: 2       Hyperlipidemia Follow-Up      Are you regularly taking any medication or supplement to lower your cholesterol?   Yes- Rosuvastatin 40mg    Are you having muscle aches or other side effects that you think could be caused by your cholesterol lowering medication?  No    Hypertension Follow-up      Do you check your blood pressure regularly outside of the clinic? No     Are you following a low salt diet? No    Are your blood pressures ever more than 140 on the top number (systolic) OR more   than 90 on the bottom number (diastolic), for example 140/90? No    Denies any headaches, lightheadedness, palpitations, shortness of breath, dyspnea, numbness/tingling.  Has noticed vision changes but getting new glasses. She does feel some chest pressure and had normal cardiac testing  She'll notice the pressure and various times but can occur at rest and usually resolves with time. Taking 1/2 tablet clonazepam has been helpful as well.     Social History     Tobacco Use     Smoking status: Former     Packs/day: 0.10     Types: Cigarettes     Quit date: 1965     Years since quittin.4     Smokeless tobacco: Never   Substance Use Topics     Alcohol use: Yes     Alcohol/week: 0.0 standard drinks of alcohol     Comment: 2 glasses wine per month, maybe     Drug use: No         3/5/2019    11:45 AM 2/15/2021    12:22 PM 2023    12:38 PM   VALERIA-7 SCORE   Total Score   2 (minimal anxiety)   Total Score 0 0 2         3/5/2019    11:45 AM 2/15/2021    12:22 PM 2023    12:37 PM   PHQ  "  PHQ-9 Total Score 0 0 4   Q9: Thoughts of better off dead/self-harm past 2 weeks Not at all Not at all Not at all         5/22/2023    12:37 PM   Last PHQ-9   1.  Little interest or pleasure in doing things 0   2.  Feeling down, depressed, or hopeless 0   3.  Trouble falling or staying asleep, or sleeping too much 0   4.  Feeling tired or having little energy 3   5.  Poor appetite or overeating 1   6.  Feeling bad about yourself 0   7.  Trouble concentrating 0   8.  Moving slowly or restless 0   Q9: Thoughts of better off dead/self-harm past 2 weeks 0   PHQ-9 Total Score 4         5/22/2023    12:38 PM   VALERIA-7    1. Feeling nervous, anxious, or on edge 1   2. Not being able to stop or control worrying 0   3. Worrying too much about different things 1   4. Trouble relaxing 0   5. Being so restless that it is hard to sit still 0   6. Becoming easily annoyed or irritable 0   7. Feeling afraid, as if something awful might happen 0   VALERIA-7 Total Score 2   If you checked any problems, how difficult have they made it for you to do your work, take care of things at home, or get along with other people? Not difficult at all       Review of Systems   Constitutional, HEENT, cardiovascular, pulmonary, gi and gu systems are negative, except as otherwise noted.      Objective    /60   Pulse 66   Temp 96.8  F (36  C) (Tympanic)   Resp 16   Ht 1.6 m (5' 3\")   Wt 59.7 kg (131 lb 9.6 oz)   LMP  (LMP Unknown)   SpO2 98%   BMI 23.31 kg/m    Body mass index is 23.31 kg/m .  Physical Exam   GENERAL: healthy, alert and no distress  RESP: lungs clear to auscultation - no rales, rhonchi or wheezes  CV: regular rates and rhythm, normal S1 S2, no S3 or S4 and no murmur, click or rub  MS: no gross musculoskeletal defects noted, no edema  PSYCH: mentation appears normal, affect normal/bright              "

## 2023-05-25 ENCOUNTER — TRANSFERRED RECORDS (OUTPATIENT)
Dept: HEALTH INFORMATION MANAGEMENT | Facility: CLINIC | Age: 83
End: 2023-05-25
Payer: COMMERCIAL

## 2023-06-07 ENCOUNTER — ANTICOAGULATION THERAPY VISIT (OUTPATIENT)
Dept: ANTICOAGULATION | Facility: CLINIC | Age: 83
End: 2023-06-07

## 2023-06-07 ENCOUNTER — ALLIED HEALTH/NURSE VISIT (OUTPATIENT)
Dept: NURSING | Facility: CLINIC | Age: 83
End: 2023-06-07
Payer: COMMERCIAL

## 2023-06-07 ENCOUNTER — LAB (OUTPATIENT)
Dept: LAB | Facility: CLINIC | Age: 83
End: 2023-06-07
Payer: COMMERCIAL

## 2023-06-07 VITALS
RESPIRATION RATE: 18 BRPM | HEART RATE: 56 BPM | TEMPERATURE: 97.6 F | DIASTOLIC BLOOD PRESSURE: 64 MMHG | BODY MASS INDEX: 23.7 KG/M2 | WEIGHT: 133.8 LBS | SYSTOLIC BLOOD PRESSURE: 122 MMHG | OXYGEN SATURATION: 97 %

## 2023-06-07 DIAGNOSIS — Z79.01 LONG TERM CURRENT USE OF ANTICOAGULANT THERAPY: Primary | ICD-10-CM

## 2023-06-07 DIAGNOSIS — I10 BENIGN ESSENTIAL HYPERTENSION: Primary | Chronic | ICD-10-CM

## 2023-06-07 DIAGNOSIS — I63.9 CEREBROVASCULAR ACCIDENT INVOLVING CEREBELLUM (H): ICD-10-CM

## 2023-06-07 DIAGNOSIS — Z79.01 LONG TERM CURRENT USE OF ANTICOAGULANT THERAPY: ICD-10-CM

## 2023-06-07 LAB — INR BLD: 2.9 (ref 0.9–1.1)

## 2023-06-07 PROCEDURE — 99207 PR NO CHARGE NURSE ONLY: CPT

## 2023-06-07 PROCEDURE — 85610 PROTHROMBIN TIME: CPT

## 2023-06-07 PROCEDURE — 36416 COLLJ CAPILLARY BLOOD SPEC: CPT

## 2023-06-07 ASSESSMENT — PAIN SCALES - GENERAL: PAINLEVEL: NO PAIN (0)

## 2023-06-07 NOTE — PROGRESS NOTES
Salome Saeed is being followed for Blood Pressure management.    See 5/22/23 office note for recent changes - states tremors have improved- no tremors in the morning.  She states tremors are brief and maybe 1- 2 times a day  States she feels better.     BP Readings from Last 3 Encounters:   06/07/23 122/64   05/22/23 132/60   05/18/23 138/64       Wt Readings from Last 3 Encounters:   06/07/23 60.7 kg (133 lb 12.8 oz)   05/22/23 59.7 kg (131 lb 9.6 oz)   05/18/23 60 kg (132 lb 3.2 oz)       Is pulse 55 or greater? - Yes    Pulse Readings from Last 3 Encounters:   06/07/23 56   05/22/23 66   05/18/23 63       Current blood pressure medication(s): patient states she is not sure if the Norvasc she takes is 2.5  Or 5mg     took her am meds at 9am apt was 11:15am   Current Outpatient Medications   Medication Sig Dispense Refill     amLODIPine (NORVASC) 2.5 MG tablet Take 1 tablet (2.5 mg) by mouth daily (Patient taking differently: Take 5 mg by mouth daily) 90 tablet 1     aspirin EC 81 MG tablet Take 1 tablet (81 mg) by mouth daily       Calcium Carb-Cholecalciferol (CALCIUM 600 + D PO) Take 1,200 mg by mouth daily        Cholecalciferol (VITAMIN D) 2000 UNITS tablet Take 2,000 Units by mouth daily        clonazePAM (KLONOPIN) 0.5 MG tablet Take 0.5 tablets (0.25 mg) by mouth daily as needed for anxiety 30 tablet 0     Coenzyme Q10 (COQ-10) 200 MG CAPS Take 400 mg by mouth daily        diclofenac (VOLTAREN) 1 % topical gel Apply 4 g topically 4 times daily as needed for moderate pain 350 g 1     escitalopram (LEXAPRO) 5 MG tablet Take 1 tablet (5 mg) by mouth daily 90 tablet 1     gatifloxacin (ZYMAXID) 0.5 % ophthalmic solution Place 1 drop into both eyes daily       ketorolac (ACULAR) 0.5 % ophthalmic solution Place 1 drop into both eyes daily       levothyroxine (SYNTHROID/LEVOTHROID) 50 MCG tablet Take 1 tablet (50 mcg) by mouth daily 90 tablet 3     lisinopril (ZESTRIL) 20 MG tablet Take 1 tablet (20 mg)  by mouth daily 90 tablet 4     Magnesium 400 MG CAPS Take 400 mg by mouth daily       Multiple Vitamin (DAILY MULTIVITAMIN PO) Take by mouth daily       nitroGLYcerin (NITROSTAT) 0.4 MG sublingual tablet Place 1 tablet (0.4 mg) under the tongue every 5 minutes as needed for chest pain 25 tablet 2     prednisoLONE acetate (PRED FORTE) 1 % ophthalmic suspension Place 1 drop into both eyes daily       propranolol ER (INDERAL LA) 60 MG 24 hr capsule Take 1 capsule (60 mg) by mouth daily 90 capsule 1     rosuvastatin (CRESTOR) 40 MG tablet Take 1 tablet (40 mg) by mouth daily 90 tablet 4     vitamin B complex with vitamin C (STRESS TAB) tablet Take 1 tablet by mouth daily        vitamin C (ASCORBIC ACID) 1000 MG TABS Take 2,000 mg by mouth daily       warfarin ANTICOAGULANT (COUMADIN) 5 MG tablet Take 5mg every Monday / Take 2.5mg all other days OR per INR clinic (Patient taking differently: Take 5mg every M, W,F and 2.5mg all other days OR per INR clinic) 50 tablet 1          1. Follow up instructions include:     Per provider .      SUBJECTIVE:                                                    The patient is taking medication as prescribed and is tolerating well.   Patient is not monitoring Blood Pressure at home.   Last 3 home readings   N/a       Out of the following complicating factors: Cough, Headache, Lightheadedness, Shortness of breath, Fatigue, Nausea, Sexual Dysfunction, New onset of swelling or edema, Weakness and New onset of Chest Pain, the patient reports:  None    OBJECTIVE:                                                      Today's BP completed using cuff size: regular on right side arm.      Potassium   Date Value Ref Range Status   05/05/2023 4.2 3.4 - 5.3 mmol/L Final   12/10/2021 4.2 3.4 - 5.3 mmol/L Final   10/14/2020 4.0 3.4 - 5.3 mmol/L Final     Creatinine   Date Value Ref Range Status   05/05/2023 0.68 0.51 - 0.95 mg/dL Final   10/14/2020 0.77 0.52 - 1.04 mg/dL Final     Urea Nitrogen   Date  Value Ref Range Status   05/05/2023 12.2 8.0 - 23.0 mg/dL Final   12/10/2021 14 7 - 30 mg/dL Final   10/14/2020 14 7 - 30 mg/dL Final     GFR Estimate   Date Value Ref Range Status   05/05/2023 87 >60 mL/min/1.73m2 Final     Comment:     eGFR calculated using 2021 CKD-EPI equation.   10/14/2020 73 >60 mL/min/[1.73_m2] Final     Comment:     Non  GFR Calc  Starting 12/18/2018, serum creatinine based estimated GFR (eGFR) will be   calculated using the Chronic Kidney Disease Epidemiology Collaboration   (CKD-EPI) equation.           Education:  general discussion/verbal explanation  Ways to help improve BP/HTN:   Medications as directed   Monitor BP at home and call if consistently > 130/80 and or experiencing symptoms.   Lose weight  Diet low in fat and rich in fruits, vegetables and low fat dairy products  Reduce salt in diet  Do something active for at least 30 minutes a day on most days of the week  Cut down on alcohol (if you drink more than 2 drinks per day)  Decrease stress (exercise, read, yoga, meditation, time for self, etc.)   Patient was given an opportunity to ask questions.    Patient verbalized understanding of this plan and is agreeable.    Argelia LEDESMA RN   [facility] Triage

## 2023-06-07 NOTE — PROGRESS NOTES
ANTICOAGULATION MANAGEMENT     Salome Saeed 83 year old female is on warfarin with therapeutic INR result. (Goal INR 2.0-3.0)    Recent labs: (last 7 days)     06/07/23  1100   INR 2.9*       ASSESSMENT     Warfarin Lab Questionnaire    Warfarin Doses Last 7 Days    Verified warfarin dosing with patient, she is taking 5 mg every Mon, Wed, Fri; 2.5 mg all other days          6/7/2023   Warfarin Lab Questionnaire   Missed doses within past 14 days? No   Changes in diet or alcohol within past 14 days? No   Medication changes since last result? No   Injuries or illness since last result? No   New shortness of breath, severe headaches or sudden changes in vision since last result? No   Abnormal bleeding since last result? No   Upcoming surgery, procedure? No        Previous result: Therapeutic last visit; previously outside of goal range  Additional findings: None       PLAN     Recommended plan for no diet, medication or health factor changes affecting INR     Dosing Instructions: Continue your current warfarin dose with next INR in 3 weeks       Summary  As of 6/7/2023    Full warfarin instructions:  5 mg every Mon, Wed, Fri; 2.5 mg all other days   Next INR check:  6/28/2023             Telephone call with Loreta who agrees to plan and repeated back plan correctly    Lab visit scheduled    Education provided:     Please call back if any changes to your diet, medications or how you've been taking warfarin    Contact [phone]  with any changes, questions or concerns.     Plan made per [facility] anticoagulation protocol    Renee Martinez RN  Anticoagulation Clinic  6/7/2023    _______________________________________________________________________     Anticoagulation Episode Summary     Current INR goal:  2.0-3.0   TTR:  57.2 % (1 y)   Target end date:  Indefinite   Send INR reminders to:  [facility]    Indications    Long term current use of anticoagulant therapy [Z79.01]  Cerebrovascular accident involving  cerebellum (H) [I63.9]           Comments:           Anticoagulation Care Providers     Provider Role Specialty Phone number    Surya Dyer DO Referring Family Medicine [phone]

## 2023-06-12 NOTE — PROGRESS NOTES
SUBJECTIVE:                                                    Salome Saeed is a 77 year old female who presents to clinic today for the following health issues:      Patient reports that she feels nauseous still, waxes and wanes, vomited one time, no blood or coffee ground emesis. When she woke up yesterday morning her head felt wobbly and got very dizzy at 10:30am and at 12:30pm. Feels nauseated every morning. Has had constipation and diarrhea intermittently and has had lower back pain frequently. Ultrasound was unremarkable for gall stones. Surgery was on 26th (4-6 weeks ago), has felt worse since the surgery, nausea and abdominal pain in right side, took probiotics for last 4 days and has helped minimally, INRs have been around 3. Denies ever having stomach ulcers. She takes Tylenol. Abdominal pain is RLQ that radiates to right lower back. Right lower back is sore constantly-from surgery. Denies fevers. Nausea gets better when she eats. No improvement with prednisone, was put on it for the last 10 days. Had XR on back, unremarkable. Was prescribed PT but hasn t started b/c she doesn t know which place to go to, was recommended to [facility] Denies chest pain, pt notes she needs cataract surgery. Pt has been on anxiety meds ever since her surgery. Pt worries about having a stroke. Pt takes Zantac frequently, she belches frequently.   BP-Pt checks her BP occasionally but not frequently, takes BP in the morning. When she feels nauseated she denies associated dizziness symptoms, has not experienced nausea like this in the past. Had nausea before starting Prednisone, gets nausea when she gets out of bed in the mornings. Denies palpitations, numbness or tingling.    Problem list and histories reviewed & adjusted, as indicated.  Additional history: as documented    Patient Active Problem List   Diagnosis     Benign essential hypertension     Coronary artery disease involving native coronary artery of  native heart without angina pectoris     Hyperlipidemia LDL goal <70     Advanced directives, counseling/discussion     DDD (degenerative disc disease), lumbar     Lumbar spinal stenosis     Lumbago     Spinal stenosis, lumbar region, with neurogenic claudication     Health Care Home     Anxiety     Mitral regurgitation     Status post coronary angiogram     GERD (gastroesophageal reflux disease)     Long-term (current) use of anticoagulants [Z79.01]     Hypothyroidism, unspecified type     Vertebral artery stenosis, right     Cerebrovascular accident involving cerebellum (H)     Chronic bilateral low back pain without sciatica     Status post arthrodesis     Past Surgical History:   Procedure Laterality Date     CORONARY ANGIOGRAPHY ADULT ORDER  2012    40-50% stenosis to mid PDA, 80% in D1- medically treat     CORONARY ANGIOGRAPHY ADULT ORDER  2015    PTCA and 2.25x8mm Promus PREMIER NAILA to ostium of 2nd OM     DECOMPRESSION, FUSION LUMBAR POSTERIOR THREE + LEVELS, COMBINED  2013    Procedure: COMBINED DECOMPRESSION, FUSION LUMBAR POSTERIOR THREE + LEVELS;  Posterior Lumbar Decompression L3-S1, Fusion L4-S1;  Surgeon: Daniel Harris MD;  Location: [facility] OR     ENDOSCOPIC STRIPPING VEIN(S)       HEART CATH, ANGIOPLASTY  2009    PTCA and two 2.5x15mm Xience stents to mid LAD lesion     HYSTERECTOMY, RICKIE      Fibroids, and Menorrhagia.. Bilateral Oophrectomy     ORTHOPEDIC SURGERY       Stenting of LAD, Angioplasty  09     TONSILLECTOMY      as a child       Social History   Substance Use Topics     Smoking status: Former Smoker     Quit date: 1965     Smokeless tobacco: Never Used     Alcohol use 0.0 oz/week     0 Standard drinks or equivalent per week      Comment: 2 glasses wine per month     Family History   Problem Relation Age of Onset     Neurologic Disorder Mother      Dementia, Still living     Ovarian Cancer Mother      Thyroid Disease Mother      C.A.D. Father        "    DIABETES Father      Coronary Artery Disease Father      Alcohol/Drug Brother      Thyroid Disease Son      DIABETES Son            Reviewed and updated as needed this visit by clinical staff       Reviewed and updated as needed this visit by Provider         ROS:  Constitutional, HEENT, cardiovascular, pulmonary, gi and gu systems are negative, except as otherwise noted.    This document serves as a record of the services and decisions personally performed and made by Karen Weiler, MD. It was created on her behalf by Asa Srinivasan, a trained medical scribe. The creation of this document is based on the provider's statements to the medical scribe.  Asa Srinivasan 4:02 PM June 12, 2017    OBJECTIVE:                                                    /62  Pulse 62  Temp 97.8  F (36.6  C) (Oral)  Ht 5' 4\" (1.626 m)  Wt 148 lb 9.6 oz (67.4 kg)  SpO2 97%  BMI 25.51 kg/m2  Body mass index is 25.51 kg/(m^2).  GENERAL: healthy, alert and no distress  EYES: Eyes grossly normal to inspection, PERRL and conjunctivae and sclerae normal  HENT: ear canals and TM's normal, nose and mouth without ulcers or lesions  NECK: no adenopathy, no asymmetry, masses, or scars and thyroid normal to palpation  RESP: lungs clear to auscultation - no rales, rhonchi or wheezes  CV: BP was taken in exam room-128/60, regular rate and rhythm, normal S1 S2, no S3 or S4, no murmur, click or rub, no peripheral edema and peripheral pulses strong  ABDOMEN: soft, nontender, no hepatosplenomegaly, no masses and bowel sounds normal  MS: no gross musculoskeletal defects noted, no edema  SKIN: no suspicious lesions or rashes  NEURO: Normal strength and tone, mentation intact and speech normal  PSYCH: mentation appears normal, affect normal/bright    Diagnostic Test Results:  none      ASSESSMENT/PLAN:                                                        ICD-10-CM    1. Abdominal pain, epigastric R10.13    2. Anxiety F41.9 escitalopram " (LEXAPRO) 5 MG tablet       Comment: Discussed with patient treatment options.  Plan: Pt will give clinic a call and will give clinic her PT orders so she can do her PT here at the [facility]. Pt will take Zantac 2X daily for next couple of weeks to treat for possible gastritis or small stomach ulcer. If no improvement, may need to do scope to see if there s  a possible stomach ulcer. Advised pt to increase Lexapro dose to 7.5 mg (1.5 tablets) to treat for possible gastritis or small stomach ulcer. Follow up in 2 weeks if symptoms worsen or aren t improving.  The information in this document, created by the medical scribe for me, accurately reflects the services I personally performed and the decisions made by me. I have reviewed and approved this document for accuracy prior to leaving the patient care area.  June 12, 2017 4:01 PM    Karen Weiler, MD  [facility]   patient

## 2023-06-28 ENCOUNTER — LAB (OUTPATIENT)
Dept: LAB | Facility: CLINIC | Age: 83
End: 2023-06-28
Payer: COMMERCIAL

## 2023-06-28 ENCOUNTER — ANTICOAGULATION THERAPY VISIT (OUTPATIENT)
Dept: ANTICOAGULATION | Facility: CLINIC | Age: 83
End: 2023-06-28

## 2023-06-28 DIAGNOSIS — I63.9 CEREBROVASCULAR ACCIDENT INVOLVING CEREBELLUM (H): ICD-10-CM

## 2023-06-28 DIAGNOSIS — Z79.01 LONG TERM CURRENT USE OF ANTICOAGULANT THERAPY: ICD-10-CM

## 2023-06-28 DIAGNOSIS — Z79.01 LONG TERM CURRENT USE OF ANTICOAGULANT THERAPY: Primary | ICD-10-CM

## 2023-06-28 LAB — INR BLD: 2.5 (ref 0.9–1.1)

## 2023-06-28 PROCEDURE — 85610 PROTHROMBIN TIME: CPT

## 2023-06-28 PROCEDURE — 36416 COLLJ CAPILLARY BLOOD SPEC: CPT

## 2023-06-28 NOTE — PROGRESS NOTES
ANTICOAGULATION MANAGEMENT     Salmoe Saeed 83 year old female is on warfarin with therapeutic INR result. (Goal INR 2.0-3.0)    Recent labs: (last 7 days)     06/28/23  1348   INR 2.5*       ASSESSMENT     Warfarin Lab Questionnaire    Warfarin Doses Last 7 Days          6/28/2023   Warfarin Lab Questionnaire   Missed doses within past 14 days? No   Changes in diet or alcohol within past 14 days? No   Medication changes since last result? No   Injuries or illness since last result? No   New shortness of breath, severe headaches or sudden changes in vision since last result? No   Abnormal bleeding since last result? No   Upcoming surgery, procedure? No     Previous result: Therapeutic last 2(+) visits  Additional findings: None       PLAN     Recommended plan for no diet, medication or health factor changes affecting INR     Dosing Instructions: Continue your current warfarin dose with next INR in 4 weeks       Summary  As of 6/28/2023    Full warfarin instructions:  5 mg every Mon, Wed, Fri; 2.5 mg all other days   Next INR check:  7/26/2023             Telephone call with Loreta who agrees to plan and repeated back plan correctly    Lab visit scheduled    Education provided:     Please call back if any changes to your diet, medications or how you've been taking warfarin    Plan made per [facility] anticoagulation protocol    Sima Bonilla RN  Anticoagulation Clinic  6/28/2023    _______________________________________________________________________     Anticoagulation Episode Summary     Current INR goal:  2.0-3.0   TTR:  58.6 % (1 y)   Target end date:  Indefinite   Send INR reminders to:  [facility]    Indications    Long term current use of anticoagulant therapy [Z79.01]  Cerebrovascular accident involving cerebellum (H) [I63.9]           Comments:           Anticoagulation Care Providers     Provider Role Specialty Phone number    Surya Dyer DO Referring Family Medicine [phone]

## 2023-07-26 ENCOUNTER — LAB (OUTPATIENT)
Dept: LAB | Facility: CLINIC | Age: 83
End: 2023-07-26
Payer: COMMERCIAL

## 2023-07-26 ENCOUNTER — ANTICOAGULATION THERAPY VISIT (OUTPATIENT)
Dept: ANTICOAGULATION | Facility: CLINIC | Age: 83
End: 2023-07-26

## 2023-07-26 DIAGNOSIS — I63.9 CEREBROVASCULAR ACCIDENT INVOLVING CEREBELLUM (H): ICD-10-CM

## 2023-07-26 DIAGNOSIS — Z79.01 LONG TERM CURRENT USE OF ANTICOAGULANT THERAPY: Primary | ICD-10-CM

## 2023-07-26 DIAGNOSIS — Z79.01 LONG TERM CURRENT USE OF ANTICOAGULANT THERAPY: ICD-10-CM

## 2023-07-26 LAB — INR BLD: 2.9 (ref 0.9–1.1)

## 2023-07-26 PROCEDURE — 36416 COLLJ CAPILLARY BLOOD SPEC: CPT

## 2023-07-26 PROCEDURE — 85610 PROTHROMBIN TIME: CPT

## 2023-07-26 NOTE — PROGRESS NOTES
ANTICOAGULATION MANAGEMENT     Salome Saeed 83 year old female is on warfarin with therapeutic INR result. (Goal INR 2.0-3.0)    Recent labs: (last 7 days)     07/26/23  1259   INR 2.9*       ASSESSMENT     Warfarin Lab Questionnaire    Warfarin Doses Last 7 Days--Patient confirmed taking warfarin maintenance dose as listed          7/26/2023   Warfarin Lab Questionnaire   Missed doses within past 14 days? No   Changes in diet or alcohol within past 14 days? No, Yes, less vitamin K intake in the past week, will resume usual intake.   Medication changes since last result? No   Injuries or illness since last result? No   New shortness of breath, severe headaches or sudden changes in vision since last result? No   Abnormal bleeding since last result? No   Upcoming surgery, procedure? No     Previous result: Therapeutic last 2(+) visits  Additional findings: None       PLAN     Recommended plan for temporary change(s) affecting INR     Dosing Instructions: Continue your current warfarin dose with next INR in 5 weeks       Summary  As of 7/26/2023      Full warfarin instructions:  5 mg every Mon, Wed, Fri; 2.5 mg all other days   Next INR check:  8/30/2023               Telephone call with Loreta who verbalizes understanding and agrees to plan    Lab visit scheduled    Education provided:   Dietary considerations: importance of consistent vitamin K intake and impact of vitamin K foods on INR    Plan made per [facility] anticoagulation protocol    Madina Brannon, RN  Anticoagulation Clinic  7/26/2023    _______________________________________________________________________     Anticoagulation Episode Summary       Current INR goal:  2.0-3.0   TTR:  60.5 % (1 y)   Target end date:  Indefinite   Send INR reminders to:  [facility]    Indications    Long term current use of anticoagulant therapy [Z79.01]  Cerebrovascular accident involving cerebellum (H) [I63.9]             Comments:               Anticoagulation Care  Providers       Provider Role Specialty Phone number    Surya Dyer,  Referring Family Medicine [phone]

## 2023-07-31 NOTE — PROGRESS NOTES
ANTICOAGULATION FOLLOW-UP CLINIC VISIT    Patient Name:  Salome Saeed  Date:  2020  Contact Type:  Face to Face    SUBJECTIVE:  Patient Findings       The patient was assessed for diet, medication, and activity level changes, missed or extra doses, bruising or bleeding, with no problem findings.    Clinical Outcomes     Negatives:   Major bleeding event, Thromboembolic event, Anticoagulation-related hospital admission, Anticoagulation-related ED visit, Anticoagulation-related fatality           OBJECTIVE    INR Protime   Date Value Ref Range Status   2020 2.5 (A) 0.86 - 1.14 Final       ASSESSMENT / PLAN  INR assessment THER    Recheck INR In: 4 DAYS    INR Location Clinic      Anticoagulation Summary  As of 2020    INR goal:   2.0-3.0   TTR:   56.8 % (1 y)   INR used for dosin.5 (2020)   Warfarin maintenance plan:   5 mg (5 mg x 1) every Mon, Fri; 2.5 mg (5 mg x 0.5) all other days   Full warfarin instructions:   : 2.5 mg; Otherwise 5 mg every Mon, Fri; 2.5 mg all other days   Weekly warfarin total:   22.5 mg   Plan last modified:   Rosanne Rico, RN (2020)   Next INR check:   2020   Target end date:       Indications    Long term current use of anticoagulant therapy [Z79.01]  CVA (cerebral vascular accident) (Resolved) [I63.9]             Anticoagulation Episode Summary     INR check location:       Preferred lab:       Send INR reminders to:   [facility]    Comments:               See the Encounter Report to view Anticoagulation Flowsheet and Dosing Calendar (Go to Encounters tab in chart review, and find the Anticoagulation Therapy Visit)    Dosage adjustment made based on physician directed care plan - patient will be having a joint injection on 20. She states that they would prefer that her INR be closer to 2.0 for the injection. Will have patient take half of her usual dose on Friday and recheck INR on Monday.    MURTAZA Javier, RN                  (3) occasionally moist

## 2023-08-01 DIAGNOSIS — I63.9 CEREBROVASCULAR ACCIDENT INVOLVING CEREBELLUM (H): ICD-10-CM

## 2023-08-01 RX ORDER — WARFARIN SODIUM 5 MG/1
TABLET ORAL
Qty: 80 TABLET | Refills: 1 | Status: SHIPPED | OUTPATIENT
Start: 2023-08-01 | End: 2024-01-04

## 2023-08-01 NOTE — TELEPHONE ENCOUNTER
ANTICOAGULATION MANAGEMENT:  Medication Refill    Anticoagulation Summary  As of 7/26/2023      Warfarin maintenance plan:  5 mg (5 mg x 1) every Mon, Wed, Fri; 2.5 mg (5 mg x 0.5) all other days   Next INR check:  8/30/2023   Target end date:  Indefinite    Indications    Long term current use of anticoagulant therapy [Z79.01]  Cerebrovascular accident involving cerebellum (H) [I63.9]                 Anticoagulation Care Providers       Provider Role Specialty Phone number    Surya Dyer DO Referring Family Medicine [phone]            Refill Criteria    Visit with referring provider/group: Meets criteria: office visit within referring provider group in the last 1 year on 5/22/23    [facility] referral signed last signed: 05/17/2023; within last year: Yes    Lab monitoring not exceeding 2 weeks overdue:  Yes    Salome meets all criteria for refill. Rx instructions and quantity supplied updated to match patient's current dosing plan. Warfarin 90 day supply with 1 refill granted per [facility] protocol     Neel Winn RN  Anticoagulation Clinic

## 2023-08-22 ENCOUNTER — TELEPHONE (OUTPATIENT)
Dept: CARDIOLOGY | Facility: CLINIC | Age: 83
End: 2023-08-22

## 2023-08-22 DIAGNOSIS — I10 ESSENTIAL HYPERTENSION: ICD-10-CM

## 2023-08-22 RX ORDER — AMLODIPINE BESYLATE 2.5 MG/1
2.5 TABLET ORAL DAILY
Qty: 90 TABLET | Refills: 3 | Status: SHIPPED | OUTPATIENT
Start: 2023-08-22 | End: 2023-11-20

## 2023-08-22 NOTE — TELEPHONE ENCOUNTER
[facility] Call Center    Phone Message    May a detailed message be left on voicemail: yes     Reason for Call: Medication Refill Request    Has the patient contacted the pharmacy for the refill? Yes   Name of medication being requested: amLODIPine (NORVASC) 2.5 MG tablet   Provider who prescribed the medication: Astrid Winston  Pharmacy:    [facility] - [facility], [address]    Date medication is needed: ASAP    Patient realized she is out of her medication and needs to know if it is okay for her to be off her meds for a few days while they are sent to her.    Action Taken: Other: Cardiology    Travel Screening: Not Applicable    Thank you!  [facility]

## 2023-08-30 ENCOUNTER — LAB (OUTPATIENT)
Dept: LAB | Facility: CLINIC | Age: 83
End: 2023-08-30
Payer: COMMERCIAL

## 2023-08-30 ENCOUNTER — ANTICOAGULATION THERAPY VISIT (OUTPATIENT)
Dept: ANTICOAGULATION | Facility: CLINIC | Age: 83
End: 2023-08-30

## 2023-08-30 DIAGNOSIS — Z79.01 LONG TERM CURRENT USE OF ANTICOAGULANT THERAPY: Primary | ICD-10-CM

## 2023-08-30 DIAGNOSIS — Z79.01 LONG TERM CURRENT USE OF ANTICOAGULANT THERAPY: ICD-10-CM

## 2023-08-30 DIAGNOSIS — I63.9 CEREBROVASCULAR ACCIDENT INVOLVING CEREBELLUM (H): ICD-10-CM

## 2023-08-30 LAB — INR BLD: 3.9 (ref 0.9–1.1)

## 2023-08-30 PROCEDURE — 85610 PROTHROMBIN TIME: CPT

## 2023-08-30 PROCEDURE — 36416 COLLJ CAPILLARY BLOOD SPEC: CPT

## 2023-08-30 NOTE — PROGRESS NOTES
ANTICOAGULATION MANAGEMENT     Salome Saeed 83 year old female is on warfarin with supratherapeutic INR result. (Goal INR 2.0-3.0)    Recent labs: (last 7 days)     08/30/23  1300   INR 3.9*       ASSESSMENT     Warfarin Lab Questionnaire    Warfarin Doses Last 7 Days          8/30/2023   Warfarin Lab Questionnaire   Missed doses within past 14 days? No   Changes in diet or alcohol within past 14 days? No - Patient reports she has not had any greens in the last week   Medication changes since last result? No   Injuries or illness since last result? No   New shortness of breath, severe headaches or sudden changes in vision since last result? No   Abnormal bleeding since last result? No   Upcoming surgery, procedure? No     Previous result: Therapeutic last 2(+) visits  Additional findings: None       PLAN     Recommended plan for temporary change(s) affecting INR     Dosing Instructions: hold dose then continue your current warfarin dose with next INR in 1-2 weeks       Summary  As of 8/30/2023      Full warfarin instructions:  8/30: Hold; Otherwise 5 mg every Mon, Wed, Fri; 2.5 mg all other days   Next INR check:  9/13/2023               Telephone call with Loreta who verbalizes understanding and agrees to plan    Lab visit scheduled    Education provided:   Please call back if any changes to your diet, medications or how you've been taking warfarin  Contact [phone]  with any changes, questions or concerns.     Plan made per [facility] anticoagulation protocol    Renee Martinez RN  Anticoagulation Clinic  8/30/2023    _______________________________________________________________________     Anticoagulation Episode Summary       Current INR goal:  2.0-3.0   TTR:  57.5 % (1 y)   Target end date:  Indefinite   Send INR reminders to:  [facility]    Indications    Long term current use of anticoagulant therapy [Z79.01]  Cerebrovascular accident involving cerebellum (H) [I63.9]             Comments:                Anticoagulation Care Providers       Provider Role Specialty Phone number    Surya Dyer,  Referring Family Medicine [phone]

## 2023-09-07 ENCOUNTER — HOSPITAL ENCOUNTER (OUTPATIENT)
Dept: MRI IMAGING | Facility: CLINIC | Age: 83
Discharge: HOME | End: 2023-09-07
Attending: PSYCHIATRY & NEUROLOGY | Admitting: PSYCHIATRY & NEUROLOGY
Payer: COMMERCIAL

## 2023-09-07 DIAGNOSIS — M89.9 LESION OF THORACIC VERTEBRA: ICD-10-CM

## 2023-09-07 PROCEDURE — 72157 MRI CHEST SPINE W/O & W/DYE: CPT

## 2023-09-07 PROCEDURE — 255N000002 HC RX 255 OP 636: Performed by: PSYCHIATRY & NEUROLOGY

## 2023-09-07 PROCEDURE — A9585 GADOBUTROL INJECTION: HCPCS | Performed by: PSYCHIATRY & NEUROLOGY

## 2023-09-07 RX ORDER — GADOBUTROL 604.72 MG/ML
5 INJECTION INTRAVENOUS ONCE
Status: COMPLETED | OUTPATIENT
Start: 2023-09-07 | End: 2023-09-07

## 2023-09-07 RX ADMIN — GADOBUTROL 5 ML: 604.72 INJECTION INTRAVENOUS at 10:52

## 2023-09-07 NOTE — LETTER
September 11, 2023      Loreta Saeed  [address]  [place] [address]        Dear ,    We are writing to inform you of your test results.    Your test results fall within the expected range(s) or remain unchanged from previous results.  Please continue with current treatment plan.    Resulted Orders   MR Thoracic Spine w/o & w Contrast    Narrative    MRI THORACIC SPINE WITHOUT CONTRAST  9/7/2023 11:58 AM     HISTORY: T8 vertebral lesion. Lesion of thoracic vertebra. Evaluate  for interval changes.    TECHNIQUE: Multiplanar multisequence MRI of the thoracic spine without  contrast.    COMPARISON: MRI of the thoracic spine dated 2/21/2023 and 11/17/2022.  Correlation also made with CT angiogram of the chest dated 4/14/2023.    FINDINGS: Normal vertebral body heights. Mild anterolisthesis of C7 on  T1 measuring approximately 2 mm, unchanged. There appears to be mild  exaggeration of the normal thoracic kyphosis, as before.    No significant interval change in the nonspecific small T2/STIR  hyperintense enhancing lesion in the T8 vertebral body measuring up to  approximately 9-10 mm craniocaudal x 4-5 mm AP. Bone marrow signal  otherwise appears unremarkable. No obvious correlate for this lesion  on the previous CT exam. Partial visualization of susceptibility  artifact related to presumed fusion hardware at the L2 level,  incompletely assessed.    Mild/mild to moderate multilevel degenerative disc height loss, as  before, most pronounced in the mid thoracic region. Small multilevel  disc bulges are present. Mild-to-moderate scattered degenerative facet  disease, most pronounced in the mid to lower thoracic spine. No  significant spinal canal stenosis is identified. No significant mass  effect on the spinal cord. No spinal cord signal abnormality. Mild  small to borderline moderate right T8-T9 neural foraminal stenosis,  unchanged. Mild and moderate degrees of scattered neural  foraminal  stenosis, not significantly changed from prior. Small presumed cyst in  the right kidney. Otherwise, the visualized paraspinous soft tissues  appear unremarkable.      Impression    IMPRESSION:  1. No significant interval change in the small nonspecific lesion in  the anterior aspect of the T8 vertebral body, as described. Although  nonspecific, the stability of the lesion continues to be reassuring.  Particularly if the patient does not have a history of malignancy,  this is overall favored to be benign, potentially representing an  atypical intraosseous hemangioma.  2. Multilevel degenerative changes, as described, not significantly  changed from prior.    WERNER GARVIN MD         SYSTEM ID:  [id number]     Please advise the patient that her thoracic spine MRI demonstrated no changes in her small nonspecific lesion of T8 vertebral body.  We will discuss details when she comes for follow-up.   Thanks,   Pj Keene MD.     If you have any questions or concerns, please call the clinic at the number listed above.       Sincerely,      Pj Keene MD

## 2023-09-11 ENCOUNTER — TELEPHONE (OUTPATIENT)
Dept: NEUROLOGY | Facility: CLINIC | Age: 83
End: 2023-09-11
Payer: COMMERCIAL

## 2023-09-13 ENCOUNTER — ANTICOAGULATION THERAPY VISIT (OUTPATIENT)
Dept: ANTICOAGULATION | Facility: CLINIC | Age: 83
End: 2023-09-13

## 2023-09-13 ENCOUNTER — LAB (OUTPATIENT)
Dept: LAB | Facility: CLINIC | Age: 83
End: 2023-09-13
Payer: COMMERCIAL

## 2023-09-13 DIAGNOSIS — Z79.01 LONG TERM CURRENT USE OF ANTICOAGULANT THERAPY: ICD-10-CM

## 2023-09-13 DIAGNOSIS — I63.9 CEREBROVASCULAR ACCIDENT INVOLVING CEREBELLUM (H): ICD-10-CM

## 2023-09-13 DIAGNOSIS — Z79.01 LONG TERM CURRENT USE OF ANTICOAGULANT THERAPY: Primary | ICD-10-CM

## 2023-09-13 LAB — INR BLD: 2.5 (ref 0.9–1.1)

## 2023-09-13 PROCEDURE — 85610 PROTHROMBIN TIME: CPT

## 2023-09-13 PROCEDURE — 36416 COLLJ CAPILLARY BLOOD SPEC: CPT

## 2023-09-13 NOTE — PROGRESS NOTES
ANTICOAGULATION MANAGEMENT     Salome Saeed 83 year old female is on warfarin with therapeutic INR result. (Goal INR 2.0-3.0)    Recent labs: (last 7 days)     09/13/23  1330   INR 2.5*       ASSESSMENT     Warfarin Lab Questionnaire    Warfarin Doses Last 7 Days          9/13/2023   Warfarin Lab Questionnaire   Missed doses within past 14 days? No   Changes in diet or alcohol within past 14 days? No - patient reports she resumed her usual amount of green veggies.   Medication changes since last result? No   Injuries or illness since last result? No   New shortness of breath, severe headaches or sudden changes in vision since last result? No   Abnormal bleeding since last result? No   Upcoming surgery, procedure? No     Previous result: Supratherapeutic  Additional findings: None       PLAN     Recommended plan for no diet, medication or health factor changes affecting INR     Dosing Instructions: Continue your current warfarin dose with next INR in 4 weeks       Summary  As of 9/13/2023      Full warfarin instructions:  5 mg every Mon, Wed, Fri; 2.5 mg all other days   Next INR check:  10/11/2023               Telephone call with Loreta who verbalizes understanding and agrees to plan    Lab visit scheduled    Education provided:   Please call back if any changes to your diet, medications or how you've been taking warfarin  Contact [phone]  with any changes, questions or concerns.     Plan made per [facility] anticoagulation protocol    Renee Martinez RN  Anticoagulation Clinic  9/13/2023    _______________________________________________________________________     Anticoagulation Episode Summary       Current INR goal:  2.0-3.0   TTR:  57.0 % (1 y)   Target end date:  Indefinite   Send INR reminders to:  [facility]    Indications    Long term current use of anticoagulant therapy [Z79.01]  Cerebrovascular accident involving cerebellum (H) [I63.9]             Comments:               Anticoagulation Care  Providers       Provider Role Specialty Phone number    Surya Dyer,  Referring Family Medicine [phone]

## 2023-09-21 ENCOUNTER — OFFICE VISIT (OUTPATIENT)
Dept: NEUROLOGY | Facility: CLINIC | Age: 83
End: 2023-09-21
Payer: COMMERCIAL

## 2023-09-21 VITALS — HEART RATE: 62 BPM | DIASTOLIC BLOOD PRESSURE: 76 MMHG | SYSTOLIC BLOOD PRESSURE: 122 MMHG | OXYGEN SATURATION: 99 %

## 2023-09-21 DIAGNOSIS — G89.29 CHRONIC MIDLINE LOW BACK PAIN WITHOUT SCIATICA: ICD-10-CM

## 2023-09-21 DIAGNOSIS — M48.02 CERVICAL STENOSIS OF SPINAL CANAL: ICD-10-CM

## 2023-09-21 DIAGNOSIS — M89.9 LESION OF THORACIC VERTEBRA: ICD-10-CM

## 2023-09-21 DIAGNOSIS — M54.50 CHRONIC MIDLINE LOW BACK PAIN WITHOUT SCIATICA: ICD-10-CM

## 2023-09-21 DIAGNOSIS — G20.C PARKINSONISM, UNSPECIFIED PARKINSONISM TYPE (H): Primary | ICD-10-CM

## 2023-09-21 PROCEDURE — 99214 OFFICE O/P EST MOD 30 MIN: CPT | Performed by: PSYCHIATRY & NEUROLOGY

## 2023-09-21 NOTE — LETTER
9/21/2023         RE: Salome Saeed  [address]  [place] [address]        Dear Colleague,    Thank you for referring your patient, Salome Saeed, to the [facility]UROLOG[facility]. Please see a copy of my visit note below.    ESTABLISHED PATIENT NEUROLOGY NOTE    DATE OF VISIT: 9/21/2023  CLINIC LOCATION: [facility]  MRN: [id number]  PATIENT NAME: Salome Saeed  YOB: 1940    REASON FOR VISIT:   Chief Complaint   Patient presents with     Follow Up     F/U Review Imaging     SUBJECTIVE:                                                      HISTORY OF PRESENT ILLNESS: Patient is here to follow up regarding parkinsonism, cervical spinal stenosis, low back pain, and thoracic vertebral lesion.  The last visit was on 3/7/2023.  Please refer to my initial/other prior notes for further information.    Since the last visit, the patient reports that her action and kinetic tremor improved with propranolol, but she continues to experience persistent low back pain.  Parkinsonism symptoms are the same.  She denies interval development of new focal neurological symptoms.    Her thoracic spine MRI with and without contrast from 9/11/2023 did not demonstrate any significant change and small nonspecific lesion in the anterior aspect of the AT 8 vertebral body, favored to be benign due to stability potentially representing atypical intraosseous hemangioma.  Images were personally reviewed and independently interpreted.  EXAM:                                                    Physical Exam:   Vitals: /76 (BP Location: Left arm, Patient Position: Sitting, Cuff Size: Adult Regular)   Pulse 62   LMP  (LMP Unknown)   SpO2 99%     General: pt is in NAD, cooperative.  Skin: normal turgor, moist mucous membranes, no lesions/rashes noticed.  HEENT: ATNC, white sclera, normal conjunctiva.  Respiratory: Symmetric lung excursion, no accessory  respiratory muscle use.  Abdomen: Non distended.  Neurological: awake, cooperative, follows commands, no exam changes compared to the previous visits.    ASSESSMENT AND PLAN:                                                    Assessment: 83 year old female patient presents for follow-up of parkinsonism, cervical spinal stenosis, low back pain, and thoracic vertebral lesion.    Regarding her continued low back pain, she did not go to physical therapy, as previously planned, but is open now to start.  A new referral was placed.  Previously we also discussed epidural steroid injection, which could be considered if she develops more significant pain.    Cervical spinal stenosis continues to be stable.  No changes in her management.    Vertebral lesion of T8 appears to be stable according to recent thoracic MRI.  No need to repeat imaging unless she develops new clinical symptoms.    Regarding her parkinsonism, previously we discussed trial of Sinemet versus DaTSCAN.  Today we reviewed these options again and decided to continue monitoring her symptoms given the stability/absence of progression.    Diagnoses:    ICD-10-CM    1. Parkinsonism, unspecified Parkinsonism type (H)  G20       2. Cervical stenosis of spinal canal  M48.02       3. Chronic midline low back pain without sciatica  M54.50     G89.29       4. Lesion of thoracic vertebra  M89.9         Plan: At today's visit we thoroughly discussed current symptoms, evaluation results, available treatment options, and the plan.    We decided to pursue physical therapy for her persistent low back pain while monitoring other symptoms.    Next follow-up appointment is in the next 1 year or earlier if needed.    Total Time: 31 minutes spent on the date of the encounter doing chart review, history and exam, documentation and further activities per the note.    Pj eKene MD  [facility] Neurology  (Chart documentation was completed in part with Veronica  "voice-recognition software. Even though reviewed, some grammatical, spelling, and word errors may remain.)    Salome Saeed is a 83 year old female who presents for:  Chief Complaint   Patient presents with     Follow Up     F/U Review Imaging        Initial Vitals:  /76 (BP Location: Left arm, Patient Position: Sitting, Cuff Size: Adult Regular)   Pulse 62   LMP  (LMP Unknown)   SpO2 99%  Estimated body mass index is 23.7 kg/m  as calculated from the following:    Height as of 5/22/23: 1.6 m (5' 3\").    Weight as of 6/7/23: 60.7 kg (133 lb 12.8 oz).. There is no height or weight on file to calculate BSA. BP completed using cuff size: regular    Margo Bingham       Again, thank you for allowing me to participate in the care of your patient.        Sincerely,        Pj Keene MD  "

## 2023-09-21 NOTE — PROGRESS NOTES
"Salome Saeed is a 83 year old female who presents for:  Chief Complaint   Patient presents with    Follow Up     F/U Review Imaging        Initial Vitals:  /76 (BP Location: Left arm, Patient Position: Sitting, Cuff Size: Adult Regular)   Pulse 62   LMP  (LMP Unknown)   SpO2 99%  Estimated body mass index is 23.7 kg/m  as calculated from the following:    Height as of 5/22/23: 1.6 m (5' 3\").    Weight as of 6/7/23: 60.7 kg (133 lb 12.8 oz).. There is no height or weight on file to calculate BSA. BP completed using cuff size: regular    Margo Bingham   "

## 2023-09-21 NOTE — PROGRESS NOTES
ESTABLISHED PATIENT NEUROLOGY NOTE    DATE OF VISIT: 9/21/2023  CLINIC LOCATION: [facility]  MRN: [id number]  PATIENT NAME: Salome Saeed  YOB: 1940    REASON FOR VISIT:   Chief Complaint   Patient presents with    Follow Up     F/U Review Imaging     SUBJECTIVE:                                                      HISTORY OF PRESENT ILLNESS: Patient is here to follow up regarding parkinsonism, cervical spinal stenosis, low back pain, and thoracic vertebral lesion.  The last visit was on 3/7/2023.  Please refer to my initial/other prior notes for further information.    Since the last visit, the patient reports that her action and kinetic tremor improved with propranolol, but she continues to experience persistent low back pain.  Parkinsonism symptoms are the same.  She denies interval development of new focal neurological symptoms.    Her thoracic spine MRI with and without contrast from 9/11/2023 did not demonstrate any significant change and small nonspecific lesion in the anterior aspect of the AT 8 vertebral body, favored to be benign due to stability potentially representing atypical intraosseous hemangioma.  Images were personally reviewed and independently interpreted.  EXAM:                                                    Physical Exam:   Vitals: /76 (BP Location: Left arm, Patient Position: Sitting, Cuff Size: Adult Regular)   Pulse 62   LMP  (LMP Unknown)   SpO2 99%     General: pt is in NAD, cooperative.  Skin: normal turgor, moist mucous membranes, no lesions/rashes noticed.  HEENT: ATNC, white sclera, normal conjunctiva.  Respiratory: Symmetric lung excursion, no accessory respiratory muscle use.  Abdomen: Non distended.  Neurological: awake, cooperative, follows commands, no exam changes compared to the previous visits.    ASSESSMENT AND PLAN:                                                    Assessment: 83 year old female patient presents for  follow-up of parkinsonism, cervical spinal stenosis, low back pain, and thoracic vertebral lesion.    Regarding her continued low back pain, she did not go to physical therapy, as previously planned, but is open now to start.  A new referral was placed.  Previously we also discussed epidural steroid injection, which could be considered if she develops more significant pain.    Cervical spinal stenosis continues to be stable.  No changes in her management.    Vertebral lesion of T8 appears to be stable according to recent thoracic MRI.  No need to repeat imaging unless she develops new clinical symptoms.    Regarding her parkinsonism, previously we discussed trial of Sinemet versus DaTSCAN.  Today we reviewed these options again and decided to continue monitoring her symptoms given the stability/absence of progression.    Diagnoses:    ICD-10-CM    1. Parkinsonism, unspecified Parkinsonism type (H)  G20       2. Cervical stenosis of spinal canal  M48.02       3. Chronic midline low back pain without sciatica  M54.50     G89.29       4. Lesion of thoracic vertebra  M89.9         Plan: At today's visit we thoroughly discussed current symptoms, evaluation results, available treatment options, and the plan.    We decided to pursue physical therapy for her persistent low back pain while monitoring other symptoms.    Next follow-up appointment is in the next 1 year or earlier if needed.    Total Time: 31 minutes spent on the date of the encounter doing chart review, history and exam, documentation and further activities per the note.    Pj Keene MD  [facility] Neurology  (Chart documentation was completed in part with Dragon voice-recognition software. Even though reviewed, some grammatical, spelling, and word errors may remain.)

## 2023-09-21 NOTE — PATIENT INSTRUCTIONS
AFTER VISIT SUMMARY (AVS):    At today's visit we thoroughly discussed current symptoms, evaluation results, available treatment options, and the plan.    We decided to pursue physical therapy for your persistent low back pain while monitoring your other symptoms.    Next follow-up appointment is in the next 1 year or earlier if needed.    Please do not hesitate to call me with any questions or concerns.    Thanks.

## 2023-10-05 ENCOUNTER — LAB (OUTPATIENT)
Dept: LAB | Facility: CLINIC | Age: 83
End: 2023-10-05
Payer: COMMERCIAL

## 2023-10-05 LAB
ANION GAP SERPL CALCULATED.3IONS-SCNC: 10 MMOL/L (ref 7–15)
BUN SERPL-MCNC: 9.6 MG/DL (ref 8–23)
CALCIUM SERPL-MCNC: 9.4 MG/DL (ref 8.8–10.2)
CHLORIDE SERPL-SCNC: 96 MMOL/L (ref 98–107)
CHOLEST SERPL-MCNC: 167 MG/DL
CREAT SERPL-MCNC: 0.7 MG/DL (ref 0.51–0.95)
DEPRECATED HCO3 PLAS-SCNC: 28 MMOL/L (ref 22–29)
EGFRCR SERPLBLD CKD-EPI 2021: 86 ML/MIN/1.73M2
GLUCOSE SERPL-MCNC: 114 MG/DL (ref 70–99)
HDLC SERPL-MCNC: 86 MG/DL
LDLC SERPL CALC-MCNC: 71 MG/DL
NONHDLC SERPL-MCNC: 81 MG/DL
POTASSIUM SERPL-SCNC: 4.4 MMOL/L (ref 3.4–5.3)
SODIUM SERPL-SCNC: 134 MMOL/L (ref 135–145)
TRIGL SERPL-MCNC: 48 MG/DL

## 2023-10-05 PROCEDURE — 80048 BASIC METABOLIC PNL TOTAL CA: CPT | Performed by: INTERNAL MEDICINE

## 2023-10-05 PROCEDURE — 36415 COLL VENOUS BLD VENIPUNCTURE: CPT | Performed by: INTERNAL MEDICINE

## 2023-10-05 PROCEDURE — 80061 LIPID PANEL: CPT | Performed by: INTERNAL MEDICINE

## 2023-10-12 ENCOUNTER — ANTICOAGULATION THERAPY VISIT (OUTPATIENT)
Dept: ANTICOAGULATION | Facility: CLINIC | Age: 83
End: 2023-10-12

## 2023-10-12 ENCOUNTER — LAB (OUTPATIENT)
Dept: LAB | Facility: CLINIC | Age: 83
End: 2023-10-12
Payer: COMMERCIAL

## 2023-10-12 DIAGNOSIS — I63.9 CEREBROVASCULAR ACCIDENT INVOLVING CEREBELLUM (H): ICD-10-CM

## 2023-10-12 DIAGNOSIS — Z79.01 LONG TERM CURRENT USE OF ANTICOAGULANT THERAPY: Primary | ICD-10-CM

## 2023-10-12 DIAGNOSIS — Z79.01 LONG TERM CURRENT USE OF ANTICOAGULANT THERAPY: ICD-10-CM

## 2023-10-12 LAB — INR BLD: 3.4 (ref 0.9–1.1)

## 2023-10-12 PROCEDURE — 85610 PROTHROMBIN TIME: CPT

## 2023-10-12 PROCEDURE — 36416 COLLJ CAPILLARY BLOOD SPEC: CPT

## 2023-10-12 NOTE — PROGRESS NOTES
ANTICOAGULATION MANAGEMENT     Salome Saeed 83 year old female is on warfarin with supratherapeutic INR result. (Goal INR 2.0-3.0)    Recent labs: (last 7 days)     10/12/23  1301   INR 3.4*       ASSESSMENT     Warfarin Lab Questionnaire    Warfarin Doses Last 7 Days          10/12/2023   Warfarin Lab Questionnaire   Missed doses within past 14 days? No   Changes in diet or alcohol within past 14 days? No   Medication changes since last result? No   Injuries or illness since last result? No   New shortness of breath, severe headaches or sudden changes in vision since last result? No   Abnormal bleeding since last result? No   Upcoming surgery, procedure? No     Previous result: Therapeutic last visit; previously outside of goal range  Additional findings: None       PLAN     Recommended plan for ongoing change(s) affecting INR     Dosing Instructions: decrease your warfarin dose (10% change) with next INR in 2 weeks       Summary  As of 10/12/2023      Full warfarin instructions:  5 mg every Mon, Fri; 2.5 mg all other days   Next INR check:  10/26/2023               Telephone call with Loreta who verbalizes understanding and agrees to plan    Lab visit scheduled    Education provided:   Please call back if any changes to your diet, medications or how you've been taking warfarin    Plan made per [facility] anticoagulation protocol    Neel Winn RN  Anticoagulation Clinic  10/12/2023    _______________________________________________________________________     Anticoagulation Episode Summary       Current INR goal:  2.0-3.0   TTR:  53.5% (1 y)   Target end date:  Indefinite   Send INR reminders to:  [facility]    Indications    Long term current use of anticoagulant therapy [Z79.01]  Cerebrovascular accident involving cerebellum (H) [I63.9]             Comments:               Anticoagulation Care Providers       Provider Role Specialty Phone number    Surya Dyer DO Referring Family Medicine  [phone]

## 2023-10-31 ENCOUNTER — ANTICOAGULATION THERAPY VISIT (OUTPATIENT)
Dept: ANTICOAGULATION | Facility: CLINIC | Age: 83
End: 2023-10-31

## 2023-10-31 ENCOUNTER — LAB (OUTPATIENT)
Dept: LAB | Facility: CLINIC | Age: 83
End: 2023-10-31
Payer: COMMERCIAL

## 2023-10-31 DIAGNOSIS — I63.9 CEREBROVASCULAR ACCIDENT INVOLVING CEREBELLUM (H): ICD-10-CM

## 2023-10-31 DIAGNOSIS — Z79.01 LONG TERM CURRENT USE OF ANTICOAGULANT THERAPY: Primary | ICD-10-CM

## 2023-10-31 DIAGNOSIS — Z79.01 LONG TERM CURRENT USE OF ANTICOAGULANT THERAPY: ICD-10-CM

## 2023-10-31 LAB — INR BLD: 3 (ref 0.9–1.1)

## 2023-10-31 PROCEDURE — 85610 PROTHROMBIN TIME: CPT

## 2023-10-31 PROCEDURE — 36416 COLLJ CAPILLARY BLOOD SPEC: CPT

## 2023-10-31 NOTE — PROGRESS NOTES
ANTICOAGULATION MANAGEMENT     Salome aSeed 83 year old female is on warfarin with therapeutic INR result. (Goal INR 2.0-3.0)    Recent labs: (last 7 days)     10/31/23  1334   INR 3.0*       ASSESSMENT     Warfarin Lab Questionnaire    Warfarin Doses Last 7 Days      10/31/2023     1:26 PM   Dose in Tablet or Mg   TAB or MG? tablet (tab)           10/31/2023   Warfarin Lab Questionnaire   Missed doses within past 14 days? No   Changes in diet or alcohol within past 14 days? No   Medication changes since last result? No   Injuries or illness since last result? No   New shortness of breath, severe headaches or sudden changes in vision since last result? No   Abnormal bleeding since last result? No   Upcoming surgery, procedure? No     Previous result: Supratherapeutic  Additional findings: None       PLAN     Recommended plan for no diet, medication or health factor changes affecting INR     Dosing Instructions: Continue your current warfarin dose with next INR in 4 weeks       Summary  As of 10/31/2023      Full warfarin instructions:  5 mg every Mon, Fri; 2.5 mg all other days   Next INR check:  11/28/2023               Detailed voice message left for Loreta with dosing instructions and follow up date.     Contact [phone]  to schedule and with any changes, questions or concerns.     Education provided:   Please call back if any changes to your diet, medications or how you've been taking warfarin    Plan made per [facility] anticoagulation protocol    Sima Bonilla RN  Anticoagulation Clinic  10/31/2023    _______________________________________________________________________     Anticoagulation Episode Summary       Current INR goal:  2.0-3.0   TTR:  48.3% (1 y)   Target end date:  Indefinite   Send INR reminders to:  [facility]    Indications    Long term current use of anticoagulant therapy [Z79.01]  Cerebrovascular accident involving cerebellum (H) [I63.9]             Comments:                Anticoagulation Care Providers       Provider Role Specialty Phone number    Surya Dyer,  Referring Family Medicine [phone]

## 2023-11-03 ENCOUNTER — OFFICE VISIT (OUTPATIENT)
Dept: CARDIOLOGY | Facility: CLINIC | Age: 83
End: 2023-11-03
Payer: COMMERCIAL

## 2023-11-03 VITALS
OXYGEN SATURATION: 99 % | HEART RATE: 61 BPM | HEIGHT: 63 IN | BODY MASS INDEX: 23.74 KG/M2 | WEIGHT: 134 LBS | DIASTOLIC BLOOD PRESSURE: 77 MMHG | SYSTOLIC BLOOD PRESSURE: 145 MMHG

## 2023-11-03 DIAGNOSIS — I25.10 CORONARY ARTERY DISEASE INVOLVING NATIVE CORONARY ARTERY OF NATIVE HEART WITHOUT ANGINA PECTORIS: Chronic | ICD-10-CM

## 2023-11-03 DIAGNOSIS — I10 ESSENTIAL HYPERTENSION: ICD-10-CM

## 2023-11-03 DIAGNOSIS — I10 BENIGN ESSENTIAL HYPERTENSION: Chronic | ICD-10-CM

## 2023-11-03 DIAGNOSIS — E78.5 HYPERLIPIDEMIA LDL GOAL <70: ICD-10-CM

## 2023-11-03 PROCEDURE — 99214 OFFICE O/P EST MOD 30 MIN: CPT | Performed by: NURSE PRACTITIONER

## 2023-11-03 RX ORDER — ACETAMINOPHEN 500 MG
500-1000 TABLET ORAL EVERY 6 HOURS PRN
COMMUNITY

## 2023-11-03 RX ORDER — ROSUVASTATIN CALCIUM 40 MG/1
40 TABLET, COATED ORAL DAILY
Qty: 90 TABLET | Refills: 4 | Status: SHIPPED | OUTPATIENT
Start: 2023-11-03 | End: 2024-01-04

## 2023-11-03 RX ORDER — PROPRANOLOL HCL 60 MG
60 CAPSULE, EXTENDED RELEASE 24HR ORAL DAILY
Qty: 90 CAPSULE | Refills: 1 | Status: SHIPPED | OUTPATIENT
Start: 2023-11-03 | End: 2024-01-04

## 2023-11-03 RX ORDER — PHENOL 1.4 %
10 AEROSOL, SPRAY (ML) MUCOUS MEMBRANE
COMMUNITY

## 2023-11-03 RX ORDER — NITROGLYCERIN 0.4 MG/1
0.4 TABLET SUBLINGUAL EVERY 5 MIN PRN
Qty: 25 TABLET | Refills: 2 | Status: SHIPPED | OUTPATIENT
Start: 2023-11-03 | End: 2024-06-03

## 2023-11-03 NOTE — PATIENT INSTRUCTIONS
No medication changes  Follow up with Dr. Amaya in 6 months  Please call with any questions/concerns [phone]

## 2023-11-03 NOTE — LETTER
11/3/2023    Surya Dyer, DO  [address] [facility] [address]    RE: Slaome Saeed       Dear Colleague,     I had the pleasure of seeing Salome Saeed in the [facility].  Cardiology Clinic Progress Note  Salome Saeed MRN# [id number]   YOB: 1940 Age: 83 year old   Primary Cardiologist: Dr. Amaya  Reason for visit: Cardiology follow up            Assessment and Plan:   Salome Saeed is a very pleasant 83 year old female here for cardiology follow up.     1. Coronary artery disease s/p NAILA x 2 to LAD in 2009, s/p NAILA to OM 2015              - Coronary angiogram 5/2023 which showed mild angiographic progression of atherosclerosis in the right coronary since the last study in 2015, but no obstructive lesions that warrant percutaneous intervention.   2. Symptomatic hypotension with diagnosis of hypertension - Resolved with adjustments in antihypertensive agents  3. Hypercholesterolemia - lipid panel  10/2023 total cholesterol 167, HDL 86, LDL 71 and triglycerides 48              - Continue rosuvastatin   4. Hypothyroidism  5. 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    I saw patient today for cardiology follow up. She is stable from a cardiac perspective. Blood pressures mildly elevated in clinic today, she shares typically controlled at home. Additionally she had some complaints of lightheadedness today which appears likely related to some issues with her new glasses and double visit. She has an eye appt next week. No changes in medications today.     Changes today: none    Follow up plan:     Follow up with Dr. Amaya in 6 months         History of Presenting Illness:    Salome Saeed is a very pleasant 83 year old female with a history of coronary artery disease, hypercholesterolemia, hypertension, hypothyroidism, chronic  "back pain with multiple surgeries, Parkinson's disease, CVA in 2013 manifesting as balance issues and strong family history of coronary artery disease.      Primary cardiologist Dr. Amaya. In 2009 she underwent NAILA x 2 to her LAD. In 2012 she had another coronary angiogram due to chest pain which showed no change in her anatomy. She had moderate disease in the PAD, ostial pinch in her first diagonal and 80% stenosis of the OM, medical management was recommended. In 2015 had chest pain, underwent coronary angiogram which again demonstrated no change in anatomy and medical management was recommended. One month later in October 2015 she was back in the ED with chest pain, she underwent coronary angiogram and stenting of her small OM with Promus NAILA (2.25 x 8mm). In 2017 she had some chest pain, nuclear stress test showed no evidence of ischemia.     Patient has had difficulty with symptomatic hypotension for which her atenolol and amlodipine were decreased.    In April 2023 she saw Dr. Amaya at which time she was having chest tightness, she was sent to the ED where her troponin was negative and CT benign. She was started on imdur 15mg daily with no change in symptoms. Ultimately she underwent coronary angiogram 5/2023 which showed mild angiographic progression of atherosclerosis in the right coronary since the last study in 2015, but no obstructive lesions that warrant percutaneous intervention.     Patient is here today for cardiology follow up.     Patient reports feeling good. Feeling lightheaded today, states off/on, unable to provide much details. At the end of our visit asked about her lightheadedness and states no longer present. Then shares she has been dealing with double vision with her current glasses, has an appt on Tuesday. Monitoring weights daily a home, has been stable. Denies shortness of breath a trest. Occasional exertional dyspnea. Occasional chest \"weight\" pressures, this is not new, similar " to prior discomforts. Denies tachycardia or palpitations. Taking medications daily. Denies episodes of bleeding.     Labs from October showed stable kidney function and electrolytes. Blood pressure 145/77 and HR 62 in clinic today. Manual recheck 138/70.    Appetite good. Eating meals at home. No set exercise routine, notes she gets activity walking around her house. Rare alcohol use. Denies tobacco use.          Social History      Social History     Socioeconomic History    Marital status:      Spouse name: Not on file    Number of children: Not on file    Years of education: Not on file    Highest education level: Not on file   Occupational History    Not on file   Tobacco Use    Smoking status: Former     Packs/day: .1     Types: Cigarettes     Quit date: 1965     Years since quittin.8    Smokeless tobacco: Never   Substance and Sexual Activity    Alcohol use: Yes     Alcohol/week: 0.0 standard drinks of alcohol     Comment: 2 glasses wine per month, maybe    Drug use: No    Sexual activity: Not Currently   Other Topics Concern     Service Not Asked    Blood Transfusions No    Caffeine Concern Yes     Comment: 5 cups caffeine per day    Occupational Exposure Not Asked    Hobby Hazards Not Asked    Sleep Concern No    Stress Concern No    Weight Concern No     Comment: weight stable    Special Diet No     Comment: trying to eat healthier    Back Care Not Asked    Exercise Yes     Comment: stretching    Bike Helmet Not Asked    Seat Belt Yes    Self-Exams Yes    Parent/sibling w/ CABG, MI or angioplasty before 65F 55M? Yes   Social History Narrative    Works in an office     Social Determinants of Health     Financial Resource Strain: Not on file   Food Insecurity: Not on file   Transportation Needs: Not on file   Physical Activity: Not on file   Stress: Not on file   Social Connections: Not on file   Interpersonal Safety: Not on file   Housing Stability: Not on file          Review of  "Systems:   10 point ROS neg other than the symptoms noted above in the HPI.          Physical Exam:   Vitals: Ht 1.6 m (5' 3\")   LMP  (LMP Unknown)   BMI 23.70 kg/m     Wt Readings from Last 4 Encounters:   06/07/23 60.7 kg (133 lb 12.8 oz)   05/22/23 59.7 kg (131 lb 9.6 oz)   05/18/23 60 kg (132 lb 3.2 oz)   04/26/23 59.9 kg (132 lb)     GEN: well nourished, in no acute distress.  HEENT:  Pupils equal, round. Sclerae nonicteric.   NECK: Supple, no masses appreciated.   C/V:  Regular rate and rhythm, no murmur, rub or gallop.    RESP: Respirations are unlabored. Clear to auscultation bilaterally without wheezing, rales, or rhonchi.  GI: Abdomen soft, nontender.  EXTREM: No LE edema.  NEURO: Alert and oriented, cooperative.  SKIN: Warm and dry.        Data:     LIPID RESULTS:  Lab Results   Component Value Date    CHOL 167 10/05/2023    CHOL 149 10/14/2020    HDL 86 10/05/2023    HDL 78 10/14/2020    LDL 71 10/05/2023    LDL 57 10/14/2020    TRIG 48 10/05/2023    TRIG 70 10/14/2020    CHOLHDLRATIO 2.2 08/17/2015     LIVER ENZYME RESULTS:  Lab Results   Component Value Date    AST 35 09/14/2021    AST 21 05/24/2017    ALT 33 12/10/2021    ALT 28 10/14/2020     CBC RESULTS:  Lab Results   Component Value Date    WBC 4.6 05/05/2023    WBC 6.1 02/15/2021    RBC 4.18 05/05/2023    RBC 4.62 02/15/2021    HGB 12.2 05/05/2023    HGB 13.4 02/15/2021    HCT 37.4 05/05/2023    HCT 40.1 02/15/2021    MCV 90 05/05/2023    MCV 87 02/15/2021    MCH 29.2 05/05/2023    MCH 29.0 02/15/2021    MCHC 32.6 05/05/2023    MCHC 33.4 02/15/2021    RDW 14.8 05/05/2023    RDW 13.8 02/15/2021     05/05/2023     02/15/2021     BMP RESULTS:  Lab Results   Component Value Date     (L) 10/05/2023     10/14/2020    POTASSIUM 4.4 10/05/2023    POTASSIUM 4.2 12/10/2021    POTASSIUM 4.0 10/14/2020    CHLORIDE 96 (L) 10/05/2023    CHLORIDE 102 12/10/2021    CHLORIDE 98 10/14/2020    CO2 28 10/05/2023    CO2 25 12/10/2021    " CO2 28 10/14/2020    ANIONGAP 10 10/05/2023    ANIONGAP 6 12/10/2021    ANIONGAP 7 10/14/2020     (H) 10/05/2023     (H) 12/10/2021     (H) 10/14/2020    BUN 9.6 10/05/2023    BUN 14 12/10/2021    BUN 14 10/14/2020    CR 0.70 10/05/2023    CR 0.77 10/14/2020    GFRESTIMATED 86 10/05/2023    GFRESTIMATED 73 10/14/2020    GFRESTBLACK 85 10/14/2020    ERIC 9.4 10/05/2023    ERIC 8.5 10/14/2020     INR RESULTS:  Lab Results   Component Value Date    INR 3.0 (H) 10/31/2023    INR 3.4 (H) 10/12/2023    INR 1.09 05/05/2023    INR 2.02 (H) 04/14/2023    INR 3.10 (H) 07/01/2021    INR 3.3 (H) 06/17/2021    INR 4.5 (H) 06/07/2021    INR 2.80 (H) 05/10/2021          Medications     Current Outpatient Medications   Medication Sig Dispense Refill    amLODIPine (NORVASC) 2.5 MG tablet Take 1 tablet (2.5 mg) by mouth daily 90 tablet 3    aspirin EC 81 MG tablet Take 1 tablet (81 mg) by mouth daily      Calcium Carb-Cholecalciferol (CALCIUM 600 + D PO) Take 1,200 mg by mouth daily       Cholecalciferol (VITAMIN D) 2000 UNITS tablet Take 2,000 Units by mouth daily       clonazePAM (KLONOPIN) 0.5 MG tablet Take 0.5 tablets (0.25 mg) by mouth daily as needed for anxiety 30 tablet 0    Coenzyme Q10 (COQ-10) 200 MG CAPS Take 400 mg by mouth daily       diclofenac (VOLTAREN) 1 % topical gel Apply 4 g topically 4 times daily as needed for moderate pain 350 g 1    escitalopram (LEXAPRO) 5 MG tablet Take 1 tablet (5 mg) by mouth daily 90 tablet 1    gatifloxacin (ZYMAXID) 0.5 % ophthalmic solution Place 1 drop into both eyes daily      ketorolac (ACULAR) 0.5 % ophthalmic solution Place 1 drop into both eyes daily      levothyroxine (SYNTHROID/LEVOTHROID) 50 MCG tablet Take 1 tablet (50 mcg) by mouth daily 90 tablet 3    lisinopril (ZESTRIL) 20 MG tablet Take 1 tablet (20 mg) by mouth daily 90 tablet 4    Magnesium 400 MG CAPS Take 400 mg by mouth daily      Multiple Vitamin (DAILY MULTIVITAMIN PO) Take by mouth daily       nitroGLYcerin (NITROSTAT) 0.4 MG sublingual tablet Place 1 tablet (0.4 mg) under the tongue every 5 minutes as needed for chest pain 25 tablet 2    prednisoLONE acetate (PRED FORTE) 1 % ophthalmic suspension Place 1 drop into both eyes daily      propranolol ER (INDERAL LA) 60 MG 24 hr capsule Take 1 capsule (60 mg) by mouth daily 90 capsule 1    rosuvastatin (CRESTOR) 40 MG tablet Take 1 tablet (40 mg) by mouth daily 90 tablet 4    vitamin B complex with vitamin C (STRESS TAB) tablet Take 1 tablet by mouth daily       vitamin C (ASCORBIC ACID) 1000 MG TABS Take 2,000 mg by mouth daily      warfarin ANTICOAGULANT (COUMADIN) 5 MG tablet Take 5mg every M, W,F and 2.5mg all other days OR per INR clinic 80 tablet 1        Past Medical History     Past Medical History:   Diagnosis Date    CAD (coronary artery disease) 04/09/2009 4/8/2009: PTCA and two 2.5x15mm Xience stents to mid LAD lesion; Cath 12/2012- 40-50% stenosis in mid PDA, 80% on D1- medically treat , 5/28/2015 - PTCA and 2.25x8mm Promus PREMIIER NAILA to ostium of 2nd OM    Cerebral artery occlusion with cerebral infarction 2012    CVA (cerebral infarction)     DDD (degenerative disc disease)     Essential hypertension, benign     GERD (gastroesophageal reflux disease)     Hyperlipidemia     Hypothyroidism     Lumbar spinal stenosis     Mitral regurgitation     1+ per 7/2013 Echo    Vertebral artery stenosis, right      Past Surgical History:   Procedure Laterality Date    ARTHROPLASTY KNEE Left 6/1/2020    Procedure: Left total knee arthroplasty (Ward [employer] Nephew #5 narrow femur; #3 tibia; 9 mm tibial poly and 35 mm patella);  Surgeon: Jorge Luis Cheatham MD;  Location: [facility] OR    CORONARY ANGIOGRAPHY ADULT ORDER  12/6/2012    40-50% stenosis to mid PDA, 80% in D1- medically treat    CORONARY ANGIOGRAPHY ADULT ORDER  5/28/2015    PTCA and 2.25x8mm Promus PREMIER NAILA to ostium of 2nd OM    CV CORONARY ANGIOGRAM N/A 5/5/2023    Procedure: Coronary Angiogram;   Surgeon: Wilfrido Robert MD;  Location: [facility]    DECOMPRESSION, FUSION LUMBAR POSTERIOR THREE + LEVELS, COMBINED  2013    Procedure: COMBINED DECOMPRESSION, FUSION LUMBAR POSTERIOR THREE + LEVELS;  Posterior Lumbar Decompression L3-S1, Fusion L4-S1;  Surgeon: Daniel Harris MD;  Location: [facility] OR    ENDOSCOPIC STRIPPING VEIN(S)      HEART CATH, ANGIOPLASTY  2009    PTCA and two 2.5x15mm Xience stents to mid LAD lesion    HYSTERECTOMY, RICKIE      Fibroids, and Menorrhagia.. Bilateral Oophrectomy    ORTHOPEDIC SURGERY      Stenting of LAD, Angioplasty  09    TONSILLECTOMY      as a child     Family History   Problem Relation Age of Onset    Neurologic Disorder Mother         Dementia, Still living    Ovarian Cancer Mother     Thyroid Disease Mother     C.A.D. Father             Diabetes Father     Coronary Artery Disease Father     Alcohol/Drug Brother     Thyroid Disease Son     Diabetes Son           Allergies   Atorvastatin, Cats, Gluten, and Shellfish allergy    30 minutes spent on the date of the encounter doing chart review, history and exam, documentation and further activities as noted above      KIANA Jorge CNP  [facility]  Pager: [phone]      Thank you for allowing me to participate in the care of your patient.      Sincerely,     KIANA Jorge CNP     [facility]  cc:   No referring provider defined for this encounter.

## 2023-11-03 NOTE — PROGRESS NOTES
Cardiology Clinic Progress Note  Salome Saeed MRN# [id number]   YOB: 1940 Age: 83 year old   Primary Cardiologist: Dr. Amaya  Reason for visit: Cardiology follow up            Assessment and Plan:   Salome Saeed is a very pleasant 83 year old female here for cardiology follow up.     1. Coronary artery disease s/p NAILA x 2 to LAD in 2009, s/p NAILA to OM 2015              - Coronary angiogram 5/2023 which showed mild angiographic progression of atherosclerosis in the right coronary since the last study in 2015, but no obstructive lesions that warrant percutaneous intervention.   2. Symptomatic hypotension with diagnosis of hypertension - Resolved with adjustments in antihypertensive agents  3. Hypercholesterolemia - lipid panel  10/2023 total cholesterol 167, HDL 86, LDL 71 and triglycerides 48              - Continue rosuvastatin   4. Hypothyroidism  5. 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    I saw patient today for cardiology follow up. She is stable from a cardiac perspective. Blood pressures mildly elevated in clinic today, she shares typically controlled at home. Additionally she had some complaints of lightheadedness today which appears likely related to some issues with her new glasses and double visit. She has an eye appt next week. No changes in medications today.     Changes today: none    Follow up plan:     Follow up with Dr. Amaya in 6 months         History of Presenting Illness:    Salome Saeed is a very pleasant 83 year old female with a history of coronary artery disease, hypercholesterolemia, hypertension, hypothyroidism, chronic back pain with multiple surgeries, Parkinson's disease, CVA in 2013 manifesting as balance issues and strong family history of coronary artery disease.      Primary cardiologist Dr. Amaya. In 2009 she underwent NAILA x  "2 to her LAD. In 2012 she had another coronary angiogram due to chest pain which showed no change in her anatomy. She had moderate disease in the PAD, ostial pinch in her first diagonal and 80% stenosis of the OM, medical management was recommended. In 2015 had chest pain, underwent coronary angiogram which again demonstrated no change in anatomy and medical management was recommended. One month later in October 2015 she was back in the ED with chest pain, she underwent coronary angiogram and stenting of her small OM with Promus NAILA (2.25 x 8mm). In 2017 she had some chest pain, nuclear stress test showed no evidence of ischemia.     Patient has had difficulty with symptomatic hypotension for which her atenolol and amlodipine were decreased.    In April 2023 she saw Dr. Amaya at which time she was having chest tightness, she was sent to the ED where her troponin was negative and CT benign. She was started on imdur 15mg daily with no change in symptoms. Ultimately she underwent coronary angiogram 5/2023 which showed mild angiographic progression of atherosclerosis in the right coronary since the last study in 2015, but no obstructive lesions that warrant percutaneous intervention.     Patient is here today for cardiology follow up.     Patient reports feeling good. Feeling lightheaded today, states off/on, unable to provide much details. At the end of our visit asked about her lightheadedness and states no longer present. Then shares she has been dealing with double vision with her current glasses, has an appt on Tuesday. Monitoring weights daily a home, has been stable. Denies shortness of breath a trest. Occasional exertional dyspnea. Occasional chest \"weight\" pressures, this is not new, similar to prior discomforts. Denies tachycardia or palpitations. Taking medications daily. Denies episodes of bleeding.     Labs from October showed stable kidney function and electrolytes. Blood pressure 145/77 and HR 62 in " "clinic today. Manual recheck 138/70.    Appetite good. Eating meals at home. No set exercise routine, notes she gets activity walking around her house. Rare alcohol use. Denies tobacco use.          Social History      Social History     Socioeconomic History    Marital status:      Spouse name: Not on file    Number of children: Not on file    Years of education: Not on file    Highest education level: Not on file   Occupational History    Not on file   Tobacco Use    Smoking status: Former     Packs/day: .1     Types: Cigarettes     Quit date: 1965     Years since quittin.8    Smokeless tobacco: Never   Substance and Sexual Activity    Alcohol use: Yes     Alcohol/week: 0.0 standard drinks of alcohol     Comment: 2 glasses wine per month, maybe    Drug use: No    Sexual activity: Not Currently   Other Topics Concern     Service Not Asked    Blood Transfusions No    Caffeine Concern Yes     Comment: 5 cups caffeine per day    Occupational Exposure Not Asked    Hobby Hazards Not Asked    Sleep Concern No    Stress Concern No    Weight Concern No     Comment: weight stable    Special Diet No     Comment: trying to eat healthier    Back Care Not Asked    Exercise Yes     Comment: stretching    Bike Helmet Not Asked    Seat Belt Yes    Self-Exams Yes    Parent/sibling w/ CABG, MI or angioplasty before 65F 55M? Yes   Social History Narrative    Works in an office     Social Determinants of Health     Financial Resource Strain: Not on file   Food Insecurity: Not on file   Transportation Needs: Not on file   Physical Activity: Not on file   Stress: Not on file   Social Connections: Not on file   Interpersonal Safety: Not on file   Housing Stability: Not on file          Review of Systems:   10 point ROS neg other than the symptoms noted above in the HPI.          Physical Exam:   Vitals: Ht 1.6 m (5' 3\")   LMP  (LMP Unknown)   BMI 23.70 kg/m     Wt Readings from Last 4 Encounters:   23 60.7 " kg (133 lb 12.8 oz)   05/22/23 59.7 kg (131 lb 9.6 oz)   05/18/23 60 kg (132 lb 3.2 oz)   04/26/23 59.9 kg (132 lb)     GEN: well nourished, in no acute distress.  HEENT:  Pupils equal, round. Sclerae nonicteric.   NECK: Supple, no masses appreciated.   C/V:  Regular rate and rhythm, no murmur, rub or gallop.    RESP: Respirations are unlabored. Clear to auscultation bilaterally without wheezing, rales, or rhonchi.  GI: Abdomen soft, nontender.  EXTREM: No LE edema.  NEURO: Alert and oriented, cooperative.  SKIN: Warm and dry.        Data:     LIPID RESULTS:  Lab Results   Component Value Date    CHOL 167 10/05/2023    CHOL 149 10/14/2020    HDL 86 10/05/2023    HDL 78 10/14/2020    LDL 71 10/05/2023    LDL 57 10/14/2020    TRIG 48 10/05/2023    TRIG 70 10/14/2020    CHOLHDLRATIO 2.2 08/17/2015     LIVER ENZYME RESULTS:  Lab Results   Component Value Date    AST 35 09/14/2021    AST 21 05/24/2017    ALT 33 12/10/2021    ALT 28 10/14/2020     CBC RESULTS:  Lab Results   Component Value Date    WBC 4.6 05/05/2023    WBC 6.1 02/15/2021    RBC 4.18 05/05/2023    RBC 4.62 02/15/2021    HGB 12.2 05/05/2023    HGB 13.4 02/15/2021    HCT 37.4 05/05/2023    HCT 40.1 02/15/2021    MCV 90 05/05/2023    MCV 87 02/15/2021    MCH 29.2 05/05/2023    MCH 29.0 02/15/2021    MCHC 32.6 05/05/2023    MCHC 33.4 02/15/2021    RDW 14.8 05/05/2023    RDW 13.8 02/15/2021     05/05/2023     02/15/2021     BMP RESULTS:  Lab Results   Component Value Date     (L) 10/05/2023     10/14/2020    POTASSIUM 4.4 10/05/2023    POTASSIUM 4.2 12/10/2021    POTASSIUM 4.0 10/14/2020    CHLORIDE 96 (L) 10/05/2023    CHLORIDE 102 12/10/2021    CHLORIDE 98 10/14/2020    CO2 28 10/05/2023    CO2 25 12/10/2021    CO2 28 10/14/2020    ANIONGAP 10 10/05/2023    ANIONGAP 6 12/10/2021    ANIONGAP 7 10/14/2020     (H) 10/05/2023     (H) 12/10/2021     (H) 10/14/2020    BUN 9.6 10/05/2023    BUN 14 12/10/2021    BUN 14  10/14/2020    CR 0.70 10/05/2023    CR 0.77 10/14/2020    GFRESTIMATED 86 10/05/2023    GFRESTIMATED 73 10/14/2020    GFRESTBLACK 85 10/14/2020    ERIC 9.4 10/05/2023    ERIC 8.5 10/14/2020     INR RESULTS:  Lab Results   Component Value Date    INR 3.0 (H) 10/31/2023    INR 3.4 (H) 10/12/2023    INR 1.09 05/05/2023    INR 2.02 (H) 04/14/2023    INR 3.10 (H) 07/01/2021    INR 3.3 (H) 06/17/2021    INR 4.5 (H) 06/07/2021    INR 2.80 (H) 05/10/2021          Medications     Current Outpatient Medications   Medication Sig Dispense Refill    amLODIPine (NORVASC) 2.5 MG tablet Take 1 tablet (2.5 mg) by mouth daily 90 tablet 3    aspirin EC 81 MG tablet Take 1 tablet (81 mg) by mouth daily      Calcium Carb-Cholecalciferol (CALCIUM 600 + D PO) Take 1,200 mg by mouth daily       Cholecalciferol (VITAMIN D) 2000 UNITS tablet Take 2,000 Units by mouth daily       clonazePAM (KLONOPIN) 0.5 MG tablet Take 0.5 tablets (0.25 mg) by mouth daily as needed for anxiety 30 tablet 0    Coenzyme Q10 (COQ-10) 200 MG CAPS Take 400 mg by mouth daily       diclofenac (VOLTAREN) 1 % topical gel Apply 4 g topically 4 times daily as needed for moderate pain 350 g 1    escitalopram (LEXAPRO) 5 MG tablet Take 1 tablet (5 mg) by mouth daily 90 tablet 1    gatifloxacin (ZYMAXID) 0.5 % ophthalmic solution Place 1 drop into both eyes daily      ketorolac (ACULAR) 0.5 % ophthalmic solution Place 1 drop into both eyes daily      levothyroxine (SYNTHROID/LEVOTHROID) 50 MCG tablet Take 1 tablet (50 mcg) by mouth daily 90 tablet 3    lisinopril (ZESTRIL) 20 MG tablet Take 1 tablet (20 mg) by mouth daily 90 tablet 4    Magnesium 400 MG CAPS Take 400 mg by mouth daily      Multiple Vitamin (DAILY MULTIVITAMIN PO) Take by mouth daily      nitroGLYcerin (NITROSTAT) 0.4 MG sublingual tablet Place 1 tablet (0.4 mg) under the tongue every 5 minutes as needed for chest pain 25 tablet 2    prednisoLONE acetate (PRED FORTE) 1 % ophthalmic suspension Place 1 drop  into both eyes daily      propranolol ER (INDERAL LA) 60 MG 24 hr capsule Take 1 capsule (60 mg) by mouth daily 90 capsule 1    rosuvastatin (CRESTOR) 40 MG tablet Take 1 tablet (40 mg) by mouth daily 90 tablet 4    vitamin B complex with vitamin C (STRESS TAB) tablet Take 1 tablet by mouth daily       vitamin C (ASCORBIC ACID) 1000 MG TABS Take 2,000 mg by mouth daily      warfarin ANTICOAGULANT (COUMADIN) 5 MG tablet Take 5mg every M, W,F and 2.5mg all other days OR per INR clinic 80 tablet 1        Past Medical History     Past Medical History:   Diagnosis Date    CAD (coronary artery disease) 04/09/2009 4/8/2009: PTCA and two 2.5x15mm Xience stents to mid LAD lesion; Cath 12/2012- 40-50% stenosis in mid PDA, 80% on D1- medically treat , 5/28/2015 - PTCA and 2.25x8mm Promus PREMIIER NAILA to ostium of 2nd OM    Cerebral artery occlusion with cerebral infarction 2012    CVA (cerebral infarction)     DDD (degenerative disc disease)     Essential hypertension, benign     GERD (gastroesophageal reflux disease)     Hyperlipidemia     Hypothyroidism     Lumbar spinal stenosis     Mitral regurgitation     1+ per 7/2013 Echo    Vertebral artery stenosis, right      Past Surgical History:   Procedure Laterality Date    ARTHROPLASTY KNEE Left 6/1/2020    Procedure: Left total knee arthroplasty (Ward and Nephew #5 narrow femur; #3 tibia; 9 mm tibial poly and 35 mm patella);  Surgeon: Jorge Luis Cheatham MD;  Location: [facility] OR    CORONARY ANGIOGRAPHY ADULT ORDER  12/6/2012    40-50% stenosis to mid PDA, 80% in D1- medically treat    CORONARY ANGIOGRAPHY ADULT ORDER  5/28/2015    PTCA and 2.25x8mm Promus PREMIER NAILA to ostium of 2nd OM    CV CORONARY ANGIOGRAM N/A 5/5/2023    Procedure: Coronary Angiogram;  Surgeon: Wilfrido Robert MD;  Location: [facility]    DECOMPRESSION, FUSION LUMBAR POSTERIOR THREE + LEVELS, COMBINED  5/8/2013    Procedure: COMBINED DECOMPRESSION, FUSION LUMBAR POSTERIOR THREE +  LEVELS;  Posterior Lumbar Decompression L3-S1, Fusion L4-S1;  Surgeon: Daniel Harris MD;  Location: [facility] OR    ENDOSCOPIC STRIPPING VEIN(S)      HEART CATH, ANGIOPLASTY  2009    PTCA and two 2.5x15mm Xience stents to mid LAD lesion    HYSTERECTOMY, RICKIE      Fibroids, and Menorrhagia.. Bilateral Oophrectomy    ORTHOPEDIC SURGERY      Stenting of LAD, Angioplasty  09    TONSILLECTOMY      as a child     Family History   Problem Relation Age of Onset    Neurologic Disorder Mother         Dementia, Still living    Ovarian Cancer Mother     Thyroid Disease Mother     C.A.D. Father             Diabetes Father     Coronary Artery Disease Father     Alcohol/Drug Brother     Thyroid Disease Son     Diabetes Son           Allergies   Atorvastatin, Cats, Gluten, and Shellfish allergy    30 minutes spent on the date of the encounter doing chart review, history and exam, documentation and further activities as noted above      KIANA Jorge [facility]  Pager: [phone]

## 2023-11-10 ENCOUNTER — THERAPY VISIT (OUTPATIENT)
Dept: PHYSICAL THERAPY | Facility: CLINIC | Age: 83
End: 2023-11-10
Attending: PSYCHIATRY & NEUROLOGY
Payer: COMMERCIAL

## 2023-11-10 DIAGNOSIS — G89.29 CHRONIC MIDLINE LOW BACK PAIN WITHOUT SCIATICA: ICD-10-CM

## 2023-11-10 DIAGNOSIS — M54.50 CHRONIC MIDLINE LOW BACK PAIN WITHOUT SCIATICA: ICD-10-CM

## 2023-11-10 PROCEDURE — 97112 NEUROMUSCULAR REEDUCATION: CPT | Mod: GP | Performed by: PHYSICAL THERAPIST

## 2023-11-10 PROCEDURE — 97110 THERAPEUTIC EXERCISES: CPT | Mod: GP | Performed by: PHYSICAL THERAPIST

## 2023-11-10 PROCEDURE — 97161 PT EVAL LOW COMPLEX 20 MIN: CPT | Mod: GP | Performed by: PHYSICAL THERAPIST

## 2023-11-10 NOTE — PROGRESS NOTES
PHYSICAL THERAPY EVALUATION  Type of Visit: Evaluation    See electronic medical record for Abuse and Falls Screening details.    Subjective       Presenting condition or subjective complaint:  LBP  Symptoms reported are ache in spine- changes location depending on the day between lower neck and lower spine. Patient reports that her neck is bothering her more today compared to her low back. She feels weak and notes that due to her symptoms she is unable to stand for periods of time. She requires a motorized shopping cart when shopping and can only be on her feet for ~10 minutes. In the community she is using SPC due to some intermittent dizziness when quickly changing positions.   Date of onset: 09/21/23 (order date)    Relevant medical history:   parkinsonism, cervical spinal stenosis, low back pain, HTN, arthritis, thyroid problems, hx of stroke  Dates & types of surgery:  2 lumbar surgeries for stenosis    Living Environment  Social support:   with   Stairs:  Assistive Device: uses SPC for community ambulation but not in her home    Employment:    retired  Hobbies/Interests:  walking her dog    Patient goals for therapy:  walk the dog, tolerate standing longer     Pain assessment: Pain present, base of neck today     Objective   Measures:  Ced STarT back tool score: 3, sub score 9, risk medium  BRANDYN: 40% dysfunction    Posture: forward rounded shoulder with kyphotic shoulder posture; especially notable in seated and standing positions    Gait: SPC with short shuffling gait pattern, slow, forward flexed at hips    Flexibility: decreased B HS flexibility     ROM: cervical ROM =WFL but increased limitation R side compared to L side with stiffness noted at end range; lumbar AROM WFL and not limiting today    Strength: decreased LE strength noted by STS testing and strength screen R to L side    Neurological:    Dural Signs L R   Slump negative negative   SLR negative negative     Palpation: TTP for cervical  paraspinals, along median scapular boarder    Functional Squat and Balance: unable to maintain SLS without HHA for >5 seconds on each leg    Other Tests: 5xSTS 20.6 seconds    Assessment & Plan   CLINICAL IMPRESSIONS  Medical Diagnosis: Chronic midline low back pain without sciatica    Treatment Diagnosis: pain in neck, pain in lumbar spine   Impression/Assessment: Patient is a 83 year old female with low back pain complaints.  The following significant findings have been identified: Pain, Decreased ROM/flexibility, Decreased joint mobility, Decreased strength, Impaired balance, Impaired gait, Impaired muscle performance, Decreased activity tolerance, and Impaired posture. These impairments interfere with their ability to perform self care tasks, work tasks, recreational activities, household chores, driving , household mobility, and community mobility as compared to previous level of function.     Clinical Decision Making (Complexity):  Clinical Presentation: Stable/Uncomplicated  Clinical Presentation Rationale: based on medical and personal factors listed in PT evaluation  Clinical Decision Making (Complexity): Low complexity    PLAN OF CARE  Treatment Interventions:  Interventions: Gait Training, Manual Therapy, Neuromuscular Re-education, Therapeutic Activity, Therapeutic Exercise    Long Term Goals     PT Goal 1  Goal Identifier: HEP  Goal Description: Patient will demonstrate consistency and independence with HEP as prescribed in order to reach functional goals.  Rationale: to maximize safety and independence within the home;to maximize safety and independence within the community;to maximize safety and independence with performance of ADLs and functional tasks  Target Date: 01/05/24  PT Goal 2  Goal Identifier: Walking  Goal Description: Patient will tolerate going on at least a 20 minute walk with LRAD in order to improve her endurance and allow her to take her dog for walks.  Rationale: to maximize safety  and independence with performance of ADLs and functional tasks;to maximize safety and independence within the community;to maximize safety and independence within the home;to maximize safety and independence with transportation  Goal Progress: 11/10: Pt reports maximum tolerance of standing for 10 mins  Target Date: 01/05/23  PT Goal 3  Goal Identifier: 5xSTS  Goal Description: Patient will demonstrate 5x STS of 15 seconds or less to indicate decreased risk of falling and improved LE strength.      Frequency of Treatment: 1x per week  Duration of Treatment: 8 weeks    Recommended Referrals to Other Professionals:   Education Assessment:   Learner/Method: Patient;No Barriers to Learning  Education Comments: no concerns    Risks and benefits of evaluation/treatment have been explained.   Patient/Family/caregiver agrees with Plan of Care.     Evaluation Time:     PT Eval, Low Complexity Minutes (73084): 17   Signing Clinician: Tianna Quijano, PT      [facility]                                                                                   OUTPATIENT PHYSICAL THERAPY      PLAN OF TREATMENT FOR OUTPATIENT REHABILITATION   Patient's Last Name, First Name, Salome Fang YOB: 1940   Provider's Name   [facility]   Medical Record No.  [id number]     Onset Date: 09/21/23 (order date)  Start of Care Date: 11/10/23     Medical Diagnosis:  Chronic midline low back pain without sciatica      PT Treatment Diagnosis:  pain in neck, pain in lumbar spine Plan of Treatment  Frequency/Duration: 1x per week/ 8 weeks    Certification date from 11/10/23 to 01/05/24         See note for plan of treatment details and functional goals     Tianna Quijano, PT                         I CERTIFY THE NEED FOR THESE SERVICES FURNISHED UNDER        THIS PLAN OF TREATMENT AND WHILE UNDER MY CARE     (Physician attestation of this document indicates review and  certification of the therapy plan).              Referring Provider:  Pj Keene    Initial Assessment  See Epic Evaluation- Start of Care Date: 11/10/23

## 2023-11-20 ENCOUNTER — TELEPHONE (OUTPATIENT)
Dept: FAMILY MEDICINE | Facility: CLINIC | Age: 83
End: 2023-11-20
Payer: COMMERCIAL

## 2023-11-20 ENCOUNTER — TELEPHONE (OUTPATIENT)
Dept: FAMILY MEDICINE | Facility: CLINIC | Age: 83
End: 2023-11-20

## 2023-11-20 DIAGNOSIS — I10 ESSENTIAL HYPERTENSION: ICD-10-CM

## 2023-11-20 DIAGNOSIS — F41.9 ANXIETY: ICD-10-CM

## 2023-11-20 RX ORDER — AMLODIPINE BESYLATE 2.5 MG/1
2.5 TABLET ORAL DAILY
Qty: 90 TABLET | Refills: 3 | Status: SHIPPED | OUTPATIENT
Start: 2023-11-20 | End: 2024-01-04

## 2023-11-20 RX ORDER — ESCITALOPRAM OXALATE 5 MG/1
5 TABLET ORAL DAILY
Qty: 90 TABLET | Refills: 1 | Status: SHIPPED | OUTPATIENT
Start: 2023-11-20 | End: 2024-01-04

## 2023-11-20 NOTE — TELEPHONE ENCOUNTER
Medication Question or Refill    Patient should have Amlodipine (she called about) as there is a ongoing rx on hand and asked her to call [facility]  to ask why this   Has not been delievered?    Lexapro she  is out of and needs to fill a rx.      What medication are you calling about (include dose and sig)?:     escitalopram (LEXAPRO) 5 MG tablet       Preferred Pharmacy:       [facility] - [address] [address]  Phone: [phone] Fax: [phone]      Controlled Substance Agreement on file:   CSA -- Patient Level:    CSA: None found at the patient level.       Who prescribed the medication?: PCP    Do you need a refill? Yes    When did you use the medication last? 1 day ago    Patient offered an appointment? No    Do you have any questions or concerns?  No      Okay to leave a detailed message?: Yes at Cell number on file:    No relevant phone numbers on file.                escitalopram (LEXAPRO) 5 MG tablet

## 2023-11-27 ENCOUNTER — ANTICOAGULATION THERAPY VISIT (OUTPATIENT)
Dept: ANTICOAGULATION | Facility: CLINIC | Age: 83
End: 2023-11-27

## 2023-11-27 ENCOUNTER — LAB (OUTPATIENT)
Dept: LAB | Facility: CLINIC | Age: 83
End: 2023-11-27
Payer: COMMERCIAL

## 2023-11-27 DIAGNOSIS — Z79.01 LONG TERM CURRENT USE OF ANTICOAGULANT THERAPY: ICD-10-CM

## 2023-11-27 DIAGNOSIS — Z79.01 LONG TERM CURRENT USE OF ANTICOAGULANT THERAPY: Primary | ICD-10-CM

## 2023-11-27 DIAGNOSIS — I63.9 CEREBROVASCULAR ACCIDENT INVOLVING CEREBELLUM (H): ICD-10-CM

## 2023-11-27 LAB — INR BLD: 4.4 (ref 0.9–1.1)

## 2023-11-27 PROCEDURE — 85610 PROTHROMBIN TIME: CPT

## 2023-11-27 PROCEDURE — 36416 COLLJ CAPILLARY BLOOD SPEC: CPT

## 2023-12-01 ENCOUNTER — THERAPY VISIT (OUTPATIENT)
Dept: PHYSICAL THERAPY | Facility: CLINIC | Age: 83
End: 2023-12-01
Payer: COMMERCIAL

## 2023-12-01 DIAGNOSIS — G89.29 CHRONIC MIDLINE LOW BACK PAIN WITHOUT SCIATICA: Primary | ICD-10-CM

## 2023-12-01 DIAGNOSIS — M54.50 CHRONIC MIDLINE LOW BACK PAIN WITHOUT SCIATICA: Primary | ICD-10-CM

## 2023-12-01 PROCEDURE — 97116 GAIT TRAINING THERAPY: CPT | Mod: GP | Performed by: PHYSICAL THERAPIST

## 2023-12-01 PROCEDURE — 97110 THERAPEUTIC EXERCISES: CPT | Mod: GP | Performed by: PHYSICAL THERAPIST

## 2023-12-01 PROCEDURE — 97112 NEUROMUSCULAR REEDUCATION: CPT | Mod: GP | Performed by: PHYSICAL THERAPIST

## 2023-12-06 ENCOUNTER — ANTICOAGULATION THERAPY VISIT (OUTPATIENT)
Dept: ANTICOAGULATION | Facility: CLINIC | Age: 83
End: 2023-12-06

## 2023-12-06 ENCOUNTER — LAB (OUTPATIENT)
Dept: LAB | Facility: CLINIC | Age: 83
End: 2023-12-06
Payer: COMMERCIAL

## 2023-12-06 DIAGNOSIS — Z79.01 LONG TERM CURRENT USE OF ANTICOAGULANT THERAPY: ICD-10-CM

## 2023-12-06 DIAGNOSIS — I63.9 CEREBROVASCULAR ACCIDENT INVOLVING CEREBELLUM (H): ICD-10-CM

## 2023-12-06 DIAGNOSIS — Z79.01 LONG TERM CURRENT USE OF ANTICOAGULANT THERAPY: Primary | ICD-10-CM

## 2023-12-06 LAB — INR BLD: 2 (ref 0.9–1.1)

## 2023-12-06 PROCEDURE — 85610 PROTHROMBIN TIME: CPT

## 2023-12-06 PROCEDURE — 36416 COLLJ CAPILLARY BLOOD SPEC: CPT

## 2023-12-06 NOTE — PROGRESS NOTES
ANTICOAGULATION MANAGEMENT     Salome Saeed 83 year old female is on warfarin with therapeutic INR result. (Goal INR 2.0-3.0)    Recent labs: (last 7 days)     12/06/23  1327   INR 2.0*       ASSESSMENT     Warfarin Lab Questionnaire    Warfarin Doses Last 7 Days          12/6/2023   Warfarin Lab Questionnaire   Missed doses within past 14 days? No   Changes in diet or alcohol within past 14 days? No   Medication changes since last result? No   Injuries or illness since last result? No   New shortness of breath, severe headaches or sudden changes in vision since last result? No   Abnormal bleeding since last result? No   Upcoming surgery, procedure? No     Previous result: Supratherapeutic, Warfarin Maintenance dose was reduced 10% 10/12/23 and 11.1 % 11/27/23. Discussed with patient that we may need to send in an Rx for 2.5 mg tablets so smaller adjustments can be made. Will recheck in 9 days when patient has PT appointment to make sure INR does not continue to drop.  Additional findings: None       PLAN     Recommended plan for no diet, medication or health factor changes affecting INR     Dosing Instructions: Continue your current warfarin dose with next INR in 1-2 weeks       Summary  As of 12/6/2023      Full warfarin instructions:  5 mg every Fri; 2.5 mg all other days   Next INR check:  12/15/2023               Telephone call with Loreta who verbalizes understanding and agrees to plan    Lab visit scheduled    Education provided:   Please call back if any changes to your diet, medications or how you've been taking warfarin  Contact [phone]  with any changes, questions or concerns.     Plan made per [facility] anticoagulation protocol    Renee Martinez RN  Anticoagulation Clinic  12/6/2023    _______________________________________________________________________     Anticoagulation Episode Summary       Current INR goal:  2.0-3.0   TTR:  39.5% (1 y)   Target end date:  Indefinite   Send INR reminders to:   [facility]    Indications    Long term current use of anticoagulant therapy [Z79.01]  Cerebrovascular accident involving cerebellum (H) [I63.9]             Comments:               Anticoagulation Care Providers       Provider Role Specialty Phone number    Surya Dyer DO Referring [facility] [phone]

## 2023-12-08 ENCOUNTER — THERAPY VISIT (OUTPATIENT)
Dept: PHYSICAL THERAPY | Facility: CLINIC | Age: 83
End: 2023-12-08
Payer: COMMERCIAL

## 2023-12-08 DIAGNOSIS — G89.29 CHRONIC MIDLINE LOW BACK PAIN WITHOUT SCIATICA: Primary | ICD-10-CM

## 2023-12-08 DIAGNOSIS — M54.50 CHRONIC MIDLINE LOW BACK PAIN WITHOUT SCIATICA: Primary | ICD-10-CM

## 2023-12-08 PROCEDURE — 97140 MANUAL THERAPY 1/> REGIONS: CPT | Mod: GP | Performed by: PHYSICAL THERAPIST

## 2023-12-08 PROCEDURE — 97110 THERAPEUTIC EXERCISES: CPT | Mod: GP | Performed by: PHYSICAL THERAPIST

## 2023-12-15 ENCOUNTER — THERAPY VISIT (OUTPATIENT)
Dept: PHYSICAL THERAPY | Facility: CLINIC | Age: 83
End: 2023-12-15
Payer: COMMERCIAL

## 2023-12-15 DIAGNOSIS — G89.29 CHRONIC MIDLINE LOW BACK PAIN WITHOUT SCIATICA: Primary | ICD-10-CM

## 2023-12-15 DIAGNOSIS — M54.50 CHRONIC MIDLINE LOW BACK PAIN WITHOUT SCIATICA: Primary | ICD-10-CM

## 2023-12-15 PROCEDURE — 97140 MANUAL THERAPY 1/> REGIONS: CPT | Mod: GP | Performed by: PHYSICAL THERAPIST

## 2023-12-15 PROCEDURE — 97112 NEUROMUSCULAR REEDUCATION: CPT | Mod: GP | Performed by: PHYSICAL THERAPIST

## 2023-12-15 PROCEDURE — 97110 THERAPEUTIC EXERCISES: CPT | Mod: GP | Performed by: PHYSICAL THERAPIST

## 2023-12-18 ENCOUNTER — ANTICOAGULATION THERAPY VISIT (OUTPATIENT)
Dept: ANTICOAGULATION | Facility: CLINIC | Age: 83
End: 2023-12-18

## 2023-12-18 ENCOUNTER — LAB (OUTPATIENT)
Dept: LAB | Facility: CLINIC | Age: 83
End: 2023-12-18
Payer: COMMERCIAL

## 2023-12-18 DIAGNOSIS — Z79.01 LONG TERM CURRENT USE OF ANTICOAGULANT THERAPY: Primary | ICD-10-CM

## 2023-12-18 DIAGNOSIS — Z79.01 LONG TERM CURRENT USE OF ANTICOAGULANT THERAPY: ICD-10-CM

## 2023-12-18 DIAGNOSIS — I63.9 CEREBROVASCULAR ACCIDENT INVOLVING CEREBELLUM (H): ICD-10-CM

## 2023-12-18 LAB — INR BLD: 2.2 (ref 0.9–1.1)

## 2023-12-18 PROCEDURE — 36416 COLLJ CAPILLARY BLOOD SPEC: CPT

## 2023-12-18 PROCEDURE — 85610 PROTHROMBIN TIME: CPT

## 2023-12-18 NOTE — PROGRESS NOTES
ANTICOAGULATION MANAGEMENT     Salome Saeed 83 year old female is on warfarin with therapeutic INR result. (Goal INR 2.0-3.0)    Recent labs: (last 7 days)     12/18/23  1410   INR 2.2*       ASSESSMENT     Warfarin Lab Questionnaire    Warfarin Doses Last 7 Days  Warfarin dosing verbally confirmed with patient-- taking as instructed          12/18/2023   Warfarin Lab Questionnaire   Missed doses within past 14 days? No   Changes in diet or alcohol within past 14 days? No   Medication changes since last result? No   Injuries or illness since last result? No   New shortness of breath, severe headaches or sudden changes in vision since last result? No   Abnormal bleeding since last result? No   Upcoming surgery, procedure? No     Previous result: Therapeutic last visit; previously outside of goal range  Additional findings: None       PLAN     Recommended plan for no diet, medication or health factor changes affecting INR     Dosing Instructions: Continue your current warfarin dose with next INR in 3 weeks       Summary  As of 12/18/2023      Full warfarin instructions:  5 mg every Fri; 2.5 mg all other days   Next INR check:  1/8/2024               Telephone call with Loreta who agrees to plan and repeated back plan correctly    Lab visit scheduled    Education provided:   Please call back if any changes to your diet, medications or how you've been taking warfarin  Contact [phone]  with any changes, questions or concerns.     Plan made per [facility] anticoagulation protocol    Renee Martinez RN  Anticoagulation Clinic  12/18/2023    _______________________________________________________________________     Anticoagulation Episode Summary       Current INR goal:  2.0-3.0   TTR:  42.5% (1 y)   Target end date:  Indefinite   Send INR reminders to:  [facility]    Indications    Long term current use of anticoagulant therapy [Z79.01]  Cerebrovascular accident involving cerebellum (H) [I63.9]              Comments:               Anticoagulation Care Providers       Provider Role Specialty Phone number    Surya Dyer,  Referring Family Medicine [phone]

## 2023-12-22 ENCOUNTER — THERAPY VISIT (OUTPATIENT)
Dept: PHYSICAL THERAPY | Facility: CLINIC | Age: 83
End: 2023-12-22
Payer: COMMERCIAL

## 2023-12-22 DIAGNOSIS — M54.50 CHRONIC MIDLINE LOW BACK PAIN WITHOUT SCIATICA: Primary | ICD-10-CM

## 2023-12-22 DIAGNOSIS — G89.29 CHRONIC MIDLINE LOW BACK PAIN WITHOUT SCIATICA: Primary | ICD-10-CM

## 2023-12-22 PROCEDURE — 97110 THERAPEUTIC EXERCISES: CPT | Mod: GP | Performed by: PHYSICAL THERAPIST

## 2023-12-22 PROCEDURE — 97140 MANUAL THERAPY 1/> REGIONS: CPT | Mod: GP | Performed by: PHYSICAL THERAPIST

## 2024-01-04 ENCOUNTER — TELEPHONE (OUTPATIENT)
Dept: FAMILY MEDICINE | Facility: CLINIC | Age: 84
End: 2024-01-04
Payer: COMMERCIAL

## 2024-01-04 DIAGNOSIS — I63.9 CEREBROVASCULAR ACCIDENT INVOLVING CEREBELLUM (H): ICD-10-CM

## 2024-01-04 DIAGNOSIS — E78.5 HYPERLIPIDEMIA LDL GOAL <70: ICD-10-CM

## 2024-01-04 DIAGNOSIS — F41.9 ANXIETY: ICD-10-CM

## 2024-01-04 DIAGNOSIS — I10 ESSENTIAL HYPERTENSION: ICD-10-CM

## 2024-01-04 DIAGNOSIS — I10 BENIGN ESSENTIAL HYPERTENSION: Chronic | ICD-10-CM

## 2024-01-04 DIAGNOSIS — I10 ESSENTIAL HYPERTENSION, BENIGN: ICD-10-CM

## 2024-01-04 DIAGNOSIS — E03.9 HYPOTHYROIDISM, UNSPECIFIED TYPE: ICD-10-CM

## 2024-01-04 RX ORDER — ROSUVASTATIN CALCIUM 40 MG/1
40 TABLET, COATED ORAL DAILY
Qty: 90 TABLET | Refills: 4 | Status: SHIPPED | OUTPATIENT
Start: 2024-01-04 | End: 2024-05-22

## 2024-01-04 RX ORDER — ESCITALOPRAM OXALATE 5 MG/1
5 TABLET ORAL DAILY
Qty: 90 TABLET | Refills: 1 | Status: SHIPPED | OUTPATIENT
Start: 2024-01-04 | End: 2024-05-23

## 2024-01-04 RX ORDER — PROPRANOLOL HCL 60 MG
60 CAPSULE, EXTENDED RELEASE 24HR ORAL DAILY
Qty: 90 CAPSULE | Refills: 1 | Status: SHIPPED | OUTPATIENT
Start: 2024-01-04 | End: 2024-05-23

## 2024-01-04 RX ORDER — LEVOTHYROXINE SODIUM 50 UG/1
50 TABLET ORAL DAILY
Qty: 90 TABLET | Refills: 3 | Status: SHIPPED | OUTPATIENT
Start: 2024-01-04 | End: 2024-03-21

## 2024-01-04 RX ORDER — LISINOPRIL 20 MG/1
20 TABLET ORAL DAILY
Qty: 90 TABLET | Refills: 4 | Status: SHIPPED | OUTPATIENT
Start: 2024-01-04 | End: 2024-07-23

## 2024-01-04 RX ORDER — AMLODIPINE BESYLATE 2.5 MG/1
2.5 TABLET ORAL DAILY
Qty: 90 TABLET | Refills: 3 | Status: SHIPPED | OUTPATIENT
Start: 2024-01-04 | End: 2024-05-22

## 2024-01-04 RX ORDER — WARFARIN SODIUM 5 MG/1
TABLET ORAL
Qty: 80 TABLET | Refills: 1 | Status: ON HOLD | OUTPATIENT
Start: 2024-01-04 | End: 2024-06-21

## 2024-01-04 NOTE — TELEPHONE ENCOUNTER
Medication Question or Refill    rosuvastatin (CRESTOR) 40 MG tablet         amLODIPine (NORVASC) 2.5 MG tablet       What medication are you calling about (include dose and sig)?:     Patient is with [facility] who has changed their pharmacy of choice to [facility].  Please make that the pharmacy of all her meds.    Preferred Pharmacy:     [facility]   [place] [id number] - [place], [place] -   [address]   [phone]       Controlled Substance Agreement on file:   CSA -- Patient Level:    CSA: None found at the patient level.       Who prescribed the medication?: Pcp    Do you need a refill? Yes    When did you use the medication last? na    Patient offered an appointment? No    Do you have any questions or concerns?  No      Okay to leave a detailed message?: Yes at Cell number on file:    No relevant phone numbers on file.     Mariama ROPER

## 2024-01-08 ENCOUNTER — ANTICOAGULATION THERAPY VISIT (OUTPATIENT)
Dept: ANTICOAGULATION | Facility: CLINIC | Age: 84
End: 2024-01-08

## 2024-01-08 ENCOUNTER — LAB (OUTPATIENT)
Dept: LAB | Facility: CLINIC | Age: 84
End: 2024-01-08
Payer: COMMERCIAL

## 2024-01-08 DIAGNOSIS — I63.9 CEREBROVASCULAR ACCIDENT INVOLVING CEREBELLUM (H): ICD-10-CM

## 2024-01-08 DIAGNOSIS — Z79.01 LONG TERM CURRENT USE OF ANTICOAGULANT THERAPY: Primary | ICD-10-CM

## 2024-01-08 DIAGNOSIS — Z79.01 LONG TERM CURRENT USE OF ANTICOAGULANT THERAPY: ICD-10-CM

## 2024-01-08 LAB — INR BLD: 2 (ref 0.9–1.1)

## 2024-01-08 PROCEDURE — 85610 PROTHROMBIN TIME: CPT

## 2024-01-08 PROCEDURE — 36416 COLLJ CAPILLARY BLOOD SPEC: CPT

## 2024-01-08 NOTE — PROGRESS NOTES
ANTICOAGULATION MANAGEMENT     Salome Saeed 83 year old female is on warfarin with therapeutic INR result. (Goal INR 2.0-3.0)    Recent labs: (last 7 days)     01/08/24  1252   INR 2.0*       ASSESSMENT     Warfarin Lab Questionnaire    Warfarin Doses Last 7 Days      1/8/2024    12:52 PM   Dose in Tablet or Mg   TAB or MG? tablet (tab)           1/8/2024   Warfarin Lab Questionnaire   Missed doses within past 14 days? No   Changes in diet or alcohol within past 14 days? No   Medication changes since last result? No   Injuries or illness since last result? No   New shortness of breath, severe headaches or sudden changes in vision since last result? No   Abnormal bleeding since last result? No   Upcoming surgery, procedure? No     Previous result: Therapeutic last 2(+) visits  Additional findings: None       PLAN     Recommended plan for no diet, medication or health factor changes affecting INR     Dosing Instructions: Continue your current warfarin dose with next INR in 4 weeks       Summary  As of 1/8/2024      Full warfarin instructions:  5 mg every Fri; 2.5 mg all other days   Next INR check:  2/5/2024               Detailed voice message left for Loreta with dosing instructions and follow up date.     Contact [phone]  to schedule and with any changes, questions or concerns.     Education provided:   Please call back if any changes to your diet, medications or how you've been taking warfarin    Plan made per [facility] anticoagulation protocol    Neeru Martinez RN  Anticoagulation Clinic  1/8/2024    _______________________________________________________________________     Anticoagulation Episode Summary       Current INR goal:  2.0-3.0   TTR:  44.1% (1 y)   Target end date:  Indefinite   Send INR reminders to:  [facility]    Indications    Long term current use of anticoagulant therapy [Z79.01]  Cerebrovascular accident involving cerebellum (H) [I63.9]             Comments:                Anticoagulation Care Providers       Provider Role Specialty Phone number    Surya Dyer,  Referring Family Medicine [phone]

## 2024-01-10 ENCOUNTER — THERAPY VISIT (OUTPATIENT)
Dept: PHYSICAL THERAPY | Facility: CLINIC | Age: 84
End: 2024-01-10
Payer: COMMERCIAL

## 2024-01-10 DIAGNOSIS — G89.29 CHRONIC MIDLINE LOW BACK PAIN WITHOUT SCIATICA: Primary | ICD-10-CM

## 2024-01-10 DIAGNOSIS — M54.50 CHRONIC MIDLINE LOW BACK PAIN WITHOUT SCIATICA: Primary | ICD-10-CM

## 2024-01-10 DIAGNOSIS — R26.89 IMPAIRED GAIT AND MOBILITY: ICD-10-CM

## 2024-01-10 PROCEDURE — 97530 THERAPEUTIC ACTIVITIES: CPT | Mod: GP | Performed by: PHYSICAL THERAPIST

## 2024-01-10 PROCEDURE — 97110 THERAPEUTIC EXERCISES: CPT | Mod: GP | Performed by: PHYSICAL THERAPIST

## 2024-01-16 PROBLEM — R26.89 IMPAIRED GAIT AND MOBILITY: Status: ACTIVE | Noted: 2024-01-16

## 2024-01-16 NOTE — PROGRESS NOTES
PT PROGRESS NOTE/RE-CERTIFICATION  PLAN  Continue therapy per updated plan of care. Due to recent fall will continue PT interventions and new goals related to gait stability to decrease fall risk. See Flow Sheet for increased details  Subjective: Pt states that she feel before coming to her PT visit. She had her cane with her but slipped when coming out of the eye clinic. She fell on her backside. States that she is feeling a little sore and stiff but did not hit her head.She is having difficulty with her double vision. Has had changes in her prism prescription.     Beginning/End Dates of Progress Note Reporting Period:    11/10/23 to 01/10/2024       01/10/24 0500   PT Goal 1   Goal Identifier HEP   Goal Description Patient will demonstrate consistency and independence with HEP as prescribed in order to reach functional goals.   Rationale to maximize safety and independence within the home;to maximize safety and independence within the community;to maximize safety and independence with performance of ADLs and functional tasks   Goal Progress 12/1/23- notes difficulty completing over the past 2 weeks   Target Date 01/05/24   Date Met 01/10/24   PT Goal 2   Goal Identifier Walking   Goal Description Patient will tolerate going on at least a 20 minute walk with LRAD in order to improve her endurance and allow her to take her dog for walks.   Rationale to maximize safety and independence with performance of ADLs and functional tasks;to maximize safety and independence within the community;to maximize safety and independence within the home;to maximize safety and independence with transportation   Goal Progress 11/10: Pt reports maximum tolerance of standing for 10 mins 1/10: progressing in duration inside, not completing outside because fearful of falls.   Target Date 03/07/24   PT Goal 3   Goal Identifier 5xSTS   Goal Description Patient will demonstrate 5x STS of 15 seconds or less to indicate decreased risk of  falling and improved LE strength.   Rationale to maximize safety and independence with performance of ADLs and functional tasks;to maximize safety and independence within the home;to maximize safety and independence within the community;to maximize safety and independence with transportation   Goal Progress 1/10/24: progressing 15.25 seconds   Target Date 02/21/24   PT Goal 4   Goal Identifier TUG   Goal Description Patient will demonstrate at least a 5 second increase in her TUG score in order to demonstrate improved gait speed and stability indicating decreased fall risk.   Rationale to maximize safety and independence with performance of ADLs and functional tasks;to maximize safety and independence with self cares;to maximize safety and independence with transportation;to maximize safety and independence within the community;to maximize safety and independence within the home   Target Date 03/07/24         [facility]                                                                                   OUTPATIENT PHYSICAL THERAPY    PLAN OF TREATMENT FOR OUTPATIENT REHABILITATION   Patient's Last Name, First Name, SISIAPARNA Vieradayande,Salome  I YOB: 1940   Provider's Name   [facility]   Medical Record No.  [id number]     Onset Date: 09/21/23 (order date)  Start of Care Date: 11/10/23     Medical Diagnosis:  Chronic midline low back pain without sciatica      PT Treatment Diagnosis:  pain in neck, pain in lumbar spine; instability and gait deviations Plan of Treatment  Frequency/Duration: 1x per 2 weeks/ 8 weeks    Certification date from 01/06/24 to 03/07/24         See note for plan of treatment details and functional goals     Tianna Quijano, PT                         I CERTIFY THE NEED FOR THESE SERVICES FURNISHED UNDER        THIS PLAN OF TREATMENT AND WHILE UNDER MY CARE     (Physician attestation of this document indicates review and  certification of the therapy plan).              Referring Provider:  Pj Keene    Initial Assessment  See Epic Evaluation- Start of Care Date: 11/10/23

## 2024-01-26 ENCOUNTER — THERAPY VISIT (OUTPATIENT)
Dept: PHYSICAL THERAPY | Facility: CLINIC | Age: 84
End: 2024-01-26
Payer: COMMERCIAL

## 2024-01-26 DIAGNOSIS — R26.89 IMPAIRED GAIT AND MOBILITY: Primary | ICD-10-CM

## 2024-01-26 PROCEDURE — 97140 MANUAL THERAPY 1/> REGIONS: CPT | Mod: GP | Performed by: PHYSICAL THERAPIST

## 2024-01-26 PROCEDURE — 97116 GAIT TRAINING THERAPY: CPT | Mod: GP | Performed by: PHYSICAL THERAPIST

## 2024-01-26 PROCEDURE — 97110 THERAPEUTIC EXERCISES: CPT | Mod: GP | Performed by: PHYSICAL THERAPIST

## 2024-02-06 ENCOUNTER — LAB (OUTPATIENT)
Dept: LAB | Facility: CLINIC | Age: 84
End: 2024-02-06
Payer: COMMERCIAL

## 2024-02-06 ENCOUNTER — ANTICOAGULATION THERAPY VISIT (OUTPATIENT)
Dept: ANTICOAGULATION | Facility: CLINIC | Age: 84
End: 2024-02-06

## 2024-02-06 DIAGNOSIS — I63.9 CEREBROVASCULAR ACCIDENT INVOLVING CEREBELLUM (H): ICD-10-CM

## 2024-02-06 DIAGNOSIS — Z79.01 LONG TERM CURRENT USE OF ANTICOAGULANT THERAPY: ICD-10-CM

## 2024-02-06 DIAGNOSIS — Z79.01 LONG TERM CURRENT USE OF ANTICOAGULANT THERAPY: Primary | ICD-10-CM

## 2024-02-06 LAB — INR BLD: 2.6 (ref 0.9–1.1)

## 2024-02-06 PROCEDURE — 85610 PROTHROMBIN TIME: CPT

## 2024-02-06 PROCEDURE — 36416 COLLJ CAPILLARY BLOOD SPEC: CPT

## 2024-02-06 NOTE — PROGRESS NOTES
ANTICOAGULATION MANAGEMENT     Salome Saeed 83 year old female is on warfarin with therapeutic INR result. (Goal INR 2.0-3.0)    Recent labs: (last 7 days)     02/06/24  1300   INR 2.6*       ASSESSMENT     Warfarin Lab Questionnaire    Warfarin Doses Last 7 Days    Confirmed proper dosing.        2/6/2024   Warfarin Lab Questionnaire   Missed doses within past 14 days? No   Changes in diet or alcohol within past 14 days? No   Medication changes since last result? No   Injuries or illness since last result? No   New shortness of breath, severe headaches or sudden changes in vision since last result? No   Abnormal bleeding since last result? No   Upcoming surgery, procedure? No     Previous result: Therapeutic last 2(+) visits  Additional findings: None       PLAN     Recommended plan for no diet, medication or health factor changes affecting INR     Dosing Instructions: Continue your current warfarin dose with next INR in 5 weeks       Summary  As of 2/6/2024      Full warfarin instructions:  5 mg every Fri; 2.5 mg all other days   Next INR check:  3/12/2024               Telephone call with Loreta who verbalizes understanding and agrees to plan    Lab visit scheduled    Education provided:   Please call back if any changes to your diet, medications or how you've been taking warfarin    Plan made per [facility] anticoagulation protocol    Neeru Martinez RN  Anticoagulation Clinic  2/6/2024    _______________________________________________________________________     Anticoagulation Episode Summary       Current INR goal:  2.0-3.0   TTR:  48.7% (1 y)   Target end date:  Indefinite   Send INR reminders to:  [facility]    Indications    Long term current use of anticoagulant therapy [Z79.01]  Cerebrovascular accident involving cerebellum (H) [I63.9]             Comments:               Anticoagulation Care Providers       Provider Role Specialty Phone number    Surya Dyer DO Referring Family  Medicine [phone]

## 2024-02-09 ENCOUNTER — THERAPY VISIT (OUTPATIENT)
Dept: PHYSICAL THERAPY | Facility: CLINIC | Age: 84
End: 2024-02-09
Payer: COMMERCIAL

## 2024-02-09 DIAGNOSIS — R26.89 IMPAIRED GAIT AND MOBILITY: Primary | ICD-10-CM

## 2024-02-09 PROCEDURE — 97112 NEUROMUSCULAR REEDUCATION: CPT | Mod: GP | Performed by: PHYSICAL THERAPIST

## 2024-02-09 PROCEDURE — 97110 THERAPEUTIC EXERCISES: CPT | Mod: GP | Performed by: PHYSICAL THERAPIST

## 2024-02-20 ENCOUNTER — THERAPY VISIT (OUTPATIENT)
Dept: PHYSICAL THERAPY | Facility: CLINIC | Age: 84
End: 2024-02-20
Payer: COMMERCIAL

## 2024-02-20 DIAGNOSIS — R26.89 IMPAIRED GAIT AND MOBILITY: Primary | ICD-10-CM

## 2024-02-20 PROCEDURE — 97112 NEUROMUSCULAR REEDUCATION: CPT | Mod: GP | Performed by: PHYSICAL THERAPIST

## 2024-02-20 PROCEDURE — 97530 THERAPEUTIC ACTIVITIES: CPT | Mod: GP | Performed by: PHYSICAL THERAPIST

## 2024-02-20 PROCEDURE — 97110 THERAPEUTIC EXERCISES: CPT | Mod: GP | Performed by: PHYSICAL THERAPIST

## 2024-02-27 ENCOUNTER — THERAPY VISIT (OUTPATIENT)
Dept: PHYSICAL THERAPY | Facility: CLINIC | Age: 84
End: 2024-02-27
Payer: COMMERCIAL

## 2024-02-27 DIAGNOSIS — M54.50 CHRONIC MIDLINE LOW BACK PAIN WITHOUT SCIATICA: ICD-10-CM

## 2024-02-27 DIAGNOSIS — G89.29 CHRONIC MIDLINE LOW BACK PAIN WITHOUT SCIATICA: ICD-10-CM

## 2024-02-27 DIAGNOSIS — R26.89 IMPAIRED GAIT AND MOBILITY: Primary | ICD-10-CM

## 2024-02-27 PROCEDURE — 97750 PHYSICAL PERFORMANCE TEST: CPT | Mod: GP | Performed by: PHYSICAL THERAPIST

## 2024-02-27 PROCEDURE — 97110 THERAPEUTIC EXERCISES: CPT | Mod: GP | Performed by: PHYSICAL THERAPIST

## 2024-03-05 ENCOUNTER — THERAPY VISIT (OUTPATIENT)
Dept: PHYSICAL THERAPY | Facility: CLINIC | Age: 84
End: 2024-03-05
Payer: COMMERCIAL

## 2024-03-05 DIAGNOSIS — R26.89 IMPAIRED GAIT AND MOBILITY: Primary | ICD-10-CM

## 2024-03-05 DIAGNOSIS — M48.062 SPINAL STENOSIS, LUMBAR REGION, WITH NEUROGENIC CLAUDICATION: ICD-10-CM

## 2024-03-05 PROCEDURE — 97140 MANUAL THERAPY 1/> REGIONS: CPT | Mod: GP | Performed by: PHYSICAL THERAPIST

## 2024-03-05 PROCEDURE — 97110 THERAPEUTIC EXERCISES: CPT | Mod: GP | Performed by: PHYSICAL THERAPIST

## 2024-03-05 NOTE — PROGRESS NOTES
DISCHARGE  Reason for Discharge: Patient has nearly met all goals and demonstrates improve safety and independence with HEP.       03/05/24 0500   PT Goal 1   Goal Identifier HEP   Goal Description Patient will demonstrate consistency and independence with HEP as prescribed in order to reach functional goals.   Rationale to maximize safety and independence within the home;to maximize safety and independence within the community;to maximize safety and independence with performance of ADLs and functional tasks   Goal Progress 12/1/23- notes difficulty completing over the past 2 weeks   Target Date 01/05/24   Date Met 01/10/24   PT Goal 2   Goal Identifier Walking   Goal Description Patient will tolerate going on at least a 20 minute walk with LRAD in order to improve her endurance and allow her to take her dog for walks.   Rationale to maximize safety and independence with performance of ADLs and functional tasks;to maximize safety and independence within the community;to maximize safety and independence within the home;to maximize safety and independence with transportation   Goal Progress 11/10: Pt reports maximum tolerance of standing for 10 mins 1/10: progressing in duration inside, not completing outside because fearful of falls. 2/27/24: progressing depending on the day 3/5/24: met per pt report   Target Date 03/07/24   Date Met 03/05/24   PT Goal 3   Goal Identifier 5xSTS   Goal Description Patient will demonstrate 5x STS of 15 seconds or less to indicate decreased risk of falling and improved LE strength.   Rationale to maximize safety and independence with performance of ADLs and functional tasks;to maximize safety and independence within the home;to maximize safety and independence within the community;to maximize safety and independence with transportation   Goal Progress 1/10/24: progressing 15.25 seconds   Target Date 02/21/24   Date Met 02/27/24   PT Goal 4   Goal Identifier TUG   Goal Description  Patient will demonstrate at least a 5 second increase in her TUG score in order to demonstrate improved gait speed and stability indicating decreased fall risk.   Rationale to maximize safety and independence with performance of ADLs and functional tasks;to maximize safety and independence with self cares;to maximize safety and independence with transportation;to maximize safety and independence within the community;to maximize safety and independence within the home   Goal Progress 1/26/24: 17 seconds average 2/27/24: 15.5 second avg = progressing 3/5/24: progressing   Target Date 03/07/24         Discharge Plan: Patient to continue home program independently.    Referring Provider:  Pj Del Toro *

## 2024-03-18 ENCOUNTER — ANTICOAGULATION THERAPY VISIT (OUTPATIENT)
Dept: ANTICOAGULATION | Facility: CLINIC | Age: 84
End: 2024-03-18

## 2024-03-18 ENCOUNTER — LAB (OUTPATIENT)
Dept: LAB | Facility: CLINIC | Age: 84
End: 2024-03-18
Payer: COMMERCIAL

## 2024-03-18 DIAGNOSIS — Z79.01 LONG TERM CURRENT USE OF ANTICOAGULANT THERAPY: ICD-10-CM

## 2024-03-18 DIAGNOSIS — Z79.01 LONG TERM CURRENT USE OF ANTICOAGULANT THERAPY: Primary | ICD-10-CM

## 2024-03-18 DIAGNOSIS — I63.9 CEREBROVASCULAR ACCIDENT INVOLVING CEREBELLUM (H): ICD-10-CM

## 2024-03-18 LAB — INR BLD: 2 (ref 0.9–1.1)

## 2024-03-18 PROCEDURE — 36416 COLLJ CAPILLARY BLOOD SPEC: CPT

## 2024-03-18 PROCEDURE — 85610 PROTHROMBIN TIME: CPT

## 2024-03-18 NOTE — PROGRESS NOTES
ANTICOAGULATION MANAGEMENT     Salome Saeed 83 year old female is on warfarin with therapeutic INR result. (Goal INR 2.0-3.0)    Recent labs: (last 7 days)     03/18/24  1319   INR 2.0*       ASSESSMENT     Warfarin Lab Questionnaire    Warfarin Doses Last 7 Days            3/18/2024   Warfarin Lab Questionnaire   Missed doses within past 14 days? No per patient, thinks she may have missed a dose   Changes in diet or alcohol within past 14 days? No   Medication changes since last result? No   Injuries or illness since last result? No   New shortness of breath, severe headaches or sudden changes in vision since last result? No   Abnormal bleeding since last result? No   Upcoming surgery, procedure? No     Previous result: Therapeutic last 2(+) visits  Additional findings: None       PLAN     Recommended plan for possible temporary change(s) affecting INR     Dosing Instructions: Continue your current warfarin dose with next INR in 6 weeks       Summary  As of 3/18/2024      Full warfarin instructions:  5 mg every Fri; 2.5 mg all other days   Next INR check:  4/29/2024               Telephone call with Loreta who verbalizes understanding and agrees to plan    Lab visit scheduled    Education provided:   Please call back if any changes to your diet, medications or how you've been taking warfarin  Contact [phone]  with any changes, questions or concerns.     Plan made per [facility] anticoagulation protocol    Renee Martinez RN  Anticoagulation Clinic  3/18/2024    _______________________________________________________________________     Anticoagulation Episode Summary       Current INR goal:  2.0-3.0   TTR:  58.2% (1 y)   Target end date:  Indefinite   Send INR reminders to:  [facility]    Indications    Long term current use of anticoagulant therapy [Z79.01]  Cerebrovascular accident involving cerebellum (H) [I63.9]             Comments:               Anticoagulation Care Providers       Provider Role  Specialty Phone number    Surya Dyer DO Referring Family Medicine [phone]

## 2024-03-20 ENCOUNTER — TRANSFERRED RECORDS (OUTPATIENT)
Dept: HEALTH INFORMATION MANAGEMENT | Facility: CLINIC | Age: 84
End: 2024-03-20

## 2024-03-21 DIAGNOSIS — E03.9 HYPOTHYROIDISM, UNSPECIFIED TYPE: ICD-10-CM

## 2024-03-21 RX ORDER — LEVOTHYROXINE SODIUM 50 UG/1
50 TABLET ORAL DAILY
Qty: 90 TABLET | Refills: 3 | Status: SHIPPED | OUTPATIENT
Start: 2024-03-21 | End: 2024-05-22

## 2024-03-21 NOTE — TELEPHONE ENCOUNTER
Medication Question or Refill    Contacts         Type Contact Phone/Fax    03/21/2024 11:58 AM CDT Phone (Incoming) ZhouLoreta okeefe CELESTINE (Self) [phone] (H)            What medication are you calling about (include dose and sig)?:   levothyroxine (SYNTHROID/LEVOTHROID) 50 MCG tablet     Preferred Pharmacy:   [facility] [place] [facility], [place] - [address] [address]  Phone: [phone] Fax: [phone]      Controlled Substance Agreement on file:   CSA -- Patient Level:    CSA: None found at the patient level.       Who prescribed the medication?: Surya Dyer D     Do you need a refill? Yes    When did you use the medication last? This morning, 03/21/2024 -- pt states she has three doses remaining    Patient offered an appointment? No    Do you have any questions or concerns?  Yes: Pt said that she was told to ask for the [place] location for the mail order pharmacy but [place] is listed?       Okay to leave a detailed message?: No, isn't working at the moment at Home number on file [phone] (home)

## 2024-04-29 ENCOUNTER — LAB (OUTPATIENT)
Dept: LAB | Facility: CLINIC | Age: 84
End: 2024-04-29
Payer: COMMERCIAL

## 2024-04-29 ENCOUNTER — DOCUMENTATION ONLY (OUTPATIENT)
Dept: FAMILY MEDICINE | Facility: CLINIC | Age: 84
End: 2024-04-29

## 2024-04-29 ENCOUNTER — ANTICOAGULATION THERAPY VISIT (OUTPATIENT)
Dept: ANTICOAGULATION | Facility: CLINIC | Age: 84
End: 2024-04-29

## 2024-04-29 DIAGNOSIS — Z79.01 LONG TERM CURRENT USE OF ANTICOAGULANT THERAPY: Primary | ICD-10-CM

## 2024-04-29 DIAGNOSIS — Z79.01 LONG TERM CURRENT USE OF ANTICOAGULANT THERAPY: ICD-10-CM

## 2024-04-29 DIAGNOSIS — E03.9 HYPOTHYROIDISM, UNSPECIFIED TYPE: Primary | ICD-10-CM

## 2024-04-29 DIAGNOSIS — I63.9 CEREBROVASCULAR ACCIDENT INVOLVING CEREBELLUM (H): ICD-10-CM

## 2024-04-29 DIAGNOSIS — I63.9 CEREBROVASCULAR ACCIDENT INVOLVING CEREBELLUM (H): Primary | ICD-10-CM

## 2024-04-29 LAB — INR BLD: 1.9 (ref 0.9–1.1)

## 2024-04-29 PROCEDURE — 36416 COLLJ CAPILLARY BLOOD SPEC: CPT

## 2024-04-29 PROCEDURE — 85610 PROTHROMBIN TIME: CPT

## 2024-04-29 NOTE — PROGRESS NOTES
ANTICOAGULATION MANAGEMENT     Salome Saeed 84 year old female is on warfarin with subtherapeutic INR result. (Goal INR 2.0-3.0)    Recent labs: (last 7 days)     04/29/24  1337   INR 1.9*       ASSESSMENT     Warfarin Lab Questionnaire    Warfarin Doses Last 7 Days    Louise is certain that she missed a dose in the last week but unsure which day.       4/29/2024   Warfarin Lab Questionnaire   Missed doses within past 14 days? No   Changes in diet or alcohol within past 14 days? No   Medication changes since last result? No   Injuries or illness since last result? No   New shortness of breath, severe headaches or sudden changes in vision since last result? No   Abnormal bleeding since last result? No   Upcoming surgery, procedure? No     Previous result: Therapeutic last 2(+) visits  Additional findings: None       PLAN     Recommended plan for temporary change(s) affecting INR     Dosing Instructions: booster dose then continue your current warfarin dose with next INR in 2 weeks       Summary  As of 4/29/2024      Full warfarin instructions:  4/29: 5 mg; Otherwise 5 mg every Fri; 2.5 mg all other days   Next INR check:  5/13/2024               Telephone call with Loreta who verbalizes understanding and agrees to plan    Lab visit scheduled    Education provided:   Please call back if any changes to your diet, medications or how you've been taking warfarin  Taking warfarin: take warfarin at same time each day; preferably in the evening    Plan made per [facility] anticoagulation protocol    Neeru Martinez RN  Anticoagulation Clinic  4/29/2024    _______________________________________________________________________     Anticoagulation Episode Summary       Current INR goal:  2.0-3.0   TTR:  55.6% (1 y)   Target end date:  Indefinite   Send INR reminders to:  [facility]    Indications    Long term current use of anticoagulant therapy [Z79.01]  Cerebrovascular accident involving cerebellum (H)  [I63.9]             Comments:               Anticoagulation Care Providers       Provider Role Specialty Phone number    Surya Dyer,  Referring Family Medicine [phone]

## 2024-04-29 NOTE — PROGRESS NOTES
ANTICOAGULATION CLINIC REFERRAL RENEWAL REQUEST       An annual renewal order is required for all patients referred to [facility].?  Please review and sign the pended referral order for Salome Saeed.       ANTICOAGULATION SUMMARY      Warfarin indication(s)   Cerebrovascular accident involving cerebellum (stroke)    Mechanical heart valve present?  NO       Current goal range   INR: 2.0-3.0     Goal appropriate for indication? Goal INR 2-3, standard for indication(s) above     Time in Therapeutic Range (TTR)  (Goal > 60%) 55.6%       Office visit with referring provider's group within last year yes on 5/22/23       Neeru Martinez RN  [facility]

## 2024-05-13 ENCOUNTER — LAB (OUTPATIENT)
Dept: LAB | Facility: CLINIC | Age: 84
End: 2024-05-13
Payer: COMMERCIAL

## 2024-05-13 ENCOUNTER — ANTICOAGULATION THERAPY VISIT (OUTPATIENT)
Dept: ANTICOAGULATION | Facility: CLINIC | Age: 84
End: 2024-05-13

## 2024-05-13 DIAGNOSIS — I63.9 CEREBROVASCULAR ACCIDENT INVOLVING CEREBELLUM (H): ICD-10-CM

## 2024-05-13 DIAGNOSIS — Z79.01 LONG TERM CURRENT USE OF ANTICOAGULANT THERAPY: Primary | ICD-10-CM

## 2024-05-13 LAB — INR BLD: 2.2 (ref 0.9–1.1)

## 2024-05-13 PROCEDURE — 85610 PROTHROMBIN TIME: CPT

## 2024-05-13 PROCEDURE — 36416 COLLJ CAPILLARY BLOOD SPEC: CPT

## 2024-05-13 NOTE — PROGRESS NOTES
ANTICOAGULATION MANAGEMENT     Salome Saeed 84 year old female is on warfarin with therapeutic INR result. (Goal INR 2.0-3.0)    Recent labs: (last 7 days)     05/13/24  1320   INR 2.2*       ASSESSMENT     Warfarin Lab Questionnaire    Warfarin Doses Last 7 Days  Warfarin dosing verbally confirmed with patient-- taking as instructed          5/13/2024   Warfarin Lab Questionnaire   Missed doses within past 14 days? No   Changes in diet or alcohol within past 14 days? No   Medication changes since last result? No   Injuries or illness since last result? No   New shortness of breath, severe headaches or sudden changes in vision since last result? No   Abnormal bleeding since last result? No   Upcoming surgery, procedure? No     Previous result: Subtherapeutic  Additional findings: None       PLAN     Recommended plan for no diet, medication or health factor changes affecting INR     Dosing Instructions: Continue your current warfarin dose with next INR in 3 weeks       Summary  As of 5/13/2024      Full warfarin instructions:  5 mg every Fri; 2.5 mg all other days   Next INR check:  6/3/2024               Telephone call with Loreta who verbalizes understanding and agrees to plan    Lab visit scheduled    Education provided:   Please call back if any changes to your diet, medications or how you've been taking warfarin  Contact [phone]  with any changes, questions or concerns.     Plan made per [facility] anticoagulation protocol    Renee Martinez RN  Anticoagulation Clinic  5/13/2024    _______________________________________________________________________     Anticoagulation Episode Summary       Current INR goal:  2.0-3.0   TTR:  58.2% (1 y)   Target end date:  Indefinite   Send INR reminders to:  [facility]    Indications    Long term current use of anticoagulant therapy [Z79.01]  Cerebrovascular accident involving cerebellum (H) [I63.9]             Comments:               Anticoagulation Care  Providers       Provider Role Specialty Phone number    Surya Dyer,  Referring Family Medicine [phone]

## 2024-05-21 DIAGNOSIS — F41.9 ANXIETY: ICD-10-CM

## 2024-05-21 DIAGNOSIS — E78.5 HYPERLIPIDEMIA LDL GOAL <70: ICD-10-CM

## 2024-05-21 DIAGNOSIS — I10 ESSENTIAL HYPERTENSION: ICD-10-CM

## 2024-05-21 DIAGNOSIS — I10 BENIGN ESSENTIAL HYPERTENSION: Chronic | ICD-10-CM

## 2024-05-21 DIAGNOSIS — E03.9 HYPOTHYROIDISM, UNSPECIFIED TYPE: ICD-10-CM

## 2024-05-21 NOTE — TELEPHONE ENCOUNTER
Medication Question or Refill    amLODIPine (NORVASC) 2.5 MG tablet       rosuvastatin (CRESTOR) 40 MG tablet       amLODIPine (NORVASC) 2.5 MG tablet       escitalopram (LEXAPRO) 5 MG tablet         levothyroxine (SYNTHROID/LEVOTHROID) 50 MCG tablet       What medication are you calling about (include dose and sig)?: Pt calling to request new rx that are due.      Preferred Pharmacy:     [id number] - [place] - [address]  [address]  [address]  [place] [address]  Phone: [phone] Fax: [phone]      Controlled Substance Agreement on file:   CSA -- Patient Level:    CSA: None found at the patient level.       Who prescribed the medication?: Dr. Dyer    Do you need a refill? Yes    When did you use the medication last? na    Patient offered an appointment? No    Do you have any questions or concerns?  No      Okay to leave a detailed message?: #[phone]  - if you need  to call.

## 2024-05-22 RX ORDER — ROSUVASTATIN CALCIUM 40 MG/1
40 TABLET, COATED ORAL DAILY
Qty: 90 TABLET | Refills: 1 | Status: SHIPPED | OUTPATIENT
Start: 2024-05-22 | End: 2024-09-03

## 2024-05-22 RX ORDER — AMLODIPINE BESYLATE 2.5 MG/1
2.5 TABLET ORAL DAILY
Qty: 90 TABLET | Refills: 1 | Status: SHIPPED | OUTPATIENT
Start: 2024-05-22 | End: 2024-05-28

## 2024-05-22 RX ORDER — ESCITALOPRAM OXALATE 5 MG/1
5 TABLET ORAL DAILY
Qty: 90 TABLET | Refills: 1 | Status: CANCELLED | OUTPATIENT
Start: 2024-05-22

## 2024-05-22 RX ORDER — LEVOTHYROXINE SODIUM 50 UG/1
50 TABLET ORAL DAILY
Qty: 90 TABLET | Refills: 0 | Status: SHIPPED | OUTPATIENT
Start: 2024-05-22 | End: 2024-06-03

## 2024-05-22 RX ORDER — AMLODIPINE BESYLATE 2.5 MG/1
2.5 TABLET ORAL DAILY
Qty: 90 TABLET | Refills: 3 | Status: CANCELLED | OUTPATIENT
Start: 2024-05-22

## 2024-05-22 RX ORDER — ROSUVASTATIN CALCIUM 40 MG/1
40 TABLET, COATED ORAL DAILY
Qty: 90 TABLET | Refills: 4 | Status: CANCELLED | OUTPATIENT
Start: 2024-05-22

## 2024-05-22 RX ORDER — PROPRANOLOL HCL 60 MG
60 CAPSULE, EXTENDED RELEASE 24HR ORAL DAILY
Qty: 90 CAPSULE | Refills: 1 | Status: CANCELLED | OUTPATIENT
Start: 2024-05-22

## 2024-05-22 RX ORDER — LEVOTHYROXINE SODIUM 50 UG/1
50 TABLET ORAL DAILY
Qty: 90 TABLET | Refills: 3 | Status: CANCELLED | OUTPATIENT
Start: 2024-05-22

## 2024-05-22 NOTE — TELEPHONE ENCOUNTER
Pharmacy change. Will approve refills that are already on file to pharmacy change. Propanolol and Escitalopram not refilled as no refills left on file.    Patient needs appointment, hasn't been seen by primary care provider since 05/2023. Please contact patient to set up appointment.     Thank you,  Raymond Cochran, Triage RN [facility]  9:05 AM 5/22/2024

## 2024-05-23 RX ORDER — PROPRANOLOL HCL 60 MG
60 CAPSULE, EXTENDED RELEASE 24HR ORAL DAILY
Qty: 90 CAPSULE | Refills: 0 | Status: SHIPPED | OUTPATIENT
Start: 2024-05-23 | End: 2024-05-28

## 2024-05-23 RX ORDER — ESCITALOPRAM OXALATE 5 MG/1
5 TABLET ORAL DAILY
Qty: 90 TABLET | Refills: 0 | Status: SHIPPED | OUTPATIENT
Start: 2024-05-23 | End: 2024-05-28

## 2024-05-23 NOTE — TELEPHONE ENCOUNTER
Refill signed. Due for annual wellness visit. Please help schedule.    Surya Dyer,   5/23/2024 12:49 PM

## 2024-05-26 ENCOUNTER — APPOINTMENT (OUTPATIENT)
Dept: CT IMAGING | Facility: CLINIC | Age: 84
End: 2024-05-26
Attending: EMERGENCY MEDICINE
Payer: COMMERCIAL

## 2024-05-26 ENCOUNTER — APPOINTMENT (OUTPATIENT)
Dept: MRI IMAGING | Facility: CLINIC | Age: 84
End: 2024-05-26
Attending: EMERGENCY MEDICINE
Payer: COMMERCIAL

## 2024-05-26 ENCOUNTER — APPOINTMENT (OUTPATIENT)
Dept: GENERAL RADIOLOGY | Facility: CLINIC | Age: 84
End: 2024-05-26
Attending: EMERGENCY MEDICINE
Payer: COMMERCIAL

## 2024-05-26 ENCOUNTER — NURSE TRIAGE (OUTPATIENT)
Dept: NURSING | Facility: CLINIC | Age: 84
End: 2024-05-26
Payer: COMMERCIAL

## 2024-05-26 ENCOUNTER — HOSPITAL ENCOUNTER (EMERGENCY)
Facility: CLINIC | Age: 84
Discharge: HOME | End: 2024-05-26
Attending: EMERGENCY MEDICINE | Admitting: EMERGENCY MEDICINE
Payer: COMMERCIAL

## 2024-05-26 VITALS
HEART RATE: 73 BPM | RESPIRATION RATE: 16 BRPM | OXYGEN SATURATION: 97 % | TEMPERATURE: 98.3 F | DIASTOLIC BLOOD PRESSURE: 81 MMHG | SYSTOLIC BLOOD PRESSURE: 178 MMHG

## 2024-05-26 DIAGNOSIS — S22.080A T12 COMPRESSION FRACTURE, INITIAL ENCOUNTER (H): ICD-10-CM

## 2024-05-26 DIAGNOSIS — D32.9 MENINGIOMA (H): ICD-10-CM

## 2024-05-26 DIAGNOSIS — R53.1 GENERAL WEAKNESS: ICD-10-CM

## 2024-05-26 DIAGNOSIS — I10 HYPERTENSION, UNSPECIFIED TYPE: ICD-10-CM

## 2024-05-26 DIAGNOSIS — R41.0 CONFUSION: ICD-10-CM
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
## 2024-05-26 LAB
ALBUMIN SERPL BCG-MCNC: 4.6 G/DL (ref 3.5–5.2)
ALBUMIN UR-MCNC: NEGATIVE MG/DL
ALP SERPL-CCNC: 104 U/L (ref 40–150)
ALT SERPL W P-5'-P-CCNC: 24 U/L (ref 0–50)
ANION GAP SERPL CALCULATED.3IONS-SCNC: 14 MMOL/L (ref 7–15)
APPEARANCE UR: CLEAR
AST SERPL W P-5'-P-CCNC: 35 U/L (ref 0–45)
BASOPHILS # BLD AUTO: 0.1 10E3/UL (ref 0–0.2)
BASOPHILS NFR BLD AUTO: 1 %
BILIRUB DIRECT SERPL-MCNC: <0.2 MG/DL (ref 0–0.3)
BILIRUB SERPL-MCNC: 0.5 MG/DL
BILIRUB UR QL STRIP: NEGATIVE
BUN SERPL-MCNC: 12.7 MG/DL (ref 8–23)
CALCIUM SERPL-MCNC: 9.6 MG/DL (ref 8.8–10.2)
CHLORIDE SERPL-SCNC: 94 MMOL/L (ref 98–107)
COLOR UR AUTO: ABNORMAL
CREAT SERPL-MCNC: 0.6 MG/DL (ref 0.51–0.95)
DEPRECATED HCO3 PLAS-SCNC: 23 MMOL/L (ref 22–29)
EGFRCR SERPLBLD CKD-EPI 2021: 89 ML/MIN/1.73M2
EOSINOPHIL # BLD AUTO: 0 10E3/UL (ref 0–0.7)
EOSINOPHIL NFR BLD AUTO: 1 %
ERYTHROCYTE [DISTWIDTH] IN BLOOD BY AUTOMATED COUNT: 13.9 % (ref 10–15)
GLUCOSE SERPL-MCNC: 108 MG/DL (ref 70–99)
GLUCOSE UR STRIP-MCNC: NEGATIVE MG/DL
HCT VFR BLD AUTO: 40.2 % (ref 35–47)
HGB BLD-MCNC: 13.5 G/DL (ref 11.7–15.7)
HGB UR QL STRIP: ABNORMAL
HOLD SPECIMEN: NORMAL
IMM GRANULOCYTES # BLD: 0 10E3/UL
IMM GRANULOCYTES NFR BLD: 0 %
INR PPP: 2.09 (ref 0.85–1.15)
KETONES UR STRIP-MCNC: 10 MG/DL
LEUKOCYTE ESTERASE UR QL STRIP: NEGATIVE
LYMPHOCYTES # BLD AUTO: 1.3 10E3/UL (ref 0.8–5.3)
LYMPHOCYTES NFR BLD AUTO: 24 %
MAGNESIUM SERPL-MCNC: 2.2 MG/DL (ref 1.7–2.3)
MCH RBC QN AUTO: 29.8 PG (ref 26.5–33)
MCHC RBC AUTO-ENTMCNC: 33.6 G/DL (ref 31.5–36.5)
MCV RBC AUTO: 89 FL (ref 78–100)
MONOCYTES # BLD AUTO: 0.6 10E3/UL (ref 0–1.3)
MONOCYTES NFR BLD AUTO: 11 %
MUCOUS THREADS #/AREA URNS LPF: PRESENT /LPF
NEUTROPHILS # BLD AUTO: 3.4 10E3/UL (ref 1.6–8.3)
NEUTROPHILS NFR BLD AUTO: 63 %
NITRATE UR QL: NEGATIVE
NRBC # BLD AUTO: 0 10E3/UL
NRBC BLD AUTO-RTO: 0 /100
PH UR STRIP: 7 [PH] (ref 5–7)
PLATELET # BLD AUTO: 230 10E3/UL (ref 150–450)
POTASSIUM SERPL-SCNC: 4.1 MMOL/L (ref 3.4–5.3)
PROT SERPL-MCNC: 7.6 G/DL (ref 6.4–8.3)
RBC # BLD AUTO: 4.53 10E6/UL (ref 3.8–5.2)
RBC URINE: 5 /HPF
SODIUM SERPL-SCNC: 131 MMOL/L (ref 135–145)
SP GR UR STRIP: 1.01 (ref 1–1.03)
SQUAMOUS EPITHELIAL: 1 /HPF
T4 FREE SERPL-MCNC: 1.35 NG/DL (ref 0.9–1.7)
TROPONIN T SERPL HS-MCNC: 14 NG/L
TSH SERPL DL<=0.005 MIU/L-ACNC: 5.69 UIU/ML (ref 0.3–4.2)
UROBILINOGEN UR STRIP-MCNC: NORMAL MG/DL
WBC # BLD AUTO: 5.3 10E3/UL (ref 4–11)
WBC URINE: 1 /HPF

## 2024-05-26 PROCEDURE — 84443 ASSAY THYROID STIM HORMONE: CPT | Performed by: EMERGENCY MEDICINE

## 2024-05-26 PROCEDURE — 36415 COLL VENOUS BLD VENIPUNCTURE: CPT | Performed by: EMERGENCY MEDICINE

## 2024-05-26 PROCEDURE — 85025 COMPLETE CBC W/AUTO DIFF WBC: CPT | Performed by: EMERGENCY MEDICINE

## 2024-05-26 PROCEDURE — 85610 PROTHROMBIN TIME: CPT | Performed by: EMERGENCY MEDICINE

## 2024-05-26 PROCEDURE — 72131 CT LUMBAR SPINE W/O DYE: CPT

## 2024-05-26 PROCEDURE — 71046 X-RAY EXAM CHEST 2 VIEWS: CPT

## 2024-05-26 PROCEDURE — 70450 CT HEAD/BRAIN W/O DYE: CPT

## 2024-05-26 PROCEDURE — 83735 ASSAY OF MAGNESIUM: CPT | Performed by: EMERGENCY MEDICINE

## 2024-05-26 PROCEDURE — 80048 BASIC METABOLIC PNL TOTAL CA: CPT | Performed by: EMERGENCY MEDICINE

## 2024-05-26 PROCEDURE — 96361 HYDRATE IV INFUSION ADD-ON: CPT

## 2024-05-26 PROCEDURE — A9585 GADOBUTROL INJECTION: HCPCS | Performed by: EMERGENCY MEDICINE

## 2024-05-26 PROCEDURE — 96360 HYDRATION IV INFUSION INIT: CPT | Mod: 59

## 2024-05-26 PROCEDURE — 84439 ASSAY OF FREE THYROXINE: CPT | Performed by: EMERGENCY MEDICINE

## 2024-05-26 PROCEDURE — 84484 ASSAY OF TROPONIN QUANT: CPT | Performed by: EMERGENCY MEDICINE

## 2024-05-26 PROCEDURE — 80053 COMPREHEN METABOLIC PANEL: CPT | Performed by: EMERGENCY MEDICINE

## 2024-05-26 PROCEDURE — 93005 ELECTROCARDIOGRAM TRACING: CPT

## 2024-05-26 PROCEDURE — 99285 EMERGENCY DEPT VISIT HI MDM: CPT | Mod: 25

## 2024-05-26 PROCEDURE — 82248 BILIRUBIN DIRECT: CPT | Performed by: EMERGENCY MEDICINE

## 2024-05-26 PROCEDURE — 250N000013 HC RX MED GY IP 250 OP 250 PS 637: Performed by: EMERGENCY MEDICINE

## 2024-05-26 PROCEDURE — 255N000002 HC RX 255 OP 636: Performed by: EMERGENCY MEDICINE

## 2024-05-26 PROCEDURE — 258N000003 HC RX IP 258 OP 636: Performed by: EMERGENCY MEDICINE

## 2024-05-26 PROCEDURE — 72125 CT NECK SPINE W/O DYE: CPT

## 2024-05-26 PROCEDURE — 70553 MRI BRAIN STEM W/O & W/DYE: CPT

## 2024-05-26 PROCEDURE — 81001 URINALYSIS AUTO W/SCOPE: CPT | Performed by: EMERGENCY MEDICINE

## 2024-05-26 RX ORDER — ACETAMINOPHEN 325 MG/1
650 TABLET ORAL ONCE
Status: COMPLETED | OUTPATIENT
Start: 2024-05-26 | End: 2024-05-26

## 2024-05-26 RX ORDER — SODIUM CHLORIDE 9 MG/ML
INJECTION, SOLUTION INTRAVENOUS CONTINUOUS
Status: DISCONTINUED | OUTPATIENT
Start: 2024-05-26 | End: 2024-05-26 | Stop reason: HOSPADM

## 2024-05-26 RX ORDER — GADOBUTROL 604.72 MG/ML
6 INJECTION INTRAVENOUS ONCE
Status: COMPLETED | OUTPATIENT
Start: 2024-05-26 | End: 2024-05-26

## 2024-05-26 RX ADMIN — SODIUM CHLORIDE: 9 INJECTION, SOLUTION INTRAVENOUS at 18:14

## 2024-05-26 RX ADMIN — GADOBUTROL 6 ML: 604.72 INJECTION INTRAVENOUS at 18:58

## 2024-05-26 RX ADMIN — ACETAMINOPHEN 650 MG: 325 TABLET, FILM COATED ORAL at 16:12

## 2024-05-26 ASSESSMENT — ACTIVITIES OF DAILY LIVING (ADL)
ADLS_ACUITY_SCORE: 40
ADLS_ACUITY_SCORE: 38
ADLS_ACUITY_SCORE: 40

## 2024-05-26 ASSESSMENT — COLUMBIA-SUICIDE SEVERITY RATING SCALE - C-SSRS: IS THE PATIENT NOT ABLE TO COMPLETE C-SSRS: UNABLE TO VERBALIZE

## 2024-05-26 NOTE — ED PROVIDER NOTES
Emergency Department Note      History of Present Illness     Chief Complaint  Altered Mental Status    HPI  Salome Saeed is a 84 year old female on warfarin with a history of parkinson's, stroke, CAD, hyperlipidemia, and GERD, who presents to the ED for altered mental status. 4 days ago (5/22/24), the patient's confusion increased from her baseline short term memory loss, and was at its worst this morning. The patient is able to walk with a cane, but has been falling more recently. 2 weeks ago she fell out of bed and hit her head, and endorses symptoms of a head, chest, neck, and back pain. Loreta was not evaluated after this fall. She was supposed to get an MRI today for her pain, but presented to the ED for increased confusion instead. The patient denies vomiting, diarrhea, trouble urinating, fever, cough, sore throat, rhinorrhea, or abdominal pain. She is unsure if she is taking her medication consistently. She denies any recent illnesses.     Independent Historian  , daughter, and son-in-law present at bedside to provide partial history.  They are somewhat concerned that the patient may have been taking her medications wrong.    Review of External Notes  Reviewed the nurse triage note from earlier today regarding the patient's symptoms and the recommendation that she be seen in the ED.  Past Medical History   Medical History and Problem List  CAD   Cerebral artery occlusion with cerebral infarction  CVA   DDD  Essential hypertension, benign  GERD  Hyperlipidemia  Hypothyroidism  Lumbar spinal stenosis  Mitral regurgitation  Vertebral artery stenosis, right  Parkinson's     Medications  Amlodipine   Aspirin 81 mg   Clonazepam   Escitalopram   Levothyroxine   Lisinopril   Propranolol  Rosuvastatin   Warfarin     Surgical History   Arthroplasty knee  Coronary angiography   Combined decompression, fusion lumbar posterior   Endoscopic stripping veins  Heart cath, angioplasty   Hysterectomy   Orthopedic  surgery  Stenting of LAD, angioplasty  Tonsillectomy   Lumbar fusion  Physical Exam   Patient Vitals for the past 24 hrs:   BP Temp Temp src Pulse Resp SpO2   05/26/24 1940 -- -- -- -- -- 97 %   05/26/24 1930 (!) 178/81 -- -- -- -- --   05/26/24 1515 (!) 188/80 -- -- 73 -- 98 %   05/26/24 1458 (!) 189/102 -- -- -- -- --   05/26/24 1457 -- 98.3  F (36.8  C) Temporal 75 16 99 %     Physical Exam  Vitals and nursing note reviewed.   Constitutional:       General: She is not in acute distress.     Appearance: She is ill-appearing. She is not toxic-appearing.   HENT:      Head: Normocephalic and atraumatic.      Right Ear: External ear normal.      Left Ear: External ear normal.      Nose: Nose normal.      Mouth/Throat:      Mouth: Mucous membranes are moist.   Eyes:      Extraocular Movements: Extraocular movements intact.      Conjunctiva/sclera: Conjunctivae normal.      Pupils: Pupils are equal, round, and reactive to light.   Cardiovascular:      Rate and Rhythm: Normal rate and regular rhythm.      Heart sounds: No murmur heard.  Pulmonary:      Effort: Pulmonary effort is normal. No respiratory distress.      Breath sounds: Normal breath sounds. No wheezing, rhonchi or rales.   Abdominal:      General: Abdomen is flat. Bowel sounds are normal. There is no distension.      Palpations: Abdomen is soft.      Tenderness: There is no abdominal tenderness. There is no guarding or rebound.   Musculoskeletal:         General: No deformity or signs of injury.      Cervical back: Normal range of motion and neck supple. Tenderness present.      Thoracic back: No tenderness.      Lumbar back: Tenderness present.   Skin:     General: Skin is warm and dry.      Findings: No rash.   Neurological:      Mental Status: She is alert and oriented to person, place, and time. She is confused.      Cranial Nerves: No cranial nerve deficit.      Sensory: No sensory deficit.      Motor: Tremor present. No weakness.      Comments: Mild  confusion and general slowing   Psychiatric:         Behavior: Behavior normal.           Diagnostics   Lab Results   Labs Ordered and Resulted from Time of ED Arrival to Time of ED Departure   BASIC METABOLIC PANEL - Abnormal       Result Value    Sodium 131 (*)     Potassium 4.1      Chloride 94 (*)     Carbon Dioxide (CO2) 23      Anion Gap 14      Urea Nitrogen 12.7      Creatinine 0.60      GFR Estimate 89      Calcium 9.6      Glucose 108 (*)    ROUTINE UA WITH MICROSCOPIC REFLEX TO CULTURE - Abnormal    Color Urine Light Yellow      Appearance Urine Clear      Glucose Urine Negative      Bilirubin Urine Negative      Ketones Urine 10 (*)     Specific Gravity Urine 1.013      Blood Urine Trace (*)     pH Urine 7.0      Protein Albumin Urine Negative      Urobilinogen Urine Normal      Nitrite Urine Negative      Leukocyte Esterase Urine Negative      Mucus Urine Present (*)     RBC Urine 5 (*)     WBC Urine 1      Squamous Epithelials Urine 1     TSH WITH FREE T4 REFLEX - Abnormal    TSH 5.69 (*)    INR - Abnormal    INR 2.09 (*)    HEPATIC FUNCTION PANEL - Normal    Protein Total 7.6      Albumin 4.6      Bilirubin Total 0.5      Alkaline Phosphatase 104      AST 35      ALT 24      Bilirubin Direct <0.20     TROPONIN T, HIGH SENSITIVITY - Normal    Troponin T, High Sensitivity 14     MAGNESIUM - Normal    Magnesium 2.2     T4 FREE - Normal    Free T4 1.35     CBC WITH PLATELETS AND DIFFERENTIAL    WBC Count 5.3      RBC Count 4.53      Hemoglobin 13.5      Hematocrit 40.2      MCV 89      MCH 29.8      MCHC 33.6      RDW 13.9      Platelet Count 230      % Neutrophils 63      % Lymphocytes 24      % Monocytes 11      % Eosinophils 1      % Basophils 1      % Immature Granulocytes 0      NRBCs per 100 WBC 0      Absolute Neutrophils 3.4      Absolute Lymphocytes 1.3      Absolute Monocytes 0.6      Absolute Eosinophils 0.0      Absolute Basophils 0.1      Absolute Immature Granulocytes 0.0      Absolute NRBCs  0.0         Imaging  MR Brain w/o & w Contrast   Final Result   IMPRESSION:   1.  No acute infarct, intra-axial mass, significant mass effect, or hemorrhage.   2.  Presumed small meningioma overlying right parietal lobe.   3.  Mild chronic small vessel ischemia.   4.  Mild atrophy.      XR Chest 2 Views   Final Result   IMPRESSION: No acute cardiopulmonary abnormality. Degenerative changes in the spine with lumbar spine fusion hardware, partially imaged.      CT Lumbar Spine w/o Contrast   Final Result   IMPRESSION:   1.  T12 anterior compression fracture with mild height loss.   2.  No fracture or posttraumatic subluxation in the lumbar spine.   3.  No high-grade spinal canal or neural foraminal stenosis.      CT Cervical Spine w/o Contrast   Final Result   IMPRESSION:   HEAD CT:   1.  No CT evidence for acute intracranial process.   2.  Brain atrophy and presumed chronic microvascular ischemic changes as above.      CERVICAL SPINE CT:   1.  No CT evidence for acute fracture or post traumatic subluxation.   2.  Cervical spondylosis as above.      CT Head w/o Contrast   Final Result   IMPRESSION:   HEAD CT:   1.  No CT evidence for acute intracranial process.   2.  Brain atrophy and presumed chronic microvascular ischemic changes as above.      CERVICAL SPINE CT:   1.  No CT evidence for acute fracture or post traumatic subluxation.   2.  Cervical spondylosis as above.          EKG   ECG results from 05/26/24   EKG 12-lead, tracing only     Value    Systolic Blood Pressure     Diastolic Blood Pressure     Ventricular Rate 72    Atrial Rate 72    AK Interval 174    QRS Duration 96        QTc 435    P Axis 97    R AXIS -18    T Axis 34    Interpretation ECG      Sinus rhythm  Nonspecific ST abnormality  Abnormal ECG  When compared with ECG of 05-MAY-2023 10:08,  No significant change was found       *Note: Due to a large number of results and/or encounters for the requested time period, some results have not  been displayed. A complete set of results can be found in Results Review.       ECG results from 05/26/24   EKG 12-lead, tracing only     Value    Systolic Blood Pressure     Diastolic Blood Pressure     Ventricular Rate 72    Atrial Rate 72    UT Interval 174    QRS Duration 96        QTc 435    P Axis 97    R AXIS -18    T Axis 34    Interpretation ECG      Sinus rhythm  Nonspecific ST abnormality  Abnormal ECG  Interpreted by me at 1523       *Note: Due to a large number of results and/or encounters for the requested time period, some results have not been displayed. A complete set of results can be found in Results Review.         Independent Interpretation  CT Head: No intracranial hemorrhage.  CXR shows no obvious pneumonia or pneumothorax  ED Course    Medications Administered  Medications   sodium chloride 0.9 % infusion ( Intravenous $New Bag 5/26/24 1814)   acetaminophen (TYLENOL) tablet 650 mg (650 mg Oral $Given 5/26/24 1612)   gadobutrol (GADAVIST) injection 6 mL (6 mLs Intravenous $Given 5/26/24 9978)       Procedures  Procedures     Discussion of Management  None    Social Determinants of Health adding to complexity of care  None    ED Course  ED Course as of 05/26/24 2026   Sun May 26, 2024   1543 I obtained the history and examined the patient as above.      Medical Decision Making / Diagnosis       MIPS     None    [facility]  Salome Saeed is a 84 year old female who presents with increasing confusion over the past 4 days.  She is also had general weakness and has fallen several times.  She had a fall 2 weeks ago where she apparently hit her head and has been complaining of increased back pain.  In the ED she does not seem to have any focal neurologic deficits.  She seems generally slowed and may be mildly confused but is oriented x 3.  Differential diagnosis includes subdural hemorrhage, cervical or lumbar injury or fracture, UTI or other infectious encephalopathy, metabolic encephalopathy,  medication induced encephalopathy, etc.  Workup was pursued as noted above.  CT of the head did not show any acute bleed.  CT of the cervical spine showed no fracture.  CT of the lumbar spine did show a T12 compression fracture which is likely from her recent fall.  Lab work also was fairly unremarkable without any signs of infection or obvious electrolyte abnormality or metabolic cause of her confusion.  Given that she has had previous strokes and seems globally slowed, we will check MRI to further evaluate.  I would recommend that the patient still be admitted even if the remainder of her workup is unremarkable just given that she has had an acute change in her mental status over the past few days.  Will discuss further with family after the MRI results.    2025 patient's MRI showed a small meningioma but no other acute findings to explain her symptoms.  I had an extensive discussion with the patient, her , and her son regarding the patient's concerning symptoms especially earlier in the day.  We still do not have a clear explanation as to why she is more confused and is falling more.  It is possible she could have had a TIA earlier in the day.  I continue to recommend admission for further evaluation and observation in the hospital as I am somewhat worried about her safety going home if she should fall again.  However the patient is adamant that she wants to be discharged from the hospital.  Her  agrees that she should be discharged despite being aware of the risks involved.  The patient's son would prefer that the patient be admitted but he cannot convince the patient or her  otherwise.  We will plan to discharge but I did recommend close follow-up with her primary care physician and neurologist if possible.  I also discussed return precautions and encouraged him to return to the ER immediately if symptoms should worsen or if they reconsider admission.  I also suggested that they go over her  medications at home and ensure that she is taking them properly and not more less than prescribed.    Disposition  The patient was discharged    ICD-10 Codes:    ICD-10-CM    1. Confusion  R41.0       2. General weakness  R53.1       3. Fall, initial encounter  W19.XXXA       4. T12 compression fracture, initial encounter (H)  S22.080A       5. Meningioma (H)  D32.9       6. Hypertension, unspecified type  I10            Discharge Medications  New Prescriptions    No medications on file     Scribe Disclosure:  ILilly, am serving as a scribe at 3:58 PM on 5/26/2024 to document services personally performed by Troy Yee MD based on my observations and the provider's statements to me.     Scribe Disclosure:  ILisa, am serving as the scribe  for Lilly Mckeon, the scribe trainee, at 4:04 PM on 5/26/2024 to document services personally performed by Troy Yee MD based on our observations and the provider's statements to us.       Troy Yee MD  05/26/24 2028

## 2024-05-26 NOTE — TELEPHONE ENCOUNTER
"The , daughter, and son n law are calling regarding the patient's acute abrupt onset of confusion  They report they patient is \"scared she is losing her memory\" \"hard time doing things\"    Symptoms started two days ago on 05/24/24, and is worse today on 05/26/24    They deny any fever, but state she does not feel warm to the touch and have not officially taken her temperature    They report the patient continues to urinate at least every 12 hours    The patient has increased fatigue and has slept over 12 hours today on 05/26/24    Triage guidelines recommend to call  Now    Caller refused and says the will drive her to the ED for evaluation      Reason for Disposition   [1] Difficult to awaken or acting confused (e.g., disoriented, slurred speech) AND [2] present now AND [3] new-onset    Additional Information   Negative: [1] Difficult to awaken or acting confused (e.g., disoriented, slurred speech) AND [2] present now AND [3] diabetes mellitus    Protocols used: Confusion - Delirium-A-AH    "

## 2024-05-27 ENCOUNTER — NURSE TRIAGE (OUTPATIENT)
Dept: NURSING | Facility: CLINIC | Age: 84
End: 2024-05-27
Payer: COMMERCIAL

## 2024-05-27 NOTE — TELEPHONE ENCOUNTER
"Nurse Triage SBAR    Situation:   -ED follow up    Background:   -  calling  -It is okay to call back and leave a detailed message at this number: [phone]  -Consent to communicate is on file     Assessment:   -jaeky  -states \" she thinks she is going to die\" last night  -was seen in ED yesterday  -did MRI, CT, and X-ray  -symptoms are the same as when she left the ED  -just has medication issue: the ED didn't know that she may have missed some   -is out of her Lexapro and only has a few days left of the amlodipine and propranolol (all three of these were ordered last week to the [facility] mail order pharmacy)    Recommendation:   -call mail order pharmacy as see if they have sent the medications - and if they could fill at in person store OR give you a 3 day supply to bridge until the 90 day supply comes in the mail  -warm transferred to  to make ED follow up appointment  -Call back with and questions, concerns, or any change in symptoms    LOULOU FLETCHER RN 5/27/2024 1:11 PM     Reason for Disposition   Caller has medicine question only, adult not sick, AND triager answers question    Protocols used: Medication Question Call-A-AH    "

## 2024-05-28 ENCOUNTER — ANTICOAGULATION THERAPY VISIT (OUTPATIENT)
Dept: ANTICOAGULATION | Facility: CLINIC | Age: 84
End: 2024-05-28
Payer: COMMERCIAL

## 2024-05-28 DIAGNOSIS — Z79.01 LONG TERM CURRENT USE OF ANTICOAGULANT THERAPY: Primary | ICD-10-CM

## 2024-05-28 DIAGNOSIS — I10 ESSENTIAL HYPERTENSION: ICD-10-CM

## 2024-05-28 DIAGNOSIS — I63.9 CEREBROVASCULAR ACCIDENT INVOLVING CEREBELLUM (H): ICD-10-CM

## 2024-05-28 DIAGNOSIS — F41.9 ANXIETY: ICD-10-CM

## 2024-05-28 DIAGNOSIS — I10 BENIGN ESSENTIAL HYPERTENSION: Chronic | ICD-10-CM

## 2024-05-28 LAB
ATRIAL RATE - MUSE: 72 BPM
DIASTOLIC BLOOD PRESSURE - MUSE: NORMAL MMHG
INTERPRETATION ECG - MUSE: NORMAL
P AXIS - MUSE: 97 DEGREES
PR INTERVAL - MUSE: 174 MS
QRS DURATION - MUSE: 96 MS
QT - MUSE: 398 MS
QTC - MUSE: 435 MS
R AXIS - MUSE: -18 DEGREES
SYSTOLIC BLOOD PRESSURE - MUSE: NORMAL MMHG
T AXIS - MUSE: 34 DEGREES
VENTRICULAR RATE- MUSE: 72 BPM

## 2024-05-28 RX ORDER — AMLODIPINE BESYLATE 2.5 MG/1
2.5 TABLET ORAL DAILY
Qty: 30 TABLET | Refills: 0 | Status: SHIPPED | OUTPATIENT
Start: 2024-05-28 | End: 2024-07-23

## 2024-05-28 RX ORDER — PROPRANOLOL HCL 60 MG
60 CAPSULE, EXTENDED RELEASE 24HR ORAL DAILY
Qty: 30 CAPSULE | Refills: 0 | Status: SHIPPED | OUTPATIENT
Start: 2024-05-28 | End: 2024-09-03

## 2024-05-28 RX ORDER — ESCITALOPRAM OXALATE 5 MG/1
5 TABLET ORAL DAILY
Qty: 30 TABLET | Refills: 0 | Status: SHIPPED | OUTPATIENT
Start: 2024-05-28 | End: 2024-09-16

## 2024-05-28 RX ORDER — ESCITALOPRAM OXALATE 5 MG/1
5 TABLET ORAL DAILY
Qty: 90 TABLET | Refills: 0 | OUTPATIENT
Start: 2024-05-28

## 2024-05-28 NOTE — PROGRESS NOTES
ANTICOAGULATION MANAGEMENT     Salome Saeed 84 year old female is on warfarin with therapeutic INR result. (Goal INR 2.0-3.0)    Recent labs: (last 7 days)     05/26/24  1546   INR 2.09*       ASSESSMENT     Source(s): Chart Review and Patient/Caregiver Call     Warfarin doses taken: Warfarin taken as instructed  Diet: No new diet changes identified  Medication/supplement changes: None noted  New illness, injury, or hospitalization: Yes: ER visit for altered mental status.  It was thought that patient may have been taking some of her medications incorrectly.  Confirmed that she has been taking the correct warfarin dose.  INR has also been fairly stable recently.  Signs or symptoms of bleeding or clotting: No  Previous result: Therapeutic last visit; previously outside of goal range  Additional findings: None       PLAN     Recommended plan for no diet, medication or health factor changes affecting INR     Dosing Instructions: Continue your current warfarin dose with next INR in 3 weeks       Summary  As of 5/28/2024      Full warfarin instructions:  5 mg every Fri; 2.5 mg all other days   Next INR check:  6/17/2024               Telephone call with Loreta who verbalizes understanding and agrees to plan    Patient offered & declined to schedule next visit    Education provided:   Please call back if any changes to your diet, medications or how you've been taking warfarin    Plan made per [facility] anticoagulation protocol    Sima Bonilla RN  Anticoagulation Clinic  5/28/2024    _______________________________________________________________________     Anticoagulation Episode Summary       Current INR goal:  2.0-3.0   TTR:  58.4% (1 y)   Target end date:  Indefinite   Send INR reminders to:  [facility]    Indications    Long term current use of anticoagulant therapy [Z79.01]  Cerebrovascular accident involving cerebellum (H) [I63.9]             Comments:               Anticoagulation Care Providers        Provider Role Specialty Phone number    Surya Dyer,  Referring Family Medicine [phone]

## 2024-05-28 NOTE — TELEPHONE ENCOUNTER
Patient's spouse calls to request 5 day supply of three medications: Amlodipine 2.5mg, escitalopram 5mg, and propanolol 60mg.    Patient went to the emergency room on 05/26/2024 for confusion/hallucinations spouse says was because of withdrawal from being off escitalopram for 3 days, patient has been off of amlodipine for 1 day.     Patient has had all meds reordered to mail order pharmacy, but mail order pharmacy may not get meds to patient for a few more days.    Refills pending for provider to advise on sending into local pharmacy for short term supply (5 days or so).    Thank you,  Raymond Cochran, Triage RN [facility]  3:36 PM 5/28/2024

## 2024-05-30 ENCOUNTER — TELEPHONE (OUTPATIENT)
Dept: FAMILY MEDICINE | Facility: CLINIC | Age: 84
End: 2024-05-30
Payer: COMMERCIAL

## 2024-05-30 NOTE — TELEPHONE ENCOUNTER
Called and spoke with patient.        Transitions of Care Outreach  Chief Complaint   Patient presents with    Appointment     Next day       Most Recent Admission Date: 5/26/2024   Most Recent Admission Diagnosis:      Most Recent Discharge Date: 5/26/2024   Most Recent Discharge Diagnosis: Confusion - R41.0  General weakness - R53.1  Fall, initial encounter - W19.XXXA  T12 compression fracture, initial encounter (H) - S22.080A  Meningioma (H) - D32.9  Hypertension, unspecified type - I10     Transitions of Care Assessment    Discharge Assessment  How are you doing now that you are home?: doing ok, persistent back pain since fall, has known T12 compression fx. pain 5/10. offered appt tomorrow, but patient ok to wait until 6/3.  How are your symptoms? (Red Flag symptoms escalate to triage hotline per guidelines): Unchanged  Do you know how to contact your clinic care team if you have future questions or changes to your health status? : Yes  Does the patient have their discharge instructions? : Yes  Does the patient have questions regarding their discharge instructions? : Yes (see comment) (supposed to see neurology. doesn't want to go to [facility]. would prefer [facility] clinic of neurology as they have location in )  Were you started on any new medications or were there changes to any of your previous medications? : No  Does the patient have all of their medications?: Yes (reviewed meds, patient a little disorganized at the moment. was able to do med rec, but patient will go through everything and bring in the bottles or her list to appt.)  Do you have questions regarding any of your medications? : No  Do you have all of your needed medical supplies or equipment (DME)?  (i.e. oxygen tank, CPAP, cane, etc.): Yes    Follow up Plan     Discharge Follow-Up  Discharge follow up appointment scheduled in alignment with recommended follow up timeframe or Transitions of Risk Category? (Low = within 30 days; Moderate= within 14  days; High= within 7 days): Yes  Discharge Follow Up Appointment Date: 06/03/24  Discharge Follow Up Appointment Scheduled with?: Primary Care Provider    Future Appointments   Date Time Provider Department Center   6/3/2024  3:00 PM Bella Martinez, CNP [facility]    9/10/2024  1:00 PM Pj Keene MD [facility]    9/13/2024 12:30 PM Nikolai Amaya MD [facility]       Outpatient Plan as outlined on AVS reviewed with patient.    For any urgent concerns, please contact our 24 hour nurse triage line: [phone] ([phone])       MARCUS IRVIN RN

## 2024-05-30 NOTE — TELEPHONE ENCOUNTER
Reason for Call:  Appointment Request    Patient requesting this type of appt:  Hospital/ED Follow-Up back pain    Requested provider: Surya Dyer    Reason patient unable to be scheduled: Not within requested timeframe    When does patient want to be seen/preferred time: 1-2 days    Comments: ER 5/26 [place] Patient is having back pain and a pos bone is broken in her spine.    Patient is scheduled with CNP on 6/3 at this time.    Okay to leave a detailed message?: Yes at Home number on file [phone] (home)    Call taken on 5/30/2024 at 1:27 PM by Ceci Min

## 2024-06-03 ENCOUNTER — OFFICE VISIT (OUTPATIENT)
Dept: FAMILY MEDICINE | Facility: CLINIC | Age: 84
End: 2024-06-03
Payer: COMMERCIAL

## 2024-06-03 VITALS
DIASTOLIC BLOOD PRESSURE: 72 MMHG | SYSTOLIC BLOOD PRESSURE: 110 MMHG | HEIGHT: 63 IN | TEMPERATURE: 98.7 F | HEART RATE: 70 BPM | WEIGHT: 135 LBS | BODY MASS INDEX: 23.92 KG/M2 | RESPIRATION RATE: 16 BRPM | OXYGEN SATURATION: 99 %

## 2024-06-03 DIAGNOSIS — Z12.31 VISIT FOR SCREENING MAMMOGRAM: Primary | ICD-10-CM

## 2024-06-03 DIAGNOSIS — I25.10 CORONARY ARTERY DISEASE INVOLVING NATIVE CORONARY ARTERY OF NATIVE HEART WITHOUT ANGINA PECTORIS: Chronic | ICD-10-CM

## 2024-06-03 DIAGNOSIS — E78.5 HYPERLIPIDEMIA LDL GOAL <70: ICD-10-CM

## 2024-06-03 DIAGNOSIS — I25.118 CORONARY ARTERY DISEASE OF NATIVE ARTERY OF NATIVE HEART WITH STABLE ANGINA PECTORIS (H): ICD-10-CM

## 2024-06-03 DIAGNOSIS — E03.9 HYPOTHYROIDISM, UNSPECIFIED TYPE: ICD-10-CM

## 2024-06-03 DIAGNOSIS — I10 ESSENTIAL HYPERTENSION: ICD-10-CM

## 2024-06-03 DIAGNOSIS — G20.C PARKINSONISM, UNSPECIFIED PARKINSONISM TYPE (H): ICD-10-CM

## 2024-06-03 PROCEDURE — 99214 OFFICE O/P EST MOD 30 MIN: CPT | Performed by: NURSE PRACTITIONER

## 2024-06-03 RX ORDER — LEVOTHYROXINE SODIUM 50 UG/1
50 TABLET ORAL DAILY
Qty: 90 TABLET | Refills: 3 | Status: SHIPPED | OUTPATIENT
Start: 2024-06-03 | End: 2024-09-03

## 2024-06-03 RX ORDER — NITROGLYCERIN 0.4 MG/1
0.4 TABLET SUBLINGUAL EVERY 5 MIN PRN
Qty: 25 TABLET | Refills: 2 | Status: SHIPPED | OUTPATIENT
Start: 2024-06-03

## 2024-06-03 NOTE — PROGRESS NOTES
"  Assessment & Plan     Hypothyroidism, unspecified type  - levothyroxine (SYNTHROID/LEVOTHROID) 50 MCG tablet  Dispense: 90 tablet; Refill: 3    Hyperlipidemia LDL goal <70  - nitroGLYcerin (NITROSTAT) 0.4 MG sublingual tablet  Dispense: 25 tablet; Refill: 2    Essential hypertension  - nitroGLYcerin (NITROSTAT) 0.4 MG sublingual tablet  Dispense: 25 tablet; Refill: 2    Coronary artery disease involving native coronary artery of native heart without angina pectoris  - nitroGLYcerin (NITROSTAT) 0.4 MG sublingual tablet  Dispense: 25 tablet; Refill: 2    Parkinsonism, unspecified Parkinsonism type (H)  Follows specialty    Coronary artery disease of native artery of native heart with stable angina pectoris (H24)  Follows specialty.    Stable since discharge from hospital. Refills given as appropriate      MED REC REQUIRED  Post Medication Reconciliation Status: discharge medications reconciled, continue medications without change    See Patient Instructions    Efe Kan is a 84 year old, presenting for the following health issues:  ER F/U        6/3/2024     2:45 PM   Additional Questions   Roomed by Tosin THOMAS CMA     [facility]     ED/UC Followup:    Facility:  [facility]  Date of visit: 5/26/2024  Reason for visit: Altered mental status   Current Status: has still been feeling weak-not as bad as she was at time of visit-was also disorientated-has been having some minor episodes here and there. Had been missing pills since they did not send them to her.       [facility]-appointment coming up this week.             Review of Systems  Constitutional, neuro, ENT, endocrine, pulmonary, cardiac, gastrointestinal, genitourinary, musculoskeletal, integument and psychiatric systems are negative, except as otherwise noted.      Objective    /72   Pulse 70   Temp 98.7  F (37.1  C) (Tympanic)   Resp 16   Ht 1.6 m (5' 3\")   Wt 61.2 kg (135 lb)   LMP  (LMP " Unknown)   SpO2 99%   BMI 23.91 kg/m    Body mass index is 23.91 kg/m .  Physical Exam   GENERAL: alert and no distress  NECK: no adenopathy, no asymmetry, masses, or scars  RESP: lungs clear to auscultation - no rales, rhonchi or wheezes  CV: regular rate and rhythm, normal S1 S2, no S3 or S4, no murmur, click or rub, no peripheral edema  ABDOMEN: soft, nontender, no hepatosplenomegaly, no masses and bowel sounds normal  MS: no gross musculoskeletal defects noted, no edema            Signed Electronically by: Bella Martinez, CNP

## 2024-06-04 ENCOUNTER — DOCUMENTATION ONLY (OUTPATIENT)
Dept: FAMILY MEDICINE | Facility: CLINIC | Age: 84
End: 2024-06-04
Payer: COMMERCIAL

## 2024-06-04 NOTE — PROGRESS NOTES
calls to ask if patient is supposed to be taking atenolol. Writer reports that patient was transitioned from atenolol to propranolol to help with tremors. See OV note 5/22/23. Writer notified  and recommended to toss atenolol pills to prevent patient from taking both atenolol and propranolol.     Summer RN 12:26 PM June 4, 2024   [facility]

## 2024-06-05 ENCOUNTER — TRANSFERRED RECORDS (OUTPATIENT)
Dept: HEALTH INFORMATION MANAGEMENT | Facility: CLINIC | Age: 84
End: 2024-06-05
Payer: COMMERCIAL

## 2024-06-17 DIAGNOSIS — F41.9 ANXIETY: ICD-10-CM

## 2024-06-17 RX ORDER — ESCITALOPRAM OXALATE 5 MG/1
5 TABLET ORAL DAILY
Qty: 30 TABLET | Refills: 0 | OUTPATIENT
Start: 2024-06-17

## 2024-06-17 NOTE — TELEPHONE ENCOUNTER
call for a refill of Escitalopram     He states his wife will not take the  Escitalopram  daily because she is going to run out soon and she does not trust him that she is suppose to take it daily not as needed      Explained with patient and  on phone that of Escitalopram is 5mg daily to prevent anxiety     also wanted write to explain that pills will have different colors, shapes and # based on what pharmacy they came from- explained to patient.     Reviewed all medications and directions with  and patient.    Advised to take daily as directed and will send in refills.   Verified pharmacy     Routing to [place] refill team.

## 2024-06-20 ENCOUNTER — NURSE TRIAGE (OUTPATIENT)
Dept: NURSING | Facility: CLINIC | Age: 84
End: 2024-06-20
Payer: COMMERCIAL

## 2024-06-20 ENCOUNTER — HOSPITAL ENCOUNTER (OUTPATIENT)
Facility: CLINIC | Age: 84
Setting detail: OBSERVATION
Discharge: HOME | End: 2024-06-22
Attending: EMERGENCY MEDICINE | Admitting: INTERNAL MEDICINE
Payer: COMMERCIAL

## 2024-06-20 DIAGNOSIS — I25.10 CORONARY ARTERY DISEASE DUE TO CALCIFIED CORONARY LESION: ICD-10-CM

## 2024-06-20 DIAGNOSIS — R79.89 ELEVATED TROPONIN: ICD-10-CM

## 2024-06-20 DIAGNOSIS — R07.9 CHEST PAIN, UNSPECIFIED TYPE: ICD-10-CM

## 2024-06-20 DIAGNOSIS — R06.09 DOE (DYSPNEA ON EXERTION): ICD-10-CM

## 2024-06-20 DIAGNOSIS — M54.50 CHRONIC BILATERAL LOW BACK PAIN WITHOUT SCIATICA: ICD-10-CM

## 2024-06-20 DIAGNOSIS — I25.84 CORONARY ARTERY DISEASE DUE TO CALCIFIED CORONARY LESION: ICD-10-CM

## 2024-06-20 DIAGNOSIS — M48.062 SPINAL STENOSIS, LUMBAR REGION, WITH NEUROGENIC CLAUDICATION: ICD-10-CM

## 2024-06-20 DIAGNOSIS — G20.C PARKINSONISM, UNSPECIFIED PARKINSONISM TYPE (H): Primary | ICD-10-CM

## 2024-06-20 DIAGNOSIS — I10 UNCONTROLLED HYPERTENSION: ICD-10-CM

## 2024-06-20 DIAGNOSIS — M17.12 PRIMARY OSTEOARTHRITIS OF LEFT KNEE: ICD-10-CM

## 2024-06-20 DIAGNOSIS — G89.29 CHRONIC BILATERAL LOW BACK PAIN WITHOUT SCIATICA: ICD-10-CM

## 2024-06-21 ENCOUNTER — APPOINTMENT (OUTPATIENT)
Dept: CARDIOLOGY | Facility: CLINIC | Age: 84
End: 2024-06-21
Attending: INTERNAL MEDICINE
Payer: COMMERCIAL

## 2024-06-21 ENCOUNTER — APPOINTMENT (OUTPATIENT)
Dept: GENERAL RADIOLOGY | Facility: CLINIC | Age: 84
End: 2024-06-21
Attending: EMERGENCY MEDICINE
Payer: COMMERCIAL

## 2024-06-21 ENCOUNTER — APPOINTMENT (OUTPATIENT)
Dept: NUCLEAR MEDICINE | Facility: CLINIC | Age: 84
End: 2024-06-21
Attending: INTERNAL MEDICINE
Payer: COMMERCIAL

## 2024-06-21 PROBLEM — R79.89 ELEVATED TROPONIN: Status: ACTIVE | Noted: 2024-06-21

## 2024-06-21 PROBLEM — I10 UNCONTROLLED HYPERTENSION: Status: ACTIVE | Noted: 2024-06-21

## 2024-06-21 PROBLEM — I25.84 CORONARY ARTERY DISEASE DUE TO CALCIFIED CORONARY LESION: Status: ACTIVE | Noted: 2024-06-21

## 2024-06-21 PROBLEM — I25.10 CORONARY ARTERY DISEASE DUE TO CALCIFIED CORONARY LESION: Status: ACTIVE | Noted: 2024-06-21

## 2024-06-21 PROBLEM — R07.9 CHEST PAIN, UNSPECIFIED TYPE: Status: ACTIVE | Noted: 2024-06-21

## 2024-06-21 LAB
ANION GAP SERPL CALCULATED.3IONS-SCNC: 14 MMOL/L (ref 7–15)
ANION GAP SERPL CALCULATED.3IONS-SCNC: 15 MMOL/L (ref 7–15)
ATRIAL RATE - MUSE: 71 BPM
BASOPHILS # BLD AUTO: 0.1 10E3/UL (ref 0–0.2)
BASOPHILS NFR BLD AUTO: 1 %
BUN SERPL-MCNC: 13.7 MG/DL (ref 8–23)
BUN SERPL-MCNC: 15.1 MG/DL (ref 8–23)
CALCIUM SERPL-MCNC: 9.2 MG/DL (ref 8.8–10.2)
CALCIUM SERPL-MCNC: 9.7 MG/DL (ref 8.8–10.2)
CHLORIDE SERPL-SCNC: 92 MMOL/L (ref 98–107)
CHLORIDE SERPL-SCNC: 94 MMOL/L (ref 98–107)
CREAT SERPL-MCNC: 0.58 MG/DL (ref 0.51–0.95)
CREAT SERPL-MCNC: 0.62 MG/DL (ref 0.51–0.95)
CV STRESS MAX HR HE: 81
DEPRECATED HCO3 PLAS-SCNC: 22 MMOL/L (ref 22–29)
DEPRECATED HCO3 PLAS-SCNC: 24 MMOL/L (ref 22–29)
DIASTOLIC BLOOD PRESSURE - MUSE: NORMAL MMHG
EGFRCR SERPLBLD CKD-EPI 2021: 88 ML/MIN/1.73M2
EGFRCR SERPLBLD CKD-EPI 2021: 90 ML/MIN/1.73M2
EOSINOPHIL # BLD AUTO: 0.1 10E3/UL (ref 0–0.7)
EOSINOPHIL NFR BLD AUTO: 2 %
ERYTHROCYTE [DISTWIDTH] IN BLOOD BY AUTOMATED COUNT: 14.5 % (ref 10–15)
GLUCOSE BLDC GLUCOMTR-MCNC: 114 MG/DL (ref 70–99)
GLUCOSE SERPL-MCNC: 116 MG/DL (ref 70–99)
GLUCOSE SERPL-MCNC: 121 MG/DL (ref 70–99)
HCT VFR BLD AUTO: 40.5 % (ref 35–47)
HGB BLD-MCNC: 13.5 G/DL (ref 11.7–15.7)
HOLD SPECIMEN: NORMAL
HOLD SPECIMEN: NORMAL
IMM GRANULOCYTES # BLD: 0 10E3/UL
IMM GRANULOCYTES NFR BLD: 0 %
INR PPP: 1.89 (ref 0.85–1.15)
INR PPP: 2.01 (ref 0.85–1.15)
INTERPRETATION ECG - MUSE: NORMAL
LVEF ECHO: NORMAL
LYMPHOCYTES # BLD AUTO: 1.8 10E3/UL (ref 0.8–5.3)
LYMPHOCYTES NFR BLD AUTO: 29 %
MCH RBC QN AUTO: 29.9 PG (ref 26.5–33)
MCHC RBC AUTO-ENTMCNC: 33.3 G/DL (ref 31.5–36.5)
MCV RBC AUTO: 90 FL (ref 78–100)
MONOCYTES # BLD AUTO: 0.5 10E3/UL (ref 0–1.3)
MONOCYTES NFR BLD AUTO: 9 %
NEUTROPHILS # BLD AUTO: 3.7 10E3/UL (ref 1.6–8.3)
NEUTROPHILS NFR BLD AUTO: 59 %
NRBC # BLD AUTO: 0 10E3/UL
NRBC BLD AUTO-RTO: 0 /100
P AXIS - MUSE: 67 DEGREES
PLATELET # BLD AUTO: 227 10E3/UL (ref 150–450)
POTASSIUM SERPL-SCNC: 4.3 MMOL/L (ref 3.4–5.3)
POTASSIUM SERPL-SCNC: 4.6 MMOL/L (ref 3.4–5.3)
PR INTERVAL - MUSE: 184 MS
QRS DURATION - MUSE: 94 MS
QT - MUSE: 410 MS
QTC - MUSE: 445 MS
R AXIS - MUSE: -18 DEGREES
RATE PRESSURE PRODUCT: 8505
RBC # BLD AUTO: 4.51 10E6/UL (ref 3.8–5.2)
SODIUM SERPL-SCNC: 130 MMOL/L (ref 135–145)
SODIUM SERPL-SCNC: 131 MMOL/L (ref 135–145)
STRESS ECHO BASELINE DIASTOLIC HE: 75
STRESS ECHO BASELINE HR: 68 BPM
STRESS ECHO BASELINE SYSTOLIC BP: 160
STRESS ECHO CALCULATED PERCENT HR: 59 %
STRESS ECHO LAST STRESS DIASTOLIC BP: 53
STRESS ECHO LAST STRESS SYSTOLIC BP: 105
STRESS ECHO TARGET HR: 138
SYSTOLIC BLOOD PRESSURE - MUSE: NORMAL MMHG
T AXIS - MUSE: 42 DEGREES
TROPONIN T SERPL HS-MCNC: 19 NG/L
TROPONIN T SERPL HS-MCNC: 53 NG/L
TROPONIN T SERPL HS-MCNC: 56 NG/L
TROPONIN T SERPL HS-MCNC: 65 NG/L
VENTRICULAR RATE- MUSE: 71 BPM
WBC # BLD AUTO: 6.3 10E3/UL (ref 4–11)

## 2024-06-21 PROCEDURE — A9500 TC99M SESTAMIBI: HCPCS | Performed by: INTERNAL MEDICINE

## 2024-06-21 PROCEDURE — 80048 BASIC METABOLIC PNL TOTAL CA: CPT | Performed by: EMERGENCY MEDICINE

## 2024-06-21 PROCEDURE — 78452 HT MUSCLE IMAGE SPECT MULT: CPT | Mod: 26 | Performed by: INTERNAL MEDICINE

## 2024-06-21 PROCEDURE — 93018 CV STRESS TEST I&R ONLY: CPT | Performed by: INTERNAL MEDICINE

## 2024-06-21 PROCEDURE — 250N000013 HC RX MED GY IP 250 OP 250 PS 637: Performed by: INTERNAL MEDICINE

## 2024-06-21 PROCEDURE — 71046 X-RAY EXAM CHEST 2 VIEWS: CPT

## 2024-06-21 PROCEDURE — G0378 HOSPITAL OBSERVATION PER HR: HCPCS

## 2024-06-21 PROCEDURE — 250N000013 HC RX MED GY IP 250 OP 250 PS 637: Performed by: EMERGENCY MEDICINE

## 2024-06-21 PROCEDURE — 80048 BASIC METABOLIC PNL TOTAL CA: CPT | Mod: 91 | Performed by: INTERNAL MEDICINE

## 2024-06-21 PROCEDURE — 99205 OFFICE O/P NEW HI 60 MIN: CPT | Mod: 25 | Performed by: INTERNAL MEDICINE

## 2024-06-21 PROCEDURE — 250N000011 HC RX IP 250 OP 636: Mod: JZ | Performed by: INTERNAL MEDICINE

## 2024-06-21 PROCEDURE — 36415 COLL VENOUS BLD VENIPUNCTURE: CPT | Performed by: INTERNAL MEDICINE

## 2024-06-21 PROCEDURE — 85610 PROTHROMBIN TIME: CPT | Mod: 91 | Performed by: INTERNAL MEDICINE

## 2024-06-21 PROCEDURE — 82962 GLUCOSE BLOOD TEST: CPT

## 2024-06-21 PROCEDURE — 84484 ASSAY OF TROPONIN QUANT: CPT | Performed by: EMERGENCY MEDICINE

## 2024-06-21 PROCEDURE — 93306 TTE W/DOPPLER COMPLETE: CPT

## 2024-06-21 PROCEDURE — 36415 COLL VENOUS BLD VENIPUNCTURE: CPT | Performed by: EMERGENCY MEDICINE

## 2024-06-21 PROCEDURE — 93306 TTE W/DOPPLER COMPLETE: CPT | Mod: 26 | Performed by: INTERNAL MEDICINE

## 2024-06-21 PROCEDURE — 93016 CV STRESS TEST SUPVJ ONLY: CPT | Performed by: INTERNAL MEDICINE

## 2024-06-21 PROCEDURE — 99285 EMERGENCY DEPT VISIT HI MDM: CPT | Mod: 25

## 2024-06-21 PROCEDURE — 343N000001 HC RX 343: Performed by: INTERNAL MEDICINE

## 2024-06-21 PROCEDURE — 99223 1ST HOSP IP/OBS HIGH 75: CPT | Performed by: INTERNAL MEDICINE

## 2024-06-21 PROCEDURE — 85025 COMPLETE CBC W/AUTO DIFF WBC: CPT | Performed by: EMERGENCY MEDICINE

## 2024-06-21 PROCEDURE — 93017 CV STRESS TEST TRACING ONLY: CPT

## 2024-06-21 PROCEDURE — 78452 HT MUSCLE IMAGE SPECT MULT: CPT

## 2024-06-21 PROCEDURE — 84484 ASSAY OF TROPONIN QUANT: CPT | Mod: 91 | Performed by: INTERNAL MEDICINE

## 2024-06-21 PROCEDURE — 93005 ELECTROCARDIOGRAM TRACING: CPT

## 2024-06-21 PROCEDURE — 85610 PROTHROMBIN TIME: CPT | Performed by: EMERGENCY MEDICINE

## 2024-06-21 RX ORDER — ACETAMINOPHEN 500 MG
500-1000 TABLET ORAL EVERY 6 HOURS PRN
Status: DISCONTINUED | OUTPATIENT
Start: 2024-06-21 | End: 2024-06-22 | Stop reason: HOSPADM

## 2024-06-21 RX ORDER — ESCITALOPRAM OXALATE 5 MG/1
5 TABLET ORAL DAILY
Status: DISCONTINUED | OUTPATIENT
Start: 2024-06-21 | End: 2024-06-22 | Stop reason: HOSPADM

## 2024-06-21 RX ORDER — CARBIDOPA AND LEVODOPA 25; 100 MG/1; MG/1
1 TABLET ORAL 2 TIMES DAILY
COMMUNITY
Start: 2024-06-19 | End: 2024-07-18

## 2024-06-21 RX ORDER — LEVOTHYROXINE SODIUM 50 UG/1
50 TABLET ORAL DAILY
Status: DISCONTINUED | OUTPATIENT
Start: 2024-06-21 | End: 2024-06-22 | Stop reason: HOSPADM

## 2024-06-21 RX ORDER — PROPRANOLOL HCL 60 MG
60 CAPSULE, EXTENDED RELEASE 24HR ORAL DAILY
Status: DISCONTINUED | OUTPATIENT
Start: 2024-06-21 | End: 2024-06-22 | Stop reason: HOSPADM

## 2024-06-21 RX ORDER — REGADENOSON 0.08 MG/ML
INJECTION, SOLUTION INTRAVENOUS
Status: COMPLETED
Start: 2024-06-21 | End: 2024-06-21

## 2024-06-21 RX ORDER — FUROSEMIDE 20 MG
20 TABLET ORAL DAILY
Status: DISCONTINUED | OUTPATIENT
Start: 2024-06-21 | End: 2024-06-22 | Stop reason: HOSPADM

## 2024-06-21 RX ORDER — MAGNESIUM HYDROXIDE/ALUMINUM HYDROXICE/SIMETHICONE 120; 1200; 1200 MG/30ML; MG/30ML; MG/30ML
30 SUSPENSION ORAL EVERY 4 HOURS PRN
Status: DISCONTINUED | OUTPATIENT
Start: 2024-06-21 | End: 2024-06-22 | Stop reason: HOSPADM

## 2024-06-21 RX ORDER — MAGNESIUM OXIDE 400 MG/1
400 TABLET ORAL DAILY
Status: DISCONTINUED | OUTPATIENT
Start: 2024-06-21 | End: 2024-06-22 | Stop reason: HOSPADM

## 2024-06-21 RX ORDER — LISINOPRIL 20 MG/1
20 TABLET ORAL DAILY
Status: DISCONTINUED | OUTPATIENT
Start: 2024-06-21 | End: 2024-06-22 | Stop reason: HOSPADM

## 2024-06-21 RX ORDER — AMLODIPINE BESYLATE 2.5 MG/1
2.5 TABLET ORAL DAILY
Status: DISCONTINUED | OUTPATIENT
Start: 2024-06-21 | End: 2024-06-22 | Stop reason: HOSPADM

## 2024-06-21 RX ORDER — CARBIDOPA AND LEVODOPA 25; 100 MG/1; MG/1
1 TABLET ORAL 3 TIMES DAILY
COMMUNITY
Start: 2024-06-26 | End: 2024-07-17

## 2024-06-21 RX ORDER — ROSUVASTATIN CALCIUM 20 MG/1
40 TABLET, COATED ORAL DAILY
Status: DISCONTINUED | OUTPATIENT
Start: 2024-06-21 | End: 2024-06-22 | Stop reason: HOSPADM

## 2024-06-21 RX ORDER — NITROGLYCERIN 0.4 MG/1
0.4 TABLET SUBLINGUAL EVERY 5 MIN PRN
Status: DISCONTINUED | OUTPATIENT
Start: 2024-06-21 | End: 2024-06-22

## 2024-06-21 RX ORDER — ASPIRIN 81 MG/1
81 TABLET ORAL DAILY
Status: DISCONTINUED | OUTPATIENT
Start: 2024-06-21 | End: 2024-06-22 | Stop reason: HOSPADM

## 2024-06-21 RX ORDER — WARFARIN SODIUM 2.5 MG/1
2.5 TABLET ORAL
Status: DISCONTINUED | OUTPATIENT
Start: 2024-06-21 | End: 2024-06-21

## 2024-06-21 RX ORDER — CLONAZEPAM 0.5 MG/1
0.25 TABLET ORAL DAILY PRN
Status: DISCONTINUED | OUTPATIENT
Start: 2024-06-21 | End: 2024-06-21

## 2024-06-21 RX ORDER — ASPIRIN 81 MG/1
81 TABLET ORAL DAILY
Status: DISCONTINUED | OUTPATIENT
Start: 2024-06-22 | End: 2024-06-21

## 2024-06-21 RX ORDER — WARFARIN SODIUM 5 MG/1
TABLET ORAL DAILY
COMMUNITY

## 2024-06-21 RX ORDER — ASPIRIN 81 MG/1
324 TABLET, CHEWABLE ORAL ONCE
Status: COMPLETED | OUTPATIENT
Start: 2024-06-21 | End: 2024-06-21

## 2024-06-21 RX ORDER — NITROGLYCERIN 0.4 MG/1
0.4 TABLET SUBLINGUAL EVERY 5 MIN PRN
Status: DISCONTINUED | OUTPATIENT
Start: 2024-06-21 | End: 2024-06-21

## 2024-06-21 RX ORDER — MULTIVITAMIN,THERAPEUTIC
1 TABLET ORAL DAILY
COMMUNITY
End: 2024-09-13

## 2024-06-21 RX ORDER — WARFARIN SODIUM 5 MG/1
5 TABLET ORAL
Status: COMPLETED | OUTPATIENT
Start: 2024-06-21 | End: 2024-06-21

## 2024-06-21 RX ADMIN — ASPIRIN 81 MG CHEWABLE TABLET 324 MG: 81 TABLET CHEWABLE at 03:23

## 2024-06-21 RX ADMIN — ESCITALOPRAM OXALATE 5 MG: 5 TABLET, FILM COATED ORAL at 09:22

## 2024-06-21 RX ADMIN — FUROSEMIDE 20 MG: 20 TABLET ORAL at 11:04

## 2024-06-21 RX ADMIN — ACETAMINOPHEN 500 MG: 500 TABLET, FILM COATED ORAL at 09:22

## 2024-06-21 RX ADMIN — ASPIRIN 81 MG: 81 TABLET, COATED ORAL at 09:22

## 2024-06-21 RX ADMIN — PROPRANOLOL HYDROCHLORIDE 60 MG: 60 CAPSULE, EXTENDED RELEASE ORAL at 09:22

## 2024-06-21 RX ADMIN — REGADENOSON 0.4 MG: 0.08 INJECTION, SOLUTION INTRAVENOUS at 12:42

## 2024-06-21 RX ADMIN — Medication 400 MG: at 11:04

## 2024-06-21 RX ADMIN — AMLODIPINE BESYLATE 2.5 MG: 2.5 TABLET ORAL at 09:22

## 2024-06-21 RX ADMIN — WARFARIN SODIUM 5 MG: 5 TABLET ORAL at 19:32

## 2024-06-21 RX ADMIN — Medication 11 MILLICURIE: at 11:13

## 2024-06-21 RX ADMIN — LISINOPRIL 20 MG: 20 TABLET ORAL at 09:22

## 2024-06-21 RX ADMIN — LEVOTHYROXINE SODIUM 50 MCG: 50 TABLET ORAL at 09:22

## 2024-06-21 RX ADMIN — Medication 32.9 MILLICURIE: at 12:53

## 2024-06-21 ASSESSMENT — ACTIVITIES OF DAILY LIVING (ADL)
ADLS_ACUITY_SCORE: 38
ADLS_ACUITY_SCORE: 40
ADLS_ACUITY_SCORE: 40
ADLS_ACUITY_SCORE: 38
ADLS_ACUITY_SCORE: 40
ADLS_ACUITY_SCORE: 36
ADLS_ACUITY_SCORE: 40
ADLS_ACUITY_SCORE: 38
ADLS_ACUITY_SCORE: 40
ADLS_ACUITY_SCORE: 38
ADLS_ACUITY_SCORE: 40

## 2024-06-21 NOTE — PROGRESS NOTES
Stress test reviewed and negative for ischemia.  Symptoms may have been related to hypertension.  Blood pressure normotensive at this time.  Okay for discharge from a cardiovascular standpoint, follow-up with PMD for better blood pressure control.  Will sign off, please call with questions.

## 2024-06-21 NOTE — PLAN OF CARE
"Goal Outcome Evaluation:      Plan of Care Reviewed With: patient    Overall Patient Progress: no changeOverall Patient Progress: no change    Outcome Evaluation: Came from the ED to floor early am. Alert. Denies chest pain,sob. SB with gait belt. PIV SL.    Problem: Adult Inpatient Plan of Care  Goal: Plan of Care Review  Description: The Plan of Care Review/Shift note should be completed every shift.  The Outcome Evaluation is a brief statement about your assessment that the patient is improving, declining, or no change.  This information will be displayed automatically on your shift  note.  Outcome: Progressing  Flowsheets (Taken 6/21/2024 0711)  Outcome Evaluation: Came from the ED to floor early am. Alert. Denies chest pain,sob. SB with gait belt. PIV SL.  Plan of Care Reviewed With: patient  Overall Patient Progress: no change  Goal: Patient-Specific Goal (Individualized)  Description: You can add care plan individualizations to a care plan. Examples of Individualization might be:  \"Parent requests to be called daily at 9am for status\", \"I have a hard time hearing out of my right ear\", or \"Do not touch me to wake me up as it startles  me\".  Outcome: Progressing  Goal: Absence of Hospital-Acquired Illness or Injury  Outcome: Progressing  Intervention: Identify and Manage Fall Risk  Recent Flowsheet Documentation  Taken 6/21/2024 0437 by Gaby Ramírez RN  Safety Promotion/Fall Prevention:   clutter free environment maintained   safety round/check completed  Intervention: Prevent Skin Injury  Recent Flowsheet Documentation  Taken 6/21/2024 0437 by Gaby Ramírez, RN  Body Position: position changed independently  Intervention: Prevent and Manage VTE (Venous Thromboembolism) Risk  Recent Flowsheet Documentation  Taken 6/21/2024 0437 by Gaby Ramírez RN  VTE Prevention/Management: SCDs (sequential compression devices) off  Intervention: Prevent Infection  Recent Flowsheet Documentation  Taken 6/21/2024 0437 " by Gaby Ramírez, RN  Infection Prevention:   single patient room provided   rest/sleep promoted  Goal: Optimal Comfort and Wellbeing  Outcome: Progressing  Goal: Readiness for Transition of Care  Outcome: Progressing  Intervention: Mutually Develop Transition Plan  Recent Flowsheet Documentation  Taken 6/21/2024 0400 by Gaby Ramírez, RN  Equipment Currently Used at Home: cane, straight

## 2024-06-21 NOTE — PHARMACY-ANTICOAGULATION SERVICE
Clinical Pharmacy - Warfarin Dosing Consult     Pharmacy has been consulted to manage this patient s warfarin therapy.  Indication: Other - specify in comments (Hx CVA)  Therapy Goal: INR 2-3  Warfarin Prior to Admission: Yes  Warfarin PTA Regimen: 5mg Friday, 2.5mg ROW    INR   Date Value Ref Range Status   06/21/2024 2.01 (H) 0.85 - 1.15 Final   06/21/2024 1.89 (H) 0.85 - 1.15 Final       Recommend warfarin 5 mg today.  Pharmacy will monitor Salome Saeed daily and order warfarin doses to achieve specified goal.      Please contact pharmacy as soon as possible if the warfarin needs to be held for a procedure or if the warfarin goals change.    Brandy Phelps RPH

## 2024-06-21 NOTE — PHARMACY-ADMISSION MEDICATION HISTORY
Pharmacist Admission Medication History    Admission medication history is complete. The information provided in this note is only as accurate as the sources available at the time of the update.    Information Source(s): Patient, Family member (spouse), Clinic records, and CareEverywhere/SureScripts via in-person and phone    Pertinent Information:   Pt started carbidopa-levodopa earlier this month, still tapering up - taking 2 tabs daily this week with plan to increase to TID next week per pt .  Pt reports accidentally taking both atenolol and propranolol for a period of time, discovered 2-3 weeks ago (has since stopped atenolol) - no recent fill hx for atenolol per surescripts.     Changes made to PTA medication list:  Added:   Carbidopa-levodopa  Deleted:   Clonazepam PRN   Changed:   Warfarin sig    Allergies reviewed with patient and updates made in EHR: no    Medication History Completed By: Brandy Phelps [facility] 6/21/2024 10:21 AM    PTA Med List   Medication Sig Last Dose    acetaminophen (TYLENOL) 500 MG tablet Take 500-1,000 mg by mouth every 6 hours as needed for mild pain Unknown at PRN    amLODIPine (NORVASC) 2.5 MG tablet Take 1 tablet (2.5 mg) by mouth daily 6/20/2024    aspirin EC 81 MG tablet Take 1 tablet (81 mg) by mouth daily 6/20/2024    Calcium Carb-Cholecalciferol 600-5 MG-MCG TABS Take 1,200 mg by mouth daily Past Month    carbidopa-levodopa (SINEMET)  MG tablet Take 1 tablet by mouth 2 times daily TAPER UP: BID 1000 and 1600 for 1 week, then increase to TID 6/20/2024    Cholecalciferol (VITAMIN D) 2000 UNITS tablet Take 2,000 Units by mouth daily  Past Month    Coenzyme Q10 (COQ-10) 200 MG CAPS Take 400 mg by mouth daily  Past Month    diclofenac (VOLTAREN) 1 % topical gel Apply 4 g topically 4 times daily as needed for moderate pain Unknown at PRN    escitalopram (LEXAPRO) 5 MG tablet Take 1 tablet (5 mg) by mouth daily 6/20/2024    levothyroxine (SYNTHROID/LEVOTHROID) 50 MCG  tablet Take 1 tablet (50 mcg) by mouth daily 6/20/2024    lisinopril (ZESTRIL) 20 MG tablet Take 1 tablet (20 mg) by mouth daily Past Week    Magnesium 400 MG CAPS Take 400 mg by mouth daily Past Month    Melatonin 10 MG TABS tablet Take 10 mg by mouth nightly as needed for sleep Unknown at PRN    multivitamin, therapeutic (THERA-VIT) TABS tablet Take 1 tablet by mouth daily Past Month    nitroGLYcerin (NITROSTAT) 0.4 MG sublingual tablet Place 1 tablet (0.4 mg) under the tongue every 5 minutes as needed for chest pain Unknown at PRN    propranolol ER (INDERAL LA) 60 MG 24 hr capsule Take 1 capsule (60 mg) by mouth daily 6/20/2024    rosuvastatin (CRESTOR) 40 MG tablet Take 1 tablet (40 mg) by mouth daily 6/20/2024    vitamin B complex with vitamin C (STRESS TAB) tablet Take 1 tablet by mouth daily  Past Month    vitamin C (ASCORBIC ACID) 1000 MG TABS Take 2,000 mg by mouth daily Past Month    warfarin ANTICOAGULANT (COUMADIN) 5 MG tablet Take by mouth daily Take 5 mg on Fridays and 2.5 mg on all other days 6/20/2024

## 2024-06-21 NOTE — ED NOTES
[facility]  ED Nurse Handoff Report    ED Chief complaint: Hypertension and Chest Pain  . ED Diagnosis:   Final diagnoses:   Chest pain, unspecified type   Elevated troponin   Uncontrolled hypertension   Coronary artery disease due to calcified coronary lesion       Allergies:   Allergies   Allergen Reactions    Atorvastatin      Leg cramps    Cats Itching    Gluten      Sinuses affected by gluten    Shellfish Allergy      hives       Code Status: Full Code    Activity level - Baseline/Home:  walker.  Activity Level - Current:   assist of 2.   Lift room needed: No.   Bariatric: No   Needed: No   Isolation: No.   Infection: Not Applicable.     Respiratory status: Room air    Vital Signs (within 30 minutes):   Vitals:    06/21/24 0155 06/21/24 0200 06/21/24 0215 06/21/24 0300   BP: (!) 164/80  (!) 170/79 132/77   Pulse:  64 65 60   Resp:       Temp:       TempSrc:       SpO2:    97%   Weight:       Height:           Cardiac Rhythm:  ,      Pain level:    Patient confused: No.   Patient Falls Risk: mobility aid in reach.   Elimination Status: Has voided     Patient Report - Initial Complaint: Pt to ER with c/o HTN.   Focused Assessment: Pt with elevated BP chest pressure  hx of cardiac stents     Abnormal Results:   Labs Ordered and Resulted from Time of ED Arrival to Time of ED Departure   BASIC METABOLIC PANEL - Abnormal       Result Value    Sodium 130 (*)     Potassium 4.3      Chloride 92 (*)     Carbon Dioxide (CO2) 24      Anion Gap 14      Urea Nitrogen 15.1      Creatinine 0.62      GFR Estimate 88      Calcium 9.7      Glucose 116 (*)    TROPONIN T, HIGH SENSITIVITY - Abnormal    Troponin T, High Sensitivity 19 (*)    INR - Abnormal    INR 1.89 (*)    TROPONIN T, HIGH SENSITIVITY - Abnormal    Troponin T, High Sensitivity 53 (*)    CBC WITH PLATELETS AND DIFFERENTIAL    WBC Count 6.3      RBC Count 4.51      Hemoglobin 13.5      Hematocrit 40.5      MCV 90      MCH 29.9       MCHC 33.3      RDW 14.5      Platelet Count 227      % Neutrophils 59      % Lymphocytes 29      % Monocytes 9      % Eosinophils 2      % Basophils 1      % Immature Granulocytes 0      NRBCs per 100 WBC 0      Absolute Neutrophils 3.7      Absolute Lymphocytes 1.8      Absolute Monocytes 0.5      Absolute Eosinophils 0.1      Absolute Basophils 0.1      Absolute Immature Granulocytes 0.0      Absolute NRBCs 0.0          XR Chest 2 Views   Final Result   IMPRESSION: Lungs are clear. No pleural effusion or pneumothorax. Normal heart size and pulmonary vascularity. Lumbar spinal fusion hardware.          Treatments provided: Meds   Family Comments: Family at bedside  OBS brochure/video discussed/provided to patient:  Yes  ED Medications:   Medications   aspirin (ASA) chewable tablet 324 mg (324 mg Oral $Given 6/21/24 6336)       Drips infusing:  No  For the majority of the shift this patient was Green.   Interventions performed wereN/A.    Sepsis treatment initiated: No    Cares/treatment/interventions/medications to be completed following ED care: See [facility]    ED Nurse Name: Peggy Martinez RN  3:26 AM

## 2024-06-21 NOTE — ED TRIAGE NOTES
Pt came in with hypertension; 200s systolic. Having midsternal chest pressure that is radiating to her neck. Mild SOB. Took her BP meds today. Has a history of cardiac stents. Takes warfarin. A&Ox4.

## 2024-06-21 NOTE — TELEPHONE ENCOUNTER
Patient  and son calling to report /74 and 201/78 consistently tonight after medications.  Patient reports some difficulty breathing and chest pressure at times, though not currently.    Disposition is to be seen at ED.  Caller verbalizes understanding and agrees with plan.    Kim Mckeon RN  [facility]      Reason for Disposition   [1] Systolic BP  >= 160 OR Diastolic >= 100 AND [2] cardiac (e.g., breathing difficulty, chest pain) or neurologic symptoms (e.g., new-onset blurred or double vision, unsteady gait)    Additional Information   Negative: Difficult to awaken or acting confused (e.g., disoriented, slurred speech)   Negative: SEVERE difficulty breathing (e.g., struggling for each breath, speaks in single words)   Negative: [1] Weakness of the face, arm or leg on one side of the body AND [2] new-onset   Negative: [1] Numbness (i.e., loss of sensation) of the face, arm or leg on one side of the body AND [2] new-onset   Negative: [1] Chest pain lasts > 5 minutes AND [2] history of heart disease (i.e., heart attack, bypass surgery, angina, angioplasty, CHF)   Negative: [1] Chest pain AND [2] took nitrogylcerin AND [3] pain was not relieved   Negative: Sounds like a life-threatening emergency to the triager    Protocols used: Blood Pressure - High-A-

## 2024-06-21 NOTE — PROGRESS NOTES
Patient was seen and examined by me at bedside.  History and physical completed by admitting physician was reviewed.  Patient presents with chest pain and elevated troponin concerning for acute coronary syndrome.  Cardiology consulted and Lexiscan is pending.  Likely discharge over the weekend if she remains stable  Addendum   - stress test unremarkable   - may advance diet   - monitor on tele for today

## 2024-06-21 NOTE — PLAN OF CARE
Cared for [id number]    Pt A/O x 4 (forgetful), VSS (BPs elevated, 165/60 - scheduled meds given); Pt denies pain, headache, dizziness, N/V & SOB. Pt up Asst: 1 w/gait belt & walker. Lung sounds clear, RA. Tele: SR. Skin intact. Cards, PT, OT & Social Work following. ECHO and possible Lexiscan today. Will continue with plan of care.    PRIMARY DIAGNOSIS: CHEST PAIN  OUTPATIENT/OBSERVATION GOALS TO BE MET BEFORE DISCHARGE:  1. Ruled out acute coronary syndrome (negative or stable Troponin):  No  2. Pain Status: Improved-controlled with oral pain medications.  3. Appropriate provocative testing performed: Yes  - Stress Test Procedure: Lesiscan  - Interpretation of cardiac rhythm per telemetry tech: SR    4. Cleared by Consultants (if applicable):No  5. Return to near baseline physical activity: No  Discharge Planner Nurse   Safe discharge environment identified: No  Barriers to discharge: No       Entered by: Lauren Wilkerson RN 06/21/2024 10:37 AM     Please review provider order for any additional goals.   Nurse to notify provider when observation goals have been met and patient is ready for discharge.  +++++++++++++++++++++++++++++++++++++++++++++++++    Goal Outcome Evaluation:      Plan of Care Reviewed With: patient    Overall Patient Progress: no changeOverall Patient Progress: no change    Outcome Evaluation: No chest pain, Cardiology consulted      Problem: Adult Inpatient Plan of Care  Goal: Plan of Care Review  Description: The Plan of Care Review/Shift note should be completed every shift.  The Outcome Evaluation is a brief statement about your assessment that the patient is improving, declining, or no change.  This information will be displayed automatically on your shift  note.  Outcome: Progressing  Flowsheets (Taken 6/21/2024 1036)  Outcome Evaluation: No chest pain, Cardiology consulted  Plan of Care Reviewed With: patient  Overall Patient Progress: no change  Goal: Patient-Specific Goal  "(Individualized)  Description: You can add care plan individualizations to a care plan. Examples of Individualization might be:  \"Parent requests to be called daily at 9am for status\", \"I have a hard time hearing out of my right ear\", or \"Do not touch me to wake me up as it startles  me\".  Outcome: Progressing  Goal: Absence of Hospital-Acquired Illness or Injury  Outcome: Progressing  Intervention: Identify and Manage Fall Risk  Recent Flowsheet Documentation  Taken 6/21/2024 0922 by Lauren Wilkerson RN  Safety Promotion/Fall Prevention:   activity supervised   assistive device/personal items within reach   increased rounding and observation   increase visualization of patient   safety round/check completed   supervised activity  Intervention: Prevent Skin Injury  Recent Flowsheet Documentation  Taken 6/21/2024 0922 by Lauren Wilkerson RN  Body Position: position changed independently  Intervention: Prevent and Manage VTE (Venous Thromboembolism) Risk  Recent Flowsheet Documentation  Taken 6/21/2024 0922 by Lauren Wilkerson RN  VTE Prevention/Management: patient refused intervention  Intervention: Prevent Infection  Recent Flowsheet Documentation  Taken 6/21/2024 0922 by Lauren Wilkerson RN  Infection Prevention:   single patient room provided   rest/sleep promoted  Goal: Optimal Comfort and Wellbeing  Outcome: Progressing  Intervention: Monitor Pain and Promote Comfort  Recent Flowsheet Documentation  Taken 6/21/2024 0922 by Lauren Wilkerson RN  Pain Management Interventions: medication (see MAR)  Goal: Readiness for Transition of Care  Outcome: Progressing     Problem: Fall Injury Risk  Goal: Absence of Fall and Fall-Related Injury  Outcome: Progressing  Intervention: Identify and Manage Contributors  Recent Flowsheet Documentation  Taken 6/21/2024 0922 by Lauren Wilkerson RN  Medication Review/Management: medications reviewed  Intervention: Promote Injury-Free Environment  Recent " Flowsheet Documentation  Taken 6/21/2024 0922 by Lauren Wilkerson, RN  Safety Promotion/Fall Prevention:   activity supervised   assistive device/personal items within reach   increased rounding and observation   increase visualization of patient   safety round/check completed   supervised activity     Problem: Chest Pain  Goal: Resolution of Chest Pain Symptoms  Outcome: Progressing

## 2024-06-21 NOTE — H&P
[facility]    Hospitalist History and Physical    Name: Salome Saeed    MRN: [id number]  YOB: 1940    Age: 84 year old  Date of Admission:  6/20/2024  Date of Service (when I saw the patient): 06/21/24    Assessment & Plan   Salome Saeed is a 84-year-old female with a well-managed history of coronary artery disease (CAD), hypertension, Parkinson's and stroke, currently on Coumadin, presented with elevated blood pressure readings (190-200s systolic) and an episode of chest pain during a drive. Troponin levels increased from 19 to 53, indicating potential demand ischemia versus ACS, no chest pain at this time.  In the emergency room was treated with aspirin. Current blood pressure is 140 systolic. Further monitoring and management are warranted given her significant cardiovascular risk factors and recent symptoms.     ACS versus demand ischemia  Risk factors: known history of CAD, hypertension, hyperlipidemia, prior history of CVA  -Monitor for observation on telemetry  -Serial troponins  -Cardiac echo for wall motion abnormality  -Continue prior to admission aspirin, beta-blocker (propranolol as patient also has Parkinson's), lisinopril, statins and nitroglycerin    History of CVA  -Continue Coumadin    History of anxiety and depression  -Can resume prior to admission meds once reconciled    History of Parkinson's  Tremors  -Continue propranolol  -Review and reorder prior to admission meds once reconciled           DVT Prophylaxis: Warfarin  Code Status: Full Code    Disposition: Admitted for observation    Primary Care Physician   Surya Dyer    Chief Complaint   High blood pressure and episode of chest pain today    History is obtained from the patient    History of Present Illness   Salome Saeed is a 84 year old female with a well-managed history of coronary artery disease (CAD), hypertension, Parkinson's and stroke, currently on Coumadin, presented with  elevated blood pressure readings (190-200s systolic) and an episode of chest pain during a drive. Troponin levels increased from 19 to 53, indicating potential demand ischemia versus ACS, no chest pain at this time.      Patient describes that today she was tremulous and thought that she should check her blood pressure.  She took her blood pressure and it was high as 220 she got scared and worried about it.  Had multiple checks which were high and so decided to come to the emergency room.  On the way to the emergency room she got anxious about her  not stopping and developed some chest pressure.  She described it as 5 out of 10 pressure anterior chest discomfort radiating to back and neck associated with nausea and lightheadedness.  It resolved on its own.  No pain at this time.  Also patient's spouse was concerned about her anxiety and given anxiolytic prior to coming to the emergency room.  More than 10 point review of system was carried out was otherwise negative.    In the emergency room was treated with aspirin. Current blood pressure is 140 systolic. Further monitoring and management are warranted given her significant cardiovascular risk factors and recent symptoms    Past Medical History    Past Medical History:   Diagnosis Date    CAD (coronary artery disease) 04/09/2009 4/8/2009: PTCA and two 2.5x15mm Xience stents to mid LAD lesion; Cath 12/2012- 40-50% stenosis in mid PDA, 80% on D1- medically treat , 5/28/2015 - PTCA and 2.25x8mm Promus PREMIIER NAILA to ostium of 2nd OM    Cerebral artery occlusion with cerebral infarction 2012    CVA (cerebral infarction)     DDD (degenerative disc disease)     Essential hypertension, benign     GERD (gastroesophageal reflux disease)     Hyperlipidemia     Hypothyroidism     Lumbar spinal stenosis     Mitral regurgitation     1+ per 7/2013 Echo    Vertebral artery stenosis, right          Past Surgical History   Past Surgical History:   Procedure Laterality  Date    ARTHROPLASTY KNEE Left 6/1/2020    Procedure: Left total knee arthroplasty (Ward and Nephew #5 narrow femur; #3 tibia; 9 mm tibial poly and 35 mm patella);  Surgeon: Jorge Luis Cheatham MD;  Location: [facility] OR    CORONARY ANGIOGRAPHY ADULT ORDER  12/6/2012    40-50% stenosis to mid PDA, 80% in D1- medically treat    CORONARY ANGIOGRAPHY ADULT ORDER  5/28/2015    PTCA and 2.25x8mm Promus PREMIER NAILA to ostium of 2nd OM    CV CORONARY ANGIOGRAM N/A 5/5/2023    Procedure: Coronary Angiogram;  Surgeon: Wilfrido Robert MD;  Location: [facility]    DECOMPRESSION, FUSION LUMBAR POSTERIOR THREE + LEVELS, COMBINED  5/8/2013    Procedure: COMBINED DECOMPRESSION, FUSION LUMBAR POSTERIOR THREE + LEVELS;  Posterior Lumbar Decompression L3-S1, Fusion L4-S1;  Surgeon: Daniel Harris MD;  Location: [facility] OR    ENDOSCOPIC STRIPPING VEIN(S)      HEART CATH, ANGIOPLASTY  4/8/2009    PTCA and two 2.5x15mm Xience stents to mid LAD lesion    HYSTERECTOMY, RICKIE      Fibroids, and Menorrhagia.. Bilateral Oophrectomy    ORTHOPEDIC SURGERY      Stenting of LAD, Angioplasty  4/8/09    TONSILLECTOMY      as a child       Prior to Admission Medications   Prior to Admission Medications   Prescriptions Last Dose Informant Patient Reported? Taking?   Calcium Carb-Cholecalciferol (CALCIUM 600 + D PO)   Yes No   Sig: Take 1,200 mg by mouth daily    Cholecalciferol (VITAMIN D) 2000 UNITS tablet   Yes No   Sig: Take 2,000 Units by mouth daily    Coenzyme Q10 (COQ-10) 200 MG CAPS   Yes No   Sig: Take 400 mg by mouth daily    Magnesium 400 MG CAPS   Yes No   Sig: Take 400 mg by mouth daily   Melatonin 10 MG TABS tablet   Yes No   Sig: Take 10 mg by mouth nightly as needed for sleep   Multiple Vitamin (DAILY MULTIVITAMIN PO)   Yes No   Sig: Take by mouth daily   acetaminophen (TYLENOL) 500 MG tablet   Yes No   Sig: Take 500-1,000 mg by mouth every 6 hours as needed for mild pain   amLODIPine (NORVASC) 2.5 MG tablet   No No    Sig: Take 1 tablet (2.5 mg) by mouth daily   aspirin EC 81 MG tablet   No No   Sig: Take 1 tablet (81 mg) by mouth daily   clonazePAM (KLONOPIN) 0.5 MG tablet   No No   Sig: Take 0.5 tablets (0.25 mg) by mouth daily as needed for anxiety   diclofenac (VOLTAREN) 1 % topical gel   No No   Sig: Apply 4 g topically 4 times daily as needed for moderate pain   escitalopram (LEXAPRO) 5 MG tablet   No No   Sig: Take 1 tablet (5 mg) by mouth daily   levothyroxine (SYNTHROID/LEVOTHROID) 50 MCG tablet   No No   Sig: Take 1 tablet (50 mcg) by mouth daily   lisinopril (ZESTRIL) 20 MG tablet   No No   Sig: Take 1 tablet (20 mg) by mouth daily   nitroGLYcerin (NITROSTAT) 0.4 MG sublingual tablet   No No   Sig: Place 1 tablet (0.4 mg) under the tongue every 5 minutes as needed for chest pain   prednisoLONE acetate (PRED FORTE) 1 % ophthalmic suspension   Yes No   Sig: Place 1 drop into both eyes daily   Patient not taking: Reported on 6/3/2024   propranolol ER (INDERAL LA) 60 MG 24 hr capsule   No No   Sig: Take 1 capsule (60 mg) by mouth daily   rosuvastatin (CRESTOR) 40 MG tablet   No No   Sig: Take 1 tablet (40 mg) by mouth daily   vitamin B complex with vitamin C (STRESS TAB) tablet   Yes No   Sig: Take 1 tablet by mouth daily    vitamin C (ASCORBIC ACID) 1000 MG TABS   Yes No   Sig: Take 2,000 mg by mouth daily   warfarin ANTICOAGULANT (COUMADIN) 5 MG tablet   No No   Sig: Take 5mg every , , and 2.5mg all other days OR per INR clinic      Facility-Administered Medications: None     Allergies   Allergies   Allergen Reactions    Atorvastatin      Leg cramps    Cats Itching    Gluten      Sinuses affected by gluten    Shellfish Allergy      hives       Social History   Social History     Tobacco Use    Smoking status: Former     Current packs/day: 0.00     Types: Cigarettes     Quit date: 1965     Years since quittin.5    Smokeless tobacco: Never   Substance Use Topics    Alcohol use: Yes     Alcohol/week: 0.0  "standard drinks of alcohol     Comment: 2 glasses wine per month, maybe     Social History     Social History Narrative    Works in an office     To spouse.  Denies smoking.  Alcohol about 1 beer a week    Family History   I have reviewed this patient's family history and updated it with pertinent information if needed.   Family History   Problem Relation Age of Onset    Neurologic Disorder Mother         Dementia, Still living    Ovarian Cancer Mother     Thyroid Disease Mother     C.A.D. Father             Diabetes Father     Coronary Artery Disease Father     Alcohol/Drug Brother     Thyroid Disease Son     Diabetes Son      Review of Systems   A Comprehensive greater than 10 system review of systems was carried out.  Pertinent positives and negatives are noted above.  Otherwise negative for contributory information.    Physical Exam   Temp: 97.6  F (36.4  C) Temp src: Temporal BP: 132/77 Pulse: 60   Resp: 14 SpO2: 97 % O2 Device: None (Room air)    Vital Signs with Ranges  Temp:  [97.6  F (36.4  C)] 97.6  F (36.4  C)  Pulse:  [60-72] 60  Resp:  [8-18] 14  BP: (132-178)/(75-88) 132/77  SpO2:  [96 %-98 %] 97 %  132 lbs 0 oz    GEN:  Alert, oriented x 3, appears comfortable, no overt distress  HEENT:  Normocephalic/atraumatic, no scleral icterus, no nasal discharge, mouth moist.  CV:  Regular rate and rhythm, no murmur or JVD.  S1 + S2 noted, no S3 or S4.  LUNGS:  Clear to auscultation bilaterally without rales/rhonchi/wheezing/retractions.  Symmetric chest rise on inhalation noted.  ABD:  Active bowel sounds, soft, non-tender/non-distended.  No rebound/guarding/rigidity.  EXT:  No edema.  No cyanosis.  No joint synovitis noted.  SKIN:  Dry to touch, no exanthems noted in the visualized areas.  NEURO:  Symmetric muscle strength,.  No new focal deficits appreciated.    Data   Data reviewed today:  I personally reviewed the EKG tracing showing normal sinus rhythm .    No results for input(s): \"PH\", \"PHV\", " "\"PO2\", \"PO2V\", \"SAT\", \"PCO2\", \"PCO2V\", \"HCO3\", \"HCO3V\" in the last 168 hours.  Recent Labs   Lab 06/21/24  0000   WBC 6.3   HGB 13.5   HCT 40.5   MCV 90        Recent Labs   Lab 06/21/24  0000   *   POTASSIUM 4.3   CHLORIDE 92*   CO2 24   ANIONGAP 14   *   BUN 15.1   CR 0.62   GFRESTIMATED 88   ERIC 9.7     7-Day Micro Results       No results found for the last 168 hours.          No results for input(s): \"NTBNPI\", \"NTBNP\" in the last 168 hours.  Recent Labs   Lab 06/21/24  0000   *   POTASSIUM 4.3   CHLORIDE 92*   CO2 24   *     No results for input(s): \"SED\", \"CRP\" in the last 168 hours.  GFR Estimate   Date Value Ref Range Status   06/21/2024 88 >60 mL/min/1.73m2 Final     Comment:     eGFR calculated using 2021 CKD-EPI equation.   05/26/2024 89 >60 mL/min/1.73m2 Final   10/05/2023 86 >60 mL/min/1.73m2 Final   10/14/2020 73 >60 mL/min/[1.73_m2] Final     Comment:     Non  GFR Calc  Starting 12/18/2018, serum creatinine based estimated GFR (eGFR) will be   calculated using the Chronic Kidney Disease Epidemiology Collaboration   (CKD-EPI) equation.     05/27/2020 83 >60 mL/min/[1.73_m2] Final     Comment:     Non  GFR Calc  Starting 12/18/2018, serum creatinine based estimated GFR (eGFR) will be   calculated using the Chronic Kidney Disease Epidemiology Collaboration   (CKD-EPI) equation.     10/04/2019 85 >60 mL/min/[1.73_m2] Final     Comment:     Non  GFR Calc  Starting 12/18/2018, serum creatinine based estimated GFR (eGFR) will be   calculated using the Chronic Kidney Disease Epidemiology Collaboration   (CKD-EPI) equation.       GFR Estimate If Black   Date Value Ref Range Status   10/14/2020 85 >60 mL/min/[1.73_m2] Final     Comment:      GFR Calc  Starting 12/18/2018, serum creatinine based estimated GFR (eGFR) will be   calculated using the Chronic Kidney Disease Epidemiology Collaboration   (CKD-EPI) " "equation.     05/27/2020 >90 >60 mL/min/[1.73_m2] Final     Comment:      GFR Calc  Starting 12/18/2018, serum creatinine based estimated GFR (eGFR) will be   calculated using the Chronic Kidney Disease Epidemiology Collaboration   (CKD-EPI) equation.     10/04/2019 >90 >60 mL/min/[1.73_m2] Final     Comment:      GFR Calc  Starting 12/18/2018, serum creatinine based estimated GFR (eGFR) will be   calculated using the Chronic Kidney Disease Epidemiology Collaboration   (CKD-EPI) equation.       Recent Labs   Lab 06/21/24  0000   *     Recent Labs   Lab 06/21/24  0000   HGB 13.5     No results for input(s): \"LACT\" in the last 168 hours.  No results for input(s): \"LIPASE\" in the last 168 hours.  Recent Labs   Lab 06/21/24  0000   BUN 15.1   CR 0.62     No results for input(s): \"TSH\" in the last 168 hours.  No results for input(s): \"TROPONIN\", \"TROPI\", \"TROPR\", \"TROPONINIS\" in the last 168 hours.    Invalid input(s): \"TROPT\", \"TROP\", \"TROPONINIES\", \"TNIH\"  No results for input(s): \"COLOR\", \"APPEARANCE\", \"URINEGLC\", \"URINEBILI\", \"URINEKETONE\", \"SG\", \"UBLD\", \"URINEPH\", \"PROTEIN\", \"UROBILINOGEN\", \"NITRITE\", \"LEUKEST\", \"RBCU\", \"WBCU\" in the last 168 hours.    Recent Results (from the past 24 hour(s))   XR Chest 2 Views    Narrative    EXAM: XR CHEST 2 VIEWS  LOCATION: [facility]  DATE: 6/21/2024    INDICATION: chest pain  COMPARISON: 5/26/2024      Impression    IMPRESSION: Lungs are clear. No pleural effusion or pneumothorax. Normal heart size and pulmonary vascularity. Lumbar spinal fusion hardware.        "

## 2024-06-21 NOTE — PROGRESS NOTES
Cared for [id number]     Pt A/O x 4 (forgetful), VSS (BPs elevated, 165/60 - scheduled meds given); Pt denies pain, headache, dizziness, N/V & SOB. Pt up Asst: 1 w/gait belt & walker. Lung sounds clear, RA. Tele: SR. Skin intact. Cards, PT, OT & Social Work following. ECHO and possible Lexiscan today. Will continue with plan of care.    PRIMARY DIAGNOSIS: CHEST PAIN  OUTPATIENT/OBSERVATION GOALS TO BE MET BEFORE DISCHARGE:  1. Ruled out acute coronary syndrome (negative or stable Troponin):  Yes  2. Pain Status: Pain free.  3. Appropriate provocative testing performed: Yes  - Stress Test Procedure: Lesiscan  - Interpretation of cardiac rhythm per telemetry tech: SR    4. Cleared by Consultants (if applicable):No  5. Return to near baseline physical activity: No  Discharge Planner Nurse   Safe discharge environment identified: No  Barriers to discharge: No       Entered by: Lauren Wilkerson RN 06/21/2024 2:23 PM     Please review provider order for any additional goals.   Nurse to notify provider when observation goals have been met and patient is ready for discharge.

## 2024-06-21 NOTE — ED PROVIDER NOTES
History     Chief Complaint:  Chest pain        HPI   Salome Saeed is a 84 year old female with history of hypertension, CAD, CVA on Coumadin, who presents with elevated blood pressures and chest pain.  The patient states that, for unclear reasons, she felt the need to check her blood pressure today in the afternoon.  Over the afternoon and evening, her blood pressures were markedly elevated without any specific symptom.  After several hours of systolic blood pressures in the 200 range, the patient determined to seek further evaluation at the ED.  En route, the patient had a several minute episode of chest pain that was pressure-like and is now resolved.  She denies difficulty breathing, headache, focal weakness/numbness/paresthesias, or any other concerns.      Independent Historian:   None    Review of External Notes:   I reviewed the 6/3/2024 office visit with family medicine for comprehensive past medical history review    ROS:  Review of Systems    Allergies:  Atorvastatin  Cats  Gluten  Shellfish Allergy     Medications:    acetaminophen (TYLENOL) 500 MG tablet  amLODIPine (NORVASC) 2.5 MG tablet  aspirin EC 81 MG tablet  Calcium Carb-Cholecalciferol (CALCIUM 600 + D PO)  Cholecalciferol (VITAMIN D) 2000 UNITS tablet  clonazePAM (KLONOPIN) 0.5 MG tablet  Coenzyme Q10 (COQ-10) 200 MG CAPS  diclofenac (VOLTAREN) 1 % topical gel  escitalopram (LEXAPRO) 5 MG tablet  levothyroxine (SYNTHROID/LEVOTHROID) 50 MCG tablet  lisinopril (ZESTRIL) 20 MG tablet  Magnesium 400 MG CAPS  Melatonin 10 MG TABS tablet  Multiple Vitamin (DAILY MULTIVITAMIN PO)  nitroGLYcerin (NITROSTAT) 0.4 MG sublingual tablet  prednisoLONE acetate (PRED FORTE) 1 % ophthalmic suspension  propranolol ER (INDERAL LA) 60 MG 24 hr capsule  rosuvastatin (CRESTOR) 40 MG tablet  vitamin B complex with vitamin C (STRESS TAB) tablet  vitamin C (ASCORBIC ACID) 1000 MG TABS  warfarin ANTICOAGULANT (COUMADIN) 5 MG tablet        Past History:    Past  Medical History:   Diagnosis Date    CAD (coronary artery disease) 04/09/2009    Cerebral artery occlusion with cerebral infarction 2012    CVA (cerebral infarction)     DDD (degenerative disc disease)     Essential hypertension, benign     GERD (gastroesophageal reflux disease)     Hyperlipidemia     Hypothyroidism     Lumbar spinal stenosis     Mitral regurgitation     Vertebral artery stenosis, right          Physical Exam     Patient Vitals for the past 24 hrs:  Vitals:    06/21/24 0155 06/21/24 0200 06/21/24 0215 06/21/24 0300   BP: (!) 164/80  (!) 170/79 132/77   Pulse:  64 65 60   Resp:       Temp:       TempSrc:       SpO2:    97%   Weight:       Height:             Physical Exam  Constitutional: Alert, attentive  HENT:    Nose: Nose normal.    Mouth/Throat: Oropharynx is clear, mucous membranes are moist   Eyes: EOM are normal.   CV: regular rate and rhythm; no murmurs, rubs or gallups  Chest: Effort normal and breath sounds normal.   GI:  There is no tenderness. No distension. Normal bowel sounds  MSK: Normal range of motion.   Neurological: Alert, attentive  Skin: Skin is warm and dry.      Emergency Department Course       Results for orders placed or performed during the hospital encounter of 06/20/24   XR Chest 2 Views     Status: None    Narrative    EXAM: XR CHEST 2 VIEWS  LOCATION: [facility]  DATE: 6/21/2024    INDICATION: chest pain  COMPARISON: 5/26/2024      Impression    IMPRESSION: Lungs are clear. No pleural effusion or pneumothorax. Normal heart size and pulmonary vascularity. Lumbar spinal fusion hardware.   Basic metabolic panel     Status: Abnormal   Result Value Ref Range    Sodium 130 (L) 135 - 145 mmol/L    Potassium 4.3 3.4 - 5.3 mmol/L    Chloride 92 (L) 98 - 107 mmol/L    Carbon Dioxide (CO2) 24 22 - 29 mmol/L    Anion Gap 14 7 - 15 mmol/L    Urea Nitrogen 15.1 8.0 - 23.0 mg/dL    Creatinine 0.62 0.51 - 0.95 mg/dL    GFR Estimate 88 >60 mL/min/1.73m2     Calcium 9.7 8.8 - 10.2 mg/dL    Glucose 116 (H) 70 - 99 mg/dL   Troponin T, High Sensitivity     Status: Abnormal   Result Value Ref Range    Troponin T, High Sensitivity 19 (H) <=14 ng/L   [place] Draw     Status: None    Narrative    The following orders were created for panel order [place] Draw.  Procedure                               Abnormality         Status                     ---------                               -----------         ------                     Extra Blue Top Tube[[id number]]                              Final result               Extra Red Top Tube[[id number]]                               Final result                 Please view results for these tests on the individual orders.   CBC with platelets and differential     Status: None   Result Value Ref Range    WBC Count 6.3 4.0 - 11.0 10e3/uL    RBC Count 4.51 3.80 - 5.20 10e6/uL    Hemoglobin 13.5 11.7 - 15.7 g/dL    Hematocrit 40.5 35.0 - 47.0 %    MCV 90 78 - 100 fL    MCH 29.9 26.5 - 33.0 pg    MCHC 33.3 31.5 - 36.5 g/dL    RDW 14.5 10.0 - 15.0 %    Platelet Count 227 150 - 450 10e3/uL    % Neutrophils 59 %    % Lymphocytes 29 %    % Monocytes 9 %    % Eosinophils 2 %    % Basophils 1 %    % Immature Granulocytes 0 %    NRBCs per 100 WBC 0 <1 /100    Absolute Neutrophils 3.7 1.6 - 8.3 10e3/uL    Absolute Lymphocytes 1.8 0.8 - 5.3 10e3/uL    Absolute Monocytes 0.5 0.0 - 1.3 10e3/uL    Absolute Eosinophils 0.1 0.0 - 0.7 10e3/uL    Absolute Basophils 0.1 0.0 - 0.2 10e3/uL    Absolute Immature Granulocytes 0.0 <=0.4 10e3/uL    Absolute NRBCs 0.0 10e3/uL   Extra Blue Top Tube     Status: None   Result Value Ref Range    Hold Specimen JIC    Extra Red Top Tube     Status: None   Result Value Ref Range    Hold Specimen JIC    INR     Status: Abnormal   Result Value Ref Range    INR 1.89 (H) 0.85 - 1.15   Troponin T, High Sensitivity (now)     Status: Abnormal   Result Value Ref Range    Troponin T, High Sensitivity 53 (H) <=14 ng/L   EKG 12  lead     Status: None (Preliminary result)   Result Value Ref Range    Systolic Blood Pressure  mmHg    Diastolic Blood Pressure  mmHg    Ventricular Rate 71 BPM    Atrial Rate 71 BPM    TX Interval 184 ms    QRS Duration 94 ms     ms    QTc 445 ms    P Axis 67 degrees    R AXIS -18 degrees    T Axis 42 degrees    Interpretation ECG       Sinus rhythm  Normal ECG  When compared with ECG of 26-MAY-2024 15:11,  No significant change was found     CBC with Platelets & Differential     Status: None    Narrative    The following orders were created for panel order CBC with Platelets & Differential.  Procedure                               Abnormality         Status                     ---------                               -----------         ------                     CBC with platelets and d...[[id number]]                      Final result                 Please view results for these tests on the individual orders.       Emergency Department Course & Assessments:             Interventions:   mg      Independent Interpretation (X-rays, CTs, rhythm strip):  No infiltrate on chest x-ray    Consultations/Discussion of Management or Tests:  Discussed the patient with the admitting hospitalist       Disposition:  The patient was admitted to the hospital.     Impression & Plan        Medical Decision Making:  This is a 84-year-old female with history of CAD, hypertension, CVA on Coumadin, who presents for evaluation of elevated blood pressure and chest pain.  Of note, patient's blood pressures are moderately elevated in the department and then improved without intervention.  Renal function is normal and stable.  Troponin is slightly elevated but EKG is nonischemic.  Strongly doubt PE based on near therapeutic INR.  Pain remains resolved the department but her delta troponin has a significant elevation, raising suspect strain for possible evolving NSTEMI versus type II myocardial infarction.  Based on baseline  CAD, will administer aspirin and admit to observation unit for serial enzymes and possible NSTEMI care.        Diagnosis:  Visit Diagnosis, Associated Orders, and Comments     ICD-10-CM    1. Chest pain, unspecified type  R07.9       2. Elevated troponin  R79.89       3. Uncontrolled hypertension  I10       4. Coronary artery disease due to calcified coronary lesion  I25.10     I25.84                Mark Almaraz MD  06/21/24 0310

## 2024-06-21 NOTE — CONSULTS
Cardiology Consultation      Salome Saeed MRN# [id number]   YOB: 1940 Age: 84 year old   Date of Admission: 6/20/2024     Reason for consult: Atypical chest pain with mild troponin           Assessment and Plan:     Symptoms and minimal troponin could be consistent with hypertension, patient has a history of nonobstructive coronary artery disease on last cardiac catheterization 5/5/2023 and benign ECG.  Patient is anticoagulated and therapeutic on warfarin, would not pursue cardiac catheterization unless objective evidence of acute coronary syndrome today.  Patient does have some exertional component but also large atypical component along with no significant troponin elevation or resting ECG changes.  Would favor stress testing for risk stratification, nuclear stress test if able to do today otherwise dobutamine echocardiogram or stress echocardiogram.  If stress testing cannot be done today, okay for discharge and outpatient stress testing unless symptoms escalate.    60 minutes spent today reviewing chart, coordinating care with nursing and staff, discussion with patient and postvisit charting.          The longitudinal plan of care for the diagnosis(es)/condition(s) as documented were addressed during this visit. Due to the added complexity in care, I will continue to support Loreta in the subsequent management and with ongoing continuity of care.          Chief Complaint:   Hypertension and Chest Pain           History of Present Illness:   This patient is a 84 year old female previously seen in cardiology clinic with history of PCI to the LAD 2009 and obtuse marginal 2015.  Her last cardiac catheterization was 5/5/2023 with no significant obstructive disease seen.  She is chronically anticoagulated and therapeutic on her warfarin which was initiated after embolic stroke in 2013.    She presents with hypertension and mildly elevated troponin without peak and curve of acute coronary syndrome.   "She has some back and chest discomfort but also some left neck discomfort that was previously deemed to be noncardiac after evaluation with cardiac catheterization in .    She states that she does think she has some exertional chest discomfort after walking for around 10 minutes.    She presented when she had an episode of chest pain during driving.         Physical Exam:   Vitals were reviewed  Blood pressure (!) 165/60, pulse 66, temperature 98  F (36.7  C), temperature source Oral, resp. rate 18, height 1.6 m (5' 3\"), weight 60.9 kg (134 lb 3.2 oz), SpO2 99%, not currently breastfeeding.  Temperatures:  Current - Temp: 98  F (36.7  C); Max - Temp  Av.8  F (36.6  C)  Min: 97.6  F (36.4  C)  Max: 98  F (36.7  C)  Respiration range: Resp  Av.3  Min: 8  Max: 18  Pulse range: Pulse  Av.6  Min: 60  Max: 72  Blood pressure range: Systolic (24hrs), Av , Min:132 , Max:178   ; Diastolic (24hrs), Av, Min:60, Max:88    Pulse oximetry range: SpO2  Av.7 %  Min: 96 %  Max: 99 %  No intake or output data in the 24 hours ending 24 1017  Constitutional:   awake, alert, cooperative, no apparent distress, and appears stated age     Eyes:   Lids and lashes normal, pupils equal, round and reactive to light, extra ocular muscles intact, sclera clear, conjunctiva normal     Neck:   supple, symmetrical, trachea midline,      Back:   symmetric     Lungs:   Symmetric     Cardiovascular:   Regular     Abdomen:   non-tender     Musculoskeletal:   motor strength is 5 out of 5 all extremities bilaterally     Neurologic:   Grossly nonfocal     Skin:   normal skin color, texture, turgor     Additional findings:                    Past Medical History:   I have reviewed this patient's past medical history  Past Medical History:   Diagnosis Date    CAD (coronary artery disease) 2009: PTCA and two 2.5x15mm Xience stents to mid LAD lesion; Cath 2012- 40-50% stenosis in mid PDA, 80% on D1- " medically treat , 5/28/2015 - PTCA and 2.25x8mm Promus PREMIIER NAILA to ostium of 2nd OM    Cerebral artery occlusion with cerebral infarction 2012    CVA (cerebral infarction)     DDD (degenerative disc disease)     Essential hypertension, benign     GERD (gastroesophageal reflux disease)     Hyperlipidemia     Hypothyroidism     Lumbar spinal stenosis     Mitral regurgitation     1+ per 7/2013 Echo    Vertebral artery stenosis, right              Past Surgical History:   I have reviewed this patient's past surgical history  Past Surgical History:   Procedure Laterality Date    ARTHROPLASTY KNEE Left 6/1/2020    Procedure: Left total knee arthroplasty (Ward and Nephew #5 narrow femur; #3 tibia; 9 mm tibial poly and 35 mm patella);  Surgeon: Jorge Luis Cheatham MD;  Location: [facility] OR    CORONARY ANGIOGRAPHY ADULT ORDER  12/6/2012    40-50% stenosis to mid PDA, 80% in D1- medically treat    CORONARY ANGIOGRAPHY ADULT ORDER  5/28/2015    PTCA and 2.25x8mm Promus PREMIER NAILA to ostium of 2nd OM    CV CORONARY ANGIOGRAM N/A 5/5/2023    Procedure: Coronary Angiogram;  Surgeon: Wilfrido Robert MD;  Location:  [facility]    DECOMPRESSION, FUSION LUMBAR POSTERIOR THREE + LEVELS, COMBINED  5/8/2013    Procedure: COMBINED DECOMPRESSION, FUSION LUMBAR POSTERIOR THREE + LEVELS;  Posterior Lumbar Decompression L3-S1, Fusion L4-S1;  Surgeon: Dnaiel Harris MD;  Location: [facility] OR    ENDOSCOPIC STRIPPING VEIN(S)      HEART CATH, ANGIOPLASTY  4/8/2009    PTCA and two 2.5x15mm Xience stents to mid LAD lesion    HYSTERECTOMY, RICKIE      Fibroids, and Menorrhagia.. Bilateral Oophrectomy    ORTHOPEDIC SURGERY      Stenting of LAD, Angioplasty  4/8/09    TONSILLECTOMY      as a child               Social History:   I have reviewed this patient's social history  Social History     Tobacco Use    Smoking status: Former     Current packs/day: 0.00     Types: Cigarettes     Quit date: 1/1/1965     Years since quitting:  59.5    Smokeless tobacco: Never   Substance Use Topics    Alcohol use: Yes     Alcohol/week: 0.0 standard drinks of alcohol     Comment: 2 glasses wine per month, maybe             Family History:   I have reviewed this patient's family history  Family History   Problem Relation Age of Onset    Neurologic Disorder Mother         Dementia, Still living    Ovarian Cancer Mother     Thyroid Disease Mother     C.A.D. Father             Diabetes Father     Coronary Artery Disease Father     Alcohol/Drug Brother     Thyroid Disease Son     Diabetes Son              Allergies:     Allergies   Allergen Reactions    Atorvastatin      Leg cramps    Cats Itching    Gluten      Sinuses affected by gluten    Shellfish Allergy      hives             Medications:   I have reviewed this patient's current medications  Medications Prior to Admission   Medication Sig Dispense Refill Last Dose    acetaminophen (TYLENOL) 500 MG tablet Take 500-1,000 mg by mouth every 6 hours as needed for mild pain   Unknown at PRN    amLODIPine (NORVASC) 2.5 MG tablet Take 1 tablet (2.5 mg) by mouth daily 30 tablet 0 2024    aspirin EC 81 MG tablet Take 1 tablet (81 mg) by mouth daily   2024    carbidopa-levodopa (SINEMET)  MG tablet Take 1 tablet by mouth 2 times daily TAPER UP: BID 1000 and 1600 for 1 week, then increase to TID   2024    escitalopram (LEXAPRO) 5 MG tablet Take 1 tablet (5 mg) by mouth daily 30 tablet 0 2024    levothyroxine (SYNTHROID/LEVOTHROID) 50 MCG tablet Take 1 tablet (50 mcg) by mouth daily 90 tablet 3 2024    lisinopril (ZESTRIL) 20 MG tablet Take 1 tablet (20 mg) by mouth daily 90 tablet 4 Past Week    propranolol ER (INDERAL LA) 60 MG 24 hr capsule Take 1 capsule (60 mg) by mouth daily 30 capsule 0 2024    rosuvastatin (CRESTOR) 40 MG tablet Take 1 tablet (40 mg) by mouth daily 90 tablet 1 2024    warfarin ANTICOAGULANT (COUMADIN) 5 MG tablet Take 5mg every M, W,F and 2.5mg  all other days OR per INR clinic 80 tablet 1 6/20/2024    Calcium Carb-Cholecalciferol (CALCIUM 600 + D PO) Take 1,200 mg by mouth daily        [START ON 6/26/2024] carbidopa-levodopa (SINEMET)  MG tablet Take 1 tablet by mouth 3 times daily 1000, 1600, and 2200    at has not started    Cholecalciferol (VITAMIN D) 2000 UNITS tablet Take 2,000 Units by mouth daily        Coenzyme Q10 (COQ-10) 200 MG CAPS Take 400 mg by mouth daily        diclofenac (VOLTAREN) 1 % topical gel Apply 4 g topically 4 times daily as needed for moderate pain 350 g 1     Magnesium 400 MG CAPS Take 400 mg by mouth daily       Melatonin 10 MG TABS tablet Take 10 mg by mouth nightly as needed for sleep       Multiple Vitamin (DAILY MULTIVITAMIN PO) Take by mouth daily       nitroGLYcerin (NITROSTAT) 0.4 MG sublingual tablet Place 1 tablet (0.4 mg) under the tongue every 5 minutes as needed for chest pain 25 tablet 2     prednisoLONE acetate (PRED FORTE) 1 % ophthalmic suspension Place 1 drop into both eyes daily (Patient not taking: Reported on 6/3/2024)       vitamin B complex with vitamin C (STRESS TAB) tablet Take 1 tablet by mouth daily        vitamin C (ASCORBIC ACID) 1000 MG TABS Take 2,000 mg by mouth daily        Current Facility-Administered Medications   Medication Dose Route Frequency Provider Last Rate Last Admin    acetaminophen (TYLENOL) tablet 500-1,000 mg  500-1,000 mg Oral Q6H PRN Indiana Currie MD   500 mg at 06/21/24 0922    alum & mag hydroxide-simethicone (MAALOX) suspension 30 mL  30 mL Oral Q4H PRN Indiana Currie MD        amLODIPine (NORVASC) tablet 2.5 mg  2.5 mg Oral Daily Indiana Currie MD   2.5 mg at 06/21/24 0922    aspirin EC tablet 81 mg  81 mg Oral Daily Indiana Currie MD   81 mg at 06/21/24 0922    diclofenac (VOLTAREN) 1 % topical gel 4 g  4 g Topical 4x Daily PRN Indiana Currie MD        escitalopram (LEXAPRO) tablet 5 mg  5 mg Oral Daily Indiana Currie MD   5  mg at 06/21/24 0922    levothyroxine (SYNTHROID/LEVOTHROID) tablet 50 mcg  50 mcg Oral Daily Indiana Currie MD   50 mcg at 06/21/24 0922    lisinopril (ZESTRIL) tablet 20 mg  20 mg Oral Daily Indiana Currie MD   20 mg at 06/21/24 0922    Magnesium CAPS 400 mg  400 mg Oral Daily Indiana Currie MD        nitroGLYcerin (NITROSTAT) sublingual tablet 0.4 mg  0.4 mg Sublingual Q5 Min PRN Indiana Currie MD        propranolol ER (INDERAL LA) 24 hr capsule 60 mg  60 mg Oral Daily Indiana Currie MD   60 mg at 06/21/24 0922    rosuvastatin (CRESTOR) tablet 40 mg  40 mg Oral Daily Indiana Currie MD        warfarin ANTICOAGULANT (COUMADIN) tablet 5 mg  5 mg Oral ONCE at 18:00 Jesus Rain MD        Warfarin Dose Required Daily - Pharmacist Managed  1 each Oral See Admin Instructions Indiana Currie MD                 Review of Systems:     The 10 point Review of Systems is negative other than noted in the HPI            Data:   All laboratory data reviewed  Results for orders placed or performed during the hospital encounter of 06/20/24 (from the past 24 hour(s))   CBC with Platelets & Differential    Narrative    The following orders were created for panel order CBC with Platelets & Differential.  Procedure                               Abnormality         Status                     ---------                               -----------         ------                     CBC with platelets and d...[[id number]]                      Final result                 Please view results for these tests on the individual orders.   Basic metabolic panel   Result Value Ref Range    Sodium 130 (L) 135 - 145 mmol/L    Potassium 4.3 3.4 - 5.3 mmol/L    Chloride 92 (L) 98 - 107 mmol/L    Carbon Dioxide (CO2) 24 22 - 29 mmol/L    Anion Gap 14 7 - 15 mmol/L    Urea Nitrogen 15.1 8.0 - 23.0 mg/dL    Creatinine 0.62 0.51 - 0.95 mg/dL    GFR Estimate 88 >60 mL/min/1.73m2    Calcium 9.7 8.8 - 10.2  mg/dL    Glucose 116 (H) 70 - 99 mg/dL   Troponin T, High Sensitivity   Result Value Ref Range    Troponin T, High Sensitivity 19 (H) <=14 ng/L   [facility] Draw    Narrative    The following orders were created for panel order [place] [facility] Draw.  Procedure                               Abnormality         Status                     ---------                               -----------         ------                     Extra Blue Top Tube[[id number]]                              Final result               Extra Red Top Tube[[id number]]                               Final result                 Please view results for these tests on the individual orders.   CBC with platelets and differential   Result Value Ref Range    WBC Count 6.3 4.0 - 11.0 10e3/uL    RBC Count 4.51 3.80 - 5.20 10e6/uL    Hemoglobin 13.5 11.7 - 15.7 g/dL    Hematocrit 40.5 35.0 - 47.0 %    MCV 90 78 - 100 fL    MCH 29.9 26.5 - 33.0 pg    MCHC 33.3 31.5 - 36.5 g/dL    RDW 14.5 10.0 - 15.0 %    Platelet Count 227 150 - 450 10e3/uL    % Neutrophils 59 %    % Lymphocytes 29 %    % Monocytes 9 %    % Eosinophils 2 %    % Basophils 1 %    % Immature Granulocytes 0 %    NRBCs per 100 WBC 0 <1 /100    Absolute Neutrophils 3.7 1.6 - 8.3 10e3/uL    Absolute Lymphocytes 1.8 0.8 - 5.3 10e3/uL    Absolute Monocytes 0.5 0.0 - 1.3 10e3/uL    Absolute Eosinophils 0.1 0.0 - 0.7 10e3/uL    Absolute Basophils 0.1 0.0 - 0.2 10e3/uL    Absolute Immature Granulocytes 0.0 <=0.4 10e3/uL    Absolute NRBCs 0.0 10e3/uL   Extra Blue Top Tube   Result Value Ref Range    Hold Specimen JIC    Extra Red Top Tube   Result Value Ref Range    Hold Specimen JIC    INR   Result Value Ref Range    INR 1.89 (H) 0.85 - 1.15   EKG 12 lead   Result Value Ref Range    Systolic Blood Pressure  mmHg    Diastolic Blood Pressure  mmHg    Ventricular Rate 71 BPM    Atrial Rate 71 BPM    AK Interval 184 ms    QRS Duration 94 ms     ms    QTc 445 ms    P Axis 67 degrees    R AXIS -18 degrees    T  Axis 42 degrees    Interpretation ECG       Sinus rhythm  Normal ECG  When compared with ECG of 26-MAY-2024 15:11,  No significant change was found  Unconfirmed report - interpretation of this ECG is computer generated - see medical record for final interpretation  Confirmed by - EMERGENCY ROOM, PHYSICIAN ([id number]),  [address] ([id number]) on 6/21/2024 7:37:29 AM     XR Chest 2 Views    Narrative    EXAM: XR CHEST 2 VIEWS  LOCATION: [facility]  DATE: 6/21/2024    INDICATION: chest pain  COMPARISON: 5/26/2024      Impression    IMPRESSION: Lungs are clear. No pleural effusion or pneumothorax. Normal heart size and pulmonary vascularity. Lumbar spinal fusion hardware.   Troponin T, High Sensitivity (now)   Result Value Ref Range    Troponin T, High Sensitivity 53 (H) <=14 ng/L   Basic metabolic panel   Result Value Ref Range    Sodium 131 (L) 135 - 145 mmol/L    Potassium 4.6 3.4 - 5.3 mmol/L    Chloride 94 (L) 98 - 107 mmol/L    Carbon Dioxide (CO2) 22 22 - 29 mmol/L    Anion Gap 15 7 - 15 mmol/L    Urea Nitrogen 13.7 8.0 - 23.0 mg/dL    Creatinine 0.58 0.51 - 0.95 mg/dL    GFR Estimate 90 >60 mL/min/1.73m2    Calcium 9.2 8.8 - 10.2 mg/dL    Glucose 121 (H) 70 - 99 mg/dL   INR   Result Value Ref Range    INR 2.01 (H) 0.85 - 1.15   Troponin T, High Sensitivity - now then in 2 hours x 2   Result Value Ref Range    Troponin T, High Sensitivity 65 (H) <=14 ng/L   Troponin T, High Sensitivity - now then in 2 hours x 2   Result Value Ref Range    Troponin T, High Sensitivity 56 (H) <=14 ng/L     *Note: Due to a large number of results and/or encounters for the requested time period, some results have not been displayed. A complete set of results can be found in Results Review.       Lab Results   Component Value Date    CHOL 167 10/05/2023    CHOL 149 10/14/2020     Lab Results   Component Value Date    HDL 86 10/05/2023    HDL 78 10/14/2020     Lab Results   Component Value Date    LDL 71  10/05/2023    LDL 57 10/14/2020     Lab Results   Component Value Date    TRIG 48 10/05/2023    TRIG 70 10/14/2020     Lab Results   Component Value Date    CHOLHDLRATIO 2.2 08/17/2015     TSH   Date Value Ref Range Status   05/26/2024 5.69 (H) 0.30 - 4.20 uIU/mL Final   09/14/2021 2.28 0.40 - 4.00 mU/L Final   02/15/2021 0.58 0.40 - 4.00 mU/L Final         EKG results:    Echocardiology:    Cardiac stress testing:    Cardiac angiography:    Clinically Significant Risk Factors Present on Admission               # Drug Induced Coagulation Defect: home medication list includes an anticoagulant medication  # Drug Induced Platelet Defect: home medication list includes an antiplatelet medication   # Hypertension: Noted on problem list

## 2024-06-22 ENCOUNTER — APPOINTMENT (OUTPATIENT)
Dept: PHYSICAL THERAPY | Facility: CLINIC | Age: 84
End: 2024-06-22
Attending: INTERNAL MEDICINE
Payer: COMMERCIAL

## 2024-06-22 VITALS
SYSTOLIC BLOOD PRESSURE: 95 MMHG | DIASTOLIC BLOOD PRESSURE: 48 MMHG | HEART RATE: 71 BPM | BODY MASS INDEX: 23.78 KG/M2 | TEMPERATURE: 98.3 F | RESPIRATION RATE: 17 BRPM | WEIGHT: 134.2 LBS | OXYGEN SATURATION: 96 % | HEIGHT: 63 IN

## 2024-06-22 LAB
ANION GAP SERPL CALCULATED.3IONS-SCNC: 14 MMOL/L (ref 7–15)
BUN SERPL-MCNC: 16.3 MG/DL (ref 8–23)
CALCIUM SERPL-MCNC: 9.3 MG/DL (ref 8.8–10.2)
CHLORIDE SERPL-SCNC: 93 MMOL/L (ref 98–107)
CREAT SERPL-MCNC: 0.66 MG/DL (ref 0.51–0.95)
DEPRECATED HCO3 PLAS-SCNC: 23 MMOL/L (ref 22–29)
EGFRCR SERPLBLD CKD-EPI 2021: 87 ML/MIN/1.73M2
ERYTHROCYTE [DISTWIDTH] IN BLOOD BY AUTOMATED COUNT: 14.5 % (ref 10–15)
GLUCOSE SERPL-MCNC: 97 MG/DL (ref 70–99)
HCT VFR BLD AUTO: 36.2 % (ref 35–47)
HGB BLD-MCNC: 12.1 G/DL (ref 11.7–15.7)
INR PPP: 2.17 (ref 0.85–1.15)
MCH RBC QN AUTO: 30.1 PG (ref 26.5–33)
MCHC RBC AUTO-ENTMCNC: 33.4 G/DL (ref 31.5–36.5)
MCV RBC AUTO: 90 FL (ref 78–100)
PLATELET # BLD AUTO: 190 10E3/UL (ref 150–450)
POTASSIUM SERPL-SCNC: 3.7 MMOL/L (ref 3.4–5.3)
RBC # BLD AUTO: 4.02 10E6/UL (ref 3.8–5.2)
SODIUM SERPL-SCNC: 130 MMOL/L (ref 135–145)
WBC # BLD AUTO: 5.2 10E3/UL (ref 4–11)

## 2024-06-22 PROCEDURE — 85610 PROTHROMBIN TIME: CPT | Performed by: INTERNAL MEDICINE

## 2024-06-22 PROCEDURE — 85027 COMPLETE CBC AUTOMATED: CPT | Performed by: INTERNAL MEDICINE

## 2024-06-22 PROCEDURE — 250N000013 HC RX MED GY IP 250 OP 250 PS 637: Performed by: INTERNAL MEDICINE

## 2024-06-22 PROCEDURE — 97116 GAIT TRAINING THERAPY: CPT | Mod: GP

## 2024-06-22 PROCEDURE — G0378 HOSPITAL OBSERVATION PER HR: HCPCS

## 2024-06-22 PROCEDURE — 99239 HOSP IP/OBS DSCHRG MGMT >30: CPT | Performed by: INTERNAL MEDICINE

## 2024-06-22 PROCEDURE — 97530 THERAPEUTIC ACTIVITIES: CPT | Mod: GP

## 2024-06-22 PROCEDURE — 80048 BASIC METABOLIC PNL TOTAL CA: CPT | Performed by: INTERNAL MEDICINE

## 2024-06-22 PROCEDURE — 97161 PT EVAL LOW COMPLEX 20 MIN: CPT | Mod: GP

## 2024-06-22 PROCEDURE — 36415 COLL VENOUS BLD VENIPUNCTURE: CPT | Performed by: INTERNAL MEDICINE

## 2024-06-22 RX ORDER — MULTIVITAMIN,THERAPEUTIC
1 TABLET ORAL DAILY
Status: DISCONTINUED | OUTPATIENT
Start: 2024-06-22 | End: 2024-06-22 | Stop reason: HOSPADM

## 2024-06-22 RX ORDER — NITROGLYCERIN 0.4 MG/1
0.4 TABLET SUBLINGUAL EVERY 5 MIN PRN
Status: DISCONTINUED | OUTPATIENT
Start: 2024-06-22 | End: 2024-06-22 | Stop reason: HOSPADM

## 2024-06-22 RX ORDER — CARBIDOPA AND LEVODOPA 25; 100 MG/1; MG/1
1 TABLET ORAL 3 TIMES DAILY
Status: DISCONTINUED | OUTPATIENT
Start: 2024-06-26 | End: 2024-06-22 | Stop reason: HOSPADM

## 2024-06-22 RX ORDER — CARBIDOPA AND LEVODOPA 25; 100 MG/1; MG/1
1 TABLET ORAL 2 TIMES DAILY
Status: DISCONTINUED | OUTPATIENT
Start: 2024-06-22 | End: 2024-06-22 | Stop reason: HOSPADM

## 2024-06-22 RX ORDER — LANOLIN ALCOHOL/MO/W.PET/CERES
1 CREAM (GRAM) TOPICAL DAILY
Status: DISCONTINUED | OUTPATIENT
Start: 2024-06-22 | End: 2024-06-22

## 2024-06-22 RX ORDER — WARFARIN SODIUM 2.5 MG/1
2.5 TABLET ORAL
Status: DISCONTINUED | OUTPATIENT
Start: 2024-06-22 | End: 2024-06-22 | Stop reason: HOSPADM

## 2024-06-22 RX ORDER — ASPIRIN 81 MG
1200 TABLET, DELAYED RELEASE (ENTERIC COATED) ORAL DAILY
Status: DISCONTINUED | OUTPATIENT
Start: 2024-06-22 | End: 2024-06-22

## 2024-06-22 RX ORDER — FUROSEMIDE 20 MG
20 TABLET ORAL DAILY
Qty: 30 TABLET | Refills: 0 | Status: SHIPPED | OUTPATIENT
Start: 2024-06-23 | End: 2024-07-17

## 2024-06-22 RX ORDER — VITAMIN B COMPLEX
50 TABLET ORAL DAILY
Status: DISCONTINUED | OUTPATIENT
Start: 2024-06-22 | End: 2024-06-22 | Stop reason: HOSPADM

## 2024-06-22 RX ADMIN — Medication 2 TABLET: at 10:38

## 2024-06-22 RX ADMIN — CARBIDOPA AND LEVODOPA 1 TABLET: 25; 100 TABLET ORAL at 10:37

## 2024-06-22 RX ADMIN — QUETIAPINE FUMARATE 12.5 MG: 25 TABLET ORAL at 00:24

## 2024-06-22 RX ADMIN — PROPRANOLOL HYDROCHLORIDE 60 MG: 60 CAPSULE, EXTENDED RELEASE ORAL at 09:18

## 2024-06-22 RX ADMIN — LEVOTHYROXINE SODIUM 50 MCG: 50 TABLET ORAL at 09:18

## 2024-06-22 RX ADMIN — Medication 50 MCG: at 10:38

## 2024-06-22 RX ADMIN — Medication 400 MG: at 09:18

## 2024-06-22 RX ADMIN — AMLODIPINE BESYLATE 2.5 MG: 2.5 TABLET ORAL at 09:18

## 2024-06-22 RX ADMIN — THERA TABS 1 TABLET: TAB at 10:38

## 2024-06-22 RX ADMIN — ASPIRIN 81 MG: 81 TABLET, COATED ORAL at 09:18

## 2024-06-22 RX ADMIN — ESCITALOPRAM OXALATE 5 MG: 5 TABLET, FILM COATED ORAL at 09:18

## 2024-06-22 RX ADMIN — LISINOPRIL 20 MG: 20 TABLET ORAL at 09:18

## 2024-06-22 RX ADMIN — FUROSEMIDE 20 MG: 20 TABLET ORAL at 09:18

## 2024-06-22 ASSESSMENT — ACTIVITIES OF DAILY LIVING (ADL)
ADLS_ACUITY_SCORE: 44
ADLS_ACUITY_SCORE: 40
ADLS_ACUITY_SCORE: 44
ADLS_ACUITY_SCORE: 40

## 2024-06-22 NOTE — PROGRESS NOTES
PRIMARY DIAGNOSIS: CHEST PAIN    OUTPATIENT/OBSERVATION GOALS TO BE   MET BEFORE DISCHARGE:    1. Ruled out acute coronary syndrome (negative or stable Troponin):  Yes  2. Pain Status: Pain free.  3. Appropriate provocative testing performed: Yes  - Stress Test Procedure: Lesiscan  - Interpretation of cardiac rhythm per telemetry tech: SR     4. Cleared by Consultants (if applicable):Yes  5. Return to near baseline physical activity: No     Discharge Planner Nurse  Safe discharge environment identified: No  Barriers to discharge: No       Entered by: Bibiana Carlisle RN 06/22/2024 00:01 AM        Please review provider order for any additional goals.   Nurse to notify provider when observation goals have been met and patient is ready for discharge.

## 2024-06-22 NOTE — PLAN OF CARE
Cared for [id number]     Pt A/O x 4 (forgetful), VSS; Pt denies pain, headache, dizziness, N/V & SOB. Pt up SBA w/gait belt & walker. Lung sounds clear, RA. Tele: SR w/prolong QT. Skin intact. Cards, PT, OT & Social Work following. ECHO & Lexiscan. Possible discharge today. Will continue with plan of care.    PRIMARY DIAGNOSIS: CHEST PAIN  OUTPATIENT/OBSERVATION GOALS TO BE MET BEFORE DISCHARGE:  1. Ruled out acute coronary syndrome (negative or stable Troponin):  Yes  2. Pain Status: Pain free.  3. Appropriate provocative testing performed: Yes  - Stress Test Procedure: Lesiscan  - Interpretation of cardiac rhythm per telemetry tech: SR    4. Cleared by Consultants (if applicable):Yes  5. Return to near baseline physical activity: Yes  Discharge Planner Nurse   Safe discharge environment identified: Yes  Barriers to discharge: No       Entered by: Lauren Wilkerson RN 06/22/2024 11:30 AM     Please review provider order for any additional goals.   Nurse to notify provider when observation goals have been met and patient is ready for discharge.      Goal Outcome Evaluation:      Plan of Care Reviewed With: patient    Overall Patient Progress: improvingOverall Patient Progress: improving    Outcome Evaluation: Denies pain, ambualted in hallway with PT    Problem: Adult Inpatient Plan of Care  Goal: Plan of Care Review  Description: The Plan of Care Review/Shift note should be completed every shift.  The Outcome Evaluation is a brief statement about your assessment that the patient is improving, declining, or no change.  This information will be displayed automatically on your shift  note.  Outcome: Progressing  Flowsheets (Taken 6/22/2024 1129)  Outcome Evaluation: Denies pain, ambualted in hallway with PT  Plan of Care Reviewed With: patient  Overall Patient Progress: improving  Goal: Patient-Specific Goal (Individualized)  Description: You can add care plan individualizations to a care plan. Examples of  "Individualization might be:  \"Parent requests to be called daily at 9am for status\", \"I have a hard time hearing out of my right ear\", or \"Do not touch me to wake me up as it startles  me\".  Outcome: Progressing  Goal: Absence of Hospital-Acquired Illness or Injury  Outcome: Progressing  Intervention: Identify and Manage Fall Risk  Recent Flowsheet Documentation  Taken 6/22/2024 0830 by Lauren Wilkerson RN  Safety Promotion/Fall Prevention:   activity supervised   assistive device/personal items within reach   increased rounding and observation   increase visualization of patient   safety round/check completed   supervised activity  Intervention: Prevent Skin Injury  Recent Flowsheet Documentation  Taken 6/22/2024 0830 by Lauren Wilkerson RN  Body Position: position changed independently  Intervention: Prevent and Manage VTE (Venous Thromboembolism) Risk  Recent Flowsheet Documentation  Taken 6/22/2024 0830 by Lauren Wilkerson RN  VTE Prevention/Management: patient refused intervention  Intervention: Prevent Infection  Recent Flowsheet Documentation  Taken 6/22/2024 0830 by Lauren Wilkerson RN  Infection Prevention:   single patient room provided   rest/sleep promoted  Goal: Optimal Comfort and Wellbeing  Outcome: Progressing  Goal: Readiness for Transition of Care  Outcome: Progressing     Problem: Fall Injury Risk  Goal: Absence of Fall and Fall-Related Injury  Outcome: Progressing  Intervention: Identify and Manage Contributors  Recent Flowsheet Documentation  Taken 6/22/2024 0830 by Lauren Wilkerson RN  Medication Review/Management: medications reviewed  Intervention: Promote Injury-Free Environment  Recent Flowsheet Documentation  Taken 6/22/2024 0830 by Lauren Wilkerson RN  Safety Promotion/Fall Prevention:   activity supervised   assistive device/personal items within reach   increased rounding and observation   increase visualization of patient   safety round/check completed   " supervised activity     Problem: Chest Pain  Goal: Resolution of Chest Pain Symptoms  Outcome: Progressing     Problem: Confusion Chronic  Goal: Optimal Cognitive Function  Outcome: Progressing  Intervention: Minimize Injury Risk and Provide Safety  Recent Flowsheet Documentation  Taken 6/22/2024 0830 by Lauren Wilkerson RN  Enhanced Safety Measures:   review medications for side effects with activity   pain management

## 2024-06-22 NOTE — PLAN OF CARE
"Pt alert to self, was restless and agitated, PRN Seroquel given, effective. No c/o chest pain, ECHO completed (see result), SR/prolonged Qtc, possible discontinue today.  Problem: Adult Inpatient Plan of Care  Goal: Plan of Care Review  Description: The Plan of Care Review/Shift note should be completed every shift.  The Outcome Evaluation is a brief statement about your assessment that the patient is improving, declining, or no change.  This information will be displayed automatically on your shift  note.  Outcome: Progressing  Flowsheets (Taken 6/22/2024 0605)  Outcome Evaluation: no c/o chest pain  Plan of Care Reviewed With: patient  Overall Patient Progress: improving  Goal: Patient-Specific Goal (Individualized)  Description: You can add care plan individualizations to a care plan. Examples of Individualization might be:  \"Parent requests to be called daily at 9am for status\", \"I have a hard time hearing out of my right ear\", or \"Do not touch me to wake me up as it startles  me\".  Outcome: Progressing  Goal: Absence of Hospital-Acquired Illness or Injury  Outcome: Progressing  Intervention: Identify and Manage Fall Risk  Recent Flowsheet Documentation  Taken 6/21/2024 2351 by Bibiana Carlisle RN  Safety Promotion/Fall Prevention:   supervised activity   safety round/check completed   room near nurse's station   room door open   nonskid shoes/slippers when out of bed   mobility aid in reach   increased rounding and observation   activity supervised   clutter free environment maintained  Intervention: Prevent Skin Injury  Recent Flowsheet Documentation  Taken 6/21/2024 2351 by Bibiana Carlisle RN  Body Position: position changed independently  Intervention: Prevent and Manage VTE (Venous Thromboembolism) Risk  Recent Flowsheet Documentation  Taken 6/21/2024 2351 by Bibiana Carlisle RN  VTE Prevention/Management: patient refused intervention  Intervention: Prevent Infection  Recent Flowsheet Documentation  Taken " 6/21/2024 2351 by Bibiana Carlisle, RN  Infection Prevention:   environmental surveillance performed   equipment surfaces disinfected   hand hygiene promoted   rest/sleep promoted   single patient room provided  Goal: Optimal Comfort and Wellbeing  Outcome: Progressing  Goal: Readiness for Transition of Care  Outcome: Progressing   Goal Outcome Evaluation:      Plan of Care Reviewed With: patient    Overall Patient Progress: improvingOverall Patient Progress: improving    Outcome Evaluation: no c/o chest pain

## 2024-06-22 NOTE — PROGRESS NOTES
PRIMARY DIAGNOSIS: CHEST PAIN  OUTPATIENT/OBSERVATION GOALS TO BE MET BEFORE DISCHARGE:  1. Ruled out acute coronary syndrome (negative or stable Troponin):  Yes  2. Pain Status: Pain free.  3. Appropriate provocative testing performed: Yes  - Stress Test Procedure: Lesiscan  - Interpretation of cardiac rhythm per telemetry tech: SR    4. Cleared by Consultants (if applicable):Yes  5. Return to near baseline physical activity: No  Discharge Planner Nurse   Safe discharge environment identified: No  Barriers to discharge: No       Entered by: Carson Petty RN 06/21/2024 11:00 PM     Please review provider order for any additional goals.   Nurse to notify provider when observation goals have been met and patient is ready for discharge.

## 2024-06-22 NOTE — PROVIDER NOTIFICATION
Please advise, Pt restless and anxious, hallucinations of mice in room, claims we have poisoned her dinner and are hold her against her will. I contacted family regarding management of anxiety. They said anxiety is common and she is prescribed escitalopram 5mg. Will continue to comfort and redirect, will try to get pt to eat or go for walk on unit with staff to see if that helps.    Provider responded, medication ordered.

## 2024-06-22 NOTE — PROGRESS NOTES
Cared for [id number]     Pt A/O x 4 (forgetful), VSS; Pt denies pain, headache, dizziness, N/V & SOB. Pt up SBA w/gait belt & walker. Lung sounds clear, RA. Tele: SR w/prolong QT. Skin intact. Cards, PT, OT & Social Work following. ECHO & Lexiscan. Possible discharge today. Will continue with plan of care.     PRIMARY DIAGNOSIS: CHEST PAIN  OUTPATIENT/OBSERVATION GOALS TO BE MET BEFORE DISCHARGE:  1. Ruled out acute coronary syndrome (negative or stable Troponin):  Yes  2. Pain Status: Pain free.  3. Appropriate provocative testing performed: Yes  - Stress Test Procedure: Lesiscan  - Interpretation of cardiac rhythm per telemetry tech: SR     4. Cleared by Consultants (if applicable):Yes  5. Return to near baseline physical activity: Yes     Discharge Planner Nurse  Safe discharge environment identified: Yes  Barriers to discharge: No       Entered by: Lauren Wilkerson RN 06/22/2024 11:30 AM        Please review provider order for any additional goals.   Nurse to notify provider when observation goals have been met and patient is ready for discharge.

## 2024-06-22 NOTE — PROGRESS NOTES
PRIMARY DIAGNOSIS: CHEST PAIN     OUTPATIENT/OBSERVATION GOALS TO BE   MET BEFORE DISCHARGE:     1. Ruled out acute coronary syndrome (negative or stable Troponin):  Yes  2. Pain Status: Pain free.  3. Appropriate provocative testing performed: Yes  - Stress Test Procedure: Lesiscan  - Interpretation of cardiac rhythm per telemetry tech: SR     4. Cleared by Consultants (if applicable):Yes  5. Return to near baseline physical activity: No     Discharge Planner Nurse  Safe discharge environment identified: No  Barriers to discharge: No       Entered by: Bibiana Carlisle RN 06/22/2024 04:03 AM        Please review provider order for any additional goals.   Nurse to notify provider when observation goals have been met and patient is ready for discharge.

## 2024-06-22 NOTE — PHARMACY-ANTICOAGULATION SERVICE
Clinical Pharmacy- Warfarin Discharge Note  This patient is currently on warfarin for the treatment of  CVA history .  INR Goal= 2-3  Expected length of therapy undetermined.    Warfarin PTA Regimen: 5mg Friday, 2.5mg ROW      Anticoagulation Dose History  More data exists         Latest Ref Rng & Units 2/6/2024 3/18/2024 4/29/2024 5/13/2024 5/26/2024 6/21/2024 6/22/2024   Recent Dosing and Labs   warfarin ANTICOAGULANT (COUMADIN) 5 MG tablet - - - - - - 5 mg, $Given -   INR 0.85 - 1.15 2.6  2.0  1.9  2.2  2.09  2.01  1.89  2.17       Details          Multiple values from one day are sorted in reverse-chronological order                 Recommend discharging the patient on PTA warfarin regimen (5 mg Friday and 2.5 mg all other days).     The patient should have an INR checked in 3-4 days.     Ulises Diallo [facility]

## 2024-06-22 NOTE — PROGRESS NOTES
PRIMARY DIAGNOSIS: CHEST PAIN  OUTPATIENT/OBSERVATION GOALS TO BE MET BEFORE DISCHARGE:  1. Ruled out acute coronary syndrome (negative or stable Troponin):  Yes  2. Pain Status: Pain free.  3. Appropriate provocative testing performed: Yes  - Stress Test Procedure: Lesiscan  - Interpretation of cardiac rhythm per telemetry tech: SR    4. Cleared by Consultants (if applicable):Yes  5. Return to near baseline physical activity: No  Discharge Planner Nurse   Safe discharge environment identified: No  Barriers to discharge: No       Entered by: Carson Petty RN 06/21/2024 11:01 PM     Please review provider order for any additional goals.   Nurse to notify provider when observation goals have been met and patient is ready for discharge.

## 2024-06-22 NOTE — PLAN OF CARE
Physical Therapy Discharge Summary    Reason for therapy discharge:    Discharged to home.    Progress towards therapy goal(s). See goals on Care Plan in [facility] electronic health record for goal details.  Goals partially met.  Barriers to achieving goals:   discharge from facility.    Therapy recommendation(s):    No further therapy is recommended.

## 2024-06-22 NOTE — PROGRESS NOTES
06/22/24 1117   Appointment Info   Signing Clinician's Name / Credentials (PT) Carmina Babin DPT   Quick Adds   Quick Adds Certification   Living Environment   People in Home spouse   Current Living Arrangements house   Home Accessibility stairs to enter home   Number of Stairs, Main Entrance 2   Stair Railings, Main Entrance railings safe and in good condition   Transportation Anticipated family or friend will provide   Living Environment Comments Pt reports there is a plan to move in with their son in 6-12 months in his home on the ground level.   Self-Care   Usual Activity Tolerance moderate   Current Activity Tolerance fair   Equipment Currently Used at Home cane, straight   Fall history within last six months yes   Number of times patient has fallen within last six months 2   Activity/Exercise/Self-Care Comment Pt reports she furniture surfs at home, has a cane but does not use it.  does the cooking and grocery shopping.   General Information   Onset of Illness/Injury or Date of Surgery 06/20/24   Referring Physician Jesus Rain MD   Patient/Family Therapy Goals Statement (PT) Return home   Pertinent History of Current Problem (include personal factors and/or comorbidities that impact the POC) Salome Saeed is a 84-year-old female with a well-managed history of coronary artery disease (CAD), hypertension, Parkinson's and stroke, currently on Coumadin, presented with elevated blood pressure readings (190-200s systolic) and an episode of chest pain during a drive. Troponin levels increased from 19 to 53, indicating potential demand ischemia versus ACS, no chest pain at this time.  In the emergency room was treated with aspirin. Current blood pressure is 140 systolic. Further monitoring and management are warranted given her significant cardiovascular risk factors and recent symptoms.   Existing Precautions/Restrictions fall   Cognition   Affect/Mental Status (Cognition) WFL   Orientation  Status (Cognition) oriented x 3   Follows Commands (Cognition) WFL   Pain Assessment   Patient Currently in Pain No   Integumentary/Edema   Integumentary/Edema no deficits were identifed   Posture    Posture Forward head position;Protracted shoulders;Kyphosis   Range of Motion (ROM)   Range of Motion ROM is WFL   Strength (Manual Muscle Testing)   Strength (Manual Muscle Testing) Able to perform R SLR;Able to perform L SLR;Deficits observed during functional mobility   Bed Mobility   Comment, (Bed Mobility) Supine to sit SBA   Transfers   Comment, (Transfers) Sit to stand SBA   Gait/Stairs (Locomotion)   Comment, (Gait/Stairs) Pt amb w/ FWW and SBA   Balance   Balance Comments Good seated and fair standing   Sensory Examination   Sensory Perception patient reports no sensory changes   Clinical Impression   Criteria for Skilled Therapeutic Intervention Yes, treatment indicated   PT Diagnosis (PT) Impaired functional mobility and gait   Influenced by the following impairments Weakness, decreased activity tolerance, impaired balance   Functional limitations due to impairments Limited functional mobility requiring AD and assist   Clinical Presentation (PT Evaluation Complexity) stable   Clinical Presentation Rationale Based on PMH, current status, and social support   Clinical Decision Making (Complexity) low complexity   Planned Therapy Interventions (PT) balance training;bed mobility training;gait training;stair training;strengthening;transfer training;progressive activity/exercise   Risk & Benefits of therapy have been explained evaluation/treatment results reviewed;care plan/treatment goals reviewed;risks/benefits reviewed;current/potential barriers reviewed;participants voiced agreement with care plan;participants included;patient   PT Total Evaluation Time   PT Eval, Low Complexity Minutes (95647) 10   Therapy Certification   Start of care date 06/22/24   Certification date from 06/22/24   Certification date to  06/29/24   Medical Diagnosis Elevated troponin   Physical Therapy Goals   PT Frequency Daily   PT Predicted Duration/Target Date for Goal Attainment 06/29/24   PT Goals Bed Mobility;Transfers;Gait;Stairs   PT: Bed Mobility Independent;Supine to/from sit   PT: Transfers Modified independent;Sit to/from stand;Assistive device   PT: Gait Modified independent;Assistive device;100 feet   PT: Stairs Supervision/stand-by assist;2 stairs   Interventions   Interventions Quick Adds Gait Training;Therapeutic Activity   Therapeutic Activity   Therapeutic Activities: dynamic activities to improve functional performance Minutes (73599) 12   Symptoms Noted During/After Treatment None   Treatment Detail/Skilled Intervention Pt supine in bed upon therapist arrival, agreeable to PT. Pt sitting EOB, donned shoes IND. Pt turned to sit at toilet, able manage pants. Pt voided, performed pericares IND. Pt performed sit to stand w/ grab bar and SBA. Pt stood at sink to wash hands. Seated rest break EOB. Pt performed sit to stand w/ FWW and Mod I. Pt performed sit <> stand from bench w/ FWW and Mod I. Pt sat in recliner after amb, able to shift hips back. All needs w/in reach, chair alarm on, LE elevated, RN updated and in room.   Gait Training   Gait Training Minutes (62520) 11   Symptoms Noted During/After Treatment (Gait Training) fatigue   Treatment Detail/Skilled Intervention Pt amb 5' x2 reps w/ FWW and SBA. Pt amb 90' w/ FWW and SBA. Pt demos fair speed and stability, VCs for upright posture and gaze. Pt navigated 2 stairs w/ railings and CGA, step to pattern on ascent, reciprocol on descent. Pt amb 100' w/ FWW and SBA, pt demos improved speed. Pt reported mild fatigue.   PT Discharge Planning   PT Plan Progress transfers, amb w/ FWW, stairs   PT Discharge Recommendation (DC Rec) home with assist   PT Rationale for DC Rec Pt below baseline, currently SBA w/ FWW. Pt reports her family able to provide some assist at home w/ ADLs.  Recommend pt use FWW for mobility.   PT Brief overview of current status SBA w/ FWW   PT Equipment Needed at Discharge walker, standard   Total Session Time   Timed Code Treatment Minutes 23   Total Session Time (sum of timed and untimed services) 33   [facility]  OUTPATIENT PHYSICAL THERAPY EVALUATION  PLAN OF TREATMENT FOR OUTPATIENT REHABILITATION  (COMPLETE FOR INITIAL CLAIMS ONLY)  Patient's Last Name, First Name, M.I.  YOB: 1940  Salome Saeed                        Provider's Name  [facility] Medical Record No.  [id number]                             Onset Date:  06/20/24   Start of Care Date:  06/22/24   Type:     _X_PT   ___OT   ___SLP Medical Diagnosis:  Elevated troponin              PT Diagnosis:  Impaired functional mobility and gait Visits from SOC:  1     See note for plan of treatment, functional goals and certification details    I CERTIFY THE NEED FOR THESE SERVICES FURNISHED UNDER        THIS PLAN OF TREATMENT AND WHILE UNDER MY CARE     (Physician co-signature of this document indicates review and certification of the therapy plan).

## 2024-06-22 NOTE — PLAN OF CARE
"DC instructions given to pt and family, verbalized understanding.  All belongings with pt, IV DC'd and documented.     Pt left the floor via wheelchair with family.      Goal Outcome Evaluation:      Plan of Care Reviewed With: patient    Overall Patient Progress: improvingOverall Patient Progress: improving    Outcome Evaluation: Denies pain, ambualted in hallway with PT      Problem: Adult Inpatient Plan of Care  Goal: Plan of Care Review  Description: The Plan of Care Review/Shift note should be completed every shift.  The Outcome Evaluation is a brief statement about your assessment that the patient is improving, declining, or no change.  This information will be displayed automatically on your shift  note.  6/22/2024 1333 by Lauren Wilkerson RN  Outcome: Adequate for Care Transition  6/22/2024 1129 by Lauren Wilkerson RN  Outcome: Progressing  Flowsheets (Taken 6/22/2024 1129)  Outcome Evaluation: Denies pain, ambualted in hallway with PT  Plan of Care Reviewed With: patient  Overall Patient Progress: improving  Goal: Patient-Specific Goal (Individualized)  Description: You can add care plan individualizations to a care plan. Examples of Individualization might be:  \"Parent requests to be called daily at 9am for status\", \"I have a hard time hearing out of my right ear\", or \"Do not touch me to wake me up as it startles  me\".  6/22/2024 1333 by Lauren Wilkerson RN  Outcome: Adequate for Care Transition  6/22/2024 1129 by Lauren Wilkerson RN  Outcome: Progressing  Goal: Absence of Hospital-Acquired Illness or Injury  6/22/2024 1333 by Lauren Wilkerson RN  Outcome: Adequate for Care Transition  6/22/2024 1129 by Lauren Wilkerson RN  Outcome: Progressing  Intervention: Identify and Manage Fall Risk  Recent Flowsheet Documentation  Taken 6/22/2024 0830 by Lauren Wilkerson RN  Safety Promotion/Fall Prevention:   activity supervised   assistive device/personal items within reach   increased " rounding and observation   increase visualization of patient   safety round/check completed   supervised activity  Intervention: Prevent Skin Injury  Recent Flowsheet Documentation  Taken 6/22/2024 0830 by Lauren Wilkerson RN  Body Position: position changed independently  Intervention: Prevent and Manage VTE (Venous Thromboembolism) Risk  Recent Flowsheet Documentation  Taken 6/22/2024 0830 by Lauren Wilkerson RN  VTE Prevention/Management: patient refused intervention  Intervention: Prevent Infection  Recent Flowsheet Documentation  Taken 6/22/2024 0830 by Lauren Wilkerson RN  Infection Prevention:   single patient room provided   rest/sleep promoted  Goal: Optimal Comfort and Wellbeing  6/22/2024 1333 by Lauren Wilkerson RN  Outcome: Adequate for Care Transition  6/22/2024 1129 by Lauren Wilkerson RN  Outcome: Progressing  Goal: Readiness for Transition of Care  6/22/2024 1333 by Lauren Wilkerson RN  Outcome: Adequate for Care Transition  6/22/2024 1129 by Lauren Wilkerson RN  Outcome: Progressing     Problem: Fall Injury Risk  Goal: Absence of Fall and Fall-Related Injury  6/22/2024 1333 by Lauren Wilkerson RN  Outcome: Adequate for Care Transition  6/22/2024 1129 by Lauren Wilkerson RN  Outcome: Progressing  Intervention: Identify and Manage Contributors  Recent Flowsheet Documentation  Taken 6/22/2024 0830 by Lauren Wilkerson RN  Medication Review/Management: medications reviewed  Intervention: Promote Injury-Free Environment  Recent Flowsheet Documentation  Taken 6/22/2024 0830 by Lauren Wilkerson RN  Safety Promotion/Fall Prevention:   activity supervised   assistive device/personal items within reach   increased rounding and observation   increase visualization of patient   safety round/check completed   supervised activity     Problem: Chest Pain  Goal: Resolution of Chest Pain Symptoms  6/22/2024 1333 by Lauren Wilkerson RN  Outcome: Adequate for Care  Transition  6/22/2024 1129 by Lauren Wilkerson, RN  Outcome: Progressing     Problem: Confusion Chronic  Goal: Optimal Cognitive Function  6/22/2024 1333 by Lauren Wilkerson, RN  Outcome: Adequate for Care Transition  6/22/2024 1129 by Lauren Wilkerson, RN  Outcome: Progressing  Intervention: Minimize Injury Risk and Provide Safety  Recent Flowsheet Documentation  Taken 6/22/2024 0830 by Lauren Wilkerson, RN  Enhanced Safety Measures:   review medications for side effects with activity   pain management

## 2024-06-22 NOTE — PLAN OF CARE
At start of shift Pt calm and cooperative, AOx3, disoriented to time, int. Confusion early in shift. VSS stable, NPO advanced to Regular diet per provider. Continent of bladder/bowel, call light use appropriate. A1 w/ GB, walker. In evening Pt became restless, uncooperative, demanded to dress self in street clothes. Did not trust staff, believed food/med was poisoned. Reported rodents/dogs were running in room/bed/window sill. Redirection moderately effective. Warfarin given when presented with sealed medication to inspect and sealed soda to drink. Pt agreed to walk on the unit w/ walker, refused GB, ambulated minimally outside room and returned to chair to recline/sleep. Refused Seroquel and Crestor at nighttime med pass. Pt up often trying to ambulate.    Goal Outcome Evaluation:      Plan of Care Reviewed With: patient    Overall Patient Progress: no changeOverall Patient Progress: no change    Outcome Evaluation: Pt denies pain, tele SR. Pt confused, AO to self only at times. Restless, uncooperative w/ cares during evening hours.      Problem: Fall Injury Risk  Goal: Absence of Fall and Fall-Related Injury  Outcome: Not Progressing  Intervention: Identify and Manage Contributors  Recent Flowsheet Documentation  Taken 6/21/2024 1606 by Carson Petty, RN  Medication Review/Management: medications reviewed  Intervention: Promote Injury-Free Environment  Recent Flowsheet Documentation  Taken 6/21/2024 1606 by Carson Petty, RN  Safety Promotion/Fall Prevention:   activity supervised   mobility aid in reach   nonskid shoes/slippers when out of bed     Problem: Confusion Chronic  Goal: Optimal Cognitive Function  Outcome: Not Progressing     Problem: Adult Inpatient Plan of Care  Goal: Plan of Care Review  Description: The Plan of Care Review/Shift note should be completed every shift.  The Outcome Evaluation is a brief statement about your assessment that the patient is improving, declining, or no change.  This  "information will be displayed automatically on your shift  note.  Outcome: Progressing  Flowsheets (Taken 6/21/2024 9056)  Outcome Evaluation: Pt denies pain, tele SR. Pt confused, AO to self only at times. Restless, uncooperative w/ cares during evening hours.  Plan of Care Reviewed With: patient  Overall Patient Progress: no change  Goal: Patient-Specific Goal (Individualized)  Description: You can add care plan individualizations to a care plan. Examples of Individualization might be:  \"Parent requests to be called daily at 9am for status\", \"I have a hard time hearing out of my right ear\", or \"Do not touch me to wake me up as it startles  me\".  Outcome: Progressing  Goal: Absence of Hospital-Acquired Illness or Injury  Outcome: Progressing  Intervention: Identify and Manage Fall Risk  Recent Flowsheet Documentation  Taken 6/21/2024 1606 by Carson Petty, RN  Safety Promotion/Fall Prevention:   activity supervised   mobility aid in reach   nonskid shoes/slippers when out of bed  Intervention: Prevent Skin Injury  Recent Flowsheet Documentation  Taken 6/21/2024 1606 by Carson Petty, RN  Body Position: position changed independently  Goal: Optimal Comfort and Wellbeing  Outcome: Progressing  Goal: Readiness for Transition of Care  Outcome: Progressing     "

## 2024-06-23 ENCOUNTER — NURSE TRIAGE (OUTPATIENT)
Dept: NURSING | Facility: CLINIC | Age: 84
End: 2024-06-23
Payer: COMMERCIAL

## 2024-06-23 ENCOUNTER — TELEPHONE (OUTPATIENT)
Dept: FAMILY MEDICINE | Facility: CLINIC | Age: 84
End: 2024-06-23
Payer: COMMERCIAL

## 2024-06-23 NOTE — TELEPHONE ENCOUNTER
Reason for Call:  Appointment Request    Patient requesting this type of appt:  Hospital/ED Follow-Up     Requested provider: Surya Dyer    Reason patient unable to be scheduled: Not within requested timeframe    When does patient want to be seen/preferred time: 3-7 days    Comments: before 6/28    Okay to leave a detailed message?: Yes at Home number on file [phone] (home)    Call taken on 6/23/2024 at 10:09 AM by Madina Becerra PTA

## 2024-06-23 NOTE — TELEPHONE ENCOUNTER
Nurse Triage SBAR    Situation: Medication question    Background: Significant Other, Farhat, calling. Consent: on file in chart. Pt was hospitalized on 6/20/2024 for HTN.     Assessment: Spouse wants to clarify medication instructions.           Recommendation: According to the protocol, Patient should do home care. Home care reviewed. Advised Significant Other that the patient needs to do home care. Home care reviewed. Care advice given. Significant Other verbalizes understanding and agrees with plan of care. Informed that they can call back if they have further questions.     Belinda Renae RN Nursing Advisor 6/23/2024 10:29 AM     Reason for Disposition   Caller has medicine question only, adult not sick, AND triager answers question    Additional Information   Negative: [1] Intentional drug overdose AND [2] suicidal thoughts or ideas   Negative: Drug overdose and triager unable to answer question   Negative: Caller requesting a renewal or refill of a medicine patient is currently taking   Negative: Caller requesting information unrelated to medicine   Negative: Caller requesting information about COVID-19 Vaccine   Negative: Caller requesting information about Emergency Contraception   Negative: Caller requesting information about Combined Birth Control Pills   Negative: Caller requesting information about Progestin Birth Control Pills   Negative: Caller requesting information about Post-Op pain or medicines   Negative: Caller requesting a prescription antibiotic (such as Penicillin) for Strep throat and has a positive culture result   Negative: Caller requesting a prescription anti-viral med (such as Tamiflu) and has influenza (flu) symptoms   Negative: Immunization reaction suspected   Negative: Rash while taking a medicine or within 3 days of stopping it   Negative: [1] Asthma and [2] having symptoms of asthma (cough, wheezing, etc.)   Negative: [1] Symptom of illness (e.g., headache, abdominal pain, earache,  vomiting) AND [2] more than mild   Negative: Breastfeeding questions about mother's medicines and diet   Negative: MORE THAN A DOUBLE DOSE of a prescription or over-the-counter (OTC) drug   Negative: [1] DOUBLE DOSE (an extra dose or lesser amount) of prescription drug AND [2] any symptoms (e.g., dizziness, nausea, pain, sleepiness)   Negative: [1] DOUBLE DOSE (an extra dose or lesser amount) of over-the-counter (OTC) drug AND [2] any symptoms (e.g., dizziness, nausea, pain, sleepiness)   Negative: Took another person's prescription drug   Negative: [1] DOUBLE DOSE (an extra dose or lesser amount) of prescription drug AND [2] NO symptoms  (Exception: A double dose of antibiotics.)   Negative: Diabetes drug error or overdose (e.g., took wrong type of insulin or took extra dose)   Negative: [1] Prescription not at pharmacy AND [2] was prescribed by PCP recently (Exception: Triager has access to EMR and prescription is recorded there. Go to Home Care and confirm for pharmacy.)   Negative: [1] Pharmacy calling with prescription question AND [2] triager unable to answer question   Negative: [1] Caller has URGENT medicine question about med that PCP or specialist prescribed AND [2] triager unable to answer question   Negative: Medicine patch causing local rash or itching   Negative: [1] Caller has medicine question about med NOT prescribed by PCP AND [2] triager unable to answer question (e.g., compatibility with other med, storage)   Negative: Prescription request for new medicine (not a refill)   Negative: [1] Caller has NON-URGENT medicine question about med that PCP prescribed AND [2] triager unable to answer question   Negative: Caller wants to use a complementary or alternative medicine   Negative: [1] Prescription prescribed recently is not at pharmacy AND [2] triager has access to patient's EMR AND [3] prescription is recorded in the EMR   Negative: [1] DOUBLE DOSE (an extra dose or lesser amount) of  over-the-counter (OTC) drug AND [2] NO symptoms   Negative: [1] DOUBLE DOSE (an extra dose or lesser amount) of antibiotic drug AND [2] NO symptoms    Protocols used: Medication Question Call-A-AH

## 2024-06-24 ENCOUNTER — PATIENT OUTREACH (OUTPATIENT)
Dept: CARE COORDINATION | Facility: CLINIC | Age: 84
End: 2024-06-24
Payer: COMMERCIAL

## 2024-06-24 ENCOUNTER — DOCUMENTATION ONLY (OUTPATIENT)
Dept: ANTICOAGULATION | Facility: CLINIC | Age: 84
End: 2024-06-24
Payer: COMMERCIAL

## 2024-06-24 DIAGNOSIS — Z79.01 LONG TERM CURRENT USE OF ANTICOAGULANT THERAPY: Primary | ICD-10-CM

## 2024-06-24 DIAGNOSIS — I63.9 CEREBROVASCULAR ACCIDENT INVOLVING CEREBELLUM (H): ICD-10-CM

## 2024-06-24 NOTE — PROGRESS NOTES
ANTICOAGULATION  MANAGEMENT: Discharge Review    Salome Saeed chart reviewed for anticoagulation continuity of care    Hospital Admission on 6/20 - 6/22/24 for HTN/chest pain.    Discharge disposition: Home    Results:    Recent labs: (last 7 days)     06/21/24  0000 06/21/24  0527 06/22/24  0555   INR 1.89* 2.01* 2.17*     Anticoagulation inpatient management:     home regimen continued    Anticoagulation discharge instructions:     Warfarin dosing: home regimen continued   Bridging: No   INR goal change: No      Medication changes affecting anticoagulation: No (lasix does not interact with warfarin)    Additional factors affecting anticoagulation: confusion noted while IP     PLAN     No adjustment to anticoagulation plan needed    Recommended follow up is scheduled  Patient not contacted    No adjustment to Anticoagulation Calendar was required    Neeru Martinez RN

## 2024-06-24 NOTE — PROGRESS NOTES
"  [facility]: Transitions of Care Outreach  Chief Complaint   Patient presents with    Clinic Care Coordination - Post Hospital       Most Recent Admission Date: 6/20/2024   Most Recent Admission Diagnosis: Elevated troponin - R79.89  Uncontrolled hypertension - I10  Coronary artery disease due to calcified coronary lesion - I25.10, I25.84  Chest pain, unspecified type - R07.9     Most Recent Discharge Date: 6/22/2024   Most Recent Discharge Diagnosis: Chest pain, unspecified type - R07.9  Elevated troponin - R79.89  Uncontrolled hypertension - I10  Coronary artery disease due to calcified coronary lesion - I25.10, I25.84  Parkinsonism, unspecified Parkinsonism type (H) - G20.C  Spinal stenosis, lumbar region, with neurogenic claudication - M48.062  Chronic bilateral low back pain without sciatica - M54.50, G89.29  Primary osteoarthritis of left knee - M17.12  WILSON (dyspnea on exertion) - R06.09     Transitions of Care Assessment    Discharge Assessment  How are you doing now that you are home?: \" doing better \"  How are your symptoms? (Red Flag symptoms escalate to triage hotline per guidelines): Improved  Do you know how to contact your clinic care team if you have future questions or changes to your health status? : Yes  Does the patient have their discharge instructions? : Yes  Does the patient have questions regarding their discharge instructions? : No  Were you started on any new medications or were there changes to any of your previous medications? : Yes  Does the patient have all of their medications?: Yes  Do you have questions regarding any of your medications? : No  Do you have all of your needed medical supplies or equipment (DME)?  (i.e. oxygen tank, CPAP, cane, etc.): Yes    Post-op ([facility]A Only)  If the patient had a surgery or procedure, do they have any questions for a nurse?: No         [facility] Explained and offered Care Coordination support to eligible patients: Yes    Patient " accepted? No    Follow up Plan     Discharge Follow-Up  Discharge follow up appointment scheduled in alignment with recommended follow up timeframe or Transitions of Risk Category? (Low = within 30 days; Moderate= within 14 days; High= within 7 days): Yes  Discharge Follow Up Appointment Date: 06/26/24  Discharge Follow Up Appointment Scheduled with?: Primary Care Provider    Future Appointments   Date Time Provider Department Center   6/26/2024 11:00 AM Surya Dyer, DO [facility]   9/3/2024  1:00 PM Surya Dyer, DO [facility]   9/13/2024 12:30 PM Nikolai Amaya MD [facility]       Outpatient Plan as outlined on AVS reviewed with patient.    For any urgent concerns, please contact our 24 hour nurse triage line: [phone] ([phone])       KIM Sims

## 2024-06-26 ENCOUNTER — ANTICOAGULATION THERAPY VISIT (OUTPATIENT)
Dept: ANTICOAGULATION | Facility: CLINIC | Age: 84
End: 2024-06-26

## 2024-06-26 ENCOUNTER — OFFICE VISIT (OUTPATIENT)
Dept: FAMILY MEDICINE | Facility: CLINIC | Age: 84
End: 2024-06-26
Payer: COMMERCIAL

## 2024-06-26 VITALS
BODY MASS INDEX: 23.04 KG/M2 | WEIGHT: 130 LBS | RESPIRATION RATE: 16 BRPM | OXYGEN SATURATION: 98 % | DIASTOLIC BLOOD PRESSURE: 64 MMHG | TEMPERATURE: 97.4 F | HEIGHT: 63 IN | SYSTOLIC BLOOD PRESSURE: 118 MMHG | HEART RATE: 79 BPM

## 2024-06-26 DIAGNOSIS — I63.9 CEREBROVASCULAR ACCIDENT INVOLVING CEREBELLUM (H): ICD-10-CM

## 2024-06-26 DIAGNOSIS — I25.110 CORONARY ARTERY DISEASE INVOLVING NATIVE CORONARY ARTERY OF NATIVE HEART WITH UNSTABLE ANGINA PECTORIS (H): ICD-10-CM

## 2024-06-26 DIAGNOSIS — E03.9 HYPOTHYROIDISM, UNSPECIFIED TYPE: ICD-10-CM

## 2024-06-26 DIAGNOSIS — Z79.01 LONG TERM CURRENT USE OF ANTICOAGULANT THERAPY: Primary | ICD-10-CM

## 2024-06-26 DIAGNOSIS — I10 ESSENTIAL HYPERTENSION: Primary | ICD-10-CM

## 2024-06-26 LAB
ANION GAP SERPL CALCULATED.3IONS-SCNC: 10 MMOL/L (ref 7–15)
BUN SERPL-MCNC: 12.5 MG/DL (ref 8–23)
CALCIUM SERPL-MCNC: 9.4 MG/DL (ref 8.8–10.2)
CHLORIDE SERPL-SCNC: 94 MMOL/L (ref 98–107)
CREAT SERPL-MCNC: 0.74 MG/DL (ref 0.51–0.95)
DEPRECATED HCO3 PLAS-SCNC: 28 MMOL/L (ref 22–29)
EGFRCR SERPLBLD CKD-EPI 2021: 80 ML/MIN/1.73M2
GLUCOSE SERPL-MCNC: 116 MG/DL (ref 70–99)
INR BLD: 2.8 (ref 0.9–1.1)
POTASSIUM SERPL-SCNC: 4.1 MMOL/L (ref 3.4–5.3)
SODIUM SERPL-SCNC: 132 MMOL/L (ref 135–145)
T4 FREE SERPL-MCNC: 1.68 NG/DL (ref 0.9–1.7)
TSH SERPL DL<=0.005 MIU/L-ACNC: 4.55 UIU/ML (ref 0.3–4.2)

## 2024-06-26 PROCEDURE — 80048 BASIC METABOLIC PNL TOTAL CA: CPT | Performed by: FAMILY MEDICINE

## 2024-06-26 PROCEDURE — 84443 ASSAY THYROID STIM HORMONE: CPT | Performed by: FAMILY MEDICINE

## 2024-06-26 PROCEDURE — 84439 ASSAY OF FREE THYROXINE: CPT | Performed by: FAMILY MEDICINE

## 2024-06-26 PROCEDURE — 99495 TRANSJ CARE MGMT MOD F2F 14D: CPT | Performed by: FAMILY MEDICINE

## 2024-06-26 PROCEDURE — 85610 PROTHROMBIN TIME: CPT | Performed by: FAMILY MEDICINE

## 2024-06-26 PROCEDURE — 36415 COLL VENOUS BLD VENIPUNCTURE: CPT | Performed by: FAMILY MEDICINE

## 2024-06-26 NOTE — LETTER
[facility]  [address] [place] [address]  [phone]                    July 12, 2024    Salome Saeed  [address]  [place] [address]      Dear Salome,    Here is a summary of your recent test results:    -Kidney function (GFR) is normal.   -Sodium is decreased but stable.   -Potassium is normal.   -Calcium is normal.   -Glucose is normal.   -Thyroid tests are stable. Continue current dose.      Your test results are enclosed.Please contact me if you have any questions.  In addition, here is a list of due or overdue Health Maintenance reminders.    Health Maintenance Due   Topic Date Due    RSV VACCINE (Pregnancy & 60+) (1 - 1-dose 60+ series) Never done    Annual Wellness Visit  02/15/2022    Mammogram  02/22/2022    COVID-19 Vaccine (4 - 2023-24 season) 09/01/2023    Osteoporosis Screening  04/01/2024    ANNUAL REVIEW OF HM ORDERS  05/22/2024   Please call us at [phone] (or use [employer]) to address the above recommendations. Thank you very much for trusting [facility].     Healthy regards,      Surya Dyer DO           Results for orders placed or performed in visit on 06/26/24   TSH WITH FREE T4 REFLEX     Status: Abnormal   Result Value Ref Range    TSH 4.55 (H) 0.30 - 4.20 uIU/mL   INR point of care (finger stick)     Status: Abnormal   Result Value Ref Range    INR 2.8 (H) 0.9 - 1.1    Narrative    This test is intended for monitoring Coumadin therapy. Results are not accurate in patients with prolonged INR due to factor deficiency.   Basic metabolic panel  (Ca, Cl, CO2, Creat, Gluc, K, Na, BUN)     Status: Abnormal   Result Value Ref Range    Sodium 132 (L) 135 - 145 mmol/L    Potassium 4.1 3.4 - 5.3 mmol/L    Chloride 94 (L) 98 - 107 mmol/L    Carbon Dioxide (CO2) 28 22 - 29 mmol/L    Anion Gap 10 7 - 15 mmol/L    Urea Nitrogen 12.5 8.0 - 23.0 mg/dL    Creatinine 0.74 0.51 - 0.95 mg/dL    GFR Estimate 80 >60  mL/min/1.73m2    Calcium 9.4 8.8 - 10.2 mg/dL    Glucose 116 (H) 70 - 99 mg/dL   T4 free     Status: Normal   Result Value Ref Range    Free T4 1.68 0.90 - 1.70 ng/dL

## 2024-06-26 NOTE — PROGRESS NOTES
Assessment & Plan     Essential hypertension  BP well controlled. Currently taking propranolol ER 60 mg daily, lisinopril 20 mg daily, amlodipine 2.5 mg daily, and recently started on furosemide 20 mg daily. Will continue current regimen for the next month and recheck BP. Consider discontinuing amlodipine and increase lisinopril dose to help simplify regimen. Will recheck electrolytes as well.  - Basic metabolic panel  (Ca, Cl, CO2, Creat, Gluc, K, Na, BUN); Future  - Basic metabolic panel  (Ca, Cl, CO2, Creat, Gluc, K, Na, BUN)    Coronary artery disease involving native coronary artery of native heart with unstable angina pectoris (H)  Stable, had negative stress test inpatient    Hypothyroidism, unspecified type  - TSH WITH FREE T4 REFLEX    Cerebrovascular accident involving cerebellum (H)  - INR point of care (finger stick)        MED REC REQUIRED  Post Medication Reconciliation Status:  Discharge medications reconciled, continue medications without change      Subjective   Loreta is a 84 year old, presenting for the following health issues:  Hospital F/U    [facility]        Hospital Follow-up Visit:    Hospital/Nursing Home/ Rehab Facility: [facility]  Date of Admission: 06/20/2024  Date of Discharge: 06/22/2024  Reason(s) for Admission: Chest pain- high blood pressure  Was the patient in the ICU or did the patient experience delirium during hospitalization?  No  Do you have any other stressors you would like to discuss with your provider? No    Problems taking medications regularly:  None  Medication changes since discharge: furosemide  Problems adhering to non-medication therapy:  None    Summary of hospitalization:  [facility] discharge summary reviewed  Diagnostic Tests/Treatments reviewed.  Follow up needed: none  Other Healthcare Providers Involved in Patient s Care:         None  Update since discharge: improved.         Plan of care communicated with patient and  "family           Review of Systems  Constitutional, HEENT, cardiovascular, pulmonary, gi and gu systems are negative, except as otherwise noted.      Objective    /64   Pulse 79   Temp 97.4  F (36.3  C) (Tympanic)   Resp 16   Ht 1.6 m (5' 3\")   Wt 59 kg (130 lb)   LMP  (LMP Unknown)   SpO2 98%   BMI 23.03 kg/m    Body mass index is 23.03 kg/m .  Physical Exam   GENERAL: alert and no distress  RESP: lungs clear to auscultation - no rales, rhonchi or wheezes  CV: regular rate and rhythm, normal S1 S2, no S3 or S4, no murmur, click or rub, no peripheral edema  PSYCH: mentation appears normal, affect normal/bright            Signed Electronically by: Surya Dyer DO  "

## 2024-06-26 NOTE — PROGRESS NOTES
ANTICOAGULATION MANAGEMENT     Salome Saeed 84 year old female is on warfarin with therapeutic INR result. (Goal INR 2.0-3.0)    Recent labs: (last 7 days)     06/26/24  1156   INR 2.8*       ASSESSMENT     Source(s): Chart Review and Patient/Caregiver Call     Warfarin doses taken: Warfarin taken as instructed  Diet: No new diet changes identified  Medication/supplement changes:  Lasix for 30 days, 6/23/24 - 7/23/24 Per UpToDate:  Loop Diuretics may diminish the anticoagulant effect of Vitamin K Antagonist  New illness, injury, or hospitalization: Yes: Patient was hospitalized 6/21/24 for chest pain and elevated BP, discharged 6/22/24. Patient reports she feels a little better than prior to admission, had hospital follow-up today with PCP  Signs or symptoms of bleeding or clotting: Yes: bruising from IV's   Previous result: Therapeutic last 2(+) visits  Additional findings: None       PLAN     Recommended plan for no diet, medication or health factor changes affecting INR     Dosing Instructions: Continue your current warfarin dose with next INR in 4 weeks       Summary  As of 6/26/2024      Full warfarin instructions:  5 mg every Fri; 2.5 mg all other days   Next INR check:  7/23/2024               Telephone call with Loreta who verbalizes understanding and agrees to plan    Check at provider office visit 7/23/24    Education provided:   Please call back if any changes to your diet, medications or how you've been taking warfarin  Contact [phone]  with any changes, questions or concerns.     Plan made per [facility] anticoagulation protocol    Renee Martinez RN  Anticoagulation Clinic  6/26/2024    _______________________________________________________________________     Anticoagulation Episode Summary       Current INR goal:  2.0-3.0   TTR:  54.6% (1 y)   Target end date:  Indefinite   Send INR reminders to:  [facility]    Indications    Long term current use of anticoagulant therapy  [Z79.01]  Cerebrovascular accident involving cerebellum (H) [I63.9]             Comments:               Anticoagulation Care Providers       Provider Role Specialty Phone number    Surya Dyer DO Referring Family Medicine [phone]

## 2024-06-30 NOTE — DISCHARGE SUMMARY
Physician Discharge Summary           [facility] Hospitalist Discharge Summary-Formerly [facility], [facility]    Name: Salome Saeed    MRN: [id number]     YOB: 1940    Age: 84 year old                                                     Primary care provider: Surya Dyer    Admit date:  6/20/2024    Discharge date and time: 6/22/2024  1:15 PM    Discharge Physician: Jesus Rain M.D., M.B.A.       Primary Discharge Diagnosis      Suspected ACS   HTN     Secondary Diagnosis /chronic medical conditions     Past Medical History:   Diagnosis Date    CAD (coronary artery disease) 04/09/2009 4/8/2009: PTCA and two 2.5x15mm Xience stents to mid LAD lesion; Cath 12/2012- 40-50% stenosis in mid PDA, 80% on D1- medically treat , 5/28/2015 - PTCA and 2.25x8mm Promus PREMIIER NAILA to ostium of 2nd OM    Cerebral artery occlusion with cerebral infarction 2012    CVA (cerebral infarction)     DDD (degenerative disc disease)     Essential hypertension, benign     GERD (gastroesophageal reflux disease)     Hyperlipidemia     Hypothyroidism     Lumbar spinal stenosis     Mitral regurgitation     1+ per 7/2013 Echo    Vertebral artery stenosis, right      Past Surgical History:  Past Surgical History:   Procedure Laterality Date    ARTHROPLASTY KNEE Left 6/1/2020    Procedure: Left total knee arthroplasty (Ward [employer] Nephew #5 narrow femur; #3 tibia; 9 mm tibial poly and 35 mm patella);  Surgeon: Jorge Luis Cheatham MD;  Location: [facility] OR    CORONARY ANGIOGRAPHY ADULT ORDER  12/6/2012    40-50% stenosis to mid PDA, 80% in D1- medically treat    CORONARY ANGIOGRAPHY ADULT ORDER  5/28/2015    PTCA and 2.25x8mm Promus PREMIER NAILA to ostium of 2nd OM    CV CORONARY ANGIOGRAM N/A 5/5/2023    Procedure: Coronary Angiogram;  Surgeon: Wilfrido Robert MD;  Location: [facility] HEART CARDIAC CATH LAB    DECOMPRESSION, FUSION LUMBAR POSTERIOR THREE + LEVELS, COMBINED  5/8/2013    Procedure: COMBINED DECOMPRESSION, FUSION  "LUMBAR POSTERIOR THREE + LEVELS;  Posterior Lumbar Decompression L3-S1, Fusion L4-S1;  Surgeon: Daniel Harris MD;  Location: [facility] OR    ENDOSCOPIC STRIPPING VEIN(S)      HEART CATH, ANGIOPLASTY  4/8/2009    PTCA and two 2.5x15mm Xience stents to mid LAD lesion    HYSTERECTOMY, RICKIE      Fibroids, and Menorrhagia.. Bilateral Oophrectomy    ORTHOPEDIC SURGERY      Stenting of LAD, Angioplasty  4/8/09    TONSILLECTOMY      as a child           Brief Summary of Hospital stay :       Please refer to  Admission H&P note  and subsequent progress notes in EMR for full details of patient care.    Reason for Presentation to the Hospital: elevated blood pressure readings (190-200s systolic) and an episode of chest pain during a drive     Brief Summary of Significant findings(Primary diagnosis )Procedures and treatments provided(Hospital course ,consults, procedures)    Salome Saeed is a 84-year-old female with a well-managed history of coronary artery disease (CAD), hypertension, Parkinson's and stroke, currently on Coumadin, presented with elevated blood pressure readings (190-200s systolic) and an episode of chest pain during a drive. Troponin levels increased from 19 to 53, indicating potential demand ischemia versus ACS, no chest pain at this time.  In the emergency room was treated with aspirin. Current blood pressure is 140 systolic. Further monitoring and management are warranted given her significant cardiovascular risk factors and recent symptoms.      Stress test reviewed and negative for ischemia. Symptoms may have been related to hypertension per cardiology.      Consultations during hospital stay:       PHARMACY TO DOSE WARFARIN  CARDIOLOGY IP CONSULT  PHYSICAL THERAPY ADULT IP CONSULT      Patient discharge Condition:     stable    BP 95/48 (BP Location: Right arm)   Pulse 71   Temp 98.3  F (36.8  C) (Axillary)   Resp 17   Ht 1.6 m (5' 3\")   Wt 60.9 kg (134 lb 3.2 oz)   LMP  (LMP Unknown)   SpO2 " 96%   BMI 23.77 kg/m         Discharge Instructions:       Patient/family instructions: Written discharge instruction given to patient/family    Discharge Medications:       Review of your medicines        UNREVIEWED medicines. Ask your doctor about these medicines        Dose / Directions   prednisoLONE acetate 1 % ophthalmic suspension  Commonly known as: PRED FORTE      Dose: 1 drop  Place 1 drop into both eyes daily  Refills: 0            START taking        Dose / Directions   furosemide 20 MG tablet  Commonly known as: LASIX  Used for: WILSON (dyspnea on exertion)      Dose: 20 mg  Take 1 tablet (20 mg) by mouth daily for 30 days  Quantity: 30 tablet  Refills: 0            CONTINUE these medicines which have NOT CHANGED        Dose / Directions   acetaminophen 500 MG tablet  Commonly known as: TYLENOL      Dose: 500-1,000 mg  Take 500-1,000 mg by mouth every 6 hours as needed for mild pain  Refills: 0     amLODIPine 2.5 MG tablet  Commonly known as: NORVASC  Used for: Essential hypertension      Dose: 2.5 mg  Take 1 tablet (2.5 mg) by mouth daily  Quantity: 30 tablet  Refills: 0     aspirin 81 MG EC tablet  Used for: Coronary artery disease involving native coronary artery of native heart without angina pectoris      Dose: 81 mg  Take 1 tablet (81 mg) by mouth daily  Refills: 0     Calcium Carb-Cholecalciferol 600-5 MG-MCG Tabs      Dose: 1,200 mg  Take 1,200 mg by mouth daily  Refills: 0     * carbidopa-levodopa  MG tablet  Commonly known as: SINEMET      Dose: 1 tablet  Take 1 tablet by mouth 2 times daily TAPER UP: BID 1000 and 1600 for 1 week, then increase to TID  Refills: 0     * carbidopa-levodopa  MG tablet  Commonly known as: SINEMET      Dose: 1 tablet  Take 1 tablet by mouth 3 times daily 1000, 1600, and 2200  Refills: 0     CoQ-10 200 MG Caps      Dose: 400 mg  Take 400 mg by mouth daily  Refills: 0     diclofenac 1 % topical gel  Commonly known as: VOLTAREN  Used for: Osteoarthritis of  left knee, unspecified osteoarthritis type      Dose: 4 g  Apply 4 g topically 4 times daily as needed for moderate pain  Quantity: 350 g  Refills: 1     escitalopram 5 MG tablet  Commonly known as: LEXAPRO  Used for: Anxiety      Dose: 5 mg  Take 1 tablet (5 mg) by mouth daily  Quantity: 30 tablet  Refills: 0     levothyroxine 50 MCG tablet  Commonly known as: SYNTHROID/LEVOTHROID  Used for: Hypothyroidism, unspecified type      Dose: 50 mcg  Take 1 tablet (50 mcg) by mouth daily  Quantity: 90 tablet  Refills: 3     lisinopril 20 MG tablet  Commonly known as: ZESTRIL  Used for: HTN Blood Pressure Goal <140/90      Dose: 20 mg  Take 1 tablet (20 mg) by mouth daily  Quantity: 90 tablet  Refills: 4     Magnesium 400 MG Caps      Dose: 400 mg  Take 400 mg by mouth daily  Refills: 0     Melatonin 10 MG Tabs tablet      Dose: 10 mg  Take 10 mg by mouth nightly as needed for sleep  Refills: 0     multivitamin, therapeutic Tabs tablet      Dose: 1 tablet  Take 1 tablet by mouth daily  Refills: 0     nitroGLYcerin 0.4 MG sublingual tablet  Commonly known as: NITROSTAT  Used for: Hyperlipidemia LDL goal <70, Essential hypertension, Coronary artery disease involving native coronary artery of native heart without angina pectoris      Dose: 0.4 mg  Place 1 tablet (0.4 mg) under the tongue every 5 minutes as needed for chest pain  Quantity: 25 tablet  Refills: 2     propranolol ER 60 MG 24 hr capsule  Commonly known as: INDERAL LA  Used for: Benign essential hypertension      Dose: 60 mg  Take 1 capsule (60 mg) by mouth daily  Quantity: 30 capsule  Refills: 0     rosuvastatin 40 MG tablet  Commonly known as: CRESTOR  Used for: Hyperlipidemia LDL goal <70      Dose: 40 mg  Take 1 tablet (40 mg) by mouth daily  Quantity: 90 tablet  Refills: 1     vitamin B complex with vitamin C tablet      Dose: 1 tablet  Take 1 tablet by mouth daily  Refills: 0     vitamin C 1000 MG Tabs  Commonly known as: ASCORBIC ACID      Dose: 2,000  mg  Take 2,000 mg by mouth daily  Refills: 0     vitamin D3 50 mcg (2000 units) tablet  Commonly known as: CHOLECALCIFEROL      Dose: 2,000 Units  Take 2,000 Units by mouth daily  Refills: 0     warfarin ANTICOAGULANT 5 MG tablet  Commonly known as: COUMADIN      Take as directed. If you are unsure how to take this medication, talk to your nurse or doctor.  Original instructions: Take by mouth daily Take 5 mg on Fridays and 2.5 mg on all other days  Refills: 0           * This list has 2 medication(s) that are the same as other medications prescribed for you. Read the directions carefully, and ask your doctor or other care provider to review them with you.                   Where to get your medicines        These medications were sent to [id number] - [place], [place] - [address] [address]  [address] [address] [place] [address]      Phone: [phone]   furosemide 20 MG tablet          Discharge diet:Orders Placed This Encounter      Diet    cardiac diet      Discharge activity:Activity as tolerated      Discharge follow-up:    Follow up with primary care provider in  7days or earlier if symptoms return or gets worse.    Follow up with consultant as instructed  with cardiology        Other instructions:    We discussed with patient/family about detail discharge instructions as well as discharge medications above including potential risks,side effects and benefits.Patient/family understood benefits and potential serious side effects of taking these medications and need to follow up with PCP if the patient develops complications.  Patient is also advised to see a doctor immediately for severe symptoms.        Major procedure performed/  Significant Diagnostic Studies:       Results for orders placed or performed during the hospital encounter of 06/20/24   XR Chest 2 Views    Narrative    EXAM: XR CHEST 2 VIEWS  LOCATION: [facility]  DATE: 6/21/2024    INDICATION: chest pain  COMPARISON:  2024      Impression    IMPRESSION: Lungs are clear. No pleural effusion or pneumothorax. Normal heart size and pulmonary vascularity. Lumbar spinal fusion hardware.   Echocardiogram Complete     Value    LVEF  55-60%    [facility]    [id number]  [id number]  [id number]  [id number]^INGA^SCOTT^BRIANA     [facility]  Echocardiography Laboratory  [address] [place] [address]     Name: JESUS AVELAR  MRN: [id number]  : 1940  Study Date: 2024 11:27 AM  Age: 84 yrs  Gender: Female  Patient Location: [facility]  Reason For Study: Chest Pain, Chest Pressure, Chest Tightness  Ordering Physician: SCOTT XIONG  Performed By: Leslie Vega     BSA: 1.6 m2  Height: 63 in  Weight: 132 lb  HR: 74  BP: 139/61 mmHg  ______________________________________________________________________________  Procedure  Complete Portable Echo Adult.  ______________________________________________________________________________  Interpretation Summary     The visual ejection fraction is 55-60%.  There are regional wall motion abnormalities as specified.  There is trace aortic regurgitation.  ______________________________________________________________________________  Left Ventricle  The left ventricle is normal in size. There is normal left ventricular wall  thickness. Diastolic Doppler findings (E/E' ratio and/or other parameters)  suggest left ventricular filling pressures are indeterminate. The visual  ejection fraction is 55-60%. The distal inferior wall appears to be severely  hypokinetic. In some views the mid inferoseptal wall appears to be hypokinetic  but this may be artifactual as the first apical four-chamber view did show  relatively normal motion in that area. There are regional wall motion  abnormalities as specified.     Right Ventricle  The right ventricle is normal in size and function.     Atria  The left atrium is mildly dilated. Right atrial size is normal. There is  no  color Doppler evidence of an atrial shunt.     Mitral Valve  There is mild mitral annular calcification. There is mild (1+) mitral  regurgitation.     Tricuspid Valve  There is mild (1+) tricuspid regurgitation. The right ventricular systolic  pressure is approximated at 21.7 mmHg plus the right atrial pressure.     Aortic Valve  The aortic valve is trileaflet. There is mild trileaflet aortic sclerosis.  There is trace aortic regurgitation. No hemodynamically significant valvular  aortic stenosis.     Pulmonic Valve  There is trace pulmonic valvular regurgitation.     Vessels  The aortic root is normal size. Normal size ascending aorta. IVC diameter <2.1  cm collapsing >50% with sniff suggests a normal RA pressure of 3 mmHg.     Pericardium  There is no pericardial effusion.     Rhythm  Sinus rhythm was noted.  ______________________________________________________________________________  MMode/2D Measurements & Calculations     IVSd: 0.87 cm  LVIDd: 4.9 cm  LVIDs: 3.2 cm  LVPWd: 0.74 cm  IVC diam: 1.4 cm  FS: 35.0 %  LV mass(C)d: 132.5 grams  LV mass(C)dI: 81.8 grams/m2  Ao root diam: 3.6 cm  asc Aorta Diam: 3.1 cm  LVOT diam: 1.9 cm  LVOT area: 2.9 cm2  Ao root diam index Ht(cm/m): 2.2  Ao root diam index BSA (cm/m2): 2.2  Asc Ao diam index BSA (cm/m2): 1.9  Asc Ao diam index Ht(cm/m): 2.0  LA Volume (BP): 61.7 ml     LA Volume Index (BP): 38.1 ml/m2  RV Base: 3.9 cm  RWT: 0.30  TAPSE: 2.2 cm     Doppler Measurements & Calculations  MV E max tushar: 60.7 cm/sec  MV A max tushar: 68.7 cm/sec  MV E/A: 0.88  MV max P.3 mmHg  MV mean P.3 mmHg  MV V2 VTI: 27.6 cm  MV dec time: 0.26 sec  PA acc time: 0.08 sec  TR max tushar: 233.1 cm/sec  TR max P.7 mmHg  E/E' avg: 10.6  Lateral E/e': 7.1  Medial E/e': 14.1     RV S Tushar: 11.6 cm/sec     ______________________________________________________________________________  Report approved by: Julien Mayberry 2024 02:18 PM         NM MPI w Lexiscan      "Value    Target     Baseline Systolic     Baseline Diastolic BP 75    Last Stress Systolic     Last Stress Diastolic BP 53    Baseline HR 68    Max HR  81    Max Predicted HR  59    Rate Pressure Product 8,505.0    Narrative      The nuclear stress test is negative for inducible myocardial ischemia   or infarction.    Left ventricular function is normal.    A prior study was conducted on 10/20/2022.  This study has no change   when compared with the prior study.       *Note: Due to a large number of results and/or encounters for the requested time period, some results have not been displayed. A complete set of results can be found in Results Review.       No results for input(s): \"WBC\", \"HGB\", \"HCT\", \"MCV\", \"PLT\" in the last 168 hours.  No results for input(s): \"CULT\" in the last 168 hours.  Recent Labs   Lab 06/26/24  1156   *   POTASSIUM 4.1   CHLORIDE 94*   CO2 28   ANIONGAP 10   *   BUN 12.5   CR 0.74   GFRESTIMATED 80   ERIC 9.4       Recent Labs   Lab 06/26/24  1156   *       Recent Labs   Lab 06/26/24  1156   INR 2.8*           Pending Results:       Unresulted Labs Ordered in the Past 30 Days of this Admission       No orders found from 5/21/2024 to 6/21/2024.               Patient Allergies:       Allergies   Allergen Reactions    Atorvastatin      Leg cramps    Cats Itching    Gluten      Sinuses affected by gluten    Shellfish Allergy      hives         Disposition:     Disposition: home      I saw and evaluated the patient on day of discharge and  discharge instructions reviewed  and  all the patient's questions and concerns addressed. Over 30 minutes spent on discharge and coordination of discharge process for this patient.      Disclaimer: This note consists of symbols derived from keyboarding, dictation and/or voice recognition software. As a result, there may be errors in the script that have gone undetected. Please consider this when interpreting information " found in this chart

## 2024-07-17 DIAGNOSIS — G20.C PARKINSONISM, UNSPECIFIED PARKINSONISM TYPE (H): Primary | ICD-10-CM

## 2024-07-17 DIAGNOSIS — R06.09 DOE (DYSPNEA ON EXERTION): ICD-10-CM

## 2024-07-17 RX ORDER — CARBIDOPA AND LEVODOPA 25; 100 MG/1; MG/1
1 TABLET ORAL 3 TIMES DAILY
OUTPATIENT
Start: 2024-07-17

## 2024-07-17 RX ORDER — FUROSEMIDE 20 MG
20 TABLET ORAL DAILY
Qty: 90 TABLET | Refills: 0 | Status: SHIPPED | OUTPATIENT
Start: 2024-07-17

## 2024-07-17 NOTE — TELEPHONE ENCOUNTER
S-(situation): Spouse calls with medication questions.    B-(background): Pt hospitalized 6/20, follow up on 6/26    A-(assessment): Spouse needs refill on Furosamide and Carbidopa/levodopa which is ordered as historical.     R-(recommendations): Sending to PCP for refill. Meds and pharmacy pended.     Blas Martinez RN [facility]

## 2024-07-17 NOTE — TELEPHONE ENCOUNTER
Lasix signed. Carbidopa Levodopa is prescribed by neurology.    Surya Dyer DO  7/17/2024 12:49 PM

## 2024-07-18 NOTE — TELEPHONE ENCOUNTER
See below-  called to pt's .     They do not want go to [place] any longer, writer recommend [facility] in [place].   will call back if referral is needed.     Can PCP fill carbidopa--levodopa 25/100 1 tab TID, writer did confirm dosing with .       Also  states he was under assumption they were to stop the furosemide at this time as pt had taken for the 30 days.  They did pick this up so they do have it on hand.       Please advise as to medications and if you are ok with [facility] for appointment     Jaleesa Samayoa RN

## 2024-07-18 NOTE — TELEPHONE ENCOUNTER
The patient stated that she will switch neurologist for closer to home and currently is not scheduled to have a follow-up with me.  For that reason I would not be able to fill out her Sinemet prescription.  Please ask primary care provider to do it while she finds a new neurologist.   Thanks,   Pj Keene MD.

## 2024-07-19 RX ORDER — CARBIDOPA AND LEVODOPA 25; 100 MG/1; MG/1
1 TABLET ORAL 3 TIMES DAILY
Qty: 270 TABLET | Refills: 0 | Status: SHIPPED | OUTPATIENT
Start: 2024-07-19

## 2024-07-23 ENCOUNTER — ANTICOAGULATION THERAPY VISIT (OUTPATIENT)
Dept: ANTICOAGULATION | Facility: CLINIC | Age: 84
End: 2024-07-23

## 2024-07-23 ENCOUNTER — OFFICE VISIT (OUTPATIENT)
Dept: FAMILY MEDICINE | Facility: CLINIC | Age: 84
End: 2024-07-23
Payer: COMMERCIAL

## 2024-07-23 VITALS
SYSTOLIC BLOOD PRESSURE: 131 MMHG | DIASTOLIC BLOOD PRESSURE: 69 MMHG | BODY MASS INDEX: 23.21 KG/M2 | RESPIRATION RATE: 16 BRPM | TEMPERATURE: 97.7 F | OXYGEN SATURATION: 96 % | WEIGHT: 131 LBS | HEART RATE: 65 BPM | HEIGHT: 63 IN

## 2024-07-23 DIAGNOSIS — Z79.01 LONG TERM CURRENT USE OF ANTICOAGULANT THERAPY: Primary | ICD-10-CM

## 2024-07-23 DIAGNOSIS — I10 ESSENTIAL HYPERTENSION, BENIGN: ICD-10-CM

## 2024-07-23 DIAGNOSIS — I63.9 CEREBROVASCULAR ACCIDENT INVOLVING CEREBELLUM (H): Primary | ICD-10-CM

## 2024-07-23 DIAGNOSIS — I63.9 CEREBROVASCULAR ACCIDENT INVOLVING CEREBELLUM (H): ICD-10-CM

## 2024-07-23 LAB
ANION GAP SERPL CALCULATED.3IONS-SCNC: 10 MMOL/L (ref 7–15)
BUN SERPL-MCNC: 12 MG/DL (ref 8–23)
CALCIUM SERPL-MCNC: 9.8 MG/DL (ref 8.8–10.4)
CHLORIDE SERPL-SCNC: 95 MMOL/L (ref 98–107)
CREAT SERPL-MCNC: 0.78 MG/DL (ref 0.51–0.95)
EGFRCR SERPLBLD CKD-EPI 2021: 75 ML/MIN/1.73M2
GLUCOSE SERPL-MCNC: 106 MG/DL (ref 70–99)
HCO3 SERPL-SCNC: 29 MMOL/L (ref 22–29)
INR BLD: 5.5 (ref 0.9–1.1)
POTASSIUM SERPL-SCNC: 4.3 MMOL/L (ref 3.4–5.3)
SODIUM SERPL-SCNC: 134 MMOL/L (ref 135–145)

## 2024-07-23 PROCEDURE — 99213 OFFICE O/P EST LOW 20 MIN: CPT | Performed by: FAMILY MEDICINE

## 2024-07-23 PROCEDURE — 85610 PROTHROMBIN TIME: CPT | Performed by: FAMILY MEDICINE

## 2024-07-23 PROCEDURE — 36415 COLL VENOUS BLD VENIPUNCTURE: CPT | Performed by: FAMILY MEDICINE

## 2024-07-23 PROCEDURE — 80048 BASIC METABOLIC PNL TOTAL CA: CPT | Performed by: FAMILY MEDICINE

## 2024-07-23 RX ORDER — LISINOPRIL 30 MG/1
30 TABLET ORAL DAILY
Qty: 90 TABLET | Refills: 1 | Status: SHIPPED | OUTPATIENT
Start: 2024-07-23

## 2024-07-23 NOTE — LETTER
July 30, 2024      Loreta SPRAGUE Darlin  [address]  [place] [address]        Dear ,    We are writing to inform you of your test results.    Labs are stable.     Please contact me if you have any questions.     Resulted Orders   Basic metabolic panel  (Ca, Cl, CO2, Creat, Gluc, K, Na, BUN)   Result Value Ref Range    Sodium 134 (L) 135 - 145 mmol/L    Potassium 4.3 3.4 - 5.3 mmol/L    Chloride 95 (L) 98 - 107 mmol/L    Carbon Dioxide (CO2) 29 22 - 29 mmol/L    Anion Gap 10 7 - 15 mmol/L    Urea Nitrogen 12.0 8.0 - 23.0 mg/dL    Creatinine 0.78 0.51 - 0.95 mg/dL    GFR Estimate 75 >60 mL/min/1.73m2      Comment:      eGFR calculated using 2021 CKD-EPI equation.    Calcium 9.8 8.8 - 10.4 mg/dL      Comment:      Reference intervals for this test were updated on 7/16/2024 to reflect our healthy population more accurately. There may be differences in the flagging of prior results with similar values performed with this method. Those prior results can be interpreted in the context of the updated reference intervals.    Glucose 106 (H) 70 - 99 mg/dL       If you have any questions or concerns, please call the clinic at the number listed above.     Sincerely,    Surya Dyer DO

## 2024-07-23 NOTE — PROGRESS NOTES
Assessment & Plan     Cerebrovascular accident involving cerebellum (H)  - INR point of care (finger stick)  - Basic metabolic panel  (Ca, Cl, CO2, Creat, Gluc, K, Na, BUN); Future  - Basic metabolic panel  (Ca, Cl, CO2, Creat, Gluc, K, Na, BUN)    HTN Blood Pressure Goal <140/90  Blood pressure at goal. Recheck labs today and will also discontinue amlodipine but increase lisinopril in order to simplify her antihypertensive regimen. Advise slow position change to avoid lightheadedness and make sure she is staying hydrated. Continue monitoring BP at home and follow up in 2 weeks if any side effects or if blood pressure above goal.  - lisinopril (ZESTRIL) 30 MG tablet; Take 1 tablet (30 mg) by mouth daily  - Basic metabolic panel  (Ca, Cl, CO2, Creat, Gluc, K, Na, BUN); Future  - Basic metabolic panel  (Ca, Cl, CO2, Creat, Gluc, K, Na, BUN)      Efe Kan is a 84 year old, presenting for the following health issues:  Recheck Medication (Patient states her medication is making her BP increase) and Hand Pain (Left Hand Pain - On and off)        7/23/2024    11:12 AM   Additional Questions   Roomed by EVELYNE Hatch   Accompanied by  - Farhat     Hand Pain    History of Present Illness       Reason for visit:  INR, Medication    She eats 0-1 servings of fruits and vegetables daily.She consumes 0 sweetened beverage(s) daily.She exercises with enough effort to increase her heart rate 9 or less minutes per day.  She exercises with enough effort to increase her heart rate 3 or less days per week.   She is taking medications regularly.     Denies any headaches, vision changes, chest pain, palpitations, shortness of breath, dyspnea, numbness/tingling. Noticing a little lightheadedness when she gets up and walks to the kitchen.           Review of Systems  Constitutional, HEENT, cardiovascular, pulmonary, gi and gu systems are negative, except as otherwise noted.      Objective    /69 (BP Location:  "Left arm, Patient Position: Sitting, Cuff Size: Adult Regular)   Pulse 65   Temp 97.7  F (36.5  C) (Oral)   Resp 16   Ht 1.6 m (5' 3\")   Wt 59.4 kg (131 lb)   LMP  (LMP Unknown)   SpO2 96%   Breastfeeding No   BMI 23.21 kg/m    Body mass index is 23.21 kg/m .  Physical Exam   GENERAL: alert and no distress  RESP: lungs clear to auscultation - no rales, rhonchi or wheezes  CV: regular rates and rhythm, normal S1 S2, no S3 or S4, and no murmur, click or rub  MS: no gross musculoskeletal defects noted, no edema  PSYCH: mentation appears normal, affect normal/bright            Signed Electronically by: Surya Dyer DO  Answers submitted by the patient for this visit:  General Questionnaire (Submitted on 7/23/2024)  Chief Complaint: Chronic problems general questions HPI Form  What is the reason for your visit today? : INR, Medication  How many servings of fruits and vegetables do you eat daily?: 0-1  On average, how many sweetened beverages do you drink each day (Examples: soda, juice, sweet tea, etc.  Do NOT count diet or artificially sweetened beverages)?: 0  How many minutes a day do you exercise enough to make your heart beat faster?: 9 or less  How many days a week do you exercise enough to make your heart beat faster?: 3 or less  How many days per week do you miss taking your medication?: 0    "

## 2024-07-23 NOTE — PROGRESS NOTES
ANTICOAGULATION MANAGEMENT     Salome Saeed 84 year old female is on warfarin with supratherapeutic INR result. (Goal INR 2.0-3.0)    Recent labs: (last 7 days)     07/23/24  1110   INR 5.5*       ASSESSMENT     Source(s): Chart Review and Patient/Caregiver Call     Warfarin doses taken: Warfarin taken as instructed  Diet: Decreased greens/vitamin K in diet; plans to resume previous intake - her  has been preparing the meals since her hospitalization last month. Loreta states he's been doing a good job but hasn't served greens.   She plans to have a [employer] tonight.  Medication/supplement changes:  Taken off amlodipine at today's OV, and lisinopril dosing increased. Neither medication interacts with warfarin.  New illness, injury, or hospitalization: No  Signs or symptoms of bleeding or clotting: No  Previous result: Therapeutic last 2(+) visits  Additional findings: None       PLAN     Recommended plan for temporary change(s) affecting INR     Dosing Instructions: hold 2 doses then continue your current warfarin dose with next INR in 2 days       Summary  As of 7/23/2024      Full warfarin instructions:  7/23: Hold; 7/24: Hold; Otherwise 5 mg every Fri; 2.5 mg all other days   Next INR check:  7/25/2024               Telephone call with Loreta who verbalizes understanding and agrees to plan    Lab visit scheduled    Education provided: Please call back if any changes to your diet, medications or how you've been taking warfarin  Dietary considerations: importance of consistent vitamin K intake and importance of notifying [facility] to changes in diet  Symptom monitoring: monitoring for bleeding signs and symptoms, when to seek medical attention/emergency care, and if you hit your head or have a bad fall seek emergency care    Plan made per [facility] anticoagulation protocol    Neeru Martinez RN  Anticoagulation Clinic  7/23/2024    _______________________________________________________________________      Anticoagulation Episode Summary       Current INR goal:  2.0-3.0   TTR:  47.8% (1 y)   Target end date:  Indefinite   Send INR reminders to:  [facility] [place]    Indications    Long term current use of anticoagulant therapy [Z79.01]  Cerebrovascular accident involving cerebellum (H) [I63.9]             Comments:               Anticoagulation Care Providers       Provider Role Specialty Phone number    Surya Dyer DO [facility] [phone]

## 2024-07-25 ENCOUNTER — LAB (OUTPATIENT)
Dept: LAB | Facility: CLINIC | Age: 84
End: 2024-07-25
Payer: COMMERCIAL

## 2024-07-25 ENCOUNTER — ANTICOAGULATION THERAPY VISIT (OUTPATIENT)
Dept: ANTICOAGULATION | Facility: CLINIC | Age: 84
End: 2024-07-25

## 2024-07-25 DIAGNOSIS — I63.9 CEREBROVASCULAR ACCIDENT INVOLVING CEREBELLUM (H): ICD-10-CM

## 2024-07-25 DIAGNOSIS — Z79.01 LONG TERM CURRENT USE OF ANTICOAGULANT THERAPY: Primary | ICD-10-CM

## 2024-07-25 LAB — INR BLD: 3.1 (ref 0.9–1.1)

## 2024-07-25 PROCEDURE — 36416 COLLJ CAPILLARY BLOOD SPEC: CPT

## 2024-07-25 PROCEDURE — 85610 PROTHROMBIN TIME: CPT

## 2024-07-25 NOTE — PROGRESS NOTES
"ANTICOAGULATION MANAGEMENT     Salome Saeed 84 year old female is on warfarin with supratherapeutic INR result. (Goal INR 2.0-3.0)    Recent labs: (last 7 days)     07/25/24  1304   INR 3.1*       ASSESSMENT     Warfarin Lab Questionnaire    Warfarin Doses Last 7 Days          7/25/2024   Warfarin Lab Questionnaire   Missed doses within past 14 days? No Yes, confirmed with patient that she did not take her warfarin as instructed 7/23 and 7/24/25   Changes in diet or alcohol within past 14 days? No Patient reports she has had greens every day since her INR 7/23/24, Patient typically has something green everyday but diet has not been the same since her hospitalization in June (20-22), patient reports trying to get diet back to \"normal\"   Medication changes since last result? No   Injuries or illness since last result? No   New shortness of breath, severe headaches or sudden changes in vision since last result? No   Abnormal bleeding since last result? No   Upcoming surgery, procedure? No        Previous result: Supratherapeutic  Additional findings: None       PLAN     Recommended plan for temporary change(s) and ongoing change(s) affecting INR Diet appears that it has been ongoing    Dosing Instructions: decrease your warfarin dose (12.5% change) (smallest adjustment with current tablet strength) with next INR in 1 week       Summary  As of 7/25/2024      Full warfarin instructions:  2.5 mg every day   Next INR check:  8/1/2024               Telephone call with Loreta who agrees to plan and repeated back plan correctly    Lab visit scheduled    Education provided: Please call back if any changes to your diet, medications or how you've been taking warfarin  Contact [phone] with any changes, questions or concerns.     Plan made per [facility] anticoagulation protocol and per [facility] anticoagulation protocol; closest % change with current tablet size made per protocol for for INR 3.1-3.3  (goal 2-3)    Renee Martinez, " RN  Anticoagulation Clinic  7/25/2024    _______________________________________________________________________     Anticoagulation Episode Summary       Current INR goal:  2.0-3.0   TTR:  47.2% (1 y)   Target end date:  Indefinite   Send INR reminders to:  [facility] [place]    Indications    Long term current use of anticoagulant therapy [Z79.01]  Cerebrovascular accident involving cerebellum (H) [I63.9]             Comments:               Anticoagulation Care Providers       Provider Role Specialty Phone number    Surya Dyer,  Referring Family Medicine [phone]

## 2024-08-01 ENCOUNTER — ANTICOAGULATION THERAPY VISIT (OUTPATIENT)
Dept: ANTICOAGULATION | Facility: CLINIC | Age: 84
End: 2024-08-01

## 2024-08-01 ENCOUNTER — LAB (OUTPATIENT)
Dept: LAB | Facility: CLINIC | Age: 84
End: 2024-08-01
Payer: COMMERCIAL

## 2024-08-01 DIAGNOSIS — Z79.01 LONG TERM CURRENT USE OF ANTICOAGULANT THERAPY: Primary | ICD-10-CM

## 2024-08-01 DIAGNOSIS — I63.9 CEREBROVASCULAR ACCIDENT INVOLVING CEREBELLUM (H): ICD-10-CM

## 2024-08-01 LAB — INR BLD: 2.4 (ref 0.9–1.1)

## 2024-08-01 PROCEDURE — 85610 PROTHROMBIN TIME: CPT

## 2024-08-01 PROCEDURE — 36416 COLLJ CAPILLARY BLOOD SPEC: CPT

## 2024-08-01 NOTE — PROGRESS NOTES
ANTICOAGULATION MANAGEMENT     Salome Saeed 84 year old female is on warfarin with therapeutic INR result. (Goal INR 2.0-3.0)    Recent labs: (last 7 days)     08/01/24  1254   INR 2.4*       ASSESSMENT     Warfarin Lab Questionnaire    Warfarin Doses Last 7 Days  Warfarin dosing verbally confirmed with patient-- taking as instructed          8/1/2024   Warfarin Lab Questionnaire   Missed doses within past 14 days? No   Changes in diet or alcohol within past 14 days? No   Medication changes since last result? No   Injuries or illness since last result? No   New shortness of breath, severe headaches or sudden changes in vision since last result? No   Abnormal bleeding since last result? No   Upcoming surgery, procedure? No        Previous result: Supratherapeutic  Additional findings: None       PLAN     Recommended plan for no diet, medication or health factor changes affecting INR     Dosing Instructions: Continue your current warfarin dose with next INR in 2 weeks       Summary  As of 8/1/2024      Full warfarin instructions:  2.5 mg every day   Next INR check:  8/15/2024               Telephone call with Loreta who verbalizes understanding and agrees to plan    Lab visit scheduled    Education provided: Please call back if any changes to your diet, medications or how you've been taking warfarin  Contact [phone] with any changes, questions or concerns.     Plan made per [facility] anticoagulation protocol    Renee Martinez RN  Anticoagulation Clinic  8/1/2024    _______________________________________________________________________     Anticoagulation Episode Summary       Current INR goal:  2.0-3.0   TTR:  47.8% (1 y)   Target end date:  Indefinite   Send INR reminders to:  [facility] [place]    Indications    Long term current use of anticoagulant therapy [Z79.01]  Cerebrovascular accident involving cerebellum (H) [I63.9]             Comments:               Anticoagulation Care Providers       Provider  Role Specialty Phone number    Surya Dyer DO Referring Family Medicine [phone]

## 2024-08-15 ENCOUNTER — LAB (OUTPATIENT)
Dept: LAB | Facility: CLINIC | Age: 84
End: 2024-08-15
Payer: COMMERCIAL

## 2024-08-15 ENCOUNTER — ANTICOAGULATION THERAPY VISIT (OUTPATIENT)
Dept: ANTICOAGULATION | Facility: CLINIC | Age: 84
End: 2024-08-15

## 2024-08-15 DIAGNOSIS — I63.9 CEREBROVASCULAR ACCIDENT INVOLVING CEREBELLUM (H): ICD-10-CM

## 2024-08-15 DIAGNOSIS — Z79.01 LONG TERM CURRENT USE OF ANTICOAGULANT THERAPY: Primary | ICD-10-CM

## 2024-08-15 LAB — INR BLD: 2.1 (ref 0.9–1.1)

## 2024-08-15 PROCEDURE — 36416 COLLJ CAPILLARY BLOOD SPEC: CPT

## 2024-08-15 PROCEDURE — 85610 PROTHROMBIN TIME: CPT

## 2024-08-15 NOTE — PROGRESS NOTES
ANTICOAGULATION MANAGEMENT     Salome Saeed 84 year old female is on warfarin with therapeutic INR result. (Goal INR 2.0-3.0)    Recent labs: (last 7 days)     08/15/24  1254   INR 2.1*       ASSESSMENT     Warfarin Lab Questionnaire    Warfarin Doses Last 7 Days  Warfarin dosing verbally confirmed with patient-- taking as instructed          8/15/2024   Warfarin Lab Questionnaire   Missed doses within past 14 days? No   Changes in diet or alcohol within past 14 days? No   Medication changes since last result? No   Injuries or illness since last result? No   New shortness of breath, severe headaches or sudden changes in vision since last result? No   Abnormal bleeding since last result? No   Upcoming surgery, procedure? No        Previous result: Therapeutic last visit; previously outside of goal range  Additional findings: None       PLAN     Recommended plan for no diet, medication or health factor changes affecting INR     Dosing Instructions: Continue your current warfarin dose with next INR in 3 weeks, suggested 2 weeks, patient declined       Summary  As of 8/15/2024      Full warfarin instructions:  2.5 mg every day   Next INR check:  9/5/2024               Telephone call with Loreta who verbalizes understanding and agrees to plan    Lab visit scheduled    Education provided: Please call back if any changes to your diet, medications or how you've been taking warfarin  Contact [phone] with any changes, questions or concerns.     Plan made per [facility] anticoagulation protocol    Renee Martinez RN  Anticoagulation Clinic  8/15/2024    _______________________________________________________________________     Anticoagulation Episode Summary       Current INR goal:  2.0-3.0   TTR:  51.6% (1 y)   Target end date:  Indefinite   Send INR reminders to:  [facility] [place]    Indications    Long term current use of anticoagulant therapy [Z79.01]  Cerebrovascular accident involving cerebellum (H) [I63.9]              Comments:               Anticoagulation Care Providers       Provider Role Specialty Phone number    Surya Dyer,  Referring Family Medicine [phone]

## 2024-08-20 ENCOUNTER — TRANSFERRED RECORDS (OUTPATIENT)
Dept: HEALTH INFORMATION MANAGEMENT | Facility: CLINIC | Age: 84
End: 2024-08-20
Payer: COMMERCIAL

## 2024-08-27 NOTE — LETTER
[facility] [place]  [address] [place] [id number] [phone]  February 19, 2019    Salome Saeed  [address] [place] [address]    Dear Salome,    We were able to refill the medication you need for a one time ton refill, but we will need to see you in the office before we can provide any further refills.  Please call [phone] to schedule an appointment for a follow up appointment at your earliest convenience.    Thank you for choosing [facility]           Detail Level: Simple

## 2024-09-03 ENCOUNTER — ANTICOAGULATION THERAPY VISIT (OUTPATIENT)
Dept: ANTICOAGULATION | Facility: CLINIC | Age: 84
End: 2024-09-03

## 2024-09-03 ENCOUNTER — LAB (OUTPATIENT)
Dept: LAB | Facility: CLINIC | Age: 84
End: 2024-09-03
Payer: COMMERCIAL

## 2024-09-03 ENCOUNTER — OFFICE VISIT (OUTPATIENT)
Dept: FAMILY MEDICINE | Facility: CLINIC | Age: 84
End: 2024-09-03
Payer: COMMERCIAL

## 2024-09-03 VITALS
WEIGHT: 130 LBS | DIASTOLIC BLOOD PRESSURE: 60 MMHG | TEMPERATURE: 97.4 F | HEIGHT: 63 IN | BODY MASS INDEX: 23.04 KG/M2 | HEART RATE: 72 BPM | OXYGEN SATURATION: 100 % | SYSTOLIC BLOOD PRESSURE: 138 MMHG | RESPIRATION RATE: 16 BRPM

## 2024-09-03 DIAGNOSIS — I63.9 CEREBROVASCULAR ACCIDENT INVOLVING CEREBELLUM (H): ICD-10-CM

## 2024-09-03 DIAGNOSIS — Z78.0 ASYMPTOMATIC POSTMENOPAUSAL STATUS: ICD-10-CM

## 2024-09-03 DIAGNOSIS — Z00.00 ENCOUNTER FOR MEDICARE ANNUAL WELLNESS EXAM: Primary | ICD-10-CM

## 2024-09-03 DIAGNOSIS — E03.9 HYPOTHYROIDISM, UNSPECIFIED TYPE: ICD-10-CM

## 2024-09-03 DIAGNOSIS — I10 ESSENTIAL HYPERTENSION, BENIGN: ICD-10-CM

## 2024-09-03 DIAGNOSIS — Z79.01 LONG TERM CURRENT USE OF ANTICOAGULANT THERAPY: Primary | ICD-10-CM

## 2024-09-03 DIAGNOSIS — I10 BENIGN ESSENTIAL HYPERTENSION: Chronic | ICD-10-CM

## 2024-09-03 DIAGNOSIS — E78.5 HYPERLIPIDEMIA LDL GOAL <70: ICD-10-CM

## 2024-09-03 LAB
ANION GAP SERPL CALCULATED.3IONS-SCNC: 10 MMOL/L (ref 7–15)
BUN SERPL-MCNC: 16.2 MG/DL (ref 8–23)
CALCIUM SERPL-MCNC: 9.4 MG/DL (ref 8.8–10.4)
CHLORIDE SERPL-SCNC: 98 MMOL/L (ref 98–107)
CHOLEST SERPL-MCNC: 148 MG/DL
CREAT SERPL-MCNC: 0.78 MG/DL (ref 0.51–0.95)
EGFRCR SERPLBLD CKD-EPI 2021: 75 ML/MIN/1.73M2
FASTING STATUS PATIENT QL REPORTED: NO
FASTING STATUS PATIENT QL REPORTED: NO
GLUCOSE SERPL-MCNC: 139 MG/DL (ref 70–99)
HCO3 SERPL-SCNC: 29 MMOL/L (ref 22–29)
HDLC SERPL-MCNC: 70 MG/DL
INR BLD: 4.6 (ref 0.9–1.1)
LDLC SERPL CALC-MCNC: 63 MG/DL
NONHDLC SERPL-MCNC: 78 MG/DL
POTASSIUM SERPL-SCNC: 3.8 MMOL/L (ref 3.4–5.3)
SODIUM SERPL-SCNC: 137 MMOL/L (ref 135–145)
TRIGL SERPL-MCNC: 76 MG/DL
TSH SERPL DL<=0.005 MIU/L-ACNC: 3.89 UIU/ML (ref 0.3–4.2)

## 2024-09-03 PROCEDURE — 99214 OFFICE O/P EST MOD 30 MIN: CPT | Mod: 25 | Performed by: FAMILY MEDICINE

## 2024-09-03 PROCEDURE — 80061 LIPID PANEL: CPT | Performed by: FAMILY MEDICINE

## 2024-09-03 PROCEDURE — 85610 PROTHROMBIN TIME: CPT

## 2024-09-03 PROCEDURE — G0439 PPPS, SUBSEQ VISIT: HCPCS | Performed by: FAMILY MEDICINE

## 2024-09-03 PROCEDURE — 84443 ASSAY THYROID STIM HORMONE: CPT | Performed by: FAMILY MEDICINE

## 2024-09-03 PROCEDURE — 36416 COLLJ CAPILLARY BLOOD SPEC: CPT

## 2024-09-03 PROCEDURE — 36415 COLL VENOUS BLD VENIPUNCTURE: CPT | Performed by: FAMILY MEDICINE

## 2024-09-03 PROCEDURE — 80048 BASIC METABOLIC PNL TOTAL CA: CPT | Performed by: FAMILY MEDICINE

## 2024-09-03 RX ORDER — PROPRANOLOL HCL 60 MG
60 CAPSULE, EXTENDED RELEASE 24HR ORAL DAILY
Qty: 90 CAPSULE | Refills: 3 | Status: SHIPPED | OUTPATIENT
Start: 2024-09-03

## 2024-09-03 RX ORDER — ROSUVASTATIN CALCIUM 40 MG/1
40 TABLET, COATED ORAL DAILY
Qty: 90 TABLET | Refills: 3 | Status: SHIPPED | OUTPATIENT
Start: 2024-09-03

## 2024-09-03 RX ORDER — LEVOTHYROXINE SODIUM 50 UG/1
50 TABLET ORAL DAILY
Qty: 90 TABLET | Refills: 3 | Status: SHIPPED | OUTPATIENT
Start: 2024-09-03

## 2024-09-03 NOTE — LETTER
[facility]  [address] [place] [address]  [phone]                    September 11, 2024    Salome Saeed  [address]  [place] [address]      Dear Salome,    Here is a summary of your recent test results:    -All of your labs are normal.     Your test results are enclosed.      Please contact me if you have any questions.    In addition, here is a list of due or overdue Health Maintenance reminders.    Health Maintenance Due   Topic Date Due    RSV VACCINE (1 - 1-dose 75+ series) Never done    Osteoporosis Screening  04/01/2024    Flu Vaccine (1) 09/01/2024    COVID-19 Vaccine (4 - 2023-24 season) 09/01/2024       Please call us at [phone] (or use [employer]) to address the above recommendations.            Thank you very much for trusting [facility].     Healthy regards,      Suray Dyer DO / [facility]          Results for orders placed or performed in visit on 09/03/24   Basic metabolic panel  (Ca, Cl, CO2, Creat, Gluc, K, Na, BUN)     Status: Abnormal   Result Value Ref Range    Sodium 137 135 - 145 mmol/L    Potassium 3.8 3.4 - 5.3 mmol/L    Chloride 98 98 - 107 mmol/L    Carbon Dioxide (CO2) 29 22 - 29 mmol/L    Anion Gap 10 7 - 15 mmol/L    Urea Nitrogen 16.2 8.0 - 23.0 mg/dL    Creatinine 0.78 0.51 - 0.95 mg/dL    GFR Estimate 75 >60 mL/min/1.73m2    Calcium 9.4 8.8 - 10.4 mg/dL    Glucose 139 (H) 70 - 99 mg/dL    Patient Fasting > 8hrs? No    TSH with free T4 reflex     Status: Normal   Result Value Ref Range    TSH 3.89 0.30 - 4.20 uIU/mL   Lipid panel reflex to direct LDL Non-fasting     Status: None   Result Value Ref Range    Cholesterol 148 <200 mg/dL    Triglycerides 76 <150 mg/dL    Direct Measure HDL 70 >=50 mg/dL    LDL Cholesterol Calculated 63 <=100 mg/dL    Non HDL Cholesterol 78 <130 mg/dL    Patient Fasting > 8hrs? No     Narrative    Cholesterol  Desirable:  <200 mg/dL    Triglycerides  Normal:  Less  than 150 mg/dL  Borderline High:  150-199 mg/dL  High:  200-499 mg/dL  Very High:  Greater than or equal to 500 mg/dL    Direct Measure HDL  Female:  Greater than or equal to 50 mg/dL   Male:  Greater than or equal to 40 mg/dL    LDL Cholesterol  Desirable:  <100mg/dL  Above Desirable:  100-129 mg/dL   Borderline High:  130-159 mg/dL   High:  160-189 mg/dL   Very High:  >= 190 mg/dL    Non HDL Cholesterol  Desirable:  130 mg/dL  Above Desirable:  130-159 mg/dL  Borderline High:  160-189 mg/dL  High:  190-219 mg/dL  Very High:  Greater than or equal to 220 mg/dL   Results for orders placed or performed in visit on 09/03/24   INR point of care (finger stick)     Status: Abnormal   Result Value Ref Range    INR 4.6 (H) 0.9 - 1.1    Narrative    This test is intended for monitoring Coumadin therapy. Results are not accurate in patients with prolonged INR due to factor deficiency.

## 2024-09-03 NOTE — PROGRESS NOTES
ANTICOAGULATION MANAGEMENT     Salome Saeed 84 year old female is on warfarin with supratherapeutic INR result. (Goal INR 2.0-3.0)    Recent labs: (last 7 days)     09/03/24  1155   INR 4.6*       ASSESSMENT     Warfarin Lab Questionnaire    Warfarin Doses Last 7 Days  Loreta was not sure what she had been taking so spoke with her , Lacho, who has been assisting with medication administration. He stated that he messed up several days last week and gave her a full tablet of warfarin. Unsure of exactly how many days.         9/3/2024   Warfarin Lab Questionnaire   Missed doses within past 14 days? No   Changes in diet or alcohol within past 14 days? No   Medication changes since last result? No   Injuries or illness since last result? No   New shortness of breath, severe headaches or sudden changes in vision since last result? No   Abnormal bleeding since last result? No   Upcoming surgery, procedure? No        Previous result: Therapeutic last 2(+) visits  Additional findings: None       PLAN     Recommended plan for temporary change(s) affecting INR     Dosing Instructions: hold dose then continue your current warfarin dose (proper maintenance dose) with next INR in 9 days       Summary  As of 9/3/2024      Full warfarin instructions:  9/3: Hold; Otherwise 2.5 mg every day   Next INR check:  9/12/2024               Telephone call with Loreta and , Lacho, who verbalizes understanding and agrees to plan    Lab visit scheduled    Education provided: Please call back if any changes to your diet, medications or how you've been taking warfarin  Taking warfarin: Importance of taking warfarin as instructed    Plan made per [facility] anticoagulation protocol    Neeru Martinez RN  Anticoagulation Clinic  9/3/2024    _______________________________________________________________________     Anticoagulation Episode Summary       Current INR goal:  2.0-3.0   TTR:  53.5% (1 y)   Target end date:  Indefinite    Send INR reminders to:  [facility] [place]    Indications    Long term current use of anticoagulant therapy [Z79.01]  Cerebrovascular accident involving cerebellum (H) [I63.9]             Comments:               Anticoagulation Care Providers       Provider Role Specialty Phone number    Surya Dyer DO [facility] [phone]

## 2024-09-03 NOTE — PATIENT INSTRUCTIONS
Patient Education   Preventive Care Advice   This is general advice given by our system to help you stay healthy. However, your care team may have specific advice just for you. Please talk to your care team about your preventive care needs.  Nutrition  Eat 5 or more servings of fruits and vegetables each day.  Try wheat bread, brown rice and whole grain pasta (instead of white bread, rice, and pasta).  Get enough calcium and vitamin D. Check the label on foods and aim for 100% of the RDA (recommended daily allowance).  Lifestyle  Exercise at least 150 minutes each week  (30 minutes a day, 5 days a week).  Do muscle strengthening activities 2 days a week. These help control your weight and prevent disease.  No smoking.  Wear sunscreen to prevent skin cancer.  Have a dental exam and cleaning every 6 months.  Yearly exams  See your health care team every year to talk about:  Any changes in your health.  Any medicines your care team has prescribed.  Preventive care, family planning, and ways to prevent chronic diseases.  Shots (vaccines)   HPV shots (up to age 26), if you've never had them before.  Hepatitis B shots (up to age 59), if you've never had them before.  COVID-19 shot: Get this shot when it's due.  Flu shot: Get a flu shot every year.  Tetanus shot: Get a tetanus shot every 10 years.  Pneumococcal, hepatitis A, and RSV shots: Ask your care team if you need these based on your risk.  Shingles shot (for age 50 and up)  General health tests  Diabetes screening:  Starting at age 35, Get screened for diabetes at least every 3 years.  If you are younger than age 35, ask your care team if you should be screened for diabetes.  Cholesterol test: At age 39, start having a cholesterol test every 5 years, or more often if advised.  Bone density scan (DEXA): At age 50, ask your care team if you should have this scan for osteoporosis (brittle bones).  Hepatitis C: Get tested at least once in your life.  STIs (sexually  transmitted infections)  Before age 24: Ask your care team if you should be screened for STIs.  After age 24: Get screened for STIs if you're at risk. You are at risk for STIs (including HIV) if:  You are sexually active with more than one person.  You don't use condoms every time.  You or a partner was diagnosed with a sexually transmitted infection.  If you are at risk for HIV, ask about PrEP medicine to prevent HIV.  Get tested for HIV at least once in your life, whether you are at risk for HIV or not.  Cancer screening tests  Cervical cancer screening: If you have a cervix, begin getting regular cervical cancer screening tests starting at age 21.  Breast cancer scan (mammogram): If you've ever had breasts, begin having regular mammograms starting at age 40. This is a scan to check for breast cancer.  Colon cancer screening: It is important to start screening for colon cancer at age 45.  Have a colonoscopy test every 10 years (or more often if you're at risk) Or, ask your provider about stool tests like a FIT test every year or Cologuard test every 3 years.  To learn more about your testing options, visit:   .  For help making a decision, visit:   https://bit.ly/cj21794.  Prostate cancer screening test: If you have a prostate, ask your care team if a prostate cancer screening test (PSA) at age 55 is right for you.  Lung cancer screening: If you are a current or former smoker ages 50 to 80, ask your care team if ongoing lung cancer screenings are right for you.  For informational purposes only. Not to replace the advice of your health care provider. Copyright   2023 [facility]. All rights reserved. Clinically reviewed by the [facility] Transitions Program. Team [employer] [id number] - REV 01/24.  Learning About Activities of Daily Living  What are activities of daily living?     Activities of daily living (ADLs) are the basic self-care tasks you do every day. These include eating, bathing, dressing,  and moving around.  As you age, and if you have health problems, you may find that it's harder to do some of these tasks. If so, your doctor can suggest ideas that may help.  To measure what kind of help you may need, your doctor will ask how well you are able to do ADLs. Let your doctor know if there are any tasks that you are having trouble doing. This is an important first step to getting help. And when you have the help you need, you can stay as independent as possible.  How will a doctor assess your ADLs?  Asking about ADLs is part of a routine health checkup your doctor will likely do as you age. Your health check might be done in a doctor's office, in your home, or at a hospital. The goal is to find out if you are having any problems that could make it hard to care for yourself or that make it unsafe for you to be on your own.  To measure your ADLs, your doctor will ask how hard it is for you to do routine tasks. Your doctor may also want to know if you have changed the way you do a task because of a health problem. Your doctor may watch how you:  Walk back and forth.  Keep your balance while you stand or walk.  Move from sitting to standing or from a bed to a chair.  Button or unbutton a shirt or sweater.  Remove and put on your shoes.  It's common to feel a little worried or anxious if you find you can't do all the things you used to be able to do. Talking with your doctor about ADLs is a way to make sure you're as safe as possible and able to care for yourself as well as you can. You may want to bring a caregiver, friend, or family member to your checkup. They can help you talk to your doctor.  Follow-up care is a key part of your treatment and safety. Be sure to make and go to all appointments, and call your doctor if you are having problems. It's also a good idea to know your test results and keep a list of the medicines you take.  Current as of: October 24, 2023  Content Version: 14.1    0259-8617  Healthwise, [employer].   Care instructions adapted under license by your healthcare professional. If you have questions about a medical condition or this instruction, always ask your healthcare professional. [employer] disclaims any warranty or liability for your use of this information.    Preventing Falls: Care Instructions  Injuries and health problems such as trouble walking or poor eyesight can increase your risk of falling. So can some medicines. But there are things you can do to help prevent falls. You can exercise to get stronger. You can also arrange your home to make it safer.    Talk to your doctor about the medicines you take. Ask if any of them increase the risk of falls and whether they can be changed or stopped.   Try to exercise regularly. It can help improve your strength and balance. This can help lower your risk of falling.     Practice fall safety and prevention.    Wear low-heeled shoes that fit well and give your feet good support. Talk to your doctor if you have foot problems that make this hard.  Carry a cellphone or wear a medical alert device that you can use to call for help.  Use stepladders instead of chairs to reach high objects. Don't climb if you're at risk for falls. Ask for help, if needed.  Wear the correct eyeglasses, if you need them.    Make your home safer.    Remove rugs, cords, clutter, and furniture from walkways.  Keep your house well lit. Use night-lights in hallways and bathrooms.  Install and use sturdy handrails on stairways.  Wear nonskid footwear, even inside. Don't walk barefoot or in socks without shoes.    Be safe outside.    Use handrails, curb cuts, and ramps whenever possible.  Keep your hands free by using a shoulder bag or backpack.  Try to walk in well-lit areas. Watch out for uneven ground, changes in pavement, and debris.  Be careful in the winter. Walk on the grass or gravel when sidewalks are slippery. Use de-icer on steps and walkways.  "Add non-slip devices to shoes.    Put grab bars and nonskid mats in your shower or tub and near the toilet. Try to use a shower chair or bath bench when bathing.   Get into a tub or shower by putting in your weaker leg first. Get out with your strong side first. Have a phone or medical alert device in the bathroom with you.   Where can you learn more?  Go to https://www.[employer].net/patiented  Enter G117 in the search box to learn more about \"Preventing Falls: Care Instructions.\"  Current as of: July 17, 2023               Content Version: 14.0    0197-8192 [employer].   Care instructions adapted under license by your healthcare professional. If you have questions about a medical condition or this instruction, always ask your healthcare professional. [employer] disclaims any warranty or liability for your use of this information.         "

## 2024-09-03 NOTE — PROGRESS NOTES
Preventive Care Visit  [facility] [place]  Surya Dyer DO, Family Medicine  Sep 3, 2024      Assessment & Plan     Encounter for Medicare annual wellness exam  Health maintenance reviewed and updated. Emphasized importance of balanced diet and regular exercise.    Essential hypertension, benign+  BP well controlled.Continue current regimen.  - Basic metabolic panel  (Ca, Cl, CO2, Creat, Gluc, K, Na, BUN); Future  - Basic metabolic panel  (Ca, Cl, CO2, Creat, Gluc, K, Na, BUN)    Hypothyroidism, unspecified type  Stable, recheck TSH and adjust levothyroxine as normal.  - TSH with free T4 reflex; Future  - levothyroxine (SYNTHROID/LEVOTHROID) 50 MCG tablet; Take 1 tablet (50 mcg) by mouth daily.  - TSH with free T4 reflex    Hyperlipidemia LDL goal <70  Stable, continue rosuvastatin.  - Lipid panel reflex to direct LDL Non-fasting; Future  - rosuvastatin (CRESTOR) 40 MG tablet; Take 1 tablet (40 mg) by mouth daily.  - Lipid panel reflex to direct LDL Non-fasting    Asymptomatic postmenopausal status  - DX Bone Density; Future    Benign essential hypertension  - propranolol ER (INDERAL LA) 60 MG 24 hr capsule; Take 1 capsule (60 mg) by mouth daily.    Patient has been advised of split billing requirements and indicates understanding: Yes        Counseling  Appropriate preventive services were addressed with this patient via screening, questionnaire, or discussion as appropriate for fall prevention, nutrition, physical activity, Tobacco-use cessation, social engagement, weight loss and cognition.  Checklist reviewing preventive services available has been given to the patient.      Efe Kan is a 84 year old, presenting for the following:  Physical        9/3/2024    12:57 PM   Additional Questions   Roomed by Tosin THOMAS CMA       Health Care Directive  Patient does not have a Health Care Directive or Living Will: Discussed advance care planning with patient; however, patient declined at this  time.    HPI    Hyperlipidemia Follow-Up  Are you regularly taking any medication or supplement to lower your cholesterol?   Yes- rosuvastatin 40 MG  Are you having muscle aches or other side effects that you think could be caused by your cholesterol lowering medication?  No    Hypertension Follow-up  Do you check your blood pressure regularly outside of the clinic? Yes   Are you following a low salt diet? Yes  Are your blood pressures ever more than 140 on the top number (systolic) OR more   than 90 on the bottom number (diastolic), for example 140/90? Yes    Anxiety   How are you doing with your anxiety since your last visit? No change  Are you having other symptoms that might be associated with anxiety? No  Have you had a significant life event? No   Are you feeling depressed? No  Do you have any concerns with your use of alcohol or other drugs? No      INR supratherapeutic: she states she hasn't been eating as many greens as usual.     Social History     Tobacco Use    Smoking status: Former     Current packs/day: 0.00     Types: Cigarettes     Quit date: 1965     Years since quittin.7    Smokeless tobacco: Never   Vaping Use    Vaping status: Never Used   Substance Use Topics    Alcohol use: Yes     Alcohol/week: 0.0 standard drinks of alcohol     Comment: 2 glasses wine per month, maybe    Drug use: No         3/5/2019    11:45 AM 2/15/2021    12:22 PM 2023    12:38 PM   VALERIA-7 SCORE   Total Score   2 (minimal anxiety)   Total Score 0 0 2         3/5/2019    11:45 AM 2/15/2021    12:22 PM 2023    12:37 PM   PHQ   PHQ-9 Total Score 0 0 4   Q9: Thoughts of better off dead/self-harm past 2 weeks Not at all Not at all Not at all       Hypothyroidism Follow-up    Since last visit, patient describes the following symptoms: Weight stable, no hair loss, no skin changes, no constipation, no loose stools        9/3/2024   General Health   How would you rate your overall physical health? (!) FAIR   Feel  stress (tense, anxious, or unable to sleep) Only a little            9/3/2024   Nutrition   Diet: Regular (no restrictions)            9/3/2024   Exercise   Days per week of moderate/strenous exercise 0 days            9/3/2024   Social Factors   Worry food won't last until get money to buy more No   Food not last or not have enough money for food? No   Do you have housing? (Housing is defined as stable permanent housing and does not include staying ouside in a car, in a tent, in an abandoned building, in an overnight shelter, or couch-surfing.) Yes   Are you worried about losing your housing? No   Lack of transportation? No   Unable to get utilities (heat,electricity)? No            9/3/2024   Fall Risk   Fallen 2 or more times in the past year? Yes    Yes   Trouble with walking or balance? Yes    Yes   Gait Speed Test (Document in seconds) 4.97   Gait Speed Test Interpretation Less than or equal to 5.00 seconds - PASS       Multiple values from one day are sorted in reverse-chronological order          9/3/2024   Activities of Daily Living- Home Safety   Needs help with the following daily activites None of the above   Safety concerns in the home No grab bars in the bathroom            9/3/2024   Dental   Dentist two times every year? Yes            9/3/2024   Hearing Screening   Hearing concerns? None of the above            9/3/2024   Driving Risk Screening   Patient/family members have concerns about driving No            9/3/2024   General Alertness/Fatigue Screening   Have you been more tired than usual lately? No            9/3/2024   Urinary Incontinence Screening   Bothered by leaking urine in past 6 months No            9/3/2024   TB Screening   Were you born outside of the [place]? Yes            Today's PHQ-2 Score:       9/3/2024    12:54 PM   PHQ-2 ( 1999 Pfizer)   Q1: Little interest or pleasure in doing things 0   Q2: Feeling down, depressed or hopeless 0   PHQ-2 Score 0   Q1: Little interest or pleasure  in doing things Not at all   Q2: Feeling down, depressed or hopeless Not at all   PHQ-2 Score 0           9/3/2024   Substance Use   Alcohol more than 3/day or more than 7/wk No   Do you have a current opioid prescription? No   How severe/bad is pain from 1 to 10? 6/10   Do you use any other substances recreationally? No        Social History     Tobacco Use    Smoking status: Former     Current packs/day: 0.00     Types: Cigarettes     Quit date: 1965     Years since quittin.7    Smokeless tobacco: Never   Vaping Use    Vaping status: Never Used   Substance Use Topics    Alcohol use: Yes     Alcohol/week: 0.0 standard drinks of alcohol     Comment: 2 glasses wine per month, maybe    Drug use: No       Mammogram Screening - After age 74- determine frequency with patient based on health status, life expectancy and patient goals      Reviewed and updated as needed this visit by Provider   Tobacco     Med Hx  Surg Hx  Fam Hx  Soc Hx Sexual Activity          Past Medical History:   Diagnosis Date    CAD (coronary artery disease) 2009: PTCA and two 2.5x15mm Xience stents to mid LAD lesion; Cath 2012- 40-50% stenosis in mid PDA, 80% on D1- medically treat , 2015 - PTCA and 2.25x8mm Promus PREMIIER NAILA to ostium of 2nd OM    Cerebral artery occlusion with cerebral infarction     CVA (cerebral infarction)     DDD (degenerative disc disease)     Essential hypertension, benign     GERD (gastroesophageal reflux disease)     Hyperlipidemia     Hypothyroidism     Lumbar spinal stenosis     Mitral regurgitation     1+ per 2013 Echo    Vertebral artery stenosis, right      Past Surgical History:   Procedure Laterality Date    ARTHROPLASTY KNEE Left 2020    Procedure: Left total knee arthroplasty (Ward and Nephew #5 narrow femur; #3 tibia; 9 mm tibial poly and 35 mm patella);  Surgeon: Jorge Luis Cheatham MD;  Location: [facility] OR    CORONARY ANGIOGRAPHY ADULT ORDER  2012    40-50%  stenosis to mid PDA, 80% in D1- medically treat    CORONARY ANGIOGRAPHY ADULT ORDER  5/28/2015    PTCA and 2.25x8mm Promus PREMIER NAILA to ostium of 2nd OM    CV CORONARY ANGIOGRAM N/A 5/5/2023    Procedure: Coronary Angiogram;  Surgeon: Wilfrido Robert MD;  Location: [facility]    DECOMPRESSION, FUSION LUMBAR POSTERIOR THREE + LEVELS, COMBINED  5/8/2013    Procedure: COMBINED DECOMPRESSION, FUSION LUMBAR POSTERIOR THREE + LEVELS;  Posterior Lumbar Decompression L3-S1, Fusion L4-S1;  Surgeon: Daniel Harris MD;  Location: [facility] OR    ENDOSCOPIC STRIPPING VEIN(S)      HEART CATH, ANGIOPLASTY  4/8/2009    PTCA and two 2.5x15mm Xience stents to mid LAD lesion    HYSTERECTOMY, RICKIE      Fibroids, and Menorrhagia.. Bilateral Oophrectomy    ORTHOPEDIC SURGERY      Stenting of LAD, Angioplasty  4/8/09    TONSILLECTOMY      as a child     Current providers sharing in care for this patient include:  Patient Care Team:  Surya Dyer DO as PCP - General (Family Practice)  Surya Dyer DO as Assigned PCP  Pj Keene MD as Assigned Neuroscience Provider  Nikolai Amaya MD as Assigned Heart and Vascular Provider    The following health maintenance items are reviewed in Epic and correct as of today:  Health Maintenance   Topic Date Due    RSV VACCINE (1 - 1-dose 60+ series) Never done    MEDICARE ANNUAL WELLNESS VISIT  02/15/2022    DEXA  04/01/2024    INFLUENZA VACCINE (1) 09/01/2024    COVID-19 Vaccine (4 - 2023-24 season) 09/01/2024    LIPID  10/05/2024    TSH W/FREE T4 REFLEX  06/26/2025    ANNUAL REVIEW OF HM ORDERS  09/03/2025    FALL RISK ASSESSMENT  09/03/2025    DTAP/TDAP/TD IMMUNIZATION (2 - Td or Tdap) 04/30/2029    ADVANCE CARE PLANNING  09/03/2029    PHQ-2 (once per calendar year)  Completed    Pneumococcal Vaccine: 65+ Years  Completed    ZOSTER IMMUNIZATION  Completed    HPV IMMUNIZATION  Aged Out    MENINGITIS IMMUNIZATION  Aged Out    RSV MONOCLONAL  "ANTIBODY  Aged Out    MAMMO SCREENING  Discontinued         Review of Systems  Constitutional, HEENT, cardiovascular, pulmonary, gi and gu systems are negative, except as otherwise noted.     Objective    Exam  /60   Pulse 72   Temp 97.4  F (36.3  C) (Tympanic)   Resp 16   Ht 1.6 m (5' 3\")   Wt 59 kg (130 lb)   LMP  (LMP Unknown)   SpO2 100%   BMI 23.03 kg/m     Estimated body mass index is 23.03 kg/m  as calculated from the following:    Height as of this encounter: 1.6 m (5' 3\").    Weight as of this encounter: 59 kg (130 lb).    Physical Exam  GENERAL: alert and no distress  EYES: Eyes grossly normal to inspection  HENT: ear canals and TM's normal, nose and mouth without ulcers or lesions  NECK: no adenopathy, no asymmetry, masses, or scars  RESP: lungs clear to auscultation - no rales, rhonchi or wheezes  CV: regular rates and rhythm, normal S1 S2, no S3 or S4, and no murmur, click or rub  MS: no gross musculoskeletal defects noted, no edema  SKIN: no suspicious lesions or rashes  PSYCH: mentation appears normal, affect normal/bright         9/3/2024   Mini Cog   Clock Draw Score 0 Abnormal   3 Item Recall 3 objects recalled   Mini Cog Total Score 3                 Signed Electronically by: Surya Dyer DO  "

## 2024-09-12 ENCOUNTER — ANTICOAGULATION THERAPY VISIT (OUTPATIENT)
Dept: ANTICOAGULATION | Facility: CLINIC | Age: 84
End: 2024-09-12

## 2024-09-12 ENCOUNTER — LAB (OUTPATIENT)
Dept: LAB | Facility: CLINIC | Age: 84
End: 2024-09-12
Payer: COMMERCIAL

## 2024-09-12 DIAGNOSIS — I63.9 CEREBROVASCULAR ACCIDENT INVOLVING CEREBELLUM (H): ICD-10-CM

## 2024-09-12 DIAGNOSIS — Z79.01 LONG TERM CURRENT USE OF ANTICOAGULANT THERAPY: Primary | ICD-10-CM

## 2024-09-12 LAB — INR BLD: 1.9 (ref 0.9–1.1)

## 2024-09-12 PROCEDURE — 36415 COLL VENOUS BLD VENIPUNCTURE: CPT

## 2024-09-12 PROCEDURE — 85610 PROTHROMBIN TIME: CPT

## 2024-09-12 NOTE — PROGRESS NOTES
Patient called. Wanted to clarify warfarin dosing as she was rewriting the information on another calendar and did not want to make a mistake.  Renee Martinez RN, BSN  Anticoagulation Clinic

## 2024-09-12 NOTE — PROGRESS NOTES
ANTICOAGULATION MANAGEMENT     Salome Saeed 84 year old female is on warfarin with subtherapeutic INR result. (Goal INR 2.0-3.0)    Recent labs: (last 7 days)     09/12/24  1257   INR 1.9*       ASSESSMENT     Warfarin Lab Questionnaire    Warfarin Doses Last 7 Days  Warfarin dosing verbally confirmed with patient-- taking as instructed          9/12/2024   Warfarin Lab Questionnaire   Missed doses within past 14 days? No   Changes in diet or alcohol within past 14 days? No   Medication changes since last result? No   Injuries or illness since last result? No   New shortness of breath, severe headaches or sudden changes in vision since last result? No   Abnormal bleeding since last result? No   Upcoming surgery, procedure? No        Previous result: Supratherapeutic  Additional findings: None       PLAN     Recommended plan for no diet, medication or health factor changes affecting INR     Dosing Instructions: Increase your warfarin dose (14.3% change), smallest adjustment with current tablet size, with next INR in 1 week       Summary  As of 9/12/2024      Full warfarin instructions:  5 mg every Thu; 2.5 mg all other days   Next INR check:  9/19/2024               Telephone call with Loreta who agrees to plan and repeated back plan correctly    Lab visit scheduled    Education provided: Please call back if any changes to your diet, medications or how you've been taking warfarin  Contact [phone] with any changes, questions or concerns.     Plan made per [facility] anticoagulation protocol; closest % change with current tablet size made per protocol for for INR 1.7-1.9 (goal 2-3)    Renee Martinez RN  9/12/2024  Anticoagulation Clinic  InNetwork for routing messages: yobany NOLE [facility] patient phone line: [phone]        _______________________________________________________________________     Anticoagulation Episode Summary       Current INR goal:  2.0-3.0   TTR:  52.9% (1 y)   Target end date:   Indefinite   Send INR reminders to:  [facility] [place]    Indications    Long term current use of anticoagulant therapy [Z79.01]  Cerebrovascular accident involving cerebellum (H) [I63.9]             Comments:               Anticoagulation Care Providers       Provider Role Specialty Phone number    Surya Dyer DO Referring [facility] [phone]

## 2024-09-13 ENCOUNTER — OFFICE VISIT (OUTPATIENT)
Dept: CARDIOLOGY | Facility: CLINIC | Age: 84
End: 2024-09-13
Payer: COMMERCIAL

## 2024-09-13 VITALS
DIASTOLIC BLOOD PRESSURE: 70 MMHG | BODY MASS INDEX: 22.95 KG/M2 | WEIGHT: 129.5 LBS | HEART RATE: 58 BPM | HEIGHT: 63 IN | SYSTOLIC BLOOD PRESSURE: 162 MMHG

## 2024-09-13 DIAGNOSIS — Z79.01 LONG TERM CURRENT USE OF ANTICOAGULANT THERAPY: ICD-10-CM

## 2024-09-13 DIAGNOSIS — I10 BENIGN ESSENTIAL HYPERTENSION: Chronic | ICD-10-CM

## 2024-09-13 DIAGNOSIS — I25.10 CORONARY ARTERY DISEASE INVOLVING NATIVE CORONARY ARTERY OF NATIVE HEART WITHOUT ANGINA PECTORIS: Primary | ICD-10-CM

## 2024-09-13 DIAGNOSIS — E78.5 HYPERLIPIDEMIA LDL GOAL <70: ICD-10-CM

## 2024-09-13 DIAGNOSIS — G20.C PARKINSONISM, UNSPECIFIED PARKINSONISM TYPE (H): ICD-10-CM

## 2024-09-13 PROCEDURE — 99214 OFFICE O/P EST MOD 30 MIN: CPT | Performed by: INTERNAL MEDICINE

## 2024-09-13 NOTE — LETTER
9/13/2024    Surya Dyer, DO  [address] [facility] [address]    RE: Salome SPRAGUE Darlin       Dear Colleague,     I had the pleasure of seeing Salome Saeed in the [facility].  HPI and Plan:   Loreta is a very nice 84-year-old woman with past medical history significant for unstable angina leading to 2 Xience drug-eluting stents placed in her left anterior descending in 2009.  She has hypercholesterolemia, hypertension, hypothyroidism, back pain with multiple surgeries by Dr. Daniel Harris, a strong family history of coronary disease, Parkinson disease, cerebrovascular accident in 2013 manifesting as balance issues.  This was thought to be an embolic event from her vertebral artery for which she has been on warfarin ever since.     Additional coronary history includes chest discomfort in 2012 with angiogram demonstrating no change in her anatomy.  She had moderate disease in posterior descending artery, ostial pinch in her diagonal and obtuse marginal that I recommended medical management.       Repeat angiography in 2015 with classic anginal symptoms showed no change in her anatomy and we continued medical management.  One month later, she was back in the emergency room.  I took her back to the Cath Lab where I stented a small obtuse marginal branch, placing a 2.25 x 8 mm Promus drug-eluting stent in the ostium of the obtuse marginal.  She had problems with the radial approach with ecchymoses and pain but this resolved over time.       In 2017, after several back surgeries by Dr. Daniel Harris, she was having chest discomfort with a stress nuclear scan that demonstrated no evidence of ischemia.  She also had followup Lexiscans in 2019, 2020 and  October 2022 for similar symptoms.     Loreta has had problems with hypotension and orthostasis for which we had to back off on her medications.     In May 2023 because of persistent symptoms I sent her back to the lab where she is found  to have scattered mild to moderate disease titer stenosis being a 40 to 50% narrowing of her posterior descending artery.    In May she was hospitalized with confusion that was thought to be taking too much of her medications.  In June she was hospitalized with hypertension and chest discomfort.  Troponins were elevated.  Echocardiogram appeared to be normal with ejection fraction of 55 to 60% and no significant valvular pathology.  A Lexiscan appeared to be normal.    Loreta returns to clinic stating she is doing quite well.  She has no chest, arm, neck, jaw or shoulder discomfort.  No dyspnea on exertion, orthopnea, or PND.  No palpitations, lightheadedness, dizziness, syncope, or near syncope.  No peripheral edema.  She is having no bleeding problems or any symptoms to suggest a TIA or CVA.  She notes no side effects or problems with her current medical regimen       Assessment and plan.  Loreta appears to be doing well from a cardiac standpoint without clinical evidence of ischemia, heart failure or significant arrhythmia.  This is supported by her coronary angiogram from May 2023 and her Lexiscan from June 2024.    Blood pressure is high today but I suspect she is anxious with whitecoat hypertension.  Review of records show her last 4 clinic visits to all be in the normal range     Fasting lipid profile is acceptable with total cholesterol 148, HDL 70, LDL 63 and triglycerides of 76..     She is having no bleeding problems on her warfarin.    She states her ability to exercise is limited by her chronic back problems.  I have encouraged her to exercise as much as she is capable of doing.     I will have her follow-up with my RASHAD in 6 months.  She will establish with a new cardiologist in 1 year.  Thank you for allow me to participate in this patient's care.  Sincerely,                                Nikolai Amaya MD [facility]    Today's clinic visit entailed:  Review of external notes as documented elsewhere in  note  Review of the result(s) of each unique test - echocardiogram, Lexiscan, lab work  Ordering of each unique test  Prescription drug management  30 minutes spent by me on the date of the encounter doing chart review, history and exam, documentation and further activities per the note  Provider  Link to [facility] Help Grid     The level of medical decision making during this visit was of moderate complexity.      Orders Placed This Encounter   Procedures     Basic metabolic panel     Follow-Up with Cardiology RASHAD       No orders of the defined types were placed in this encounter.      Medications Discontinued During This Encounter   Medication Reason     Calcium Carb-Cholecalciferol 600-5 MG-MCG TABS      Coenzyme Q10 (COQ-10) 200 MG CAPS      multivitamin, therapeutic (THERA-VIT) TABS tablet      prednisoLONE acetate (PRED FORTE) 1 % ophthalmic suspension      vitamin B complex with vitamin C (STRESS TAB) tablet      vitamin C (ASCORBIC ACID) 1000 MG TABS          Encounter Diagnoses   Name Primary?     Coronary artery disease involving native coronary artery of native heart without angina pectoris Yes     Benign essential hypertension      Hyperlipidemia LDL goal <70      Long term current use of anticoagulant therapy      Parkinsonism, unspecified Parkinsonism type (H)        CURRENT MEDICATIONS:  Current Outpatient Medications   Medication Sig Dispense Refill     acetaminophen (TYLENOL) 500 MG tablet Take 500-1,000 mg by mouth every 6 hours as needed for mild pain       aspirin EC 81 MG tablet Take 1 tablet (81 mg) by mouth daily       carbidopa-levodopa (SINEMET)  MG tablet Take 1 tablet by mouth 3 times daily 1000, 1600, and 2200 270 tablet 0     Cholecalciferol (VITAMIN D) 2000 UNITS tablet Take 2,000 Units by mouth daily.       diclofenac (VOLTAREN) 1 % topical gel Apply 4 g topically 4 times daily as needed for moderate pain 350 g 1     escitalopram (LEXAPRO) 5 MG tablet Take 1 tablet (5 mg) by mouth  daily 30 tablet 0     furosemide (LASIX) 20 MG tablet Take 1 tablet (20 mg) by mouth daily 90 tablet 0     levothyroxine (SYNTHROID/LEVOTHROID) 50 MCG tablet Take 1 tablet (50 mcg) by mouth daily. 90 tablet 3     lisinopril (ZESTRIL) 30 MG tablet Take 1 tablet (30 mg) by mouth daily 90 tablet 1     Magnesium 400 MG CAPS Take 400 mg by mouth daily       Melatonin 10 MG TABS tablet Take 10 mg by mouth nightly as needed for sleep       nitroGLYcerin (NITROSTAT) 0.4 MG sublingual tablet Place 1 tablet (0.4 mg) under the tongue every 5 minutes as needed for chest pain 25 tablet 2     propranolol ER (INDERAL LA) 60 MG 24 hr capsule Take 1 capsule (60 mg) by mouth daily. 90 capsule 3     rosuvastatin (CRESTOR) 40 MG tablet Take 1 tablet (40 mg) by mouth daily. 90 tablet 3     warfarin ANTICOAGULANT (COUMADIN) 5 MG tablet Take by mouth daily Take 5 mg on Fridays and 2.5 mg on all other days         ALLERGIES     Allergies   Allergen Reactions     Atorvastatin      Leg cramps     Cats Itching     Gluten      Sinuses affected by gluten     Shellfish Allergy      hives       PAST MEDICAL HISTORY:  Past Medical History:   Diagnosis Date     CAD (coronary artery disease) 04/09/2009 4/8/2009: PTCA and two 2.5x15mm Xience stents to mid LAD lesion; Cath 12/2012- 40-50% stenosis in mid PDA, 80% on D1- medically treat , 5/28/2015 - PTCA and 2.25x8mm Promus PREMIIER NAILA to ostium of 2nd OM     Cerebral artery occlusion with cerebral infarction 2012     CVA (cerebral infarction)      DDD (degenerative disc disease)      Essential hypertension, benign      GERD (gastroesophageal reflux disease)      Hyperlipidemia      Hypothyroidism      Lumbar spinal stenosis      Mitral regurgitation     1+ per 7/2013 Echo     Vertebral artery stenosis, right        PAST SURGICAL HISTORY:  Past Surgical History:   Procedure Laterality Date     ARTHROPLASTY KNEE Left 6/1/2020    Procedure: Left total knee arthroplasty (Ward [employer] Nephew #5 narrow  femur; #3 tibia; 9 mm tibial poly and 35 mm patella);  Surgeon: Jorge Luis Cheatham MD;  Location: [facility] OR     CORONARY ANGIOGRAPHY ADULT ORDER  2012    40-50% stenosis to mid PDA, 80% in D1- medically treat     CORONARY ANGIOGRAPHY ADULT ORDER  2015    PTCA and 2.25x8mm Promus PREMIER NAILA to ostium of 2nd OM     CV CORONARY ANGIOGRAM N/A 2023    Procedure: Coronary Angiogram;  Surgeon: Wilfrido Robert MD;  Location:  HEART CARDI[facility] CATH LAB     DECOMPRESSION, FUSION LUMBAR POSTERIOR THREE + LEVELS, COMBINED  2013    Procedure: COMBINED DECOMPRESSION, FUSION LUMBAR POSTERIOR THREE + LEVELS;  Posterior Lumbar Decompression L3-S1, Fusion L4-S1;  Surgeon: Daniel Harris MD;  Location: [facility] OR     ENDOSCOPIC STRIPPING VEIN(S)       HEART CATH, ANGIOPLASTY  2009    PTCA and two 2.5x15mm Xience stents to mid LAD lesion     HYSTERECTOMY, RICKIE      Fibroids, and Menorrhagia.. Bilateral Oophrectomy     ORTHOPEDIC SURGERY       Stenting of LAD, Angioplasty  09     TONSILLECTOMY      as a child       FAMILY HISTORY:  Family History   Problem Relation Age of Onset     Neurologic Disorder Mother         Dementia, Still living     Ovarian Cancer Mother      Thyroid Disease Mother      C.A.D. Father              Diabetes Father      Coronary Artery Disease Father      Alcohol/Drug Brother      Thyroid Disease Son      Diabetes Son        SOCIAL HISTORY:  Social History     Socioeconomic History     Marital status:      Spouse name: None     Number of children: None     Years of education: None     Highest education level: None   Tobacco Use     Smoking status: Former     Current packs/day: 0.00     Types: Cigarettes     Quit date: 1965     Years since quittin.7     Smokeless tobacco: Never   Vaping Use     Vaping status: Never Used   Substance and Sexual Activity     Alcohol use: Yes     Comment: occ     Drug use: No     Sexual activity: Not Currently   Other Topics Concern      Blood Transfusions No     Caffeine Concern Yes     Comment: 5 cups caffeine per day     Sleep Concern No     Stress Concern No     Weight Concern No     Comment: weight stable     Special Diet No     Comment: trying to eat healthier     Exercise Yes     Comment: stretching     Seat Belt Yes     Self-Exams Yes     Parent/sibling w/ CABG, MI or angioplasty before 65F 55M? Yes   Social History Narrative    Works in an office     Social Determinants of Health     Financial Resource Strain: Low Risk  (9/3/2024)    Financial Resource Strain      Within the past 12 months, have you or your family members you live with been unable to get utilities (heat, electricity) when it was really needed?: No   Food Insecurity: Low Risk  (9/3/2024)    Food Insecurity      Within the past 12 months, did you worry that your food would run out before you got money to buy more?: No      Within the past 12 months, did the food you bought just not last and you didn t have money to get more?: No   Transportation Needs: Low Risk  (9/3/2024)    Transportation Needs      Within the past 12 months, has lack of transportation kept you from medical appointments, getting your medicines, non-medical meetings or appointments, work, or from getting things that you need?: No   Physical Activity: Unknown (9/3/2024)    Exercise Vital Sign      Days of Exercise per Week: 0 days   Stress: No Stress Concern Present (9/3/2024)    [employer] - Occupational Stress Questionnaire      Feeling of Stress : Only a little   Interpersonal Safety: Low Risk  (9/3/2024)    Interpersonal Safety      Do you feel physically and emotionally safe where you currently live?: Yes      Within the past 12 months, have you been hit, slapped, kicked or otherwise physically hurt by someone?: No      Within the past 12 months, have you been humiliated or emotionally abused in other ways by your partner or ex-partner?: No   Housing Stability: Low Risk   "(9/3/2024)    Housing Stability      Do you have housing? : Yes      Are you worried about losing your housing?: No       Review of Systems:  Skin:  not assessed       Eyes:  not assessed      ENT:  not assessed      Respiratory:  Positive for dyspnea on exertion     Cardiovascular:    Positive for;fatigue;lightheadedness;dizziness    Gastroenterology: not assessed      Genitourinary:  not assessed      Musculoskeletal:  not assessed      Neurologic:  not assessed      Psychiatric:  not assessed      Heme/Lymph/Imm:  not assessed      Endocrine:  not assessed        Physical Exam:  Vitals: BP (!) 162/70 (BP Location: Right arm, Patient Position: Sitting, Cuff Size: Adult Regular)   Pulse 58   Ht 1.6 m (5' 3\")   Wt 58.7 kg (129 lb 8 oz)   LMP  (LMP Unknown)   BMI 22.94 kg/m      Constitutional:  cooperative, alert and oriented, well developed, well nourished, in no acute distress overweight      Skin:  warm and dry to the touch, no apparent skin lesions or masses noted          Head:  normocephalic, no masses or lesions        Eyes:  pupils equal and round, conjunctivae and lids unremarkable, sclera white, no xanthalasma, EOMS intact, no nystagmus        Lymph:      ENT:  no pallor or cyanosis, dentition good        Neck:  no carotid bruit        Respiratory:  normal breath sounds, clear to auscultation, normal A-P diameter, normal symmetry, normal respiratory excursion, no use of accessory muscles         Cardiac: regular rhythm       LUSB;grade 2;systolic ejection murmur        pulses full and equal                                   left wrist    GI:           Extremities and Muscular Skeletal:  no edema;no spinal abnormalities noted;normal muscle strength and tone         left wrist and forearm ecchymotic and mildy swollen without hum or bruit    Neurological:  no gross motor deficits        Psych:  affect appropriate, oriented to time, person and place        CC  No referring provider defined for this " encounter.                Thank you for allowing me to participate in the care of your patient.      Sincerely,     Nikolai Amaya MD     [facility]  cc:   No referring provider defined for this encounter.

## 2024-09-13 NOTE — PROGRESS NOTES
HPI and Plan:   Loreta is a very nice 84-year-old woman with past medical history significant for unstable angina leading to 2 Xience drug-eluting stents placed in her left anterior descending in 2009.  She has hypercholesterolemia, hypertension, hypothyroidism, back pain with multiple surgeries by Dr. Daniel Harris, a strong family history of coronary disease, Parkinson disease, cerebrovascular accident in 2013 manifesting as balance issues.  This was thought to be an embolic event from her vertebral artery for which she has been on warfarin ever since.     Additional coronary history includes chest discomfort in 2012 with angiogram demonstrating no change in her anatomy.  She had moderate disease in posterior descending artery, ostial pinch in her diagonal and obtuse marginal that I recommended medical management.       Repeat angiography in 2015 with classic anginal symptoms showed no change in her anatomy and we continued medical management.  One month later, she was back in the emergency room.  I took her back to the Cath Lab where I stented a small obtuse marginal branch, placing a 2.25 x 8 mm Promus drug-eluting stent in the ostium of the obtuse marginal.  She had problems with the radial approach with ecchymoses and pain but this resolved over time.       In 2017, after several back surgeries by Dr. Daniel Harris, she was having chest discomfort with a stress nuclear scan that demonstrated no evidence of ischemia.  She also had followup Lexiscans in 2019, 2020 and  October 2022 for similar symptoms.     Loreta has had problems with hypotension and orthostasis for which we had to back off on her medications.     In May 2023 because of persistent symptoms I sent her back to the lab where she is found to have scattered mild to moderate disease titer stenosis being a 40 to 50% narrowing of her posterior descending artery.    In May she was hospitalized with confusion that was thought to be taking too much of her  medications.  In June she was hospitalized with hypertension and chest discomfort.  Troponins were elevated.  Echocardiogram appeared to be normal with ejection fraction of 55 to 60% and no significant valvular pathology.  A Lexiscan appeared to be normal.    Loreta returns to clinic stating she is doing quite well.  She has no chest, arm, neck, jaw or shoulder discomfort.  No dyspnea on exertion, orthopnea, or PND.  No palpitations, lightheadedness, dizziness, syncope, or near syncope.  No peripheral edema.  She is having no bleeding problems or any symptoms to suggest a TIA or CVA.  She notes no side effects or problems with her current medical regimen       Assessment and plan.  Loreta appears to be doing well from a cardiac standpoint without clinical evidence of ischemia, heart failure or significant arrhythmia.  This is supported by her coronary angiogram from May 2023 and her Lexiscan from June 2024.    Blood pressure is high today but I suspect she is anxious with whitecoat hypertension.  Review of records show her last 4 clinic visits to all be in the normal range     Fasting lipid profile is acceptable with total cholesterol 148, HDL 70, LDL 63 and triglycerides of 76..     She is having no bleeding problems on her warfarin.    She states her ability to exercise is limited by her chronic back problems.  I have encouraged her to exercise as much as she is capable of doing.     I will have her follow-up with my RASHAD in 6 months.  She will establish with a new cardiologist in 1 year.  Thank you for allow me to participate in this patient's care.  Sincerely,                                Nikolai Amaya MD [facility]    Today's clinic visit entailed:  Review of external notes as documented elsewhere in note  Review of the result(s) of each unique test - echocardiogram, Lexiscan, lab work  Ordering of each unique test  Prescription drug management  30 minutes spent by me on the date of the encounter doing chart  review, history and exam, documentation and further activities per the note  Provider  Link to MDM Help Grid     The level of medical decision making during this visit was of moderate complexity.      Orders Placed This Encounter   Procedures    Basic metabolic panel    Follow-Up with Cardiology RASHAD       No orders of the defined types were placed in this encounter.      Medications Discontinued During This Encounter   Medication Reason    Calcium Carb-Cholecalciferol 600-5 MG-MCG TABS     Coenzyme Q10 (COQ-10) 200 MG CAPS     multivitamin, therapeutic (THERA-VIT) TABS tablet     prednisoLONE acetate (PRED FORTE) 1 % ophthalmic suspension     vitamin B complex with vitamin C (STRESS TAB) tablet     vitamin C (ASCORBIC ACID) 1000 MG TABS          Encounter Diagnoses   Name Primary?    Coronary artery disease involving native coronary artery of native heart without angina pectoris Yes    Benign essential hypertension     Hyperlipidemia LDL goal <70     Long term current use of anticoagulant therapy     Parkinsonism, unspecified Parkinsonism type (H)        CURRENT MEDICATIONS:  Current Outpatient Medications   Medication Sig Dispense Refill    acetaminophen (TYLENOL) 500 MG tablet Take 500-1,000 mg by mouth every 6 hours as needed for mild pain      aspirin EC 81 MG tablet Take 1 tablet (81 mg) by mouth daily      carbidopa-levodopa (SINEMET)  MG tablet Take 1 tablet by mouth 3 times daily 1000, 1600, and 2200 270 tablet 0    Cholecalciferol (VITAMIN D) 2000 UNITS tablet Take 2,000 Units by mouth daily.      diclofenac (VOLTAREN) 1 % topical gel Apply 4 g topically 4 times daily as needed for moderate pain 350 g 1    escitalopram (LEXAPRO) 5 MG tablet Take 1 tablet (5 mg) by mouth daily 30 tablet 0    furosemide (LASIX) 20 MG tablet Take 1 tablet (20 mg) by mouth daily 90 tablet 0    levothyroxine (SYNTHROID/LEVOTHROID) 50 MCG tablet Take 1 tablet (50 mcg) by mouth daily. 90 tablet 3    lisinopril (ZESTRIL) 30  MG tablet Take 1 tablet (30 mg) by mouth daily 90 tablet 1    Magnesium 400 MG CAPS Take 400 mg by mouth daily      Melatonin 10 MG TABS tablet Take 10 mg by mouth nightly as needed for sleep      nitroGLYcerin (NITROSTAT) 0.4 MG sublingual tablet Place 1 tablet (0.4 mg) under the tongue every 5 minutes as needed for chest pain 25 tablet 2    propranolol ER (INDERAL LA) 60 MG 24 hr capsule Take 1 capsule (60 mg) by mouth daily. 90 capsule 3    rosuvastatin (CRESTOR) 40 MG tablet Take 1 tablet (40 mg) by mouth daily. 90 tablet 3    warfarin ANTICOAGULANT (COUMADIN) 5 MG tablet Take by mouth daily Take 5 mg on Fridays and 2.5 mg on all other days         ALLERGIES     Allergies   Allergen Reactions    Atorvastatin      Leg cramps    Cats Itching    Gluten      Sinuses affected by gluten    Shellfish Allergy      hives       PAST MEDICAL HISTORY:  Past Medical History:   Diagnosis Date    CAD (coronary artery disease) 04/09/2009 4/8/2009: PTCA and two 2.5x15mm Xience stents to mid LAD lesion; Cath 12/2012- 40-50% stenosis in mid PDA, 80% on D1- medically treat , 5/28/2015 - PTCA and 2.25x8mm Promus PREMIIER NAILA to ostium of 2nd OM    Cerebral artery occlusion with cerebral infarction 2012    CVA (cerebral infarction)     DDD (degenerative disc disease)     Essential hypertension, benign     GERD (gastroesophageal reflux disease)     Hyperlipidemia     Hypothyroidism     Lumbar spinal stenosis     Mitral regurgitation     1+ per 7/2013 Echo    Vertebral artery stenosis, right        PAST SURGICAL HISTORY:  Past Surgical History:   Procedure Laterality Date    ARTHROPLASTY KNEE Left 6/1/2020    Procedure: Left total knee arthroplasty (Ward and Nephew #5 narrow femur; #3 tibia; 9 mm tibial poly and 35 mm patella);  Surgeon: Jorge Luis Cheatham MD;  Location: [facility] OR    CORONARY ANGIOGRAPHY ADULT ORDER  12/6/2012    40-50% stenosis to mid PDA, 80% in D1- medically treat    CORONARY ANGIOGRAPHY ADULT ORDER  5/28/2015     PTCA and 2.25x8mm Promus PREMIER NAILA to ostium of 2nd OM    CV CORONARY ANGIOGRAM N/A 2023    Procedure: Coronary Angiogram;  Surgeon: Wilfrido Robert MD;  Location: [facility]    DECOMPRESSION, FUSION LUMBAR POSTERIOR THREE + LEVELS, COMBINED  2013    Procedure: COMBINED DECOMPRESSION, FUSION LUMBAR POSTERIOR THREE + LEVELS;  Posterior Lumbar Decompression L3-S1, Fusion L4-S1;  Surgeon: Daniel Harris MD;  Location: [facility] OR    ENDOSCOPIC STRIPPING VEIN(S)      HEART CATH, ANGIOPLASTY  2009    PTCA and two 2.5x15mm Xience stents to mid LAD lesion    HYSTERECTOMY, RICKIE      Fibroids, and Menorrhagia.. Bilateral Oophrectomy    ORTHOPEDIC SURGERY      Stenting of LAD, Angioplasty  09    TONSILLECTOMY      as a child       FAMILY HISTORY:  Family History   Problem Relation Age of Onset    Neurologic Disorder Mother         Dementia, Still living    Ovarian Cancer Mother     Thyroid Disease Mother     C.A.D. Father             Diabetes Father     Coronary Artery Disease Father     Alcohol/Drug Brother     Thyroid Disease Son     Diabetes Son        SOCIAL HISTORY:  Social History     Socioeconomic History    Marital status:      Spouse name: None    Number of children: None    Years of education: None    Highest education level: None   Tobacco Use    Smoking status: Former     Current packs/day: 0.00     Types: Cigarettes     Quit date: 1965     Years since quittin.7    Smokeless tobacco: Never   Vaping Use    Vaping status: Never Used   Substance and Sexual Activity    Alcohol use: Yes     Comment: occ    Drug use: No    Sexual activity: Not Currently   Other Topics Concern    Blood Transfusions No    Caffeine Concern Yes     Comment: 5 cups caffeine per day    Sleep Concern No    Stress Concern No    Weight Concern No     Comment: weight stable    Special Diet No     Comment: trying to eat healthier    Exercise Yes     Comment: stretching    Seat Belt Yes     Self-Exams Yes    Parent/sibling w/ CABG, MI or angioplasty before 65F 55M? Yes   Social History Narrative    Works in an office     Social Determinants of Health     Financial Resource Strain: Low Risk  (9/3/2024)    Financial Resource Strain     Within the past 12 months, have you or your family members you live with been unable to get utilities (heat, electricity) when it was really needed?: No   Food Insecurity: Low Risk  (9/3/2024)    Food Insecurity     Within the past 12 months, did you worry that your food would run out before you got money to buy more?: No     Within the past 12 months, did the food you bought just not last and you didn t have money to get more?: No   Transportation Needs: Low Risk  (9/3/2024)    Transportation Needs     Within the past 12 months, has lack of transportation kept you from medical appointments, getting your medicines, non-medical meetings or appointments, work, or from getting things that you need?: No   Physical Activity: Unknown (9/3/2024)    Exercise Vital Sign     Days of Exercise per Week: 0 days   Stress: No Stress Concern Present (9/3/2024)    [employer] [employer] - Occupational Stress Questionnaire     Feeling of Stress : Only a little   Interpersonal Safety: Low Risk  (9/3/2024)    Interpersonal Safety     Do you feel physically and emotionally safe where you currently live?: Yes     Within the past 12 months, have you been hit, slapped, kicked or otherwise physically hurt by someone?: No     Within the past 12 months, have you been humiliated or emotionally abused in other ways by your partner or ex-partner?: No   Housing Stability: Low Risk  (9/3/2024)    Housing Stability     Do you have housing? : Yes     Are you worried about losing your housing?: No       Review of Systems:  Skin:  not assessed       Eyes:  not assessed      ENT:  not assessed      Respiratory:  Positive for dyspnea on exertion     Cardiovascular:    Positive  "for;fatigue;lightheadedness;dizziness    Gastroenterology: not assessed      Genitourinary:  not assessed      Musculoskeletal:  not assessed      Neurologic:  not assessed      Psychiatric:  not assessed      Heme/Lymph/Imm:  not assessed      Endocrine:  not assessed        Physical Exam:  Vitals: BP (!) 162/70 (BP Location: Right arm, Patient Position: Sitting, Cuff Size: Adult Regular)   Pulse 58   Ht 1.6 m (5' 3\")   Wt 58.7 kg (129 lb 8 oz)   LMP  (LMP Unknown)   BMI 22.94 kg/m      Constitutional:  cooperative, alert and oriented, well developed, well nourished, in no acute distress overweight      Skin:  warm and dry to the touch, no apparent skin lesions or masses noted          Head:  normocephalic, no masses or lesions        Eyes:  pupils equal and round, conjunctivae and lids unremarkable, sclera white, no xanthalasma, EOMS intact, no nystagmus        Lymph:      ENT:  no pallor or cyanosis, dentition good        Neck:  no carotid bruit        Respiratory:  normal breath sounds, clear to auscultation, normal A-P diameter, normal symmetry, normal respiratory excursion, no use of accessory muscles         Cardiac: regular rhythm       LUSB;grade 2;systolic ejection murmur        pulses full and equal                                   left wrist    GI:           Extremities and Muscular Skeletal:  no edema;no spinal abnormalities noted;normal muscle strength and tone         left wrist and forearm ecchymotic and mildy swollen without hum or bruit    Neurological:  no gross motor deficits        Psych:  affect appropriate, oriented to time, person and place        CC  No referring provider defined for this encounter.                "

## 2024-09-13 NOTE — PATIENT INSTRUCTIONS
It was a pleasure seeing you today and thank you for allowing me to be a part of your health care team.  Should   you have any questions regarding your visit or future needs please feel free to reach out to my care team for assistance.      Thank you, Dr. Nikloai Amaya        **Nursing: [phone]       **Scheduling: [phone]

## 2024-09-16 DIAGNOSIS — F41.9 ANXIETY: ICD-10-CM

## 2024-09-16 RX ORDER — ESCITALOPRAM OXALATE 5 MG/1
5 TABLET ORAL DAILY
Qty: 30 TABLET | Refills: 0 | Status: SHIPPED | OUTPATIENT
Start: 2024-09-16

## 2024-09-19 ENCOUNTER — ANTICOAGULATION THERAPY VISIT (OUTPATIENT)
Dept: ANTICOAGULATION | Facility: CLINIC | Age: 84
End: 2024-09-19

## 2024-09-19 ENCOUNTER — LAB (OUTPATIENT)
Dept: LAB | Facility: CLINIC | Age: 84
End: 2024-09-19
Payer: COMMERCIAL

## 2024-09-19 DIAGNOSIS — Z79.01 LONG TERM CURRENT USE OF ANTICOAGULANT THERAPY: Primary | ICD-10-CM

## 2024-09-19 DIAGNOSIS — I63.9 CEREBROVASCULAR ACCIDENT INVOLVING CEREBELLUM (H): ICD-10-CM

## 2024-09-19 LAB — INR BLD: 2.1 (ref 0.9–1.1)

## 2024-09-19 PROCEDURE — 36416 COLLJ CAPILLARY BLOOD SPEC: CPT

## 2024-09-19 PROCEDURE — 85610 PROTHROMBIN TIME: CPT

## 2024-09-19 NOTE — PROGRESS NOTES
ANTICOAGULATION MANAGEMENT     Salome Saeed 84 year old female is on warfarin with therapeutic INR result. (Goal INR 2.0-3.0)    Recent labs: (last 7 days)     09/19/24  1306   INR 2.1*       ASSESSMENT     Warfarin Lab Questionnaire    Warfarin Doses Last 7 Days    Warfarin dosing verbally confirmed with patient-- taking as instructed        9/19/2024   Warfarin Lab Questionnaire   Missed doses within past 14 days? No   Changes in diet or alcohol within past 14 days? No   Medication changes since last result? No   Injuries or illness since last result? No   New shortness of breath, severe headaches or sudden changes in vision since last result? No   Abnormal bleeding since last result? No   Upcoming surgery, procedure? No        Previous result: Subtherapeutic  Additional findings: 9/13/24,patient had appointment with cardio, no changes made to patient plan of care.       PLAN     Recommended plan for no diet, medication or health factor changes affecting INR     Dosing Instructions: Continue your current warfarin dose with next INR in 2 weeks       Summary  As of 9/19/2024      Full warfarin instructions:  5 mg every Thu; 2.5 mg all other days   Next INR check:  10/3/2024               Telephone call with Loreta who agrees to plan and repeated back plan correctly    Lab visit scheduled    Education provided: Please call back if any changes to your diet, medications or how you've been taking warfarin  Contact [phone] with any changes, questions or concerns.     Plan made per [facility] anticoagulation protocol    Renee Martinez RN  9/19/2024  Anticoagulation Clinic  [facility] for routing messages: p [facility] [place]  [facility] patient phone line: [phone]        _______________________________________________________________________     Anticoagulation Episode Summary       Current INR goal:  2.0-3.0   TTR:  52.2% (1 y)   Target end date:  Indefinite   Send INR reminders to:  [facility] [place]    Indications     Long term current use of anticoagulant therapy [Z79.01]  Cerebrovascular accident involving cerebellum (H) [I63.9]             Comments:               Anticoagulation Care Providers       Provider Role Specialty Phone number    Surya Dyer DO [facility] [phone]

## 2024-10-03 ENCOUNTER — LAB (OUTPATIENT)
Dept: LAB | Facility: CLINIC | Age: 84
End: 2024-10-03
Payer: COMMERCIAL

## 2024-10-03 ENCOUNTER — ANTICOAGULATION THERAPY VISIT (OUTPATIENT)
Dept: ANTICOAGULATION | Facility: CLINIC | Age: 84
End: 2024-10-03

## 2024-10-03 DIAGNOSIS — I63.9 CEREBROVASCULAR ACCIDENT INVOLVING CEREBELLUM (H): ICD-10-CM

## 2024-10-03 DIAGNOSIS — Z79.01 LONG TERM CURRENT USE OF ANTICOAGULANT THERAPY: Primary | ICD-10-CM

## 2024-10-03 LAB — INR BLD: 2 (ref 0.9–1.1)

## 2024-10-03 PROCEDURE — 36416 COLLJ CAPILLARY BLOOD SPEC: CPT

## 2024-10-03 PROCEDURE — 85610 PROTHROMBIN TIME: CPT

## 2024-10-03 NOTE — PROGRESS NOTES
ANTICOAGULATION MANAGEMENT     Salome Saeed 84 year old female is on warfarin with therapeutic INR result. (Goal INR 2.0-3.0)    Recent labs: (last 7 days)     10/03/24  1310   INR 2.0*       ASSESSMENT     Warfarin Lab Questionnaire    Warfarin Doses Last 7 Days          10/3/2024   Warfarin Lab Questionnaire   Missed doses within past 14 days? No   Changes in diet or alcohol within past 14 days? No   Medication changes since last result? No   Injuries or illness since last result? No   New shortness of breath, severe headaches or sudden changes in vision since last result? No   Abnormal bleeding since last result? No   Upcoming surgery, procedure? No        Previous result: Therapeutic last visit; previously outside of goal range  Additional findings: None- confirmed dosing with patient.       PLAN     Recommended plan for no diet, medication or health factor changes affecting INR     Dosing Instructions: Continue your current warfarin dose with next INR in 3 weeks       Summary  As of 10/3/2024      Full warfarin instructions:  5 mg every Thu; 2.5 mg all other days   Next INR check:  10/24/2024               Telephone call with Loreta who verbalizes understanding and agrees to plan and who agrees to plan and repeated back plan correctly    Lab visit scheduled    Education provided: Contact [phone] with any changes, questions or concerns.     Plan made per [facility] anticoagulation protocol    Nicole Gudino, RN  10/3/2024  Anticoagulation Clinic  [facility] for routing messages: p [facility] [place]  [facility] patient phone line: [phone]        _______________________________________________________________________     Anticoagulation Episode Summary       Current INR goal:  2.0-3.0   TTR:  53.4% (1 y)   Target end date:  Indefinite   Send INR reminders to:  [facility] [place]    Indications    Long term current use of anticoagulant therapy [Z79.01]  Cerebrovascular accident involving cerebellum (H)  [I63.9]             Comments:               Anticoagulation Care Providers       Provider Role Specialty Phone number    Surya Dyer,  Referring Family Medicine [phone]

## 2024-10-12 DIAGNOSIS — R06.09 DOE (DYSPNEA ON EXERTION): ICD-10-CM

## 2024-10-12 DIAGNOSIS — F41.9 ANXIETY: ICD-10-CM

## 2024-10-14 RX ORDER — ESCITALOPRAM OXALATE 5 MG/1
5 TABLET ORAL DAILY
Qty: 90 TABLET | Refills: 1 | Status: SHIPPED | OUTPATIENT
Start: 2024-10-14

## 2024-10-14 RX ORDER — FUROSEMIDE 20 MG/1
20 TABLET ORAL DAILY
Qty: 90 TABLET | Refills: 1 | Status: SHIPPED | OUTPATIENT
Start: 2024-10-14

## 2024-10-23 ENCOUNTER — ANTICOAGULATION THERAPY VISIT (OUTPATIENT)
Dept: ANTICOAGULATION | Facility: CLINIC | Age: 84
End: 2024-10-23

## 2024-10-23 ENCOUNTER — LAB (OUTPATIENT)
Dept: LAB | Facility: CLINIC | Age: 84
End: 2024-10-23
Payer: COMMERCIAL

## 2024-10-23 DIAGNOSIS — Z79.01 LONG TERM CURRENT USE OF ANTICOAGULANT THERAPY: Primary | ICD-10-CM

## 2024-10-23 DIAGNOSIS — I63.9 CEREBROVASCULAR ACCIDENT INVOLVING CEREBELLUM (H): ICD-10-CM

## 2024-10-23 LAB — INR BLD: 1.8 (ref 0.9–1.1)

## 2024-10-23 PROCEDURE — 85610 PROTHROMBIN TIME: CPT

## 2024-10-23 PROCEDURE — 36416 COLLJ CAPILLARY BLOOD SPEC: CPT

## 2024-10-23 NOTE — PROGRESS NOTES
ANTICOAGULATION MANAGEMENT     Salome Saeed 84 year old female is on warfarin with subtherapeutic INR result. (Goal INR 2.0-3.0)    Recent labs: (last 7 days)     10/23/24  1252   INR 1.8*       ASSESSMENT     Warfarin Lab Questionnaire    Warfarin Doses Last 7 Days          10/23/2024   Warfarin Lab Questionnaire   Missed doses within past 14 days? No Patient does not think she missed any warfarin    Changes in diet or alcohol within past 14 days? No   Medication changes since last result? No   Injuries or illness since last result? No   New shortness of breath, severe headaches or sudden changes in vision since last result? No   Abnormal bleeding since last result? No   Upcoming surgery, procedure? No        Previous result: Therapeutic last 2(+) visits  Additional findings: last 2 INR's were therapeutic at the bottom of goal range so will increase warfarin maintenance dose today       PLAN     Recommended plan for no diet, medication or health factor changes and previous 2 INR results within goal affecting INR     Dosing Instructions: Increase your warfarin dose (12.5% change) with next INR in 1-2 weeks       Summary  As of 10/23/2024      Full warfarin instructions:  5 mg every Wed, Sat; 2.5 mg all other days   Next INR check:  11/5/2024               Telephone call with Loreta who verbalizes understanding and agrees to plan    Lab visit scheduled    Education provided: Please call back if any changes to your diet, medications or how you've been taking warfarin  Contact [phone] with any changes, questions or concerns.     Plan made per [facility] anticoagulation protocol and per [facility] anticoagulation protocol; closest % change with current tablet size made per protocol for for INR 1.7-1.9 (goal 2-3)    Renee Martinez RN  10/23/2024  Anticoagulation Clinic  [facility] for routing messages: yobany NOEL [facility] patient phone line:  [phone]        _______________________________________________________________________     Anticoagulation Episode Summary       Current INR goal:  2.0-3.0   TTR:  53.4% (1 y)   Target end date:  Indefinite   Send INR reminders to:  [facility] [place]    Indications    Long term current use of anticoagulant therapy [Z79.01]  Cerebrovascular accident involving cerebellum (H) [I63.9]             Comments:  --             Anticoagulation Care Providers       Provider Role Specialty Phone number    Surya Dyer DO Referring Family Medicine [phone]

## 2024-10-30 ENCOUNTER — TELEPHONE (OUTPATIENT)
Dept: FAMILY MEDICINE | Facility: CLINIC | Age: 84
End: 2024-10-30
Payer: COMMERCIAL

## 2024-10-30 NOTE — TELEPHONE ENCOUNTER
Reason for Call:  Appointment Request    Patient requesting this type of appt:  PAIN    Requested provider: Surya Dyer    Reason patient unable to be scheduled: Not within requested timeframe    When does patient want to be seen/preferred time: 1-2 days    Comments: Patient is experiencing ear pain with some buzzing sounds as well. Requesting an appointment asap. Reach out to assist.     Okay to leave a detailed message?: Yes [phone]      Call taken on 10/30/2024 at 4:09 PM by Mahi Palacio

## 2024-10-31 ENCOUNTER — OFFICE VISIT (OUTPATIENT)
Dept: FAMILY MEDICINE | Facility: CLINIC | Age: 84
End: 2024-10-31
Payer: COMMERCIAL

## 2024-10-31 VITALS
HEART RATE: 60 BPM | WEIGHT: 129 LBS | DIASTOLIC BLOOD PRESSURE: 90 MMHG | HEIGHT: 63 IN | SYSTOLIC BLOOD PRESSURE: 155 MMHG | TEMPERATURE: 97.4 F | RESPIRATION RATE: 12 BRPM | OXYGEN SATURATION: 99 % | BODY MASS INDEX: 22.86 KG/M2

## 2024-10-31 DIAGNOSIS — H91.91 DECREASED HEARING OF RIGHT EAR: ICD-10-CM

## 2024-10-31 DIAGNOSIS — I10 BENIGN ESSENTIAL HYPERTENSION: ICD-10-CM

## 2024-10-31 DIAGNOSIS — H93.11 TINNITUS, RIGHT: Primary | ICD-10-CM

## 2024-10-31 PROCEDURE — 99213 OFFICE O/P EST LOW 20 MIN: CPT | Performed by: NURSE PRACTITIONER

## 2024-10-31 PROCEDURE — G2211 COMPLEX E/M VISIT ADD ON: HCPCS | Performed by: NURSE PRACTITIONER

## 2024-10-31 ASSESSMENT — PAIN SCALES - GENERAL: PAINLEVEL_OUTOF10: NO PAIN (0)

## 2024-10-31 NOTE — PROGRESS NOTES
"  Assessment & Plan     Loreta was seen today for ear problem.    Diagnoses and all orders for this visit:    Tinnitus, right  Decreased hearing of right ear  Chronic, worsening of right ear tinnitus, no previous ENT consultation.  Recommended further evaluation with ENT, referral placed.    -     Adult ENT  Referral; Future    Benign essential hypertension  Noted elevated blood pressure in clinic, previously stable on antihypertensive regimen of Lisinopril 30 mg daily, Propranolol ER 60 mg daily.  Close follow-up in clinic for recheck in 4 weeks.            The longitudinal plan of care for the diagnosis(es)/condition(s) as documented were addressed during this visit. Due to the added complexity in care, I will continue to support Loreta in the subsequent management and with ongoing continuity of care.      Return in about 4 weeks (around 11/28/2024) for BP Recheck, In Clinic.      Subjective   Loreta is a 84 year old, presenting for the following health issues:  Ear Problem        10/31/2024     9:07 AM   Additional Questions   Roomed by OLIVIA PAREDES CMA   Accompanied by SELF     History of Present Illness       Reason for visit:  Ear  Symptoms include:  Rt ear pain, buzzing and ringing for over a year  Symptom progression:  Worsening  Had these symptoms before:  Yes      Onset over one year ago.  Worsening recently and is noticing difficulty with keeping up with family due to ringing and hearing issues.    Yesterday noticed more of a buzzing sound - which stopped after she got the appointment for today.   When she puts the phone up to her right ear, this is bothersome for her.    No concerns with left ear.      No previous hearing check or ENT consultation.       Review of Systems  Constitutional, HEENT, cardiovascular, pulmonary, gi and gu systems are negative, except as otherwise noted.      Objective    BP (!) 156/100   Pulse 60   Temp 97.4  F (36.3  C) (Tympanic)   Resp 12   Ht 1.6 m (5' 3\")   Wt 58.5 " kg (129 lb)   LMP  (LMP Unknown)   SpO2 99%   BMI 22.85 kg/m    Body mass index is 22.85 kg/m .    Physical Exam     GENERAL: alert and no distress  HENT: ear canals and TM's normal, nose and mouth without ulcers or lesions  PSYCH: mentation appears normal, affect normal/bright            Signed Electronically by: KIANA Causey CNP

## 2024-11-05 ENCOUNTER — LAB (OUTPATIENT)
Dept: LAB | Facility: CLINIC | Age: 84
End: 2024-11-05
Payer: COMMERCIAL

## 2024-11-05 ENCOUNTER — ANTICOAGULATION THERAPY VISIT (OUTPATIENT)
Dept: ANTICOAGULATION | Facility: CLINIC | Age: 84
End: 2024-11-05

## 2024-11-05 DIAGNOSIS — I63.9 CEREBROVASCULAR ACCIDENT INVOLVING CEREBELLUM (H): ICD-10-CM

## 2024-11-05 DIAGNOSIS — Z79.01 LONG TERM CURRENT USE OF ANTICOAGULANT THERAPY: Primary | ICD-10-CM

## 2024-11-05 LAB — INR BLD: 3 (ref 0.9–1.1)

## 2024-11-05 PROCEDURE — 36416 COLLJ CAPILLARY BLOOD SPEC: CPT

## 2024-11-05 PROCEDURE — 85610 PROTHROMBIN TIME: CPT

## 2024-11-05 NOTE — PROGRESS NOTES
ANTICOAGULATION MANAGEMENT     Salome Saeed 84 year old female is on warfarin with therapeutic INR result. (Goal INR 2.0-3.0)    Recent labs: (last 7 days)     11/05/24  1255   INR 3.0*       ASSESSMENT     Warfarin Lab Questionnaire    Warfarin Doses Last 7 Days  Loreta was not sure about her warfarin dosing during today's call. She was able to track down her  and confirm he's been giving her the proper maintenance dose.         11/5/2024   Warfarin Lab Questionnaire   Missed doses within past 14 days?    Changes in diet or alcohol within past 14 days?    Medication changes since last result? No   Injuries or illness since last result? No   New shortness of breath, severe headaches or sudden changes in vision since last result? No   Abnormal bleeding since last result? No   Upcoming surgery, procedure? No        Previous result: Subtherapeutic  Additional findings: None       PLAN     Recommended plan for no diet, medication or health factor changes affecting INR     Dosing Instructions: Continue your current warfarin dose with next INR in 3 weeks       Summary  As of 11/5/2024      Full warfarin instructions:  5 mg every Wed, Sat; 2.5 mg all other days   Next INR check:  11/26/2024               Telephone call with Loreta who verbalizes understanding and agrees to plan    Lab visit scheduled    Education provided: Please call back if any changes to your diet, medications or how you've been taking warfarin    Plan made per [facility] anticoagulation protocol    Neeru Martinez RN  11/5/2024  Anticoagulation Clinic  [facility] for routing messages: p [facility] [place]  [facility] patient phone line: [phone]        _______________________________________________________________________     Anticoagulation Episode Summary       Current INR goal:  2.0-3.0   TTR:  56.4% (1 y)   Target end date:  Indefinite   Send INR reminders to:  [facility] [place]    Indications    Long term current use of anticoagulant  therapy [Z79.01]  Cerebrovascular accident involving cerebellum (H) [I63.9]             Comments:  --             Anticoagulation Care Providers       Provider Role Specialty Phone number    Surya Dyer,  Referring Family Medicine [phone]

## 2024-11-19 ENCOUNTER — TRANSFERRED RECORDS (OUTPATIENT)
Dept: HEALTH INFORMATION MANAGEMENT | Facility: CLINIC | Age: 84
End: 2024-11-19

## 2024-11-26 ENCOUNTER — ANTICOAGULATION THERAPY VISIT (OUTPATIENT)
Dept: ANTICOAGULATION | Facility: CLINIC | Age: 84
End: 2024-11-26

## 2024-11-26 ENCOUNTER — LAB (OUTPATIENT)
Dept: LAB | Facility: CLINIC | Age: 84
End: 2024-11-26
Payer: COMMERCIAL

## 2024-11-26 DIAGNOSIS — Z79.01 LONG TERM CURRENT USE OF ANTICOAGULANT THERAPY: Primary | ICD-10-CM

## 2024-11-26 DIAGNOSIS — I63.9 CEREBROVASCULAR ACCIDENT INVOLVING CEREBELLUM (H): ICD-10-CM

## 2024-11-26 LAB — INR BLD: 3 (ref 0.9–1.1)

## 2024-11-26 PROCEDURE — 85610 PROTHROMBIN TIME: CPT

## 2024-11-26 PROCEDURE — 36416 COLLJ CAPILLARY BLOOD SPEC: CPT

## 2024-11-26 NOTE — PROGRESS NOTES
ANTICOAGULATION MANAGEMENT     Salome Saeed 84 year old female is on warfarin with therapeutic INR result. (Goal INR 2.0-3.0)    Recent labs: (last 7 days)     11/26/24  1254   INR 3.0*       ASSESSMENT     Warfarin Lab Questionnaire    Warfarin Doses Last 7 Days          11/26/2024   Warfarin Lab Questionnaire   Missed doses within past 14 days? No   Changes in diet or alcohol within past 14 days? No   Medication changes since last result? No   Injuries or illness since last result? No   New shortness of breath, severe headaches or sudden changes in vision since last result? No   Abnormal bleeding since last result? No   Upcoming surgery, procedure? No        Previous result: Therapeutic last visit; previously outside of goal range  Additional findings: None       PLAN     Recommended plan for no diet, medication or health factor changes affecting INR     Dosing Instructions: Continue your current warfarin dose with next INR in 4 weeks       Summary  As of 11/26/2024      Full warfarin instructions:  5 mg every Wed, Sat; 2.5 mg all other days   Next INR check:  --               Telephone call with Loreta who verbalizes understanding and agrees to plan    Lab visit scheduled    Education provided: Please call back if any changes to your diet, medications or how you've been taking warfarin    Plan made per [facility] anticoagulation protocol    Lori Parham RN  11/26/2024  Anticoagulation Clinic  [facility] for routing messages: p [facility] [place]  [facility] patient phone line: [phone]        _______________________________________________________________________     Anticoagulation Episode Summary       Current INR goal:  2.0-3.0   TTR:  62.2% (1 y)   Target end date:  Indefinite   Send INR reminders to:  [facility] [place]    Indications    Long term current use of anticoagulant therapy [Z79.01]  Cerebrovascular accident involving cerebellum (H) [I63.9]             Comments:  --             Anticoagulation  Care Providers       Provider Role Specialty Phone number    Surya Dyer,  Referring Family Medicine [phone]

## 2024-12-02 ENCOUNTER — OFFICE VISIT (OUTPATIENT)
Dept: FAMILY MEDICINE | Facility: CLINIC | Age: 84
End: 2024-12-02
Payer: COMMERCIAL

## 2024-12-02 VITALS
WEIGHT: 129 LBS | BODY MASS INDEX: 22.85 KG/M2 | HEART RATE: 75 BPM | OXYGEN SATURATION: 99 % | RESPIRATION RATE: 16 BRPM | TEMPERATURE: 97.7 F | DIASTOLIC BLOOD PRESSURE: 70 MMHG | SYSTOLIC BLOOD PRESSURE: 140 MMHG

## 2024-12-02 DIAGNOSIS — I10 ESSENTIAL HYPERTENSION, BENIGN: ICD-10-CM

## 2024-12-02 PROCEDURE — 99214 OFFICE O/P EST MOD 30 MIN: CPT | Performed by: FAMILY MEDICINE

## 2024-12-02 PROCEDURE — G2211 COMPLEX E/M VISIT ADD ON: HCPCS | Performed by: FAMILY MEDICINE

## 2024-12-02 RX ORDER — LISINOPRIL 40 MG/1
40 TABLET ORAL DAILY
Qty: 90 TABLET | Refills: 1 | Status: SHIPPED | OUTPATIENT
Start: 2024-12-02

## 2024-12-02 NOTE — PROGRESS NOTES
Assessment & Plan     HTN Blood Pressure Goal <140/90  Will try increasing lisinopril to 40 mg in hopes of better overall blood pressure control at home. Continue monitoring at home. Recommend rechecking in 2-4 weeks.  - lisinopril (ZESTRIL) 40 MG tablet; Take 1 tablet (40 mg) by mouth daily.      Efe Kan is a 84 year old, presenting for the following health issues:  RECHECK    HPI       Hypertension Follow-up    Do you check your blood pressure regularly outside of the clinic? No   Are you following a low salt diet? No  Are your blood pressures ever more than 140 on the top number (systolic) OR more   than 90 on the bottom number (diastolic), for example 140/90? Does not check    She has continued to have some buzzing sounds in her ear. Saw ENT and found to have hearing loss, more significant in the right ear. She does notices some dizziness. When checking BP at home, usually in th 140s systolic but can get up to the 180s if she is more active.     Review of Systems  Constitutional, HEENT, cardiovascular, pulmonary, gi and gu systems are negative, except as otherwise noted.      Objective    BP (!) 140/70   Pulse 75   Temp 97.7  F (36.5  C) (Tympanic)   Resp 16   Wt 58.5 kg (129 lb)   LMP  (LMP Unknown)   SpO2 99%   BMI 22.85 kg/m    Body mass index is 22.85 kg/m .  Physical Exam   GENERAL: alert and no distress  RESP: lungs clear to auscultation - no rales, rhonchi or wheezes  CV: regular rates and rhythm, normal S1 S2, no S3 or S4, and no murmur, click or rub  MS: no gross musculoskeletal defects noted, no edema  PSYCH: mentation appears normal, affect normal/bright            Signed Electronically by: Surya Dyer, DO

## 2024-12-23 ENCOUNTER — LAB (OUTPATIENT)
Dept: LAB | Facility: CLINIC | Age: 84
End: 2024-12-23
Payer: COMMERCIAL

## 2024-12-23 ENCOUNTER — ANTICOAGULATION THERAPY VISIT (OUTPATIENT)
Dept: ANTICOAGULATION | Facility: CLINIC | Age: 84
End: 2024-12-23

## 2024-12-23 DIAGNOSIS — I63.9 CEREBROVASCULAR ACCIDENT INVOLVING CEREBELLUM (H): ICD-10-CM

## 2024-12-23 DIAGNOSIS — Z79.01 LONG TERM CURRENT USE OF ANTICOAGULANT THERAPY: Primary | ICD-10-CM

## 2024-12-23 LAB — INR BLD: 4.3 (ref 0.9–1.1)

## 2024-12-23 PROCEDURE — 85610 PROTHROMBIN TIME: CPT

## 2024-12-23 PROCEDURE — 36416 COLLJ CAPILLARY BLOOD SPEC: CPT

## 2024-12-23 NOTE — PROGRESS NOTES
ANTICOAGULATION MANAGEMENT     Salome Saeed 84 year old female is on warfarin with supratherapeutic INR result. (Goal INR 2.0-3.0)    Recent labs: (last 7 days)     12/23/24  1313   INR 4.3*       ASSESSMENT     Warfarin Lab Questionnaire    Warfarin Doses Last 7 Days--Patient confirmed taking warfarin maintenance dose as listed          12/23/2024   Warfarin Lab Questionnaire   Missed doses within past 14 days? No   Changes in diet or alcohol within past 14 days? No, yes, patient reports she did not eat any greens in the past week, will resume usual vitamin K regimen   Medication changes since last result? No   Injuries or illness since last result? No   New shortness of breath, severe headaches or sudden changes in vision since last result? No   Abnormal bleeding since last result? No   Upcoming surgery, procedure? No     Previous result: Therapeutic last 2(+) visits  Additional findings:  office visit with PCP on 12/2/24-no change in care plan       PLAN     Recommended plan for temporary change(s) affecting INR     Dosing Instructions: hold dose then continue your current warfarin dose with next INR in 10 days       Summary  As of 12/23/2024      Full warfarin instructions:  12/23: Hold; Otherwise 5 mg every Wed, Sat; 2.5 mg all other days   Next INR check:  1/2/2025               Telephone call with Loreta who verbalizes understanding and agrees to plan and who agrees to plan and repeated back plan correctly    Lab visit scheduled    Education provided: Taking warfarin: Importance of taking warfarin as instructed  Dietary considerations: importance of consistent vitamin K intake    Plan made per [facility] anticoagulation protocol    Madina Brannon RN  12/23/2024  Anticoagulation Clinic  [employer] for routing messages: [facility] patient phone line: [phone]        _______________________________________________________________________     Anticoagulation Episode Summary       Current INR goal:   2.0-3.0   TTR:  56.3% (1 y)   Target end date:  Indefinite   Send INR reminders to:  [facility] [place]    Indications    Long term current use of anticoagulant therapy [Z79.01]  Cerebrovascular accident involving cerebellum (H) [I63.9]             Comments:  --             Anticoagulation Care Providers       Provider Role Specialty Phone number    Surya Dyer DO [facility] [phone]

## 2025-01-02 ENCOUNTER — LAB (OUTPATIENT)
Dept: LAB | Facility: CLINIC | Age: 85
End: 2025-01-02
Payer: COMMERCIAL

## 2025-01-02 ENCOUNTER — ANTICOAGULATION THERAPY VISIT (OUTPATIENT)
Dept: ANTICOAGULATION | Facility: CLINIC | Age: 85
End: 2025-01-02

## 2025-01-02 DIAGNOSIS — I63.9 CEREBROVASCULAR ACCIDENT INVOLVING CEREBELLUM (H): ICD-10-CM

## 2025-01-02 DIAGNOSIS — Z79.01 LONG TERM CURRENT USE OF ANTICOAGULANT THERAPY: Primary | ICD-10-CM

## 2025-01-02 LAB — INR BLD: 2.3 (ref 0.9–1.1)

## 2025-01-02 NOTE — PROGRESS NOTES
ANTICOAGULATION MANAGEMENT     Salome Saeed 84 year old female is on warfarin with therapeutic INR result. (Goal INR 2.0-3.0)    Recent labs: (last 7 days)     01/02/25  1255   INR 2.3*       ASSESSMENT     Warfarin Lab Questionnaire    Warfarin Doses Last 7 Days          1/2/2025   Warfarin Lab Questionnaire   Missed doses within past 14 days? No, Yes, intentional hold on 12/23/24   Changes in diet or alcohol within past 14 days? No, patient reports she resumed her usual intake of broccoli   Medication changes since last result? No   Injuries or illness since last result? No   New shortness of breath, severe headaches or sudden changes in vision since last result? No   Abnormal bleeding since last result? No   Upcoming surgery, procedure? No     Previous result: Supratherapeutic  Additional findings: None       PLAN     Recommended plan for temporary change(s) affecting INR     Dosing Instructions: Continue your current warfarin dose with next INR in 2 weeks       Summary  As of 1/2/2025      Full warfarin instructions:  5 mg every Wed, Sat; 2.5 mg all other days   Next INR check:  1/16/2025               Telephone call with Loreta who verbalizes understanding and agrees to plan    Lab visit scheduled    Education provided: Dietary considerations: importance of consistent vitamin K intake    Plan made per [facility] anticoagulation protocol    Madina Brannon, RN  1/2/2025  Anticoagulation Clinic  [facility] for routing messages: p [facility] [place]  [facility] patient phone line: [phone]        _______________________________________________________________________     Anticoagulation Episode Summary       Current INR goal:  2.0-3.0   TTR:  54.5% (1 y)   Target end date:  Indefinite   Send INR reminders to:  [facility] [place]    Indications    Long term current use of anticoagulant therapy [Z79.01]  Cerebrovascular accident involving cerebellum (H) [I63.9]             Comments:  --             Anticoagulation Care  Providers       Provider Role Specialty Phone number    Surya Dyer,  Referring Family Medicine [phone]

## 2025-01-16 ENCOUNTER — ANTICOAGULATION THERAPY VISIT (OUTPATIENT)
Dept: ANTICOAGULATION | Facility: CLINIC | Age: 85
End: 2025-01-16

## 2025-01-16 ENCOUNTER — LAB (OUTPATIENT)
Dept: LAB | Facility: CLINIC | Age: 85
End: 2025-01-16
Payer: COMMERCIAL

## 2025-01-16 DIAGNOSIS — Z79.01 LONG TERM CURRENT USE OF ANTICOAGULANT THERAPY: Primary | ICD-10-CM

## 2025-01-16 DIAGNOSIS — I63.9 CEREBROVASCULAR ACCIDENT INVOLVING CEREBELLUM (H): ICD-10-CM

## 2025-01-16 LAB — INR BLD: 2.8 (ref 0.9–1.1)

## 2025-01-17 NOTE — PROGRESS NOTES
ANTICOAGULATION MANAGEMENT     Salome Saede 84 year old female is on warfarin with therapeutic INR result. (Goal INR 2.0-3.0)    Recent labs: (last 7 days)     01/16/25  1259   INR 2.8*       ASSESSMENT     Warfarin Lab Questionnaire    Warfarin Doses Last 7 Days    Warfarin dosing verbally confirmed with patient-- taking as instructed        1/16/2025   Warfarin Lab Questionnaire   Missed doses within past 14 days? No   Changes in diet or alcohol within past 14 days? No   Medication changes since last result? No   Injuries or illness since last result? No   New shortness of breath, severe headaches or sudden changes in vision since last result? No   Abnormal bleeding since last result? No   Upcoming surgery, procedure? No     Previous result: Therapeutic last visit; previously outside of goal range  Additional findings: None       PLAN     Recommended plan for no diet, medication or health factor changes affecting INR     Dosing Instructions: Continue your current warfarin dose with next INR in 3 weeks       Summary  As of 1/16/2025      Full warfarin instructions:  5 mg every Wed, Sat; 2.5 mg all other days   Next INR check:  2/6/2025               Telephone call with Loreta who verbalizes understanding and agrees to plan    Lab visit scheduled    Education provided: Please call back if any changes to your diet, medications or how you've been taking warfarin  Contact [phone] with any changes, questions or concerns.     Plan made per [facility] anticoagulation protocol    Renee Martinez RN  1/16/2025  Anticoagulation Clinic  [facility] for routing messages: p [facility] [place]  [facility] patient phone line: [phone]        _______________________________________________________________________     Anticoagulation Episode Summary       Current INR goal:  2.0-3.0   TTR:  54.5% (1 y)   Target end date:  Indefinite   Send INR reminders to:  [facility] [place]    Indications    Long term current use of anticoagulant  therapy [Z79.01]  Cerebrovascular accident involving cerebellum (H) [I63.9]             Comments:  --             Anticoagulation Care Providers       Provider Role Specialty Phone number    Surya Dyer,  Referring Family Medicine [phone]

## 2025-01-28 ENCOUNTER — APPOINTMENT (OUTPATIENT)
Dept: CT IMAGING | Facility: CLINIC | Age: 85
End: 2025-01-28
Attending: EMERGENCY MEDICINE
Payer: COMMERCIAL

## 2025-01-28 ENCOUNTER — HOSPITAL ENCOUNTER (EMERGENCY)
Facility: CLINIC | Age: 85
Discharge: HOME | End: 2025-01-29
Attending: EMERGENCY MEDICINE
Payer: COMMERCIAL

## 2025-01-28 ENCOUNTER — APPOINTMENT (OUTPATIENT)
Dept: GENERAL RADIOLOGY | Facility: CLINIC | Age: 85
End: 2025-01-28
Attending: EMERGENCY MEDICINE
Payer: COMMERCIAL

## 2025-01-28 DIAGNOSIS — T07.XXXA MULTIPLE CONTUSIONS: ICD-10-CM

## 2025-01-28 DIAGNOSIS — R07.89 ATYPICAL CHEST PAIN: ICD-10-CM

## 2025-01-28 DIAGNOSIS — S09.90XA CLOSED HEAD INJURY, INITIAL ENCOUNTER: ICD-10-CM

## 2025-01-28 LAB
ANION GAP SERPL CALCULATED.3IONS-SCNC: 9 MMOL/L (ref 7–15)
BASOPHILS # BLD AUTO: 0.1 10E3/UL (ref 0–0.2)
BASOPHILS NFR BLD AUTO: 1 %
BUN SERPL-MCNC: 20.4 MG/DL (ref 8–23)
CALCIUM SERPL-MCNC: 9 MG/DL (ref 8.8–10.4)
CHLORIDE SERPL-SCNC: 94 MMOL/L (ref 98–107)
CREAT SERPL-MCNC: 0.63 MG/DL (ref 0.51–0.95)
EGFRCR SERPLBLD CKD-EPI 2021: 88 ML/MIN/1.73M2
EOSINOPHIL # BLD AUTO: 0.2 10E3/UL (ref 0–0.7)
EOSINOPHIL NFR BLD AUTO: 3 %
ERYTHROCYTE [DISTWIDTH] IN BLOOD BY AUTOMATED COUNT: 14.2 % (ref 10–15)
GLUCOSE SERPL-MCNC: 102 MG/DL (ref 70–99)
HCO3 SERPL-SCNC: 27 MMOL/L (ref 22–29)
HCT VFR BLD AUTO: 33.2 % (ref 35–47)
HGB BLD-MCNC: 11 G/DL (ref 11.7–15.7)
IMM GRANULOCYTES # BLD: 0 10E3/UL
IMM GRANULOCYTES NFR BLD: 0 %
LYMPHOCYTES # BLD AUTO: 1.6 10E3/UL (ref 0.8–5.3)
LYMPHOCYTES NFR BLD AUTO: 29 %
MCH RBC QN AUTO: 30.4 PG (ref 26.5–33)
MCHC RBC AUTO-ENTMCNC: 33.1 G/DL (ref 31.5–36.5)
MCV RBC AUTO: 92 FL (ref 78–100)
MONOCYTES # BLD AUTO: 0.6 10E3/UL (ref 0–1.3)
MONOCYTES NFR BLD AUTO: 11 %
NEUTROPHILS # BLD AUTO: 3.1 10E3/UL (ref 1.6–8.3)
NEUTROPHILS NFR BLD AUTO: 56 %
NRBC # BLD AUTO: 0 10E3/UL
NRBC BLD AUTO-RTO: 0 /100
PLATELET # BLD AUTO: 193 10E3/UL (ref 150–450)
POTASSIUM SERPL-SCNC: 4.3 MMOL/L (ref 3.4–5.3)
RBC # BLD AUTO: 3.62 10E6/UL (ref 3.8–5.2)
SODIUM SERPL-SCNC: 130 MMOL/L (ref 135–145)
TROPONIN T SERPL HS-MCNC: 9 NG/L
WBC # BLD AUTO: 5.6 10E3/UL (ref 4–11)

## 2025-01-28 PROCEDURE — 93005 ELECTROCARDIOGRAM TRACING: CPT

## 2025-01-28 PROCEDURE — 72125 CT NECK SPINE W/O DYE: CPT

## 2025-01-28 PROCEDURE — 71046 X-RAY EXAM CHEST 2 VIEWS: CPT

## 2025-01-28 PROCEDURE — 85025 COMPLETE CBC W/AUTO DIFF WBC: CPT | Performed by: EMERGENCY MEDICINE

## 2025-01-28 PROCEDURE — 84484 ASSAY OF TROPONIN QUANT: CPT | Performed by: EMERGENCY MEDICINE

## 2025-01-28 PROCEDURE — 36415 COLL VENOUS BLD VENIPUNCTURE: CPT | Performed by: EMERGENCY MEDICINE

## 2025-01-28 PROCEDURE — 80048 BASIC METABOLIC PNL TOTAL CA: CPT | Performed by: EMERGENCY MEDICINE

## 2025-01-28 PROCEDURE — 99285 EMERGENCY DEPT VISIT HI MDM: CPT | Mod: 25

## 2025-01-28 PROCEDURE — 36415 COLL VENOUS BLD VENIPUNCTURE: CPT | Performed by: STUDENT IN AN ORGANIZED HEALTH CARE EDUCATION/TRAINING PROGRAM

## 2025-01-28 PROCEDURE — 85041 AUTOMATED RBC COUNT: CPT | Performed by: STUDENT IN AN ORGANIZED HEALTH CARE EDUCATION/TRAINING PROGRAM

## 2025-01-28 PROCEDURE — 70450 CT HEAD/BRAIN W/O DYE: CPT

## 2025-01-28 PROCEDURE — 85004 AUTOMATED DIFF WBC COUNT: CPT | Performed by: STUDENT IN AN ORGANIZED HEALTH CARE EDUCATION/TRAINING PROGRAM

## 2025-01-28 PROCEDURE — 85610 PROTHROMBIN TIME: CPT | Performed by: EMERGENCY MEDICINE

## 2025-01-28 RX ORDER — ACETAMINOPHEN 325 MG/1
650 TABLET ORAL ONCE
Status: COMPLETED | OUTPATIENT
Start: 2025-01-28 | End: 2025-01-29

## 2025-01-28 ASSESSMENT — COLUMBIA-SUICIDE SEVERITY RATING SCALE - C-SSRS
1. IN THE PAST MONTH, HAVE YOU WISHED YOU WERE DEAD OR WISHED YOU COULD GO TO SLEEP AND NOT WAKE UP?: NO
6. HAVE YOU EVER DONE ANYTHING, STARTED TO DO ANYTHING, OR PREPARED TO DO ANYTHING TO END YOUR LIFE?: NO
2. HAVE YOU ACTUALLY HAD ANY THOUGHTS OF KILLING YOURSELF IN THE PAST MONTH?: NO

## 2025-01-29 ENCOUNTER — TELEPHONE (OUTPATIENT)
Dept: FAMILY MEDICINE | Facility: CLINIC | Age: 85
End: 2025-01-29

## 2025-01-29 ENCOUNTER — ANTICOAGULATION THERAPY VISIT (OUTPATIENT)
Dept: ANTICOAGULATION | Facility: CLINIC | Age: 85
End: 2025-01-29
Payer: COMMERCIAL

## 2025-01-29 VITALS
OXYGEN SATURATION: 99 % | BODY MASS INDEX: 24.18 KG/M2 | DIASTOLIC BLOOD PRESSURE: 78 MMHG | WEIGHT: 136.47 LBS | HEIGHT: 63 IN | RESPIRATION RATE: 18 BRPM | HEART RATE: 62 BPM | TEMPERATURE: 97.3 F | SYSTOLIC BLOOD PRESSURE: 181 MMHG

## 2025-01-29 DIAGNOSIS — Z79.01 LONG TERM CURRENT USE OF ANTICOAGULANT THERAPY: Primary | ICD-10-CM

## 2025-01-29 DIAGNOSIS — I63.9 CEREBROVASCULAR ACCIDENT INVOLVING CEREBELLUM (H): ICD-10-CM

## 2025-01-29 LAB
ATRIAL RATE - MUSE: 67 BPM
DIASTOLIC BLOOD PRESSURE - MUSE: NORMAL MMHG
HOLD SPECIMEN: NORMAL
INR PPP: 3.06 (ref 0.85–1.15)
INTERPRETATION ECG - MUSE: NORMAL
P AXIS - MUSE: NORMAL DEGREES
PR INTERVAL - MUSE: 206 MS
QRS DURATION - MUSE: 108 MS
QT - MUSE: 420 MS
QTC - MUSE: 443 MS
R AXIS - MUSE: 1 DEGREES
SYSTOLIC BLOOD PRESSURE - MUSE: NORMAL MMHG
T AXIS - MUSE: 36 DEGREES
TROPONIN T SERPL HS-MCNC: 15 NG/L
VENTRICULAR RATE- MUSE: 67 BPM

## 2025-01-29 PROCEDURE — 36415 COLL VENOUS BLD VENIPUNCTURE: CPT | Performed by: EMERGENCY MEDICINE

## 2025-01-29 PROCEDURE — 84484 ASSAY OF TROPONIN QUANT: CPT | Performed by: EMERGENCY MEDICINE

## 2025-01-29 PROCEDURE — 250N000013 HC RX MED GY IP 250 OP 250 PS 637: Performed by: EMERGENCY MEDICINE

## 2025-01-29 RX ORDER — ACETAMINOPHEN 325 MG/1
650 TABLET ORAL ONCE
Status: COMPLETED | OUTPATIENT
Start: 2025-01-29 | End: 2025-01-29

## 2025-01-29 RX ADMIN — ACETAMINOPHEN 650 MG: 325 TABLET, FILM COATED ORAL at 01:27

## 2025-01-29 ASSESSMENT — ACTIVITIES OF DAILY LIVING (ADL)
ADLS_ACUITY_SCORE: 57
ADLS_ACUITY_SCORE: 57

## 2025-01-29 NOTE — ED TRIAGE NOTES
Fell out of bed on Saturday night. Chest pain started on Monday. Has a bruise on R hip and toes, but only has pain in chest.No bruising on chest. Gets better with position changes, radiates to back.

## 2025-01-29 NOTE — PROGRESS NOTES
----- Message from Cyndie Azul sent at 9/27/2018  1:11 PM CDT -----            Name of Who is Calling: CODY SKINNER JR. [[id number]]      What is the request in detail: pt is calling to have his PT order sent to [facility] because [facility] is all booked up     Can the clinic reply by [facility]:no    [facility] [phone]    [facility]  [phone]  Fax [phone]                               ANTICOAGULATION MANAGEMENT          PLAN     Recommended plan for no diet, medication or health factor changes affecting INR     Dosing Instructions: Continue your current warfarin dose with next INR in 2 weeks       Summary  As of 1/29/2025      Full warfarin instructions:  5 mg every Wed, Sat; 2.5 mg all other days   Next INR check:  2/13/2025               Telephone call with Loreta who agrees to plan and repeated back plan correctly    Lab visit scheduled    Education provided: Please call back if any changes to your diet, medications or how you've been taking warfarin    Plan made per [facility] anticoagulation protocol    Sima Bonilla RN  1/29/2025  Anticoagulation Clinic  [employer] for routing messages: [facility] [place]  [facility] patient phone line: [phone]        _______________________________________________________________________     Anticoagulation Episode Summary       Current INR goal:  2.0-3.0   TTR:  53.7% (1 y)   Target end date:  Indefinite   Send INR reminders to:  [facility] [place]    Indications    Long term current use of anticoagulant therapy [Z79.01]  Cerebrovascular accident involving cerebellum (H) [I63.9]             Comments:  --             Anticoagulation Care Providers       Provider Role Specialty Phone number    Surya Dyer DO Referring Family Medicine [phone]

## 2025-01-29 NOTE — ED PROVIDER NOTES
Emergency Department Note      History of Present Illness     Chief Complaint   Fall and Chest Pain      HPI   Salome Saeed is a 84 year old female anticoagulated on Warfarin with history of hypertension, hyperlipidemia, CVA, unstable angina, CAD, and Parkinson's disease who presents to the ED with family members for evaluation of a fall and chest pain. The patient fell out of bed early in the morning 2 days ago and hit her head. Loreta also sustained bruising to her right hip and left toe. She was not evaluated after the fall and has been ambulatory, per family members. Yesterday, patient developed substernal chest pain that she describes as an ache. The chest pain is not worse with movements or reproducible with palpation. Pain is slightly worse with deep inspiration. Loreta notes she has been lightheaded intermittently for the past week or two. She has been taking Tylenol at home for the pain with no relief. She denies diaphoresis, dizziness, or pain radiating into the upper extremities, neck or jaw. No nausea, vomiting, bloody stools, hematuria, or personal history of MI.     Independent Historian   Family members as detailed above.    Review of External Notes   None    Past Medical History     Medical History and Problem List   CAD  CVA   DDD, lumbar   Hypertension   GERD   Hyperlipidemia  Hypothyroidism  Lumbar spinal stenosis  Vertebral artery stenosis, right  Anxiety  Nonrheumatic mitral valve regurgitation  Chronic bilateral low back pain  Primary osteoarthritis   Parkinson's disease  Depression   Unstable angina     Medications   Aspirin 81 mg   Carbidopa-levodopa  Escitalopram  Furosemide  Levothyroxine   Lisinopril   Nitroglycerin  Propranolol  Rosuvastatin  Warfarin     Surgical History   Left total knee arthroplasty   Coronary angiography adult order x2  PTCA  Coronary angiogram  Decompression, fusion lumbar posterior three+ levels, combined  Endoscopic stripping vein(s)  Total abdominal  "hysterectomy, bilateral oophorectomy  Orthopedic surgery  Heart cath, stenting of LAD, angioplasty  Tonsillectomy     Physical Exam     Patient Vitals for the past 24 hrs:   BP Temp Temp src Pulse Resp SpO2 Height Weight   01/29/25 0207 (!) 181/78 -- -- -- -- -- -- --   01/29/25 0204 -- -- -- 62 -- 99 % -- --   01/29/25 0203 -- -- -- 61 -- 97 % -- --   01/29/25 0202 -- -- -- 61 -- 98 % -- --   01/29/25 0201 -- -- -- 60 -- 98 % -- --   01/29/25 0200 (!) 181/78 -- -- 61 -- -- -- --   01/29/25 0159 -- -- -- 60 -- 97 % -- --   01/29/25 0158 -- -- -- 61 -- 98 % -- --   01/29/25 0157 -- -- -- 60 -- 99 % -- --   01/29/25 0152 -- -- -- 62 -- 100 % -- --   01/29/25 0146 -- -- -- 60 -- 98 % -- --   01/29/25 0137 -- -- -- 62 -- 97 % -- --   01/29/25 0131 (!) 177/95 -- -- 64 -- 98 % -- --   01/29/25 0130 (!) 177/95 -- -- 64 -- -- -- --   01/29/25 0122 (!) 185/163 -- -- -- -- -- -- --   01/28/25 2345 (!) 172/116 -- -- 67 -- -- -- --   01/28/25 2237 (!) 197/98 97.3  F (36.3  C) Temporal 67 18 98 % 1.6 m (5' 3\") 61.9 kg (136 lb 7.4 oz)     Physical Exam  Constitutional: Vital signs reviewed as above  General: Alert, pleasant  HEENT: Moist mucous membranes  Eyes: Pupils are equal, round, and reactive to light.   Neck: Normal range of motion  Cardiovascular: normal rate, Regular rhythm and normal heart sounds.  Mo MRG  Pulmonary/Chest: Effort normal and breath sounds normal. No respiratory distress. Patient has no wheezes. Patient has no rales. Substernal chest tenderness to palpation. No bony deformities or crepitus.   Gastrointestinal: Soft. Positive bowel sounds. No MRG.  Musculoskeletal/Extremities: Full ROM of all four extremities. No bony tenderness or deformities. No midline neck tenderness.   Endo: No pitting edema  Neurological: A/O x 3. CN-II-XII intact bilaterally. No pronator drift. Normal strength and sensation throughout all 4 extremities. GCS 15  Skin: Skin is warm and dry. Large hematoma jenny contusion on the right " hip. Contusion around the left great toe distally.  Psychiatric: Pleasant     Diagnostics     Lab Results   Labs Ordered and Resulted from Time of ED Arrival to Time of ED Departure   BASIC METABOLIC PANEL - Abnormal       Result Value    Sodium 130 (*)     Potassium 4.3      Chloride 94 (*)     Carbon Dioxide (CO2) 27      Anion Gap 9      Urea Nitrogen 20.4      Creatinine 0.63      GFR Estimate 88      Calcium 9.0      Glucose 102 (*)    CBC WITH PLATELETS AND DIFFERENTIAL - Abnormal    WBC Count 5.6      RBC Count 3.62 (*)     Hemoglobin 11.0 (*)     Hematocrit 33.2 (*)     MCV 92      MCH 30.4      MCHC 33.1      RDW 14.2      Platelet Count 193      % Neutrophils 56      % Lymphocytes 29      % Monocytes 11      % Eosinophils 3      % Basophils 1      % Immature Granulocytes 0      NRBCs per 100 WBC 0      Absolute Neutrophils 3.1      Absolute Lymphocytes 1.6      Absolute Monocytes 0.6      Absolute Eosinophils 0.2      Absolute Basophils 0.1      Absolute Immature Granulocytes 0.0      Absolute NRBCs 0.0     INR - Abnormal    INR 3.06 (*)    TROPONIN T, HIGH SENSITIVITY - Abnormal    Troponin T, High Sensitivity 15 (*)    TROPONIN T, HIGH SENSITIVITY - Normal    Troponin T, High Sensitivity 9         Imaging   CT Cervical Spine w/o Contrast   Final Result   IMPRESSION:   1.  No fracture or posttraumatic subluxation.   2.  Spondylosis with spinal canal and foraminal stenoses as detailed.      Head CT w/o contrast   Final Result   IMPRESSION:   1.  No CT evidence for acute intracranial process.   2.  Brain atrophy and presumed chronic microvascular ischemic changes as above.      Chest XR,  PA & LAT   Final Result   IMPRESSION: The heart is normal in size. There is a moderate hiatal hernia. No acute cardiopulmonary process. Mild AC joint degeneration.          EKG   ECG results from 01/28/25   EKG 12-lead, tracing only     Value    Systolic Blood Pressure     Diastolic Blood Pressure     Ventricular Rate 67     Atrial Rate 67    MS Interval 206    QRS Duration 108        QTc 443    P Axis     R AXIS 1    T Axis 36    Interpretation ECG      Sinus rhythm  Incomplete left bundle branch block  Borderline ECG  When compared with ECG of 21-Jun-2024 12:47, (unconfirmed)  Nonspecific T wave abnormality no longer evident in Anterior leads  Read at 2332 by Dr. Dumont     *Note: Due to a large number of results and/or encounters for the requested time period, some results have not been displayed. A complete set of results can be found in Results Review.        Independent Interpretation   CXR: No pneumothorax, infiltrate, pleural effusion, pulmonary edema, visible rib fracture, cardiomegaly, or mediastinal widening.  CT Head: No intracranial hemorrhage.  EKG from today shows sinus rhythm, rate of 67, no acute concerning ST or T wave changes.    ED Course      Medications Administered   Medications   acetaminophen (TYLENOL) tablet 650 mg (650 mg Oral Not Given 1/29/25 0114)   acetaminophen (TYLENOL) tablet 650 mg (650 mg Oral $Given 1/29/25 0127)       Procedures   Procedures     Discussion of Management   None    ED Course   ED Course as of 01/29/25 0254   Tue Jan 28, 2025   2325 I obtained history and examined the patient as noted above.    Wed Jan 29, 2025   0107 I rechecked the patient and explained findings.    0202 I reassesed patient. I discussed plan for discharge and patient is agreeable with that plan.        Additional Documentation  None    Medical Decision Making / Diagnosis     CMS Diagnoses: None    MIPS       None    [facility]   Salome Saeed is a 84 year old female who presents to the emergency department after having a fall a couple of nights ago.  She did hit her head.  She is anticoagulated on warfarin.  She did not lose consciousness.  She is also complaining of chest discomfort since that fall as well as pelvic pain.  She has been ambulatory and family states she is actually walking better than she  normally has been.  Given the fall was a couple of days ago and her neuroexam here is benign given the fact that she is on warfarin and her INR being 3.06 I did obtain a CT scan of her head as well as her C-spine's.  Fortune these were negative for any acute findings.  X-ray of the pelvis was also negative.  She does have a large hematoma and bruising on that pelvis which is likely the etiology of her discomfort.  Her troponin was initially 9 with a delta Trope of 15.  Coupled this with her nondiagnostic EKG and this essentially rules out acute coronary syndrome.  Again chest x-ray does show any signs of cardiomegaly, widened mediastinum, or CHF or infiltrate.  She has some minor but chronic abnormalities in her CBC and BMP.  She has been ambulatory in the department multiple times here.  She did get a dose of Tylenol.  She continues to do well.  She like to go home.  I think this is reasonable.  Follow-up as symptoms warrant.    Disposition   The patient was discharged.     Diagnosis     ICD-10-CM    1. Closed head injury, initial encounter  S09.90XA       2. Atypical chest pain  R07.89       3. Multiple contusions  T07.XXXA            Discharge Medications   Discharge Medication List as of 1/29/2025  2:01 AM            Scribe Disclosure:  I, Brenda Marinelli, am serving as a scribe at 12:32 AM on 1/29/2025 to document services personally performed by Devaughn Dumont MD based on my observations and the provider's statements to me.        Devaughn Dumont MD  01/29/25 0334

## 2025-01-29 NOTE — PROGRESS NOTES
ANTICOAGULATION MANAGEMENT     Salome Saeed 84 year old female is on warfarin with therapeutic INR result. (Goal INR 2.0-3.0)    Recent labs: (last 7 days)     01/28/25  2343   INR 3.06*       ASSESSMENT     Source(s): Chart Review  Previous INR was Therapeutic last 2(+) visits  ER visit 1/28 for closed head injury and chest pain s/p fall.  Large hematoma and bruising on her pelvis.         PLAN     Unable to reach Loreta today. Left message with her  requesting patient return call.    Follow up required to confirm warfarin dose taken and assess for changes    Sima Bonilla, RN  1/29/2025  Anticoagulation Clinic  [employer] for routing messages: yobany KIM [facility] patient phone line: [phone]

## 2025-02-13 ENCOUNTER — LAB (OUTPATIENT)
Dept: LAB | Facility: CLINIC | Age: 85
End: 2025-02-13
Payer: COMMERCIAL

## 2025-02-13 DIAGNOSIS — I63.9 CEREBROVASCULAR ACCIDENT INVOLVING CEREBELLUM (H): ICD-10-CM

## 2025-02-13 LAB — INR BLD: 3.5 (ref 0.9–1.1)

## 2025-02-18 ENCOUNTER — TELEPHONE (OUTPATIENT)
Dept: FAMILY MEDICINE | Facility: CLINIC | Age: 85
End: 2025-02-18
Payer: COMMERCIAL

## 2025-02-18 NOTE — TELEPHONE ENCOUNTER
Patient Returning Call    Reason for call:  Question about using left over 5mg pills for on Wednesdays.    Information relayed to patient:  message placed    Patient has additional questions:  No      Okay to leave a detailed message?: Yes at Home number on file [phone] (home)

## 2025-02-18 NOTE — TELEPHONE ENCOUNTER
Called and reviewed warfarin dose with patient.  He has 4 tablets of the warfarin 5 mg tablets.  Plans to use these on Wednesdays until gone.  Will start using the warfarin 2.5 mg on the other days.

## 2025-02-26 ENCOUNTER — LAB (OUTPATIENT)
Dept: LAB | Facility: CLINIC | Age: 85
End: 2025-02-26
Payer: COMMERCIAL

## 2025-02-26 ENCOUNTER — ANTICOAGULATION THERAPY VISIT (OUTPATIENT)
Dept: ANTICOAGULATION | Facility: CLINIC | Age: 85
End: 2025-02-26

## 2025-02-26 DIAGNOSIS — I63.9 CEREBROVASCULAR ACCIDENT INVOLVING CEREBELLUM (H): ICD-10-CM

## 2025-02-26 DIAGNOSIS — Z79.01 LONG TERM CURRENT USE OF ANTICOAGULANT THERAPY: Primary | ICD-10-CM

## 2025-02-26 LAB — INR BLD: 2.2 (ref 0.9–1.1)

## 2025-02-26 PROCEDURE — 36416 COLLJ CAPILLARY BLOOD SPEC: CPT

## 2025-02-26 PROCEDURE — 85610 PROTHROMBIN TIME: CPT

## 2025-02-26 NOTE — PROGRESS NOTES
ANTICOAGULATION MANAGEMENT     Salome Saeed 84 year old female is on warfarin with therapeutic INR result. (Goal INR 2.0-3.0)    Recent labs: (last 7 days)     02/26/25  1058   INR 2.2*       ASSESSMENT     Warfarin Lab Questionnaire    Warfarin Doses Last 7 Days    Warfarin dose confirmed with patient and taken as directed          2/26/2025   Warfarin Lab Questionnaire   Missed doses within past 14 days? No   Changes in diet or alcohol within past 14 days? No   Medication changes since last result? No   Injuries or illness since last result? No   New shortness of breath, severe headaches or sudden changes in vision since last result? No   Abnormal bleeding since last result? No   Upcoming surgery, procedure? No     Previous result: Supratherapeutic  Additional findings: None       PLAN     Recommended plan for no diet, medication or health factor changes affecting INR     Dosing Instructions: Continue your current warfarin dose with next INR in 3 weeks       Summary  As of 2/26/2025      Full warfarin instructions:  5 mg every Wed; 2.5 mg all other days   Next INR check:  3/19/2025               Telephone call with Loreta who agrees to plan and repeated back plan correctly    Lab visit scheduled    Education provided: Please call back if any changes to your diet, medications or how you've been taking warfarin    Plan made per [facility] anticoagulation protocol    Sima Bonilla, RN  2/26/2025  Anticoagulation Clinic  [facility] for routing messages: p [facility] [place]  ACC patient phone line: [phone]        _______________________________________________________________________     Anticoagulation Episode Summary       Current INR goal:  2.0-3.0   TTR:  48.1% (1 y)   Target end date:  Indefinite   Send INR reminders to:  [facility] [place]    Indications    Long term current use of anticoagulant therapy [Z79.01]  Cerebrovascular accident involving cerebellum (H) [I63.9]             Comments:  --              Anticoagulation Care Providers       Provider Role Specialty Phone number    Surya Dyer,  Referring Family Medicine [phone]

## 2025-02-26 NOTE — PROGRESS NOTES
ANTICOAGULATION MANAGEMENT     Salome Saeed 84 year old female is on warfarin with therapeutic INR result. (Goal INR 2.0-3.0)    Recent labs: (last 7 days)     02/26/25  1058   INR 2.2*       ASSESSMENT     Warfarin Lab Questionnaire    Warfarin Doses Last 7 Days    Switched to warfarin 2.5 mg tablets.  Will need to confirm dose.        2/26/2025   Warfarin Lab Questionnaire   Missed doses within past 14 days? No   Changes in diet or alcohol within past 14 days? No   Medication changes since last result? No   Injuries or illness since last result? No   New shortness of breath, severe headaches or sudden changes in vision since last result? No   Abnormal bleeding since last result? No   Upcoming surgery, procedure? No     Previous result: Supratherapeutic  Additional findings: None       PLAN     Unable to reach Loreta today.    Left message for patient to call anticoagulation clinic to discuss result.      Follow up required to discuss dosing instructions and confirm understanding of instructions    Sima Bonilla, RN  2/26/2025  Anticoagulation Clinic  [employer] for routing messages: yobany KIM [facility] patient phone line: [phone]

## 2025-02-28 PROBLEM — R55 SYNCOPE, UNSPECIFIED SYNCOPE TYPE: Status: ACTIVE | Noted: 2025-02-28

## 2025-03-18 ENCOUNTER — ANTICOAGULATION THERAPY VISIT (OUTPATIENT)
Dept: ANTICOAGULATION | Facility: CLINIC | Age: 85
End: 2025-03-18

## 2025-03-18 ENCOUNTER — LAB (OUTPATIENT)
Dept: LAB | Facility: CLINIC | Age: 85
End: 2025-03-18
Payer: COMMERCIAL

## 2025-03-18 ENCOUNTER — DOCUMENTATION ONLY (OUTPATIENT)
Dept: ANTICOAGULATION | Facility: CLINIC | Age: 85
End: 2025-03-18

## 2025-03-18 DIAGNOSIS — Z79.01 LONG TERM CURRENT USE OF ANTICOAGULANT THERAPY: Primary | ICD-10-CM

## 2025-03-18 DIAGNOSIS — I63.9 CEREBROVASCULAR ACCIDENT INVOLVING CEREBELLUM (H): ICD-10-CM

## 2025-03-18 DIAGNOSIS — I63.9 CEREBELLAR INFARCTION (H): ICD-10-CM

## 2025-03-18 LAB — INR BLD: 2.2 (ref 0.9–1.1)

## 2025-03-18 PROCEDURE — 36416 COLLJ CAPILLARY BLOOD SPEC: CPT

## 2025-03-18 PROCEDURE — 85610 PROTHROMBIN TIME: CPT

## 2025-03-18 NOTE — PROGRESS NOTES
ANTICOAGULATION CLINIC REFERRAL RENEWAL REQUEST       An annual renewal order is required for all patients referred to [facility] Anticoagulation [facility].?  Please review and sign the pended referral order for Salome Saeed.       ANTICOAGULATION SUMMARY      Warfarin indication(s)   Stroke    Mechanical heart valve present?  NO       Current goal range   INR: 2.0-3.0     Goal appropriate for indication? Goal INR 2-3, standard for indication(s) above     Time in Therapeutic Range (TTR)  (Goal > 60%) 48.9%       Office visit with referring provider's group within last year yes on 12/2/24       Sima Bonilla RN  [facility] Anticoagulation [facility]

## 2025-03-18 NOTE — PROGRESS NOTES
ANTICOAGULATION MANAGEMENT     Salome Saeed 84 year old female is on warfarin with therapeutic INR result. (Goal INR 2.0-3.0)    Recent labs: (last 7 days)     03/18/25  1249   INR 2.2*       ASSESSMENT     Warfarin Lab Questionnaire    Warfarin Doses Last 7 Days    Warfarin dose confirmed with patient and taken as directed          3/18/2025   Warfarin Lab Questionnaire   Missed doses within past 14 days? No   Changes in diet or alcohol within past 14 days? No   Medication changes since last result? No   Injuries or illness since last result? No   New shortness of breath, severe headaches or sudden changes in vision since last result? No   Abnormal bleeding since last result? No   Upcoming surgery, procedure? No     Previous result: Therapeutic last visit; previously outside of goal range  Additional findings: None       PLAN     Recommended plan for no diet, medication or health factor changes affecting INR     Dosing Instructions: Continue your current warfarin dose with next INR in 4 weeks       Summary  As of 3/18/2025      Full warfarin instructions:  5 mg every Wed; 2.5 mg all other days   Next INR check:  4/15/2025               Telephone call with Loreta who agrees to plan and repeated back plan correctly    Lab visit scheduled    Education provided: Please call back if any changes to your diet, medications or how you've been taking warfarin    Plan made per [facility] anticoagulation protocol    Sima Bonilla, RN  3/18/2025  Anticoagulation Clinic  [facility] for routing messages: p [facility] [place]  ACC patient phone line: [phone]        _______________________________________________________________________     Anticoagulation Episode Summary       Current INR goal:  2.0-3.0   TTR:  48.9% (1 y)   Target end date:  Indefinite   Send INR reminders to:  [facility] [place]    Indications    Long term current use of anticoagulant therapy [Z79.01]  Cerebrovascular accident involving cerebellum (H)  [I63.9]             Comments:  --             Anticoagulation Care Providers       Provider Role Specialty Phone number    Surya Dyer,  Referring Family Medicine [phone]

## 2025-04-14 ENCOUNTER — TELEPHONE (OUTPATIENT)
Dept: FAMILY MEDICINE | Facility: CLINIC | Age: 85
End: 2025-04-14
Payer: COMMERCIAL

## 2025-04-14 DIAGNOSIS — R06.09 DOE (DYSPNEA ON EXERTION): ICD-10-CM

## 2025-04-14 DIAGNOSIS — F41.9 ANXIETY: ICD-10-CM

## 2025-04-14 DIAGNOSIS — E78.5 HYPERLIPIDEMIA LDL GOAL <70: ICD-10-CM

## 2025-04-14 DIAGNOSIS — G20.C PARKINSONISM, UNSPECIFIED PARKINSONISM TYPE (H): ICD-10-CM

## 2025-04-14 RX ORDER — ESCITALOPRAM OXALATE 5 MG/1
5 TABLET ORAL DAILY
Qty: 90 TABLET | Refills: 0 | OUTPATIENT
Start: 2025-04-14

## 2025-04-14 RX ORDER — FUROSEMIDE 20 MG/1
20 TABLET ORAL DAILY
Qty: 90 TABLET | Refills: 1 | Status: SHIPPED | OUTPATIENT
Start: 2025-04-14

## 2025-04-14 RX ORDER — ROSUVASTATIN CALCIUM 40 MG/1
40 TABLET, COATED ORAL DAILY
Qty: 90 TABLET | Refills: 3 | OUTPATIENT
Start: 2025-04-14

## 2025-04-14 RX ORDER — FUROSEMIDE 20 MG/1
20 TABLET ORAL DAILY
Qty: 90 TABLET | Refills: 0 | OUTPATIENT
Start: 2025-04-14

## 2025-04-14 NOTE — TELEPHONE ENCOUNTER
Faxed form to Dr Dyer to give permission for the above patient to hold their Coumadin for x5 days pending a Steriod injection.

## 2025-04-14 NOTE — TELEPHONE ENCOUNTER
carbidopa-levodopa (SINEMET)  MG tablet     escitalopram (LEXAPRO) 5 MG tablet       furosemide (LASIX) 20 MG tablet         rosuvastatin (CRESTOR) 40 MG tablet     [id number] - [place], [place] - [address] [address]

## 2025-04-14 NOTE — TELEPHONE ENCOUNTER
Clinic RN: Please investigate patient's chart or contact patient if the information cannot be found because patient should have run out of this medication on Carbidopa: 10/24. Confirm patient is taking this medication as prescribed. Document findings and route refill encounter to provider for approval or denial.    Rosanne HAUSER, RN, PHN

## 2025-04-15 ENCOUNTER — ANTICOAGULATION THERAPY VISIT (OUTPATIENT)
Dept: ANTICOAGULATION | Facility: CLINIC | Age: 85
End: 2025-04-15

## 2025-04-15 ENCOUNTER — LAB (OUTPATIENT)
Dept: LAB | Facility: CLINIC | Age: 85
End: 2025-04-15
Payer: COMMERCIAL

## 2025-04-15 DIAGNOSIS — I63.9 CEREBROVASCULAR ACCIDENT INVOLVING CEREBELLUM (H): ICD-10-CM

## 2025-04-15 DIAGNOSIS — F41.9 ANXIETY: ICD-10-CM

## 2025-04-15 DIAGNOSIS — I63.9 CEREBELLAR INFARCTION (H): ICD-10-CM

## 2025-04-15 DIAGNOSIS — Z79.01 LONG TERM CURRENT USE OF ANTICOAGULANT THERAPY: Primary | ICD-10-CM

## 2025-04-15 LAB — INR BLD: 1.9 (ref 0.9–1.1)

## 2025-04-15 PROCEDURE — 36416 COLLJ CAPILLARY BLOOD SPEC: CPT

## 2025-04-15 PROCEDURE — 85610 PROTHROMBIN TIME: CPT

## 2025-04-15 RX ORDER — ESCITALOPRAM OXALATE 5 MG/1
5 TABLET ORAL DAILY
Qty: 90 TABLET | Refills: 0 | Status: SHIPPED | OUTPATIENT
Start: 2025-04-15

## 2025-04-15 NOTE — PROGRESS NOTES
ANTICOAGULATION MANAGEMENT     Salome Saeed 85 year old female is on warfarin with subtherapeutic INR result. (Goal INR 2.0-3.0)    Recent labs: (last 7 days)     04/15/25  1251   INR 1.9*       ASSESSMENT     Warfarin Lab Questionnaire    Warfarin Doses Last 7 Days  Confirmed proper dosing.        4/15/2025   Warfarin Lab Questionnaire   Missed doses within past 14 days? No   Changes in diet or alcohol within past 14 days? No   Medication changes since last result? No   Injuries or illness since last result? No   New shortness of breath, severe headaches or sudden changes in vision since last result? No   Abnormal bleeding since last result? No   Upcoming surgery, procedure? No     Previous result: Therapeutic last 2(+) visits  Additional findings: Loreta mentioned today that her , Lacho, is the one who sets up medications. She still relays all the dosing instructions to him but he puts the medications in her pill box. Spoke on the phone with Lacho who confirmed he's using both 5 mg tablets (Wed) and 2.5 mg tablets (all other days). He states this method is easier for him.       PLAN     Recommended plan for no diet, medication or health factor changes and previous 2 INR results within goal affecting INR     Dosing Instructions: Continue your current warfarin dose with next INR in 9 days       Summary  As of 4/15/2025      Full warfarin instructions:  5 mg every Wed; 2.5 mg all other days   Next INR check:  4/24/2025               Telephone call with Loreta who verbalizes understanding and agrees to plan    Check at provider office visit    Education provided: Please call back if any changes to your diet, medications or how you've been taking warfarin  Taking warfarin: prescribed tablet strength and color    Plan made per [facility] anticoagulation protocol    Neeru Martinez RN  4/15/2025  Anticoagulation Clinic  [employer] for routing messages: yobany NOEL [facility] [place] patient phone line:  [phone]        _______________________________________________________________________     Anticoagulation Episode Summary       Current INR goal:  2.0-3.0   TTR:  53.1% (1 y)   Target end date:  Indefinite   Send INR reminders to:  [facility] [place]    Indications    Long term current use of anticoagulant therapy [Z79.01]  Cerebrovascular accident involving cerebellum (H) [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

## 2025-04-15 NOTE — TELEPHONE ENCOUNTER
Attempt # 1    Called # [phone]     Left a non detailed VM to call back at [phone] and ask for any available Triage Nurse.    I think carbidopa is ordered by neurology  Escitalopram - needs VALERIA-7      MARCUS IRVIN RN on 4/15/2025 at 1:46 PM   [facility]

## 2025-04-16 ENCOUNTER — MEDICAL CORRESPONDENCE (OUTPATIENT)
Dept: HEALTH INFORMATION MANAGEMENT | Facility: CLINIC | Age: 85
End: 2025-04-16
Payer: COMMERCIAL

## 2025-04-16 RX ORDER — CARBIDOPA AND LEVODOPA 25; 100 MG/1; MG/1
1 TABLET ORAL 3 TIMES DAILY
Qty: 270 TABLET | Refills: 1 | Status: SHIPPED | OUTPATIENT
Start: 2025-04-16

## 2025-04-16 RX ORDER — ESCITALOPRAM OXALATE 5 MG/1
5 TABLET ORAL DAILY
Qty: 90 TABLET | Refills: 1 | OUTPATIENT
Start: 2025-04-16

## 2025-04-16 NOTE — TELEPHONE ENCOUNTER
Paperwork signed and faxed. Sent to [facility], filed in [facility].

## 2025-04-24 ENCOUNTER — OFFICE VISIT (OUTPATIENT)
Dept: FAMILY MEDICINE | Facility: CLINIC | Age: 85
End: 2025-04-24
Payer: COMMERCIAL

## 2025-04-24 ENCOUNTER — ANTICOAGULATION THERAPY VISIT (OUTPATIENT)
Dept: ANTICOAGULATION | Facility: CLINIC | Age: 85
End: 2025-04-24

## 2025-04-24 ENCOUNTER — LAB (OUTPATIENT)
Dept: LAB | Facility: CLINIC | Age: 85
End: 2025-04-24
Payer: COMMERCIAL

## 2025-04-24 VITALS
OXYGEN SATURATION: 99 % | TEMPERATURE: 97.7 F | HEIGHT: 63 IN | WEIGHT: 128 LBS | DIASTOLIC BLOOD PRESSURE: 68 MMHG | SYSTOLIC BLOOD PRESSURE: 148 MMHG | HEART RATE: 66 BPM | BODY MASS INDEX: 22.68 KG/M2 | RESPIRATION RATE: 16 BRPM

## 2025-04-24 DIAGNOSIS — I63.9 CEREBELLAR INFARCTION (H): ICD-10-CM

## 2025-04-24 DIAGNOSIS — Z79.01 LONG TERM CURRENT USE OF ANTICOAGULANT THERAPY: Primary | ICD-10-CM

## 2025-04-24 DIAGNOSIS — I63.9 CEREBROVASCULAR ACCIDENT INVOLVING CEREBELLUM (H): ICD-10-CM

## 2025-04-24 DIAGNOSIS — I10 ESSENTIAL HYPERTENSION WITH GOAL BLOOD PRESSURE LESS THAN 140/90: Primary | ICD-10-CM

## 2025-04-24 DIAGNOSIS — E03.9 HYPOTHYROIDISM, UNSPECIFIED TYPE: ICD-10-CM

## 2025-04-24 DIAGNOSIS — R42 DIZZINESS: ICD-10-CM

## 2025-04-24 LAB
ERYTHROCYTE [DISTWIDTH] IN BLOOD BY AUTOMATED COUNT: 13.2 % (ref 10–15)
HCT VFR BLD AUTO: 37.9 % (ref 35–47)
HGB BLD-MCNC: 12.7 G/DL (ref 11.7–15.7)
INR BLD: 1.8 (ref 0.9–1.1)
MCH RBC QN AUTO: 30.2 PG (ref 26.5–33)
MCHC RBC AUTO-ENTMCNC: 33.5 G/DL (ref 31.5–36.5)
MCV RBC AUTO: 90 FL (ref 78–100)
PLATELET # BLD AUTO: 200 10E3/UL (ref 150–450)
RBC # BLD AUTO: 4.21 10E6/UL (ref 3.8–5.2)
WBC # BLD AUTO: 5.1 10E3/UL (ref 4–11)

## 2025-04-24 PROCEDURE — 84439 ASSAY OF FREE THYROXINE: CPT | Performed by: FAMILY MEDICINE

## 2025-04-24 PROCEDURE — 99213 OFFICE O/P EST LOW 20 MIN: CPT | Performed by: FAMILY MEDICINE

## 2025-04-24 PROCEDURE — G2211 COMPLEX E/M VISIT ADD ON: HCPCS | Performed by: FAMILY MEDICINE

## 2025-04-24 PROCEDURE — 3077F SYST BP >= 140 MM HG: CPT | Performed by: FAMILY MEDICINE

## 2025-04-24 PROCEDURE — 1126F AMNT PAIN NOTED NONE PRSNT: CPT | Performed by: FAMILY MEDICINE

## 2025-04-24 PROCEDURE — 80048 BASIC METABOLIC PNL TOTAL CA: CPT | Performed by: FAMILY MEDICINE

## 2025-04-24 PROCEDURE — 85027 COMPLETE CBC AUTOMATED: CPT | Performed by: FAMILY MEDICINE

## 2025-04-24 PROCEDURE — 84443 ASSAY THYROID STIM HORMONE: CPT | Performed by: FAMILY MEDICINE

## 2025-04-24 PROCEDURE — 3078F DIAST BP <80 MM HG: CPT | Performed by: FAMILY MEDICINE

## 2025-04-24 ASSESSMENT — PAIN SCALES - GENERAL: PAINLEVEL_OUTOF10: NO PAIN (0)

## 2025-04-24 NOTE — PATIENT INSTRUCTIONS
BP today is just slightly above your goal of <140/90. Let's keep your blood pressure medication the same:   - lisinopril 40 mg daily   - propranolol 60 mg daily    Check the blood pressure 2-3 times per week. Repeat checks 2-3 minutes apart and take an average.    If your blood pressure remains above 140/90 for several days, please let me know and we could add another blood pressure medication.

## 2025-04-24 NOTE — LETTER
[facility]  [address], [place] [address] [phone]                    May 5, 2025    Salome Saeed  [address]  [place] [address]      Dear Salome,    Here is a summary of your recent test results:    -Normal red blood cell (hgb) levels, normal white blood cell count and normal platelet levels.   -Kidney function is normal (Cr, GFR), Sodium is normal, Potassium is normal, Calcium is normal, Glucose is normal.   -Thyroid tests are stable.     Your test results are enclosed. Please contact me if you have any questions.    In addition, here is a list of due or overdue Health Maintenance reminders.    Health Maintenance Due   Topic Date Due    RSV VACCINE (1 - 1-dose 75+ series) Never done    Osteoporosis Screening  04/01/2024    COVID-19 Vaccine (4 - 2024-25 season) 09/01/2024    ANNUAL REVIEW OF HM ORDERS  09/03/2025       Please call us at [phone] (or use [employer]) to address the above recommendations.            Thank you very much for trusting [facility].     Healthy regards,      Surya Dyer DO          Results for orders placed or performed in visit on 04/24/25   Basic metabolic panel  (Ca, Cl, CO2, Creat, Gluc, K, Na, BUN)     Status: Abnormal   Result Value Ref Range    Sodium 135 135 - 145 mmol/L    Potassium 4.3 3.4 - 5.3 mmol/L    Chloride 95 (L) 98 - 107 mmol/L    Carbon Dioxide (CO2) 28 22 - 29 mmol/L    Anion Gap 12 7 - 15 mmol/L    Urea Nitrogen 17.2 8.0 - 23.0 mg/dL    Creatinine 0.74 0.51 - 0.95 mg/dL    GFR Estimate 80 >60 mL/min/1.73m2    Calcium 9.7 8.8 - 10.4 mg/dL    Glucose 105 (H) 70 - 99 mg/dL   CBC with platelets     Status: Normal   Result Value Ref Range    WBC Count 5.1 4.0 - 11.0 10e3/uL    RBC Count 4.21 3.80 - 5.20 10e6/uL    Hemoglobin 12.7 11.7 - 15.7 g/dL    Hematocrit 37.9 35.0 - 47.0 %    MCV 90 78 - 100 fL    MCH 30.2 26.5 - 33.0 pg    MCHC 33.5 31.5 - 36.5 g/dL    RDW 13.2 10.0 -  15.0 %    Platelet Count 200 150 - 450 10e3/uL   TSH with free T4 reflex     Status: Abnormal   Result Value Ref Range    TSH 5.96 (H) 0.30 - 4.20 uIU/mL   T4 free     Status: Normal   Result Value Ref Range    Free T4 1.46 0.90 - 1.70 ng/dL   Results for orders placed or performed in visit on 04/24/25   INR point of care (finger stick)     Status: Abnormal   Result Value Ref Range    INR 1.8 (H) 0.9 - 1.1    Narrative    This test is intended for monitoring Coumadin therapy. Results are not accurate in patients with prolonged INR due to factor deficiency.

## 2025-04-24 NOTE — PROGRESS NOTES
ANTICOAGULATION MANAGEMENT     Salome Saeed 85 year old female is on warfarin with subtherapeutic INR result. (Goal INR 2.0-3.0)    Recent labs: (last 7 days)     04/24/25  1427   INR 1.8*       ASSESSMENT     Warfarin Lab Questionnaire    Warfarin Doses Last 7 Days--Patient confirmed taking warfarin maintenance dose as listed, she confirmed with her           4/24/2025   Warfarin Lab Questionnaire   Missed doses within past 14 days? No   Changes in diet or alcohol within past 14 days? No   Medication changes since last result? No   Injuries or illness since last result? No   New shortness of breath, severe headaches or sudden changes in vision since last result? No   Abnormal bleeding since last result? No   Upcoming surgery, procedure? No     Previous result: Subtherapeutic  Additional findings:  office visit with PCP today for blood pressure follow up-notes are not yet complete, no change in medications  Lacho agreed to set aside 5 mg warfarin tablets and only utilize 2.5 mg tablets, he understands how to give 3.75 mg and he does have and utilize a pill splitter and organizer (2 days of 5 mg would likely be too much--12.5% increase).  5 mg tablets removed from calendar.       PLAN     Recommended plan for no diet, medication or health factor changes affecting INR     Dosing Instructions: Increase your warfarin dose (6% change) with next INR in 2 weeks       Summary  As of 4/24/2025      Full warfarin instructions:  3.75 mg every Sun, Wed, Fri; 2.5 mg all other days   Next INR check:  5/8/2025               Telephone call with Loreta and Lacho who verbalizes understanding and agrees to plan, who agrees to plan and repeated back plan correctly, and Lacho wrote down new dosing instructions    Lab visit scheduled    Education provided: Taking warfarin: prescribed tablet strength and color, importance of following [facility] instructions vs instructions on the prescription bottle, Importance of taking warfarin as  instructed, and warfarin tablet strength change; remove and/or discard previous strength from medication supply    Plan made per [facility] anticoagulation protocol    Madina Brannon, RN  4/24/2025  Anticoagulation Clinic  [facility] for routing messages: yobany [facility] patient phone line: [phone]        _______________________________________________________________________     Anticoagulation Episode Summary       Current INR goal:  2.0-3.0   TTR:  53.1% (1 y)   Target end date:  Indefinite   Send INR reminders to:  [facility]    Indications    Long term current use of anticoagulant therapy [Z79.01]  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

## 2025-04-24 NOTE — PROGRESS NOTES
Assessment & Plan     Essential hypertension with goal blood pressure less than 140/90  BP is slightly elevated in clinic today, however, more recent blood pressures have been at goal. Based on home readings, I am hesitant to adjust her antihypertensive regimen right now but could consider it in the future. Recommend bringing home cuff to next visit to compare to manual check for accuracy.     Dizziness  Check labs; continue current medications. Follow up if not improving over the next few weeks.   - Basic metabolic panel  (Ca, Cl, CO2, Creat, Gluc, K, Na, BUN); Future  - CBC with platelets; Future  - Basic metabolic panel  (Ca, Cl, CO2, Creat, Gluc, K, Na, BUN)  - CBC with platelets    Hypothyroidism, unspecified type  - TSH with free T4 reflex; Future  - TSH with free T4 reflex  - T4 free    Subjective   Loreta is a 85 year old, presenting for the following health issues:  Hypertension      4/24/2025     2:48 PM   Additional Questions   Roomed by OLIVIA PAREDES CMA   Accompanied by SELF     History of Present Illness       Hypertension: She presents for follow up of hypertension.  She does check blood pressure  regularly outside of the clinic. Outside blood pressures have been over 140/90.         Having some dizziness when BP is high - highest at home was 169/79 on 3/30/25 , had a few normal reading she admits she does not check on a regular basis. No vision changes, chest pain/pressure.    She has been noticing some higher blood pressures at home 156/73, 169/79 at the end of March. More recently, BP has been 123/66 and 132/68. Blood pressures from early March: 133/68, 114/64, 115/63    BP Readings from Last 6 Encounters:   02/28/25 112/72   01/29/25 (!) 181/78   12/02/24 (!) 140/70   10/31/24 (!) 155/90   09/13/24 (!) 162/70   09/03/24 138/60               Review of Systems  Constitutional, HEENT, cardiovascular, pulmonary, gi and gu systems are negative, except as otherwise noted.      Objective    LMP  (LMP  Unknown)   There is no height or weight on file to calculate BMI.  Physical Exam   GENERAL: alert and no distress  EYES: Eyes grossly normal to inspection  HENT: ear canals and TM's normal, nose and mouth without ulcers or lesions  NECK: no adenopathy, no asymmetry, masses, or scars  RESP: lungs clear to auscultation - no rales, rhonchi or wheezes  CV: regular rates and rhythm, normal S1 S2, no S3 or S4, and no murmur, click or rub  PSYCH: mentation appears normal, affect normal/bright        The longitudinal plan of care for the diagnosis(es)/condition(s) as documented were addressed during this visit. Due to the added complexity in care, I will continue to support Loreta in the subsequent management and with ongoing continuity of care.          Signed Electronically by: Surya Dyer DO

## 2025-04-25 LAB
ANION GAP SERPL CALCULATED.3IONS-SCNC: 12 MMOL/L (ref 7–15)
BUN SERPL-MCNC: 17.2 MG/DL (ref 8–23)
CALCIUM SERPL-MCNC: 9.7 MG/DL (ref 8.8–10.4)
CHLORIDE SERPL-SCNC: 95 MMOL/L (ref 98–107)
CREAT SERPL-MCNC: 0.74 MG/DL (ref 0.51–0.95)
EGFRCR SERPLBLD CKD-EPI 2021: 80 ML/MIN/1.73M2
GLUCOSE SERPL-MCNC: 105 MG/DL (ref 70–99)
HCO3 SERPL-SCNC: 28 MMOL/L (ref 22–29)
POTASSIUM SERPL-SCNC: 4.3 MMOL/L (ref 3.4–5.3)
SODIUM SERPL-SCNC: 135 MMOL/L (ref 135–145)
T4 FREE SERPL-MCNC: 1.46 NG/DL (ref 0.9–1.7)
TSH SERPL DL<=0.005 MIU/L-ACNC: 5.96 UIU/ML (ref 0.3–4.2)

## 2025-05-08 ENCOUNTER — LAB (OUTPATIENT)
Dept: LAB | Facility: CLINIC | Age: 85
End: 2025-05-08
Payer: COMMERCIAL

## 2025-05-08 ENCOUNTER — ANTICOAGULATION THERAPY VISIT (OUTPATIENT)
Dept: ANTICOAGULATION | Facility: CLINIC | Age: 85
End: 2025-05-08

## 2025-05-08 ENCOUNTER — RESULTS FOLLOW-UP (OUTPATIENT)
Dept: ANTICOAGULATION | Facility: CLINIC | Age: 85
End: 2025-05-08

## 2025-05-08 DIAGNOSIS — I63.9 CEREBROVASCULAR ACCIDENT INVOLVING CEREBELLUM (H): ICD-10-CM

## 2025-05-08 DIAGNOSIS — I63.9 CEREBROVASCULAR ACCIDENT INVOLVING CEREBELLUM (H): Primary | ICD-10-CM

## 2025-05-08 DIAGNOSIS — I63.9 CEREBELLAR INFARCTION (H): ICD-10-CM

## 2025-05-08 DIAGNOSIS — Z79.01 LONG TERM CURRENT USE OF ANTICOAGULANT THERAPY: Primary | ICD-10-CM

## 2025-05-08 LAB — INR BLD: 2.8 (ref 0.9–1.1)

## 2025-05-08 NOTE — PROGRESS NOTES
ANTICOAGULATION MANAGEMENT     Salome Saeed 85 year old female is on warfarin with therapeutic INR result. (Goal INR 2.0-3.0)    Recent labs: (last 7 days)     05/08/25  1340   INR 2.8*       ASSESSMENT     Warfarin Lab Questionnaire    Warfarin Doses Last 7 Days    Warfarin dose confirmed with patient and taken as directed          5/8/2025   Warfarin Lab Questionnaire   Missed doses within past 14 days? No   Changes in diet or alcohol within past 14 days? No   Medication changes since last result? No   Injuries or illness since last result? No   New shortness of breath, severe headaches or sudden changes in vision since last result? No   Abnormal bleeding since last result? No   Upcoming surgery, procedure? No     Previous result: Subtherapeutic  Additional findings: None       PLAN     Recommended plan for no diet, medication or health factor changes affecting INR     Dosing Instructions: Continue your current warfarin dose with next INR in 3 weeks       Summary  As of 5/8/2025      Full warfarin instructions:  3.75 mg every Sun, Wed, Fri; 2.5 mg all other days   Next INR check:  5/29/2025               Telephone call with Loreta and , Lacho, who agrees to plan and repeated back plan correctly    Lab visit scheduled    Education provided: Please call back if any changes to your diet, medications or how you've been taking warfarin    Plan made per [facility] anticoagulation protocol    Sima Bonilla, RN  5/8/2025  Anticoagulation Clinic  [facility] for routing messages: p [facility] [place]  [facility] patient phone line: [phone]        _______________________________________________________________________     Anticoagulation Episode Summary       Current INR goal:  2.0-3.0   TTR:  55.1% (1 y)   Target end date:  Indefinite   Send INR reminders to:  [facility]    Indications    Long term current use of anticoagulant therapy [Z79.01]  Cerebrovascular accident involving cerebellum (H) [I63.9]  Cerebellar  infarction (H) [I63.9]             Comments:  --             Anticoagulation Care Providers       Provider Role Specialty Phone number    Surya Dyer DO Referring Family Medicine [phone]

## 2025-05-12 DIAGNOSIS — Z79.01 ON CONTINUOUS ORAL ANTICOAGULATION: Primary | ICD-10-CM

## 2025-05-20 ENCOUNTER — TRANSCRIBE ORDERS (OUTPATIENT)
Dept: OTHER | Age: 85
End: 2025-05-20

## 2025-05-20 DIAGNOSIS — G20.A1 PARKINSON'S DISEASE (H): Primary | ICD-10-CM

## 2025-05-20 DIAGNOSIS — G20.A1 PARKINSON'S DISEASE WITHOUT DYSKINESIA OR FLUCTUATING MANIFESTATIONS (H): ICD-10-CM

## 2025-05-21 DIAGNOSIS — E78.5 HYPERLIPIDEMIA LDL GOAL <70: ICD-10-CM

## 2025-05-21 DIAGNOSIS — I63.9 CEREBROVASCULAR ACCIDENT INVOLVING CEREBELLUM (H): ICD-10-CM

## 2025-05-21 DIAGNOSIS — I10 ESSENTIAL HYPERTENSION, BENIGN: ICD-10-CM

## 2025-05-21 DIAGNOSIS — Z79.01 LONG TERM CURRENT USE OF ANTICOAGULANT THERAPY: ICD-10-CM

## 2025-05-21 RX ORDER — LISINOPRIL 40 MG/1
40 TABLET ORAL DAILY
Qty: 90 TABLET | Refills: 1 | Status: SHIPPED | OUTPATIENT
Start: 2025-05-21

## 2025-05-21 RX ORDER — WARFARIN SODIUM 2.5 MG/1
TABLET ORAL
Qty: 110 TABLET | Refills: 0 | Status: SHIPPED | OUTPATIENT
Start: 2025-05-21

## 2025-05-21 RX ORDER — ROSUVASTATIN CALCIUM 40 MG/1
40 TABLET, COATED ORAL DAILY
Qty: 90 TABLET | Refills: 3 | OUTPATIENT
Start: 2025-05-21

## 2025-05-21 NOTE — TELEPHONE ENCOUNTER
Medication Question or Refill    lisinopril (ZESTRIL) 40 MG tablet,    warfarin ANTICOAGULANT (COUMADIN) 2.5 MG tablet    What medication are you calling about (include dose and sig)?:     Pt  is calling to refill prescriptions;  lisinopril (ZESTRIL) 40 MG tablet,  warfarin ANTICOAGULANT (COUMADIN) 2.5 MG tablet    Preferred Pharmacy:     [facility] [address] [place] [id number]  Phone: [phone] Fax: [phone]      Controlled Substance Agreement on file:   CSA -- Patient Level:    CSA: None found at the patient level.       Who prescribed the medication?: Dr. Dyer    Do you need a refill? Yes - 6-7 days left    When did you use the medication last? na    Patient offered an appointment? No    Do you have any questions or concerns?  No      Okay to leave a detailed message?: No at Cell number on file:    No relevant phone numbers on file.     Mariama ROPER     no

## 2025-05-27 ENCOUNTER — ANTICOAGULATION THERAPY VISIT (OUTPATIENT)
Dept: ANTICOAGULATION | Facility: CLINIC | Age: 85
End: 2025-05-27

## 2025-05-27 ENCOUNTER — RESULTS FOLLOW-UP (OUTPATIENT)
Dept: ANTICOAGULATION | Facility: CLINIC | Age: 85
End: 2025-05-27

## 2025-05-27 ENCOUNTER — LAB (OUTPATIENT)
Dept: LAB | Facility: CLINIC | Age: 85
End: 2025-05-27
Payer: COMMERCIAL

## 2025-05-27 DIAGNOSIS — I63.9 CEREBELLAR INFARCTION (H): ICD-10-CM

## 2025-05-27 DIAGNOSIS — I63.9 CEREBROVASCULAR ACCIDENT INVOLVING CEREBELLUM (H): ICD-10-CM

## 2025-05-27 DIAGNOSIS — Z79.01 LONG TERM CURRENT USE OF ANTICOAGULANT THERAPY: Primary | ICD-10-CM

## 2025-05-27 DIAGNOSIS — Z79.01 ON CONTINUOUS ORAL ANTICOAGULATION: ICD-10-CM

## 2025-05-27 LAB
INR PPP: 1.08 (ref 0.85–1.15)
PROTHROMBIN TIME: 14.1 SECONDS (ref 11.8–14.8)

## 2025-05-27 PROCEDURE — 85610 PROTHROMBIN TIME: CPT

## 2025-05-27 PROCEDURE — 36415 COLL VENOUS BLD VENIPUNCTURE: CPT

## 2025-05-27 NOTE — PROGRESS NOTES
ANTICOAGULATION MANAGEMENT     Salome Saeed 85 year old female is on warfarin with subtherapeutic INR result. (Goal INR 2.0-3.0)    Recent labs: (last 7 days)     05/27/25  1058   INR 1.08       ASSESSMENT     Source(s): Chart Review and Patient/Caregiver Call     Warfarin doses taken: Held for CHRIS  recently which may be affecting INR and patient's spouse reports he resumed warfarin for Loreta today instead of waiting until tomorrow as instructed  Diet: No new diet changes identified  Medication/supplement changes: None noted  New illness, injury, or hospitalization: Yes: CHRIS 5/27/25  Signs or symptoms of bleeding or clotting: No  Previous result: Therapeutic last visit; previously outside of goal range  Additional findings: None       PLAN     Recommended plan for temporary change(s) affecting INR     Dosing Instructions: booster dose per below warfarin resumption plan, however, Farhat reports he already gave patient 5 mg warfarin today, dosing calendar updated, then continue your current warfarin dose with next INR in 1 week     Post-Procedure:  Resume warfarin dose if okay with provider doing procedure on night after procedure, Wednesday, May 28 PM: take 5 mg x 2, then resume home dose  Recheck INR ~ 7 days after resuming warfarin     Summary  As of 5/27/2025      Full warfarin instructions:  5/27: 5 mg; 5/28: 5 mg; Otherwise 3.75 mg every Sun, Wed, Fri; 2.5 mg all other days   Next INR check:  6/3/2025               Telephone call with Loreta and Farhat who agrees to plan and repeated back plan correctly    Lab visit scheduled    Education provided: Please call back if any changes to your diet, medications or how you've been taking warfarin  Contact [phone] with any changes, questions or concerns.     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

## 2025-06-03 ENCOUNTER — ANTICOAGULATION THERAPY VISIT (OUTPATIENT)
Dept: ANTICOAGULATION | Facility: CLINIC | Age: 85
End: 2025-06-03

## 2025-06-03 ENCOUNTER — RESULTS FOLLOW-UP (OUTPATIENT)
Dept: ANTICOAGULATION | Facility: CLINIC | Age: 85
End: 2025-06-03

## 2025-06-03 ENCOUNTER — LAB (OUTPATIENT)
Dept: LAB | Facility: CLINIC | Age: 85
End: 2025-06-03
Payer: COMMERCIAL

## 2025-06-03 DIAGNOSIS — I63.9 CEREBELLAR INFARCTION (H): ICD-10-CM

## 2025-06-03 DIAGNOSIS — I63.9 CEREBROVASCULAR ACCIDENT INVOLVING CEREBELLUM (H): ICD-10-CM

## 2025-06-03 DIAGNOSIS — Z79.01 LONG TERM CURRENT USE OF ANTICOAGULANT THERAPY: Primary | ICD-10-CM

## 2025-06-03 LAB — INR BLD: 2 (ref 0.9–1.1)

## 2025-06-03 PROCEDURE — 36416 COLLJ CAPILLARY BLOOD SPEC: CPT

## 2025-06-03 PROCEDURE — 85610 PROTHROMBIN TIME: CPT

## 2025-06-03 NOTE — PROGRESS NOTES
ANTICOAGULATION MANAGEMENT     Salome Saeed 85 year old female is on warfarin with therapeutic INR result. (Goal INR 2.0-3.0)    Recent labs: (last 7 days)     06/03/25  1101   INR 2.0*       ASSESSMENT     Warfarin Lab Questionnaire    Warfarin Doses Last 7 Days  5 day hold (5/22-5/26), boosted 5 mg x2, then resumed maint dose        6/3/2025   Warfarin Lab Questionnaire   Missed doses within past 14 days? No - held for 5/27 CHRIS   Changes in diet or alcohol within past 14 days? No   Medication changes since last result? No   Injuries or illness since last result? No   New shortness of breath, severe headaches or sudden changes in vision since last result? No   Abnormal bleeding since last result? No   Upcoming surgery, procedure? No     Previous result: Subtherapeutic (for procedure)  Additional findings: None       PLAN     Recommended plan for temporary change(s) affecting INR     Dosing Instructions: Continue your current warfarin dose with next INR in 2 weeks       Summary  As of 6/3/2025      Full warfarin instructions:  3.75 mg every Sun, Wed, Fri; 2.5 mg all other days   Next INR check:  6/17/2025               Telephone call with , Lacho, who verbalizes understanding and agrees to plan    Lab visit scheduled    Education provided: Please call back if any changes to your diet, medications or how you've been taking warfarin    Plan made per [facility] anticoagulation protocol    Neeru Martinez RN  6/3/2025  Anticoagulation Clinic  [facility] for routing messages: p [facility] [place]  [facility] patient phone line: [phone]        _______________________________________________________________________     Anticoagulation Episode Summary       Current INR goal:  2.0-3.0   TTR:  50.4% (1 y)   Target end date:  Indefinite   Send INR reminders to:  [facility]    Indications    Long term current use of anticoagulant therapy [Z79.01]  Cerebrovascular accident involving cerebellum (H)  [I63.9]  Cerebellar infarction (H) [I63.9]             Comments:  --             Anticoagulation Care Providers       Provider Role Specialty Phone number    Surya Dyer DO Referring [facility] [phone]

## 2025-06-17 ENCOUNTER — RESULTS FOLLOW-UP (OUTPATIENT)
Dept: ANTICOAGULATION | Facility: CLINIC | Age: 85
End: 2025-06-17

## 2025-06-17 ENCOUNTER — ANTICOAGULATION THERAPY VISIT (OUTPATIENT)
Dept: ANTICOAGULATION | Facility: CLINIC | Age: 85
End: 2025-06-17

## 2025-06-17 ENCOUNTER — LAB (OUTPATIENT)
Dept: LAB | Facility: CLINIC | Age: 85
End: 2025-06-17
Payer: COMMERCIAL

## 2025-06-17 DIAGNOSIS — Z79.01 LONG TERM CURRENT USE OF ANTICOAGULANT THERAPY: Primary | ICD-10-CM

## 2025-06-17 DIAGNOSIS — I63.9 CEREBROVASCULAR ACCIDENT INVOLVING CEREBELLUM (H): ICD-10-CM

## 2025-06-17 DIAGNOSIS — I63.9 CEREBELLAR INFARCTION (H): ICD-10-CM

## 2025-06-17 LAB — INR BLD: 1.5 (ref 0.9–1.1)

## 2025-06-17 PROCEDURE — 36416 COLLJ CAPILLARY BLOOD SPEC: CPT

## 2025-06-17 PROCEDURE — 85610 PROTHROMBIN TIME: CPT

## 2025-06-17 NOTE — PROGRESS NOTES
ANTICOAGULATION MANAGEMENT     Salome Saeed 85 year old female is on warfarin with subtherapeutic INR result. (Goal INR 2.0-3.0)    Recent labs: (last 7 days)     06/17/25  1101   INR 1.5*       ASSESSMENT     Warfarin Lab Questionnaire    Warfarin Doses Last 7 Days  Confirmed proper dosing        6/17/2025   Warfarin Lab Questionnaire   Missed doses within past 14 days? No   Changes in diet or alcohol within past 14 days? No - has been eating more broccoli as she typically does in the summer months - ongoing change.   Medication changes since last result? No   Injuries or illness since last result? No   New shortness of breath, severe headaches or sudden changes in vision since last result? No   Abnormal bleeding since last result? No   Upcoming surgery, procedure? No     Previous result: Therapeutic last visit; previously outside of goal range  Additional findings: None       PLAN     Recommended plan for ongoing change(s) affecting INR     Dosing Instructions: booster dose then Increase your warfarin dose (11.8% change) with next INR in 1 week       Summary  As of 6/17/2025      Full warfarin instructions:  6/17: 5 mg; Otherwise 2.5 mg every Mon, Thu; 3.75 mg all other days   Next INR check:  6/24/2025               Telephone call with , Lacho, who verbalizes understanding and agrees to plan    Lab visit scheduled    Education provided: Please call back if any changes to your diet, medications or how you've been taking warfarin    Plan made per [facility] anticoagulation protocol    Neeru Martinez RN  6/17/2025  Anticoagulation Clinic  [facility] for routing messages: p [facility] [place]  [facility] patient phone line: [phone]        _______________________________________________________________________     Anticoagulation Episode Summary       Current INR goal:  2.0-3.0   TTR:  49.3% (1 y)   Target end date:  Indefinite   Send INR reminders to:  [facility]    Indications    Long term  current use of anticoagulant therapy [Z79.01]  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

## 2025-06-24 ENCOUNTER — RESULTS FOLLOW-UP (OUTPATIENT)
Dept: ANTICOAGULATION | Facility: CLINIC | Age: 85
End: 2025-06-24

## 2025-06-24 ENCOUNTER — ANTICOAGULATION THERAPY VISIT (OUTPATIENT)
Dept: ANTICOAGULATION | Facility: CLINIC | Age: 85
End: 2025-06-24

## 2025-06-24 ENCOUNTER — LAB (OUTPATIENT)
Dept: LAB | Facility: CLINIC | Age: 85
End: 2025-06-24
Payer: COMMERCIAL

## 2025-06-24 DIAGNOSIS — I63.9 CEREBELLAR INFARCTION (H): ICD-10-CM

## 2025-06-24 DIAGNOSIS — I63.9 CEREBROVASCULAR ACCIDENT INVOLVING CEREBELLUM (H): ICD-10-CM

## 2025-06-24 DIAGNOSIS — Z79.01 LONG TERM CURRENT USE OF ANTICOAGULANT THERAPY: Primary | ICD-10-CM

## 2025-06-24 LAB — INR BLD: 2.9 (ref 0.9–1.1)

## 2025-06-24 PROCEDURE — 85610 PROTHROMBIN TIME: CPT

## 2025-06-24 PROCEDURE — 36416 COLLJ CAPILLARY BLOOD SPEC: CPT

## 2025-06-24 NOTE — PROGRESS NOTES
ANTICOAGULATION MANAGEMENT     Salome Saeed 85 year old female is on warfarin with therapeutic INR result. (Goal INR 2.0-3.0)    Recent labs: (last 7 days)     06/24/25  1057   INR 2.9*       ASSESSMENT     Warfarin Lab Questionnaire    Warfarin Doses Last 7 Days  Did not increase Saturday dose as advised (took 2.5 mg instead of 3.75 mg) - will keep this plan as INR increased significantly in the last week (though booster from 6/17 impacting as well). Shown as a 5.3% decrease today.        6/24/2025   Warfarin Lab Questionnaire   Missed doses within past 14 days? No   Changes in diet or alcohol within past 14 days? No   Medication changes since last result? No   Injuries or illness since last result? No   New shortness of breath, severe headaches or sudden changes in vision since last result? No   Abnormal bleeding since last result? No   Upcoming surgery, procedure? No     Previous result: Subtherapeutic  Additional findings: None       PLAN     Recommended plan for temporary change(s) affecting INR     Dosing Instructions: Continue your current warfarin dose (as you've been taking) with next INR in 2 weeks       Summary  As of 6/24/2025      Full warfarin instructions:  2.5 mg every Mon, Thu, Sat; 3.75 mg all other days   Next INR check:  7/8/2025               Telephone call with Loreta who verbalizes understanding and agrees to plan    Lab visit scheduled    Education provided: Please call back if any changes to your diet, medications or how you've been taking warfarin  Taking warfarin: thorough review of current maintenance dose    Plan made per [facility] anticoagulation protocol    Neeru Martinez RN  6/24/2025  Anticoagulation Clinic  [employer] [facility] for routing messages: yobany [facility] patient phone line: [phone]        _______________________________________________________________________     Anticoagulation Episode Summary       Current INR goal:  2.0-3.0   TTR:  49.8% (1 y)   Target  end date:  Indefinite   Send INR reminders to:  [facility]    Indications    Long term current use of anticoagulant therapy [Z79.01]  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

## 2025-07-14 DIAGNOSIS — F41.9 ANXIETY: ICD-10-CM

## 2025-07-14 RX ORDER — ESCITALOPRAM OXALATE 5 MG/1
5 TABLET ORAL DAILY
Qty: 90 TABLET | Refills: 0 | Status: SHIPPED | OUTPATIENT
Start: 2025-07-14

## 2025-07-15 ENCOUNTER — RESULTS FOLLOW-UP (OUTPATIENT)
Dept: ANTICOAGULATION | Facility: CLINIC | Age: 85
End: 2025-07-15

## 2025-07-15 ENCOUNTER — LAB (OUTPATIENT)
Dept: LAB | Facility: CLINIC | Age: 85
End: 2025-07-15
Payer: COMMERCIAL

## 2025-07-15 ENCOUNTER — ANTICOAGULATION THERAPY VISIT (OUTPATIENT)
Dept: ANTICOAGULATION | Facility: CLINIC | Age: 85
End: 2025-07-15

## 2025-07-15 DIAGNOSIS — I63.9 CEREBELLAR INFARCTION (H): ICD-10-CM

## 2025-07-15 DIAGNOSIS — Z79.01 LONG TERM CURRENT USE OF ANTICOAGULANT THERAPY: Primary | ICD-10-CM

## 2025-07-15 DIAGNOSIS — I63.9 CEREBROVASCULAR ACCIDENT INVOLVING CEREBELLUM (H): ICD-10-CM

## 2025-07-15 LAB — INR BLD: 2.7 (ref 0.9–1.1)

## 2025-07-15 PROCEDURE — 85610 PROTHROMBIN TIME: CPT

## 2025-07-15 PROCEDURE — 36416 COLLJ CAPILLARY BLOOD SPEC: CPT

## 2025-07-15 NOTE — PROGRESS NOTES
ANTICOAGULATION MANAGEMENT     Salome Saeed 85 year old female is on warfarin with therapeutic INR result. (Goal INR 2.0-3.0)    Recent labs: (last 7 days)     07/15/25  1122   INR 2.7*       ASSESSMENT     Warfarin Lab Questionnaire    Warfarin Doses Last 7 Days    Warfarin dose confirmed with patient and taken as directed          7/15/2025   Warfarin Lab Questionnaire   Missed doses within past 14 days? No   Changes in diet or alcohol within past 14 days? No   Medication changes since last result? No   Injuries or illness since last result? No   New shortness of breath, severe headaches or sudden changes in vision since last result? No   Abnormal bleeding since last result? No   Upcoming surgery, procedure? No     Previous result: Therapeutic last visit; previously outside of goal range  Additional findings: None       PLAN     Recommended plan for no diet, medication or health factor changes affecting INR     Dosing Instructions: Continue your current warfarin dose with next INR in 4 weeks       Summary  As of 7/15/2025      Full warfarin instructions:  2.5 mg every Mon, Thu, Sat; 3.75 mg all other days   Next INR check:  8/12/2025               Telephone call with Loreta's , Lacho, who agrees to plan and repeated back plan correctly    Lab visit scheduled    Education provided: Please call back if any changes to your diet, medications or how you've been taking warfarin    Plan made per [facility] anticoagulation protocol    Sima Bonilla RN  7/15/2025  Anticoagulation Clinic  [facility] for routing messages: p [facility] [place]  [facility] patient phone line: [phone]        _______________________________________________________________________     Anticoagulation Episode Summary       Current INR goal:  2.0-3.0   TTR:  54.4% (1 y)   Target end date:  Indefinite   Send INR reminders to:  [facility] [place] [place]    Indications    Long term current use of anticoagulant therapy [Z79.01]  Cerebrovascular accident  involving cerebellum (H) [I63.9]  Cerebellar infarction (H) [I63.9]             Comments:  --             Anticoagulation Care Providers       Provider Role Specialty Phone number    Surya Dyer,  Referring [facility] [phone]

## 2025-07-30 NOTE — PROGRESS NOTES
11/22/21 1200   Quick Adds   Type of Visit Initial OP PT Evaluation   General Information   Start of Care Date 11/22/21   Referring Physician Pj Keene MD   Orders Evaluate and Treat as Indicated  (Neck pain, BIleteral UE strengthening and balance therapy)   Order Date 08/03/21   Medical Diagnosis Cervical stenosis of spinal canal, neck pain   Onset of illness/injury or Date of Surgery 08/03/21   Surgical/Medical history reviewed Yes   Pertinent history of current problem (include personal factors and/or comorbidities that impact the POC) Patient is a 72 year old female being seen for cervical stenosis of spinal canal and neck pain. PMH significant for parkinsons disease dx in 7/2021, heart problems with 3 stents, L TKA, CVA 7 years ago, and two lumbar spinal fusions. Patient began having neck pain in 2020, primarily in inferior cervical spine but extends to B shoulders with increase in severity. Patient is currently most limited in walking by neck pain as pain increases to 10/10 with walking. Patient also reports gait changes 2/2 parkinsons disease with difficulty with unstable surfaces, slopes, and displaying gait path deviations.   Prior level of function comment walking 15 minutes every other day, able to cook, clean, laundry, and shopping without being limited by pain   Previous/Current Treatment Physical Therapy;Chiropractic  (manual therapy, traction, in 2020)   Improvement after PT Mild  (temporary improvement)   Improvement after Chiropractic Tx Mild  (temporary improvement)   Current Community Support Family/friend caregiver   Patient role/Employment history Retired  (cares for two dogs)   [facility]  (2 level home, no difficulty with stairs but rarely completes)   Home/Community Accessibility Comments ambulates in home unassisted,  currently completes grovery shopping   Current Assistive Devices Standard Cane;Walker   ADL Devices Shower/Tub Chair     Assistive Devices Comments Uses cane in community, no AD in home, occassional need to touch furniture for balance in home   Patient/Family Goals Statement To be able to complete cleaning and cooking and walking regularily without being limited by pain   Fall Risk Screen   Fall screen completed by PT   Have you fallen 2 or more times in the past year? Yes   Have you fallen and had an injury in the past year? Yes   Is patient a fall risk? Yes   Abuse Screen (yes response referral indicated)   Feels Unsafe at Home or Work/School no   Feels Threatened by Someone no   Does Anyone Try to Keep You From Having Contact with Others or Doing Things Outside Your Home? no   Pain   Patient currently in pain Yes   Pain location center lower cervical spine, moves to B shoulders L>R when at worse   Pain rating 3/10 at rest, 10/10 with walking, cleaning   Pain description Ache;Throbbing   Vitals Signs   Heart Rate 63   SpO2 98   Blood Pressure 109/61  (L arm seated, automatic cuff)   Vital Signs Comments 90/53 mmHg on L arm seated after supine exercises    Cognitive Status Examination   Level of Consciousness alert   Follows Commands and Answers Questions able to follow multistep instructions   Observation   Observation Patient amb to clinic with single point cane   Integumentary   Integumentary No deficits were identified   Posture   Posture Comments Wall to occiput 13 cm, forward head posture, rounded shoulders   Range of Motion (ROM)   ROM Comment Apley back scratch test R arm over 2 inchs, L arm over 5 inches, Cervical AROM rotation 50% of full motion to R with painful \"creak\" noted by patient, 75% of full motion to L   Strength   Strength Comments UE screen: shoulder flexion 5/5, B shoulder abduction 4/5, B shoulder IR 5/5, B shoulder ER 3+/5, B elbow flexion 5/5   Gait Special Tests 25 Foot Timed Walk   Seconds 14.03   Comments completed with no AD   Balance Special Tests Modified CTSIB Conditions   Condition 1, seconds 30 " Seconds   Condition 2, seconds 30 Seconds   Condition 4, seconds 30 Seconds  (increased ankle and lateral sway, assist to position)   Condition 5, seconds 0 Seconds   Coordination   Coordination no deficits were identified   Coordination Comments not formally tested   Muscle Tone   Muscle Tone no deficits were identified   Planned Therapy Interventions   Planned Therapy Interventions balance training;gait training;neuromuscular re-education;ROM;strengthening;stretching   Clinical Impression   Criteria for Skilled Therapeutic Interventions Met yes, treatment indicated   PT Diagnosis neck pain with mobility deficit and balance dysfunction   Influenced by the following impairments postural abnormality, range of motion defecits, UE weakness, static balance impairment   Functional limitations due to impairments gait abnormality requiring AD in home and community, unable to ambulate community distances   Clinical Presentation Evolving/Changing   Clinical Presentation Rationale neck pain with multiple previous spinal surgeries and additional parkinsons disease diagnosis   Clinical Decision Making (Complexity) Moderate complexity   Therapy Frequency 2 times/Week  (for 4 weeks, 1x/week for 5 weeks)   Predicted Duration of Therapy Intervention (days/wks) 9 weeks   Risk & Benefits of therapy have been explained Yes   Patient, Family & other staff in agreement with plan of care Yes   Education Assessment   Preferred Learning Style Listening;Reading;Demonstration;Pictures/video   Goal 1   Goal Identifier Pain   Goal Description Patient will 15 minutes of ambulation with reports of neck pain <5/10 in order to return to regular walking exercise program.   Target Date 01/24/22   Goal 2   Goal Identifier Posture   Goal Description Patient will display less than 2 inches bilaterally on apley's scratch test and less than 8 cm on wall to occiput distance in order to maintain greater upright posture and tolerance of cleaning around  home.    Target Date 01/24/22   Goal 3   Goal Identifier Balance   Goal Description Patient will complete 30 seconds on all conditions of mCTSIB with minimal ankle and hip motion in order to display improved balance on unstable surfaces for outdoor ambulation.   Target Date 01/24/22   Goal 4   Goal Identifier HEP   Goal Description Patient will be independent in HEP displaying correct mechanics in order to discharge to independent management of impairments   Target Date 01/24/22   Total Evaluation Time   PT Eval, Moderate Complexity Minutes ([id number]) 35   Therapy Certification   Certification date from 11/22/21   Certification date to 01/24/22   Medical Diagnosis Cervical stenosis of spinal canal, neck pain   Certification I certify the need for these services furnished under this plan of treatment and while under my care.  (Physician co-signature of this document indicates review and certification of the therapy plan).

## 2025-08-12 ENCOUNTER — LAB (OUTPATIENT)
Dept: LAB | Facility: CLINIC | Age: 85
End: 2025-08-12
Payer: COMMERCIAL

## 2025-08-12 ENCOUNTER — ANTICOAGULATION THERAPY VISIT (OUTPATIENT)
Dept: ANTICOAGULATION | Facility: CLINIC | Age: 85
End: 2025-08-12

## 2025-08-12 DIAGNOSIS — I25.10 CORONARY ARTERY DISEASE DUE TO CALCIFIED CORONARY LESION: ICD-10-CM

## 2025-08-12 DIAGNOSIS — I63.9 CEREBROVASCULAR ACCIDENT INVOLVING CEREBELLUM (H): ICD-10-CM

## 2025-08-12 DIAGNOSIS — Z13.6 SCREENING FOR CARDIOVASCULAR CONDITION: Primary | ICD-10-CM

## 2025-08-12 DIAGNOSIS — I63.9 CEREBELLAR INFARCTION (H): ICD-10-CM

## 2025-08-12 DIAGNOSIS — I25.84 CORONARY ARTERY DISEASE DUE TO CALCIFIED CORONARY LESION: ICD-10-CM

## 2025-08-12 DIAGNOSIS — Z79.01 LONG TERM CURRENT USE OF ANTICOAGULANT THERAPY: Primary | ICD-10-CM

## 2025-08-12 LAB — INR BLD: 2.5 (ref 0.9–1.1)

## 2025-08-12 PROCEDURE — 85610 PROTHROMBIN TIME: CPT

## 2025-08-12 PROCEDURE — 36416 COLLJ CAPILLARY BLOOD SPEC: CPT

## 2025-08-14 DIAGNOSIS — I63.9 CEREBROVASCULAR ACCIDENT INVOLVING CEREBELLUM (H): ICD-10-CM

## 2025-08-14 DIAGNOSIS — Z79.01 LONG TERM CURRENT USE OF ANTICOAGULANT THERAPY: ICD-10-CM

## 2025-08-14 RX ORDER — WARFARIN SODIUM 2.5 MG/1
2.5-3.75 TABLET ORAL EVERY EVENING
Qty: 120 TABLET | Refills: 1 | Status: SHIPPED | OUTPATIENT
Start: 2025-08-14

## 2025-08-20 ENCOUNTER — TELEPHONE (OUTPATIENT)
Dept: FAMILY MEDICINE | Facility: CLINIC | Age: 85
End: 2025-08-20
Payer: COMMERCIAL

## (undated) DEVICE — HEMOSTAT COMPRESSION VASC REGULAR OD24 CM 3524

## (undated) DEVICE — BONE CEMENT MIXEVAC III HI VAC KIT  0206-015-000

## (undated) DEVICE — LINEN HALF SHEET 5512

## (undated) DEVICE — CAST PADDING 6" STERILE 9046S

## (undated) DEVICE — Device

## (undated) DEVICE — SOL WATER IRRIG 1000ML BOTTLE 2F7114

## (undated) DEVICE — TOURNIQUET CUFF 30" REPRO BLUE 60-7070-105

## (undated) DEVICE — CATH JACKY 5FR 3.5 CURVE 40-5023

## (undated) DEVICE — DRSG ABDOMINAL 07 1/2X8" 7197D

## (undated) DEVICE — GLOVE PROTEXIS POWDER FREE 7.5 ORTHOPEDIC 2D73ET75

## (undated) DEVICE — INTRO GLIDESHEATH SLENDER 6FR 10X45CM 60-1060

## (undated) DEVICE — SU VICRYL 2-0 CT-1 27" UND J259H

## (undated) DEVICE — BAG CLEAR TRASH 1.3M 39X33" P4040C

## (undated) DEVICE — PACK TOTAL KNEE BOXED LATEX FREE PO15TKFCT

## (undated) DEVICE — LINEN ORTHO ACL PACK 5447

## (undated) DEVICE — WIRE GUIDE 0.035"X260CM SAFE-T-J EXCHANGE G00517

## (undated) DEVICE — DRSG ADAPTIC 3X8" 6113

## (undated) DEVICE — 4IN X 6IN PADPRO RADIOTRANSPARENT ELECTRODE DEFRIBILLATOR ADHESIVE PAD, ADULT

## (undated) DEVICE — LINEN FULL SHEET 5511

## (undated) DEVICE — CATH ANGIO INFINITI JL3.5 4FRX100CM 538418

## (undated) DEVICE — SET HANDPIECE INTERPULSE W/COAXIAL FAN SPRAY TIP 0210118000

## (undated) DEVICE — SUCTION MANIFOLD NEPTUNE 2 SYS 4 PORT 0702-020-000

## (undated) DEVICE — CAST PADDING 6" UNSTERILE 9046

## (undated) DEVICE — KIT HAND CONTROL ANGIOTOUCH ACIST 65CM AT-P65

## (undated) DEVICE — MANIFOLD KIT ANGIO AUTOMATED 014613

## (undated) DEVICE — GLOVE PROTEXIS BLUE W/NEU-THERA 8.0  2D73EB80

## (undated) DEVICE — DRAIN HEMOVAC RESERVOIR KIT 10FR 1/8" MED 00-2550-002-10

## (undated) DEVICE — SU VICRYL 1 MO-4 18" J702D

## (undated) DEVICE — DRSG GAUZE 4X4" TRAY

## (undated) DEVICE — BLADE SAW SAGITTAL STRK 18X90X1.27MM HD SYS 6 6118-127-090

## (undated) RX ORDER — TRANEXAMIC ACID 10 MG/ML
INJECTION, SOLUTION INTRAVENOUS
Status: DISPENSED
Start: 2020-06-01

## (undated) RX ORDER — ACETAMINOPHEN 500 MG
TABLET ORAL
Status: DISPENSED
Start: 2020-06-01

## (undated) RX ORDER — CEFAZOLIN SODIUM 2 G/100ML
INJECTION, SOLUTION INTRAVENOUS
Status: DISPENSED
Start: 2020-06-01

## (undated) RX ORDER — GLYCOPYRROLATE 0.2 MG/ML
INJECTION INTRAMUSCULAR; INTRAVENOUS
Status: DISPENSED
Start: 2020-06-01

## (undated) RX ORDER — LABETALOL 20 MG/4 ML (5 MG/ML) INTRAVENOUS SYRINGE
Status: DISPENSED
Start: 2020-06-01

## (undated) RX ORDER — PROPOFOL 10 MG/ML
INJECTION, EMULSION INTRAVENOUS
Status: DISPENSED
Start: 2020-06-01

## (undated) RX ORDER — FENTANYL CITRATE 50 UG/ML
INJECTION, SOLUTION INTRAMUSCULAR; INTRAVENOUS
Status: DISPENSED
Start: 2020-06-01

## (undated) RX ORDER — GABAPENTIN 100 MG/1
CAPSULE ORAL
Status: DISPENSED
Start: 2020-06-01

## (undated) RX ORDER — NEOSTIGMINE METHYLSULFATE 1 MG/ML
VIAL (ML) INJECTION
Status: DISPENSED
Start: 2020-06-01

## (undated) RX ORDER — DEXAMETHASONE SODIUM PHOSPHATE 4 MG/ML
INJECTION, SOLUTION INTRA-ARTICULAR; INTRALESIONAL; INTRAMUSCULAR; INTRAVENOUS; SOFT TISSUE
Status: DISPENSED
Start: 2020-06-01

## (undated) RX ORDER — LIDOCAINE HYDROCHLORIDE 10 MG/ML
INJECTION, SOLUTION EPIDURAL; INFILTRATION; INTRACAUDAL; PERINEURAL
Status: DISPENSED
Start: 2020-06-01

## (undated) RX ORDER — ONDANSETRON 2 MG/ML
INJECTION INTRAMUSCULAR; INTRAVENOUS
Status: DISPENSED
Start: 2020-06-01